# Patient Record
Sex: FEMALE | Race: WHITE | NOT HISPANIC OR LATINO | Employment: OTHER | URBAN - METROPOLITAN AREA
[De-identification: names, ages, dates, MRNs, and addresses within clinical notes are randomized per-mention and may not be internally consistent; named-entity substitution may affect disease eponyms.]

---

## 2017-01-11 ENCOUNTER — OFFICE VISIT (OUTPATIENT)
Dept: PHYSICAL MEDICINE AND REHAB | Facility: CLINIC | Age: 82
End: 2017-01-11
Payer: MEDICARE

## 2017-01-11 VITALS
DIASTOLIC BLOOD PRESSURE: 72 MMHG | HEIGHT: 65 IN | SYSTOLIC BLOOD PRESSURE: 165 MMHG | BODY MASS INDEX: 25.34 KG/M2 | WEIGHT: 152.13 LBS | RESPIRATION RATE: 14 BRPM | HEART RATE: 57 BPM

## 2017-01-11 DIAGNOSIS — M54.16 CHRONIC LUMBAR RADICULOPATHY: Primary | ICD-10-CM

## 2017-01-11 PROCEDURE — 99999 PR PBB SHADOW E&M-EST. PATIENT-LVL III: CPT | Mod: PBBFAC,,, | Performed by: PHYSICAL MEDICINE & REHABILITATION

## 2017-01-11 PROCEDURE — 1160F RVW MEDS BY RX/DR IN RCRD: CPT | Mod: S$GLB,,, | Performed by: PHYSICAL MEDICINE & REHABILITATION

## 2017-01-11 PROCEDURE — 1159F MED LIST DOCD IN RCRD: CPT | Mod: S$GLB,,, | Performed by: PHYSICAL MEDICINE & REHABILITATION

## 2017-01-11 PROCEDURE — 99214 OFFICE O/P EST MOD 30 MIN: CPT | Mod: S$GLB,,, | Performed by: PHYSICAL MEDICINE & REHABILITATION

## 2017-01-11 PROCEDURE — 3078F DIAST BP <80 MM HG: CPT | Mod: S$GLB,,, | Performed by: PHYSICAL MEDICINE & REHABILITATION

## 2017-01-11 PROCEDURE — 1157F ADVNC CARE PLAN IN RCRD: CPT | Mod: S$GLB,,, | Performed by: PHYSICAL MEDICINE & REHABILITATION

## 2017-01-11 PROCEDURE — 3077F SYST BP >= 140 MM HG: CPT | Mod: S$GLB,,, | Performed by: PHYSICAL MEDICINE & REHABILITATION

## 2017-01-11 PROCEDURE — 1125F AMNT PAIN NOTED PAIN PRSNT: CPT | Mod: S$GLB,,, | Performed by: PHYSICAL MEDICINE & REHABILITATION

## 2017-01-11 RX ORDER — MELOXICAM 7.5 MG/1
TABLET ORAL
COMMUNITY
Start: 2016-12-10 | End: 2017-01-11

## 2017-01-11 RX ORDER — GABAPENTIN 300 MG/1
300 CAPSULE ORAL 3 TIMES DAILY
Qty: 90 CAPSULE | Refills: 1 | Status: SHIPPED | OUTPATIENT
Start: 2017-01-11 | End: 2017-10-09 | Stop reason: SDUPTHER

## 2017-01-11 NOTE — MR AVS SNAPSHOT
[facility]iat[facility]  [address]  [place] [address]  Phone: [phone]  Fax: [phone]                  Clau Nunn   2017 12:40 PM   Office Visit    Description:  Female : 1935   Provider:  Elicia Mao MD   Department:  [facility] Physiat[facility]           Reason for Visit     Back Pain     Numbness           Diagnoses this Visit        Comments    Chronic lumbar radiculopathy    -  Primary            To Do List           Future Appointments        Provider Department Dept Phone    2017 8:00 AM Dona Manjarrez MD [facility] Rheumatology [phone]    2017 1:40 PM Jeanette Molina MD [facility] Internal Medicine [phone]      Goals (5 Years of Data)     None       These Medications        Disp Refills Start End    gabapentin (NEURONTIN) 300 MG capsule 90 capsule 1 2017     Take 1 capsule (300 mg total) by mouth 3 (three) times daily. - Oral    Pharmacy: [id number] - [place], [place] - [address] [facility] #: [phone]         [facility]     [facility] -  Assistance  Registered nurses in the [facility] provide clinical advisement, health education, appointment booking, and other advisory services.  Call for this free service at [phone].             Medications           Message regarding Medications     Verify the changes and/or additions to your medication regime listed below are the same as discussed with your clinician today.  If any of these changes or additions are incorrect, please notify your healthcare provider.        START taking these NEW medications        Refills    gabapentin (NEURONTIN) 300 MG capsule 1    Sig: Take 1 capsule (300 mg total) by mouth 3 (three) times daily.    Class: Normal    Route: Oral      STOP taking these medications     zolpidem (AMBIEN) 10 mg Tab TAKE 1 TABLET EVERY NIGHT AT BEDTIME AS NEEDED           Verify that the below list of medications is an  "accurate representation of the medications you are currently taking.  If none reported, the list may be blank. If incorrect, please contact your healthcare provider. Carry this list with you in case of emergency.           Current Medications     albuterol 90 mcg/actuation inhaler Inhale 2 puffs into the lungs 4 (four) times daily as needed.    alprazolam (XANAX) 0.25 MG tablet TAKE 1 TABLET BY MOUTH EVERY 6 HOURS AS NEEDED FOR ACUTE ANXIETY    ammonium lactate (AMLACTIN) 12 % lotion Apply to damp skin daily.    azelastine (ASTELIN) 137 mcg (0.1 %) nasal spray     cycloSPORINE (RESTASIS) 0.05 % ophthalmic emulsion Place 0.05 mLs (1 drop total) into both eyes 2 (two) times daily.    escitalopram oxalate (LEXAPRO) 20 MG tablet Take 1 tablet (20 mg total) by mouth once daily.    fluticasone (FLONASE) 50 mcg/actuation nasal spray USE 2 SPRAYS IN EACH NOSTRIL EVERY DAY    levothyroxine (SYNTHROID) 88 MCG tablet Take 1 tablet (88 mcg total) by mouth once daily.    losartan-hydrochlorothiazide 50-12.5 mg (HYZAAR) 50-12.5 mg per tablet TAKE 1 TABLET ONE TIME DAILY    meloxicam (MOBIC) 15 MG tablet TAKE ONE TABLET BY MOUTH DAILY    omeprazole (PRILOSEC) 20 MG capsule Take 2 capsules (40 mg total) by mouth 2 (two) times daily.    temazepam (RESTORIL) 15 mg Cap     cholecalciferol, vitamin D3, 50,000 unit capsule Take 1 capsule (50,000 Units total) by mouth once a week.    diclofenac sodium (VOLTAREN) 1 % Gel Apply 2 g topically 3 (three) times daily.    gabapentin (NEURONTIN) 300 MG capsule Take 1 capsule (300 mg total) by mouth 3 (three) times daily.    tolterodine (DETROL) 2 MG Tab Take 2 mg by mouth once daily.           Clinical Reference Information           Vital Signs - Last Recorded  Most recent update: 1/11/2017 12:44 PM by Glenys Acosta LPN    BP Pulse Resp Ht Wt BMI    (!) 165/72 (!) 57 14 5' 5" (1.651 m) 69 kg (152 lb 1.9 oz) 25.31 kg/m2      Blood Pressure          Most Recent Value    BP  (!)  165/72    "   Allergies as of 1/11/2017     No Known Drug Allergies      Immunizations Administered on Date of Encounter - 1/11/2017     None      Instructions      Exercises to Strengthen Your Lower Back  Strong lower back and abdominal muscles work together to support your spine. The exercises below will help strengthen the lower back. It is important that you begin exercising slowly and increase levels gradually.  Always begin any exercise program with stretching. If you feel pain while doing any of these exercises, stop and talk to your doctor about a more specific exercise program that better suits your condition.   Low back stretch  The point of stretching is to make you more flexible and increase your range of motion. Stretch only as much as you are able. Stretch slowly. Do not push your stretch to the limit. If at any point you feel pain while stretching, this is your (temporary) limit.  · Lie on your back with your knees bent and both feet on the ground.  · Slowly raise your left knee to your chest as you flatten your lower back against the floor. Hold for 5 seconds.  · Relax and repeat the exercise with your right knee.  · Do 10 of these exercises for each leg.  · Repeat hugging both knees to your chest at the same time.  Building lower back strength  Start your exercise routine with 10 to 30 minutes a day, 1 to 3 times a day.  Initial exercises  Lying on your back:  1. Ankle pumps: Move your foot up and down, towards your head, and then away. Repeat 10 times with each foot.  2. Heel slides: Slowly bend your knee, drawing the heel of your foot towards you. Then slide your heel/foot from you, straightening your knee. Do not lift your foot off the floor (this is not a leg lift).  3. Abdominal contraction: Bend your knees and put your hands on your stomach. Tighten your stomach muscles. Hold for 5 seconds, then relax. Repeat 10 times.  4. Straight leg raise: Bend one leg at the knee and keep the other leg straight.  Tighten your stomach muscles. Slowly lift your straight leg 6 to 12 inches off the floor and hold for up to 5 seconds. Repeat 10 times on each side.  Standin. Wall squats: Stand with your back against the wall. Move your feet about 12 inches away from the wall. Tighten your stomach muscles, and slowly bend your knees until they are at about a 45 degree angle. Do not go down too far. Hold about 5 seconds. Then slowly return to your starting position. Repeat 10 times.  2. Heel raises: Stand facing the wall. Slowly raise the heels of your feet up and down, while keeping your toes on the floor. If you have trouble balancing, you can touch the wall with your hands. Repeat 10 times.  More advanced exercises  When you feel comfortable enough, try these exercises.  1. Kneeling lumbar extension: Begin on your hands and knees. At the same time, raise and straighten your right arm and left leg until they are parallel to the ground. Hold for 2 seconds and come back slowly to a starting position. Repeat with left arm and right leg, alternating 10 times.  2. Prone lumbar extension: Lie face down, arms extended overhead, palms on the floor. At the same time, raise your right arm and left leg as high as comfortably possible. Hold for 10 seconds and slowly return to start. Repeat with left arm and right leg, alternating 10 times. Gradually build up to 20 times. (Advanced: Repeat this exercise raising both arms and both legs a few inches off the floor at the same time. Hold for 5 seconds and release.)  3. Pelvic tilt: Lie on the floor on your back with your knees bent at 90 degrees. Your feet should be flat on the floor. Inhale, exhale, then slowly contract your abdominal muscles bringing your navel toward your spine. Let your pelvis rock back until your lower back is flat on the floor. Hold for 10 seconds while breathing smoothly.  4. Abdominal crunch: Perform a pelvic tilt (above) flattening your lower back against the floor.  Holding the tension in your abdominal muscles, take another breath and raise your shoulder blades off the ground (this is not a full sit-up). Keep your head in line with your body (dont bend your neck forward). Hold for 2 seconds, then slowly lower.  © 1949-3815 [employer]. [address] [place] [address]. All rights reserved. This information is not intended as a substitute for professional medical care. Always follow your healthcare professional's instructions.        Back Safety: Pushing and Pulling  Pushing can be hard on your back. Pulling can be even harder. So, push rather than pull when you can. Follow the tips on this sheet to help protect your back.    Pushing a light object  · Bend your knees slightly. Keep your ears, shoulders, and hips in line.  · Tighten your stomach muscles.  · Lean in slightly toward the object youre pushing.  · Use your legs and the weight of your body to move the object.  · Take small steps.    Pushing a heavy object  · Tighten your stomach muscles.  · Bend your knees.  · Lean in toward the object youre pushing. The heavier the object, the more you should lean.  · Try not to hunch your back: Keep it straight.  · Use your legs and the weight of your body to move the object.  · Take small steps.    Pulling  · Face the object youre pulling.  · Keep your knees slightly bent.  · Step backward and pull the object with you.  · Dont twist your body. If youre using one hand, putting the other hand on your hip can help keep you from twisting.  · When pulling heavy objects, lean back, bending at the knees and hips. Keep your arms straight. Let your body weight pull the load.  © 4604-6789 [employer]. [address] [place] [address]. All rights reserved. This information is not intended as a substitute for professional medical care. Always follow your healthcare professional's instructions.        Back Safety: Basics of Good Posture  Good  posture helps protect you from injury. It also increases your comfort. Aim for good posture throughout the day.    Check your posture  The human body works best when it is properly aligned. To improve your standing posture, follow these steps:  · Take a moment to close your eyes and feel your body. Then breathe deeply and relax your shoulders, hips, and knees.  · Now, from the very top of your head, lift up just a bit. Think of a line linking your ears, shoulders, hips, and ankles. Adjust your body to follow the line. You may need to relax your hips and tuck your buttocks under a bit.  · Next, take a look at yourself in a mirror. Is one ear, shoulder, or hip higher than the other? They should be level.  Check how you sit  When you sit properly, pressure on your back is reduced. Try these steps:  · Sit so that the curve of your lower back fits easily against the chair. Keep your gaze level.  · Support your feet. They should be flat on the floor or on a footrest. Your knees should be level with your hips.  · Adjust the chair height as needed. Sit so your forearms are level with the work surface.  Proper posture helps  When your back is aligned, its more likely to stay safe throughout the day.  · Standing in place. Rest one foot on a stool or low box to ease pressure on your lower back. Switch feet often. If you can, adjust the height of your work surface so your neck and shoulders arent under strain.  · Driving. Sit close enough to the steering wheel to keep your knees slightly bent. For comfort, your knees should be level with your hips or just a bit lower. Sit as straight as you can. The curve of your lower back should be fully supported.  · Walking. Stand tall and walk with your head up. Let your arms swing while you walk. This helps relax muscles. Wear shoes that fit and support your feet. If you will be standing or walking for a long time, dont wear high heels.  · Sitting and sleeping. Choose your furniture  with care. Make sure its not causing or increasing your back pain. Chairs should allow for comfortable, correct sitting posture. Use pillows for added support if needed. Your bed should support your backs natural curves without being too hard or too soft.  © 5125-1608 [employer]. [address] [place] [address]. All rights reserved. This information is not intended as a substitute for professional medical care. Always follow your healthcare professional's instructions.        Back Safety: Poor Posture Hurts  An unhealthy spine often starts with bad habits. Poor movement patterns and posture problems are common causes of back pain. Disk, bone, nerve, and soft tissue problems can all be affected by poor posture. They can lead to pain, stiffness, and other symptoms.    Poor posture backfires  Poor posture can cause pain. Too much slouching puts increased pressure on the disks. An excessive lumbar curve can overload and inflame the vertebrae. As a result, the back muscles may tighten or spasm to splint and protect the spine. This adds to the pain you feel.    Proper posture: The key to safe movement  Your spine bears your weight throughout the day. This is true whether youre sleeping, standing, or bending. Certain positions strain your spine more than others. But by maintaining proper posture in all positions, you can reduce the stress on your spine.       To improve your standing posture, follow these steps:  · Breathe deeply.  · Relax your shoulders, hips, and ankles. · Think of the ears, shoulders, hips, and ankles as a series of dots. Now, adjust your body to connect the dots in a straight line.  · Tuck your buttocks in just a bit if you need to.      © 5578-1881 [employer]. [address] [place] [address]. All rights reserved. This information is not intended as a substitute for professional medical care. Always follow your healthcare professional's  instructions.        Caring for Your Back Throughout the Day  Take care of your back throughout the day. You will likely have fewer back problems if you do. Try to warm up before you move. Shift positions often. Also do your best to form healthy habits.    Warm up for the day  Do a few slow, catlike stretches before starting your day. This simple warmup can soften your disks, stretch your back muscles, and help prevent injuries.  Shift positions often  At work and at home, change positions often. This helps keep your body from getting stiff. Stand up or lean back while you sit. If you can, get up and move every 1/2 hour.  Form healthy habits  Here are some suggestions:   · Keep a healthy weight. When you weigh too much, your back is under excess strain. But losing just a few extra pounds can help a lot.  · Try not to overeat. Learn about serving sizes. The size of a serving depends on the food and the food group. Many foods list serving sizes on the labels.  · Handle minor aches with cold and heat. Apply cold the first 24 to 48 hours. Use heat after that. Always place a thin cloth between your skin and the source of cold or heat.  · Take medicines as directed. This helps keep pain under control. Always read labels, and call your healthcare provider or pharmacist if you have any questions.  Walk each day  A daily walk keeps your back and thigh muscles stretched and strong. This gives your back better support. Be sure to walk with your spines three curves aligned, by keeping your head, hips, and toes connected by a vertical line.   © 7682-6104 [employer]. [address] [place] [address], [place] [address]. All rights reserved. This information is not intended as a substitute for professional medical care. Always follow your healthcare professional's instructions.        Back Safety: Bending  Bending can strain or even injure your back. Follow the tips below to move safely and protect your back as you perform  everyday activities.  Bending over    · Keep your feet shoulder-width apart.  · Move your whole body as one unit.  · Bend at your hips and knees, not at your waist.  · Flatten your stomach and tighten your leg muscles.  · To keep your spine straight, let your buttocks move out behind you. Dont try to tuck them under.  · If you need to, place one hand on a sturdy object for support.     Bending to the floor  · Lower yourself to one knee. If you can, rest one hand on a sturdy object to help lower yourself.  · Rest one arm on your raised knee.  · Dont bend at the waist.  · Do not hunch your back or neck to reach to the floor. Instead, bend more at your hips and knees to get closer.  © 5565-2662 [employer]. [address] [place] [address]. All rights reserved. This information is not intended as a substitute for professional medical care. Always follow your healthcare professional's instructions.        Back Safety: Sleeping Positions  Good posture protects your back when you sit, stand, and walk. It is also important while sleeping. Keep your ears, shoulders, and hips in line. Try the tips below. Also, be sure to follow any guidelines from your health care provider.  If you lie on your back  Safe sleeping     Place pillows under your legs from your thighs to your ankles.     Ask your healthcare provider how firm your mattress should be.  · Find a position that keeps your back aligned and comfortable.  · Fill gaps between your body and the mattress with pillows.  · Never sleep on your back without bending your legs.  · Never sleep on your stomach.  If you lie on your side  Turning in bed     Support your upper body and top leg with pillows.    · If you change positions, you will need to move your pillows. This can become so natural that you hardly wake up.  · When you turn in bed, move your whole body as one unit.  · Tighten your stomach muscles. Bend your knees slightly toward your  chest.  · Roll to one side, keeping your ears, shoulders, and hips in line. Keep a pillow between your legs.  · Be careful not to bend or twist at the waist.  © 6135-5657 [employer]. [address] [place] [address], [place] [address]. All rights reserved. This information is not intended as a substitute for professional medical care. Always follow your healthcare professional's instructions.        Back Basics: A Healthy Spine  A healthy spine supports the body while letting it move freely. It does this with the help of three natural curves. Strong, flexible muscles help, too. They support the spine by keeping its curves properly aligned. The disks that cushion the bones of your spine also play a role in back fitness.    Three natural curves  The spine is made of bones (vertebrae) and pads of soft tissue (disks). These parts are arranged in three curves: cervical, thoracic, and lumbar. When properly aligned, these curves keep your body balanced. They also support your body when you move. By distributing your weight throughout your spine, the curves make back injuries less likely.  Strong, flexible muscles  Strong, flexible back muscles help support the three curves of the spine. They do so by holding the vertebrae and disks in proper alignment. Strong, flexible abdominal, hip, and leg muscles also reduce strain on the back.  The lumbar curve  The lumbar curve is the hardest-working part of the spine. It carries more weight and moves the most. Aligning this curve helps prevent damage to vertebrae, disks, and other parts of the spine.  Cushioning disks  Disks are the soft pads of tissue between the vertebrae. The disks absorb shock caused by movement. Each disk has a spongy center (nucleus) and a tougher outer ring (annulus). Movement within the nucleus allows the vertebrae to rock back and forth on the disks. This provides the flexibility needed to bend and move.       © 2000-2016 [employer] [facility] [place] [address]. All rights reserved. This information is not intended as a substitute for professional medical care. Always follow your healthcare professional's instructions.        Exercises to Prevent Falls  Certain types of exercises may help make you less likely to fall. Try the ones below. Or do other exercises that your health care provider suggests. Depending on your health, you may need to start slowly. Don't let that stop you. Even small amounts of exercise can help you. Be sure to talk to your health care provider before starting any exercise program.    Improve balance  Many types of exercise can help improve balance. Deion chi and yoga are good examples. Here's another one to try. You can do it anytime and almost anywhere.  · Stand next to a counter or solid support.  · Push yourself up onto your tiptoes.  · Hold for 5 seconds. If you start to lose your balance, hold on to the counter.  · Rest and repeat 5 times. Work up to holding for 20 to 30 seconds, if you can.    Increase flexibility  Being more flexible makes it easier for you to move around safely. Try exercises like the seated hamstring stretch.  · Sit in a chair and put one foot on a stool.  · Straighten your leg and reach with both hands down either side of your leg. Reach as far down your leg as you can.  · Hold for about 20 seconds.  · Go back to the starting position. Then repeat 5 times. Switch legs.    Build strength  "Resistance" exercises help build strength. You can do them without equipment. Or you can use weights, elastic bands, or special machines. One such exercise is called the biceps curl. You can hold a 1-pound weight or even a can of soup. Do this exercise at least 3 times a week. Strive for every day.  · Sit up straight in a chair.  · Keep your elbow close to your body and your wrist straight.  · Bend your arm, moving your hand up to your shoulder. Then slowly lower your arm.  · Repeat 5 times. Switch to " the other arm.    Build your staying power  Aerobic exercises make your heart and lungs stronger so you can keep moving longer. Walking and swimming are two of the best types of exercises you can do. Using a stationary bike is great, too. Find an aerobic exercise that you enjoy. Start slowly and build up. Even 5 minutes is helpful. Aim for a goal of 30 minutes, at least 3 times a week. You don't have to do 30 minutes in 1 session. Break it up and walk a little throughout the day.     More helpful tips  · Start easy. Slowly work up to doing more.  · Talk with your health care provider about the best exercises for you.  · Call senior centers or health clubs about exercise programs.  · If needed, have a family member watch you walk every so often to check your stability.  · Exercise with a friend. Choose an activity you both enjoy.  · Consider kaycee chi or yoga to strengthen your balance.  · Try exercises that you can do anytime, anywhere. Here are 2 examples. Have someone with you when you first try these:  ¨ Practice walking by placing 1 foot right in front of the other.  ¨ Stand up and sit down 10 times. Repeat this throughout the day.   © 1874-1481 [employer]. [address] [place] [address], [place] [address]. All rights reserved. This information is not intended as a substitute for professional medical care. Always follow your healthcare professional's instructions.

## 2017-01-11 NOTE — PROGRESS NOTES
PM&R NEW PATIENT HISTORY & PHYSICAL :    Referring Physician:    Chief Complaint   Patient presents with    Back Pain     low back pain, to the legs, burnumng    Numbness     bilateral leg numbness/tingling       HPI: This is a 81 y.o.  female being seen in clinic today for evaluation of chronic leg pain with numbness, tingling, and burning into her legs to her feet.  Her symptoms are worse at night when trying to rest.  She has tried multiple ESIs and other interventional procedures with Dr Palmer in the past without much relief.  She has a history of thoracic surgery due to major vertebral fracture after [place] [facility] [place]  She denies major back pain other than stiffness.  Her major complaint is leg numbness tingling and limited mobility.  She tries to remain as active as possible and feels her legs are limiting her.     History obtained from patient    Functional History:  Walking: limited at times  Transfers: Independent  Assistive devices: No  Power mobility: No  Falls: yes in past    Needs help with:  Nothing - all ADLS normal    Review of Systems:     General- denies lethargy, weight change, fever, chills  Head/neck- denies swallowing difficulties  ENT- denies hearing changes  Cardiovascular-denies chest pain  Pulmonary- denies shortness of breath  GI- denies constipation or bowel incontinence  - denies bladder incontinence  Skin- denies wounds or rashes  Musculoskeletal- +/-weakness, +pain  Neurologic- +numbness and tingling  Psychiatric- denies depressive or psychotic features, denies anxiety  Lymphatic-denies swelling  Endocrine- denies hypoglycemic symptoms/DM history    Physical Examination:  General: Well developed, well nourished female, NAD    Spinal Examination: CERVICAL  Active ROM is within normal limits.  Inspection: No deformity of spinal alignment.    Spinal Examination: LUMBAR or THORACIC  Active ROM is limited at endranges  Inspection: No deformity of spinal  alignment.  No palpable olisthesis.  Palpation: No vertebral tenderness to percussion.  Mild ttp at si joints and paraspinals  SLR Test (seated supine):negative  bilaterally  Able to stand on heels and toes    Bilateral Upper and Lower Extremities:  Pulses are 2+ at radial,bilaterally.  Shoulder/Elbow/Wrist/Hand ROM   Hip/Knee/Ankle ROM wnl  Bilateral Extremities show normal capillary refill.  No signs of cyanosis, rubor, edema, skin changes, or dysvascular changes of appendages.  Nails appear intact.    Neurological Exam:  Cranial Nerves:  II-XII grossly intact    Manual Muscle Testing: (Motor 5=normal)    RIGHT Lower extremity: Hip flexion 5/5, Hip Abduction 4/5, Knee extension 5/5, Knee flexion 5/5, Ankle dorsiflexion 5/5, Extensor hallucis longus 5/5, Ankle plantarflexion 5/5  LEFT Lower extremity:  Hip flexion 5/5, Hip Abduction 4/5, Knee extension 5/5, Knee flexion 5/5, Ankle dorsiflexion 5/5, Extensor hallucis longus 5/5, Ankle plantarflexion 5/5    No focal atrophy is noted of either upper or lower extremity.    Bilateral Reflexes:hypo at patellar  No clonus at knee or ankle.    Sensation: tested to light touch  - intact in legs    Gait: slow, normal stride, good arm swing, no obvious hip weakness    Cerebellar: tandem gait.     IMPRESSION/PLAN: This is a 81 y.o.  female with lumbar DJD/DDD, chronic lumbar radiculopathy    1. Try gabapentin 300mg with titration to TID if tolerated  2. Back exercises and fall prevention exercises provided to patient  3. May consider formal PT with traction if not responding to [facility]  4. Pt will contact     Elicia Mao M.D.  Physical Medicine and Rehab

## 2017-01-11 NOTE — PATIENT INSTRUCTIONS
Exercises to Strengthen Your Lower Back  Strong lower back and abdominal muscles work together to support your spine. The exercises below will help strengthen the lower back. It is important that you begin exercising slowly and increase levels gradually.  Always begin any exercise program with stretching. If you feel pain while doing any of these exercises, stop and talk to your doctor about a more specific exercise program that better suits your condition.   Low back stretch  The point of stretching is to make you more flexible and increase your range of motion. Stretch only as much as you are able. Stretch slowly. Do not push your stretch to the limit. If at any point you feel pain while stretching, this is your (temporary) limit.  · Lie on your back with your knees bent and both feet on the ground.  · Slowly raise your left knee to your chest as you flatten your lower back against the floor. Hold for 5 seconds.  · Relax and repeat the exercise with your right knee.  · Do 10 of these exercises for each leg.  · Repeat hugging both knees to your chest at the same time.  Building lower back strength  Start your exercise routine with 10 to 30 minutes a day, 1 to 3 times a day.  Initial exercises  Lying on your back:  1. Ankle pumps: Move your foot up and down, towards your head, and then away. Repeat 10 times with each foot.  2. Heel slides: Slowly bend your knee, drawing the heel of your foot towards you. Then slide your heel/foot from you, straightening your knee. Do not lift your foot off the floor (this is not a leg lift).  3. Abdominal contraction: Bend your knees and put your hands on your stomach. Tighten your stomach muscles. Hold for 5 seconds, then relax. Repeat 10 times.  4. Straight leg raise: Bend one leg at the knee and keep the other leg straight. Tighten your stomach muscles. Slowly lift your straight leg 6 to 12 inches off the floor and hold for up to 5 seconds. Repeat 10 times on each  side.  Standin. Wall squats: Stand with your back against the wall. Move your feet about 12 inches away from the wall. Tighten your stomach muscles, and slowly bend your knees until they are at about a 45 degree angle. Do not go down too far. Hold about 5 seconds. Then slowly return to your starting position. Repeat 10 times.  2. Heel raises: Stand facing the wall. Slowly raise the heels of your feet up and down, while keeping your toes on the floor. If you have trouble balancing, you can touch the wall with your hands. Repeat 10 times.  More advanced exercises  When you feel comfortable enough, try these exercises.  1. Kneeling lumbar extension: Begin on your hands and knees. At the same time, raise and straighten your right arm and left leg until they are parallel to the ground. Hold for 2 seconds and come back slowly to a starting position. Repeat with left arm and right leg, alternating 10 times.  2. Prone lumbar extension: Lie face down, arms extended overhead, palms on the floor. At the same time, raise your right arm and left leg as high as comfortably possible. Hold for 10 seconds and slowly return to start. Repeat with left arm and right leg, alternating 10 times. Gradually build up to 20 times. (Advanced: Repeat this exercise raising both arms and both legs a few inches off the floor at the same time. Hold for 5 seconds and release.)  3. Pelvic tilt: Lie on the floor on your back with your knees bent at 90 degrees. Your feet should be flat on the floor. Inhale, exhale, then slowly contract your abdominal muscles bringing your navel toward your spine. Let your pelvis rock back until your lower back is flat on the floor. Hold for 10 seconds while breathing smoothly.  4. Abdominal crunch: Perform a pelvic tilt (above) flattening your lower back against the floor. Holding the tension in your abdominal muscles, take another breath and raise your shoulder blades off the ground (this is not a full sit-up).  Keep your head in line with your body (dont bend your neck forward). Hold for 2 seconds, then slowly lower.  © 4613-7898 [employer]. [address] [place] [address]. All rights reserved. This information is not intended as a substitute for professional medical care. Always follow your healthcare professional's instructions.        Back Safety: Pushing and Pulling  Pushing can be hard on your back. Pulling can be even harder. So, push rather than pull when you can. Follow the tips on this sheet to help protect your back.    Pushing a light object  · Bend your knees slightly. Keep your ears, shoulders, and hips in line.  · Tighten your stomach muscles.  · Lean in slightly toward the object youre pushing.  · Use your legs and the weight of your body to move the object.  · Take small steps.    Pushing a heavy object  · Tighten your stomach muscles.  · Bend your knees.  · Lean in toward the object youre pushing. The heavier the object, the more you should lean.  · Try not to hunch your back: Keep it straight.  · Use your legs and the weight of your body to move the object.  · Take small steps.    Pulling  · Face the object youre pulling.  · Keep your knees slightly bent.  · Step backward and pull the object with you.  · Dont twist your body. If youre using one hand, putting the other hand on your hip can help keep you from twisting.  · When pulling heavy objects, lean back, bending at the knees and hips. Keep your arms straight. Let your body weight pull the load.  © 1635-2246 [employer]. [address] [place] [address]. All rights reserved. This information is not intended as a substitute for professional medical care. Always follow your healthcare professional's instructions.        Back Safety: Basics of Good Posture  Good posture helps protect you from injury. It also increases your comfort. Aim for good posture throughout the day.    Check your posture  The  human body works best when it is properly aligned. To improve your standing posture, follow these steps:  · Take a moment to close your eyes and feel your body. Then breathe deeply and relax your shoulders, hips, and knees.  · Now, from the very top of your head, lift up just a bit. Think of a line linking your ears, shoulders, hips, and ankles. Adjust your body to follow the line. You may need to relax your hips and tuck your buttocks under a bit.  · Next, take a look at yourself in a mirror. Is one ear, shoulder, or hip higher than the other? They should be level.  Check how you sit  When you sit properly, pressure on your back is reduced. Try these steps:  · Sit so that the curve of your lower back fits easily against the chair. Keep your gaze level.  · Support your feet. They should be flat on the floor or on a footrest. Your knees should be level with your hips.  · Adjust the chair height as needed. Sit so your forearms are level with the work surface.  Proper posture helps  When your back is aligned, its more likely to stay safe throughout the day.  · Standing in place. Rest one foot on a stool or low box to ease pressure on your lower back. Switch feet often. If you can, adjust the height of your work surface so your neck and shoulders arent under strain.  · Driving. Sit close enough to the steering wheel to keep your knees slightly bent. For comfort, your knees should be level with your hips or just a bit lower. Sit as straight as you can. The curve of your lower back should be fully supported.  · Walking. Stand tall and walk with your head up. Let your arms swing while you walk. This helps relax muscles. Wear shoes that fit and support your feet. If you will be standing or walking for a long time, dont wear high heels.  · Sitting and sleeping. Choose your furniture with care. Make sure its not causing or increasing your back pain. Chairs should allow for comfortable, correct sitting posture. Use pillows  for added support if needed. Your bed should support your backs natural curves without being too hard or too soft.  © 1764-3432 [employer]. [address] [place] [address]. All rights reserved. This information is not intended as a substitute for professional medical care. Always follow your healthcare professional's instructions.        Back Safety: Poor Posture Hurts  An unhealthy spine often starts with bad habits. Poor movement patterns and posture problems are common causes of back pain. Disk, bone, nerve, and soft tissue problems can all be affected by poor posture. They can lead to pain, stiffness, and other symptoms.    Poor posture backfires  Poor posture can cause pain. Too much slouching puts increased pressure on the disks. An excessive lumbar curve can overload and inflame the vertebrae. As a result, the back muscles may tighten or spasm to splint and protect the spine. This adds to the pain you feel.    Proper posture: The key to safe movement  Your spine bears your weight throughout the day. This is true whether youre sleeping, standing, or bending. Certain positions strain your spine more than others. But by maintaining proper posture in all positions, you can reduce the stress on your spine.       To improve your standing posture, follow these steps:  · Breathe deeply.  · Relax your shoulders, hips, and ankles. · Think of the ears, shoulders, hips, and ankles as a series of dots. Now, adjust your body to connect the dots in a straight line.  · Tuck your buttocks in just a bit if you need to.      © 2000-2016 [employer]. [address] [place] [address]. All rights reserved. This information is not intended as a substitute for professional medical care. Always follow your healthcare professional's instructions.        Caring for Your Back Throughout the Day  Take care of your back throughout the day. You will likely have fewer back problems if you  do. Try to warm up before you move. Shift positions often. Also do your best to form healthy habits.    Warm up for the day  Do a few slow, catlike stretches before starting your day. This simple warmup can soften your disks, stretch your back muscles, and help prevent injuries.  Shift positions often  At work and at home, change positions often. This helps keep your body from getting stiff. Stand up or lean back while you sit. If you can, get up and move every 1/2 hour.  Form healthy habits  Here are some suggestions:   · Keep a healthy weight. When you weigh too much, your back is under excess strain. But losing just a few extra pounds can help a lot.  · Try not to overeat. Learn about serving sizes. The size of a serving depends on the food and the food group. Many foods list serving sizes on the labels.  · Handle minor aches with cold and heat. Apply cold the first 24 to 48 hours. Use heat after that. Always place a thin cloth between your skin and the source of cold or heat.  · Take medicines as directed. This helps keep pain under control. Always read labels, and call your healthcare provider or pharmacist if you have any questions.  Walk each day  A daily walk keeps your back and thigh muscles stretched and strong. This gives your back better support. Be sure to walk with your spines three curves aligned, by keeping your head, hips, and toes connected by a vertical line.   © 3810-7233 [employer]. [address] [place] [address], [place] [address]. All rights reserved. This information is not intended as a substitute for professional medical care. Always follow your healthcare professional's instructions.        Back Safety: Bending  Bending can strain or even injure your back. Follow the tips below to move safely and protect your back as you perform everyday activities.  Bending over    · Keep your feet shoulder-width apart.  · Move your whole body as one unit.  · Bend at your hips and knees, not at  your waist.  · Flatten your stomach and tighten your leg muscles.  · To keep your spine straight, let your buttocks move out behind you. Dont try to tuck them under.  · If you need to, place one hand on a sturdy object for support.     Bending to the floor  · Lower yourself to one knee. If you can, rest one hand on a sturdy object to help lower yourself.  · Rest one arm on your raised knee.  · Dont bend at the waist.  · Do not hunch your back or neck to reach to the floor. Instead, bend more at your hips and knees to get closer.  © 5509-5215 [employer]. [address] [place] [address], [place] [address]. All rights reserved. This information is not intended as a substitute for professional medical care. Always follow your healthcare professional's instructions.        Back Safety: Sleeping Positions  Good posture protects your back when you sit, stand, and walk. It is also important while sleeping. Keep your ears, shoulders, and hips in line. Try the tips below. Also, be sure to follow any guidelines from your health care provider.  If you lie on your back  Safe sleeping     Place pillows under your legs from your thighs to your ankles.     Ask your healthcare provider how firm your mattress should be.  · Find a position that keeps your back aligned and comfortable.  · Fill gaps between your body and the mattress with pillows.  · Never sleep on your back without bending your legs.  · Never sleep on your stomach.  If you lie on your side  Turning in bed     Support your upper body and top leg with pillows.    · If you change positions, you will need to move your pillows. This can become so natural that you hardly wake up.  · When you turn in bed, move your whole body as one unit.  · Tighten your stomach muscles. Bend your knees slightly toward your chest.  · Roll to one side, keeping your ears, shoulders, and hips in line. Keep a pillow between your legs.  · Be careful not to bend or twist at the waist.  ©  1985-9652 [employer]. [address] [place] [address]. All rights reserved. This information is not intended as a substitute for professional medical care. Always follow your healthcare professional's instructions.        Back Basics: A Healthy Spine  A healthy spine supports the body while letting it move freely. It does this with the help of three natural curves. Strong, flexible muscles help, too. They support the spine by keeping its curves properly aligned. The disks that cushion the bones of your spine also play a role in back fitness.    Three natural curves  The spine is made of bones (vertebrae) and pads of soft tissue (disks). These parts are arranged in three curves: cervical, thoracic, and lumbar. When properly aligned, these curves keep your body balanced. They also support your body when you move. By distributing your weight throughout your spine, the curves make back injuries less likely.  Strong, flexible muscles  Strong, flexible back muscles help support the three curves of the spine. They do so by holding the vertebrae and disks in proper alignment. Strong, flexible abdominal, hip, and leg muscles also reduce strain on the back.  The lumbar curve  The lumbar curve is the hardest-working part of the spine. It carries more weight and moves the most. Aligning this curve helps prevent damage to vertebrae, disks, and other parts of the spine.  Cushioning disks  Disks are the soft pads of tissue between the vertebrae. The disks absorb shock caused by movement. Each disk has a spongy center (nucleus) and a tougher outer ring (annulus). Movement within the nucleus allows the vertebrae to rock back and forth on the disks. This provides the flexibility needed to bend and move.       © 1343-6710 [employer]. [address] [place], [place] [id number]. All rights reserved. This information is not intended as a substitute for professional medical care. Always follow your  "healthcare professional's instructions.        Exercises to Prevent Falls  Certain types of exercises may help make you less likely to fall. Try the ones below. Or do other exercises that your health care provider suggests. Depending on your health, you may need to start slowly. Don't let that stop you. Even small amounts of exercise can help you. Be sure to talk to your health care provider before starting any exercise program.    Improve balance  Many types of exercise can help improve balance. Deion chi and yoga are good examples. Here's another one to try. You can do it anytime and almost anywhere.  · Stand next to a counter or solid support.  · Push yourself up onto your tiptoes.  · Hold for 5 seconds. If you start to lose your balance, hold on to the counter.  · Rest and repeat 5 times. Work up to holding for 20 to 30 seconds, if you can.    Increase flexibility  Being more flexible makes it easier for you to move around safely. Try exercises like the seated hamstring stretch.  · Sit in a chair and put one foot on a stool.  · Straighten your leg and reach with both hands down either side of your leg. Reach as far down your leg as you can.  · Hold for about 20 seconds.  · Go back to the starting position. Then repeat 5 times. Switch legs.    Build strength  "Resistance" exercises help build strength. You can do them without equipment. Or you can use weights, elastic bands, or special machines. One such exercise is called the biceps curl. You can hold a 1-pound weight or even a can of soup. Do this exercise at least 3 times a week. Strive for every day.  · Sit up straight in a chair.  · Keep your elbow close to your body and your wrist straight.  · Bend your arm, moving your hand up to your shoulder. Then slowly lower your arm.  · Repeat 5 times. Switch to the other arm.    Build your staying power  Aerobic exercises make your heart and lungs stronger so you can keep moving longer. Walking and swimming are two of " the best types of exercises you can do. Using a stationary bike is great, too. Find an aerobic exercise that you enjoy. Start slowly and build up. Even 5 minutes is helpful. Aim for a goal of 30 minutes, at least 3 times a week. You don't have to do 30 minutes in 1 session. Break it up and walk a little throughout the day.     More helpful tips  · Start easy. Slowly work up to doing more.  · Talk with your health care provider about the best exercises for you.  · Call senior centers or health clubs about exercise programs.  · If needed, have a family member watch you walk every so often to check your stability.  · Exercise with a friend. Choose an activity you both enjoy.  · Consider kaycee chi or yoga to strengthen your balance.  · Try exercises that you can do anytime, anywhere. Here are 2 examples. Have someone with you when you first try these:  ¨ Practice walking by placing 1 foot right in front of the other.  ¨ Stand up and sit down 10 times. Repeat this throughout the day.   © 9275-1845 [employer]. [address] [place] [address], [place] [address]. All rights reserved. This information is not intended as a substitute for professional medical care. Always follow your healthcare professional's instructions.

## 2017-01-17 ENCOUNTER — LAB VISIT (OUTPATIENT)
Dept: LAB | Facility: HOSPITAL | Age: 82
End: 2017-01-17
Attending: INTERNAL MEDICINE
Payer: MEDICARE

## 2017-01-17 ENCOUNTER — OFFICE VISIT (OUTPATIENT)
Dept: RHEUMATOLOGY | Facility: CLINIC | Age: 82
End: 2017-01-17
Payer: MEDICARE

## 2017-01-17 VITALS
HEART RATE: 64 BPM | BODY MASS INDEX: 25.78 KG/M2 | HEIGHT: 65 IN | SYSTOLIC BLOOD PRESSURE: 161 MMHG | DIASTOLIC BLOOD PRESSURE: 82 MMHG | WEIGHT: 154.75 LBS

## 2017-01-17 DIAGNOSIS — E55.9 VITAMIN D DEFICIENCY DISEASE: ICD-10-CM

## 2017-01-17 DIAGNOSIS — Z87.81 HISTORY OF FRACTURE OF RIGHT HIP: ICD-10-CM

## 2017-01-17 DIAGNOSIS — M81.0 OSTEOPOROSIS, POSTMENOPAUSAL: Primary | Chronic | ICD-10-CM

## 2017-01-17 DIAGNOSIS — N18.30 STAGE 3 CHRONIC KIDNEY DISEASE: ICD-10-CM

## 2017-01-17 DIAGNOSIS — M81.0 OSTEOPOROSIS, POSTMENOPAUSAL: Chronic | ICD-10-CM

## 2017-01-17 LAB
25(OH)D3+25(OH)D2 SERPL-MCNC: 51 NG/ML
ALBUMIN SERPL BCP-MCNC: 3.6 G/DL
ALP SERPL-CCNC: 58 U/L
ALT SERPL W/O P-5'-P-CCNC: 12 U/L
ANION GAP SERPL CALC-SCNC: 11 MMOL/L
AST SERPL-CCNC: 21 U/L
BASOPHILS # BLD AUTO: 0.02 K/UL
BASOPHILS NFR BLD: 0.5 %
BILIRUB SERPL-MCNC: 0.7 MG/DL
BUN SERPL-MCNC: 16 MG/DL
CALCIUM SERPL-MCNC: 9.6 MG/DL
CHLORIDE SERPL-SCNC: 105 MMOL/L
CO2 SERPL-SCNC: 25 MMOL/L
CREAT SERPL-MCNC: 1 MG/DL
DIFFERENTIAL METHOD: ABNORMAL
EOSINOPHIL # BLD AUTO: 0.1 K/UL
EOSINOPHIL NFR BLD: 3.5 %
ERYTHROCYTE [DISTWIDTH] IN BLOOD BY AUTOMATED COUNT: 13 %
EST. GFR  (AFRICAN AMERICAN): >60 ML/MIN/1.73 M^2
EST. GFR  (NON AFRICAN AMERICAN): 53 ML/MIN/1.73 M^2
GLUCOSE SERPL-MCNC: 118 MG/DL
HCT VFR BLD AUTO: 44.4 %
HGB BLD-MCNC: 14.9 G/DL
LYMPHOCYTES # BLD AUTO: 1.3 K/UL
LYMPHOCYTES NFR BLD: 32.6 %
MCH RBC QN AUTO: 32.1 PG
MCHC RBC AUTO-ENTMCNC: 33.6 %
MCV RBC AUTO: 96 FL
MONOCYTES # BLD AUTO: 0.3 K/UL
MONOCYTES NFR BLD: 7.2 %
NEUTROPHILS # BLD AUTO: 2.3 K/UL
NEUTROPHILS NFR BLD: 56.2 %
PLATELET # BLD AUTO: 206 K/UL
PMV BLD AUTO: 10.8 FL
POTASSIUM SERPL-SCNC: 4.4 MMOL/L
PROT SERPL-MCNC: 6.5 G/DL
RBC # BLD AUTO: 4.64 M/UL
SODIUM SERPL-SCNC: 141 MMOL/L
WBC # BLD AUTO: 4.05 K/UL

## 2017-01-17 PROCEDURE — 80053 COMPREHEN METABOLIC PANEL: CPT | Mod: PO

## 2017-01-17 PROCEDURE — 99214 OFFICE O/P EST MOD 30 MIN: CPT | Mod: S$GLB,,, | Performed by: INTERNAL MEDICINE

## 2017-01-17 PROCEDURE — 1157F ADVNC CARE PLAN IN RCRD: CPT | Mod: S$GLB,,, | Performed by: INTERNAL MEDICINE

## 2017-01-17 PROCEDURE — 99999 PR PBB SHADOW E&M-EST. PATIENT-LVL III: CPT | Mod: PBBFAC,,, | Performed by: INTERNAL MEDICINE

## 2017-01-17 PROCEDURE — 1159F MED LIST DOCD IN RCRD: CPT | Mod: S$GLB,,, | Performed by: INTERNAL MEDICINE

## 2017-01-17 PROCEDURE — 3077F SYST BP >= 140 MM HG: CPT | Mod: S$GLB,,, | Performed by: INTERNAL MEDICINE

## 2017-01-17 PROCEDURE — 3079F DIAST BP 80-89 MM HG: CPT | Mod: S$GLB,,, | Performed by: INTERNAL MEDICINE

## 2017-01-17 PROCEDURE — 1160F RVW MEDS BY RX/DR IN RCRD: CPT | Mod: S$GLB,,, | Performed by: INTERNAL MEDICINE

## 2017-01-17 PROCEDURE — 82306 VITAMIN D 25 HYDROXY: CPT

## 2017-01-17 PROCEDURE — 36415 COLL VENOUS BLD VENIPUNCTURE: CPT | Mod: PO

## 2017-01-17 PROCEDURE — 85025 COMPLETE CBC W/AUTO DIFF WBC: CPT | Mod: PO

## 2017-01-17 PROCEDURE — 99499 UNLISTED E&M SERVICE: CPT | Mod: S$GLB,,, | Performed by: INTERNAL MEDICINE

## 2017-01-17 PROCEDURE — 1125F AMNT PAIN NOTED PAIN PRSNT: CPT | Mod: S$GLB,,, | Performed by: INTERNAL MEDICINE
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
## 2017-01-18 ENCOUNTER — TELEPHONE (OUTPATIENT)
Dept: RHEUMATOLOGY | Facility: CLINIC | Age: 82
End: 2017-01-18

## 2017-01-18 NOTE — TELEPHONE ENCOUNTER
----- Message from Dona Manjarrez MD sent at 1/18/2017 12:27 PM CST -----  Let her know her vitamin D is normal so she doesn't need the prescription anymore. She should purchase over the counter D3 or D2 and take 4000 IU daily.  Thank ou  MB

## 2017-01-20 ENCOUNTER — OFFICE VISIT (OUTPATIENT)
Dept: INTERNAL MEDICINE | Facility: CLINIC | Age: 82
End: 2017-01-20
Payer: MEDICARE

## 2017-01-20 ENCOUNTER — OFFICE VISIT (OUTPATIENT)
Dept: OPHTHALMOLOGY | Facility: CLINIC | Age: 82
End: 2017-01-20
Payer: MEDICARE

## 2017-01-20 VITALS
SYSTOLIC BLOOD PRESSURE: 162 MMHG | HEART RATE: 60 BPM | DIASTOLIC BLOOD PRESSURE: 88 MMHG | TEMPERATURE: 96 F | WEIGHT: 156.06 LBS | OXYGEN SATURATION: 98 % | BODY MASS INDEX: 26 KG/M2 | HEIGHT: 65 IN

## 2017-01-20 DIAGNOSIS — E55.9 VITAMIN D DEFICIENCY DISEASE: ICD-10-CM

## 2017-01-20 DIAGNOSIS — F41.9 ANXIETY: ICD-10-CM

## 2017-01-20 DIAGNOSIS — H04.129 DRY EYE: Primary | ICD-10-CM

## 2017-01-20 DIAGNOSIS — H01.009 BLEPHARITIS, UNSPECIFIED LATERALITY, UNSPECIFIED TYPE: ICD-10-CM

## 2017-01-20 DIAGNOSIS — E03.9 ACQUIRED HYPOTHYROIDISM: ICD-10-CM

## 2017-01-20 DIAGNOSIS — M81.0 OSTEOPOROSIS, POSTMENOPAUSAL: Primary | Chronic | ICD-10-CM

## 2017-01-20 DIAGNOSIS — H52.7 REFRACTIVE ERROR: ICD-10-CM

## 2017-01-20 DIAGNOSIS — J41.0 SIMPLE CHRONIC BRONCHITIS: ICD-10-CM

## 2017-01-20 DIAGNOSIS — Z96.1 PSEUDOPHAKIA: ICD-10-CM

## 2017-01-20 DIAGNOSIS — I10 ESSENTIAL HYPERTENSION: ICD-10-CM

## 2017-01-20 DIAGNOSIS — Z00.00 ENCOUNTER FOR PREVENTIVE HEALTH EXAMINATION: ICD-10-CM

## 2017-01-20 PROCEDURE — 92014 COMPRE OPH EXAM EST PT 1/>: CPT | Mod: S$GLB,,, | Performed by: OPHTHALMOLOGY

## 2017-01-20 PROCEDURE — 99999 PR PBB SHADOW E&M-EST. PATIENT-LVL I: CPT | Mod: PBBFAC,,, | Performed by: OPHTHALMOLOGY

## 2017-01-20 PROCEDURE — 92015 DETERMINE REFRACTIVE STATE: CPT | Mod: S$GLB,,, | Performed by: OPHTHALMOLOGY

## 2017-01-20 PROCEDURE — 3077F SYST BP >= 140 MM HG: CPT | Mod: S$GLB,,, | Performed by: INTERNAL MEDICINE

## 2017-01-20 PROCEDURE — 99999 PR PBB SHADOW E&M-EST. PATIENT-LVL III: CPT | Mod: PBBFAC,,, | Performed by: INTERNAL MEDICINE

## 2017-01-20 PROCEDURE — 3079F DIAST BP 80-89 MM HG: CPT | Mod: S$GLB,,, | Performed by: INTERNAL MEDICINE

## 2017-01-20 PROCEDURE — 99397 PER PM REEVAL EST PAT 65+ YR: CPT | Mod: S$GLB,,, | Performed by: INTERNAL MEDICINE

## 2017-01-20 PROCEDURE — 99499 UNLISTED E&M SERVICE: CPT | Mod: S$GLB,,, | Performed by: INTERNAL MEDICINE

## 2017-01-20 RX ORDER — ERYTHROMYCIN 5 MG/G
OINTMENT OPHTHALMIC
Qty: 1 TUBE | Refills: 3 | Status: SHIPPED | OUTPATIENT
Start: 2017-01-20 | End: 2018-05-28

## 2017-01-20 RX ORDER — CYCLOSPORINE 0.5 MG/ML
1 EMULSION OPHTHALMIC 2 TIMES DAILY
Qty: 60 VIAL | Refills: 12 | Status: SHIPPED | OUTPATIENT
Start: 2017-01-20 | End: 2020-06-30 | Stop reason: SDUPTHER

## 2017-01-20 RX ORDER — ALBUTEROL SULFATE 90 UG/1
2 AEROSOL, METERED RESPIRATORY (INHALATION) 4 TIMES DAILY PRN
Qty: 3 INHALER | Refills: 3 | Status: SHIPPED | OUTPATIENT
Start: 2017-01-20 | End: 2018-04-17 | Stop reason: SDUPTHER

## 2017-01-20 NOTE — PROGRESS NOTES
SUBJECTIVE:   Clau Nunn is a 81 y.o. female   Corrected distance visual acuity was 20/30 -2 in the right eye and 20/25 -1 in the left eye.   Chief Complaint   Patient presents with    Eye Exam     yearly exam.     Dry Eye     restasis bid ou-needs refill.     Eye Problem     occasional itching. states she had ointment in the past and would like an rx.         HPI:  HPI     Eye Exam    Additional comments: yearly exam.            Dry Eye    Additional comments: restasis bid ou-needs refill.            Eye Problem    Additional comments: occasional itching. states she had ointment in the   past and would like an rx.            Comments   Pt would like a new glasses rx today.     1. Restor IOL OU-Fargusen  2. Dry eyes-CSM  3. Dizzness-No Ocular involvement   4.RE- RX for SV glasses.  Restasis BID OU       Last edited by Lela Fong MA on 1/20/2017 10:01 AM. (History)        Assessment /Plan :  1. Dry eye - Both Eyes - Doing well with Restasis    2. Pseudophakia - Well   3. Refractive error  Disp Single va MR- pt request    4. Blepharitis, unspecified laterality, unspecified type- use Emycin at bed as needed               RTC 1 year
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
## 2017-01-20 NOTE — MR AVS SNAPSHOT
[facility]  [address]  [place] [address]  Phone: [phone]  Fax: [phone]                  Clau Nunn   2017 11:00 AM   Office Visit    Description:  Female : 1935   Provider:  Jeanette Molina MD   Department:  [facility]           Reason for Visit     Follow-up           Diagnoses this Visit        Comments    Osteoporosis, postmenopausal    -  Primary     Vitamin D deficiency disease         Essential hypertension         Acquired hypothyroidism         Anxiety         Encounter for preventive health examination         Simple chronic bronchitis                To Do List           Future Appointments        Provider Department Dept Phone    2017 1:00 PM RHEUMATOLOGY NURSE, [facility] Rheumatology [phone]    2/3/2017 9:00 AM IRAM Baer [facility] [phone]    2/10/2017 11:05 AM LABORATORY, [facility] [phone]    5/3/2017 10:20 AM Jeanette Molina MD [facility] [phone]      Goals (5 Years of Data)     None      Follow-Up and Disposition     Return in about 3 months (around 2017).    Follow-up and Disposition History       These Medications        Disp Refills Start End    albuterol 90 mcg/actuation inhaler 3 Inhaler 3 2017     Inhale 2 puffs into the lungs 4 (four) times daily as needed. - Inhalation    Pharmacy: [id number] [address] [facility] Ph #: [phone]         [facility]     [facility] -  Assistance  Registered nurses in the [facility] provide clinical advisement, health education, appointment booking, and other advisory services.  Call for this free service at [phone].             Medications           Message regarding Medications     Verify the changes and/or additions to your medication regime listed below are the same as discussed with your clinician  today.  If any of these changes or additions are incorrect, please notify your healthcare provider.             Verify that the below list of medications is an accurate representation of the medications you are currently taking.  If none reported, the list may be blank. If incorrect, please contact your healthcare provider. Carry this list with you in case of emergency.           Current Medications     albuterol 90 mcg/actuation inhaler Inhale 2 puffs into the lungs 4 (four) times daily as needed.    alprazolam (XANAX) 0.25 MG tablet TAKE 1 TABLET BY MOUTH EVERY 6 HOURS AS NEEDED FOR ACUTE ANXIETY    ammonium lactate (AMLACTIN) 12 % lotion Apply to damp skin daily.    azelastine (ASTELIN) 137 mcg (0.1 %) nasal spray     cholecalciferol, vitamin D3, 50,000 unit capsule Take 1 capsule (50,000 Units total) by mouth once a week.    cycloSPORINE (RESTASIS) 0.05 % ophthalmic emulsion Place 0.4 mLs (1 drop total) into both eyes 2 (two) times daily.    diclofenac sodium (VOLTAREN) 1 % Gel Apply 2 g topically 3 (three) times daily.    erythromycin (ROMYCIN) ophthalmic ointment Place into both eyes as needed.    escitalopram oxalate (LEXAPRO) 20 MG tablet Take 1 tablet (20 mg total) by mouth once daily.    fluticasone (FLONASE) 50 mcg/actuation nasal spray USE 2 SPRAYS IN EACH NOSTRIL EVERY DAY    gabapentin (NEURONTIN) 300 MG capsule Take 1 capsule (300 mg total) by mouth 3 (three) times daily.    levothyroxine (SYNTHROID) 88 MCG tablet Take 1 tablet (88 mcg total) by mouth once daily.    losartan-hydrochlorothiazide 50-12.5 mg (HYZAAR) 50-12.5 mg per tablet TAKE 1 TABLET ONE TIME DAILY    meloxicam (MOBIC) 15 MG tablet TAKE ONE TABLET BY MOUTH DAILY    omeprazole (PRILOSEC) 20 MG capsule Take 2 capsules (40 mg total) by mouth 2 (two) times daily.    temazepam (RESTORIL) 15 mg Cap     tolterodine (DETROL) 2 MG Tab Take 2 mg by mouth once daily.           Clinical Reference Information           Vital Signs - Last Recorded   "Most recent update: 1/20/2017 11:12 AM by French Sanchez MA    BP Pulse Temp Ht Wt SpO2    (!) 162/88 (BP Location: Right arm) 60 (!) 95.9 °F (35.5 °C) (Tympanic) 5' 5" (1.651 m) 70.8 kg (156 lb 1.4 oz) 98%    BMI                25.97 kg/m2          Blood Pressure          Most Recent Value    BP  (!)  162/88      Allergies as of 1/20/2017     No Known Drug Allergies      Immunizations Administered on Date of Encounter - 1/20/2017     None      "

## 2017-02-03 ENCOUNTER — OFFICE VISIT (OUTPATIENT)
Dept: INTERNAL MEDICINE | Facility: CLINIC | Age: 82
End: 2017-02-03
Payer: MEDICARE

## 2017-02-03 VITALS
WEIGHT: 152.13 LBS | DIASTOLIC BLOOD PRESSURE: 62 MMHG | BODY MASS INDEX: 25.34 KG/M2 | HEIGHT: 65 IN | HEART RATE: 62 BPM | TEMPERATURE: 97 F | OXYGEN SATURATION: 96 % | SYSTOLIC BLOOD PRESSURE: 112 MMHG

## 2017-02-03 DIAGNOSIS — I10 ESSENTIAL HYPERTENSION: Primary | ICD-10-CM

## 2017-02-03 PROCEDURE — 99499 UNLISTED E&M SERVICE: CPT | Mod: S$GLB,,, | Performed by: PHYSICIAN ASSISTANT

## 2017-02-03 PROCEDURE — 99999 PR PBB SHADOW E&M-EST. PATIENT-LVL IV: CPT | Mod: PBBFAC,,, | Performed by: PHYSICIAN ASSISTANT

## 2017-02-03 PROCEDURE — 1160F RVW MEDS BY RX/DR IN RCRD: CPT | Mod: S$GLB,,, | Performed by: PHYSICIAN ASSISTANT

## 2017-02-03 PROCEDURE — 99213 OFFICE O/P EST LOW 20 MIN: CPT | Mod: S$GLB,,, | Performed by: PHYSICIAN ASSISTANT

## 2017-02-03 PROCEDURE — 1159F MED LIST DOCD IN RCRD: CPT | Mod: S$GLB,,, | Performed by: PHYSICIAN ASSISTANT

## 2017-02-03 PROCEDURE — 3078F DIAST BP <80 MM HG: CPT | Mod: S$GLB,,, | Performed by: PHYSICIAN ASSISTANT

## 2017-02-03 PROCEDURE — 1157F ADVNC CARE PLAN IN RCRD: CPT | Mod: S$GLB,,, | Performed by: PHYSICIAN ASSISTANT

## 2017-02-03 PROCEDURE — 3074F SYST BP LT 130 MM HG: CPT | Mod: S$GLB,,, | Performed by: PHYSICIAN ASSISTANT

## 2017-02-03 RX ORDER — PROMETHAZINE HYDROCHLORIDE AND DEXTROMETHORPHAN HYDROBROMIDE 6.25; 15 MG/5ML; MG/5ML
SYRUP ORAL
COMMUNITY
Start: 2017-01-25 | End: 2017-02-21

## 2017-02-03 RX ORDER — AMOXICILLIN 500 MG/1
CAPSULE ORAL
COMMUNITY
Start: 2017-01-25 | End: 2017-02-21

## 2017-02-03 RX ORDER — PREDNISONE 20 MG/1
20 TABLET ORAL DAILY
COMMUNITY
Start: 2017-01-25 | End: 2017-02-21
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
## 2017-02-03 NOTE — MR AVS SNAPSHOT
[facility]  [address] [place] [address]  Phone: [phone]  Fax: [phone]                  Clau Nunn   2/3/2017 9:00 AM   Office Visit    Description:  Female : 1935   Provider:  IRAM Baer   Department:  [facility]           Reason for Visit     Follow-up                To Do List           Future Appointments        Provider Department Dept Phone    2017 11:40 AM IRAM Baer [facility] [phone]    2/10/2017 11:05 AM LABORATORY, [facility] [phone]    5/3/2017 10:20 AM Jeanette Molina MD [facility] [phone]      Goals (5 Years of Data)     None      [facility]     [facility] On Call Nurse [facility] Line -  Assistance  Registered nurses in the [facility] provide clinical advisement, health education, appointment booking, and other advisory services.  Call for this free service at [phone].             Medications           Message regarding Medications     Verify the changes and/or additions to your medication regime listed below are the same as discussed with your clinician today.  If any of these changes or additions are incorrect, please notify your healthcare provider.             Verify that the below list of medications is an accurate representation of the medications you are currently taking.  If none reported, the list may be blank. If incorrect, please contact your healthcare provider. Carry this list with you in case of emergency.           Current Medications     albuterol 90 mcg/actuation inhaler Inhale 2 puffs into the lungs 4 (four) times daily as needed.    alprazolam (XANAX) 0.25 MG tablet TAKE 1 TABLET BY MOUTH EVERY 6 HOURS AS NEEDED FOR ACUTE ANXIETY    ammonium lactate (AMLACTIN) 12 % lotion Apply to damp skin daily.    amoxicillin (AMOXIL) 500 MG capsule     azelastine (ASTELIN) 137 mcg (0.1 %) nasal spray      "cholecalciferol, vitamin D3, 50,000 unit capsule Take 1 capsule (50,000 Units total) by mouth once a week.    cycloSPORINE (RESTASIS) 0.05 % ophthalmic emulsion Place 0.4 mLs (1 drop total) into both eyes 2 (two) times daily.    diclofenac sodium (VOLTAREN) 1 % Gel Apply 2 g topically 3 (three) times daily.    erythromycin (ROMYCIN) ophthalmic ointment Place into both eyes as needed.    escitalopram oxalate (LEXAPRO) 20 MG tablet Take 1 tablet (20 mg total) by mouth once daily.    fluticasone (FLONASE) 50 mcg/actuation nasal spray USE 2 SPRAYS IN EACH NOSTRIL EVERY DAY    gabapentin (NEURONTIN) 300 MG capsule Take 1 capsule (300 mg total) by mouth 3 (three) times daily.    levothyroxine (SYNTHROID) 88 MCG tablet Take 1 tablet (88 mcg total) by mouth once daily.    losartan-hydrochlorothiazide 50-12.5 mg (HYZAAR) 50-12.5 mg per tablet TAKE 1 TABLET ONE TIME DAILY    meloxicam (MOBIC) 15 MG tablet TAKE ONE TABLET BY MOUTH DAILY    omeprazole (PRILOSEC) 20 MG capsule Take 2 capsules (40 mg total) by mouth 2 (two) times daily.    predniSONE (DELTASONE) 20 MG tablet Take 20 mg by mouth once daily.     promethazine-dextromethorphan (PROMETHAZINE-DM) 6.25-15 mg/5 mL Syrp     temazepam (RESTORIL) 15 mg Cap     tolterodine (DETROL) 2 MG Tab Take 2 mg by mouth once daily.           Clinical Reference Information           Your Vitals Were     BP Pulse Temp Height Weight SpO2    112/62 (BP Location: Right arm, Patient Position: Sitting, BP Method: Manual) 62 96.5 °F (35.8 °C) (Tympanic) 5' 5" (1.651 m) 69 kg (152 lb 1.9 oz) 96%    BMI                25.31 kg/m2          Blood Pressure          Most Recent Value    BP  112/62      Allergies as of 2/3/2017     No Known Drug Allergies      Immunizations Administered on Date of Encounter - 2/3/2017     None      Language Assistance Services     ATTENTION: Language assistance services are available, free of charge. Please call [phone].      ATENCIÓN: chuck Jones " a santos disposición servicios gratuitos de asistencia lingüística. Llfrancis al [phone].     SANDRITA Ý: N?u b?n nói Ti?ng Vi?t, có các d?ch v? h? tr? ngôn ng? mi?n phí dành cho b?n. G?i s? [phone].         [facility] complies with applicable Federal civil rights laws and does not discriminate on the basis of race, color, national origin, age, disability, or sex.

## 2017-02-06 ENCOUNTER — OFFICE VISIT (OUTPATIENT)
Dept: INTERNAL MEDICINE | Facility: CLINIC | Age: 82
End: 2017-02-06
Payer: MEDICARE

## 2017-02-06 VITALS
TEMPERATURE: 97 F | WEIGHT: 154.75 LBS | BODY MASS INDEX: 25.78 KG/M2 | HEIGHT: 65 IN | OXYGEN SATURATION: 97 % | DIASTOLIC BLOOD PRESSURE: 70 MMHG | SYSTOLIC BLOOD PRESSURE: 122 MMHG

## 2017-02-06 DIAGNOSIS — I10 ESSENTIAL HYPERTENSION: Primary | ICD-10-CM

## 2017-02-06 PROCEDURE — 3074F SYST BP LT 130 MM HG: CPT | Mod: S$GLB,,, | Performed by: PHYSICIAN ASSISTANT

## 2017-02-06 PROCEDURE — 1160F RVW MEDS BY RX/DR IN RCRD: CPT | Mod: S$GLB,,, | Performed by: PHYSICIAN ASSISTANT

## 2017-02-06 PROCEDURE — 1157F ADVNC CARE PLAN IN RCRD: CPT | Mod: S$GLB,,, | Performed by: PHYSICIAN ASSISTANT

## 2017-02-06 PROCEDURE — 99213 OFFICE O/P EST LOW 20 MIN: CPT | Mod: S$GLB,,, | Performed by: PHYSICIAN ASSISTANT

## 2017-02-06 PROCEDURE — 3078F DIAST BP <80 MM HG: CPT | Mod: S$GLB,,, | Performed by: PHYSICIAN ASSISTANT

## 2017-02-06 PROCEDURE — 99499 UNLISTED E&M SERVICE: CPT | Mod: S$GLB,,, | Performed by: PHYSICIAN ASSISTANT

## 2017-02-06 PROCEDURE — 1159F MED LIST DOCD IN RCRD: CPT | Mod: S$GLB,,, | Performed by: PHYSICIAN ASSISTANT

## 2017-02-06 PROCEDURE — 99999 PR PBB SHADOW E&M-EST. PATIENT-LVL IV: CPT | Mod: PBBFAC,,, | Performed by: PHYSICIAN ASSISTANT

## 2017-02-06 NOTE — MR AVS SNAPSHOT
[facility]  [address] [place] [address]  Phone: [phone]  Fax: [phone]                  Clau Nunn   2017 11:40 AM   Office Visit    Description:  Female : 1935   Provider:  IRAM Baer   Department:  [facility]                To Do List           Future Appointments        Provider Department Dept Phone    2017 11:40 AM IRAM Baer [facility] [phone]    2/10/2017 11:05 AM LABORATORY, [facility] [phone]    5/3/2017 10:20 AM Jeanette Molina MD [facility] [place] [phone]      Goals (5 Years of Data)     None      [facility]     [facility] -  Assistance  Registered nurses in the [facility] provide clinical advisement, health education, appointment booking, and other advisory services.  Call for this free service at [phone].             Medications           Message regarding Medications     Verify the changes and/or additions to your medication regime listed below are the same as discussed with your clinician today.  If any of these changes or additions are incorrect, please notify your healthcare provider.             Verify that the below list of medications is an accurate representation of the medications you are currently taking.  If none reported, the list may be blank. If incorrect, please contact your healthcare provider. Carry this list with you in case of emergency.           Current Medications     albuterol 90 mcg/actuation inhaler Inhale 2 puffs into the lungs 4 (four) times daily as needed.    alprazolam (XANAX) 0.25 MG tablet TAKE 1 TABLET BY MOUTH EVERY 6 HOURS AS NEEDED FOR ACUTE ANXIETY    ammonium lactate (AMLACTIN) 12 % lotion Apply to damp skin daily.    amoxicillin (AMOXIL) 500 MG capsule     azelastine (ASTELIN) 137 mcg (0.1 %) nasal spray     cholecalciferol, vitamin D3, 50,000 unit capsule  "Take 1 capsule (50,000 Units total) by mouth once a week.    cycloSPORINE (RESTASIS) 0.05 % ophthalmic emulsion Place 0.4 mLs (1 drop total) into both eyes 2 (two) times daily.    diclofenac sodium (VOLTAREN) 1 % Gel Apply 2 g topically 3 (three) times daily.    erythromycin (ROMYCIN) ophthalmic ointment Place into both eyes as needed.    escitalopram oxalate (LEXAPRO) 20 MG tablet Take 1 tablet (20 mg total) by mouth once daily.    fluticasone (FLONASE) 50 mcg/actuation nasal spray USE 2 SPRAYS IN EACH NOSTRIL EVERY DAY    gabapentin (NEURONTIN) 300 MG capsule Take 1 capsule (300 mg total) by mouth 3 (three) times daily.    levothyroxine (SYNTHROID) 88 MCG tablet Take 1 tablet (88 mcg total) by mouth once daily.    losartan-hydrochlorothiazide 50-12.5 mg (HYZAAR) 50-12.5 mg per tablet TAKE 1 TABLET ONE TIME DAILY    meloxicam (MOBIC) 15 MG tablet TAKE ONE TABLET BY MOUTH DAILY    omeprazole (PRILOSEC) 20 MG capsule Take 2 capsules (40 mg total) by mouth 2 (two) times daily.    predniSONE (DELTASONE) 20 MG tablet Take 20 mg by mouth once daily.     promethazine-dextromethorphan (PROMETHAZINE-DM) 6.25-15 mg/5 mL Syrp     temazepam (RESTORIL) 15 mg Cap     tolterodine (DETROL) 2 MG Tab Take 2 mg by mouth once daily.           Clinical Reference Information           Your Vitals Were     BP Temp Height    122/70 (BP Location: Right arm, Patient Position: Sitting, BP Method: Manual) 96.9 °F (36.1 °C) (Tympanic) 5' 5" (1.651 m)    Weight SpO2 BMI    70.2 kg (154 lb 12.2 oz) 97% 25.75 kg/m2      Blood Pressure          Most Recent Value    BP  122/70      Allergies as of 2/6/2017     No Known Drug Allergies      Immunizations Administered on Date of Encounter - 2/6/2017     None      Language Assistance Services     ATTENTION: Language assistance services are available, free of charge. Please call [phone].      ATENCIÓN: Si habla reg, tiene a santos disposición servicios gratuitos de asistencia lingüística. Llame " al [phone].     SANDRITA Ý: N?u b?n nói Ti?ng Vi?t, có các d?ch v? h? tr? ngôn ng? mi?n phí dành cho b?n. G?i s? [phone].         [facility] - Internal Medicine complies with applicable Federal civil rights laws and does not discriminate on the basis of race, color, national origin, age, disability, or sex.

## 2017-02-06 NOTE — PROGRESS NOTES
Subjective:       Patient ID: Clau Nunn is a 81 y.o. female.    Chief Complaint: Follow-up    HPI Comments: 81 year old female presents to clinic for BP f/u. She reports BPs at home have been high but she did not bring BP record in with her today. She brings in her BP cuff today and she has been using it upside down - unsure if this would affect her cuff reading, but reading in clinic today was 120s/70s with her cuff (applied the correct way) and she says she has never gotten a good reading such as this with her cuff. Manual BP reading in clinic is 122/70. She reports taking her losartan-HCTZ 50-12.5mg QD as directed. She reports no CP, SOB, edema, exertional sxs, or other medical complaints.    Past Medical History:    Allergy                                                       Anxiety                                                       Asthma                                                        Back pain                                                     Chronic venous insufficiency                    11/19/2013    Depression                                                    Diverticulosis                                  11/19/2013      Comment:1/8/8 colonoscopy    Dry eyes                                                      GERD (gastroesophageal reflux disease)                        H/O total hip arthroplasty                      12/30/2015    Hypertension                                                  Hypothyroid                                                   Osteoarthritis                                                Osteoporosis                                                  Postlaminectomy syndrome of lumbar region       1/8/2014      Umbilical hernia                                11/19/2013    Urinary incontinence                                          Vitamin D deficiency disease                                      Review of Systems   Constitutional: Negative for chills and  fever.   Respiratory: Negative for cough and shortness of breath.    Cardiovascular: Negative for chest pain, palpitations and leg swelling.   Gastrointestinal: Negative for nausea and vomiting.   Skin: Negative for rash.   Neurological: Negative for dizziness, weakness, numbness and headaches.   Psychiatric/Behavioral: Negative for confusion.       Objective:      Physical Exam   Constitutional: She is oriented to person, place, and time. She appears well-developed and well-nourished. No distress.   HENT:   Head: Normocephalic and atraumatic.   Eyes: EOM are normal. No scleral icterus.   Neck: Neck supple.   Cardiovascular: Normal rate and regular rhythm.    Pulmonary/Chest: Effort normal and breath sounds normal. No respiratory distress. She has no decreased breath sounds. She has no wheezes. She has no rhonchi. She has no rales.   Musculoskeletal: Normal range of motion. She exhibits no edema.   Neurological: She is alert and oriented to person, place, and time. No cranial nerve deficit.   Skin: Skin is warm and dry.   Psychiatric: She has a normal mood and affect. Her speech is normal and behavior is normal. Thought content normal.       Assessment:       1. Essential hypertension        Plan:         1. Continue current medication. Monitor BP with BP cuff correctly positioned.  2. Call office or RTC within one week if BPs still running high at home. Otherwise, f/u with  PCP in 3 months as already scheduled for health management.

## 2017-02-10 ENCOUNTER — CLINICAL SUPPORT (OUTPATIENT)
Dept: RHEUMATOLOGY | Facility: CLINIC | Age: 82
End: 2017-02-10
Payer: MEDICARE

## 2017-02-10 ENCOUNTER — LAB VISIT (OUTPATIENT)
Dept: LAB | Facility: HOSPITAL | Age: 82
End: 2017-02-10
Attending: INTERNAL MEDICINE
Payer: MEDICARE

## 2017-02-10 DIAGNOSIS — M81.0 OSTEOPOROSIS, POSTMENOPAUSAL: Chronic | ICD-10-CM

## 2017-02-10 LAB
ANION GAP SERPL CALC-SCNC: 12 MMOL/L
BUN SERPL-MCNC: 23 MG/DL
CALCIUM SERPL-MCNC: 9.7 MG/DL
CHLORIDE SERPL-SCNC: 103 MMOL/L
CO2 SERPL-SCNC: 25 MMOL/L
CREAT SERPL-MCNC: 1.1 MG/DL
EST. GFR  (AFRICAN AMERICAN): 55 ML/MIN/1.73 M^2
EST. GFR  (NON AFRICAN AMERICAN): 48 ML/MIN/1.73 M^2
GLUCOSE SERPL-MCNC: 90 MG/DL
POTASSIUM SERPL-SCNC: 4.3 MMOL/L
SODIUM SERPL-SCNC: 140 MMOL/L

## 2017-02-10 PROCEDURE — 96372 THER/PROPH/DIAG INJ SC/IM: CPT | Mod: S$GLB,,, | Performed by: INTERNAL MEDICINE

## 2017-02-10 PROCEDURE — 99999 PR PBB SHADOW E&M-EST. PATIENT-LVL III: CPT | Mod: PBBFAC,,,

## 2017-02-10 NOTE — PROGRESS NOTES
Prolia 60mg/cc to right lower abdomen. Labs checked: Calcium 9.7, Creatinine1.1. Pt tolerated well. No acute reaction noted to site. Pt instructed on signs and symptoms of reaction to report. Pt verbalized understanding.     Lot:  [id number]  Exp:  4/19    Pt instructed to wait 15 min to watch for any adverse reactions

## 2017-02-10 NOTE — MR AVS SNAPSHOT
[facility]  [address] [place] [address]  Phone: [phone]  Fax: [phone]                  Clau Nunn   2/10/2017 11:15 AM   Appointment    Description:  Female : 1935   Provider:  RHEUMATOLOGY NURSE [facility]   Department:  [facility] - Rhe[facility]ology                To Do List           Future Appointments        Provider Department Dept Phone    5/3/2017 10:20 AM Jeanette Molina MD [facility] [phone]    2017 10:30 AM LAB[facility]ATORY, [facility] [phone]    2017 11:00 AM Dona Manjarrez MD [facility] [phone]      Goals (5 Years of Data)     None      [facility]     [facility] On Call Nurse Care Line -  Assistance  Registered nurses in the [facility] provide clinical advisement, health education, appointment booking, and other advisory services.  Call for this free service at [phone].             Medications           Message regarding Medications     Verify the changes and/or additions to your medication regime listed below are the same as discussed with your clinician today.  If any of these changes or additions are incorrect, please notify your healthcare provider.             Verify that the below list of medications is an accurate representation of the medications you are currently taking.  If none reported, the list may be blank. If incorrect, please contact your healthcare provider. Carry this list with you in case of emergency.           Current Medications     albuterol 90 mcg/actuation inhaler Inhale 2 puffs into the lungs 4 (four) times daily as needed.    alprazolam (XANAX) 0.25 MG tablet TAKE 1 TABLET BY MOUTH EVERY 6 HOURS AS NEEDED FOR ACUTE ANXIETY    ammonium lactate (AMLACTIN) 12 % lotion Apply to damp skin daily.    amoxicillin (AMOXIL) 500 MG capsule     azelastine (ASTELIN) 137 mcg (0.1 %) nasal spray     cholecalciferol, vitamin D3, 50,000 unit  capsule Take 1 capsule (50,000 Units total) by mouth once a week.    cycloSPORINE (RESTASIS) 0.05 % ophthalmic emulsion Place 0.4 mLs (1 drop total) into both eyes 2 (two) times daily.    diclofenac sodium (VOLTAREN) 1 % Gel Apply 2 g topically 3 (three) times daily.    erythromycin (ROMYCIN) ophthalmic ointment Place into both eyes as needed.    escitalopram oxalate (LEXAPRO) 20 MG tablet Take 1 tablet (20 mg total) by mouth once daily.    fluticasone (FLONASE) 50 mcg/actuation nasal spray USE 2 SPRAYS IN EACH NOSTRIL EVERY DAY    gabapentin (NEURONTIN) 300 MG capsule Take 1 capsule (300 mg total) by mouth 3 (three) times daily.    levothyroxine (SYNTHROID) 88 MCG tablet Take 1 tablet (88 mcg total) by mouth once daily.    losartan-hydrochlorothiazide 50-12.5 mg (HYZAAR) 50-12.5 mg per tablet TAKE 1 TABLET ONE TIME DAILY    meloxicam (MOBIC) 15 MG tablet TAKE ONE TABLET BY MOUTH DAILY    omeprazole (PRILOSEC) 20 MG capsule Take 2 capsules (40 mg total) by mouth 2 (two) times daily.    predniSONE (DELTASONE) 20 MG tablet Take 20 mg by mouth once daily.     promethazine-dextromethorphan (PROMETHAZINE-DM) 6.25-15 mg/5 mL Syrp     temazepam (RESTORIL) 15 mg Cap     tolterodine (DETROL) 2 MG Tab Take 2 mg by mouth once daily.           Clinical Reference Information           Allergies as of 2/10/2017     No Known Drug Allergies      Immunizations Administered on Date of Encounter - 2/10/2017     None      Language Assistance Services     ATTENTION: Language assistance services are available, free of charge. Please call [phone].      ATENCIÓN: Si davidla reg, tiene a santos disposición servicios gratuitos de asistencia lingüística. Llame al [phone].     SANDRITA Ý: N?u b?n nói Ti?ng Vi?t, có các d?ch v? h? tr? ngôn ng? mi?n phí dành cho b?n. G?i s? [phone].         [facility] complies with applicable Federal civil rights laws and does not discriminate on the basis of race, color, national origin,  age, disability, or sex.

## 2017-02-21 ENCOUNTER — OFFICE VISIT (OUTPATIENT)
Dept: INTERNAL MEDICINE | Facility: CLINIC | Age: 82
End: 2017-02-21
Payer: MEDICARE

## 2017-02-21 VITALS
SYSTOLIC BLOOD PRESSURE: 139 MMHG | WEIGHT: 157.06 LBS | DIASTOLIC BLOOD PRESSURE: 84 MMHG | BODY MASS INDEX: 26.17 KG/M2 | OXYGEN SATURATION: 94 % | HEIGHT: 65 IN | HEART RATE: 66 BPM

## 2017-02-21 DIAGNOSIS — F32.0 MILD MAJOR DEPRESSION: ICD-10-CM

## 2017-02-21 DIAGNOSIS — M54.16 CHRONIC LUMBAR RADICULOPATHY: ICD-10-CM

## 2017-02-21 DIAGNOSIS — E55.9 VITAMIN D DEFICIENCY DISEASE: ICD-10-CM

## 2017-02-21 DIAGNOSIS — I10 ESSENTIAL HYPERTENSION: Chronic | ICD-10-CM

## 2017-02-21 DIAGNOSIS — E03.9 ACQUIRED HYPOTHYROIDISM: ICD-10-CM

## 2017-02-21 DIAGNOSIS — Z00.00 ENCOUNTER FOR PREVENTIVE HEALTH EXAMINATION: Primary | ICD-10-CM

## 2017-02-21 DIAGNOSIS — M46.1 SACROILIAC INFLAMMATION: ICD-10-CM

## 2017-02-21 DIAGNOSIS — N18.30 CKD (CHRONIC KIDNEY DISEASE) STAGE 3, GFR 30-59 ML/MIN: ICD-10-CM

## 2017-02-21 DIAGNOSIS — K44.9 HIATAL HERNIA WITH GASTROESOPHAGEAL REFLUX: ICD-10-CM

## 2017-02-21 DIAGNOSIS — M47.816 LUMBAR ARTHROPATHY: ICD-10-CM

## 2017-02-21 DIAGNOSIS — F41.9 ANXIETY: ICD-10-CM

## 2017-02-21 DIAGNOSIS — I70.0 ATHEROSCLEROSIS OF AORTA: ICD-10-CM

## 2017-02-21 DIAGNOSIS — K21.9 HIATAL HERNIA WITH GASTROESOPHAGEAL REFLUX: ICD-10-CM

## 2017-02-21 DIAGNOSIS — M81.0 OSTEOPOROSIS, POSTMENOPAUSAL: Chronic | ICD-10-CM

## 2017-02-21 PROCEDURE — 99499 UNLISTED E&M SERVICE: CPT | Mod: S$GLB,,, | Performed by: PHYSICIAN ASSISTANT

## 2017-02-21 PROCEDURE — 3079F DIAST BP 80-89 MM HG: CPT | Mod: S$GLB,,, | Performed by: PHYSICIAN ASSISTANT

## 2017-02-21 PROCEDURE — G0439 PPPS, SUBSEQ VISIT: HCPCS | Mod: S$GLB,,, | Performed by: PHYSICIAN ASSISTANT

## 2017-02-21 PROCEDURE — 3075F SYST BP GE 130 - 139MM HG: CPT | Mod: S$GLB,,, | Performed by: PHYSICIAN ASSISTANT

## 2017-02-21 PROCEDURE — 99999 PR PBB SHADOW E&M-EST. PATIENT-LVL IV: CPT | Mod: PBBFAC,,, | Performed by: PHYSICIAN ASSISTANT

## 2017-02-21 NOTE — MR AVS SNAPSHOT
[facility] Internal Medicine  [address] [facility] [address] [place] [address]  Phone: [phone]  Fax: [phone]                  Clau Nunn   2017 12:30 PM   Office Visit    Description:  Female : 1935   Provider:  Trevor Carter PA-C   Department:  [facility] - Internal Medicine           Reason for Visit     Health Risk Assessment           Diagnoses this Visit        Comments    Encounter for preventive health examination    -  Primary     Essential hypertension         Osteoporosis, postmenopausal         Acquired hypothyroidism         Lumbar arthropathy         Vitamin D deficiency disease         Sacroiliac inflammation         Atherosclerosis of aorta         Hiatal hernia with gastroesophageal reflux         CKD (chronic kidney disease) stage 3, GFR 30-59 ml/min         Mild major depression         Anxiety         Chronic lumbar radiculopathy                To Do List           Future Appointments        Provider Department Dept Phone    5/3/2017 10:20 AM Jeanette Molina MD [facility] Internal Medicine [phone]    2017 10:30 AM LABORATORY, [facility] [phone]    2017 11:00 AM Dona Manjarrez MD [facility] Rheumatology [phone]      Goals (5 Years of Data)     None      [facility] On [facility]     [facility] -  Assistance  Registered nurses in the [facility] provide clinical advisement, health education, appointment booking, and other advisory services.  Call for this free service at [phone].             Medications           Message regarding Medications     Verify the changes and/or additions to your medication regime listed below are the same as discussed with your clinician today.  If any of these changes or additions are incorrect, please notify your healthcare provider.        STOP taking these medications     amoxicillin (AMOXIL) 500 MG capsule     diclofenac sodium (VOLTAREN) 1 % Gel Apply 2 g  topically 3 (three) times daily.    meloxicam (MOBIC) 15 MG tablet TAKE ONE TABLET BY MOUTH DAILY    predniSONE (DELTASONE) 20 MG tablet Take 20 mg by mouth once daily.     promethazine-dextromethorphan (PROMETHAZINE-DM) 6.25-15 mg/5 mL Syrp            Verify that the below list of medications is an accurate representation of the medications you are currently taking.  If none reported, the list may be blank. If incorrect, please contact your healthcare provider. Carry this list with you in case of emergency.           Current Medications     albuterol 90 mcg/actuation inhaler Inhale 2 puffs into the lungs 4 (four) times daily as needed.    alprazolam (XANAX) 0.25 MG tablet TAKE 1 TABLET BY MOUTH EVERY 6 HOURS AS NEEDED FOR ACUTE ANXIETY    cholecalciferol, vitamin D3, 50,000 unit capsule Take 1 capsule (50,000 Units total) by mouth once a week.    cycloSPORINE (RESTASIS) 0.05 % ophthalmic emulsion Place 0.4 mLs (1 drop total) into both eyes 2 (two) times daily.    escitalopram oxalate (LEXAPRO) 20 MG tablet Take 1 tablet (20 mg total) by mouth once daily.    fluticasone (FLONASE) 50 mcg/actuation nasal spray USE 2 SPRAYS IN EACH NOSTRIL EVERY DAY    gabapentin (NEURONTIN) 300 MG capsule Take 1 capsule (300 mg total) by mouth 3 (three) times daily.    levothyroxine (SYNTHROID) 88 MCG tablet Take 1 tablet (88 mcg total) by mouth once daily.    losartan-hydrochlorothiazide 50-12.5 mg (HYZAAR) 50-12.5 mg per tablet TAKE 1 TABLET ONE TIME DAILY    omeprazole (PRILOSEC) 20 MG capsule Take 2 capsules (40 mg total) by mouth 2 (two) times daily.    temazepam (RESTORIL) 15 mg Cap     tolterodine (DETROL) 2 MG Tab Take 2 mg by mouth once daily.    ammonium lactate (AMLACTIN) 12 % lotion Apply to damp skin daily.    azelastine (ASTELIN) 137 mcg (0.1 %) nasal spray     erythromycin (ROMYCIN) ophthalmic ointment Place into both eyes as needed.           Clinical Reference Information           Your Vitals Were     BP Pulse Height  "Weight SpO2 BMI    139/84 66 5' 5" (1.651 m) 71.2 kg (157 lb 1.2 oz) 94% 26.14 kg/m2      Blood Pressure          Most Recent Value    BP  139/84      Allergies as of 2/21/2017     No Known Drug Allergies      Immunizations Administered on Date of Encounter - 2/21/2017     None      Instructions      Counseling and Referral of Other Preventative  (Italic type indicates deductible and co-insurance are waived)    Patient Name: Clau Nunn  Today's Date: 2/21/2017      SERVICE LIMITATIONS RECOMMENDATION    Vaccines    · Pneumococcal (once after 65)    · Influenza (annually)    · Hepatitis B (if medium/high risk)    · Prevnar 13      Hepatitis B medium/high risk factors:       - End-stage renal disease       - Hemophiliacs who received Factor VII or         IX concentrates       - Clients of institutions for the mentally             retarded       - Persons who live in the same house as          a HepB carrier       - Homosexual men       - Illicit injectable drug abusers     Pneumococcal:12/2011 Done, no repeat necessary     Influenza:10/2016 Done, repeat in one year     Hepatitis B: N/A Follow PCP     Prevnar 13: done 5/2016    Mammogram (biennial age 50-74)  Annually (age 40 or over)  Last done 2016, recommend to repeat every 1  years [facility]    Pap (up to age 70 and after 70 if unknown history or abnormal study last 10 years)        The USPSTF recommends against screening for cervical cancer in women older than age 65 years who have had adequate prior screening and are not otherwise at high risk for cervical cancer.      Colorectal cancer screening (to age 75)    · Fecal occult blood test (annual)  · Flexible sigmoidoscopy (5y)  · Screening colonoscopy (10y)  · Barium enema   Last done 1/2014, recommend to repeat every 0  years    Diabetes self-management training (no USPSTF recommendations)  Requires referral by treating physician for patient with diabetes or renal disease. 10 hours of initial DSMT " sessions of no less than 30 minutes each in a continuous 12-month period. 2 hours of follow-up DSMT in subsequent years.  Continue to follow PCP    Bone mass measurements (age 65 & older, biennial)  Requires diagnosis related to osteoporosis or estrogen deficiency. Biennial benefit unless patient has history of long-term glucocorticoid  Last done 12/2016, recommend to repeat every 2-3  years    Glaucoma screening (no USPSTF recommendation)  Diabetes mellitus, family history   , age 50 or over    American, age 65 or over  Continue to follow Dr. Rubalcava    Medical nutrition therapy for diabetes or renal disease (no recommended schedule)  Requires referral by treating physician for patient with diabetes or renal disease or kidney transplant within the past 3 years.  Can be provided in same year as diabetes self-management training (DSMT), and CMS recommends medical nutrition therapy take place after DSMT. Up to 3 hours for initial year and 2 hours in subsequent years.  Continue to follow PCP    Cardiovascular screening blood tests (every 5 years)  · Fasting lipid panel  Order as a panel if possible  Continue to follow PCP    Diabetes screening tests (at least every 3 years, Medicare covers annually or at 6-month intervals for prediabetic patients)  · Fasting blood sugar (FBS) or glucose tolerance test (GTT)  Patient must be diagnosed with one of the following:       - Hypertension       - Dyslipidemia       - Obesity (BMI 30kg/m2)       - Previous elevated impaired FBS or GTT       ... or any two of the following:       - Overweight (BMI 25 but <30)       - Family history of diabetes       - Age 65 or older       - History of gestational diabetes or birth of baby weighing more than 9 pounds  Continue to follow PCP    Abdominal aortic aneurysm screening (once)  · Sonogram   Limited to patients who meet one of the following criteria:       - Men who are 65-75 years old and have smoked more than  100 cigarette in their lifetime       - Anyone with a family history of abdominal aortic aneurysm       - Anyone recommended for screening by the USPSTF Continue to follow PCP    HIV screening (annually for increased risk patients)  · HIV-1 and HIV-2 by EIA, or AUDREY, rapid antibody test or oral mucosa transudate  Patients must be at increased risk for HIV infection per USPSTF guidelines or pregnant. Tests covered annually for patient at increased risk or as requested by the patient. Pregnant patients may receive up to 3 tests during pregnancy.  Risks discussed, screening is not recommended    Smoking cessation counseling (up to 8 sessions per year)  Patients must be asymptomatic of tobacco-related conditions to receive as a preventative service.  does not smoke    Subsequent annual wellness visit  At least 12 months since last AWV  Return in one year     The following information is provided to all patients.  This information is to help you find resources for any of the problems found today that may be affecting your health:                Living healthy guide: www.[facility].[place].[facility]      Understanding Diabetes: www.diabetes.org      Eating healthy: www.cdc.gov/healthyweight      [facility] home safety checklist: www.cdc.gov/steadi/patient.html      Agency on Aging: www.goea.[place]      Alcoholics anonymous (AA): www.aa.org      Physical Activity: www.eligio.nih.gov/xm1fcow      Tobacco use: www.quitwithusla.org          Language Assistance Services     ATTENTION: Language assistance services are available, free of charge. Please call [phone].      ATENCIÓN: Si habla español, tiene a santos disposición servicios gratuitos de asistencia lingüística. Llame al [phone].     CHÚ Ý: N?u b?n nói Ti?ng Vi?t, có các d?ch v? h? tr? ngôn ng? mi?n phí dành cho b?n. G?i s? [phone].         [facility] complies with applicable Federal civil rights laws and does not discriminate on the basis of race,  color, national origin, age, disability, or sex.

## 2017-02-21 NOTE — PROGRESS NOTES
"Clau Nunn presented for a  Medicare AWV and comprehensive Health Risk Assessment today. The following components were reviewed and updated:    · Medical history  · Family History  · Social history  · Allergies and Current Medications  · Health Risk Assessment  · Health Maintenance  · Care Team     ** See Completed Assessments for Annual Wellness Visit within the encounter summary.**       The following assessments were completed:  · Living Situation  · CAGE  · Depression Screening  · Timed Get Up and Go  · Whisper Test  · Cognitive Function Screening  · Nutrition Screening  · ADL Screening  · PAQ Screening    Vitals:    02/21/17 1233   BP: 139/84   Pulse: 66   SpO2: (!) 94%   Weight: 71.2 kg (157 lb 1.2 oz)   Height: 5' 5" (1.651 m)     Body mass index is 26.14 kg/(m^2).  Physical Exam   Constitutional: She appears well-developed and well-nourished. No distress.   HENT:   Head: Normocephalic and atraumatic.   Eyes: Conjunctivae and EOM are normal. Right eye exhibits no discharge. Left eye exhibits no discharge.   Neck: Normal range of motion. Neck supple. No tracheal deviation present. No thyromegaly present.   Cardiovascular: Normal rate, regular rhythm and normal heart sounds.    No murmur heard.  Pulses:       Radial pulses are 2+ on the right side, and 2+ on the left side.   Pulmonary/Chest: Effort normal and breath sounds normal. No respiratory distress. She has no wheezes.   Abdominal: Soft. Bowel sounds are normal. She exhibits no distension. There is no tenderness. There is no rebound and no guarding.   Musculoskeletal: Normal range of motion. She exhibits no edema or tenderness.   Neurological: No cranial nerve deficit.   Grasp equal both hands, No tremors, or muscle fasciculations noted. Toes downgoing, Sensation intact to soft touch. Gait: No ataxia.    Skin: Skin is warm and dry. No rash noted. She is not diaphoretic. No erythema. No pallor.   Psychiatric: She has a normal mood and affect. Her " behavior is normal. Judgment and thought content normal.         Diagnoses and health risks identified today and associated recommendations/orders:    1. Encounter for preventive health examination  Completed today    2. Essential hypertension  Stable and controlled. On losartan-hctz.  Continue current treatment plan as previously prescribed with your PCP.     3. Osteoporosis, postmenopausal/  /Dexa 6/18/14. Prolia inj. Stable. Continue current treatment plan as previously prescribed with your rheumatologist, Dr. Manjarrez. Appt     4. Acquired hypothyroidism  Stable and controlled. On Synthroid. Continue current treatment plan as previously prescribed with your PCP.    5. Lumbar arthropathy  Followed  Dr. Palmer for REJI and facet injections in the past.  Stable. Continue current treatment plan as previously prescribed with Dr. Bolaños in physiatry.    6. Vitamin D deficiency disease  Stable. On vit D.  Continue current treatment plan as previously prescribed with your PCP.    7. Sacroiliac inflammation  SI jt injection by Dr. Palmer 1/8/15.  Stable. Continue current treatment plan as previously prescribed with Dr. Bolaños    8. Atherosclerosis of aorta  CT abd 11/11/13. Documented atherosclerosis of the aorta. Pt denies chest pains, sob or palpations.  Discussed this is not emergent. Discussed need to continue control BP, lipids, glucose, avoid smoking to avoid further arterial wall buildup.    9. Hiatal hernia with gastroesophageal reflux  Stable. Reports seen ENT DR. Yakov Yu for throat pain. Rx Omeprazole helped. Continue follow with ent and PCP.    10. CKD (chronic kidney disease) stage 3, GFR 30-59 ml/min  Recommend avoid NSAID pain medications such as Advil, Aleve, Motrin, Ibuprofen, Naprosyn, Celebrex, Meloxicam, Diclofenac, etc.   Drink plenty water. Stable. Continue follow with PCP and Dr. Brown.    11. Mild major depression  PHQ-9 score . Reports feeling down/ depressed some the past two weeks.  Denies thoughts of hurting self.  Stable. On Lexapro. Continue current treatment plan as previously prescribed with your PCP.    12. Anxiety  Stable. Xanax prn. Continue current treatment plan as previously prescribed with your PCP.    13. Chronic lumbar radiculopathy  Stable. Takes Gabapentin. Continue current treatment plan as previously prescribed with your PCP and Dr. Bolaños.      Provided Clau with a 5-10 year written screening schedule and personal prevention plan. Recommendations were developed using the USPSTF age appropriate recommendations. Education, counseling, and referrals were provided as needed. After Visit Summary printed and given to patient which includes a list of additional screenings\tests needed. Continue to follow with your PCP as scheduled 5/3/17 or sooner if necessary.      Trevor Carter PA-C

## 2017-02-21 NOTE — PATIENT INSTRUCTIONS
Counseling and Referral of Other Preventative  (Italic type indicates deductible and co-insurance are waived)    Patient Name: Clau Nunn  Today's Date: 2/21/2017      SERVICE LIMITATIONS RECOMMENDATION    Vaccines    · Pneumococcal (once after 65)    · Influenza (annually)    · Hepatitis B (if medium/high risk)    · Prevnar 13      Hepatitis B medium/high risk factors:       - End-stage renal disease       - Hemophiliacs who received Factor VII or         IX concentrates       - Clients of institutions for the mentally             retarded       - Persons who live in the same house as          a HepB carrier       - Homosexual men       - Illicit injectable drug abusers     Pneumococcal:12/2011 Done, no repeat necessary     Influenza:10/2016 Done, repeat in one year     Hepatitis B: N/A Follow PCP     Prevnar 13: done 5/2016    Mammogram (biennial age 50-74)  Annually (age 40 or over)  Last done 2016, recommend to repeat every 1  years [facility]    Pap (up to age 70 and after 70 if unknown history or abnormal study last 10 years)        The USPSTF recommends against screening for cervical cancer in women older than age 65 years who have had adequate prior screening and are not otherwise at high risk for cervical cancer.      Colorectal cancer screening (to age 75)    · Fecal occult blood test (annual)  · Flexible sigmoidoscopy (5y)  · Screening colonoscopy (10y)  · Barium enema   Last done 1/2014, recommend to repeat every 0  years    Diabetes self-management training (no USPSTF recommendations)  Requires referral by treating physician for patient with diabetes or renal disease. 10 hours of initial DSMT sessions of no less than 30 minutes each in a continuous 12-month period. 2 hours of follow-up DSMT in subsequent years.  Continue to follow PCP    Bone mass measurements (age 65 & older, biennial)  Requires diagnosis related to osteoporosis or estrogen deficiency. Biennial benefit unless patient has  history of long-term glucocorticoid  Last done 12/2016, recommend to repeat every 2-3  years    Glaucoma screening (no USPSTF recommendation)  Diabetes mellitus, family history   , age 50 or over    American, age 65 or over  Continue to follow Dr. Rubalcava    Medical nutrition therapy for diabetes or renal disease (no recommended schedule)  Requires referral by treating physician for patient with diabetes or renal disease or kidney transplant within the past 3 years.  Can be provided in same year as diabetes self-management training (DSMT), and CMS recommends medical nutrition therapy take place after DSMT. Up to 3 hours for initial year and 2 hours in subsequent years.  Continue to follow PCP    Cardiovascular screening blood tests (every 5 years)  · Fasting lipid panel  Order as a panel if possible  Continue to follow PCP    Diabetes screening tests (at least every 3 years, Medicare covers annually or at 6-month intervals for prediabetic patients)  · Fasting blood sugar (FBS) or glucose tolerance test (GTT)  Patient must be diagnosed with one of the following:       - Hypertension       - Dyslipidemia       - Obesity (BMI 30kg/m2)       - Previous elevated impaired FBS or GTT       ... or any two of the following:       - Overweight (BMI 25 but <30)       - Family history of diabetes       - Age 65 or older       - History of gestational diabetes or birth of baby weighing more than 9 pounds  Continue to follow PCP    Abdominal aortic aneurysm screening (once)  · Sonogram   Limited to patients who meet one of the following criteria:       - Men who are 65-75 years old and have smoked more than 100 cigarette in their lifetime       - Anyone with a family history of abdominal aortic aneurysm       - Anyone recommended for screening by the USPSTF Continue to follow PCP    HIV screening (annually for increased risk patients)  · HIV-1 and HIV-2 by EIA, or AUDREY, rapid antibody test or oral mucosa  transudate  Patients must be at increased risk for HIV infection per USPSTF guidelines or pregnant. Tests covered annually for patient at increased risk or as requested by the patient. Pregnant patients may receive up to 3 tests during pregnancy.  Risks discussed, screening is not recommended    Smoking cessation counseling (up to 8 sessions per year)  Patients must be asymptomatic of tobacco-related conditions to receive as a preventative service.  does not smoke    Subsequent annual wellness visit  At least 12 months since last AWV  Return in one year     The following information is provided to all patients.  This information is to help you find resources for any of the problems found today that may be affecting your health:                Living healthy guide: www.[facility].[place].[facility]      Understanding Diabetes: www.diabetes.org      Eating healthy: www.cdc.gov/healthyweight      CDC home safety checklist: www.cdc.gov/steadi/patient.html      Agency on Aging: www.goea.[place].[facility]      Alcoholics anonymous (AA): www.aa.org      Physical Activity: www.eligio.nih.gov/ua1gvkw      Tobacco use: www.quitwithusla.org

## 2017-04-26 ENCOUNTER — TELEPHONE (OUTPATIENT)
Dept: RHEUMATOLOGY | Facility: CLINIC | Age: 82
End: 2017-04-26

## 2017-04-26 NOTE — TELEPHONE ENCOUNTER
Spoke with Ms. Nunn and rescheduled her appointment with Dr. Manjarrez on 08/16/2017 to 08/18/2017 with Kandice Salcedo PA-C. Reminder letter mailed.

## 2017-05-03 ENCOUNTER — OFFICE VISIT (OUTPATIENT)
Dept: INTERNAL MEDICINE | Facility: CLINIC | Age: 82
End: 2017-05-03
Payer: MEDICARE

## 2017-05-03 VITALS
BODY MASS INDEX: 26.23 KG/M2 | OXYGEN SATURATION: 93 % | SYSTOLIC BLOOD PRESSURE: 132 MMHG | HEIGHT: 65 IN | HEART RATE: 58 BPM | TEMPERATURE: 96 F | DIASTOLIC BLOOD PRESSURE: 82 MMHG | WEIGHT: 157.44 LBS

## 2017-05-03 DIAGNOSIS — M81.0 OSTEOPOROSIS, POSTMENOPAUSAL: Primary | Chronic | ICD-10-CM

## 2017-05-03 DIAGNOSIS — E03.9 ACQUIRED HYPOTHYROIDISM: ICD-10-CM

## 2017-05-03 DIAGNOSIS — F32.0 MILD MAJOR DEPRESSION: ICD-10-CM

## 2017-05-03 DIAGNOSIS — I10 ESSENTIAL HYPERTENSION: Chronic | ICD-10-CM

## 2017-05-03 DIAGNOSIS — F41.9 ANXIETY: ICD-10-CM

## 2017-05-03 DIAGNOSIS — J41.0 SIMPLE CHRONIC BRONCHITIS: ICD-10-CM

## 2017-05-03 DIAGNOSIS — Z29.9 PREVENTIVE MEASURE: ICD-10-CM

## 2017-05-03 PROCEDURE — 90732 PPSV23 VACC 2 YRS+ SUBQ/IM: CPT | Mod: S$GLB,,, | Performed by: INTERNAL MEDICINE

## 2017-05-03 PROCEDURE — 1160F RVW MEDS BY RX/DR IN RCRD: CPT | Mod: S$GLB,,, | Performed by: INTERNAL MEDICINE

## 2017-05-03 PROCEDURE — 1157F ADVNC CARE PLAN IN RCRD: CPT | Mod: 8P,S$GLB,, | Performed by: INTERNAL MEDICINE

## 2017-05-03 PROCEDURE — 99214 OFFICE O/P EST MOD 30 MIN: CPT | Mod: 25,S$GLB,, | Performed by: INTERNAL MEDICINE

## 2017-05-03 PROCEDURE — 1159F MED LIST DOCD IN RCRD: CPT | Mod: S$GLB,,, | Performed by: INTERNAL MEDICINE

## 2017-05-03 PROCEDURE — 3075F SYST BP GE 130 - 139MM HG: CPT | Mod: S$GLB,,, | Performed by: INTERNAL MEDICINE

## 2017-05-03 PROCEDURE — 3079F DIAST BP 80-89 MM HG: CPT | Mod: S$GLB,,, | Performed by: INTERNAL MEDICINE

## 2017-05-03 PROCEDURE — 99999 PR PBB SHADOW E&M-EST. PATIENT-LVL IV: CPT | Mod: PBBFAC,,, | Performed by: INTERNAL MEDICINE

## 2017-05-03 PROCEDURE — 99499 UNLISTED E&M SERVICE: CPT | Mod: S$GLB,,, | Performed by: INTERNAL MEDICINE

## 2017-05-03 PROCEDURE — G0009 ADMIN PNEUMOCOCCAL VACCINE: HCPCS | Mod: S$GLB,,, | Performed by: INTERNAL MEDICINE

## 2017-05-03 RX ORDER — LOSARTAN POTASSIUM AND HYDROCHLOROTHIAZIDE 12.5; 5 MG/1; MG/1
TABLET ORAL
Qty: 90 TABLET | Refills: 3 | Status: SHIPPED | OUTPATIENT
Start: 2017-05-03 | End: 2018-03-23 | Stop reason: SDUPTHER

## 2017-05-03 RX ORDER — ALPRAZOLAM 0.25 MG/1
TABLET ORAL
Qty: 30 TABLET | Refills: 3 | Status: SHIPPED | OUTPATIENT
Start: 2017-05-03 | End: 2018-05-28

## 2017-05-03 RX ORDER — MELOXICAM 15 MG/1
TABLET ORAL
COMMUNITY
Start: 2017-03-14 | End: 2017-10-24

## 2017-05-03 NOTE — PROGRESS NOTES
"Subjective:       Patient ID: Clau Nunn is a 82 y.o. female.    Chief Complaint: Follow-up    HPI Comments: Here for follow up of medical problems.   Started prolia, tolerating well.  Gyn giving sleep med.  No f/c/sw.  Cough is chronic.  On PPI.  Takes albuterol once a day for cough.  Anxiety doing well, lexapro and rare xanax.  No cp/palp.  BMs normal.      Updated/ annual due 1/18:  HM: 10/16 fluvax, 5/16 [id number], 5/17 today booster [id number], 12/11 TDaP, 11/09 zoster, 12/16 BMD rep 3y, 1/14 Cscope no more needed, 11/16 MMG/ Gyn Dr. Tere bauer outside, 1/17 Eye Dr. Rubalcava.        Review of Systems   Constitutional: Negative for chills, diaphoresis and fever.   Respiratory: Negative for cough and shortness of breath.    Cardiovascular: Negative for chest pain, palpitations and leg swelling.   Gastrointestinal: Negative for blood in stool, constipation, diarrhea, nausea and vomiting.   Genitourinary: Negative for dysuria, frequency and hematuria.   Psychiatric/Behavioral: The patient is not nervous/anxious.        Objective:   /82 (BP Location: Right arm)  Pulse (!) 58  Temp (!) 95.8 °F (35.4 °C) (Tympanic)   Ht 5' 5" (1.651 m)  Wt 71.4 kg (157 lb 6.5 oz)  SpO2 (!) 93%  BMI 26.19 kg/m2    Physical Exam   Constitutional: She is oriented to person, place, and time. She appears well-developed.   HENT:   Mouth/Throat: Oropharynx is clear and moist.   Neck: Neck supple. Carotid bruit is not present. No thyroid mass present.   Cardiovascular: Normal rate, regular rhythm and intact distal pulses.  Exam reveals no gallop and no friction rub.    No murmur heard.  Pulmonary/Chest: Effort normal and breath sounds normal. She has no wheezes. She has no rales.   Abdominal: Soft. Bowel sounds are normal. She exhibits no mass. There is no hepatosplenomegaly. There is no tenderness.   Musculoskeletal: She exhibits no edema.   Lymphadenopathy:     She has no cervical adenopathy.   Neurological: She is alert and " oriented to person, place, and time.   Psychiatric: She has a normal mood and affect.       Assessment:       1. Osteoporosis, postmenopausal    2. Acquired hypothyroidism    3. Essential hypertension    4. Mild major depression    5. Anxiety    6. Preventive measure    7. Simple chronic bronchitis        Plan:       Clau was seen today for follow-up.    Diagnoses and all orders for this visit:    Osteoporosis, postmenopausal- on prolia.    Acquired hypothyroidism- clin stable.    Essential hypertension- stable on rx.    Mild major depression and Anxiety- stable on rxs.    Preventive measure  -     Pneumococcal Polysaccharide Vaccine (23 Valent) (SQ/IM)    Simple chronic bronchitis- stable on rx.    RTC 6mo.

## 2017-05-03 NOTE — MR AVS SNAPSHOT
[facility] Internal Medicine  [address]  [facility] [address]  Phone: [phone]  Fax: [phone]                  Clau Nunn   5/3/2017 10:20 AM   Office Visit    Description:  Female : 1935   Provider:  Jeanette Molina MD   Department:  [facility] Internal Medicine           Reason for Visit     Follow-up           Diagnoses this Visit        Comments    Osteoporosis, postmenopausal    -  Primary     Acquired hypothyroidism         Essential hypertension         Mild major depression         Anxiety         Preventive measure         Simple chronic bronchitis                To Do List           Future Appointments        Provider Department Dept Phone    2017 10:30 AM LABORATORY, [facility] [phone]    2017 11:00 AM Kandice Salcedo PA-C [facility] Rheumatology [phone]    11/3/2017 9:40 AM Jeanette Molina MD [facility] Internal Medicine [phone]      Goals (5 Years of Data)     None      Follow-Up and Disposition     Return in about 6 months (around 11/3/2017).       These Medications        Disp Refills Start End    alprazolam (XANAX) 0.25 MG tablet 30 tablet 3 5/3/2017     TAKE 1 TABLET BY MOUTH EVERY 6 HOURS AS NEEDED FOR ACUTE ANXIETY    Pharmacy: [id number] [address] #: [phone]         [facility]     [facility] Care [facility] -  Assistance  Unless otherwise directed by your provider, please contact [facility], our nurse care line that is available for  assistance.     Registered nurses in the [facility] provide: appointment scheduling, clinical advisement, health education, and other advisory services.  Call: [phone] (toll free)               Medications           Message regarding Medications     Verify the changes and/or additions to your medication regime listed below are the same as discussed with your clinician today.  If any of these changes  "or additions are incorrect, please notify your healthcare provider.             Verify that the below list of medications is an accurate representation of the medications you are currently taking.  If none reported, the list may be blank. If incorrect, please contact your healthcare provider. Carry this list with you in case of emergency.           Current Medications     albuterol 90 mcg/actuation inhaler Inhale 2 puffs into the lungs 4 (four) times daily as needed.    alprazolam (XANAX) 0.25 MG tablet TAKE 1 TABLET BY MOUTH EVERY 6 HOURS AS NEEDED FOR ACUTE ANXIETY    ammonium lactate (AMLACTIN) 12 % lotion Apply to damp skin daily.    azelastine (ASTELIN) 137 mcg (0.1 %) nasal spray     cholecalciferol, vitamin D3, 50,000 unit capsule Take 1 capsule (50,000 Units total) by mouth once a week.    cycloSPORINE (RESTASIS) 0.05 % ophthalmic emulsion Place 0.4 mLs (1 drop total) into both eyes 2 (two) times daily.    erythromycin (ROMYCIN) ophthalmic ointment Place into both eyes as needed.    escitalopram oxalate (LEXAPRO) 20 MG tablet Take 1 tablet (20 mg total) by mouth once daily.    fluticasone (FLONASE) 50 mcg/actuation nasal spray USE 2 SPRAYS IN EACH NOSTRIL EVERY DAY    gabapentin (NEURONTIN) 300 MG capsule Take 1 capsule (300 mg total) by mouth 3 (three) times daily.    levothyroxine (SYNTHROID) 88 MCG tablet Take 1 tablet (88 mcg total) by mouth once daily.    losartan-hydrochlorothiazide 50-12.5 mg (HYZAAR) 50-12.5 mg per tablet TAKE 1 TABLET ONE TIME DAILY    meloxicam (MOBIC) 15 MG tablet     omeprazole (PRILOSEC) 20 MG capsule Take 2 capsules (40 mg total) by mouth 2 (two) times daily.    temazepam (RESTORIL) 15 mg Cap     tolterodine (DETROL) 2 MG Tab Take 2 mg by mouth once daily.           Clinical Reference Information           Your Vitals Were     BP Pulse Temp Height Weight SpO2    132/82 (BP Location: Right arm) 58 95.8 °F (35.4 °C) (Tympanic) 5' 5" (1.651 m) 71.4 kg (157 lb 6.5 oz) 93%    BMI    "             26.19 kg/m2          Blood Pressure          Most Recent Value    BP  132/82      Allergies as of 5/3/2017     No Known Drug Allergies      Immunizations Administered on Date of Encounter - 5/3/2017     Name Date Dose VIS Date Route    Pneumococcal Polysaccharide - 23 Valent 5/3/2017 0.5 mL 4/24/2015 Intramuscular      Orders Placed During Today's Visit      Normal Orders This Visit    Pneumococcal Polysaccharide Vaccine (23 Valent) (SQ/IM)       Language Assistance Services     ATTENTION: Language assistance services are available, free of charge. Please call [phone].      ATENCIÓN: Si habla español, tiene a santos disposición servicios gratuitos de asistencia lingüística. Llame al [phone].     SANDRITA Ý: N?u b?n nói Ti?ng Vi?t, có các d?ch v? h? tr? ngôn ng? mi?n phí dành cho b?n. G?i s? [phone].         [facility] complies with applicable Federal civil rights laws and does not discriminate on the basis of race, color, national origin, age, disability, or sex.

## 2017-06-13 ENCOUNTER — TELEPHONE (OUTPATIENT)
Dept: RHEUMATOLOGY | Facility: CLINIC | Age: 82
End: 2017-06-13

## 2017-06-14 DIAGNOSIS — M81.0 OSTEOPOROSIS, POSTMENOPAUSAL: Primary | Chronic | ICD-10-CM

## 2017-08-18 ENCOUNTER — LAB VISIT (OUTPATIENT)
Dept: LAB | Facility: HOSPITAL | Age: 82
End: 2017-08-18
Attending: PHYSICIAN ASSISTANT
Payer: MEDICARE

## 2017-08-18 ENCOUNTER — OFFICE VISIT (OUTPATIENT)
Dept: RHEUMATOLOGY | Facility: CLINIC | Age: 82
End: 2017-08-18
Payer: MEDICARE

## 2017-08-18 VITALS
DIASTOLIC BLOOD PRESSURE: 65 MMHG | HEART RATE: 60 BPM | WEIGHT: 161.38 LBS | BODY MASS INDEX: 26.85 KG/M2 | SYSTOLIC BLOOD PRESSURE: 121 MMHG

## 2017-08-18 DIAGNOSIS — N18.30 STAGE 3 CHRONIC KIDNEY DISEASE: ICD-10-CM

## 2017-08-18 DIAGNOSIS — Z51.81 MEDICATION MONITORING ENCOUNTER: ICD-10-CM

## 2017-08-18 DIAGNOSIS — M47.816 LUMBAR ARTHROPATHY: ICD-10-CM

## 2017-08-18 DIAGNOSIS — M85.80 OSTEOPENIA WITH HIGH RISK OF FRACTURE: Primary | ICD-10-CM

## 2017-08-18 DIAGNOSIS — M81.0 OSTEOPOROSIS, POSTMENOPAUSAL: Chronic | ICD-10-CM

## 2017-08-18 DIAGNOSIS — M15.9 PRIMARY OSTEOARTHRITIS INVOLVING MULTIPLE JOINTS: ICD-10-CM

## 2017-08-18 LAB
ALBUMIN SERPL BCP-MCNC: 3.7 G/DL
ALP SERPL-CCNC: 44 U/L
ALT SERPL W/O P-5'-P-CCNC: 15 U/L
ANION GAP SERPL CALC-SCNC: 10 MMOL/L
AST SERPL-CCNC: 23 U/L
BILIRUB SERPL-MCNC: 0.9 MG/DL
BUN SERPL-MCNC: 30 MG/DL
CALCIUM SERPL-MCNC: 10.2 MG/DL
CHLORIDE SERPL-SCNC: 101 MMOL/L
CO2 SERPL-SCNC: 28 MMOL/L
CREAT SERPL-MCNC: 1.1 MG/DL
EST. GFR  (AFRICAN AMERICAN): 54 ML/MIN/1.73 M^2
EST. GFR  (NON AFRICAN AMERICAN): 47 ML/MIN/1.73 M^2
GLUCOSE SERPL-MCNC: 86 MG/DL
POTASSIUM SERPL-SCNC: 4.2 MMOL/L
PROT SERPL-MCNC: 7 G/DL
SODIUM SERPL-SCNC: 139 MMOL/L

## 2017-08-18 PROCEDURE — 96372 THER/PROPH/DIAG INJ SC/IM: CPT | Mod: S$GLB,,, | Performed by: PHYSICIAN ASSISTANT

## 2017-08-18 PROCEDURE — 99999 PR PBB SHADOW E&M-EST. PATIENT-LVL IV: CPT | Mod: PBBFAC,,, | Performed by: PHYSICIAN ASSISTANT

## 2017-08-18 PROCEDURE — 1125F AMNT PAIN NOTED PAIN PRSNT: CPT | Mod: S$GLB,,, | Performed by: PHYSICIAN ASSISTANT

## 2017-08-18 PROCEDURE — 99499 UNLISTED E&M SERVICE: CPT | Mod: S$GLB,,, | Performed by: PHYSICIAN ASSISTANT

## 2017-08-18 PROCEDURE — 3078F DIAST BP <80 MM HG: CPT | Mod: S$GLB,,, | Performed by: PHYSICIAN ASSISTANT

## 2017-08-18 PROCEDURE — 1159F MED LIST DOCD IN RCRD: CPT | Mod: S$GLB,,, | Performed by: PHYSICIAN ASSISTANT

## 2017-08-18 PROCEDURE — 99214 OFFICE O/P EST MOD 30 MIN: CPT | Mod: 25,S$GLB,, | Performed by: PHYSICIAN ASSISTANT

## 2017-08-18 PROCEDURE — 3008F BODY MASS INDEX DOCD: CPT | Mod: S$GLB,,, | Performed by: PHYSICIAN ASSISTANT

## 2017-08-18 PROCEDURE — 3074F SYST BP LT 130 MM HG: CPT | Mod: S$GLB,,, | Performed by: PHYSICIAN ASSISTANT

## 2017-08-18 NOTE — PATIENT INSTRUCTIONS
Tumeric 1000mg/day   Omega 3, fish oil for stiffness  Avoid nsaids--mobic, aleve, ibuprofen,   Try tylenol arthritis/tylenol    Cont prolia today and in 6 months    Gabapentin 2-3 at night      Chew 2 tums daily x 2 weeks, then we will recheck calcium level

## 2017-08-18 NOTE — LETTER
August 18, 2017      Vin Mcginnis MD  [address] [facility] [address] [place] [address]           [facility] - Rheumatology  [address] [place] [address]  Phone: [phone]  Fax: [phone]          Patient: Clau Nunn   MR Number: [id number]   YOB: 1935   Date of Visit: 8/18/2017       Dear Dr. Vin Mcginnis:    Thank you for referring Clau Nunn to me for evaluation. Attached you will find relevant portions of my assessment and plan of care.    If you have questions, please do not hesitate to call me. I look forward to following Clau Nunn along with you.    Sincerely,    Kandice Salcedo PA-C    Enclosure  CC:  No Recipients    If you would like to receive this communication electronically, please contact [email or web address] or [phone] to request more information on [employer] access.    For providers and/or their staff who would like to refer a patient to [facility], please contact us through our one-stop-shop provider referral line, [facility], at [phone].    If you feel you have received this communication in error or would no longer like to receive these types of communications, please e-mail [email or web address]

## 2017-08-18 NOTE — PROGRESS NOTES
Administered 1cc Prolia 60mg/cc to LLQ of abdomen. Pt. Tolerated well. No acute reaction noted to site. Pt. Instructed on S/S of reaction to report. Pt. Verbalized understanding.    Lot:[id number]  Exp:10/19  Manu:kenny

## 2017-08-18 NOTE — PROGRESS NOTES
Subjective:       Patient ID: Clau Nunn is a 82 y.o. female.    Chief Complaint: osteopenia w high frax    Mrs. Nunn is here for f/u for h/o osteoporosis now osteopenia with high fracture risk. She has a h/o of a right hip fx, right tibia fx, spine fxs s/p trauma and left wrist fx.  No recent falls or broken bones.  She has taken reclast in the past and was on holiday but repeat dexa this year showed decreasing bmd at the hip.  Last visit with Dr. SCHMITT she was moved to Prolia injections due to CKD. She had her first injection in feb this year. Due for #2 today.  She has a history of low vit d on 50K units weekly, but level check last was normal and she is taking otc vit D daily 1K units.  She also has osteoarthritis in multiple  Joints, hips, hands, knees, and chronic back pain (two surgeries s/p trauma) takes gabapentin for this but still having leg pains mainly at night.  She takes mobic for her pain.        Review of Systems   Constitutional: Negative for chills, fatigue and fever.   HENT: Negative for mouth sores, rhinorrhea and sore throat.    Eyes: Negative for pain and redness.   Respiratory: Negative for cough and shortness of breath.    Cardiovascular: Negative for chest pain.   Gastrointestinal: Negative for abdominal pain, constipation, diarrhea, nausea and vomiting.   Genitourinary: Negative for dysuria and hematuria.   Musculoskeletal: Positive for arthralgias and back pain. Negative for joint swelling and myalgias.   Skin: Negative for rash.   Neurological: Negative for weakness, numbness and headaches.   Psychiatric/Behavioral: The patient is not nervous/anxious.          Objective:   /65   Pulse 60   Wt 73.2 kg (161 lb 6 oz)   BMI 26.85 kg/m²      Physical Exam   Constitutional: She is oriented to person, place, and time and well-developed, well-nourished, and in no distress.   HENT:   Head: Normocephalic and atraumatic.   Eyes: Pupils are equal, round, and reactive to light. Right  eye exhibits no discharge.   Neck: Normal range of motion.   Cardiovascular: Normal rate, regular rhythm and normal heart sounds.  Exam reveals no friction rub.    Pulmonary/Chest: Effort normal and breath sounds normal. No respiratory distress.   Abdominal: Soft. She exhibits no distension. There is no tenderness.   Lymphadenopathy:     She has no cervical adenopathy.   Neurological: She is alert and oriented to person, place, and time.   Skin: No rash noted. No erythema.     Psychiatric: Mood normal.   Musculoskeletal: Normal range of motion. She exhibits no edema or deformity.   significant heberden, surekha nodes, OA squaring rebecca 1 cmc joints, no synovitis  rebecca knees no effusion +crepitus, good rom  Gait is steady            Recent Results (from the past 168 hour(s))   Comprehensive metabolic panel    Collection Time: 08/18/17 10:37 AM   Result Value Ref Range    Sodium 139 136 - 145 mmol/L    Potassium 4.2 3.5 - 5.1 mmol/L    Chloride 101 95 - 110 mmol/L    CO2 28 23 - 29 mmol/L    Glucose 86 70 - 110 mg/dL    BUN, Bld 30 (H) 8 - 23 mg/dL    Creatinine 1.1 0.5 - 1.4 mg/dL    Calcium 10.2 8.7 - 10.5 mg/dL    Total Protein 7.0 6.0 - 8.4 g/dL    Albumin 3.7 3.5 - 5.2 g/dL    Total Bilirubin 0.9 0.1 - 1.0 mg/dL    Alkaline Phosphatase 44 (L) 55 - 135 U/L    AST 23 10 - 40 U/L    ALT 15 10 - 44 U/L    Anion Gap 10 8 - 16 mmol/L    eGFR if African American 54 (A) >60 mL/min/1.73 m^2    eGFR if non African American 47 (A) >60 mL/min/1.73 m^2         Dexa 12.13.16: Left Femur -1.2, L neck -1.2, spine 5.6, decreasing bmd at hip, frax major 18.5%, hip 3.7%  Assessment:       1. Osteopenia with high risk of fracture    2. Stage 3 chronic kidney disease    3. Medication monitoring encounter    4. Lumbar arthropathy    5. Primary osteoarthritis involving multiple joints        Impression:  Osteopenia with high fracture risk: h/o multiple fxs,  h/o reclast then holiday, decreasing bmd last dexa, Prolia started x 1 dose;  taking vit d daily 1K units, dietary calcium, no new fxs, no falls  CKD: stable  Medication monitoring; no issues with prolia  Osteoarthritis multiple joints: rebecca hands, rebecca shoulders, left hip, rebecca knees, she takes mobic  Lumbar arthropathy: h/o trauma, 2 surgeries, chronic pain, gabapentin 300mg at night     Plan:       prolia #2 today and in 6 months  Cont daily vit D 1000units, dietary calcium   Stop mobic, avoid nsaids, rec tylenol, tumeric, omega 3/fish oil  Increase neurontin to 2-3 pills at night  Send to PT for low back, gen OA  2 tums daily x 2 weeks, then recheck bmp in 10 days     rtc in 6 mos for prolia, bmp

## 2017-08-30 DIAGNOSIS — F41.9 ANXIETY: ICD-10-CM

## 2017-08-30 RX ORDER — ESCITALOPRAM OXALATE 20 MG/1
TABLET ORAL
Qty: 90 TABLET | Refills: 2 | Status: SHIPPED | OUTPATIENT
Start: 2017-08-30 | End: 2018-06-05 | Stop reason: SDUPTHER

## 2017-10-02 DIAGNOSIS — E03.4 HYPOTHYROIDISM DUE TO ACQUIRED ATROPHY OF THYROID: Chronic | ICD-10-CM

## 2017-10-02 RX ORDER — LEVOTHYROXINE SODIUM 88 UG/1
88 TABLET ORAL DAILY
Qty: 90 TABLET | Refills: 3 | Status: SHIPPED | OUTPATIENT
Start: 2017-10-02 | End: 2018-10-05 | Stop reason: SDUPTHER

## 2017-10-02 NOTE — TELEPHONE ENCOUNTER
----- Message from Martin Fregoso sent at 10/2/2017 10:23 AM CDT -----  Pt is requesting a refill on levothyroxine 88 mcg tab.          Please call pt back at [phone]        .  [id number] - [facility] [address] [facility]  [address] [facility] [address]  Phone: [phone] Fax: [phone]    [facility] - [place], [place] - [address]  [address] [facility] [address]  Phone: [phone] Fax: [phone]

## 2017-10-09 DIAGNOSIS — M54.16 CHRONIC LUMBAR RADICULOPATHY: ICD-10-CM

## 2017-10-09 RX ORDER — GABAPENTIN 300 MG/1
300 CAPSULE ORAL 3 TIMES DAILY
Qty: 90 CAPSULE | Refills: 11 | Status: SHIPPED | OUTPATIENT
Start: 2017-10-09 | End: 2018-10-17 | Stop reason: SDUPTHER

## 2017-10-24 ENCOUNTER — OFFICE VISIT (OUTPATIENT)
Dept: URGENT CARE | Facility: CLINIC | Age: 82
End: 2017-10-24
Payer: MEDICARE

## 2017-10-24 ENCOUNTER — HOSPITAL ENCOUNTER (OUTPATIENT)
Dept: RADIOLOGY | Facility: HOSPITAL | Age: 82
Discharge: HOME | End: 2017-10-24
Attending: NURSE PRACTITIONER
Payer: MEDICARE

## 2017-10-24 VITALS
SYSTOLIC BLOOD PRESSURE: 132 MMHG | TEMPERATURE: 97 F | WEIGHT: 162.94 LBS | HEART RATE: 63 BPM | OXYGEN SATURATION: 98 % | BODY MASS INDEX: 27.15 KG/M2 | DIASTOLIC BLOOD PRESSURE: 80 MMHG | HEIGHT: 65 IN

## 2017-10-24 DIAGNOSIS — M79.671 RIGHT FOOT PAIN: ICD-10-CM

## 2017-10-24 DIAGNOSIS — M79.671 RIGHT FOOT PAIN: Primary | ICD-10-CM

## 2017-10-24 PROCEDURE — 99999 PR PBB SHADOW E&M-EST. PATIENT-LVL V: CPT | Mod: PBBFAC,,, | Performed by: NURSE PRACTITIONER

## 2017-10-24 PROCEDURE — 99499 UNLISTED E&M SERVICE: CPT | Mod: S$GLB,,, | Performed by: NURSE PRACTITIONER

## 2017-10-24 PROCEDURE — 99214 OFFICE O/P EST MOD 30 MIN: CPT | Mod: S$GLB,,, | Performed by: NURSE PRACTITIONER

## 2017-10-24 PROCEDURE — 73630 X-RAY EXAM OF FOOT: CPT | Mod: TC,PO,RT

## 2017-10-24 PROCEDURE — 73630 X-RAY EXAM OF FOOT: CPT | Mod: 26,RT,, | Performed by: RADIOLOGY

## 2017-10-24 NOTE — PROGRESS NOTES
CHIEF COMPLAINT/REASON FOR VISIT:  Right foot pain    HISTORY OF PRESENT ILLNESS:  82-year-old  Female complains of right pain & burning on & off for some time. Patient admits repeatedly stubbing toes & concerned may have broke her foot. Admits tried OTC medications with little relief. Admits primary care instructed her to stop taking Mobic due to kidney function. Admits not taking Neurontin every day as prescribed.  Admits would like an x-ray to be sure no fractures. Patient denies shortness of breath, congestion, fever, cough, back pain and urinary discomfort. Admits has an appointment with PCP in November.    Past Medical History:   Diagnosis Date    Allergy     Anxiety     Asthma     Back pain     Chronic venous insufficiency 11/19/2013    Depression     Diverticulosis 11/19/2013 1/8/8 colonoscopy    Dry eyes     GERD (gastroesophageal reflux disease)     H/O total hip arthroplasty 12/30/2015    Hypertension     Hypothyroid     Osteoarthritis     Osteoporosis     Postlaminectomy syndrome of lumbar region 1/8/2014    Simple chronic bronchitis 5/3/2017    Umbilical hernia 11/19/2013    Urinary incontinence     Vitamin D deficiency disease           Social History     Social History    Marital status:      Spouse name: N/A    Number of children: N/A    Years of education: N/A     Occupational History    retired      [employer]     Social History Main Topics    Smoking status: Never Smoker    Smokeless tobacco: Never Used    Alcohol use 0.0 oz/week      Comment: rarely    Drug use:      Types: Hydrocodone    Sexual activity: No     Other Topics Concern    Not on file     Social History Narrative    Patient is retired from [employer]          Family History   Problem Relation Age of Onset    COPD Mother     Cancer Mother      lung CA    Pulmonary fibrosis Sister     Cancer Daughter      colon    Stroke Father     Diabetes Son     Melanoma Neg Hx      Psoriasis Neg Hx     Lupus Neg Hx        ROS:  GENERAL: No fever, chills, fatigability or weight loss.  SKIN: No rashes, itching or changes in color or texture of skin.  HEENT: No headaches or recent head trauma. . No loss of smell, no epistaxis or postnasal drip. No hoarseness or change in voice.   NODES: No masses or lesions. Denies swollen glands.  CHEST: Denies cyanosis, wheezing, cough and sputum production.  CARDIOVASCULAR: Denies chest pain, PND, orthopnea or reduced exercise tolerance.  ABDOMEN: Appetite fine. No weight loss. Denies diarrhea, abdominal pain,  MUSCULOSKELETAL: right foot pain & burning sensation. Denies back pain.  NEUROLOGIC: No history of seizures, paralysis, alteration of gait or coordination.  PSYCHIATRIC: Denies mood swings, depression or suicidal thoughts.    PE:   APPEARANCE: Well nourished, well developed, in no acute distress.   V/S: Reviewed.  SKIN: Normal skin turgor, no lesions.  HEENT: Normocephalic, atraumatic, turbinates pink, pink pharynx, TM's clear bilateral.  CHEST: Lungs clear to auscultation.  CARDIOVASCULAR: Regular rate and rhythm   MUSCULOSKELETAL: right lower extremity & foot with full range of motion, no redness, no ecchymosis, minimal soft tissue swelling, pulse palpable  NEUROLOGIC: No sensory deficits. Gait & Posture: Normal gait.  No cerebellar signs.  MENTAL STATUS: Patient alert, oriented x 3 & conversant.    PLAN: X-ray right foot  Advise elevate & apply ice  Practice good handwashing..  Advise low sodium diet  Advise take Neurontin  Advise increase oral fluids  Tylenol  for fever, headache and body aches.  Advise follow with PCP.  Advise  go to ER if symptoms worsen or fail to improve with treatment.      DIAGNOSIS:  Right foot pain/ burning  Osteoarthritis  Hypertension  Chronic kidney disease

## 2017-10-24 NOTE — PATIENT INSTRUCTIONS
PLAN: X-ray right foot  Advise elevate & apply ice  Practice good handwashing..  Advise low sodium diet  Advise take Neurontin  Advise increase oral fluids  Tylenol  for fever, headache and body aches.  Advise follow with PCP.  Advise  go to ER if symptoms worsen or fail to improve with treatment.

## 2017-10-27 ENCOUNTER — OFFICE VISIT (OUTPATIENT)
Dept: PHYSICAL MEDICINE AND REHAB | Facility: CLINIC | Age: 82
End: 2017-10-27
Payer: MEDICARE

## 2017-10-27 VITALS
BODY MASS INDEX: 26.99 KG/M2 | RESPIRATION RATE: 14 BRPM | HEART RATE: 58 BPM | DIASTOLIC BLOOD PRESSURE: 68 MMHG | WEIGHT: 162 LBS | HEIGHT: 65 IN | SYSTOLIC BLOOD PRESSURE: 120 MMHG

## 2017-10-27 DIAGNOSIS — M54.16 CHRONIC LUMBAR RADICULOPATHY: Primary | ICD-10-CM

## 2017-10-27 PROCEDURE — 99999 PR PBB SHADOW E&M-EST. PATIENT-LVL III: CPT | Mod: PBBFAC,,, | Performed by: PHYSICAL MEDICINE & REHABILITATION

## 2017-10-27 PROCEDURE — 99214 OFFICE O/P EST MOD 30 MIN: CPT | Mod: S$GLB,,, | Performed by: PHYSICAL MEDICINE & REHABILITATION

## 2017-10-27 NOTE — PROGRESS NOTES
PM&R CLINIC NOTE    Chief Complaint   Patient presents with    Back Pain     low back    Leg Pain     left     HPI: This is a 82 y.o.  female being seen in clinic today for follow up of chronic leg pain with numbness, tingling, and achy pain into her legs to her feet-worse on the left. She tried aquatic PT at [facility] but it didn't help much.  She overall feels achy and is limited in her desired functions    History obtained from patient    Functional History:  Walking: limited at times  Transfers: Independent  Assistive devices: No  Power mobility: No  Falls: yes in past    Needs help with:  Nothing - all ADLS normal    Review of Systems:     General- denies lethargy, weight change, fever, chills  Head/neck- denies swallowing difficulties  ENT- denies hearing changes  Cardiovascular-denies chest pain  Pulmonary- denies shortness of breath  GI- denies constipation or bowel incontinence  - denies bladder incontinence  Skin- denies wounds or rashes  Musculoskeletal- +/-weakness, +pain  Neurologic- +numbness and tingling  Psychiatric- denies depressive or psychotic features, denies anxiety  Lymphatic-denies swelling  Endocrine- denies hypoglycemic symptoms/DM history    Physical Examination:  General: Well developed, well nourished female, NAD  HEENT: NCAT EOMI  Pulmonary: Normal respirations    Spinal Examination: CERVICAL  Active ROM is within normal limits.  Inspection: No deformity of spinal alignment.    Spinal Examination: LUMBAR or THORACIC  Active ROM is limited at endranges  Inspection: No deformity of spinal alignment.  No palpable olisthesis.  Palpation:  Mild ttp at si joints and paraspinals  SLR Test (seated supine):negative  bilaterally  Able to stand on heels and toes    Bilateral Upper and Lower Extremities:  Shoulder/Elbow/Wrist/Hand ROM   Hip/Knee/Ankle ROM wnl  Bilateral Extremities show normal capillary refill.  No signs of cyanosis, rubor, edema, skin changes, or dysvascular changes of  appendages.  Nails appear intact.    Neurological Exam:  Cranial Nerves:  II-XII grossly intact    Manual Muscle Testing: (Motor 5=normal)    RIGHT Lower extremity: Hip flexion 5/5, Hip Abduction 4/5, Knee extension 5/5, Knee flexion 5/5, Ankle dorsiflexion 5/5, Extensor hallucis longus 5/5, Ankle plantarflexion 5/5  LEFT Lower extremity:  Hip flexion 5/5, Hip Abduction 4/5, Knee extension 5/5, Knee flexion 5/5, Ankle dorsiflexion 5/5, Extensor hallucis longus 5/5, Ankle plantarflexion 5/5    No focal atrophy is noted of either upper or lower extremity.    No clonus at knee or ankle.    Sensation: tested to light touch  - intact in legs    Gait: slow, normal stride, good arm swing, no obvious hip weakness    IMPRESSION/PLAN: This is a 82 y.o.  female with lumbar DJD/DDD, chronic lumbar radiculopathy    1. Cont gabapentin 300mg TID if tolerated  2. Cont HEP  3. Per pt request, will refer to Dr Wells for pain management     Elicia Mao M.D.  Physical Medicine and Rehab

## 2017-10-31 ENCOUNTER — OFFICE VISIT (OUTPATIENT)
Dept: PAIN MEDICINE | Facility: CLINIC | Age: 82
End: 2017-10-31
Payer: MEDICARE

## 2017-10-31 VITALS
HEIGHT: 65 IN | DIASTOLIC BLOOD PRESSURE: 75 MMHG | HEART RATE: 62 BPM | SYSTOLIC BLOOD PRESSURE: 140 MMHG | BODY MASS INDEX: 26.99 KG/M2 | RESPIRATION RATE: 16 BRPM | WEIGHT: 162 LBS

## 2017-10-31 DIAGNOSIS — M47.816 LUMBAR SPONDYLOSIS: ICD-10-CM

## 2017-10-31 DIAGNOSIS — M53.3 SACROILIAC JOINT PAIN: Primary | ICD-10-CM

## 2017-10-31 PROCEDURE — 99999 PR PBB SHADOW E&M-EST. PATIENT-LVL III: CPT | Mod: PBBFAC,,, | Performed by: ANESTHESIOLOGY

## 2017-10-31 PROCEDURE — 99204 OFFICE O/P NEW MOD 45 MIN: CPT | Mod: S$GLB,,, | Performed by: ANESTHESIOLOGY

## 2017-10-31 NOTE — PATIENT INSTRUCTIONS
Understanding Sacroiliac Strain    A joint is a place where 2 bones meet. The 2 sacroiliac joints are where the hip (iliac) bones meet the bottom part of the spine (sacrum). These joints are surrounded by muscle, connective tissue, and nerves. Normally, a sacroiliac joint (SIJ) does not move very much. But it can be pushed out of alignment. The tissues around an SIJ also can be stretched or torn. This can lead to pain in the low back.     How to say it  [id number] strain   Causes of sacroiliac strain  Causes of SIJ strain can include:  · Stress on the SIJ from lifting weight incorrectly  · Poor body mechanics and posture during sports or work activities  · Damage from degenerative diseases such as arthritis  · Increased pressure on the SIJ from pregnancy  Symptoms of sacroiliac strain  Symptoms of SIJ strain may include:  · Aching in the low back, buttocks, or upper leg  · Pain that gets worse with movement or standing for a long time, and gets better with rest  · Inability to move as freely as usual  · Muscle spasms in the low back  Treating sacroiliac strain  Treatment focuses on reducing pain and avoiding further injury. Treatments may include:  · Prescription or over-the-counter pain medicines. These help reduce pain and swelling.  · Cold packs or heat packs. These help reduce pain and swelling.  · Stretching and other exercises. These improve flexibility and strength.  · Physical therapy. This may include exercises or other treatments.  · An SIJ belt. This medical device is worn around the hips, to make the SIJ more stable and reduce pain.  · Injections of medicine. This may relieve symptoms.  Possible complications of sacroiliac strain  If the cause of the pain is not addressed, symptoms may return or get worse. Follow your healthcare providers instructions on lifestyle changes and treating your SIJ strain.     When to call your healthcare provider  Call your healthcare provider right away if you have  any of these:  · Fever of 100.4°F (38°C) or higher, or as directed  · Redness or swelling  · Pain that gets worse  · Symptoms that dont get better with prescribed medicines, or get worse  · New symptoms   Date Last Reviewed: 3/10/2016  © 2370-3425 [employer]. [address] [place] [address]. All rights reserved. This information is not intended as a substitute for professional medical care. Always follow your healthcare professional's instructions.

## 2017-10-31 NOTE — PROGRESS NOTES
Chief Pain Complaint:  Low-back Pain (Bilat )        History of Present Illness:   Clau Nunn is a 82 y.o. female  who is presenting with a chief complaint of Low-back Pain (Bilat )  . The patient began experiencing this problem insidiously, and the pain has been gradually worsening over the past 6 month(s). The pain is described as throbbing, cramping, aching and heavy and is located in the bilateral buttocks. Pain is intermittent and lasts hours. The pain radiates to lateral thigh and groin. The patient rates her pain a 7 out of ten and interferes with activities of daily living a 6 out of ten. Pain is exacerbated by getting up from a seated position, and is improved by rest. Patient reports no prior trauma, no prior spinal surgery.     - pertinent negatives: No fever, No chills, No weight loss, No bladder dysfunction, No bowel dysfunction, No extremity weakness, No saddle anesthesia  - pertinent positives: none    - medications, other therapies tried (physical therapy, injections):     >> Tylenol    >> Has previously undergone Physical Therapy    >> Has previously undergone spinal injection/s      Imaging / Labs / Studies (reviewed on 10/31/2017):    No results found for this or any previous visit.  Results for orders placed during the hospital encounter of 01/13/14   MRI Lumbar Spine W WO Contrast    Narrative Findings: Pre- and postcontrast multiplanar multisequence imaging of the thoracic and lumbar spine was performed using 7 cc of Gadavist intravenous contrast material.    There is moderately severe chronic central compression deformity of the T12 vertebral body which is stabilized by paraspinous rods and pedicle screws which extend from T10 through L2.  Postsurgical changes of laminectomy also noted at T12.  The posterior   cortex of the T12 vertebral body is displaced posteriorly and indents the ventral thecal sac.  There is no anterior cord contact or significant central canal stenosis.   "Degenerative disk desiccation is noted at multiple levels with moderate disk   narrowing at L5-S1.  Also L5-S1 is a broad-based disk protrusion which combined with bilateral facet hypertrophy results in moderately severe narrowing of both neural foramina.  No significant central canal stenosis noted.  From L2-3 through L4-5 there   is mild disk bulging which results in mild bilateral foraminal narrowing.  This is slightly more pronounced at L4-5 with bilateral facet hypertrophy a contributing factor.  No significant central canal stenosis noted.  No thoracic disk extrusion, and   foraminal narrowing, canal stenosis is evident.  Thoracolumbar cord is intact.  Paravertebral soft tissues are symmetric and normal in appearance.    Impression  Chronic compression deformity of T12 status post surgical stabilization and laminectomy.  Multilevel spondylosis and degenerative disk disease lumbar spine as detailed.      Electronically signed by: JARROD PRESTON MD  Date:     01/13/14  Time:    16:38        Review of Systems:  CONSTITUTIONAL: patient denies any fever, chills, or weight loss  SKIN: patient denies any rash or itching  RESPIRATORY: patient denies having any shortness of breath  GASTROINTESTINAL: patient denies having any diarrhea, constipation, or bowel incontinence  GENITOURINARY: patient denies having any abnormal bladder function    MUSCULOSKELETAL:  - patient complains of the above noted pain/s (see chief pain complaint)    NEUROLOGICAL:   - pain as above  - strength in Lower extremities is intact, BILATERALLY  - sensation in Lower extremities is intact, BILATERALLY  - patient denies any loss of bowel or bladder control      PSYCHIATRIC: patient denies any change in mood    Other:  All other systems reviewed and are negative      Physical Exam:  BP (!) 140/75 (BP Location: Right arm, Patient Position: Sitting)   Pulse 62   Resp 16   Ht 5' 5" (1.651 m)   Wt 73.5 kg (162 lb)   BMI 26.96 kg/m²  (reviewed on " 10/31/2017)  General: Alert and oriented, in no apparent distress.  Gait: normal gait.  Skin: No rashes, No discoloration, No obvious lesions  HEENT: Normocephalic, atraumatic. Pupils equal and round.  Cardiovascular: Regular rate and rhythm , no significant peripheral edema present  Respiratory: Without audible wheezing, without use of accessory muscles of respiration.    Musculoskeletal:    Lumbar Spine    - Pain on flexion of lumbar spine Absent  - Straight Leg Raise:  Absent    - Pain on extension of lumbar spine Absent  - TTP over the lumbar facet joints Absent  - Lumbar facet loading Absent    -Pain on palpation over the SI joint  Present Bilateral L>R  - PAMELA: Present Bilateral L>R      Neuro:    Strength:    LE R/L: HF: 5/5, HE: 5/5, KF: 5/5; KE: 5/5; FE: 5/5; FF: 5/5    Extremity Reflexes: Brisk and symmetric throughout.      Extremity Sensory: Sensation to pinprick and temperature symmetric. Proprioception intact.      Psych:  Mood and affect is appropriate      Assessment:    Clau Nunn is a 82 y.o. year old female who is presenting with   Encounter Diagnoses   Name Primary?    Sacroiliac joint pain Yes    Lumbar spondylosis        Plan:    1. Interventional: Schdule patient for bilateral SI joint injection with local.     2. Pharmacologic: Tylenol PRN.    3. Rehabilitative: PT post injection.    4. Diagnostic: None at this time.    5. Follow up: Return for After Injection.      30  minutes were spent in this encounter with more than 50% of the time used for counseling and review of the plan.  Imaging / studies reviewed, detailed above.  I discussed in detail the risks, benefits, and alternatives to any and all potential treatment options.  All questions and concerns were fully addressed today in clinic. Medical decision making moderate.    Thank you for the opportunity to assist in the care of this patient.    Best wishes,    Signed:    Raffi Wells MD          Disclaimer:  This note may have  been prepared using voice recognition software, it may have not been extensively proofed, as such there could be errors within the text such as sound alike errors.

## 2017-10-31 NOTE — LETTER
October 31, 2017      Elicia Mao MD  [address] [place] [address]           [facility]  [address] [place] [address]  Phone: [phone]  Fax: [phone]          Patient: Clau Nunn   MR Number: [id number]   YOB: 1935   Date of Visit: 10/31/2017       Dear Dr. Elicia Mao:    Thank you for referring Clau Nunn to me for evaluation. Attached you will find relevant portions of my assessment and plan of care.    If you have questions, please do not hesitate to call me. I look forward to following Clau Nunn along with you.    Sincerely,    Raffi Wells MD    Enclosure  CC:  No Recipients    If you would like to receive this communication electronically, please contact [email or web address] or [phone] to request more information on [employer] access.    For providers and/or their staff who would like to refer a patient to [facility], please contact us through our one-stop-shop provider referral line, [facility], at [phone].    If you feel you have received this communication in error or would no longer like to receive these types of communications, please e-mail [email or web address]

## 2017-11-03 ENCOUNTER — OFFICE VISIT (OUTPATIENT)
Dept: INTERNAL MEDICINE | Facility: CLINIC | Age: 82
End: 2017-11-03
Payer: MEDICARE

## 2017-11-03 VITALS
HEART RATE: 76 BPM | BODY MASS INDEX: 27.15 KG/M2 | WEIGHT: 162.94 LBS | TEMPERATURE: 97 F | DIASTOLIC BLOOD PRESSURE: 80 MMHG | OXYGEN SATURATION: 96 % | SYSTOLIC BLOOD PRESSURE: 110 MMHG | HEIGHT: 65 IN

## 2017-11-03 DIAGNOSIS — R79.9 ABNORMAL BLOOD CHEMISTRY: ICD-10-CM

## 2017-11-03 DIAGNOSIS — F41.9 ANXIETY: ICD-10-CM

## 2017-11-03 DIAGNOSIS — J41.0 SIMPLE CHRONIC BRONCHITIS: ICD-10-CM

## 2017-11-03 DIAGNOSIS — N18.30 STAGE 3 CHRONIC KIDNEY DISEASE: ICD-10-CM

## 2017-11-03 DIAGNOSIS — E03.9 ACQUIRED HYPOTHYROIDISM: ICD-10-CM

## 2017-11-03 DIAGNOSIS — M81.0 OSTEOPOROSIS, POSTMENOPAUSAL: Chronic | ICD-10-CM

## 2017-11-03 DIAGNOSIS — Z29.9 PREVENTIVE MEASURE: ICD-10-CM

## 2017-11-03 DIAGNOSIS — I10 ESSENTIAL HYPERTENSION: Primary | Chronic | ICD-10-CM

## 2017-11-03 DIAGNOSIS — E55.9 VITAMIN D DEFICIENCY DISEASE: ICD-10-CM

## 2017-11-03 PROCEDURE — G0008 ADMIN INFLUENZA VIRUS VAC: HCPCS | Mod: S$GLB,,, | Performed by: INTERNAL MEDICINE

## 2017-11-03 PROCEDURE — 99999 PR PBB SHADOW E&M-EST. PATIENT-LVL III: CPT | Mod: PBBFAC,,, | Performed by: INTERNAL MEDICINE

## 2017-11-03 PROCEDURE — 99214 OFFICE O/P EST MOD 30 MIN: CPT | Mod: S$GLB,,, | Performed by: INTERNAL MEDICINE

## 2017-11-03 PROCEDURE — 99499 UNLISTED E&M SERVICE: CPT | Mod: S$GLB,,, | Performed by: INTERNAL MEDICINE

## 2017-11-03 PROCEDURE — 90662 IIV NO PRSV INCREASED AG IM: CPT | Mod: S$GLB,,, | Performed by: INTERNAL MEDICINE

## 2017-11-03 RX ORDER — LIDOCAINE AND PRILOCAINE 25; 25 MG/G; MG/G
CREAM TOPICAL
COMMUNITY
Start: 2017-10-31 | End: 2020-08-10

## 2017-11-03 NOTE — PROGRESS NOTES
"Subjective:       Patient ID: Clau Nunn is a 82 y.o. female.    Chief Complaint: Follow-up    Here for follow up of medical problems.  Lexapro working well.  Not taking xanax.  Takes restoril every night, no morning grogginess.  Energy a little low.  Gabapentin is helping pain some, but causing sleepiness.  To have SI joints injected.  No cp/palp.  Breathing well.    No f/c/sw/cough.  No cp/sob/palp.  BMs normal.    Updated/ annual due 1/18:  HM: 11/17 today fluvax, 5/16 [id number], 5/17 booster [id number], 12/11 TDaP, 11/09 zoster, 12/16 BMD rep 3y, 1/14 Cscope no more needed, 11/16 MMG/ Gyn Dr. Tere bauer outside, 1/17 Eye Dr. Rubalcava.          Review of Systems   Constitutional: Negative for chills, diaphoresis and fever.   Respiratory: Negative for cough and shortness of breath.    Cardiovascular: Negative for chest pain, palpitations and leg swelling.   Gastrointestinal: Negative for blood in stool, constipation, diarrhea, nausea and vomiting.   Genitourinary: Negative for dysuria, frequency and hematuria.   Psychiatric/Behavioral: The patient is not nervous/anxious.        Objective:   /80 (BP Location: Right arm, Patient Position: Sitting)   Pulse 76   Temp 96.9 °F (36.1 °C) (Tympanic)   Ht 5' 5" (1.651 m)   Wt 73.9 kg (162 lb 14.7 oz)   SpO2 96%   BMI 27.11 kg/m²     Physical Exam   Constitutional: She is oriented to person, place, and time. She appears well-developed.   HENT:   Mouth/Throat: Oropharynx is clear and moist.   Neck: Neck supple. Carotid bruit is not present. No thyroid mass present.   Cardiovascular: Normal rate, regular rhythm and intact distal pulses.  Exam reveals no gallop and no friction rub.    No murmur heard.  Pulmonary/Chest: Effort normal and breath sounds normal. She has no wheezes. She has no rales.   Abdominal: Soft. Bowel sounds are normal. She exhibits no mass. There is no hepatosplenomegaly. There is no tenderness.   Musculoskeletal: She exhibits no edema. "   Lymphadenopathy:     She has no cervical adenopathy.   Neurological: She is alert and oriented to person, place, and time.   Psychiatric: She has a normal mood and affect.       Results for orders placed or performed in visit on 08/18/17   Comprehensive metabolic panel   Result Value Ref Range    Sodium 139 136 - 145 mmol/L    Potassium 4.2 3.5 - 5.1 mmol/L    Chloride 101 95 - 110 mmol/L    CO2 28 23 - 29 mmol/L    Glucose 86 70 - 110 mg/dL    BUN, Bld 30 (H) 8 - 23 mg/dL    Creatinine 1.1 0.5 - 1.4 mg/dL    Calcium 10.2 8.7 - 10.5 mg/dL    Total Protein 7.0 6.0 - 8.4 g/dL    Albumin 3.7 3.5 - 5.2 g/dL    Total Bilirubin 0.9 0.1 - 1.0 mg/dL    Alkaline Phosphatase 44 (L) 55 - 135 U/L    AST 23 10 - 40 U/L    ALT 15 10 - 44 U/L    Anion Gap 10 8 - 16 mmol/L    eGFR if African American 54 (A) >60 mL/min/1.73 m^2    eGFR if non African American 47 (A) >60 mL/min/1.73 m^2       Assessment:       1. Essential hypertension    2. Anxiety    3. Acquired hypothyroidism    4. Stage 3 chronic kidney disease    5. Vitamin D deficiency disease    6. Simple chronic bronchitis    7. Osteoporosis, postmenopausal    8. Preventive measure    9. Abnormal blood chemistry        Plan:       Clau was seen today for follow-up.    Diagnoses and all orders for this visit:    Essential hypertension- stable on rx, cont.    Anxiety- stable on rx, cont.    Acquired hypothyroidism- clin stable, recheck 4mo.  -     TSH; Future    Stage 3 chronic kidney disease- cont to monitor.    Vitamin D deficiency disease- check lab.  -     Vitamin D; Future    Simple chronic bronchitis- doing well.    Osteoporosis, postmenopausal    Preventive measure- fluvax now.  Annual in 4mo.  -     Comprehensive metabolic panel; Future  -     Lipid panel; Future  -     CBC auto differential; Future  -     TSH; Future  -     Vitamin D; Future    Abnormal blood chemistry/ gluc high on one lab, and son with severe DM.  -     Lipid panel; Future  -     Hemoglobin  A1c; Future    Other orders  -     Influenza - High Dose (65+) (PF) (IM)

## 2017-11-09 ENCOUNTER — HOSPITAL ENCOUNTER (OUTPATIENT)
Facility: HOSPITAL | Age: 82
Discharge: HOME | End: 2017-11-09
Attending: ANESTHESIOLOGY | Admitting: ANESTHESIOLOGY
Payer: MEDICARE

## 2017-11-09 ENCOUNTER — HOSPITAL ENCOUNTER (OUTPATIENT)
Dept: RADIOLOGY | Facility: HOSPITAL | Age: 82
Discharge: HOME | End: 2017-11-09
Attending: ANESTHESIOLOGY | Admitting: ANESTHESIOLOGY
Payer: MEDICARE

## 2017-11-09 ENCOUNTER — SURGERY (OUTPATIENT)
Age: 82
End: 2017-11-09

## 2017-11-09 VITALS
HEART RATE: 54 BPM | SYSTOLIC BLOOD PRESSURE: 162 MMHG | DIASTOLIC BLOOD PRESSURE: 77 MMHG | OXYGEN SATURATION: 97 % | RESPIRATION RATE: 16 BRPM

## 2017-11-09 DIAGNOSIS — M53.3 SACROILIAC JOINT PAIN: ICD-10-CM

## 2017-11-09 DIAGNOSIS — M47.816 LUMBAR SPONDYLOSIS: ICD-10-CM

## 2017-11-09 PROCEDURE — 63600175 PHARM REV CODE 636 W HCPCS

## 2017-11-09 PROCEDURE — 25000003 PHARM REV CODE 250

## 2017-11-09 PROCEDURE — 25000003 PHARM REV CODE 250: Performed by: ANESTHESIOLOGY

## 2017-11-09 PROCEDURE — 63600175 PHARM REV CODE 636 W HCPCS: Performed by: ANESTHESIOLOGY

## 2017-11-09 PROCEDURE — 27096 INJECT SACROILIAC JOINT: CPT | Mod: TC,50

## 2017-11-09 PROCEDURE — 27096 INJECT SACROILIAC JOINT: CPT | Mod: 50,,, | Performed by: ANESTHESIOLOGY

## 2017-11-09 RX ORDER — METHYLPREDNISOLONE ACETATE 40 MG/ML
INJECTION, SUSPENSION INTRA-ARTICULAR; INTRALESIONAL; INTRAMUSCULAR; SOFT TISSUE
Status: DISCONTINUED | OUTPATIENT
Start: 2017-11-09 | End: 2017-11-09 | Stop reason: HOSPADM

## 2017-11-09 RX ORDER — LIDOCAINE HYDROCHLORIDE 20 MG/ML
INJECTION, SOLUTION EPIDURAL; INFILTRATION; INTRACAUDAL; PERINEURAL
Status: DISCONTINUED | OUTPATIENT
Start: 2017-11-09 | End: 2017-11-09 | Stop reason: HOSPADM

## 2017-11-09 RX ADMIN — LIDOCAINE HYDROCHLORIDE 4 ML: 20 INJECTION, SOLUTION EPIDURAL; INFILTRATION; INTRACAUDAL; PERINEURAL at 01:11

## 2017-11-09 RX ADMIN — METHYLPREDNISOLONE ACETATE 80 MG: 40 INJECTION, SUSPENSION INTRA-ARTICULAR; INTRALESIONAL; INTRAMUSCULAR; SOFT TISSUE at 01:11

## 2017-11-09 NOTE — DISCHARGE SUMMARY
[facility]  Discharge Note       Description of Procedure: Sacroiliac Joint Injection under Fluoroscopic Guidance    Procedure Date: 11/9/2017    Admit Date: 11/9/2017  Discharge Date: 11/9/2017     Attending Physician: Priscilla Nugent   Discharge Provider: Priscilla Nugent    Preoperative Diagnosis: Bilateral Sacroiliac Joint Dysfunction     Postoperative Diagnosis: as above, same as preoperative diagnosis    Discharged Condition: Stable    Hospital Course: Patient was admitted for an outpatient procedure. The procedure was tolerated well with no complications.    Final Diagnoses: Same as principal problem.    Disposition: Home, self-care.    Follow up/Patient Instructions:  Follow-up in clinic in 2-3 weeks.    Medications: No medications were prescribed today. The patient was advised to resume normal medication regimen without change.  Specific information was provided regarding restarting any anticoagulant/s.    Discharge Procedure Orders (must include Diet, Follow-up, Activity):  Light activity for the remainder of the day, resume normal activity tomorrow. Resume normal diet. Follow-up in clinic in 2-3 weeks.

## 2017-11-09 NOTE — PROCEDURES
PROCEDURE: Sacroiliac joint injection under fluoroscopic guidance     SIDE: bilateral      PROCEDURE DATE: 11/9/2017    PREOPERATIVE DIAGNOSIS: Sacroiliitis  POSTOPERATIVE DIAGNOSIS: Sacroiliitis    PROVIDER: Fernando Sandhu MD  Assistant(s): None    ANESTHESIA: Local, No Sedation    >> 0 mg of VERSED    >> 0 mcg of FENTANYL     INDICATION: The patient has a history of pain due to sacroiliitis unresponsive to conservative treatments. Fluoroscopy was used to optimize visualization of needle placement and to maximize safety.     PROCEDURE DESCRIPTION: The patient was seen and identified in the preoperative area. Risks, benefits, complications, and alternatives were discussed with the patient. The patient agreed to proceed with the procedure and signed the consent. The site and side of the procedure was identified and marked. An IV was not placed for this procedure.     The patient was taken to the procedural suite. The patient was positioned in prone orientation on procedure table. A time out was performed prior to any intervention. The procedure, site, side, and allergies were stated and agreed to by all present. The lumbosacral area was widely prepped with ChloraPrep. The procedural site was draped in usual sterile fashion. Vital signs were closely monitored throughout this procedure. Conscious sedation was not used for this procedure.    The fluoroscopic camera was placed in contralateral oblique view and was adjusted until the anterior and posterior joint margins of the targeted sacroiliac joint aligned in linear array. The lower pole of the joint was identified, marked, and localized with 1% Lidocaine. A 25 gauge 3.5 inch spinal needle was introduced and advanced to the joint under fluoroscopic guidance. The joint space was entered and after negative aspiration, 2 mL of injectate was posited into the joint space. The needle was then withdrawn outside of the joint space and after negative aspiration 1 mL of  solution was injected outside of the joint space. The injectant solution used was comprised of 4 mL of 1% PF Lidocaine and 2 mL of Methylprednisolone (40 mg/mL). This techniques was performed for the above noted joint/s.    Description of Findings: Not applicable    Prosthetic devices, grafts, tissues, or devices implanted: None    Specimen Removed: No    Estimated Blood Loss: minimal    COMPLICATIONS: None    DISPOSITION / PLANS: The patient was transferred to the recovery area in a stable condition for observation. The patient was reexamined prior to discharge. There was no evidence of acute neurologic injury following the procedure.  Patient was discharged from the recovery room after meeting discharge criteria. Home discharge instructions were given to the patient by the staff.

## 2017-11-09 NOTE — PLAN OF CARE
Problem: Patient Care Overview  Goal: Plan of Care Review  Outcome: Outcome(s) achieved Date Met: 11/09/17  Patient d/c home in stable condition via wheelchair with ride. Verbalized understanding of d/c instructions. Patient voiced no complaints at this time. Patient stood at side of bed, walked steps with no new motor deficits. Neurologically intact.

## 2017-12-01 ENCOUNTER — OFFICE VISIT (OUTPATIENT)
Dept: PAIN MEDICINE | Facility: CLINIC | Age: 82
End: 2017-12-01
Payer: MEDICARE

## 2017-12-01 VITALS
DIASTOLIC BLOOD PRESSURE: 68 MMHG | RESPIRATION RATE: 16 BRPM | HEIGHT: 65 IN | HEART RATE: 61 BPM | SYSTOLIC BLOOD PRESSURE: 118 MMHG | BODY MASS INDEX: 26.99 KG/M2 | WEIGHT: 162 LBS

## 2017-12-01 DIAGNOSIS — M47.816 LUMBAR SPONDYLOSIS: ICD-10-CM

## 2017-12-01 DIAGNOSIS — M53.3 SACROILIAC JOINT PAIN: ICD-10-CM

## 2017-12-01 DIAGNOSIS — M25.552 PAIN OF LEFT HIP JOINT: Primary | ICD-10-CM

## 2017-12-01 PROCEDURE — 99214 OFFICE O/P EST MOD 30 MIN: CPT | Mod: ,,, | Performed by: ANESTHESIOLOGY

## 2017-12-01 PROCEDURE — 99999 PR PBB SHADOW E&M-EST. PATIENT-LVL III: CPT | Mod: PBBFAC,,, | Performed by: ANESTHESIOLOGY

## 2017-12-01 RX ORDER — TIZANIDINE 4 MG/1
4 TABLET ORAL 2 TIMES DAILY PRN
Qty: 60 TABLET | Refills: 0 | Status: SHIPPED | OUTPATIENT
Start: 2017-12-01 | End: 2017-12-31

## 2017-12-01 NOTE — PROGRESS NOTES
Chief Pain Complaint:  Low-back Pain (F/U INJ)    Interval History: The patient was last seen 11/09/2017. At that time she underwent bilateral SI joint injection. The patient reports that  she is/was better following the procedure. She had 30% improvement in pain. The changes lasted 2  weeks. The changes have continued through this visit.      History of Present Illness:   Clau Nunn is a 82 y.o. female  who is presenting with a chief complaint of Low-back Pain (F/U INJ)  . The patient began experiencing this problem insidiously, and the pain has been gradually worsening over the past 6 month(s). The pain is described as throbbing, cramping, aching and heavy and is located in the bilateral buttocks. Pain is intermittent and lasts hours. The pain radiates to lateral thigh and groin. The patient rates her pain a 7 out of ten and interferes with activities of daily living a 6 out of ten. Pain is exacerbated by getting up from a seated position, and is improved by rest. Patient reports no prior trauma, no prior spinal surgery.     - pertinent negatives: No fever, No chills, No weight loss, No bladder dysfunction, No bowel dysfunction, No extremity weakness, No saddle anesthesia  - pertinent positives: none    - medications, other therapies tried (physical therapy, injections):     >> Tylenol    >> Has previously undergone Physical Therapy    >> Has previously undergone spinal injection/s      Imaging / Labs / Studies (reviewed on 12/1/2017):    No results found for this or any previous visit.  Results for orders placed during the hospital encounter of 01/13/14   MRI Lumbar Spine W WO Contrast    Narrative Findings: Pre- and postcontrast multiplanar multisequence imaging of the thoracic and lumbar spine was performed using 7 cc of Gadavist intravenous contrast material.    There is moderately severe chronic central compression deformity of the T12 vertebral body which is stabilized by paraspinous rods and pedicle  screws which extend from T10 through L2.  Postsurgical changes of laminectomy also noted at T12.  The posterior   cortex of the T12 vertebral body is displaced posteriorly and indents the ventral thecal sac.  There is no anterior cord contact or significant central canal stenosis.  Degenerative disk desiccation is noted at multiple levels with moderate disk   narrowing at L5-S1.  Also L5-S1 is a broad-based disk protrusion which combined with bilateral facet hypertrophy results in moderately severe narrowing of both neural foramina.  No significant central canal stenosis noted.  From L2-3 through L4-5 there   is mild disk bulging which results in mild bilateral foraminal narrowing.  This is slightly more pronounced at L4-5 with bilateral facet hypertrophy a contributing factor.  No significant central canal stenosis noted.  No thoracic disk extrusion, and   foraminal narrowing, canal stenosis is evident.  Thoracolumbar cord is intact.  Paravertebral soft tissues are symmetric and normal in appearance.    Impression  Chronic compression deformity of T12 status post surgical stabilization and laminectomy.  Multilevel spondylosis and degenerative disk disease lumbar spine as detailed.      Electronically signed by: JARROD PRESTON MD  Date:     01/13/14  Time:    16:38        Review of Systems:  CONSTITUTIONAL: patient denies any fever, chills, or weight loss  SKIN: patient denies any rash or itching  RESPIRATORY: patient denies having any shortness of breath  GASTROINTESTINAL: patient denies having any diarrhea, constipation, or bowel incontinence  GENITOURINARY: patient denies having any abnormal bladder function    MUSCULOSKELETAL:  - patient complains of the above noted pain/s (see chief pain complaint)    NEUROLOGICAL:   - pain as above  - strength in Lower extremities is intact, BILATERALLY  - sensation in Lower extremities is intact, BILATERALLY  - patient denies any loss of bowel or bladder control     "  PSYCHIATRIC: patient denies any change in mood    Other:  All other systems reviewed and are negative      Physical Exam:  /68 (BP Location: Right arm, Patient Position: Sitting)   Pulse 61   Resp 16   Ht 5' 5" (1.651 m)   Wt 73.5 kg (162 lb)   BMI 26.96 kg/m²  (reviewed on 12/1/2017)  General: Alert and oriented, in no apparent distress.  Gait: normal gait.  Skin: No rashes, No discoloration, No obvious lesions  HEENT: Normocephalic, atraumatic. Pupils equal and round.  Cardiovascular: Regular rate and rhythm , no significant peripheral edema present  Respiratory: Without audible wheezing, without use of accessory muscles of respiration.    Musculoskeletal:    Lumbar Spine    - Pain on flexion of lumbar spine Absent  - Straight Leg Raise:  Absent    - Pain on extension of lumbar spine Absent  - TTP over the lumbar facet joints Absent  - Lumbar facet loading Absent    -Pain on palpation over the SI joint  Present Bilateral L>R  - PAMELA: Present Bilateral L>R    - Pain on Internal and external rotation of the hip L>R    Neuro:    Strength:    LE R/L: HF: 5/5, HE: 5/5, KF: 5/5; KE: 5/5; FE: 5/5; FF: 5/5    Extremity Reflexes: Brisk and symmetric throughout.      Extremity Sensory: Sensation to pinprick and temperature symmetric. Proprioception intact.      Psych:  Mood and affect is appropriate      Assessment:    Clau Nunn is a 82 y.o. year old female who is presenting with   Encounter Diagnoses   Name Primary?    Sacroiliac joint pain Yes    Lumbar spondylosis     Pain of left hip joint        Plan:    1. Interventional: Schdule patient for Left Hip injection with local.     2. Pharmacologic: Tylenol PRN. Add Tizanidine 4 mg PO BID.     3. Rehabilitative: PT post injection.    4. Diagnostic: None at this time.    5. Follow up: Post injection.     30  minutes were spent in this encounter with more than 50% of the time used for counseling and review of the plan.  Imaging / studies reviewed, " detailed above.  I discussed in detail the risks, benefits, and alternatives to any and all potential treatment options.  All questions and concerns were fully addressed today in clinic. Medical decision making moderate.    Thank you for the opportunity to assist in the care of this patient.    Best wishes,    Signed:    Raffi Wells MD          Disclaimer:  This note may have been prepared using voice recognition software, it may have not been extensively proofed, as such there could be errors within the text such as sound alike errors.

## 2017-12-28 ENCOUNTER — HOSPITAL ENCOUNTER (OUTPATIENT)
Facility: HOSPITAL | Age: 82
Discharge: HOME | End: 2017-12-28
Attending: ANESTHESIOLOGY | Admitting: ANESTHESIOLOGY
Payer: MEDICARE

## 2017-12-28 ENCOUNTER — SURGERY (OUTPATIENT)
Age: 82
End: 2017-12-28

## 2017-12-28 ENCOUNTER — HOSPITAL ENCOUNTER (OUTPATIENT)
Dept: RADIOLOGY | Facility: HOSPITAL | Age: 82
Discharge: HOME | End: 2017-12-28
Attending: ANESTHESIOLOGY
Payer: MEDICARE

## 2017-12-28 VITALS
RESPIRATION RATE: 17 BRPM | OXYGEN SATURATION: 100 % | SYSTOLIC BLOOD PRESSURE: 186 MMHG | HEART RATE: 58 BPM | DIASTOLIC BLOOD PRESSURE: 82 MMHG

## 2017-12-28 DIAGNOSIS — M25.552 PAIN OF LEFT HIP JOINT: ICD-10-CM

## 2017-12-28 PROCEDURE — 77002 NEEDLE LOCALIZATION BY XRAY: CPT | Mod: TC

## 2017-12-28 PROCEDURE — 63600175 PHARM REV CODE 636 W HCPCS

## 2017-12-28 PROCEDURE — 25000003 PHARM REV CODE 250: Performed by: ANESTHESIOLOGY

## 2017-12-28 PROCEDURE — 63600175 PHARM REV CODE 636 W HCPCS: Performed by: ANESTHESIOLOGY

## 2017-12-28 PROCEDURE — 25000003 PHARM REV CODE 250

## 2017-12-28 PROCEDURE — 20610 DRAIN/INJ JOINT/BURSA W/O US: CPT | Mod: LT,,, | Performed by: ANESTHESIOLOGY

## 2017-12-28 PROCEDURE — 77002 NEEDLE LOCALIZATION BY XRAY: CPT | Mod: 26,,, | Performed by: ANESTHESIOLOGY

## 2017-12-28 RX ORDER — METHYLPREDNISOLONE ACETATE 40 MG/ML
INJECTION, SUSPENSION INTRA-ARTICULAR; INTRALESIONAL; INTRAMUSCULAR; SOFT TISSUE
Status: DISCONTINUED | OUTPATIENT
Start: 2017-12-28 | End: 2017-12-28 | Stop reason: HOSPADM

## 2017-12-28 RX ORDER — LIDOCAINE HYDROCHLORIDE 20 MG/ML
INJECTION, SOLUTION EPIDURAL; INFILTRATION; INTRACAUDAL; PERINEURAL
Status: DISCONTINUED | OUTPATIENT
Start: 2017-12-28 | End: 2017-12-28 | Stop reason: HOSPADM

## 2017-12-28 RX ADMIN — METHYLPREDNISOLONE ACETATE 80 MG: 40 INJECTION, SUSPENSION INTRA-ARTICULAR; INTRALESIONAL; INTRAMUSCULAR; SOFT TISSUE at 12:12

## 2017-12-28 RX ADMIN — LIDOCAINE HYDROCHLORIDE 2 ML: 20 INJECTION, SOLUTION EPIDURAL; INFILTRATION; INTRACAUDAL; PERINEURAL at 12:12

## 2017-12-28 NOTE — H&P (VIEW-ONLY)
Chief Pain Complaint:  Low-back Pain (F/U INJ)    Interval History: The patient was last seen 11/09/2017. At that time she underwent bilateral SI joint injection. The patient reports that  she is/was better following the procedure. She had 30% improvement in pain. The changes lasted 2  weeks. The changes have continued through this visit.      History of Present Illness:   Clau Nunn is a 82 y.o. female  who is presenting with a chief complaint of Low-back Pain (F/U INJ)  . The patient began experiencing this problem insidiously, and the pain has been gradually worsening over the past 6 month(s). The pain is described as throbbing, cramping, aching and heavy and is located in the bilateral buttocks. Pain is intermittent and lasts hours. The pain radiates to lateral thigh and groin. The patient rates her pain a 7 out of ten and interferes with activities of daily living a 6 out of ten. Pain is exacerbated by getting up from a seated position, and is improved by rest. Patient reports no prior trauma, no prior spinal surgery.     - pertinent negatives: No fever, No chills, No weight loss, No bladder dysfunction, No bowel dysfunction, No extremity weakness, No saddle anesthesia  - pertinent positives: none    - medications, other therapies tried (physical therapy, injections):     >> Tylenol    >> Has previously undergone Physical Therapy    >> Has previously undergone spinal injection/s      Imaging / Labs / Studies (reviewed on 12/1/2017):    No results found for this or any previous visit.  Results for orders placed during the hospital encounter of 01/13/14   MRI Lumbar Spine W WO Contrast    Narrative Findings: Pre- and postcontrast multiplanar multisequence imaging of the thoracic and lumbar spine was performed using 7 cc of Gadavist intravenous contrast material.    There is moderately severe chronic central compression deformity of the T12 vertebral body which is stabilized by paraspinous rods and pedicle  screws which extend from T10 through L2.  Postsurgical changes of laminectomy also noted at T12.  The posterior   cortex of the T12 vertebral body is displaced posteriorly and indents the ventral thecal sac.  There is no anterior cord contact or significant central canal stenosis.  Degenerative disk desiccation is noted at multiple levels with moderate disk   narrowing at L5-S1.  Also L5-S1 is a broad-based disk protrusion which combined with bilateral facet hypertrophy results in moderately severe narrowing of both neural foramina.  No significant central canal stenosis noted.  From L2-3 through L4-5 there   is mild disk bulging which results in mild bilateral foraminal narrowing.  This is slightly more pronounced at L4-5 with bilateral facet hypertrophy a contributing factor.  No significant central canal stenosis noted.  No thoracic disk extrusion, and   foraminal narrowing, canal stenosis is evident.  Thoracolumbar cord is intact.  Paravertebral soft tissues are symmetric and normal in appearance.    Impression  Chronic compression deformity of T12 status post surgical stabilization and laminectomy.  Multilevel spondylosis and degenerative disk disease lumbar spine as detailed.      Electronically signed by: JARROD PRESTON MD  Date:     01/13/14  Time:    16:38        Review of Systems:  CONSTITUTIONAL: patient denies any fever, chills, or weight loss  SKIN: patient denies any rash or itching  RESPIRATORY: patient denies having any shortness of breath  GASTROINTESTINAL: patient denies having any diarrhea, constipation, or bowel incontinence  GENITOURINARY: patient denies having any abnormal bladder function    MUSCULOSKELETAL:  - patient complains of the above noted pain/s (see chief pain complaint)    NEUROLOGICAL:   - pain as above  - strength in Lower extremities is intact, BILATERALLY  - sensation in Lower extremities is intact, BILATERALLY  - patient denies any loss of bowel or bladder control     "  PSYCHIATRIC: patient denies any change in mood    Other:  All other systems reviewed and are negative      Physical Exam:  /68 (BP Location: Right arm, Patient Position: Sitting)   Pulse 61   Resp 16   Ht 5' 5" (1.651 m)   Wt 73.5 kg (162 lb)   BMI 26.96 kg/m²  (reviewed on 12/1/2017)  General: Alert and oriented, in no apparent distress.  Gait: normal gait.  Skin: No rashes, No discoloration, No obvious lesions  HEENT: Normocephalic, atraumatic. Pupils equal and round.  Cardiovascular: Regular rate and rhythm , no significant peripheral edema present  Respiratory: Without audible wheezing, without use of accessory muscles of respiration.    Musculoskeletal:    Lumbar Spine    - Pain on flexion of lumbar spine Absent  - Straight Leg Raise:  Absent    - Pain on extension of lumbar spine Absent  - TTP over the lumbar facet joints Absent  - Lumbar facet loading Absent    -Pain on palpation over the SI joint  Present Bilateral L>R  - PAEMLA: Present Bilateral L>R    - Pain on Internal and external rotation of the hip L>R    Neuro:    Strength:    LE R/L: HF: 5/5, HE: 5/5, KF: 5/5; KE: 5/5; FE: 5/5; FF: 5/5    Extremity Reflexes: Brisk and symmetric throughout.      Extremity Sensory: Sensation to pinprick and temperature symmetric. Proprioception intact.      Psych:  Mood and affect is appropriate      Assessment:    Clau Nunn is a 82 y.o. year old female who is presenting with   Encounter Diagnoses   Name Primary?    Sacroiliac joint pain Yes    Lumbar spondylosis     Pain of left hip joint        Plan:    1. Interventional: Schdule patient for Left Hip injection with local.     2. Pharmacologic: Tylenol PRN. Add Tizanidine 4 mg PO BID.     3. Rehabilitative: PT post injection.    4. Diagnostic: None at this time.    5. Follow up: Post injection.     30  minutes were spent in this encounter with more than 50% of the time used for counseling and review of the plan.  Imaging / studies reviewed, " detailed above.  I discussed in detail the risks, benefits, and alternatives to any and all potential treatment options.  All questions and concerns were fully addressed today in clinic. Medical decision making moderate.    Thank you for the opportunity to assist in the care of this patient.    Best wishes,    Signed:    Raffi Wells MD          Disclaimer:  This note may have been prepared using voice recognition software, it may have not been extensively proofed, as such there could be errors within the text such as sound alike errors.

## 2017-12-28 NOTE — OP NOTE
Procedure: Hip (acetabulofemoral) joint injection under fluoroscopic guidance     Side: Left     Pre-procedure diagnosis: osteoarthritis of the hip (of the above noted laterality)  Post-procedure diagnosis: same     PROVIDER: Raffi Wells MD  Assistant(s): None     ANESTHESIA: Local, No Sedation    >> 0 mg of VERSED    >> 0 mcg of FENTANYL      INDICATION: The patient has hip pain unresponsive to conservative treatments. Fluoroscopy was used to optimize visualization of needle placement and to maximize safety.      Description of Procedure:  Prior to starting this procedure, risks, benefits, complications, and alternatives were discussed with the patient. The patient agreed to proceed with the procedure and signed a consent. The site and side of the procedure was identified and marked. The patient was taken to the procedural suite and positioned on the table in prone orientation. A time out was performed. All in attendance were in agreement with the time out. The area over the above noted joint/s was widely prepped with ChloraPrep and drapped in usual sterile fashion.     The above noted joint/s was identified on fluoroscopy. A 27-gauge 1.5 inch needle was used to localize the site of entry with 3 mL of 1% PF Lidocaine. A 25 gauge 3.5 inch spinal needle was then introduced and advanced towards the neck of the femur. The target site was approximately 0.5 to 1.0 cm distal to the lateral border of the femoral head and 0.5 to 1.0 cm below the superior aspect of the femoral neck. The needle was advanced until osseus interface was met. Following negative aspiration, a solution containing 2 mL of 1% PF Lidocaine and 1 mL of Methylprednisolone (40 mg/mL) was then injected. There was minimal resistance encountered throughout injection. No paresthesias were noted on injection. The needle stylet was replaced and the needle was removed intact. The patient tolerated the procedure well. A bandage was applied to the site of  injection.     Description of Findings: Not applicable     Prosthetic devices, grafts, tissues, or devices implanted: None     Specimen Removed: No     Estimated Blood Loss: minimal     COMPLICATIONS: None     DISPOSITION / PLANS: The patient was transferred to the recovery area in a stable condition for observation. The patient was reexamined prior to discharge. There was no evidence of acute neurologic injury following the procedure.  Patient was discharged from the recovery room after meeting discharge criteria. Home discharge instructions were given to the patient by the staff.

## 2017-12-28 NOTE — DISCHARGE SUMMARY
[facility]  Discharge Note       Description of Procedure: Left  Acetabulofemoral Joint Injection under Fluoroscopic Guidance    Procedure Date: 12/28/2017    Admit Date: 12/28/2017  Discharge Date: 12/28/2017     Attending Physician: Priscilla Nugent   Discharge Provider: Priscilla Nugent    Preoperative Diagnosis: Left Hip Pain     Postoperative Diagnosis: as above, same as preoperative diagnosis    Discharged Condition: Stable    Hospital Course: Patient was admitted for an outpatient procedure. The procedure was tolerated well with no complications.    Final Diagnoses: Same as principal problem.    Disposition: Home, self-care.    Follow up/Patient Instructions:  Follow-up in clinic in 2-3 weeks.    Medications: No medications were prescribed today. The patient was advised to resume normal medication regimen without change.  Specific information was provided regarding restarting any anticoagulant/s.    Discharge Procedure Orders (must include Diet, Follow-up, Activity):  Light activity for the remainder of the day, resume normal activity tomorrow. Resume normal diet. Follow-up in clinic in 2-3 weeks.

## 2017-12-28 NOTE — PLAN OF CARE
Problem: Patient Care Overview  Goal: Plan of Care Review  Outcome: Outcome(s) achieved Date Met: 12/28/17  Patient discharged home in stable condition via wheelchair with ride. Verbalized understanding of discharge instructions. Patient voiced no complaints at this time. Patient stood at side of bed, walked steps with no new motor or sensory deficits. Neurologically intact.

## 2018-01-22 ENCOUNTER — OFFICE VISIT (OUTPATIENT)
Dept: PAIN MEDICINE | Facility: CLINIC | Age: 83
End: 2018-01-22
Payer: MEDICARE

## 2018-01-22 VITALS
HEART RATE: 60 BPM | WEIGHT: 162 LBS | DIASTOLIC BLOOD PRESSURE: 77 MMHG | SYSTOLIC BLOOD PRESSURE: 140 MMHG | BODY MASS INDEX: 26.99 KG/M2 | RESPIRATION RATE: 18 BRPM | HEIGHT: 65 IN

## 2018-01-22 DIAGNOSIS — M53.3 SACROILIAC JOINT PAIN: Primary | ICD-10-CM

## 2018-01-22 DIAGNOSIS — M47.816 LUMBAR FACET ARTHROPATHY: ICD-10-CM

## 2018-01-22 DIAGNOSIS — M25.552 PAIN OF LEFT HIP JOINT: ICD-10-CM

## 2018-01-22 DIAGNOSIS — M47.816 LUMBAR SPONDYLOSIS: ICD-10-CM

## 2018-01-22 PROCEDURE — 99999 PR PBB SHADOW E&M-EST. PATIENT-LVL IV: CPT | Mod: PBBFAC,,, | Performed by: PHYSICIAN ASSISTANT

## 2018-01-22 PROCEDURE — 99214 OFFICE O/P EST MOD 30 MIN: CPT | Mod: S$GLB,,, | Performed by: PHYSICIAN ASSISTANT

## 2018-01-22 NOTE — PROGRESS NOTES
Chief Pain Complaint:  Low-back Pain    Interval History: The patient was last seen 12/28/2017. At that time, she underwent a left hip joint injection. The patient reports that  she is/was better following the procedure. The changes have continued through this visit. She does have some localized back pain at this time.      History of Present Illness:   Clau Nunn is a 82 y.o. female  who is presenting with a chief complaint of low back pain. The patient began experiencing this problem insidiously, and the pain has been gradually worsening over the past 6 month(s). The pain is described as throbbing, cramping, aching and heavy and is located in the bilateral buttocks. Pain is intermittent and lasts hours. The pain radiates to lateral thigh and groin. The patient rates her pain a 5 out of ten and interferes with activities of daily living a 5 out of ten. Pain is exacerbated by getting up from a seated position, and is improved by rest. Patient reports no prior trauma, no prior spinal surgery.     - pertinent negatives: No fever, No chills, No weight loss, No bladder dysfunction, No bowel dysfunction, No extremity weakness, No saddle anesthesia  - pertinent positives: none    - medications, other therapies tried (physical therapy, injections):     >> Tylenol    >> Has previously undergone Physical Therapy (aquatic and land) with limited relief    >> Has previously undergone spinal injection/s:   - bilateral SIJ injection on 11-9-17 with ~30% relief for 2 weeks   - left hip injection on 12-28-17 with some relief      Imaging / Labs / Studies (reviewed on 1/22/2018):      Results for orders placed during the hospital encounter of 01/13/14   MRI Lumbar Spine W WO Contrast    Narrative Findings: Pre- and postcontrast multiplanar multisequence imaging of the thoracic and lumbar spine was performed using 7 cc of Gadavist intravenous contrast material.    There is moderately severe chronic central compression  deformity of the T12 vertebral body which is stabilized by paraspinous rods and pedicle screws which extend from T10 through L2.  Postsurgical changes of laminectomy also noted at T12.  The posterior   cortex of the T12 vertebral body is displaced posteriorly and indents the ventral thecal sac.  There is no anterior cord contact or significant central canal stenosis.  Degenerative disk desiccation is noted at multiple levels with moderate disk   narrowing at L5-S1.  Also L5-S1 is a broad-based disk protrusion which combined with bilateral facet hypertrophy results in moderately severe narrowing of both neural foramina.  No significant central canal stenosis noted.  From L2-3 through L4-5 there   is mild disk bulging which results in mild bilateral foraminal narrowing.  This is slightly more pronounced at L4-5 with bilateral facet hypertrophy a contributing factor.  No significant central canal stenosis noted.  No thoracic disk extrusion, and   foraminal narrowing, canal stenosis is evident.  Thoracolumbar cord is intact.  Paravertebral soft tissues are symmetric and normal in appearance.       Review of Systems:  CONSTITUTIONAL: patient denies any fever, chills, or weight loss  SKIN: patient denies any rash or itching  RESPIRATORY: patient denies having any shortness of breath  GASTROINTESTINAL: patient denies having any diarrhea, constipation, or bowel incontinence  GENITOURINARY: patient denies having any abnormal bladder function    MUSCULOSKELETAL:  - patient complains of the above noted pain/s (see chief pain complaint)    NEUROLOGICAL:   - pain as above  - strength in Lower extremities is intact, BILATERALLY  - sensation in Lower extremities is intact, BILATERALLY  - patient denies any loss of bowel or bladder control      PSYCHIATRIC: patient denies any change in mood    Other:  All other systems reviewed and are negative      Physical Exam:  Vitals:  BP (!) 140/77 (BP Location: Right arm, Patient Position:  "Sitting, BP Method: Medium (Automatic))   Pulse 60   Resp 18   Ht 5' 5" (1.651 m)   Wt 73.5 kg (162 lb)   BMI 26.96 kg/m²   (reviewed on 1/22/2018)    General: alert and oriented, in no apparent distress.  Gait: normal gait.  Skin: no rashes, no discoloration, no obvious lesions  HEENT: normocephalic, atraumatic. Pupils equal and round.  Cardiovascular: no significant peripheral edema present.  Respiratory: without use of accessory muscles of respiration.    Musculoskeletal - Lumbar Spine:  - Pain on flexion of lumbar spine: Absent   - Pain on extension of lumbar spine: Present  - Lumbar facet loading: Positive bilaterally  - TTP over the lumbar facet joints: Absent  - TTP over the SI joints:  Present Bilaterally, L>R  - Straight Leg Raise: Negative  - PAMELA: Positive bilaterally, L>R  - Pain on Internal and external rotation of the hip L>R    Neuro - Lower Extremities:  - BLE Strength: R/L: HF: 5/5, HE: 5/5, KF: 5/5; KE: 5/5; FE: 5/5; FF: 5/5  - Extremity Reflexes: Brisk and symmetric throughout  - Sensory: Sensation to light touch intact bilaterally      Psych:  Mood and affect is appropriate              Assessment:    Clau Nunn is a 82 y.o. year old female who is presenting with   Encounter Diagnoses   Name Primary?    Sacroiliac joint pain Yes    Lumbar spondylosis     Pain of left hip joint     Lumbar facet arthropathy        Plan:    1. Interventional:   - S/p left hip injection on 12-28-17 with some relief. She feels better overall than prior to procedure, but she continues to have localized axial back pain.   - Consider lumbar MBB, but we will hold off at this time to limit steroid exposure, since she did get some relief.    2. Pharmacologic:   - Continue Tylenol PRN and Tizanidine 4 mg PO BID.   - She asks about back brace, but I inform her that my concern is about muscle atrophy. We will hold off on this for now.    3. Rehabilitative: We discussed PT, but she is not interested. She " reports it made her pain worse in the past.    4. Diagnostic: None at this time.    5. Follow up: in 3 months to discuss possible injections.    - I discussed the risks, benefits, and alternatives to potential treatment options. All questions and concerns were fully addressed today in clinic. Dr. Wells was consulted regarding the patient plan and agrees.            >> Pain Disability Index:  1/22/2018 :: 31

## 2018-02-05 ENCOUNTER — OFFICE VISIT (OUTPATIENT)
Dept: INTERNAL MEDICINE | Facility: CLINIC | Age: 83
End: 2018-02-05
Payer: MEDICARE

## 2018-02-05 VITALS
TEMPERATURE: 97 F | HEART RATE: 65 BPM | WEIGHT: 161.63 LBS | SYSTOLIC BLOOD PRESSURE: 146 MMHG | HEIGHT: 65 IN | BODY MASS INDEX: 26.93 KG/M2 | OXYGEN SATURATION: 99 % | DIASTOLIC BLOOD PRESSURE: 86 MMHG

## 2018-02-05 DIAGNOSIS — M46.1 SACROILIAC INFLAMMATION: ICD-10-CM

## 2018-02-05 DIAGNOSIS — I10 ESSENTIAL HYPERTENSION: Chronic | ICD-10-CM

## 2018-02-05 DIAGNOSIS — M47.816 LUMBAR ARTHROPATHY: ICD-10-CM

## 2018-02-05 DIAGNOSIS — Z00.00 ENCOUNTER FOR PREVENTIVE HEALTH EXAMINATION: Primary | ICD-10-CM

## 2018-02-05 DIAGNOSIS — I87.2 CHRONIC VENOUS INSUFFICIENCY: Chronic | ICD-10-CM

## 2018-02-05 DIAGNOSIS — N18.30 STAGE 3 CHRONIC KIDNEY DISEASE: ICD-10-CM

## 2018-02-05 DIAGNOSIS — J41.0 SIMPLE CHRONIC BRONCHITIS: ICD-10-CM

## 2018-02-05 DIAGNOSIS — I70.0 ATHEROSCLEROSIS OF AORTA: ICD-10-CM

## 2018-02-05 DIAGNOSIS — E55.9 VITAMIN D DEFICIENCY DISEASE: ICD-10-CM

## 2018-02-05 DIAGNOSIS — M81.0 OSTEOPOROSIS, POSTMENOPAUSAL: Chronic | ICD-10-CM

## 2018-02-05 DIAGNOSIS — F32.0 MILD MAJOR DEPRESSION: ICD-10-CM

## 2018-02-05 DIAGNOSIS — K21.9 HIATAL HERNIA WITH GASTROESOPHAGEAL REFLUX: ICD-10-CM

## 2018-02-05 DIAGNOSIS — F41.9 ANXIETY: ICD-10-CM

## 2018-02-05 DIAGNOSIS — K44.9 HIATAL HERNIA WITH GASTROESOPHAGEAL REFLUX: ICD-10-CM

## 2018-02-05 DIAGNOSIS — M15.9 PRIMARY OSTEOARTHRITIS INVOLVING MULTIPLE JOINTS: ICD-10-CM

## 2018-02-05 DIAGNOSIS — M25.552 PAIN OF LEFT HIP JOINT: ICD-10-CM

## 2018-02-05 DIAGNOSIS — M54.16 CHRONIC LUMBAR RADICULOPATHY: ICD-10-CM

## 2018-02-05 DIAGNOSIS — E03.9 ACQUIRED HYPOTHYROIDISM: ICD-10-CM

## 2018-02-05 DIAGNOSIS — M85.80 OSTEOPENIA WITH HIGH RISK OF FRACTURE: ICD-10-CM

## 2018-02-05 PROBLEM — Z51.81 MEDICATION MONITORING ENCOUNTER: Status: RESOLVED | Noted: 2017-08-18 | Resolved: 2018-02-05

## 2018-02-05 PROCEDURE — 99499 UNLISTED E&M SERVICE: CPT | Mod: S$GLB,,, | Performed by: PHYSICIAN ASSISTANT

## 2018-02-05 PROCEDURE — G0439 PPPS, SUBSEQ VISIT: HCPCS | Mod: S$GLB,,, | Performed by: PHYSICIAN ASSISTANT

## 2018-02-05 PROCEDURE — 99999 PR PBB SHADOW E&M-EST. PATIENT-LVL IV: CPT | Mod: PBBFAC,,, | Performed by: PHYSICIAN ASSISTANT

## 2018-02-05 RX ORDER — BROMPHENIRAMINE MALEATE, PSEUDOEPHEDRINE HYDROCHLORIDE, AND DEXTROMETHORPHAN HYDROBROMIDE 2; 30; 10 MG/5ML; MG/5ML; MG/5ML
SYRUP ORAL
COMMUNITY
Start: 2017-11-15 | End: 2018-07-25

## 2018-02-05 NOTE — PROGRESS NOTES
"Clau Nunn presented for a  Medicare AWV and comprehensive Health Risk Assessment today. The following components were reviewed and updated:    · Medical history  · Family History  · Social history  · Allergies and Current Medications  · Health Risk Assessment  · Health Maintenance  · Care Team     ** See Completed Assessments for Annual Wellness Visit within the encounter summary.**       The following assessments were completed:  · Living Situation  · CAGE  · Depression Screening  · Timed Get Up and Go  · Whisper Test  · Cognitive Function Screening  · Nutrition Screening  · ADL Screening  · PAQ Screening    Vitals:    02/05/18 1112   BP: (!) 146/86   Pulse: 65   Temp: 96.9 °F (36.1 °C)   TempSrc: Tympanic   SpO2: 99%   Weight: 73.3 kg (161 lb 9.6 oz)   Height: 5' 5" (1.651 m)     Body mass index is 26.89 kg/m².  Physical Exam   Constitutional: She is oriented to person, place, and time. She appears well-developed and well-nourished. No distress.   HENT:   Head: Normocephalic and atraumatic.   Mouth/Throat: No oropharyngeal exudate.   Eyes: Conjunctivae and EOM are normal. Pupils are equal, round, and reactive to light.   Cardiovascular: Normal rate, regular rhythm and normal heart sounds.  Exam reveals no gallop and no friction rub.    No murmur heard.  Pulmonary/Chest: Effort normal and breath sounds normal. No respiratory distress. She has no wheezes. She has no rales.   Musculoskeletal:        Legs:  Neurological: She is alert and oriented to person, place, and time.   Skin: Skin is warm. Capillary refill takes less than 2 seconds. No rash noted. She is not diaphoretic.   Psychiatric: She has a normal mood and affect. Her behavior is normal. Judgment and thought content normal.   Nursing note and vitals reviewed.        Diagnoses and health risks identified today and associated recommendations/orders:    1. Encounter for preventive health examination  -patient is due for labs scheduled by PCP. Pt states " that she is scheduled to have labs completed before her follow up appointment with her PCP in March.     2. Chronic lumbar radiculopathy  -Dr. Wells. Not controlled.  Continue current treatment plan as previously prescribed with your PCP and pain management specialist.     3. Anxiety  -Stable and controlled on lexapro. Continue current treatment plan as previously prescribed with your PCP.     4. Mild major depression   -Stable and controlled on lexapro. Continue current treatment plan as previously prescribed with your PCP.     5. Simple chronic bronchitis  -Stable and controlled on astelin nasal spray. Continue current treatment plan as previously prescribed with your PCP.      6. Atherosclerosis of aorta  -CT abd 11/11/2013.   --Stable and controlled. Continue current treatment plan as previously prescribed with your PCP.     7. Chronic venous insufficiency  -Continue current treatment plan as previously prescribed with your PCP. Denies any pain. Reports off and on swelling.  -advised to start wearing compression stockings.     8. Essential hypertension  -Stable and controlled. Continue current treatment plan as previously prescribed with your PCP.   -a little high today but did not take her medication yet.     9. Stage 3 chronic kidney disease  -Last CMP 8/18/2017: creat: 1.1, eGFR: 47.   --Stable. Continue current treatment plan as previously prescribed with your PCP.     10. Acquired hypothyroidism  --Stable and controlled on synthroid 88mcg. Continue current treatment plan as previously prescribed with your PCP.   -due to recheck thyroid levels. Scheduled.     11. Vitamin D deficiency disease  --Stable and controlled. Continue current treatment plan as previously prescribed with your PCP.   -currently on OTC calcium + vitamin D     12. Hiatal hernia with gastroesophageal reflux.   -Stable and controlled on omeprazole Continue current treatment plan as previously prescribed with your PCP.     13. Lumbar  arthropathy  -Dr. Wells. Not controlled.  Continue current treatment plan as previously prescribed with your PCP and pain management specialist.     14. Osteopenia with high risk of fracture  -on calcium and vitamin D.  -Stable and controlled. Continue current treatment plan as previously prescribed with your PCP.     15. Osteoporosis, postmenopausal  -prolia injections. Due. Scheduled.   -Stable and controlled. Continue current treatment plan as previously prescribed with your PCP.     16. Pain of left hip joint  -See's Dr. Wells. Not controlled.  Continue current treatment plan as previously prescribed with your PCP and pain management specialist.     17. Primary osteoarthritis involving multiple joints  --Stable. Continue current treatment plan as previously prescribed with your PCP.     18. Sacroiliac inflammation  -See's Dr. Wells. Not controlled.  Continue current treatment plan as previously prescribed with your PCP and pain management specialist.       Provided Clau with a 5-10 year written screening schedule and personal prevention plan. Recommendations were developed using the USPSTF age appropriate recommendations. Education, counseling, and referrals were provided as needed. After Visit Summary printed and given to patient which includes a list of additional screenings\tests needed.    Follow-up if symptoms worsen or fail to improve.    Tanesha Ulloa PA-C  I offered to discuss end of life issues, including information on how to make advance directives that the patient could use to name someone who would make medical decisions on their behalf if they became too ill to make themselves.    ___Patient declined  _X_Patient is interested, I provided paper work and offered to discuss.

## 2018-02-05 NOTE — PATIENT INSTRUCTIONS
Counseling and Referral of Other Preventative  (Italic type indicates deductible and co-insurance are waived)    Patient Name: Clau Nunn  Today's Date: 2/5/2018    Health Maintenance       Date Due Completion Date    DEXA SCAN 12/13/2018 12/13/2016    Override on 4/17/2012: Done (Osteoporosis; stable to improved BMD hip and spine; repeat in 1-2 years)    Lipid Panel 08/08/2021 8/8/2016    TETANUS VACCINE 12/13/2021 12/13/2011        No orders of the defined types were placed in this encounter.    The following information is provided to all patients.  This information is to help you find resources for any of the problems found today that may be affecting your health:                Living healthy guide: www.[facility].[place].gov      Understanding Diabetes: www.diabetes.org      Eating healthy: www.cdc.gov/healthyweight      [facility] home safety checklist: www.cdc.gov/steadi/patient.html      Agency on Aging: www.goea.[place].[facility]      Alcoholics anonymous (AA): www.aa.org      Physical Activity: www.eligio.nih.gov/zy5cfxd      Tobacco use: www.quitwithusla.org
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
## 2018-02-20 ENCOUNTER — OFFICE VISIT (OUTPATIENT)
Dept: RHEUMATOLOGY | Facility: CLINIC | Age: 83
End: 2018-02-20
Payer: MEDICARE

## 2018-02-20 ENCOUNTER — LAB VISIT (OUTPATIENT)
Dept: LAB | Facility: HOSPITAL | Age: 83
End: 2018-02-20
Attending: PHYSICIAN ASSISTANT
Payer: MEDICARE

## 2018-02-20 VITALS
WEIGHT: 164.88 LBS | HEART RATE: 61 BPM | HEIGHT: 64 IN | BODY MASS INDEX: 28.15 KG/M2 | DIASTOLIC BLOOD PRESSURE: 57 MMHG | SYSTOLIC BLOOD PRESSURE: 122 MMHG

## 2018-02-20 DIAGNOSIS — M47.816 LUMBAR ARTHROPATHY: ICD-10-CM

## 2018-02-20 DIAGNOSIS — M81.0 OSTEOPOROSIS, POSTMENOPAUSAL: Chronic | ICD-10-CM

## 2018-02-20 DIAGNOSIS — M15.9 PRIMARY OSTEOARTHRITIS INVOLVING MULTIPLE JOINTS: ICD-10-CM

## 2018-02-20 DIAGNOSIS — Z51.81 MEDICATION MONITORING ENCOUNTER: ICD-10-CM

## 2018-02-20 DIAGNOSIS — N18.30 STAGE 3 CHRONIC KIDNEY DISEASE: ICD-10-CM

## 2018-02-20 DIAGNOSIS — M85.80 OSTEOPENIA WITH HIGH RISK OF FRACTURE: Primary | ICD-10-CM

## 2018-02-20 LAB
ANION GAP SERPL CALC-SCNC: 12 MMOL/L
BUN SERPL-MCNC: 26 MG/DL
CALCIUM SERPL-MCNC: 9.4 MG/DL
CHLORIDE SERPL-SCNC: 103 MMOL/L
CO2 SERPL-SCNC: 24 MMOL/L
CREAT SERPL-MCNC: 1.1 MG/DL
EST. GFR  (AFRICAN AMERICAN): 54 ML/MIN/1.73 M^2
EST. GFR  (NON AFRICAN AMERICAN): 47 ML/MIN/1.73 M^2
GLUCOSE SERPL-MCNC: 133 MG/DL
POTASSIUM SERPL-SCNC: 4.4 MMOL/L
SODIUM SERPL-SCNC: 139 MMOL/L

## 2018-02-20 PROCEDURE — 36415 COLL VENOUS BLD VENIPUNCTURE: CPT | Mod: PO

## 2018-02-20 PROCEDURE — 99499 UNLISTED E&M SERVICE: CPT | Mod: S$GLB,,, | Performed by: PHYSICIAN ASSISTANT

## 2018-02-20 PROCEDURE — 99999 PR PBB SHADOW E&M-EST. PATIENT-LVL IV: CPT | Mod: PBBFAC,,, | Performed by: PHYSICIAN ASSISTANT

## 2018-02-20 PROCEDURE — 1159F MED LIST DOCD IN RCRD: CPT | Mod: S$GLB,,, | Performed by: PHYSICIAN ASSISTANT

## 2018-02-20 PROCEDURE — 96372 THER/PROPH/DIAG INJ SC/IM: CPT | Mod: JG,S$GLB,, | Performed by: INTERNAL MEDICINE

## 2018-02-20 PROCEDURE — 80048 BASIC METABOLIC PNL TOTAL CA: CPT | Mod: PO

## 2018-02-20 PROCEDURE — 1125F AMNT PAIN NOTED PAIN PRSNT: CPT | Mod: S$GLB,,, | Performed by: PHYSICIAN ASSISTANT

## 2018-02-20 PROCEDURE — 3008F BODY MASS INDEX DOCD: CPT | Mod: S$GLB,,, | Performed by: PHYSICIAN ASSISTANT

## 2018-02-20 PROCEDURE — 99214 OFFICE O/P EST MOD 30 MIN: CPT | Mod: 25,S$GLB,, | Performed by: PHYSICIAN ASSISTANT

## 2018-02-20 NOTE — PATIENT INSTRUCTIONS
Increase gabapentin at night to 2- 3 pills     Chew 2 tums day for 2 weeks--don't need to check lab again

## 2018-02-20 NOTE — PROGRESS NOTES
Administered 1cc Prolia 60mg/cc to llq of abdomen. Ca 9.4 Creat 1.1 Pt. Tolerated well. No acute reaction noted to site. Pt. Instructed on S/S of reaction to report. Pt. Verbalized understanding. Pt waited 15 minutes.    Lot:[id number]  Exp:06/20  Manu:RACHEL

## 2018-02-23 ENCOUNTER — PATIENT OUTREACH (OUTPATIENT)
Dept: ADMINISTRATIVE | Facility: HOSPITAL | Age: 83
End: 2018-02-23

## 2018-03-02 ENCOUNTER — LAB VISIT (OUTPATIENT)
Dept: LAB | Facility: HOSPITAL | Age: 83
End: 2018-03-02
Attending: INTERNAL MEDICINE
Payer: MEDICARE

## 2018-03-02 DIAGNOSIS — Z29.9 PREVENTIVE MEASURE: ICD-10-CM

## 2018-03-02 DIAGNOSIS — E55.9 VITAMIN D DEFICIENCY DISEASE: ICD-10-CM

## 2018-03-02 DIAGNOSIS — E03.9 ACQUIRED HYPOTHYROIDISM: ICD-10-CM

## 2018-03-02 DIAGNOSIS — R79.9 ABNORMAL BLOOD CHEMISTRY: ICD-10-CM

## 2018-03-02 LAB
25(OH)D3+25(OH)D2 SERPL-MCNC: 28 NG/ML
ALBUMIN SERPL BCP-MCNC: 3.5 G/DL
ALP SERPL-CCNC: 54 U/L
ALT SERPL W/O P-5'-P-CCNC: 12 U/L
ANION GAP SERPL CALC-SCNC: 10 MMOL/L
AST SERPL-CCNC: 17 U/L
BASOPHILS # BLD AUTO: 0.05 K/UL
BASOPHILS NFR BLD: 0.8 %
BILIRUB SERPL-MCNC: 0.7 MG/DL
BUN SERPL-MCNC: 22 MG/DL
CALCIUM SERPL-MCNC: 9.6 MG/DL
CHLORIDE SERPL-SCNC: 104 MMOL/L
CHOLEST SERPL-MCNC: 206 MG/DL
CHOLEST/HDLC SERPL: 4 {RATIO}
CO2 SERPL-SCNC: 28 MMOL/L
CREAT SERPL-MCNC: 1.1 MG/DL
DIFFERENTIAL METHOD: ABNORMAL
EOSINOPHIL # BLD AUTO: 0.2 K/UL
EOSINOPHIL NFR BLD: 3 %
ERYTHROCYTE [DISTWIDTH] IN BLOOD BY AUTOMATED COUNT: 11.9 %
EST. GFR  (AFRICAN AMERICAN): 54.4 ML/MIN/1.73 M^2
EST. GFR  (NON AFRICAN AMERICAN): 47.2 ML/MIN/1.73 M^2
ESTIMATED AVG GLUCOSE: 100 MG/DL
GLUCOSE SERPL-MCNC: 87 MG/DL
HBA1C MFR BLD HPLC: 5.1 %
HCT VFR BLD AUTO: 43.9 %
HDLC SERPL-MCNC: 52 MG/DL
HDLC SERPL: 25.2 %
HGB BLD-MCNC: 14.2 G/DL
IMM GRANULOCYTES # BLD AUTO: 0.02 K/UL
IMM GRANULOCYTES NFR BLD AUTO: 0.3 %
LDLC SERPL CALC-MCNC: 129.2 MG/DL
LYMPHOCYTES # BLD AUTO: 1.7 K/UL
LYMPHOCYTES NFR BLD: 28.3 %
MCH RBC QN AUTO: 31.9 PG
MCHC RBC AUTO-ENTMCNC: 32.3 G/DL
MCV RBC AUTO: 99 FL
MONOCYTES # BLD AUTO: 0.5 K/UL
MONOCYTES NFR BLD: 7.7 %
NEUTROPHILS # BLD AUTO: 3.6 K/UL
NEUTROPHILS NFR BLD: 59.9 %
NONHDLC SERPL-MCNC: 154 MG/DL
NRBC BLD-RTO: 0 /100 WBC
PLATELET # BLD AUTO: 242 K/UL
PMV BLD AUTO: 11.2 FL
POTASSIUM SERPL-SCNC: 4.2 MMOL/L
PROT SERPL-MCNC: 7.1 G/DL
RBC # BLD AUTO: 4.45 M/UL
SODIUM SERPL-SCNC: 142 MMOL/L
TRIGL SERPL-MCNC: 124 MG/DL
TSH SERPL DL<=0.005 MIU/L-ACNC: 1.19 UIU/ML
WBC # BLD AUTO: 5.97 K/UL

## 2018-03-02 PROCEDURE — 84443 ASSAY THYROID STIM HORMONE: CPT

## 2018-03-02 PROCEDURE — 80053 COMPREHEN METABOLIC PANEL: CPT

## 2018-03-02 PROCEDURE — 80061 LIPID PANEL: CPT

## 2018-03-02 PROCEDURE — 82306 VITAMIN D 25 HYDROXY: CPT

## 2018-03-02 PROCEDURE — 83036 HEMOGLOBIN GLYCOSYLATED A1C: CPT

## 2018-03-02 PROCEDURE — 36415 COLL VENOUS BLD VENIPUNCTURE: CPT | Mod: PO

## 2018-03-02 PROCEDURE — 85025 COMPLETE CBC W/AUTO DIFF WBC: CPT
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
## 2018-03-09 ENCOUNTER — PATIENT MESSAGE (OUTPATIENT)
Dept: INTERNAL MEDICINE | Facility: CLINIC | Age: 83
End: 2018-03-09

## 2018-03-09 ENCOUNTER — LAB VISIT (OUTPATIENT)
Dept: LAB | Facility: HOSPITAL | Age: 83
End: 2018-03-09
Attending: INTERNAL MEDICINE
Payer: MEDICARE

## 2018-03-09 ENCOUNTER — OFFICE VISIT (OUTPATIENT)
Dept: INTERNAL MEDICINE | Facility: CLINIC | Age: 83
End: 2018-03-09
Payer: MEDICARE

## 2018-03-09 VITALS
BODY MASS INDEX: 28.49 KG/M2 | SYSTOLIC BLOOD PRESSURE: 134 MMHG | TEMPERATURE: 96 F | HEART RATE: 74 BPM | HEIGHT: 64 IN | DIASTOLIC BLOOD PRESSURE: 72 MMHG | OXYGEN SATURATION: 95 % | WEIGHT: 166.88 LBS

## 2018-03-09 DIAGNOSIS — N18.30 STAGE 3 CHRONIC KIDNEY DISEASE: ICD-10-CM

## 2018-03-09 DIAGNOSIS — R30.0 DYSURIA: ICD-10-CM

## 2018-03-09 DIAGNOSIS — J41.0 SIMPLE CHRONIC BRONCHITIS: ICD-10-CM

## 2018-03-09 DIAGNOSIS — K44.9 HIATAL HERNIA WITH GASTROESOPHAGEAL REFLUX: ICD-10-CM

## 2018-03-09 DIAGNOSIS — M81.0 OSTEOPOROSIS, POSTMENOPAUSAL: Chronic | ICD-10-CM

## 2018-03-09 DIAGNOSIS — E03.9 ACQUIRED HYPOTHYROIDISM: ICD-10-CM

## 2018-03-09 DIAGNOSIS — I70.0 ATHEROSCLEROSIS OF AORTA: ICD-10-CM

## 2018-03-09 DIAGNOSIS — K21.9 HIATAL HERNIA WITH GASTROESOPHAGEAL REFLUX: ICD-10-CM

## 2018-03-09 DIAGNOSIS — M54.16 CHRONIC LUMBAR RADICULOPATHY: ICD-10-CM

## 2018-03-09 DIAGNOSIS — E55.9 VITAMIN D DEFICIENCY DISEASE: ICD-10-CM

## 2018-03-09 DIAGNOSIS — M15.9 PRIMARY OSTEOARTHRITIS INVOLVING MULTIPLE JOINTS: ICD-10-CM

## 2018-03-09 DIAGNOSIS — I10 ESSENTIAL HYPERTENSION: Chronic | ICD-10-CM

## 2018-03-09 DIAGNOSIS — F41.9 ANXIETY: ICD-10-CM

## 2018-03-09 DIAGNOSIS — Z00.00 ENCOUNTER FOR PREVENTIVE HEALTH EXAMINATION: Primary | ICD-10-CM

## 2018-03-09 LAB
BACTERIA #/AREA URNS HPF: ABNORMAL /HPF
BILIRUB UR QL STRIP: NEGATIVE
CLARITY UR: ABNORMAL
COLOR UR: YELLOW
GLUCOSE UR QL STRIP: NEGATIVE
HGB UR QL STRIP: ABNORMAL
KETONES UR QL STRIP: NEGATIVE
LEUKOCYTE ESTERASE UR QL STRIP: ABNORMAL
MICROSCOPIC COMMENT: ABNORMAL
NITRITE UR QL STRIP: NEGATIVE
PH UR STRIP: 6 [PH] (ref 5–8)
PROT UR QL STRIP: NEGATIVE
RBC #/AREA URNS HPF: 10 /HPF (ref 0–4)
SP GR UR STRIP: 1.01 (ref 1–1.03)
URN SPEC COLLECT METH UR: ABNORMAL
WBC #/AREA URNS HPF: >100 /HPF (ref 0–5)

## 2018-03-09 PROCEDURE — 81000 URINALYSIS NONAUTO W/SCOPE: CPT | Mod: PO

## 2018-03-09 PROCEDURE — 87186 SC STD MICRODIL/AGAR DIL: CPT

## 2018-03-09 PROCEDURE — 87088 URINE BACTERIA CULTURE: CPT

## 2018-03-09 PROCEDURE — 87086 URINE CULTURE/COLONY COUNT: CPT

## 2018-03-09 PROCEDURE — 99499 UNLISTED E&M SERVICE: CPT | Mod: S$GLB,,, | Performed by: INTERNAL MEDICINE

## 2018-03-09 PROCEDURE — 87077 CULTURE AEROBIC IDENTIFY: CPT

## 2018-03-09 PROCEDURE — 99999 PR PBB SHADOW E&M-EST. PATIENT-LVL V: CPT | Mod: PBBFAC,,, | Performed by: INTERNAL MEDICINE

## 2018-03-09 PROCEDURE — 99397 PER PM REEVAL EST PAT 65+ YR: CPT | Mod: S$GLB,,, | Performed by: INTERNAL MEDICINE

## 2018-03-09 RX ORDER — OMEPRAZOLE 40 MG/1
CAPSULE, DELAYED RELEASE ORAL
COMMUNITY
Start: 2017-12-04 | End: 2018-09-07

## 2018-03-09 RX ORDER — GUAIFENESIN AND PHENYLEPHRINE HCL 400; 10 MG/1; MG/1
1000 TABLET ORAL DAILY
Qty: 100 CAPSULE | Refills: 6 | Status: SHIPPED | OUTPATIENT
Start: 2018-03-09 | End: 2018-12-10 | Stop reason: ALTCHOICE

## 2018-03-09 RX ORDER — CIPROFLOXACIN 250 MG/1
250 TABLET, FILM COATED ORAL 2 TIMES DAILY
Qty: 14 TABLET | Refills: 0 | Status: SHIPPED | OUTPATIENT
Start: 2018-03-09 | End: 2018-03-16

## 2018-03-12 LAB — BACTERIA UR CULT: NORMAL

## 2018-03-23 RX ORDER — LOSARTAN POTASSIUM AND HYDROCHLOROTHIAZIDE 12.5; 5 MG/1; MG/1
TABLET ORAL
Qty: 90 TABLET | Refills: 3 | Status: SHIPPED | OUTPATIENT
Start: 2018-03-23 | End: 2018-09-07

## 2018-04-17 DIAGNOSIS — J41.0 SIMPLE CHRONIC BRONCHITIS: ICD-10-CM

## 2018-04-17 RX ORDER — ALBUTEROL SULFATE 90 UG/1
2 AEROSOL, METERED RESPIRATORY (INHALATION) 4 TIMES DAILY PRN
Qty: 18 G | Refills: 2 | Status: SHIPPED | OUTPATIENT
Start: 2018-04-17 | End: 2018-10-05 | Stop reason: SDUPTHER

## 2018-04-25 ENCOUNTER — OFFICE VISIT (OUTPATIENT)
Dept: PAIN MEDICINE | Facility: CLINIC | Age: 83
End: 2018-04-25
Payer: MEDICARE

## 2018-04-25 VITALS
RESPIRATION RATE: 16 BRPM | WEIGHT: 166 LBS | HEIGHT: 64 IN | HEART RATE: 72 BPM | DIASTOLIC BLOOD PRESSURE: 63 MMHG | SYSTOLIC BLOOD PRESSURE: 96 MMHG | BODY MASS INDEX: 28.34 KG/M2

## 2018-04-25 DIAGNOSIS — M25.552 PAIN OF LEFT HIP JOINT: ICD-10-CM

## 2018-04-25 DIAGNOSIS — M47.817 LUMBOSACRAL SPONDYLOSIS WITHOUT MYELOPATHY: ICD-10-CM

## 2018-04-25 DIAGNOSIS — R60.9 SWELLING: ICD-10-CM

## 2018-04-25 DIAGNOSIS — M46.1 SACROILIITIS: ICD-10-CM

## 2018-04-25 DIAGNOSIS — M47.816 LUMBAR SPONDYLOSIS: ICD-10-CM

## 2018-04-25 DIAGNOSIS — M47.816 LUMBAR FACET ARTHROPATHY: ICD-10-CM

## 2018-04-25 DIAGNOSIS — M53.3 SACROILIAC JOINT PAIN: Primary | ICD-10-CM

## 2018-04-25 PROCEDURE — 3078F DIAST BP <80 MM HG: CPT | Mod: CPTII,S$GLB,, | Performed by: PHYSICIAN ASSISTANT

## 2018-04-25 PROCEDURE — 99214 OFFICE O/P EST MOD 30 MIN: CPT | Mod: S$GLB,,, | Performed by: PHYSICIAN ASSISTANT

## 2018-04-25 PROCEDURE — 99999 PR PBB SHADOW E&M-EST. PATIENT-LVL V: CPT | Mod: PBBFAC,,, | Performed by: PHYSICIAN ASSISTANT

## 2018-04-25 PROCEDURE — 3074F SYST BP LT 130 MM HG: CPT | Mod: CPTII,S$GLB,, | Performed by: PHYSICIAN ASSISTANT

## 2018-04-25 RX ORDER — ARM BRACE
1 EACH MISCELLANEOUS DAILY PRN
Qty: 1 EACH | Refills: 0 | Status: SHIPPED | OUTPATIENT
Start: 2018-04-25 | End: 2022-04-07 | Stop reason: ALTCHOICE

## 2018-04-25 RX ORDER — ARM BRACE
1 EACH MISCELLANEOUS DAILY PRN
Qty: 1 EACH | Refills: 0 | Status: SHIPPED | OUTPATIENT
Start: 2018-04-25 | End: 2018-04-25

## 2018-04-25 NOTE — PROGRESS NOTES
Chief Pain Complaint:  Low-back Pain    Interval History: The patient was last seen 12/28/2017. At that time, she underwent a left hip joint injection. The patient reports that  she is/was better following the procedure. The changes have continued through this visit. She does have some localized back pain at this time.    History of Present Illness:   Clau Nunn is a 83 y.o. female  who is presenting with a chief complaint of low back pain. The patient began experiencing this problem insidiously, and the pain has been gradually worsening over the past 6 month(s). The pain is described as throbbing, cramping, aching and heavy and is located in the bilateral buttocks. Pain is intermittent and lasts hours. The pain radiates to lateral thigh and groin. The patient rates her pain a 5 out of ten and interferes with activities of daily living a 5 out of ten. Pain is exacerbated by getting up from a seated position, and is improved by rest. Patient reports no prior trauma, no prior spinal surgery.     - pertinent negatives: No fever, No chills, No weight loss, No bladder dysfunction, No bowel dysfunction, No extremity weakness, No saddle anesthesia  - pertinent positives: none    - medications, other therapies tried (physical therapy, injections):     >> Tylenol    >> Has previously undergone Physical Therapy (aquatic and land) with limited relief    >> Has previously undergone spinal injection/s:   - bilateral SIJ injection on 11-9-17 with ~30% relief for 2 weeks   - left hip injection on 12-28-17 with some relief        Imaging / Labs / Studies (reviewed on 4/25/2018):    Results for orders placed during the hospital encounter of 01/13/14   MRI Lumbar Spine W WO Contrast    Narrative Findings: Pre- and postcontrast multiplanar multisequence imaging of the thoracic and lumbar spine was performed using 7 cc of Gadavist intravenous contrast material.  There is moderately severe chronic central compression deformity  of the T12 vertebral body which is stabilized by paraspinous rods and pedicle screws which extend from T10 through L2.  Postsurgical changes of laminectomy also noted at T12.  The posterior cortex of the T12 vertebral body is displaced posteriorly and indents the ventral thecal sac.  There is no anterior cord contact or significant central canal stenosis.  Degenerative disk desiccation is noted at multiple levels with moderate disk   narrowing at L5-S1.    Also L5-S1 is a broad-based disk protrusion which combined with bilateral facet hypertrophy results in moderately severe narrowing of both neural foramina.  No significant central canal stenosis noted.    From L2-3 through L4-5 there   is mild disk bulging which results in mild bilateral foraminal narrowing.  This is slightly more pronounced at L4-5 with bilateral facet hypertrophy a contributing factor.  No significant central canal stenosis noted.  No thoracic disk extrusion, and foraminal narrowing, canal stenosis is evident.  Thoracolumbar cord is intact.  Paravertebral soft tissues are symmetric and normal in appearance.         Review of Systems:  CONSTITUTIONAL: patient denies any fever, chills, or weight loss  SKIN: patient denies any rash or itching  RESPIRATORY: patient denies having any shortness of breath  GASTROINTESTINAL: patient denies having any diarrhea, constipation, or bowel incontinence  GENITOURINARY: patient denies having any abnormal bladder function    MUSCULOSKELETAL:  - patient complains of the above noted pain/s (see chief pain complaint)    NEUROLOGICAL:   - pain as above  - strength in Lower extremities is intact, BILATERALLY  - sensation in Lower extremities is intact, BILATERALLY  - patient denies any loss of bowel or bladder control      PSYCHIATRIC: patient denies any change in mood    Other:  All other systems reviewed and are negative      Physical Exam:  Vitals:  BP 96/63 (BP Location: Right arm, Patient Position: Sitting, BP  "Method: Medium (Automatic))   Pulse 72   Resp 16   Ht 5' 4" (1.626 m)   Wt 75.3 kg (166 lb)   BMI 28.49 kg/m²   (reviewed on 4/25/2018)    General: alert and oriented, in no apparent distress.  Gait: normal gait.  Skin: no rashes, no discoloration, no obvious lesions  HEENT: normocephalic, atraumatic. Pupils equal and round.  Cardiovascular: no significant peripheral edema present.  Respiratory: without use of accessory muscles of respiration.    Musculoskeletal - Lumbar Spine:  - Pain on flexion of lumbar spine: Absent   - Pain on extension of lumbar spine: Present  - Lumbar facet loading: Positive bilaterally  - TTP over the lumbar facet joints: Absent  - TTP over the SI joints:  Present Bilaterally, L>R  - Straight Leg Raise: Negative  - PAMELA: Positive bilaterally, L>R  - Pain on Internal and external rotation of the hip L>R    Neuro - Lower Extremities:  - BLE Strength: R/L: HF: 5/5, HE: 5/5, KF: 5/5; KE: 5/5; FE: 5/5; FF: 5/5  - Extremity Reflexes: Brisk and symmetric throughout  - Sensory: Sensation to light touch intact bilaterally      Psych:  Mood and affect is appropriate              Assessment:  Clau Nunn is a 83 y.o. year old female who is presenting with   Encounter Diagnoses   Name Primary?    Sacroiliac joint pain Yes    Lumbar spondylosis     Pain of left hip joint     Lumbar facet arthropathy     Lumbosacral spondylosis without myelopathy     Sacroiliitis        Plan:  1. Interventional:   - Schedule bilateral L3-5 MBB with local. Patient is not prescription taking blood thinners or ASA.   - Consider REJI for continued pain.    2. Pharmacologic:   - Continue Tylenol PRN and Tizanidine 4 mg PO BID.     3. Rehabilitative:   - We discussed PT, but she is not interested. She reports it made her pain worse in the past. Encourage regular exercise.  - Order back brace. Patient would benefit from brace to help provide stability, support weak spinal muscles, reduce pain, and increase " ADLs. Order sent to [facility]. Patient was informed to limit use to avoid muscle atrophy.     4. Diagnostic: None at this time. Consider updating MRI for pain not relieved with injection.    5. Follow up: 3 weeks post injection.    - I discussed the risks, benefits, and alternatives to potential treatment options. All questions and concerns were fully addressed today in clinic. Dr. Wells was consulted regarding the patient plan and agrees.            >> Pain Disability Index:  1/22/2018 :: 31

## 2018-05-09 ENCOUNTER — TELEPHONE (OUTPATIENT)
Dept: PAIN MEDICINE | Facility: CLINIC | Age: 83
End: 2018-05-09

## 2018-05-09 NOTE — TELEPHONE ENCOUNTER
----- Message from Kristie Samaniego sent at 5/9/2018 12:25 PM CDT -----  Contact: [facility]  Please fax the pt prescription, demographics, and clinical notes to [phone], can be reached at [phone]///[phone]

## 2018-05-11 ENCOUNTER — SURGERY (OUTPATIENT)
Age: 83
End: 2018-05-11

## 2018-05-11 ENCOUNTER — HOSPITAL ENCOUNTER (OUTPATIENT)
Facility: HOSPITAL | Age: 83
Discharge: HOME | End: 2018-05-11
Attending: ANESTHESIOLOGY | Admitting: ANESTHESIOLOGY
Payer: MEDICARE

## 2018-05-11 ENCOUNTER — HOSPITAL ENCOUNTER (OUTPATIENT)
Dept: RADIOLOGY | Facility: HOSPITAL | Age: 83
Discharge: HOME | End: 2018-05-11
Attending: PHYSICIAN ASSISTANT
Payer: MEDICARE

## 2018-05-11 VITALS
DIASTOLIC BLOOD PRESSURE: 85 MMHG | RESPIRATION RATE: 15 BRPM | OXYGEN SATURATION: 97 % | SYSTOLIC BLOOD PRESSURE: 177 MMHG | TEMPERATURE: 98 F | HEART RATE: 56 BPM

## 2018-05-11 DIAGNOSIS — M47.816 LUMBAR SPONDYLOSIS: ICD-10-CM

## 2018-05-11 PROCEDURE — 63600175 PHARM REV CODE 636 W HCPCS: Performed by: ANESTHESIOLOGY

## 2018-05-11 PROCEDURE — 25000003 PHARM REV CODE 250

## 2018-05-11 PROCEDURE — 25000003 PHARM REV CODE 250: Performed by: ANESTHESIOLOGY

## 2018-05-11 PROCEDURE — 64495 INJ PARAVERT F JNT L/S 3 LEV: CPT | Mod: 50,,, | Performed by: ANESTHESIOLOGY

## 2018-05-11 PROCEDURE — 64493 INJ PARAVERT F JNT L/S 1 LEV: CPT | Mod: 50

## 2018-05-11 PROCEDURE — 63600175 PHARM REV CODE 636 W HCPCS

## 2018-05-11 PROCEDURE — 64494 INJ PARAVERT F JNT L/S 2 LEV: CPT | Mod: 50

## 2018-05-11 PROCEDURE — 64495 INJ PARAVERT F JNT L/S 3 LEV: CPT | Mod: 50

## 2018-05-11 PROCEDURE — 64493 INJ PARAVERT F JNT L/S 1 LEV: CPT | Mod: 50,,, | Performed by: ANESTHESIOLOGY

## 2018-05-11 PROCEDURE — 64494 INJ PARAVERT F JNT L/S 2 LEV: CPT | Mod: 50,,, | Performed by: ANESTHESIOLOGY

## 2018-05-11 RX ORDER — LIDOCAINE HYDROCHLORIDE 20 MG/ML
INJECTION, SOLUTION EPIDURAL; INFILTRATION; INTRACAUDAL; PERINEURAL
Status: DISCONTINUED | OUTPATIENT
Start: 2018-05-11 | End: 2018-05-11 | Stop reason: HOSPADM

## 2018-05-11 RX ORDER — METHYLPREDNISOLONE ACETATE 40 MG/ML
INJECTION, SUSPENSION INTRA-ARTICULAR; INTRALESIONAL; INTRAMUSCULAR; SOFT TISSUE
Status: DISCONTINUED | OUTPATIENT
Start: 2018-05-11 | End: 2018-05-11 | Stop reason: HOSPADM

## 2018-05-11 RX ADMIN — METHYLPREDNISOLONE ACETATE 160 MG: 40 INJECTION, SUSPENSION INTRA-ARTICULAR; INTRALESIONAL; INTRAMUSCULAR; SOFT TISSUE at 01:05

## 2018-05-11 RX ADMIN — LIDOCAINE HYDROCHLORIDE 5 ML: 20 INJECTION, SOLUTION EPIDURAL; INFILTRATION; INTRACAUDAL; PERINEURAL at 01:05

## 2018-05-11 NOTE — PLAN OF CARE
Pt d/c home in stable condition via wheelchair with ride.  Verbalized understanding of d/c instructions.  Pt voiced no complaints at this time. Pt stood at side of bed, walked steps with no new motor or sensory deficits.  Neurologically intact.

## 2018-05-11 NOTE — DISCHARGE SUMMARY
[facility]  Discharge Note       Description of Procedure: Bilateral L3, L4, L5 Lumbar Medial Branch Block under Fluoroscopic Guidance    Procedure Date: 5/11/2018    Admit Date: 5/11/2018  Discharge Date: 5/11/2018     Attending Physician: Priscilla Nugent   Discharge Provider: Priscilla Nugent    Preoperative Diagnosis: Lumbar Spondylosis, Lumbar Facet Arthropathy     Postoperative Diagnosis: as above, same as preoperative diagnosis    Discharged Condition: Stable    Hospital Course: Patient was admitted for an outpatient procedure. The procedure was tolerated well with no complications.    Final Diagnoses: Same as principal problem.    Disposition: Home, self-care.    Follow up/Patient Instructions:  Follow-up in clinic in 2-3 weeks.    Medications: No medications were prescribed today. The patient was advised to resume normal medication regimen without change.  Specific information was provided regarding restarting any anticoagulant/s.    Discharge Procedure Orders (must include Diet, Follow-up, Activity):  Light activity for the remainder of the day, resume normal activity tomorrow. Resume normal diet. Follow-up in clinic in 2-3 weeks.

## 2018-05-11 NOTE — OP NOTE
Procedure: Lumbar Medial Branch Block under Fluoroscopic Guidance    Side: bilateral     Level:  Sacral ala (Corresponding to the L5 dorsal ramus), L5 transverse process (Corresponding to the L4 medial branch) and L4 transverse process (Corresponding to the L3 medial branch)     PROCEDURE DATE: 5/11/2018    Pre-operative Diagnosis: Lumbar Spondylosis  Post-operative Diagnosis: Lumbar Spondylosis    Provider: Raffi Wells MD  Assistant(s): none    Anesthesia: Local, No Sedation    >> 0 mg of VERSED    >> 0 mcg of FENTANYL     Indication: Low back pain without radiculopathy. Symptoms unresponsive to conservative treatments. Fluoroscopy was used to optimize visualization of needle placement and to maximize safety.     Procedure Description / Technique:  The patient was seen and identified in the preoperative area. Risks, benefits, complications, and alternatives were discussed with the patient. The patient agreed to proceed with the procedure and signed the consent. The site and side of the procedure was identified and marked. No iv was started.     The patient was taken to the procedural suite and positioned in prone orientation on the procedure table. A pillow was placed under the abdomen to reduce lumbar lordosis. A time out was performed. The procedure, site, side, and allergies were stated and agreed to by all present. The lumbosacral area was widely prepped with ChloraPrep. The procedural site was draped in usual sterile fashion. Vital signs were closely monitored throughout this procedure. Conscious sedation was NOT used for this procedure.    The Right sacral ala and superior articular process was identified and marked on AP fluoroscopic imaging. Subcutaneous tissues were localized using 1% PF Lidocaine to improve patient comfort. A 25 gauge 3.5 inch spinal needle was advance until the needle rested on OS at the interface of the sacral ala and the base of the sacral superior articular process. After negative  "aspiration, 1 mL of a solution containing 4 mL of 1% PF Lidocaine and 2 mL of Methylprednisolone (40 mg/mL) was injected. No pain or paresthesia was noted on injection. After right side injection, the fluoroscope was obliqued to the Right until the ted dog outline of the L5 vertebrae came into view. The spinal needle was advanced to the "eye of the ted dog" and after negative aspiration a 1 mL of the above noted solution was injected. No pain or paresthesia was noted. This technique was repeated at each of the above noted levels. The spinal needle was removed intact following injection at each targeted site. The stylet was replaced prior to needle removal at each site.    The Left sacral ala and superior articular process was identified and marked on AP fluoroscopic imaging. Subcutaneous tissues were localized using 1% PF Lidocaine to improve patient comfort. A 25 gauge 3.5 inch spinal needle was advance until the needle rested on OS at the interface of the sacral ala and the base of the sacral superior articular process. After negative aspiration, 1 mL of a solution containing 4 mL of 1% PF Lidocaine and 2 mL of Methylprednisolone (40 mg/mL) was injected. No pain or paresthesia was noted on injection. After left side injection, the fluoroscope was obliqued to the Left until the ted dog outline of the L5 vertebrae came into view. The spinal needle was advanced to the "eye of the ted dog" and after negative aspiration a 1 mL of the above noted solution was injected. No pain or paresthesia was noted. This technique was repeated at each of the above noted levels. The spinal needle was removed intact following injection at each targeted site. The stylet was replaced prior to needle removal at each site.    Description of Findings: Not applicable    Prosthetic devices, grafts, tissues, or devices implanted: None    Specimen Removed: No    ESTIMATED BLOOD LOSS: minimal    COMPLICATIONS: None    DISPOSITION " / PLANS: The patient was transferred to the recovery area in a stable condition for observation. The patient was reexamined prior to discharge. There was no evidence of acute neurologic injury following the procedure.  Patient was discharged from the recovery room after meeting discharge criteria. Home discharge instructions were given to the patient by the staff.

## 2018-05-22 ENCOUNTER — TELEPHONE (OUTPATIENT)
Dept: INTERNAL MEDICINE | Facility: CLINIC | Age: 83
End: 2018-05-22
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
## 2018-05-22 NOTE — TELEPHONE ENCOUNTER
Called [employer] regarding a letter that was received on pt's behalf requesting medical information d/t a report citing concerns.  LMOM./rpr
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
## 2018-05-28 ENCOUNTER — OFFICE VISIT (OUTPATIENT)
Dept: NEUROLOGY | Facility: CLINIC | Age: 83
End: 2018-05-28
Payer: MEDICARE

## 2018-05-28 VITALS
HEIGHT: 64 IN | DIASTOLIC BLOOD PRESSURE: 60 MMHG | HEART RATE: 78 BPM | SYSTOLIC BLOOD PRESSURE: 110 MMHG | BODY MASS INDEX: 27.63 KG/M2 | WEIGHT: 161.81 LBS | RESPIRATION RATE: 20 BRPM

## 2018-05-28 DIAGNOSIS — M79.605 PAIN IN BOTH LOWER EXTREMITIES: ICD-10-CM

## 2018-05-28 DIAGNOSIS — M79.604 PAIN IN BOTH LOWER EXTREMITIES: ICD-10-CM

## 2018-05-28 DIAGNOSIS — M47.816 LUMBAR SPONDYLOSIS: Primary | ICD-10-CM

## 2018-05-28 PROCEDURE — 99999 PR PBB SHADOW E&M-EST. PATIENT-LVL IV: CPT | Mod: PBBFAC,,, | Performed by: PSYCHIATRY & NEUROLOGY

## 2018-05-28 PROCEDURE — 99499 UNLISTED E&M SERVICE: CPT | Mod: HCNC,S$GLB,, | Performed by: PSYCHIATRY & NEUROLOGY

## 2018-05-28 PROCEDURE — 3074F SYST BP LT 130 MM HG: CPT | Mod: CPTII,S$GLB,, | Performed by: PSYCHIATRY & NEUROLOGY

## 2018-05-28 PROCEDURE — 3078F DIAST BP <80 MM HG: CPT | Mod: CPTII,S$GLB,, | Performed by: PSYCHIATRY & NEUROLOGY

## 2018-05-28 PROCEDURE — 99205 OFFICE O/P NEW HI 60 MIN: CPT | Mod: S$GLB,,, | Performed by: PSYCHIATRY & NEUROLOGY

## 2018-05-28 NOTE — PROGRESS NOTES
Consult Requested By: No att. providers found  Reason for Consult: Leg pain     SUBJECTIVE:       HPI:   Back pain and leg pain for long time . She goes to pain clinic and lately she got injection in the back which has improved her leg pain. She has multiple compression fractures in the lumbar and thoracic regions . She has back surgery . Still she is walking around normally. At night she has some leg cramps and takes OTC medication. She takes 2 Gabapentin at night and it makes her drowsy .    Past Medical History:   Diagnosis Date    Allergy     Anxiety     Asthma     Back pain     Chronic venous insufficiency 11/19/2013    Depression     Diverticulosis 11/19/2013 1/8/8 colonoscopy    Dry eyes     GERD (gastroesophageal reflux disease)     H/O total hip arthroplasty 12/30/2015    Hypertension     Hypothyroid     Osteoarthritis     Osteoporosis     Postlaminectomy syndrome of lumbar region 1/8/2014    Simple chronic bronchitis 5/3/2017    Umbilical hernia 11/19/2013    Urinary incontinence     Vitamin D deficiency disease      Past Surgical History:   Procedure Laterality Date    ADENOIDECTOMY      BACK SURGERY      x2    breast implants  1973    CATARACT EXTRACTION Bilateral     cystoscope  1/6/16    DR. Brown    FRACTURE SURGERY  April 3 2015    left wrist    HAND SURGERY      HIP SURGERY Right     total hip- Dr. Dos Santos    HYSTERECTOMY      36y/o    LEG SURGERY Right     ex-fix tib/fib    TONSILLECTOMY       Family History   Problem Relation Age of Onset    COPD Mother     Cancer Mother         lung CA    Pulmonary fibrosis Sister     Cancer Daughter         colon    Stroke Father     Diabetes Son     Melanoma Neg Hx     Psoriasis Neg Hx     Lupus Neg Hx      Social History   Substance Use Topics    Smoking status: Never Smoker    Smokeless tobacco: Never Used    Alcohol use 0.0 oz/week      Comment: rarely     Review of patient's allergies indicates:   Allergen  Reactions    No known drug allergies        Scheduled Meds:   [START ON 7/25/2018] denosumab  60 mg Subcutaneous Q6 Months     Review of Systems   Constitutional: Negative for fever and weight loss.   HENT: Negative for hearing loss.    Eyes: Negative for blurred vision, double vision, photophobia and pain.   Respiratory: Negative for cough.    Cardiovascular: Negative for chest pain.   Gastrointestinal: Negative for abdominal pain, nausea and vomiting.   Genitourinary: Negative for dysuria, frequency and urgency.   Musculoskeletal: Positive for back pain, joint pain and myalgias. Negative for falls and neck pain.   Skin: Negative for itching and rash.   Neurological: Negative for tingling and headaches.   Psychiatric/Behavioral: Negative for depression and memory loss.         OBJECTIVE:     Vital Signs (Most Recent)  Pulse: 78 (05/28/18 1437)  Resp: 20 (05/28/18 1437)  BP: 110/60 (05/28/18 1437)    Physical Exam   Constitutional: She is oriented to person, place, and time. She appears well-developed and well-nourished.   HENT:   Head: Normocephalic and atraumatic.   Eyes: Conjunctivae and EOM are normal. Pupils are equal, round, and reactive to light.   Neck: Normal range of motion. Neck supple. No JVD present. No tracheal deviation present. No thyromegaly present.   Cardiovascular: Normal rate, regular rhythm and normal heart sounds.    Pulmonary/Chest: Effort normal and breath sounds normal.   Abdominal: She exhibits no distension. There is no tenderness.   Musculoskeletal: Normal range of motion. She exhibits no edema or tenderness.   Neurological: She is alert and oriented to person, place, and time. She has normal strength. She displays normal reflexes. A sensory deficit is present. No cranial nerve deficit. She exhibits normal muscle tone. She displays a negative Romberg sign. Coordination and gait normal.   Reflex Scores:       Tricep reflexes are 2+ on the right side and 2+ on the left side.       Bicep  reflexes are 2+ on the right side and 2+ on the left side.       Brachioradialis reflexes are 2+ on the right side and 2+ on the left side.       Patellar reflexes are 2+ on the right side and 2+ on the left side.       Achilles reflexes are 1+ on the right side and 1+ on the left side.  She has mild loss of vibratory sensation , mainly in the right big toe.   Skin: Skin is warm and dry. No rash noted.   Psychiatric: She has a normal mood and affect. Her behavior is normal. Judgment and thought content normal.       Strength  Deltoids Triceps Biceps Wrist Extension Wrist Flexion Hand    Upper: R 5/5 5/5 5/5 5/5 5/5 5/5    L 5/5 5/5 5/5 5/5 5/5 5/5     Iliopsoas Quadriceps Knee  Flexion Tibialis  anterior Gastro- cnemius EHL   Lower: R 5/5 5/5 5/5 5/5 5/5 5/5    L 5/5 5/5 5/5 5/5 5/5 5/5     Laboratory:  Lab Results   Component Value Date    WBC 5.97 03/02/2018    HGB 14.2 03/02/2018    HCT 43.9 03/02/2018     03/02/2018    CHOL 206 (H) 03/02/2018    TRIG 124 03/02/2018    HDL 52 03/02/2018    ALT 12 03/02/2018    AST 17 03/02/2018     03/02/2018    K 4.2 03/02/2018     03/02/2018    CREATININE 1.1 03/02/2018    BUN 22 03/02/2018    CO2 28 03/02/2018    TSH 1.185 03/02/2018    INR 2.5 (H) 12/06/2010    HGBA1C 5.1 03/02/2018         Diagnostic Results:  MRI of the L-spine:1/13/2014  Impression      Chronic compression deformity of T12 status post surgical stabilization and laminectomy.  Multilevel spondylosis and degenerative disk disease lumbar spine as detailed.       MRI of the T-spine: 1/13/2014  Impression      Chronic compression deformity of T12 status post surgical stabilization and laminectomy.  Multilevel spondylosis and degenerative disk disease lumbar spine as detailed.             ASSESSMENT/PLAN:     Primary Diagnoses:  Problem List Items Addressed This Visit     Lumbar spondylosis - Primary    Current Assessment & Plan     She has severe DDD nd compression fracture s/p back  surgery. Back pain comes and goes but with injection, she is better today.         Pain in both lower extremities    Overview     She has intermittent leg pain , possibly secondary to radiculopathy. Injection in the back has helped.                  Patient Active Problem List   Diagnosis    Primary osteoarthritis involving multiple joints    Osteoporosis, postmenopausal    Acquired hypothyroidism    Lumbar arthropathy    Chronic venous insufficiency    Anxiety    Vitamin D deficiency disease    Essential hypertension    Hiatal hernia with gastroesophageal reflux    Sacroiliac inflammation    Atherosclerosis of aorta    Chronic lumbar radiculopathy    Stage 3 chronic kidney disease    Mild major depression    Simple chronic bronchitis    Osteopenia with high risk of fracture    Pain of left hip joint    Lumbar spondylosis        Plan Will see her PRN.  Time spent: 40 minutes in face to face discussion concerning diagnosis, prognosis, review of lab and test results, benefits of treatment as well as management of disease, counseling of patient and coordination of care between various health care providers . Greater than half the time spent was used for coordination of care and counseling of patient. This note may have some spelling or wording mistakes which might have been overlooked during proof reading.

## 2018-05-28 NOTE — ASSESSMENT & PLAN NOTE
She has severe DDD nd compression fracture s/p back surgery. Back pain comes and goes but with injection, she is better today.

## 2018-05-28 NOTE — LETTER
May 28, 2018      Jeanette Molina MD  [address]  [place] [address]           [facility]  [address] [place] [address]  Phone: [phone]          Patient: Clau Nunn   MR Number: [id number]   YOB: 1935   Date of Visit: 5/28/2018       Dear Dr. Jeanette Molina:    Thank you for referring Clau Nunn to me for evaluation. Attached you will find relevant portions of my assessment and plan of care.    If you have questions, please do not hesitate to call me. I look forward to following Clau Nunn along with you.    Sincerely,    Vidal Anderson MD    Enclosure  CC:  No Recipients    If you would like to receive this communication electronically, please contact [email or web address] or [phone] to request more information on [facility] access.    For providers and/or their staff who would like to refer a patient to [facility], please contact us through our one-stop-shop provider referral line, [facility], at [phone].    If you feel you have received this communication in error or would no longer like to receive these types of communications, please e-mail [email or web address]

## 2018-06-01 ENCOUNTER — OFFICE VISIT (OUTPATIENT)
Dept: PAIN MEDICINE | Facility: CLINIC | Age: 83
End: 2018-06-01
Payer: MEDICARE

## 2018-06-01 VITALS
HEIGHT: 64 IN | RESPIRATION RATE: 16 BRPM | SYSTOLIC BLOOD PRESSURE: 130 MMHG | HEART RATE: 56 BPM | DIASTOLIC BLOOD PRESSURE: 72 MMHG | WEIGHT: 161 LBS | BODY MASS INDEX: 27.49 KG/M2

## 2018-06-01 DIAGNOSIS — M25.552 PAIN OF LEFT HIP JOINT: ICD-10-CM

## 2018-06-01 DIAGNOSIS — M47.816 LUMBAR SPONDYLOSIS: ICD-10-CM

## 2018-06-01 DIAGNOSIS — M47.816 LUMBAR FACET ARTHROPATHY: ICD-10-CM

## 2018-06-01 DIAGNOSIS — M47.817 LUMBOSACRAL SPONDYLOSIS WITHOUT MYELOPATHY: ICD-10-CM

## 2018-06-01 DIAGNOSIS — M53.3 SACROILIAC JOINT PAIN: Primary | ICD-10-CM

## 2018-06-01 DIAGNOSIS — M46.1 SACROILIITIS: ICD-10-CM

## 2018-06-01 PROCEDURE — 99214 OFFICE O/P EST MOD 30 MIN: CPT | Mod: S$GLB,,, | Performed by: PHYSICIAN ASSISTANT

## 2018-06-01 PROCEDURE — 3075F SYST BP GE 130 - 139MM HG: CPT | Mod: CPTII,S$GLB,, | Performed by: PHYSICIAN ASSISTANT

## 2018-06-01 PROCEDURE — 3078F DIAST BP <80 MM HG: CPT | Mod: CPTII,S$GLB,, | Performed by: PHYSICIAN ASSISTANT

## 2018-06-01 PROCEDURE — 99999 PR PBB SHADOW E&M-EST. PATIENT-LVL IV: CPT | Mod: PBBFAC,,, | Performed by: PHYSICIAN ASSISTANT

## 2018-06-01 NOTE — PROGRESS NOTES
Chief Pain Complaint:  Low-back Pain      Interval History: Patient was seen on 5/11/18. At that time she underwent bilateral L3-5 MBB with local. The patient reports that she is/was better following the procedure. She reports 100% pain relief that has lasted 3 weeks so far. The changes have continued through this visit. She reports 0/10 pain today.      History of Present Illness:   Clau Nunn is a 83 y.o. female  who is presenting with a chief complaint of low back pain. The patient began experiencing this problem insidiously, and the pain has been gradually worsening over the past 6 month(s). The pain is described as throbbing, cramping, aching and heavy and is located in the bilateral buttocks. Pain is intermittent and lasts hours. The pain radiates to lateral thigh and groin. The patient rates her pain a 5 out of ten and interferes with activities of daily living a 5 out of ten. Pain is exacerbated by getting up from a seated position, and is improved by rest. Patient reports no prior trauma, no prior spinal surgery.     - pertinent negatives: No fever, No chills, No weight loss, No bladder dysfunction, No bowel dysfunction, No extremity weakness, No saddle anesthesia  - pertinent positives: none    - medications, other therapies tried (physical therapy, injections):     >> Tylenol    >> Has previously undergone Physical Therapy (aquatic and land) with limited relief    >> Has previously undergone spinal injection/s:   - bilateral SIJ injection on 11-9-17 with ~30% relief for 2 weeks   - left hip injection on 12-28-17 with some relief   - bilateral L3-5 MBB with local on 5/11/18 with reported 100% pain relief      Imaging / Labs / Studies (reviewed on 6/1/2018):    Results for orders placed during the hospital encounter of 01/13/14   MRI Lumbar Spine W WO Contrast    Narrative Findings: Pre- and postcontrast multiplanar multisequence imaging of the thoracic and lumbar spine was performed using 7 cc of  Gadavist intravenous contrast material.  There is moderately severe chronic central compression deformity of the T12 vertebral body which is stabilized by paraspinous rods and pedicle screws which extend from T10 through L2.  Postsurgical changes of laminectomy also noted at T12.  The posterior cortex of the T12 vertebral body is displaced posteriorly and indents the ventral thecal sac.  There is no anterior cord contact or significant central canal stenosis.  Degenerative disk desiccation is noted at multiple levels with moderate disk   narrowing at L5-S1.    Also L5-S1 is a broad-based disk protrusion which combined with bilateral facet hypertrophy results in moderately severe narrowing of both neural foramina.  No significant central canal stenosis noted.    From L2-3 through L4-5 there   is mild disk bulging which results in mild bilateral foraminal narrowing.  This is slightly more pronounced at L4-5 with bilateral facet hypertrophy a contributing factor.  No significant central canal stenosis noted.  No thoracic disk extrusion, and foraminal narrowing, canal stenosis is evident.  Thoracolumbar cord is intact.  Paravertebral soft tissues are symmetric and normal in appearance.         Review of Systems:  CONSTITUTIONAL: patient denies any fever, chills, or weight loss  SKIN: patient denies any rash or itching  RESPIRATORY: patient denies having any shortness of breath  GASTROINTESTINAL: patient denies having any diarrhea, constipation, or bowel incontinence  GENITOURINARY: patient denies having any abnormal bladder function    MUSCULOSKELETAL:  - patient complains of the above noted pain/s (see chief pain complaint)    NEUROLOGICAL:   - pain as above  - strength in Lower extremities is intact, BILATERALLY  - sensation in Lower extremities is intact, BILATERALLY  - patient denies any loss of bowel or bladder control      PSYCHIATRIC: patient denies any change in mood    Other:  All other systems reviewed and are  "negative      Physical Exam:  Vitals:  /72 (BP Location: Left arm, Patient Position: Sitting, BP Method: Medium (Automatic))   Pulse (!) 56   Resp 16   Ht 5' 4" (1.626 m)   Wt 73 kg (161 lb)   BMI 27.64 kg/m²   (reviewed on 6/1/2018)    General: alert and oriented, in no apparent distress.  Gait: normal gait.  Skin: no rashes, no discoloration, no obvious lesions  HEENT: normocephalic, atraumatic. Pupils equal and round.  Cardiovascular: no significant peripheral edema present.  Respiratory: without use of accessory muscles of respiration.    Musculoskeletal - Lumbar Spine:  - Pain on flexion of lumbar spine: Absent   - Pain on extension of lumbar spine: Present  - Lumbar facet loading: Positive bilaterally  - TTP over the lumbar facet joints: Absent  - TTP over the SI joints:  Present Bilaterally, L>R  - Straight Leg Raise: Negative  - PAMELA: Positive bilaterally, L>R  - Pain on Internal and external rotation of the hip L>R    Neuro - Lower Extremities:  - BLE Strength: R/L: HF: 5/5, HE: 5/5, KF: 5/5; KE: 5/5; FE: 5/5; FF: 5/5  - Extremity Reflexes: Brisk and symmetric throughout  - Sensory: Sensation to light touch intact bilaterally      Psych:  Mood and affect is appropriate              Assessment:  Clau Nunn is a 83 y.o. year old female who is presenting with   Encounter Diagnoses   Name Primary?    Sacroiliac joint pain Yes    Lumbar spondylosis     Pain of left hip joint     Lumbar facet arthropathy     Lumbosacral spondylosis without myelopathy     Sacroiliitis        Plan:  1. Interventional:   - S/p bilateral L3-5 MBB with local on 5/11/18 with reported 100% pain relief  - Consider REJI for worsening leg pain.    2. Pharmacologic:   - Continue gabapentin 600mg QHS and Tizanidine 4 mg PO BID.     3. Rehabilitative:   - We discussed PT previously, but she is not interested. She reports it made her pain worse in the past. Encourage regular exercise.   - Can use back brace but try to " limit use to avoid muscle atrophy.     4. Diagnostic: None at this time. Consider updating MRI in the future.    5. Follow up: PRN    - I discussed the risks, benefits, and alternatives to potential treatment options. All questions and concerns were fully addressed today in clinic. Dr. Wells was consulted regarding the patient plan and agrees.            >> Pain Disability Index:  1/22/2018 :: 31

## 2018-06-05 DIAGNOSIS — F41.9 ANXIETY: ICD-10-CM

## 2018-06-05 RX ORDER — ESCITALOPRAM OXALATE 20 MG/1
TABLET ORAL
Qty: 90 TABLET | Refills: 1 | Status: SHIPPED | OUTPATIENT
Start: 2018-06-05 | End: 2018-10-05 | Stop reason: SDUPTHER

## 2018-07-24 DIAGNOSIS — M25.552 LEFT HIP PAIN: Primary | ICD-10-CM

## 2018-07-25 ENCOUNTER — OFFICE VISIT (OUTPATIENT)
Dept: ORTHOPEDICS | Facility: CLINIC | Age: 83
End: 2018-07-25
Payer: MEDICARE

## 2018-07-25 ENCOUNTER — HOSPITAL ENCOUNTER (OUTPATIENT)
Dept: RADIOLOGY | Facility: HOSPITAL | Age: 83
Discharge: HOME | End: 2018-07-25
Attending: PHYSICIAN ASSISTANT
Payer: MEDICARE

## 2018-07-25 VITALS
RESPIRATION RATE: 12 BRPM | HEIGHT: 64 IN | SYSTOLIC BLOOD PRESSURE: 136 MMHG | HEART RATE: 54 BPM | BODY MASS INDEX: 27.48 KG/M2 | WEIGHT: 160.94 LBS | DIASTOLIC BLOOD PRESSURE: 74 MMHG

## 2018-07-25 DIAGNOSIS — M54.16 LUMBAR RADICULOPATHY: ICD-10-CM

## 2018-07-25 DIAGNOSIS — M25.552 LEFT HIP PAIN: ICD-10-CM

## 2018-07-25 DIAGNOSIS — M54.50 LUMBOSACRAL PAIN: Primary | ICD-10-CM

## 2018-07-25 DIAGNOSIS — M54.50 LUMBOSACRAL PAIN: ICD-10-CM

## 2018-07-25 PROCEDURE — 3078F DIAST BP <80 MM HG: CPT | Mod: CPTII,S$GLB,, | Performed by: PHYSICIAN ASSISTANT

## 2018-07-25 PROCEDURE — 73502 X-RAY EXAM HIP UNI 2-3 VIEWS: CPT | Mod: 26,LT,, | Performed by: RADIOLOGY

## 2018-07-25 PROCEDURE — 99999 PR PBB SHADOW E&M-EST. PATIENT-LVL IV: CPT | Mod: PBBFAC,,, | Performed by: PHYSICIAN ASSISTANT

## 2018-07-25 PROCEDURE — 73502 X-RAY EXAM HIP UNI 2-3 VIEWS: CPT | Mod: TC,FY,PO,LT

## 2018-07-25 PROCEDURE — 99214 OFFICE O/P EST MOD 30 MIN: CPT | Mod: S$GLB,,, | Performed by: PHYSICIAN ASSISTANT

## 2018-07-25 PROCEDURE — 3075F SYST BP GE 130 - 139MM HG: CPT | Mod: CPTII,S$GLB,, | Performed by: PHYSICIAN ASSISTANT

## 2018-07-25 PROCEDURE — 72110 X-RAY EXAM L-2 SPINE 4/>VWS: CPT | Mod: TC,FY,PO

## 2018-07-25 PROCEDURE — 72110 X-RAY EXAM L-2 SPINE 4/>VWS: CPT | Mod: 26,,, | Performed by: RADIOLOGY

## 2018-07-25 RX ORDER — METHYLPREDNISOLONE 4 MG/1
TABLET ORAL
Qty: 1 PACKAGE | Refills: 0 | Status: SHIPPED | OUTPATIENT
Start: 2018-07-25 | End: 2018-09-07

## 2018-07-25 NOTE — PATIENT INSTRUCTIONS
Lumbar Stretch (Flexibility)    1. Lie on your back on the floor, with your knees bent and your feet flat on the floor. Dont press your neck or lower back to the floor.  2. Pull one knee up toward your chest. Clasp your hands under your thigh to help pull.  3. Hold for 30 to 60 seconds. Lower your leg back down to the floor.  4. Repeat 2 times, or as instructed.  5. Switch legs and repeat.  Date Last Reviewed: 3/10/2016  © 8478-1983 [employer]. [address] [place] [address]. All rights reserved. This information is not intended as a substitute for professional medical care. Always follow your healthcare professional's instructions.      Back Exercise to Keep Fit for Low Back Pain  To help in your recovery and to prevent further back problems, keep your back, abdominal muscles and legs strong. Walk daily as soon as you can. Gradually add other physical activities such as swimming and biking, which can help improve lower back strength. Begin as soon as you can do them comfortably. Do not do any exercises that make your pain a lot worse. The following are some back exercises that can help relieve low back pain.     Pelvic tilt   Repeat 5-10 times, twice per day.  Lie flat on your back (or stand with your back to a wall), knees bent, feet flat on the floor, body relaxed. Tighten your abdominal and buttock muscles, and tilt your pelvis. The curve of the small of your back should flatten towards the floor (or wall). Hold 10 seconds and then relax.       Knee raise     Repeat 5-10 times, twice per day.    Lie flat on your back, knees bent. Bring one knee slowly to your chest. Hug your knee gently. Then lower your leg toward the floor, keeping your knee bent. Do not straighten your legs. Repeat exercise with your other leg.              Partial press-up     Lie face down on a soft, firm surface. Do not turn your head to either side. Rest your arms bent at the elbows alongside your body. Relax  for a few minutes. Then raise your upper body enough to lean on your elbows. Relax your lower back and legs as much as possible. Hold this position for 30 seconds at first. Gradually work up to five minutes. Or try slow press-ups. Hold each for five seconds, and repeat five to six times.              Copyright © 2012 by [employer]   Seema JERRY, Mario D, Hayes J, Allison B, Julio R, Segundo B, Chase K, Jose C, Sherley B, Jonh S, Gustavo L, Ben R. [employer]. Adult Acute and Subacute Low Back Pain. Updated November 2012.     Back Exercises: Back Release  Do this exercise on your hands and knees. Keep your knees under your hips and your hands under your shoulders.      · Relax your abdominal and buttocks muscles, lift your head, and let your back sag. Be sure to keep your weight evenly distributed. Dont sit back on your hips.   · Hold for 5 seconds.  · Return to starting position.  · Tuck your head and lift (arch) your back.  · Hold for 5 seconds  · Return to starting position.  · Repeat 5 times.  Date Last Reviewed: 8/16/2015  © 6597-0981 [employer]. [address] [place] [address]. All rights reserved. This information is not intended as a substitute for professional medical care. Always follow your healthcare professional's instructions.        Back Exercises: Arm Reach    Do this exercise on your hands and knees. Keep your knees under your hips and your hands under your shoulders. Keep your spine in a neutral position (not arched or sagging). Be sure to maintain your necks natural curve:  · Stretch one arm straight out in front of you. Dont raise your head or let your supporting shoulder sag.  · Hold for 5 seconds.  · Return to starting position.  · Repeat 5 times.  · Switch arms.  Date Last Reviewed: 8/16/2015  © 1258-7260 [employer]. [address] [place] [address]. All rights  reserved. This information is not intended as a substitute for professional medical care. Always follow your healthcare professional's instructions.

## 2018-07-25 NOTE — PROGRESS NOTES
Subjective:      Patient ID: Clau Nunn is a 83 y.o. female.    Chief Complaint: Pain of the Left Hip      HPI: Clau Nunn  is a 83 y.o. female who c/o Pain of the Left Hip   for duration of for a few months.  She denies any inciting injury.  She complains of pain in the left hip posteriorly which radiates down the leg.  She has had epidural steroid injections by Dr. Wells with little to no relief.  She wanted to come in and get her hip checked today. She tells me her nephew is an interventional radiologist to does a hip injections for people with it pain and wants to know if she should consider having a hip injection done.  She has a history of a right total hip replacement up by Dr. Dos Santos many years ago.  She is not having any problems with the right hip.  Pain level today on the left is 7/10 in severity.  Quality is aching, throbbing, sharp, shooting, burning.  Alleviating factors include nothing.  She has been on prescription for ibuprofen and gabapentin both by her rheumatologist.  She has had injections by Dr. Wells.  Aggravating factors include getting up from seated position, trying to get comfortable at nighttime, as well as getting up 1st thing in the morning.  She also has difficulty with stairs and steps.  She has done physical therapy in the past without any relief.  She says if anything, the aquatic therapy made her worse.  She denies groin pain.    Past Medical History:   Diagnosis Date    Allergy     Anxiety     Asthma     Back pain     Chronic venous insufficiency 11/19/2013    Depression     Diverticulosis 11/19/2013 1/8/8 colonoscopy    Dry eyes     GERD (gastroesophageal reflux disease)     H/O total hip arthroplasty 12/30/2015    Hypertension     Hypothyroid     Osteoarthritis     Osteoporosis     Postlaminectomy syndrome of lumbar region 1/8/2014    Simple chronic bronchitis 5/3/2017    Umbilical hernia 11/19/2013    Urinary incontinence     Vitamin D  deficiency disease      Past Surgical History:   Procedure Laterality Date    ADENOIDECTOMY      BACK SURGERY      x2    breast implants  1973    CATARACT EXTRACTION Bilateral     cystoscope  1/6/16    DR. Brown    FRACTURE SURGERY  April 3 2015    left wrist    HAND SURGERY      HIP SURGERY Right     total hip- Dr. Dos Santos    HYSTERECTOMY      34y/o    JOINT REPLACEMENT Right     R NNAMDI - Dr. Dos Santos    LEG SURGERY Right     ex-fix tib/fib    TONSILLECTOMY       Family History   Problem Relation Age of Onset    COPD Mother     Cancer Mother         lung CA    Pulmonary fibrosis Sister     Cancer Daughter         colon    Stroke Father     Diabetes Son     Melanoma Neg Hx     Psoriasis Neg Hx     Lupus Neg Hx      Social History     Social History    Marital status:      Spouse name: N/A    Number of children: N/A    Years of education: N/A     Occupational History    retired      [employer]     Social History Main Topics    Smoking status: Never Smoker    Smokeless tobacco: Never Used    Alcohol use 0.0 oz/week      Comment: rarely    Drug use: Yes     Types: Hydrocodone    Sexual activity: No     Other Topics Concern    Not on file     Social History Narrative    Patient is retired from [employer]     Medication List with Changes/Refills   New Medications    METHYLPREDNISOLONE (MEDROL DOSEPACK) 4 MG TABLET    use as directed   Current Medications    AMMONIUM LACTATE (AMLACTIN) 12 % LOTION    Apply to damp skin daily.    AZELASTINE (ASTELIN) 137 MCG (0.1 %) NASAL SPRAY        BACK BRACE MISC    1 Device by Misc.(Non-Drug; Combo Route) route daily as needed.    CHOLECALCIFEROL, VITAMIN D3, 50,000 UNIT CAPSULE    Take 1 capsule (50,000 Units total) by mouth once a week.    CYCLOSPORINE (RESTASIS) 0.05 % OPHTHALMIC EMULSION    Place 0.4 mLs (1 drop total) into both eyes 2 (two) times daily.    ESCITALOPRAM OXALATE (LEXAPRO) 20 MG TABLET    TAKE ONE TABLET BY  MOUTH DAILY    GABAPENTIN (NEURONTIN) 300 MG CAPSULE    Take 1 capsule (300 mg total) by mouth 3 (three) times daily.    LEVOTHYROXINE (SYNTHROID) 88 MCG TABLET    Take 1 tablet (88 mcg total) by mouth once daily.    LIDOCAINE-PRILOCAINE (EMLA) CREAM        LOSARTAN-HYDROCHLOROTHIAZIDE 50-12.5 MG (HYZAAR) 50-12.5 MG PER TABLET    TAKE 1 TABLET EVERY DAY    OMEPRAZOLE (PRILOSEC) 40 MG CAPSULE        TOLTERODINE (DETROL) 2 MG TAB    Take 2 mg by mouth once daily.    TURMERIC ROOT EXTRACT 500 MG CAP    Take 1,000 capsules by mouth once daily.    VENTOLIN HFA 90 MCG/ACTUATION INHALER    INHALE 2 PUFFS INTO THE LUNGS 4 (FOUR) TIMES DAILY AS NEEDED.   Discontinued Medications    BROMPHENIRAMINE-PSEUDOEPH-DM 2-30-10 MG/5 ML SYRP         Review of patient's allergies indicates:   Allergen Reactions    No known drug allergies        Review of Systems   Constitution: Negative for fever.   Cardiovascular: Negative for chest pain.   Respiratory: Negative for cough and shortness of breath.    Skin: Negative for rash.   Musculoskeletal: Positive for back pain and joint pain (left buttock radiating down leg). Negative for joint swelling and stiffness.   Gastrointestinal: Negative for heartburn.   Neurological: Negative for headaches and numbness.         Objective:        General    Nursing note and vitals reviewed.  Constitutional: She is oriented to person, place, and time. She appears well-developed and well-nourished.   HENT:   Head: Normocephalic and atraumatic.   Eyes: EOM are normal.   Cardiovascular: Normal rate and regular rhythm.    Pulmonary/Chest: Effort normal and breath sounds normal.   Abdominal: Soft.   Neurological: She is alert and oriented to person, place, and time.   Psychiatric: She has a normal mood and affect. Her behavior is normal.             Right Hip Exam     Other   Sensation: normal    Comments:  Sensation intact S/S/SPN/DPN/Tib  Motor intact to EHL/FHL/GS/TA  (-) SLR  Left Hip Exam     Tenderness    The patient tender to palpation of the SI joint and trochanteric bursa.    Range of Motion   Extension: normal   Flexion: normal   Internal Rotation: normal   External Rotation: normal   Abduction: normal   Adduction: normal     Tests   Pain w/ forced internal rotation (PAMELA): present  Pain w/ forced external rotation (FADIR): absent  Log Roll: negative    Other   Sensation: normal    Comments:  Min TTP troch bursa  Sensation intact S/S/SPN/DPN/Tib  Motor intact to EHL/FHL/GS/TA  (+) SLR        Back (L-Spine & T-Spine) / Neck (C-Spine) Exam     Back (L-Spine & T-Spine) Range of Motion   Extension: normal   Flexion: abnormal Back flexion: pain.     Spinal Sensation   Right Side Sensation  L-Spine Level: normal  Left Side Sensation  L-Spine Level: normal    Comments:  (+) SLR LLE        Muscle Strength   Right Lower Extremity   Hip Abduction: 5/5   Hip Adduction: 5/5   Hip Flexion: 5/5   Quadriceps:  5/5   Hamstrin/5   Ankle Dorsiflexion:  5/5   Anterior tibial:  5/5/5  Gastrocsoleus:  5/5/5  EHL:  5/5  Left Lower Extremity   Hip Abduction: 5/5   Hip Adduction: 5/5   Hip Flexion: 5/5   Quadriceps:  5/5   Hamstrin/5   Ankle Dorsiflexion:  5/5   Anterior tibial:  5/5/5   Gastrocsoleus:  5/5/5  EHL:  5/5    Reflexes     Left Side  Quadriceps:  2+  Achilles:  2+    Right Side   Quadriceps:  2+  Achilles:  2+    Vascular Exam     Right Pulses  Dorsalis Pedis:      2+          Left Pulses  Dorsalis Pedis:      2+          Capillary Refill  Right Hand: normal capillary refill  Left Hand: normal capillary refill    Edema  Right Upper Leg: absent  Left Upper Leg: absent            Xray:   Left hip from today images and report were reviewed today.  I agree with the radiologist's interpretation.  Right hip arthroplasty changes present without evidence of loosening.  There is mild degenerative changes of the left hip without significant joint space loss.  No acute fracture.  Degenerative changes noted of the lower  lumbar spine.    Assessment:       Encounter Diagnoses   Name Primary?    Lumbosacral pain Yes    Lumbar radiculopathy           Plan:       Clau was seen today for pain.    Diagnoses and all orders for this visit:    Lumbosacral pain  -     methylPREDNISolone (MEDROL DOSEPACK) 4 mg tablet; use as directed  -     X-Ray Lumbar Spine Complete 5 View; Future    Lumbar radiculopathy  -     methylPREDNISolone (MEDROL DOSEPACK) 4 mg tablet; use as directed  -     X-Ray Lumbar Spine Complete 5 View; Future    Ms. Nunn is a new patient with new problems today as above. She has minimal degenerative changes in the left hip on x-ray.  Certainly not anything that I would expect to be causing the pain that she is experiencing radiating down the left lower extremity.  Additionally she has negative hip findings on exam.  Additionally, she has positive findings for lumbosacral pain as the source of her pain in the left posterior hip.  I would recommend she follow up with Dr. Wells.  She is not diabetic and has not done any sort of steroid Dosepak here recently.  About all I can offer her would be a Medrol Dosepak with a follow-up in the [facility] for further evaluation and consideration of injections. I have also ordered an x-ray of her back which she can do on her way out.  I will notify her via the patient portal with those results.  She verbalizes understanding and agrees with the above plan.    Follow-up if symptoms worsen or fail to improve.          The patient understands, chooses and consents to this plan and accepts all   the risks which include but are not limited to the risks mentioned above.     Disclaimer: This note was prepared using a voice recognition system and is likely to have sound alike errors within the text.

## 2018-07-26 ENCOUNTER — HOSPITAL ENCOUNTER (EMERGENCY)
Facility: HOSPITAL | Age: 83
Discharge: HOME | End: 2018-07-27
Attending: EMERGENCY MEDICINE
Payer: MEDICARE

## 2018-07-26 DIAGNOSIS — M21.931 RIGHT WRIST DEFORMITY: ICD-10-CM

## 2018-07-26 DIAGNOSIS — M25.559 HIP PAIN: ICD-10-CM

## 2018-07-26 DIAGNOSIS — M79.603 ARM PAIN: ICD-10-CM

## 2018-07-26 DIAGNOSIS — S52.501A CLOSED FRACTURE OF DISTAL END OF RIGHT RADIUS, UNSPECIFIED FRACTURE MORPHOLOGY, INITIAL ENCOUNTER: Primary | ICD-10-CM

## 2018-07-26 PROCEDURE — 25600 CLTX DST RDL FX/EPHYS SEP WO: CPT

## 2018-07-26 PROCEDURE — 25000003 PHARM REV CODE 250: Performed by: EMERGENCY MEDICINE

## 2018-07-26 PROCEDURE — 96365 THER/PROPH/DIAG IV INF INIT: CPT | Mod: RT

## 2018-07-26 PROCEDURE — 96375 TX/PRO/DX INJ NEW DRUG ADDON: CPT

## 2018-07-26 PROCEDURE — 99285 EMERGENCY DEPT VISIT HI MDM: CPT | Mod: 25

## 2018-07-26 PROCEDURE — 63600175 PHARM REV CODE 636 W HCPCS: Performed by: EMERGENCY MEDICINE

## 2018-07-26 PROCEDURE — 99152 MOD SED SAME PHYS/QHP 5/>YRS: CPT

## 2018-07-26 PROCEDURE — 25605 CLTX DST RDL FX/EPHYS SEP W/: CPT

## 2018-07-26 RX ORDER — LOSARTAN POTASSIUM 50 MG/1
50 TABLET ORAL ONCE
Status: COMPLETED | OUTPATIENT
Start: 2018-07-26 | End: 2018-07-26

## 2018-07-26 RX ORDER — PROPOFOL 10 MG/ML
20 VIAL (ML) INTRAVENOUS ONCE
Status: COMPLETED | OUTPATIENT
Start: 2018-07-27 | End: 2018-07-26

## 2018-07-26 RX ORDER — MORPHINE SULFATE 10 MG/ML
5 INJECTION INTRAMUSCULAR; INTRAVENOUS; SUBCUTANEOUS
Status: COMPLETED | OUTPATIENT
Start: 2018-07-26 | End: 2018-07-26

## 2018-07-26 RX ORDER — HYDROCHLOROTHIAZIDE 12.5 MG/1
12.5 TABLET ORAL ONCE
Status: COMPLETED | OUTPATIENT
Start: 2018-07-26 | End: 2018-07-26

## 2018-07-26 RX ORDER — PROPOFOL 10 MG/ML
20 VIAL (ML) INTRAVENOUS ONCE
Status: COMPLETED | OUTPATIENT
Start: 2018-07-26 | End: 2018-07-26

## 2018-07-26 RX ORDER — ONDANSETRON 2 MG/ML
4 INJECTION INTRAMUSCULAR; INTRAVENOUS
Status: COMPLETED | OUTPATIENT
Start: 2018-07-26 | End: 2018-07-26

## 2018-07-26 RX ADMIN — ONDANSETRON 4 MG: 2 INJECTION, SOLUTION INTRAMUSCULAR; INTRAVENOUS at 10:07

## 2018-07-26 RX ADMIN — PROPOFOL 20 MG: 10 INJECTION, EMULSION INTRAVENOUS at 11:07

## 2018-07-26 RX ADMIN — MORPHINE SULFATE 5 MG: 10 INJECTION INTRAVENOUS at 10:07

## 2018-07-26 RX ADMIN — LOSARTAN POTASSIUM 50 MG: 50 TABLET, FILM COATED ORAL at 10:07

## 2018-07-26 RX ADMIN — HYDROCHLOROTHIAZIDE 12.5 MG: 12.5 TABLET ORAL at 10:07

## 2018-07-27 VITALS
HEART RATE: 56 BPM | OXYGEN SATURATION: 98 % | TEMPERATURE: 98 F | RESPIRATION RATE: 22 BRPM | BODY MASS INDEX: 28.35 KG/M2 | WEIGHT: 166.06 LBS | HEIGHT: 64 IN | DIASTOLIC BLOOD PRESSURE: 77 MMHG | SYSTOLIC BLOOD PRESSURE: 165 MMHG

## 2018-07-27 PROCEDURE — 90715 TDAP VACCINE 7 YRS/> IM: CPT | Performed by: EMERGENCY MEDICINE

## 2018-07-27 PROCEDURE — 96365 THER/PROPH/DIAG IV INF INIT: CPT

## 2018-07-27 PROCEDURE — 63600175 PHARM REV CODE 636 W HCPCS: Performed by: EMERGENCY MEDICINE

## 2018-07-27 PROCEDURE — 90471 IMMUNIZATION ADMIN: CPT | Performed by: EMERGENCY MEDICINE

## 2018-07-27 RX ORDER — CEFAZOLIN SODIUM 1 G/50ML
1 SOLUTION INTRAVENOUS
Status: COMPLETED | OUTPATIENT
Start: 2018-07-27 | End: 2018-07-27

## 2018-07-27 RX ORDER — CEPHALEXIN 500 MG/1
500 CAPSULE ORAL EVERY 6 HOURS
Qty: 40 CAPSULE | Refills: 0 | Status: SHIPPED | OUTPATIENT
Start: 2018-07-27 | End: 2018-08-06

## 2018-07-27 RX ORDER — HYDROCODONE BITARTRATE AND ACETAMINOPHEN 5; 325 MG/1; MG/1
1 TABLET ORAL EVERY 6 HOURS PRN
Qty: 15 TABLET | Refills: 0 | Status: SHIPPED | OUTPATIENT
Start: 2018-07-27 | End: 2018-12-10 | Stop reason: ALTCHOICE

## 2018-07-27 RX ADMIN — CLOSTRIDIUM TETANI TOXOID ANTIGEN (FORMALDEHYDE INACTIVATED), CORYNEBACTERIUM DIPHTHERIAE TOXOID ANTIGEN (FORMALDEHYDE INACTIVATED), BORDETELLA PERTUSSIS TOXOID ANTIGEN (GLUTARALDEHYDE INACTIVATED), BORDETELLA PERTUSSIS FILAMENTOUS HEMAGGLUTININ ANTIGEN (FORMALDEHYDE INACTIVATED), BORDETELLA PERTUSSIS PERTACTIN ANTIGEN, AND BORDETELLA PERTUSSIS FIMBRIAE 2/3 ANTIGEN 0.5 ML: 5; 2; 2.5; 5; 3; 5 INJECTION, SUSPENSION INTRAMUSCULAR at 12:07

## 2018-07-27 RX ADMIN — CEFAZOLIN SODIUM 1 G: 1 SOLUTION INTRAVENOUS at 12:07

## 2018-07-27 NOTE — ED PROVIDER NOTES
SCRIBE #1 NOTE: I, Africa Miller, am scribing for, and in the presence of, Marc Nunez MD. I have scribed the entire note.      History      Chief Complaint   Patient presents with    Fall     R wrist and R hip pain.  Denies LOC       Review of patient's allergies indicates:   Allergen Reactions    No known drug allergies         HPI   HPI    7/26/2018, 9:26 PM   History obtained from the patient      History of Present Illness: Clau Nunn is a 83 y.o. female patient who presents to the Emergency Department for evaluation after fall which occurred PTA. Patient reports tripping and falling at home. Patient reports landing on her R side. Symptoms are constant and moderate in severity. No mitigating or exacerbating factors reported. Patient c/o R shoulder pain, R elbow pain, R wrist pain, R hip pain, and R upper leg pain. Patient denies any head injury, LOC, HA, dizziness, neck pain, abdominal pain, CP, SOB, back pain, knee pain, and all other sxs at this time. Patient denies use of blood thinners. No further complaints or concerns at this time.     Arrival mode: EMS     PCP: Jeanette Gomez MD       Past Medical History:  Past Medical History:   Diagnosis Date    Allergy     Anxiety     Asthma     Back pain     Chronic venous insufficiency 11/19/2013    Depression     Diverticulosis 11/19/2013 1/8/8 colonoscopy    Dry eyes     GERD (gastroesophageal reflux disease)     H/O total hip arthroplasty 12/30/2015    Hypertension     Hypothyroid     Osteoarthritis     Osteoporosis     Postlaminectomy syndrome of lumbar region 1/8/2014    Simple chronic bronchitis 5/3/2017    Umbilical hernia 11/19/2013    Urinary incontinence     Vitamin D deficiency disease        Past Surgical History:  Past Surgical History:   Procedure Laterality Date    ADENOIDECTOMY      BACK SURGERY      x2    breast implants  1973    CATARACT EXTRACTION Bilateral     cystoscope  1/6/16    DR. Brown     FRACTURE SURGERY  April 3 2015    left wrist    HAND SURGERY      HIP SURGERY Right     total hip- Dr. Dos Santos    HYSTERECTOMY      36y/o    JOINT REPLACEMENT Right     R NNAMDI - Dr. Dos Santos    LEG SURGERY Right     ex-fix tib/fib    TONSILLECTOMY           Family History:  Family History   Problem Relation Age of Onset    COPD Mother     Cancer Mother         lung CA    Pulmonary fibrosis Sister     Cancer Daughter         colon    Stroke Father     Diabetes Son     Melanoma Neg Hx     Psoriasis Neg Hx     Lupus Neg Hx        Social History:  Social History     Social History Main Topics    Smoking status: Never Smoker    Smokeless tobacco: Never Used    Alcohol use 0.0 oz/week      Comment: rarely    Drug use: Yes     Types: Hydrocodone    Sexual activity: No       ROS   Review of Systems   Constitutional: Negative for fever.   HENT: Negative for sore throat.         (-) head injury   Respiratory: Negative for shortness of breath.    Cardiovascular: Negative for chest pain.   Gastrointestinal: Negative for abdominal pain and nausea.   Genitourinary: Negative for dysuria.   Musculoskeletal: Negative for back pain and neck pain.        (+) R shoulder pain, R elbow pain, R wrist pain, R hip pain, and R upper leg pain  (-) knee pain   Skin: Negative for rash.   Neurological: Negative for dizziness, weakness and headaches.        (-) LOC   Hematological: Does not bruise/bleed easily.   All other systems reviewed and are negative.      Physical Exam      Initial Vitals [07/26/18 2102]   BP Pulse Resp Temp SpO2   (!) 170/77 70 18 98.2 °F (36.8 °C) 96 %      MAP       --          Physical Exam  Nursing Notes and Vital Signs Reviewed.  Constitutional: Patient is in no acute distress. Well-developed and well-nourished.  Head: Atraumatic. Normocephalic.  Eyes: PERRL. EOM intact. Conjunctivae are not pale. No scleral icterus.  ENT: Mucous membranes are moist. Oropharynx is clear and symmetric.    Neck: Supple.  "Full ROM. No lymphadenopathy. No cervical midline bony tenderness, deformities, or step-offs.  Cardiovascular: Regular rate. Regular rhythm. No murmurs, rubs, or gallops. Distal pulses are 2+ and symmetric.  Pulmonary/Chest: No respiratory distress. Clear to auscultation bilaterally. No wheezing or rales.  Abdominal: Soft and non-distended.  There is no tenderness.  No rebound, guarding, or rigidity. Good bowel sounds.  Genitourinary: No CVA tenderness  Musculoskeletal: Moves all extremities. No obvious deformities. No edema. No calf tenderness.  Back: No tenderness. No midline bony tenderness, deformities, or step-offs of the T-spine or L-spine. Skin appears normal without abrasions or bruising. No erythema, induration, or fluctuance.   RUE: evident deformity to R wrist, with punctate area to lateral wrist, good distal pulse, good movement of digits. Cap refill distally is <2 seconds. Radial pulses are equal and 2+ bilaterally. No motor deficit. No distal sensory deficit. Mild tenderness to palpation to R elbow, good distal pulse, good ROM. Mild tenderness to palpation to R shoulder, good distal pulse, good ROM.  RLE: no evident deformity. R hip tender to palpation. ROM is normal. Cap refill distally is <2 seconds. DP and PT pulses are equal and 2+ bilaterally. No motor deficit. No distal sensory deficit  Skin: Warm and dry.  Neurological:  Alert, awake, and appropriate.  Normal speech.  No acute focal neurological deficits are appreciated.  Psychiatric: Normal affect. Good eye contact. Appropriate in content.    ED Course    Procedural Sedation  Date/Time: 7/26/2018 11:09 PM  Performed by: CORINA MARTINEZ.  Authorized by: CORINA MARTINEZ.   Consent Done: Yes  Consent: Verbal consent obtained. Written consent obtained.  Risks and benefits: risks, benefits and alternatives were discussed  Consent given by: patient  Time out: Immediately prior to procedure a "time out" was called to verify the correct patient, " procedure, equipment, support staff and site/side marked as required.  ASA Class: Class 1 - Heathy patient. No medical history.  Mallampati Score: Class 1 - Visualization of the soft palate, fauces, uvula, and anterior/posterior pillars.   NPO STATUS:  Date/Time of last solid: 7/26/2018 5:00 PM  Equipment: on cardiac monitor., on BP monitor., suction available., airway equipment available. and reversal drugs available.   Sedation type: moderate (conscious) sedation  (See MAR for exact dosages of medications).  Sedatives: propofol  Sedation start date/time: 7/26/2018 11:11 PM  Sedation end date/time: 7/26/2018 11:29 PM  Vitals: Vital signs were monitored during sedation.  Complications: No complications.   Patient/Family history of anesthesia or sedation complications: No  Orthopedic Injury  Date/Time: 7/26/2018 11:10 PM  Performed by: CORINA MARTINEZ  Authorized by: CORINA MARTINEZ     Consent Done?:  Yes  Universal Protocol:     Verbal consent obtained?: Yes      Written consent obtained?: Yes      Risks and benefits: Risks, benefits and alternatives were discussed      Consent given by:  Patient    Time Out: Immediately prior to the procedure a time out was called    Injury:     Injury location:  Wrist    Location details:  Right wrist    Injury type:  Fracture    Fracture type: distal radius      Fracture type: distal radius        Pre-procedure assessment:     Neurovascular status: Neurovascularly intact      Distal perfusion: normal      Neurological function: normal      Range of motion: reduced      Patient sedated?: Yes      ASA Class:  Class 1 - Heathy patient. No medical history.    Mallampati Score:  Class 1 - Visualization of the soft palate, fauces, uvula, and anterior/posterior pillars.  Date/Time of last solid:  7/26/2018 5:00 PM    Patient/Family history of anesthesia or sedation complications: No      Sedation type: moderate (conscious) sedation      Sedation:  Propofol    Sedation start:   "7/26/2018 11:11 PM    Sedation end:  7/26/2018 11:29 PM    Vital signs: Vital signs monitored during sedation        Selections made in this section will also lock the Injury type section above.:     Manipulation performed?: Yes      Reduction successful?: Yes      Confirmation: Reduction confirmed by x-ray      Immobilization:  Splint and sling    Splint type:  Ulnar gutter  Post-procedure assessment:     Neurovascular status: Neurovascularly intact      Distal perfusion: normal      Neurological function: normal      Range of motion: normal      Patient tolerance:  Patient tolerated the procedure well with no immediate complications      ED Vital Signs:  Vitals:    07/26/18 2102 07/26/18 2107 07/26/18 2134 07/26/18 2159   BP: (!) 170/77  (!) 191/93    Pulse: 70  66 63   Resp: 18      Temp: 98.2 °F (36.8 °C)      TempSrc: Oral      SpO2: 96%  (!) 94%    Weight:  75.3 kg (166 lb 1 oz)     Height: 5' 4" (1.626 m)       07/26/18 2232 07/26/18 2256 07/26/18 2314 07/26/18 2315   BP: (!) 180/83 (!) 189/81  (!) 172/76   Pulse: 62 62     Resp: 16 (!) 25     Temp:       TempSrc:       SpO2: (!) 94% (!) 89% 96%    Weight:       Height:        07/26/18 2318 07/26/18 2323 07/26/18 2328 07/26/18 2331   BP: (!) 178/79 (!) 165/74 (!) 170/79 (!) 166/78   Pulse: 65 66 65 63   Resp: 20 20 20 (!) 22   Temp:       TempSrc:       SpO2: 96% (!) 94% 96% 96%   Weight:       Height:        07/27/18 0031 07/27/18 0101   BP: (!) 183/84 (!) 165/77   Pulse: (!) 58 (!) 56   Resp: 20 (!) 22   Temp:  98.1 °F (36.7 °C)   TempSrc:     SpO2: 98% 98%   Weight:     Height:         Imaging Results:  Imaging Results          X-Ray Wrist 2 View Right (Final result)  Result time 07/26/18 23:46:47   Procedure changed from X-Ray Wrist Complete Left     Final result by Olegario Easton Jr., MD (07/26/18 23:46:47)                 Impression:      Improved alignment of the distal radius fracture.  There is an acute ulnar styloid fracture.  This was previously " described as chronic.      Electronically signed by: Olegario Easton Jr., MD  Date:    07/26/2018  Time:    23:46             Narrative:    EXAMINATION:  XR WRIST 2 VIEW RIGHT    CLINICAL HISTORY:  reduction;    TECHNIQUE:  PA, lateral, and oblique views of the right wrist were performed.    COMPARISON:  None    FINDINGS:  Postreduction views demonstrate improved alignment of the fracture site of the distal radius.  There is an associated ulnar styloid fracture that was previously described as chronic.  It is now favored to be acute.  No additional fractures.  Osteopenia.                               X-Ray Shoulder Trauma Right (Final result)  Result time 07/26/18 22:35:27    Final result by Olegario Easton Jr., MD (07/26/18 22:35:27)                 Impression:      Degenerative AC joint change without acute abnormality.      Electronically signed by: Olegario Easton Jr., MD  Date:    07/26/2018  Time:    22:35             Narrative:    EXAMINATION:  XR SHOULDER TRAUMA 3 VIEW RIGHT    CLINICAL HISTORY:  Pain in arm, unspecified    TECHNIQUE:  Two or three views of the right shoulder were performed.    COMPARISON:  None    FINDINGS:  The glenohumeral joint articulates normally.  Moderate degenerative change of the acromioclavicular joint.  No acute fractures are evident.                               X-Ray Elbow Complete Right (Final result)  Result time 07/26/18 22:34:39    Final result by Olegario Easton Jr., MD (07/26/18 22:34:39)                 Impression:      No acute radiographic abnormality.      Electronically signed by: Olegario Easton Jr., MD  Date:    07/26/2018  Time:    22:34             Narrative:    EXAMINATION:  XR ELBOW COMPLETE 3 VIEW RIGHT    CLINICAL HISTORY:  . Pain in arm, unspecified    TECHNIQUE:  AP, lateral, and oblique views of the right elbow were performed.    COMPARISON:  None available.    FINDINGS:  The elbow joint articulates normally. No evidence of fracture or elbow joint effusion. No  radiopaque foreign bodies.                               X-Ray Hip 2 View Right (Final result)  Result time 07/26/18 22:33:51    Final result by Olegario Easton Jr., MD (07/26/18 22:33:51)                 Impression:      Findings are similar to December 30, 2015 with no acute abnormality evident.      Electronically signed by: Olegario Easton Jr., MD  Date:    07/26/2018  Time:    22:33             Narrative:    EXAMINATION:  XR HIP 2 VIEW RIGHT    CLINICAL HISTORY:  Pain in unspecified hip    TECHNIQUE:  AP view of the pelvis and frog leg lateral view of the right hip were performed.    COMPARISON:  Previous radiographic series of the left hip from July 25, 2018 was reviewed.  Radiographs of December 30, 2015 were reviewed as well.    FINDINGS:  There is a total right hip arthroplasty with a malleable plate about the greater trochanter of the right femur.  There are metallic wires in place.  The hardware is not significantly changed compared to December 30, 2015.  No signs of acute fracture or dislocation.                               X-Ray Wrist Complete Right (Final result)  Result time 07/26/18 22:36:24    Final result by Olegario Easton Jr., MD (07/26/18 22:36:24)                 Impression:      Distal radius fracture with dorsal angulation.      Electronically signed by: Olegario Easton Jr., MD  Date:    07/26/2018  Time:    22:36             Narrative:    EXAMINATION:  XR WRIST COMPLETE 3 VIEWS RIGHT    CLINICAL HISTORY:  Unspecified acquired deformity of right forearm    TECHNIQUE:  PA, lateral, and oblique views of the right wrist were performed.    COMPARISON:  None    FINDINGS:  There is an impaction fracture of the distal radius with dorsal angulation.  There is an old fracture of the ulnar styloid.  Osteopenia.  Degenerative change of the trapezial metacarpal joint.                                        The Emergency Provider reviewed the vital signs and test results, which are outlined above.    ED  Discussion     11:05 PM: Re-evaluated pt. Pt is resting comfortably and is in no acute distress.  D/w pt all pertinent results. D/w pt any concerns expressed at this time. Answered all questions. Pt expresses understanding at this time. Patient states Tetanus is not UTD.    12:37 AM: Dr. Nunez discussed the pt's case with Dr. Coley (Ortho) who states it is reasonable to discharge patient on Keflex and have patient f/u as outpatient in a few days.    12:40 AM: Reassessed pt at this time.  Pt states her condition has improved at this time. Improved alignment on repeat x-ray. Discussed with pt all pertinent ED information and results. Discussed pt dx and plan of tx. Gave pt all f/u and return to the ED instructions. All questions and concerns were addressed at this time. Pt expresses understanding of information and instructions, and is comfortable with plan to discharge. Pt is stable for discharge.  Discussed with patient to f/u with ortho in a few days. Family states patient has previously seen Dr. Black for ortho. Advised patient call his office in the morning to schedule f/u appointment.    I discussed with patient and/or family/caretaker that evaluation in the ED does not suggest any emergent or life threatening medical conditions requiring immediate intervention beyond what was provided in the ED, and I believe patient is safe for discharge.  Regardless, an unremarkable evaluation in the ED does not preclude the development or presence of a serious of life threatening condition. As such, patient was instructed to return immediately for any worsening or change in current symptoms.    Driving or other activities under influence of medications - Patient and/or family/caretaker was given a prescription for, or instructed to use a medicine that may impair ability to drive, operate machinery, or participate in other potentially dangerous activities.  Patient was instructed not to participate in these activities  while under the influence of these medications.     I discussed with the patient/guardian regarding the the quantity of the opioid. I discussed the patient/guardian's option to fill the prescription in a lesser quantity. I also discussed with the patient/guardian the risks associated with the opioid.        ED Medication(s):  Medications   losartan tablet 50 mg (50 mg Oral Given 7/26/18 2231)   hydroCHLOROthiazide tablet 12.5 mg (12.5 mg Oral Given 7/26/18 2231)   morphine injection 5 mg (5 mg Intravenous Given 7/26/18 2232)   ondansetron injection 4 mg (4 mg Intravenous Given 7/26/18 2232)   propofol (DIPRIVAN) 10 mg/mL IVP (20 mg Intravenous Given 7/26/18 2319)   propofol (DIPRIVAN) 10 mg/mL IVP (20 mg Intravenous Given 7/26/18 2320)   Tdap vaccine injection 0.5 mL (0.5 mLs Intramuscular Given 7/27/18 0044)   ceFAZolin (ANCEF) 1 gram in dextrose 5 % 50 mL IVPB (premix) (0 g Intravenous Stopped 7/27/18 0122)       Discharge Medication List as of 7/27/2018 12:52 AM      START taking these medications    Details   cephALEXin (KEFLEX) 500 MG capsule Take 1 capsule (500 mg total) by mouth every 6 (six) hours. for 10 days, Starting Fri 7/27/2018, Until Mon 8/6/2018, Print      HYDROcodone-acetaminophen (NORCO) 5-325 mg per tablet Take 1 tablet by mouth every 6 (six) hours as needed for Pain., Starting Fri 7/27/2018, Print             Follow-up Information     Zafar Black Jr, MD. Call on 7/27/2018.    Specialty:  Orthopedic Surgery  Why:  to make an appointment for follow-up on Monday  Contact information:  [address]  [address]  [facility] [address] [phone]             Go to  [facility].    Specialty:  Emergency Medicine  Why:  As needed, If symptoms worsen  Contact information:  [address]  [facility] [address] [phone]                   Medical Decision Making    Medical Decision Making:   Clinical Tests:   Radiological Study: Ordered and  Reviewed           Scribe Attestation:   Scribe #1: I performed the above scribed service and the documentation accurately describes the services I performed. I attest to the accuracy of the note.    Attending:   Physician Attestation Statement for Scribe #1: I, Marc uNnez MD, personally performed the services described in this documentation, as scribed by Africa Miller, in my presence, and it is both accurate and complete.          Clinical Impression       ICD-10-CM ICD-9-CM   1. Closed fracture of distal end of right radius, unspecified fracture morphology, initial encounter S52.501A 813.42   2. Right wrist deformity M21.931 736.00   3. Arm pain M79.603 729.5   4. Hip pain M25.559 719.45       Disposition:   Disposition: Discharged  Condition: Stable         Marc Nunez MD  07/27/18 0201

## 2018-07-27 NOTE — ED NOTES
In bed resting, VSS,aaox3,skin warm dry to touch,equal bilateral clear lung sounds,side rails up x 2,bed locked and in low position, plan of care discussed, oriented to surroundings, instructed to call with any needs. Patient cap refill less than 3 seconds on left fingers, pulses noted.

## 2018-07-30 ENCOUNTER — HOSPITAL ENCOUNTER (OUTPATIENT)
Dept: RADIOLOGY | Facility: HOSPITAL | Age: 83
Discharge: HOME | End: 2018-07-30
Attending: PHYSICIAN ASSISTANT
Payer: MEDICARE

## 2018-07-30 ENCOUNTER — CLINICAL SUPPORT (OUTPATIENT)
Dept: CARDIOLOGY | Facility: CLINIC | Age: 83
End: 2018-07-30
Payer: MEDICARE

## 2018-07-30 ENCOUNTER — OFFICE VISIT (OUTPATIENT)
Dept: INTERNAL MEDICINE | Facility: CLINIC | Age: 83
End: 2018-07-30
Payer: MEDICARE

## 2018-07-30 VITALS
HEART RATE: 82 BPM | SYSTOLIC BLOOD PRESSURE: 124 MMHG | TEMPERATURE: 99 F | DIASTOLIC BLOOD PRESSURE: 70 MMHG | HEIGHT: 64 IN | OXYGEN SATURATION: 96 %

## 2018-07-30 DIAGNOSIS — I10 ESSENTIAL HYPERTENSION: Chronic | ICD-10-CM

## 2018-07-30 DIAGNOSIS — Z01.818 PREOP EXAM FOR INTERNAL MEDICINE: ICD-10-CM

## 2018-07-30 DIAGNOSIS — M25.551 RIGHT HIP PAIN: ICD-10-CM

## 2018-07-30 DIAGNOSIS — I70.0 ATHEROSCLEROSIS OF AORTA: ICD-10-CM

## 2018-07-30 DIAGNOSIS — S52.501A CLOSED FRACTURE OF DISTAL END OF RIGHT RADIUS, UNSPECIFIED FRACTURE MORPHOLOGY, INITIAL ENCOUNTER: Primary | ICD-10-CM

## 2018-07-30 DIAGNOSIS — M81.0 OSTEOPOROSIS, POSTMENOPAUSAL: Chronic | ICD-10-CM

## 2018-07-30 DIAGNOSIS — M54.5 ACUTE RIGHT-SIDED LOW BACK PAIN, WITH SCIATICA PRESENCE UNSPECIFIED: ICD-10-CM

## 2018-07-30 DIAGNOSIS — E55.9 VITAMIN D DEFICIENCY DISEASE: ICD-10-CM
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
## 2018-07-30 DIAGNOSIS — E03.9 ACQUIRED HYPOTHYROIDISM: ICD-10-CM

## 2018-07-30 DIAGNOSIS — F41.9 ANXIETY: ICD-10-CM

## 2018-07-30 DIAGNOSIS — N18.30 STAGE 3 CHRONIC KIDNEY DISEASE: ICD-10-CM

## 2018-07-30 PROCEDURE — 72110 X-RAY EXAM L-2 SPINE 4/>VWS: CPT | Mod: TC,FY,PO

## 2018-07-30 PROCEDURE — 3074F SYST BP LT 130 MM HG: CPT | Mod: CPTII,S$GLB,, | Performed by: PHYSICIAN ASSISTANT

## 2018-07-30 PROCEDURE — 73521 X-RAY EXAM HIPS BI 2 VIEWS: CPT | Mod: 26,,, | Performed by: RADIOLOGY

## 2018-07-30 PROCEDURE — 93000 ELECTROCARDIOGRAM COMPLETE: CPT | Mod: S$GLB,,, | Performed by: NUCLEAR MEDICINE

## 2018-07-30 PROCEDURE — 73521 X-RAY EXAM HIPS BI 2 VIEWS: CPT | Mod: TC,FY,PO

## 2018-07-30 PROCEDURE — 71046 X-RAY EXAM CHEST 2 VIEWS: CPT | Mod: TC,FY,PO

## 2018-07-30 PROCEDURE — 71046 X-RAY EXAM CHEST 2 VIEWS: CPT | Mod: 26,,, | Performed by: RADIOLOGY

## 2018-07-30 PROCEDURE — 99999 PR PBB SHADOW E&M-EST. PATIENT-LVL V: CPT | Mod: PBBFAC,,, | Performed by: PHYSICIAN ASSISTANT

## 2018-07-30 PROCEDURE — 99214 OFFICE O/P EST MOD 30 MIN: CPT | Mod: S$GLB,,, | Performed by: PHYSICIAN ASSISTANT

## 2018-07-30 PROCEDURE — 72110 X-RAY EXAM L-2 SPINE 4/>VWS: CPT | Mod: 26,,, | Performed by: RADIOLOGY

## 2018-07-30 PROCEDURE — 3078F DIAST BP <80 MM HG: CPT | Mod: CPTII,S$GLB,, | Performed by: PHYSICIAN ASSISTANT

## 2018-07-30 NOTE — PROGRESS NOTES
Subjective:       Patient ID: Clau Nunn is a 83 y.o. female.    Chief Complaint: Pre-op Exam    83 year old female presents to clinic for pre-op exam. Pt is scheduled for R wrist ORIF by Dr. Zafar Black 7/27/18. She reports falling 7/26/18, which resulted in R wrist fracture. She reports onset of R lower back / buttock pain after that fall as well and would like xrays for this today. She reports pain worsens with any movement of affected area and radiates into R groin / hip region. She reports no fever, chills, CP, SOB, exertional sxs, or other medical complaints. She denies any past complications with anesthesia.    PCP: Dr. Molina    Patient Active Problem List:     Primary osteoarthritis involving multiple joints     Osteoporosis, postmenopausal     Acquired hypothyroidism     Lumbar arthropathy     Chronic venous insufficiency     Anxiety     Vitamin D deficiency disease     Essential hypertension     Hiatal hernia with gastroesophageal reflux     Sacroiliac inflammation     Atherosclerosis of aorta     Chronic lumbar radiculopathy     Stage 3 chronic kidney disease     Mild major depression     Simple chronic bronchitis     Osteopenia with high risk of fracture     Pain of left hip joint     Lumbar spondylosis     Pain in both lower extremities      Review of Systems   Constitutional: Negative for unexpected weight change.   HENT: Negative for hearing loss, rhinorrhea and trouble swallowing.    Eyes: Negative for discharge and visual disturbance.   Respiratory: Negative for cough, chest tightness, shortness of breath and wheezing.    Cardiovascular: Negative for chest pain, palpitations and leg swelling.   Gastrointestinal: Negative for blood in stool, constipation, diarrhea and vomiting.   Endocrine: Negative for polydipsia and polyuria.   Genitourinary: Negative for difficulty urinating, dysuria, hematuria and menstrual problem.   Musculoskeletal: Positive for arthralgias and back pain.  "Negative for joint swelling and neck pain.   Skin: Negative for rash.   Neurological: Negative for dizziness, weakness, numbness and headaches.   Psychiatric/Behavioral: Negative for confusion and dysphoric mood.       Objective:       Vitals:    07/30/18 1014   BP: 124/70   BP Location: Left arm   Patient Position: Sitting   BP Method: Small (Manual)   Pulse: 82   Temp: 98.5 °F (36.9 °C)   TempSrc: Tympanic   SpO2: 96%   Weight: Comment: unable to obtain   Height: 5' 4" (1.626 m)     Physical Exam   Constitutional: She is oriented to person, place, and time. She appears well-developed and well-nourished. No distress.   Pt presents in wheelchair   HENT:   Head: Normocephalic and atraumatic.   Eyes: EOM are normal. No scleral icterus.   Neck: Neck supple.   Cardiovascular: Normal rate and regular rhythm.    Pulmonary/Chest: Effort normal and breath sounds normal. No respiratory distress. She has no decreased breath sounds. She has no wheezes. She has no rhonchi. She has no rales.   Musculoskeletal: She exhibits no edema.   TTP to midline lumbar spine and R lower back; no midline spine step-offs; rotations of R hip produce R hip pain; R wrist in splint; 5+ strength   Neurological: She is alert and oriented to person, place, and time. No cranial nerve deficit.   Skin: Skin is warm and dry. No rash noted.   Psychiatric: She has a normal mood and affect. Her speech is normal and behavior is normal. Thought content normal.       Component      Latest Ref Rng & Units 3/2/2018   WBC      3.90 - 12.70 K/uL 5.97   RBC      4.00 - 5.40 M/uL 4.45   Hemoglobin      12.0 - 16.0 g/dL 14.2   Hematocrit      37.0 - 48.5 % 43.9   MCV      82 - 98 fL 99 (H)   MCH      27.0 - 31.0 pg 31.9 (H)   MCHC      32.0 - 36.0 g/dL 32.3   RDW      11.5 - 14.5 % 11.9   Platelets      150 - 350 K/uL 242   MPV      9.2 - 12.9 fL 11.2   Immature Granulocytes      0.0 - 0.5 % 0.3   Gran # (ANC)      1.8 - 7.7 K/uL 3.6   Immature Grans (Abs)      0.00 " - 0.04 K/uL 0.02   Lymph #      1.0 - 4.8 K/uL 1.7   Mono #      0.3 - 1.0 K/uL 0.5   Eos #      0.0 - 0.5 K/uL 0.2   Baso #      0.00 - 0.20 K/uL 0.05   nRBC      0 /100 WBC 0   Gran%      38.0 - 73.0 % 59.9   Lymph%      18.0 - 48.0 % 28.3   Mono%      4.0 - 15.0 % 7.7   Eosinophil%      0.0 - 8.0 % 3.0   Basophil%      0.0 - 1.9 % 0.8   Differential Method       Automated   Sodium      136 - 145 mmol/L 142   Potassium      3.5 - 5.1 mmol/L 4.2   Chloride      95 - 110 mmol/L 104   CO2      23 - 29 mmol/L 28   Glucose      70 - 110 mg/dL 87   BUN, Bld      8 - 23 mg/dL 22   Creatinine      0.5 - 1.4 mg/dL 1.1   Calcium      8.7 - 10.5 mg/dL 9.6   Total Protein      6.0 - 8.4 g/dL 7.1   Albumin      3.5 - 5.2 g/dL 3.5   Total Bilirubin      0.1 - 1.0 mg/dL 0.7   Alkaline Phosphatase      55 - 135 U/L 54 (L)   AST      10 - 40 U/L 17   ALT      10 - 44 U/L 12   Anion Gap      8 - 16 mmol/L 10   eGFR if African American      >60 mL/min/1.73 m:2 54.4 (A)   eGFR if non African American      >60 mL/min/1.73 m:2 47.2 (A)   Cholesterol      120 - 199 mg/dL 206 (H)   Triglycerides      30 - 150 mg/dL 124   HDL      40 - 75 mg/dL 52   LDL Cholesterol      63.0 - 159.0 mg/dL 129.2   HDL/Chol Ratio      20.0 - 50.0 % 25.2   Total Cholesterol/HDL Ratio      2.0 - 5.0 4.0   Non-HDL Cholesterol      mg/dL 154   Hemoglobin A1C      4.0 - 5.6 % 5.1   Estimated Avg Glucose      68 - 131 mg/dL 100   TSH      0.400 - 4.000 uIU/mL 1.185   Vit D, 25-Hydroxy      30 - 96 ng/mL 28 (L)     Assessment:       1. Closed fracture of distal end of right radius, unspecified fracture morphology, initial encounter    2. Preop exam for internal medicine    3. Fall, initial encounter    4. Acute right-sided low back pain, with sciatica presence unspecified    5. Right hip pain    6. Essential hypertension    7. Stage 3 chronic kidney disease    8. Atherosclerosis of aorta    9. Acquired hypothyroidism    10. Vitamin D deficiency disease    11.  Osteoporosis, postmenopausal    12. Anxiety        Plan:         Clau was seen today for pre-op exam.    Diagnoses and all orders for this visit:    Closed fracture of distal end of right radius, unspecified fracture morphology, initial encounter; Preop exam for internal medicine; Fall, initial encounter  -     CBC auto differential; Future  -     X-Ray Chest PA And Lateral; Future  -     EKG 12-lead; Future  -     Basic metabolic panel; Future  Pre-op labs / imaging today with result review following. Will complete pre-op after review of pending results.    Acute right-sided low back pain, Right hip pain  -     X-Ray Lumbar Spine Complete 5 View; Future  -     X-Ray Hips Bilateral 2 View Inc AP Pelvis; Future  Xrays as above with result review following. Pt to inform surgeon of sxs prior to surgery as well.    Essential hypertension  -     X-Ray Chest PA And Lateral; Future  -     EKG 12-lead; Future  -     Basic metabolic panel; Future  Controlled on losartan-HCTZ. Monitor BP and f/u with PCP.    Stage 3 chronic kidney disease  Stable. Avoid NSAIDs and stay hydrated. F/u with PCP.    Atherosclerosis of aorta  -     X-Ray Chest PA And Lateral; Future  Stable. F/u with PCP.    Acquired hypothyroidism  Controlled on Synthroid. F/u wiuth PCP.    Vitamin D deficiency disease  Pt taking vit D suppl. F/u with PCP.    Osteoporosis, postmenopausal  Pt taking vit D suppl and dietary calcium. F/u with rheum as recommended.    Anxiety  Controlled on Lexapro.    Follow-up with your PCP as scheduled 9/7/18 for health management.    Addendum 7/31/18:    Component      Latest Ref Rng & Units 7/30/2018   WBC      3.90 - 12.70 K/uL 13.16 (H)   RBC      4.00 - 5.40 M/uL 4.62   Hemoglobin      12.0 - 16.0 g/dL 14.9   Hematocrit      37.0 - 48.5 % 45.5   MCV      82 - 98 fL 99 (H)   MCH      27.0 - 31.0 pg 32.3 (H)   MCHC      32.0 - 36.0 g/dL 32.7   RDW      11.5 - 14.5 % 13.6   Platelets      150 - 350 K/uL 203   MPV      9.2 -  12.9 fL 11.3   Gran # (ANC)      1.8 - 7.7 K/uL 11.2 (H)   Lymph #      1.0 - 4.8 K/uL 0.8 (L)   Mono #      0.3 - 1.0 K/uL 1.1 (H)   Eos #      0.0 - 0.5 K/uL 0.1   Baso #      0.00 - 0.20 K/uL 0.02   Gran%      38.0 - 73.0 % 85.6 (H)   Lymph%      18.0 - 48.0 % 6.4 (L)   Mono%      4.0 - 15.0 % 8.0   Eosinophil%      0.0 - 8.0 % 0.5   Basophil%      0.0 - 1.9 % 0.2   Platelet Estimate       Appears normal   Differential Method       Automated   Sodium      136 - 145 mmol/L 137   Potassium      3.5 - 5.1 mmol/L 4.4   Chloride      95 - 110 mmol/L 98   CO2      23 - 29 mmol/L 26   Glucose      70 - 110 mg/dL 106   BUN, Bld      8 - 23 mg/dL 24 (H)   Creatinine      0.5 - 1.4 mg/dL 1.1   Calcium      8.7 - 10.5 mg/dL 9.5   Anion Gap      8 - 16 mmol/L 13   eGFR if African American      >60 mL/min/1.73 m:2 54 (A)   eGFR if non African American      >60 mL/min/1.73 m:2 47 (A)     CXR 7/30/18: The lungs are clear and free of infiltrate.  No pleural effusion or pneumothorax. The heart is mildly enlarged.  Calcified bilateral breast implants are noted.  Postoperative changes associated with the lower thoracic/upper lumbar spine. IMPRESSION: No acute cardiopulmonary process.    EKG 7/30/18: Sinus rhythm with marked sinus arrythmia. Nonspecific ST and/or T wave abnormalities. Abnormal ECG. When compared with ECG of 30-JUL-2018 11:16, Confirmed by MING KING MD ([id number]) on 7/30/2018 5:00:38 PM    B hips/pelvis xrays 7/30/18: The bony pelvis is intact. A right total hip arthroplasty, plate and wires are in place.  No hardware failure or loosening suggested.  The appearance is unchanged when compared to the prior exam.  Mild degenerative changes noted at the left hip.  No acute fracture or dislocation of the left hip.  No significant joint space narrowing identified.  No plain film evidence to suggest AVN of either hip.    L-spine xrays 7/30/18: There is grade 1 spondylolisthesis of L4 on L5.  Pedicle screws and fixation  rods are noted for at the T10, T11, L1 and L2 levels.  Chronic compression deformity at the L1 level unchanged.  Prominent bilateral facet arthropathy noted at the L4-5 and L5-S1 levels.  IVC filter noted.    Pt is currently taking a Medrol dose pack. Pt denies any sxs of infection or fever/chills.     Recommend pt f/u with ortho for further eval of onset of R lower back / R hip pain after her fall.     Pre-op test results reviewed with PCP Dr. Molina. Pt is cleared for upcoming surgery from general medical standpoint.

## 2018-08-27 ENCOUNTER — OFFICE VISIT (OUTPATIENT)
Dept: RHEUMATOLOGY | Facility: CLINIC | Age: 83
End: 2018-08-27
Payer: MEDICARE

## 2018-08-27 ENCOUNTER — LAB VISIT (OUTPATIENT)
Dept: LAB | Facility: HOSPITAL | Age: 83
End: 2018-08-27
Attending: PHYSICIAN ASSISTANT
Payer: MEDICARE

## 2018-08-27 VITALS
SYSTOLIC BLOOD PRESSURE: 121 MMHG | DIASTOLIC BLOOD PRESSURE: 75 MMHG | HEART RATE: 57 BPM | BODY MASS INDEX: 26.52 KG/M2 | HEIGHT: 65 IN | WEIGHT: 159.19 LBS

## 2018-08-27 DIAGNOSIS — N18.30 STAGE 3 CHRONIC KIDNEY DISEASE: ICD-10-CM

## 2018-08-27 DIAGNOSIS — M15.9 PRIMARY OSTEOARTHRITIS INVOLVING MULTIPLE JOINTS: ICD-10-CM

## 2018-08-27 DIAGNOSIS — M85.80 OSTEOPENIA WITH HIGH RISK OF FRACTURE: ICD-10-CM

## 2018-08-27 DIAGNOSIS — Z51.81 MEDICATION MONITORING ENCOUNTER: ICD-10-CM

## 2018-08-27 DIAGNOSIS — E55.9 VITAMIN D DEFICIENCY DISEASE: ICD-10-CM

## 2018-08-27 DIAGNOSIS — M81.0 OSTEOPOROSIS, POSTMENOPAUSAL: Primary | Chronic | ICD-10-CM

## 2018-08-27 LAB
ANION GAP SERPL CALC-SCNC: 9 MMOL/L
BUN SERPL-MCNC: 14 MG/DL
CALCIUM SERPL-MCNC: 9.2 MG/DL
CHLORIDE SERPL-SCNC: 104 MMOL/L
CO2 SERPL-SCNC: 27 MMOL/L
CREAT SERPL-MCNC: 1.1 MG/DL
EST. GFR  (AFRICAN AMERICAN): 54 ML/MIN/1.73 M^2
EST. GFR  (NON AFRICAN AMERICAN): 47 ML/MIN/1.73 M^2
GLUCOSE SERPL-MCNC: 83 MG/DL
POTASSIUM SERPL-SCNC: 4.8 MMOL/L
SODIUM SERPL-SCNC: 140 MMOL/L

## 2018-08-27 PROCEDURE — 36415 COLL VENOUS BLD VENIPUNCTURE: CPT | Mod: PO

## 2018-08-27 PROCEDURE — 3078F DIAST BP <80 MM HG: CPT | Mod: CPTII,S$GLB,, | Performed by: PHYSICIAN ASSISTANT

## 2018-08-27 PROCEDURE — 80048 BASIC METABOLIC PNL TOTAL CA: CPT | Mod: PO

## 2018-08-27 PROCEDURE — 99499 UNLISTED E&M SERVICE: CPT | Mod: HCNC,S$GLB,, | Performed by: PHYSICIAN ASSISTANT

## 2018-08-27 PROCEDURE — 3074F SYST BP LT 130 MM HG: CPT | Mod: CPTII,S$GLB,, | Performed by: PHYSICIAN ASSISTANT

## 2018-08-27 PROCEDURE — 99214 OFFICE O/P EST MOD 30 MIN: CPT | Mod: S$GLB,,, | Performed by: PHYSICIAN ASSISTANT

## 2018-08-27 PROCEDURE — 99999 PR PBB SHADOW E&M-EST. PATIENT-LVL IV: CPT | Mod: PBBFAC,,, | Performed by: PHYSICIAN ASSISTANT

## 2018-08-27 NOTE — LETTER
August 27, 2018      Kandice Salcedo PA-C  [address]  [facility] [address]           [facility]  [address] [place] [address]  Phone: [phone]  Fax: [phone]          Patient: Clau Nunn   MR Number: [id number]   YOB: 1935   Date of Visit: 8/27/2018       Dear Kandice Salcedo:    Thank you for referring Clau Nunn to me for evaluation. Attached you will find relevant portions of my assessment and plan of care.    If you have questions, please do not hesitate to call me. I look forward to following Clau Nunn along with you.    Sincerely,    Carly Mora PA-C    Enclosure  CC:  No Recipients    If you would like to receive this communication electronically, please contact [email or web address] or [phone] to request more information on [employer] Link access.    For providers and/or their staff who would like to refer a patient to [facility], please contact us through our one-stop-shop provider referral line, [facility], at [phone].    If you feel you have received this communication in error or would no longer like to receive these types of communications, please e-mail [email or web address]

## 2018-08-27 NOTE — PATIENT INSTRUCTIONS
Labs today -  Mag, phos, pth, vit D  Forteo- Teriparatide injection  What is this medicine?  TERIPARATIDE (terr ih PAR a tyd) increases bone mass and strength. It helps make healthy bone and to slow bone loss. This medicine is used to prevent bone fractures.  How should I use this medicine?  This medicine comes in an injection pen device. This pen injects the medicine under your skin. Follow the directions on the prescription label. You will be taught how to use this medicine. Take your medicine at regular intervals. Do not take your medicine more often than directed.  Do not use this medicine if it has solid particles in it, or if it is cloudy or colored. It should be clear and colorless.  Be sure that you are using your pen device correctly.   A special MedGuide will be given to you by the pharmacist with each prescription and refill. Be sure to read this information carefully each time.  Talk to your pediatrician regarding the use of this medicine in children. Special care may be needed.  What side effects may I notice from receiving this medicine?  Side effects that you should report to your doctor or health care professional as soon as possible:  · allergic reactions like skin rash, itching or hives, swelling of the face, lips, or tongue  · chronic constipation  · high blood pressure  · irregular heartbeat, chest pain  · nausea, vomiting  · unusually weak or tired  Side effects that usually do not require medical attention (report to your doctor or health care professional if they continue or are bothersome):  · bone pain  · cough, runny nose  · headache  · leg cramps  · muscle spasms in the back or legs  · pain, redness, irritation or swelling at the injection site  · stomach upset  · trouble sleeping  What may interact with this medicine?  · digoxin  · other medicines to strengthen bone  What if I miss a dose?  If you miss a dose, take it as soon as you can. If it is almost time for your next dose, take only  that dose. Do not take double or extra doses.  Where should I keep my medicine?  Keep out of the reach of children.  Store in a refrigerator between 2 and 8 degrees C (36 and 46 degrees F). Do not freeze. Recap the pen injector when not in use to protect it from light and damage. Use quickly after taking out of the refrigerator and return to refrigerator right after using. Throw away any unused medicine 28 days after the first injection from the device. Do not use after the expiration date printed on the pen and pen packaging.  What should I tell my health care provider before I take this medicine?  They need to know if you have any of these conditions:  · bone disease other than osteoporosis  · heart, kidney or liver disease  · history of cancer in the bone  · kidney stone  · Paget's disease  · parathyroid disease  · receiving radiation therapy  · an unusual or allergic reaction to teriparatide, other medicines, foods, dyes, or preservatives  · pregnant or trying to get pregnant  · breast-feeding  What should I watch for while using this medicine?  Visit your doctor or health care professional for regular checks on your progress. Your doctor may order blood tests and other tests to see how you are doing.  You should make sure you get enough calcium and vitamin D while you are taking this medicine, unless your doctor tells you not to. Discuss the foods you eat and the vitamins you take with your health care professional.  You may get drowsy or dizzy. Do not drive, use machinery, or do anything that needs mental alertness until you know how this medicine affects you. Do not stand or sit up quickly, especially if you are an older patient. This reduces the risk of dizzy or fainting spells.  NOTE:This sheet is a summary. It may not cover all possible information. If you have questions about this medicine, talk to your doctor, pharmacist, or health care provider. Copyright© 2017 [employer]

## 2018-08-27 NOTE — PROGRESS NOTES
Subjective:       Patient ID: Clau Nunn is a 83 y.o. female.    Chief Complaint: osteopenia w high frax    Mrs. Nunn is here for f/u for h/o osteoporosis now osteopenia with high fracture risk. She has a h/o of a right hip fx, right tibia fx, spine fxs s/p trauma and left wrist fx.  2 weeks ago fell- tripped over threshold from in her living room. fx right distal radius and right sacrum. Had ORIF right wrist. Non weightbearing on her right hip/pelvis. In wheelchair today her grand daughter with her. Her son Has fixed the SP3H moldings and now all are flat. No rugs are out.     In the past she has taken IV reclast then on drug  holiday but repeat dexa (2016) showed decreasing bmd at the hip.   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 1st Prolia feb 2017. Due for #4 today.  She has a history of low vit d on 50K units weekly, but level check last was normal and she is taking otc vit D daily 1K units.      She also has osteoarthritis in multiple  Joints, hips, hands, knees, and chronic back pain (two surgeries s/p trauma) takes gabapentin at night.  mobic dc'd due to kidney function. She is taking tumeric and tylenol.  Today rates her pain level 0/10      Review of Systems   Constitutional: Negative for chills, fatigue and fever.   HENT: Negative for mouth sores, rhinorrhea and sore throat.    Eyes: Negative for pain and redness.   Respiratory: Negative for cough and shortness of breath.    Cardiovascular: Negative for chest pain.   Gastrointestinal: Negative for abdominal pain, constipation, diarrhea, nausea and vomiting.   Genitourinary: Negative for dysuria and hematuria.   Musculoskeletal: Positive for gait problem. Negative for arthralgias, back pain, joint swelling and myalgias.   Skin: Negative for rash.   Neurological: Negative for weakness, numbness and headaches.   Psychiatric/Behavioral: The patient is not nervous/anxious.          Objective:   /75   Pulse (!) 57    "Ht 5' 5" (1.651 m)   Wt 72.2 kg (159 lb 2.8 oz)   BMI 26.49 kg/m²      Physical Exam   Constitutional: She is oriented to person, place, and time and well-developed, well-nourished, and in no distress.   HENT:   Head: Normocephalic and atraumatic.   Eyes: Pupils are equal, round, and reactive to light. Right eye exhibits no discharge.   Neck: Normal range of motion.   Cardiovascular: Normal rate, regular rhythm and normal heart sounds.  Exam reveals no friction rub.    Pulmonary/Chest: Effort normal and breath sounds normal. No respiratory distress.   Abdominal: Soft. She exhibits no distension. There is no tenderness.   Lymphadenopathy:     She has no cervical adenopathy.   Neurological: She is alert and oriented to person, place, and time.   Skin: No rash noted. No erythema.     Psychiatric: Mood normal.   Musculoskeletal: She exhibits no edema or deformity.   significant heberden, surekha nodes, OA squaring rebecca 1 cmc joints, no synovitis  rebecca knees no effusion +crepitus, good rom  In Wheel chair  Right sacral fx  Right hand immobilizing brace due to Right wrist fx post ORIF           Recent Results (from the past 168 hour(s))   Basic metabolic panel    Collection Time: 08/27/18 10:05 AM   Result Value Ref Range    Sodium 140 136 - 145 mmol/L    Potassium 4.8 3.5 - 5.1 mmol/L    Chloride 104 95 - 110 mmol/L    CO2 27 23 - 29 mmol/L    Glucose 83 70 - 110 mg/dL    BUN, Bld 14 8 - 23 mg/dL    Creatinine 1.1 0.5 - 1.4 mg/dL    Calcium 9.2 8.7 - 10.5 mg/dL    Anion Gap 9 8 - 16 mmol/L    eGFR if African American 54 (A) >60 mL/min/1.73 m^2    eGFR if non African American 47 (A) >60 mL/min/1.73 m^2        Dexa 12.13.16:   Left Femur  BMD 0.850 g/cm2 with T score -1.2  L  Femur neck BMD 0.868 g/cm2 with T score  -1.2,   L1 spine 5.6, decreasing bmd at hip,   frax major 18.5%, hip 3.7%    Assessment:       1. Osteoporosis, postmenopausal    2. Primary osteoarthritis involving multiple joints    3. Vitamin D deficiency " disease    4. Stage 3 chronic kidney disease        Impression:      1. Osteoporosis with  multiple fxs- right hip, left wrist, several compression fx- now new fall with right sacral fx and right wrist fx post ORIF    Mild CKD  Moved to Prolia 2/2017 (X 3 doses) post falling bmd with Reclast Holiday  [id number] (5 yr)  Completed IV Reclast ~ 4 treatment ([id number])      ? forteo candidate   Vit D level wnl on otc D3 1K units, Ca WNL with dietary calcium only     2. CKD: stable, gfr 47    3. Medication monitoring; no issues with prolia    4. Osteoarthritis multiple joints: rebecca hands, rebecca shoulders, left hip, rebecca knees, off mobic, using tumeric, tylenol, has had injections in knees by ortho    5. Lumbar arthropathy with radiculopathy right leg/foot: h/o trauma, 2 surgeries, chronic pain, gabapentin 300mg at night, sent to PT, seeing dr. Wells    Plan:           Get labs today- pth, mag, phos and vit D    Consider forteo as best option in lieu of recent fx if labs are within range  Discussed risk versus benefits and potential side effects of forteo. Medication Literature given to patient      If not able to do forteo then will c/w prolia and bring pt back for nurse visit to admin    Repeat her dexa next year if remains on prolia    Cont daily vit D 1000units, dietary calcium   cont tylenol, tumeric,  Avoid nsaids     C/w neurontin 600-900 mg at night  Cont f/u with Dr. Wells

## 2018-08-28 ENCOUNTER — TELEPHONE (OUTPATIENT)
Dept: RHEUMATOLOGY | Facility: CLINIC | Age: 83
End: 2018-08-28

## 2018-08-28 DIAGNOSIS — M80.00XG AGE-RELATED OSTEOPOROSIS WITH CURRENT PATHOLOGICAL FRACTURE WITH DELAYED HEALING: ICD-10-CM

## 2018-08-28 DIAGNOSIS — M81.0 OSTEOPOROSIS, POSTMENOPAUSAL: Primary | Chronic | ICD-10-CM

## 2018-08-28 RX ORDER — TERIPARATIDE 250 UG/ML
20 INJECTION, SOLUTION SUBCUTANEOUS DAILY
Qty: 2.4 ML | Refills: 11 | Status: SHIPPED | OUTPATIENT
Start: 2018-08-28 | End: 2018-12-10 | Stop reason: ALTCHOICE

## 2018-08-28 NOTE — TELEPHONE ENCOUNTER
Called crys mrs null grand daughter and caretaker    Labs look great pth, mag, bree, ca normal only mild ckd    Ok for forteo daily injection to trt OP with recurrent fx most recent right radial and right sacral fx 2 weeks ago    Will build bone and heal fx so very good for her current issues    Please schedule nurse visit wed or Thursday for her to come in for instruction on home admin of forteo and can start injection in office      She will need cmp, mag, phos, pth and vit D in 3 months  rtc in 6 months with karen with same labs

## 2018-08-29 ENCOUNTER — TELEPHONE (OUTPATIENT)
Dept: PHARMACY | Facility: CLINIC | Age: 83
End: 2018-08-29

## 2018-08-29 NOTE — TELEPHONE ENCOUNTER
Spoke with Jaylene, Mrs. Nunn granddaughter informed her per Karen Mora PA-C that labs look great pth, mag, bree, ca normal only mild ckd. Ok for forteo daily injection to trt OP with recurrent fx most recent right radial and right sacral fx 2 weeks ago. Will build bone and heal fx so very good for her current issues. Nurse visit scheduled for Wed for her to come in for instruction on home admin of forteo and can start injection in office.  cmp, mag, phos, pth and vit D in 3 months  rtc in 6 months with karen with same labs has been scheduled. Jaylene voiced understanding

## 2018-08-30 ENCOUNTER — TELEPHONE (OUTPATIENT)
Dept: RHEUMATOLOGY | Facility: CLINIC | Age: 83
End: 2018-08-30

## 2018-08-30 ENCOUNTER — CLINICAL SUPPORT (OUTPATIENT)
Dept: RHEUMATOLOGY | Facility: CLINIC | Age: 83
End: 2018-08-30
Payer: MEDICARE

## 2018-08-30 NOTE — TELEPHONE ENCOUNTER
I do not see any documentation of paget's disease or bone cancer in her chart    There is no heme/onc noted or endocrinology notes and no care everywhere notes    Where did you get that info? So I can review    She def did not tell me of that history   I looked at her pcp notes as well and do not see anything documented

## 2018-08-30 NOTE — PROGRESS NOTES
Instructed pt and caregiver on use of Forteo pt and caregiver verbalized understanding. Told to call office if any questions

## 2018-08-31 NOTE — TELEPHONE ENCOUNTER
DOCUMENTATION ONLY:  Prior Authorization for Forteo approved from 08/30/18 to 08/30/2020    Co-pay: $499.00    Patient Assistance IS required and is being researched.     -ARR

## 2018-09-04 NOTE — PROGRESS NOTES
Subjective:      Patient ID: Clau Nunn is a 83 y.o. female.    Chief Complaint: Follow-up      HPI  Here for follow up of medical problems.  Fell in July, tripped over a threshold- fracture radius and sacrum, s/p surgery on the radius.  Lots sacrum pain- taking gabapentin 600mg and HC and flexeril at night and able to sleep ok.  Anxiety doing ok most of the time, but wants something for breakthrough.  Rheum has changed to forteo.  Having heartburn, not taking PPI lately.  PT at home 3d per week.  Walking around with a cane.  Non weight bearing on right arm fx.  Walks with walker during the day, not sitting all day.  Recliner also.  No cp/sob/palp.  Urine is strong, doesn't drink much fluids.  Some dyuria.  Coughing on and off.  Took bactrim for both recent UTIs.    Updated/ annual due 3/19:  HM: 11/17 fluvax, 5/16 [id number], 5/17 booster [id number], 12/11 TDaP, 11/09 zoster, 12/16 BMD rep 3y, 1/14 Cscope no more needed, 11/16 MMG/ Gyn Dr. Tere bauer outside, 3/18 sched Eye Dr. Rubalcava.     Review of Systems   Constitutional: Negative for chills, diaphoresis and fever.   Respiratory: Negative for cough and shortness of breath.    Cardiovascular: Negative for chest pain, palpitations and leg swelling.   Gastrointestinal: Negative for blood in stool, constipation, diarrhea, nausea and vomiting.   Genitourinary: Negative for dysuria, frequency and hematuria.   Psychiatric/Behavioral: The patient is not nervous/anxious.          Objective:   /72 (BP Location: Right arm, Patient Position: Sitting, BP Method: Small (Manual))   Pulse 85   Temp 96.5 °F (35.8 °C) (Tympanic)   Wt 71.6 kg (157 lb 13.6 oz)   SpO2 98%   BMI 26.27 kg/m²     Physical Exam   Constitutional: She is oriented to person, place, and time. She appears well-developed.   HENT:   Mouth/Throat: Oropharynx is clear and moist.   Neck: Neck supple. Carotid bruit is not present. No thyroid mass present.   Cardiovascular: Normal rate, regular  rhythm and intact distal pulses. Exam reveals no gallop and no friction rub.   No murmur heard.  Pulmonary/Chest: Effort normal and breath sounds normal. She has no wheezes. She has no rales.   Abdominal: Soft. Bowel sounds are normal. She exhibits no mass. There is no hepatosplenomegaly. There is no tenderness.   Musculoskeletal: She exhibits no edema.   Lymphadenopathy:     She has no cervical adenopathy.   Neurological: She is alert and oriented to person, place, and time.   Psychiatric: She has a normal mood and affect.           Assessment:       1. Hiatal hernia with gastroesophageal reflux    2. Essential hypertension    3. Acquired hypothyroidism    4. Age-related osteoporosis with current pathological fracture with delayed healing    5. Anxiety    6. Chronic lumbar radiculopathy    7. Abnormal lung sounds    8. Cough    9. Dysuria    10. Closed fracture of sacrum and coccyx, initial encounter    11. Closed fracture of proximal end of right radius, unspecified fracture morphology, initial encounter          Plan:     Hiatal hernia with gastroesophageal reflux- restart PPI.    Essential hypertension- stable but overcontrolled, stop hctz.  RTC 6wk.    Acquired hypothyroidism- Clinically stable, continue present treatment.    Age-related osteoporosis with current pathological fracture with delayed healing    Anxiety- cont lexapro.  -     busPIRone (BUSPAR) 5 MG Tab; Take 1 tablet (5 mg total) by mouth 2 (two) times daily.  Dispense: 90 tablet; Refill: 1    Chronic lumbar radiculopathy  -     cyclobenzaprine (FLEXERIL) 5 MG tablet; Take 1 tablet (5 mg total) by mouth nightly as needed for Muscle spasms.  Dispense: 90 tablet; Refill: 0    Abnormal lung sounds/ Cough  -     X-Ray Chest PA And Lateral; Future; Expected date: 09/07/2018    Dysuria s/p 2 courses bactrim-  -     Urine culture; Future; Expected date: 09/07/2018  -     Urinalysis; Future; Expected date: 09/07/2018

## 2018-09-07 ENCOUNTER — OFFICE VISIT (OUTPATIENT)
Dept: INTERNAL MEDICINE | Facility: CLINIC | Age: 83
End: 2018-09-07
Payer: MEDICARE

## 2018-09-07 ENCOUNTER — HOSPITAL ENCOUNTER (OUTPATIENT)
Dept: RADIOLOGY | Facility: HOSPITAL | Age: 83
Discharge: HOME | End: 2018-09-07
Attending: INTERNAL MEDICINE
Payer: MEDICARE

## 2018-09-07 VITALS
TEMPERATURE: 97 F | BODY MASS INDEX: 26.27 KG/M2 | WEIGHT: 157.88 LBS | DIASTOLIC BLOOD PRESSURE: 72 MMHG | HEART RATE: 85 BPM | OXYGEN SATURATION: 98 % | SYSTOLIC BLOOD PRESSURE: 108 MMHG

## 2018-09-07 DIAGNOSIS — I10 ESSENTIAL HYPERTENSION: Chronic | ICD-10-CM

## 2018-09-07 DIAGNOSIS — K44.9 HIATAL HERNIA WITH GASTROESOPHAGEAL REFLUX: Primary | ICD-10-CM

## 2018-09-07 DIAGNOSIS — M80.00XG AGE-RELATED OSTEOPOROSIS WITH CURRENT PATHOLOGICAL FRACTURE WITH DELAYED HEALING: ICD-10-CM

## 2018-09-07 DIAGNOSIS — E03.9 ACQUIRED HYPOTHYROIDISM: ICD-10-CM

## 2018-09-07 DIAGNOSIS — R05.9 COUGH: ICD-10-CM

## 2018-09-07 DIAGNOSIS — S32.10XA CLOSED FRACTURE OF SACRUM AND COCCYX, INITIAL ENCOUNTER: ICD-10-CM

## 2018-09-07 DIAGNOSIS — K21.9 HIATAL HERNIA WITH GASTROESOPHAGEAL REFLUX: Primary | ICD-10-CM

## 2018-09-07 DIAGNOSIS — R09.89 ABNORMAL LUNG SOUNDS: ICD-10-CM

## 2018-09-07 DIAGNOSIS — R30.0 DYSURIA: ICD-10-CM

## 2018-09-07 DIAGNOSIS — S52.101A CLOSED FRACTURE OF PROXIMAL END OF RIGHT RADIUS, UNSPECIFIED FRACTURE MORPHOLOGY, INITIAL ENCOUNTER: ICD-10-CM

## 2018-09-07 DIAGNOSIS — M54.16 CHRONIC LUMBAR RADICULOPATHY: ICD-10-CM

## 2018-09-07 DIAGNOSIS — S32.2XXA CLOSED FRACTURE OF SACRUM AND COCCYX, INITIAL ENCOUNTER: ICD-10-CM

## 2018-09-07 DIAGNOSIS — F41.9 ANXIETY: ICD-10-CM

## 2018-09-07 PROCEDURE — 71046 X-RAY EXAM CHEST 2 VIEWS: CPT | Mod: 26,,, | Performed by: RADIOLOGY

## 2018-09-07 PROCEDURE — 3074F SYST BP LT 130 MM HG: CPT | Mod: CPTII,,, | Performed by: INTERNAL MEDICINE

## 2018-09-07 PROCEDURE — 3078F DIAST BP <80 MM HG: CPT | Mod: CPTII,,, | Performed by: INTERNAL MEDICINE

## 2018-09-07 PROCEDURE — 99999 PR PBB SHADOW E&M-EST. PATIENT-LVL III: CPT | Mod: PBBFAC,,, | Performed by: INTERNAL MEDICINE

## 2018-09-07 PROCEDURE — 71046 X-RAY EXAM CHEST 2 VIEWS: CPT | Mod: TC,FY,PO

## 2018-09-07 PROCEDURE — 99213 OFFICE O/P EST LOW 20 MIN: CPT | Mod: PBBFAC,25,PO | Performed by: INTERNAL MEDICINE

## 2018-09-07 PROCEDURE — 1100F PTFALLS ASSESS-DOCD GE2>/YR: CPT | Mod: CPTII,,, | Performed by: INTERNAL MEDICINE

## 2018-09-07 PROCEDURE — 3288F FALL RISK ASSESSMENT DOCD: CPT | Mod: CPTII,,, | Performed by: INTERNAL MEDICINE

## 2018-09-07 PROCEDURE — 99214 OFFICE O/P EST MOD 30 MIN: CPT | Mod: S$PBB,,, | Performed by: INTERNAL MEDICINE

## 2018-09-07 RX ORDER — CYCLOBENZAPRINE HCL 5 MG
5 TABLET ORAL NIGHTLY PRN
Qty: 90 TABLET | Refills: 0 | Status: SHIPPED | OUTPATIENT
Start: 2018-09-07 | End: 2018-12-10 | Stop reason: ALTCHOICE

## 2018-09-07 RX ORDER — CYCLOBENZAPRINE HCL 5 MG
5 TABLET ORAL NIGHTLY
COMMUNITY
End: 2018-09-07 | Stop reason: SDUPTHER

## 2018-09-07 RX ORDER — OMEPRAZOLE 40 MG/1
40 CAPSULE, DELAYED RELEASE ORAL EVERY MORNING
Qty: 90 CAPSULE | Refills: 3 | Status: SHIPPED | OUTPATIENT
Start: 2018-09-07 | End: 2019-12-23 | Stop reason: SDUPTHER

## 2018-09-07 RX ORDER — BUSPIRONE HYDROCHLORIDE 5 MG/1
5 TABLET ORAL 2 TIMES DAILY
Qty: 90 TABLET | Refills: 1 | Status: SHIPPED | OUTPATIENT
Start: 2018-09-07 | End: 2019-11-15 | Stop reason: SDUPTHER

## 2018-09-07 RX ORDER — LOSARTAN POTASSIUM 50 MG/1
50 TABLET ORAL DAILY
Qty: 90 TABLET | Refills: 3 | Status: SHIPPED | OUTPATIENT
Start: 2018-09-07 | End: 2019-11-15 | Stop reason: SDUPTHER

## 2018-09-11 ENCOUNTER — PATIENT MESSAGE (OUTPATIENT)
Dept: INTERNAL MEDICINE | Facility: CLINIC | Age: 83
End: 2018-09-11

## 2018-09-11 RX ORDER — AMOXICILLIN 875 MG/1
875 TABLET, FILM COATED ORAL 2 TIMES DAILY
Qty: 14 TABLET | Refills: 0 | Status: SHIPPED | OUTPATIENT
Start: 2018-09-11 | End: 2018-09-18

## 2018-09-17 ENCOUNTER — PATIENT MESSAGE (OUTPATIENT)
Dept: RHEUMATOLOGY | Facility: CLINIC | Age: 83
End: 2018-09-17

## 2018-09-18 ENCOUNTER — PATIENT MESSAGE (OUTPATIENT)
Dept: INTERNAL MEDICINE | Facility: CLINIC | Age: 83
End: 2018-09-18

## 2018-09-18 ENCOUNTER — DOCUMENTATION ONLY (OUTPATIENT)
Dept: INTERNAL MEDICINE | Facility: CLINIC | Age: 83
End: 2018-09-18

## 2018-09-20 ENCOUNTER — OFFICE VISIT (OUTPATIENT)
Dept: PAIN MEDICINE | Facility: CLINIC | Age: 83
End: 2018-09-20
Payer: MEDICARE

## 2018-09-20 VITALS
BODY MASS INDEX: 26.16 KG/M2 | SYSTOLIC BLOOD PRESSURE: 141 MMHG | WEIGHT: 157 LBS | RESPIRATION RATE: 18 BRPM | HEART RATE: 62 BPM | DIASTOLIC BLOOD PRESSURE: 73 MMHG | HEIGHT: 65 IN

## 2018-09-20 DIAGNOSIS — M25.552 PAIN OF LEFT HIP JOINT: ICD-10-CM

## 2018-09-20 DIAGNOSIS — M70.62 GREATER TROCHANTERIC BURSITIS OF BOTH HIPS: ICD-10-CM

## 2018-09-20 DIAGNOSIS — M47.816 LUMBAR SPONDYLOSIS: ICD-10-CM

## 2018-09-20 DIAGNOSIS — M53.3 SACROILIAC JOINT PAIN: Primary | ICD-10-CM

## 2018-09-20 DIAGNOSIS — M70.61 GREATER TROCHANTERIC BURSITIS OF BOTH HIPS: ICD-10-CM

## 2018-09-20 DIAGNOSIS — M47.816 LUMBAR FACET ARTHROPATHY: ICD-10-CM

## 2018-09-20 DIAGNOSIS — M47.817 LUMBOSACRAL SPONDYLOSIS WITHOUT MYELOPATHY: ICD-10-CM

## 2018-09-20 PROCEDURE — 99999 PR PBB SHADOW E&M-EST. PATIENT-LVL IV: CPT | Mod: PBBFAC,,, | Performed by: PHYSICIAN ASSISTANT

## 2018-09-20 PROCEDURE — 3078F DIAST BP <80 MM HG: CPT | Mod: CPTII,,, | Performed by: PHYSICIAN ASSISTANT

## 2018-09-20 PROCEDURE — 99214 OFFICE O/P EST MOD 30 MIN: CPT | Mod: S$PBB,,, | Performed by: PHYSICIAN ASSISTANT

## 2018-09-20 PROCEDURE — 99214 OFFICE O/P EST MOD 30 MIN: CPT | Mod: PBBFAC | Performed by: PHYSICIAN ASSISTANT

## 2018-09-20 PROCEDURE — 3288F FALL RISK ASSESSMENT DOCD: CPT | Mod: CPTII,,, | Performed by: PHYSICIAN ASSISTANT

## 2018-09-20 PROCEDURE — 3077F SYST BP >= 140 MM HG: CPT | Mod: CPTII,,, | Performed by: PHYSICIAN ASSISTANT

## 2018-09-20 PROCEDURE — 1100F PTFALLS ASSESS-DOCD GE2>/YR: CPT | Mod: CPTII,,, | Performed by: PHYSICIAN ASSISTANT

## 2018-09-20 NOTE — PROGRESS NOTES
Chief Pain Complaint:  Low-back Pain      Interval History: Patient was seen in June 2018. Since then, she had a fall causing right distal radius fracture requiring ORIF, along with right sacrum fracture.  Her pain has been severe, but it is improving.  She just completed PT for right wrist, but she has not gained full ROM.       History of Present Illness:   Clau Nunn is a 83 y.o. female  who is presenting with a chief complaint of low back pain. The patient began experiencing this problem insidiously, and the pain has been gradually worsening over the past 6 month(s). The pain is described as throbbing, cramping, aching and heavy and is located in the bilateral buttocks. Pain is intermittent and lasts hours. The pain radiates to lateral thigh and groin. The patient rates her pain a 5 out of ten and interferes with activities of daily living a 5 out of ten. Pain is exacerbated by getting up from a seated position, and is improved by rest. Patient reports no prior trauma, no prior spinal surgery.     - pertinent negatives: No fever, No chills, No weight loss, No bladder dysfunction, No bowel dysfunction, No extremity weakness, No saddle anesthesia  - pertinent positives: none    - medications, other therapies tried (physical therapy, injections):     >> Tylenol    >> Has previously undergone Physical Therapy (aquatic and land) with limited relief    >> Has previously undergone spinal injection/s:   - bilateral SIJ injection on 11-9-17 with ~30% relief for 2 weeks   - left hip injection on 12-28-17 with some relief   - bilateral L3-5 MBB with local on 5/11/18 with reported 100% pain relief        Imaging / Labs / Studies (reviewed on 9/20/2018):    Results for orders placed during the hospital encounter of 01/13/14   MRI Lumbar Spine W WO Contrast    Narrative Findings: Pre- and postcontrast multiplanar multisequence imaging of the thoracic and lumbar spine was performed using 7 cc of Gadavist intravenous  contrast material.  There is moderately severe chronic central compression deformity of the T12 vertebral body which is stabilized by paraspinous rods and pedicle screws which extend from T10 through L2.  Postsurgical changes of laminectomy also noted at T12.  The posterior cortex of the T12 vertebral body is displaced posteriorly and indents the ventral thecal sac.  There is no anterior cord contact or significant central canal stenosis.  Degenerative disk desiccation is noted at multiple levels with moderate disk   narrowing at L5-S1.    Also L5-S1 is a broad-based disk protrusion which combined with bilateral facet hypertrophy results in moderately severe narrowing of both neural foramina.  No significant central canal stenosis noted.    From L2-3 through L4-5 there   is mild disk bulging which results in mild bilateral foraminal narrowing.  This is slightly more pronounced at L4-5 with bilateral facet hypertrophy a contributing factor.  No significant central canal stenosis noted.  No thoracic disk extrusion, and foraminal narrowing, canal stenosis is evident.  Thoracolumbar cord is intact.  Paravertebral soft tissues are symmetric and normal in appearance.         Review of Systems:  CONSTITUTIONAL: patient denies any fever, chills, or weight loss  SKIN: patient denies any rash or itching  RESPIRATORY: patient denies having any shortness of breath  GASTROINTESTINAL: patient denies having any diarrhea, constipation, or bowel incontinence  GENITOURINARY: patient denies having any abnormal bladder function    MUSCULOSKELETAL:  - patient complains of the above noted pain/s (see chief pain complaint)    NEUROLOGICAL:   - pain as above  - strength in Lower extremities is intact, BILATERALLY  - sensation in Lower extremities is intact, BILATERALLY  - patient denies any loss of bowel or bladder control      PSYCHIATRIC: patient denies any change in mood    Other:  All other systems reviewed and are  "negative        Physical Exam:  Vitals:  BP (!) 141/73 (BP Location: Right arm, Patient Position: Sitting, BP Method: Medium (Automatic))   Pulse 62   Resp 18   Ht 5' 5" (1.651 m)   Wt 71.2 kg (157 lb)   BMI 26.13 kg/m²   (reviewed on 9/20/2018)    General: alert and oriented, in no apparent distress.  Gait: normal gait.  Skin: no rashes, no discoloration, no obvious lesions  HEENT: normocephalic, atraumatic. Pupils equal and round.  Cardiovascular: no significant peripheral edema present.  Respiratory: without use of accessory muscles of respiration.    Musculoskeletal - Lumbar Spine:  - Pain on flexion of lumbar spine: Absent   - Pain on extension of lumbar spine: Present  - Lumbar facet loading: Positive bilaterally  - TTP over the lumbar facet joints: Absent  - TTP over the SI joints:  Present on right  - TTP over GT bursa:  Present on right  - Straight Leg Raise: Negative  - PAMELA: Positive bilaterally   - Pain on Internal and external rotation of the hip: Mild    Neuro - Lower Extremities:  - BLE Strength: R/L: HF: 5/5, HE: 5/5, KF: 5/5; KE: 5/5; FE: 5/5; FF: 5/5  - Extremity Reflexes: Brisk and symmetric throughout  - Sensory: Sensation to light touch intact bilaterally      Psych:  Mood and affect is appropriate              Assessment:  Clau Nunn is a 83 y.o. year old female who is presenting with   Encounter Diagnoses   Name Primary?    Sacroiliac joint pain Yes    Lumbar spondylosis     Pain of left hip joint     Lumbar facet arthropathy     Lumbosacral spondylosis without myelopathy        Plan:  1. Interventional: Schedule right SIJ + right GT bursa injection with local.     2. Pharmacologic: Continue gabapentin 600mg QHS and Tizanidine 4 mg PO BID.     3. Rehabilitative:   - We discussed PT previously, but she is not interested. She reports it made her pain worse in the past. Encourage regular exercise.   - Can use back brace but try to limit use to avoid muscle atrophy.     4. " Diagnostic: None at this time. Consider updating MRI in the future.    5. Follow up: 3-4 weeks post injection.    - I discussed the risks, benefits, and alternatives to potential treatment options. All questions and concerns were fully addressed today in clinic. Dr. Wells was consulted regarding the patient plan and agrees.

## 2018-09-20 NOTE — TELEPHONE ENCOUNTER
FOR DOCUMENTATION ONLY:  Financial Assistance for Forteo approved from 8/21/2018 to 8/20/2019    Source: [employer]    ID: [id number]  BIN: [id number]  PCN: [id number]  GRP: [id number]

## 2018-09-21 ENCOUNTER — TELEPHONE (OUTPATIENT)
Dept: PHARMACY | Facility: CLINIC | Age: 83
End: 2018-09-21

## 2018-09-21 NOTE — TELEPHONE ENCOUNTER
Initial Forteo consult completed on . Forteo will be shipped on  to arrive at patient's home on  via [employer]. $ 0 copay. Address confirmed.  Confirmed 2 patient identifiers - name and . Therapy Appropriate.    Patient did not want to be counseled on drug. She stated her MD told her all about it and she knows how to do the injections and about the injections.    Will make sure on follow-up that everything is going well with patient.      Reiterated the importance of the importance of Ca + Vit D supplements and weight bearing exercises.  Patient understands to report any medication changes to [facility] and provider. All questions answered and addressed to patients satisfaction. I will f/u with her in 1 week from start, [facility] to contact patient in 3 weeks for refills.

## 2018-10-05 DIAGNOSIS — J41.0 SIMPLE CHRONIC BRONCHITIS: ICD-10-CM

## 2018-10-05 DIAGNOSIS — E03.4 HYPOTHYROIDISM DUE TO ACQUIRED ATROPHY OF THYROID: Chronic | ICD-10-CM

## 2018-10-05 DIAGNOSIS — F41.9 ANXIETY: ICD-10-CM

## 2018-10-05 RX ORDER — ESCITALOPRAM OXALATE 20 MG/1
20 TABLET ORAL DAILY
Qty: 90 TABLET | Refills: 3 | Status: SHIPPED | OUTPATIENT
Start: 2018-10-05 | End: 2019-11-15 | Stop reason: SDUPTHER

## 2018-10-05 RX ORDER — LEVOTHYROXINE SODIUM 88 UG/1
88 TABLET ORAL DAILY
Qty: 90 TABLET | Refills: 3 | Status: SHIPPED | OUTPATIENT
Start: 2018-10-05 | End: 2019-03-13 | Stop reason: DRUGHIGH

## 2018-10-05 RX ORDER — ALBUTEROL SULFATE 90 UG/1
2 AEROSOL, METERED RESPIRATORY (INHALATION) 4 TIMES DAILY PRN
Qty: 3 EACH | Refills: 3 | Status: SHIPPED | OUTPATIENT
Start: 2018-10-05 | End: 2020-11-23 | Stop reason: SDUPTHER

## 2018-10-15 NOTE — TELEPHONE ENCOUNTER
FOR DOCUMENTATION ONLY:    Financial Assistance for Forteo approved from 4/18/2018 to 10/15/2019    Source: [facility] [employer]    ID: [id number]  BIN: [id number]  PCN: [id number]  GRP: [id number]    Amount: $1,000

## 2018-10-16 ENCOUNTER — PATIENT MESSAGE (OUTPATIENT)
Dept: INTERNAL MEDICINE | Facility: CLINIC | Age: 83
End: 2018-10-16

## 2018-10-16 NOTE — TELEPHONE ENCOUNTER
Charlie is holding pt's Lexapro rx d/t age recommendations.  It is recommended max 10mg daily.  Do you still want to prescribe 20mg?/rpr

## 2018-10-17 DIAGNOSIS — M54.16 CHRONIC LUMBAR RADICULOPATHY: ICD-10-CM

## 2018-10-17 RX ORDER — GABAPENTIN 300 MG/1
300 CAPSULE ORAL 3 TIMES DAILY
Qty: 270 CAPSULE | Refills: 4 | Status: SHIPPED | OUTPATIENT
Start: 2018-10-17 | End: 2019-11-18 | Stop reason: SDUPTHER

## 2018-10-17 NOTE — TELEPHONE ENCOUNTER
S/w pharmacist at [facility] ([place]).  Verified pt is to have Lexapro 20mg daily./rpr

## 2018-10-24 ENCOUNTER — HOSPITAL ENCOUNTER (OUTPATIENT)
Facility: HOSPITAL | Age: 83
Discharge: HOME | End: 2018-10-24
Attending: ANESTHESIOLOGY | Admitting: ANESTHESIOLOGY
Payer: MEDICARE

## 2018-10-24 VITALS
RESPIRATION RATE: 16 BRPM | OXYGEN SATURATION: 97 % | DIASTOLIC BLOOD PRESSURE: 91 MMHG | HEART RATE: 54 BPM | TEMPERATURE: 98 F | SYSTOLIC BLOOD PRESSURE: 188 MMHG

## 2018-10-24 DIAGNOSIS — M53.3 SACROILIAC JOINT PAIN: ICD-10-CM

## 2018-10-24 DIAGNOSIS — M70.62 GREATER TROCHANTERIC BURSITIS OF BOTH HIPS: ICD-10-CM

## 2018-10-24 DIAGNOSIS — M70.61 GREATER TROCHANTERIC BURSITIS OF BOTH HIPS: ICD-10-CM

## 2018-10-24 PROCEDURE — 27096 INJECT SACROILIAC JOINT: CPT | Mod: LT,,, | Performed by: ANESTHESIOLOGY

## 2018-10-24 PROCEDURE — 63600175 PHARM REV CODE 636 W HCPCS: Performed by: ANESTHESIOLOGY

## 2018-10-24 PROCEDURE — 25000003 PHARM REV CODE 250

## 2018-10-24 PROCEDURE — 63600175 PHARM REV CODE 636 W HCPCS

## 2018-10-24 PROCEDURE — 25000003 PHARM REV CODE 250: Performed by: ANESTHESIOLOGY

## 2018-10-24 PROCEDURE — 20610 DRAIN/INJ JOINT/BURSA W/O US: CPT | Mod: 59,LT,, | Performed by: ANESTHESIOLOGY

## 2018-10-24 RX ORDER — METHYLPREDNISOLONE ACETATE 40 MG/ML
INJECTION, SUSPENSION INTRA-ARTICULAR; INTRALESIONAL; INTRAMUSCULAR; SOFT TISSUE
Status: DISCONTINUED | OUTPATIENT
Start: 2018-10-24 | End: 2018-10-24 | Stop reason: HOSPADM

## 2018-10-24 RX ORDER — LIDOCAINE HYDROCHLORIDE 20 MG/ML
INJECTION, SOLUTION EPIDURAL; INFILTRATION; INTRACAUDAL; PERINEURAL
Status: DISCONTINUED | OUTPATIENT
Start: 2018-10-24 | End: 2018-10-24 | Stop reason: HOSPADM

## 2018-10-24 NOTE — PLAN OF CARE
Problem: Patient Care Overview  Goal: Plan of Care Review  Outcome: Outcome(s) achieved Date Met: 10/24/18  Patient discharged home in stable condition via wheelchair with ride. Verbalized understanding of discharge instructions. Patient voiced no complaints at this time. Patient stood at side of bed, walked steps with no new motor or sensory deficits. Neurologically intact.

## 2018-10-24 NOTE — DISCHARGE SUMMARY
[facility]  Discharge Note       Description of Procedure: Left Sacroiliac Joint and Greater Trochanteric Bursa Injection under Fluoroscopic Guidance    Procedure Date: 10/24/2018    Admit Date: 10/24/2018  Discharge Date: 10/24/2018     Attending Physician: Priscilla Nugent   Discharge Provider: Priscilla Nugent    Preoperative Diagnosis: Sacroiliitis and Greater Trochanteric Bursitis     Postoperative Diagnosis: as above, same as preoperative diagnosis    Discharged Condition: Stable    Hospital Course: Patient was admitted for an outpatient procedure. The procedure was tolerated well with no complications.    Final Diagnoses: Same as principal problem.    Disposition: Home, self-care.    Follow up/Patient Instructions:  Follow-up in clinic in 2-3 weeks.    Medications: No medications were prescribed today. The patient was advised to resume normal medication regimen without change.  Specific information was provided regarding restarting any anticoagulant/s.    Discharge Procedure Orders (must include Diet, Follow-up, Activity):  Light activity for the remainder of the day, resume normal activity tomorrow. Resume normal diet. Follow-up in clinic in 2-3 weeks.

## 2018-10-24 NOTE — H&P
Chief Pain Complaint:  Low-back Pain        Interval History: Patient was seen in June 2018. Since then, she had a fall causing right distal radius fracture requiring ORIF, along with right sacrum fracture.  Her pain has been severe, but it is improving.  She just completed PT for right wrist, but she has not gained full ROM.         History of Present Illness:   Clau Nunn is a 83 y.o. female  who is presenting with a chief complaint of low back pain. The patient began experiencing this problem insidiously, and the pain has been gradually worsening over the past 6 month(s). The pain is described as throbbing, cramping, aching and heavy and is located in the bilateral buttocks. Pain is intermittent and lasts hours. The pain radiates to lateral thigh and groin. The patient rates her pain a 5 out of ten and interferes with activities of daily living a 5 out of ten. Pain is exacerbated by getting up from a seated position, and is improved by rest. Patient reports no prior trauma, no prior spinal surgery.      - pertinent negatives: No fever, No chills, No weight loss, No bladder dysfunction, No bowel dysfunction, No extremity weakness, No saddle anesthesia  - pertinent positives: none    - medications, other therapies tried (physical therapy, injections):     >> Tylenol    >> Has previously undergone Physical Therapy (aquatic and land) with limited relief    >> Has previously undergone spinal injection/s:              - bilateral SIJ injection on 11-9-17 with ~30% relief for 2 weeks              - left hip injection on 12-28-17 with some relief              - bilateral L3-5 MBB with local on 5/11/18 with reported 100% pain relief           Imaging / Labs / Studies (reviewed on 9/20/2018):          Results for orders placed during the hospital encounter of 01/13/14   MRI Lumbar Spine W WO Contrast     Narrative Findings: Pre- and postcontrast multiplanar multisequence imaging of the thoracic and lumbar spine was  performed using 7 cc of Gadavist intravenous contrast material.  There is moderately severe chronic central compression deformity of the T12 vertebral body which is stabilized by paraspinous rods and pedicle screws which extend from T10 through L2.  Postsurgical changes of laminectomy also noted at T12.  The posterior cortex of the T12 vertebral body is displaced posteriorly and indents the ventral thecal sac.  There is no anterior cord contact or significant central canal stenosis.  Degenerative disk desiccation is noted at multiple levels with moderate disk   narrowing at L5-S1.    Also L5-S1 is a broad-based disk protrusion which combined with bilateral facet hypertrophy results in moderately severe narrowing of both neural foramina.  No significant central canal stenosis noted.    From L2-3 through L4-5 there   is mild disk bulging which results in mild bilateral foraminal narrowing.  This is slightly more pronounced at L4-5 with bilateral facet hypertrophy a contributing factor.  No significant central canal stenosis noted.  No thoracic disk extrusion, and foraminal narrowing, canal stenosis is evident.  Thoracolumbar cord is intact.  Paravertebral soft tissues are symmetric and normal in appearance.            Review of Systems:  CONSTITUTIONAL: patient denies any fever, chills, or weight loss  SKIN: patient denies any rash or itching  RESPIRATORY: patient denies having any shortness of breath  GASTROINTESTINAL: patient denies having any diarrhea, constipation, or bowel incontinence  GENITOURINARY: patient denies having any abnormal bladder function     MUSCULOSKELETAL:  - patient complains of the above noted pain/s (see chief pain complaint)     NEUROLOGICAL:   - pain as above  - strength in Lower extremities is intact, BILATERALLY  - sensation in Lower extremities is intact, BILATERALLY  - patient denies any loss of bowel or bladder control      PSYCHIATRIC: patient denies any change in mood     Other:  All  "other systems reviewed and are negative           Physical Exam:  Vitals:  BP (!) 141/73 (BP Location: Right arm, Patient Position: Sitting, BP Method: Medium (Automatic))   Pulse 62   Resp 18   Ht 5' 5" (1.651 m)   Wt 71.2 kg (157 lb)   BMI 26.13 kg/m²   (reviewed on 9/20/2018)     General: alert and oriented, in no apparent distress.  Gait: normal gait.  Skin: no rashes, no discoloration, no obvious lesions  HEENT: normocephalic, atraumatic. Pupils equal and round.  Cardiovascular: no significant peripheral edema present.  Respiratory: without use of accessory muscles of respiration.     Musculoskeletal - Lumbar Spine:  - Pain on flexion of lumbar spine: Absent   - Pain on extension of lumbar spine: Present  - Lumbar facet loading: Positive bilaterally  - TTP over the lumbar facet joints: Absent  - TTP over the SI joints:  Present on right  - TTP over GT bursa:  Present on right  - Straight Leg Raise: Negative  - PAMELA: Positive bilaterally   - Pain on Internal and external rotation of the hip: Mild     Neuro - Lower Extremities:  - BLE Strength: R/L: HF: 5/5, HE: 5/5, KF: 5/5; KE: 5/5; FE: 5/5; FF: 5/5  - Extremity Reflexes: Brisk and symmetric throughout  - Sensory: Sensation to light touch intact bilaterally      Psych:  Mood and affect is appropriate                  Assessment:  Clau Nunn is a 83 y.o. year old female who is presenting with        Encounter Diagnoses   Name Primary?    Sacroiliac joint pain Yes    Lumbar spondylosis      Pain of left hip joint      Lumbar facet arthropathy      Lumbosacral spondylosis without myelopathy           Plan: Proceed with Left SIJ + Left GT bursa injection with local        "

## 2018-10-24 NOTE — OP NOTE
PROCEDURE: Sacroiliac joint and Greater trochanteric bursa injection under fluoroscopic guidance       SIDE: LEFT Sacroiliac injection and LEFT greater trochanteric bursa injection      PROCEDURE DATE: 10/24/2018    PREOPERATIVE DIAGNOSIS: Sacroiliitis, greater trochanteric bursitis  POSTOPERATIVE DIAGNOSIS: Sacroiliitis, greater trochanteric bursitis    PROVIDER: Raffi Wells MD  Assistant(s): none    Anesthesia: Local, No Sedation     >> 0 mg of VERSED    >> 0 mcg of FENTANYL     INDICATION: The patient has a history of pain due to sacroiliitis unresponsive to conservative treatments. Fluoroscopy was used to optimize visualization of needle placement and to maximize safety.       PROCEDURE DESCRIPTION: The patient was seen and identified in the preoperative area. Risks, benefits, complications, and alternatives were discussed with the patient. The patient agreed to proceed with the procedure and signed the consent. The site and side of the procedure was identified and marked.     The patient was taken to the procedural suite. The patient was positioned in prone orientation on procedure table. A time out was performed prior to any intervention. The procedure, site, side, and allergies were stated and agreed to by all present. The lumbosacral area extending to the skin overlying the femoral greater trochanter was widely prepped with ChloraPrep. The procedural site was draped in usual sterile fashion. Vital signs were closely monitored throughout this procedure.     The fluoroscopic camera was placed in contralateral oblique view and was adjusted until the anterior and posterior joint margins of the targeted sacroiliac joint aligned in linear array. The lower pole of the joint was identified, marked, and localized with 1% Lidocaine. A 25 gauge 3.5 inch spinal needle was introduced and advanced to the joint under fluoroscopic guidance. The joint space was entered and after negative aspiration, 2 mL of injectate was  posited into the joint space. The needle was then withdrawn outside of the joint space and after negative aspiration 1 mL of solution was injected outside of the joint space. The injectant solution used was comprised of 3 mL of 1% PF Lidocaine and 2 mL of Methylprednisolone (40 mg/mL). This techniques was performed for the above noted joint/s.    Following Sacroiliac Joint injection, the stylet was replaced and the needle was withdrawn intact. The LEFT greater trochanter was then identified with fluoroscopy. The targeted site of entry was localized with 1% PF Lidocaine. The above noted spinal needle was advanced to the lateral border of the greater trochanter until the osseus interface was met.  After negative aspiration, 2 mL of the above noted solution was injected. No pain or paresthesia was noted on injection. Following injection, the stylet was replaced and the needle was withdrawn intact. This technique was used for the above noted greater trochanteric bursa / bursae. The skin was cleaned and bandages were applied.    Description of Findings: Not applicable    Prosthetic devices, grafts, tissues, or devices implanted: None    Specimen Removed: No    Estimated Blood Loss: minimal    COMPLICATIONS: None    DISPOSITION / PLANS: The patient was transferred to the recovery area in a stable condition for observation. The patient was reexamined prior to discharge. There was no evidence of acute neurologic injury following the procedure.  Patient was discharged from the recovery room after meeting discharge criteria. Home discharge instructions were given to the patient by the staff.

## 2018-11-13 ENCOUNTER — TELEPHONE (OUTPATIENT)
Dept: PHARMACY | Facility: CLINIC | Age: 83
End: 2018-11-13

## 2018-11-20 ENCOUNTER — LAB VISIT (OUTPATIENT)
Dept: LAB | Facility: HOSPITAL | Age: 83
End: 2018-11-20
Attending: PHYSICIAN ASSISTANT
Payer: MEDICARE

## 2018-11-20 ENCOUNTER — OFFICE VISIT (OUTPATIENT)
Dept: PAIN MEDICINE | Facility: CLINIC | Age: 83
End: 2018-11-20
Payer: MEDICARE

## 2018-11-20 VITALS
BODY MASS INDEX: 26.16 KG/M2 | HEART RATE: 73 BPM | SYSTOLIC BLOOD PRESSURE: 132 MMHG | HEIGHT: 65 IN | RESPIRATION RATE: 18 BRPM | WEIGHT: 157 LBS | DIASTOLIC BLOOD PRESSURE: 77 MMHG

## 2018-11-20 DIAGNOSIS — M47.817 LUMBOSACRAL SPONDYLOSIS WITHOUT MYELOPATHY: ICD-10-CM

## 2018-11-20 DIAGNOSIS — M53.3 SACROILIAC JOINT PAIN: ICD-10-CM

## 2018-11-20 DIAGNOSIS — E55.9 VITAMIN D DEFICIENCY DISEASE: ICD-10-CM

## 2018-11-20 DIAGNOSIS — M47.816 LUMBAR SPONDYLOSIS: Primary | ICD-10-CM

## 2018-11-20 DIAGNOSIS — M81.0 OSTEOPOROSIS, POSTMENOPAUSAL: Chronic | ICD-10-CM

## 2018-11-20 DIAGNOSIS — N18.30 STAGE 3 CHRONIC KIDNEY DISEASE: ICD-10-CM

## 2018-11-20 DIAGNOSIS — M47.816 LUMBAR FACET ARTHROPATHY: ICD-10-CM

## 2018-11-20 DIAGNOSIS — M25.552 PAIN OF LEFT HIP JOINT: ICD-10-CM

## 2018-11-20 LAB
25(OH)D3+25(OH)D2 SERPL-MCNC: 20 NG/ML
ALBUMIN SERPL BCP-MCNC: 3.8 G/DL
ALP SERPL-CCNC: 62 U/L
ALT SERPL W/O P-5'-P-CCNC: 15 U/L
ANION GAP SERPL CALC-SCNC: 8 MMOL/L
AST SERPL-CCNC: 24 U/L
BILIRUB SERPL-MCNC: 0.7 MG/DL
BUN SERPL-MCNC: 20 MG/DL
CALCIUM SERPL-MCNC: 10.1 MG/DL
CHLORIDE SERPL-SCNC: 103 MMOL/L
CO2 SERPL-SCNC: 27 MMOL/L
CREAT SERPL-MCNC: 1.3 MG/DL
EST. GFR  (AFRICAN AMERICAN): 44 ML/MIN/1.73 M^2
EST. GFR  (NON AFRICAN AMERICAN): 38 ML/MIN/1.73 M^2
GLUCOSE SERPL-MCNC: 93 MG/DL
MAGNESIUM SERPL-MCNC: 2.1 MG/DL
PHOSPHATE SERPL-MCNC: 3.3 MG/DL
POTASSIUM SERPL-SCNC: 4.5 MMOL/L
PROT SERPL-MCNC: 7.2 G/DL
PTH-INTACT SERPL-MCNC: 34.5 PG/ML
SODIUM SERPL-SCNC: 138 MMOL/L

## 2018-11-20 PROCEDURE — 84100 ASSAY OF PHOSPHORUS: CPT | Mod: HCNC

## 2018-11-20 PROCEDURE — 36415 COLL VENOUS BLD VENIPUNCTURE: CPT | Mod: HCNC

## 2018-11-20 PROCEDURE — 99999 PR PBB SHADOW E&M-EST. PATIENT-LVL IV: CPT | Mod: PBBFAC,HCNC,, | Performed by: PHYSICIAN ASSISTANT

## 2018-11-20 PROCEDURE — 1100F PTFALLS ASSESS-DOCD GE2>/YR: CPT | Mod: CPTII,HCNC,S$GLB, | Performed by: PHYSICIAN ASSISTANT

## 2018-11-20 PROCEDURE — 83970 ASSAY OF PARATHORMONE: CPT | Mod: HCNC

## 2018-11-20 PROCEDURE — 99214 OFFICE O/P EST MOD 30 MIN: CPT | Mod: HCNC,S$GLB,, | Performed by: PHYSICIAN ASSISTANT

## 2018-11-20 PROCEDURE — 83735 ASSAY OF MAGNESIUM: CPT | Mod: HCNC

## 2018-11-20 PROCEDURE — 3078F DIAST BP <80 MM HG: CPT | Mod: CPTII,HCNC,S$GLB, | Performed by: PHYSICIAN ASSISTANT

## 2018-11-20 PROCEDURE — 82306 VITAMIN D 25 HYDROXY: CPT | Mod: HCNC

## 2018-11-20 PROCEDURE — 3288F FALL RISK ASSESSMENT DOCD: CPT | Mod: CPTII,HCNC,S$GLB, | Performed by: PHYSICIAN ASSISTANT

## 2018-11-20 PROCEDURE — 80053 COMPREHEN METABOLIC PANEL: CPT | Mod: HCNC

## 2018-11-20 PROCEDURE — 3075F SYST BP GE 130 - 139MM HG: CPT | Mod: CPTII,HCNC,S$GLB, | Performed by: PHYSICIAN ASSISTANT

## 2018-11-20 NOTE — PROGRESS NOTES
Chief Pain Complaint:  Low-back Pain and Hip Pain      Interval History: Patient was seen on 10/24/18. At that time she underwent left SIJ + left GT bursa injection with local.  The patient reports that she is/was better following the procedure.  she reports 100% pain relief.  The changes lasted 4 weeks so far.  The changes have continued through this visit.      History of Present Illness:   Clau Nunn is a 83 y.o. female  who is presenting with a chief complaint of low back pain and hip pain. The patient began experiencing this problem insidiously, and the pain has been gradually worsening over the past 6 month(s). The pain is described as throbbing, cramping, aching and heavy and is located in the bilateral buttocks. Pain is intermittent and lasts hours. The pain radiates to lateral thigh and groin. The patient rates her pain a 5 out of ten and interferes with activities of daily living a 5 out of ten. Pain is exacerbated by getting up from a seated position, and is improved by rest. Patient reports prior trauma (fall in June 2018 causing right distal radius + right sacrum fracture), no prior spinal surgery, right hip replacement, right distal radius fracture requiring ORIF in June 2018.    - pertinent negatives: No fever, No chills, No weight loss, No bladder dysfunction, No bowel dysfunction, No extremity weakness, No saddle anesthesia  - pertinent positives: none    - medications, other therapies tried (physical therapy, injections):     >> Tylenol    >> Has previously undergone Physical Therapy (aquatic and land) with limited relief    >> Has previously undergone spinal injection/s:   - bilateral SIJ injection on 11-9-17 with ~30% relief for 2 weeks   - left hip injection on 12-28-17 with some relief   - bilateral L3-5 MBB with local on 5/11/18 with reported 100% pain relief   - left SIJ + left GT bursa injection with local on 10/24/18 with 100% pain relief        Imaging / Labs / Studies (reviewed on  11/20/2018):    7/30/2018 XR LUMBAR SPINE COMPLETE 5 VIEW  TECHNIQUE:  AP, lateral, spot and bilateral oblique views of the lumbar spine were performed.  COMPARISON:  07/25/2018  FINDINGS:  There is grade 1 spondylolisthesis of L4 on L5.  Pedicle screws and fixation rods are noted for at the T10, T11, L1 and L2 levels.  Chronic compression deformity at the L1 level unchanged.  Prominent bilateral facet arthropathy noted at the L4-5 and L5-S1 levels.  IVC filter noted.       7/30/2018 XR HIPS BILATERAL 2 VIEW INCL AP PELVIS  TECHNIQUE:  AP view of the pelvis and frogleg lateral views of both hips were performed.    COMPARISON:  07/26/2018    FINDINGS:  The bony pelvis is intact. A right total hip arthroplasty, plate and wires are in place.  No hardware failure or loosening suggested.  The appearance is unchanged when compared to the prior exam.  Mild degenerative changes noted at the left hip.  No acute fracture or dislocation of the left hip.  No significant joint space narrowing identified.  No plain film evidence to suggest AVN of either hip.       Results for orders placed during the hospital encounter of 01/13/14   MRI Lumbar Spine W WO Contrast    Narrative Findings: Pre- and postcontrast multiplanar multisequence imaging of the thoracic and lumbar spine was performed using 7 cc of Gadavist intravenous contrast material.  There is moderately severe chronic central compression deformity of the T12 vertebral body which is stabilized by paraspinous rods and pedicle screws which extend from T10 through L2.  Postsurgical changes of laminectomy also noted at T12.  The posterior cortex of the T12 vertebral body is displaced posteriorly and indents the ventral thecal sac.  There is no anterior cord contact or significant central canal stenosis.  Degenerative disk desiccation is noted at multiple levels with moderate disk   narrowing at L5-S1.    Also L5-S1 is a broad-based disk protrusion which combined with bilateral  "facet hypertrophy results in moderately severe narrowing of both neural foramina.  No significant central canal stenosis noted.    From L2-3 through L4-5 there   is mild disk bulging which results in mild bilateral foraminal narrowing.  This is slightly more pronounced at L4-5 with bilateral facet hypertrophy a contributing factor.  No significant central canal stenosis noted.  No thoracic disk extrusion, and foraminal narrowing, canal stenosis is evident.  Thoracolumbar cord is intact.  Paravertebral soft tissues are symmetric and normal in appearance.         Review of Systems:  CONSTITUTIONAL: patient denies any fever, chills, or weight loss  SKIN: patient denies any rash or itching  RESPIRATORY: patient denies having any shortness of breath  GASTROINTESTINAL: patient denies having any diarrhea, constipation, or bowel incontinence  GENITOURINARY: patient denies having any abnormal bladder function    MUSCULOSKELETAL:  - patient complains of the above noted pain/s (see chief pain complaint)    NEUROLOGICAL:   - pain as above  - strength in Lower extremities is intact, BILATERALLY  - sensation in Lower extremities is intact, BILATERALLY  - patient denies any loss of bowel or bladder control      PSYCHIATRIC: patient denies any change in mood    Other:  All other systems reviewed and are negative        Physical Exam:  Vitals:  /77 (BP Location: Right arm, Patient Position: Sitting, BP Method: Medium (Automatic))   Pulse 73   Resp 18   Ht 5' 5" (1.651 m)   Wt 71.2 kg (157 lb)   BMI 26.13 kg/m²   (reviewed on 11/20/2018)    General: alert and oriented, in no apparent distress.  Gait: normal gait.  Skin: no rashes, no discoloration, no obvious lesions  HEENT: normocephalic, atraumatic. Pupils equal and round.  Cardiovascular: no significant peripheral edema present.  Respiratory: without use of accessory muscles of respiration.    Musculoskeletal - Lumbar Spine:  - Pain on flexion of lumbar spine: Absent "   - Pain on extension of lumbar spine: Present  - Lumbar facet loading: Positive bilaterally  - TTP over the lumbar facet joints: Absent  - TTP over the SI joints:  Absent on left, improved  - TTP over GT bursa:  Absent on left, improved  - Straight Leg Raise: Negative  - PAMELA: Positive bilaterally   - Pain on Internal and external rotation of the hip: Mild    Neuro - Lower Extremities:  - BLE Strength: R/L: HF: 5/5, HE: 5/5, KF: 5/5; KE: 5/5; FE: 5/5; FF: 5/5  - Extremity Reflexes: Brisk and symmetric throughout  - Sensory: Sensation to light touch intact bilaterally      Psych:  Mood and affect is appropriate              Assessment:  Clau Nunn is a 83 y.o. year old female who is presenting with   Encounter Diagnoses   Name Primary?    Lumbar spondylosis Yes    Sacroiliac joint pain     Pain of left hip joint     Lumbar facet arthropathy     Lumbosacral spondylosis without myelopathy        Plan:  1. Interventional: S/p left SIJ + left GT bursa injection with local on 10/24/18 with 100% pain relief.    2. Pharmacologic: Continue gabapentin 600mg QHS and Tizanidine 4 mg PO BID.     3. Rehabilitative: We discussed PT previously, but she is not interested. She reports it made her pain worse in the past. Encourage regular exercise.     4. Diagnostic: Consider updating MRI in the future.    5. Follow up: PRN.    - I discussed the risks, benefits, and alternatives to potential treatment options. All questions and concerns were fully addressed today in clinic. Dr. Wells was consulted regarding the patient plan and agrees.

## 2018-11-26 ENCOUNTER — TELEPHONE (OUTPATIENT)
Dept: RHEUMATOLOGY | Facility: CLINIC | Age: 83
End: 2018-11-26

## 2018-11-26 ENCOUNTER — PATIENT MESSAGE (OUTPATIENT)
Dept: RHEUMATOLOGY | Facility: CLINIC | Age: 83
End: 2018-11-26

## 2018-11-26 ENCOUNTER — TELEPHONE (OUTPATIENT)
Dept: PHARMACY | Facility: CLINIC | Age: 83
End: 2018-11-26

## 2018-11-26 DIAGNOSIS — M81.0 OSTEOPOROSIS, POSTMENOPAUSAL: Primary | Chronic | ICD-10-CM

## 2018-11-26 NOTE — TELEPHONE ENCOUNTER
Tell her its ok to stop  the foteo but we need to go back on Prolia- she needs to be on OP treatment    Prolia auth orders placed  Please have her come in  For ep with me in the next 8 weeks to resume prolia with  labs cmp and vit D labs

## 2018-11-26 NOTE — TELEPHONE ENCOUNTER
Patient stated that she was not interested in applying in assistance for her co-pay ($365.34).  Instead she has realized since starting on Forteo therapy she has not felt good and does not want to continue therapy.  Sent inbasket to [facility].

## 2018-11-26 NOTE — TELEPHONE ENCOUNTER
----- Message from Carly Joseph, PharmD sent at 11/26/2018 10:59 AM CST -----  Regarding: Forteo  Good Morning,    I wanted to inform Dr. Mora and staff that Ms. Nunn has decided to stop Forteo therapy.  We were working on getting her financial assistance for her co-pay.  However, she has decided she does not like how the Forteo makes her feel and she does not want to continue with therapy.  I informed her that I would let you all know her decision.      Thank You,    Carly Joseph, PharmD    Specialty Pharmacy Clinical Pharmacist  [facility]  P: [phone]

## 2018-11-26 NOTE — TELEPHONE ENCOUNTER
Spoke with Mrs. Nunn informed her that per Carly Mora PA-C it is ok to stop  the foteo but she will  need to go back on Prolia- she needs to be on OP treatment. Appointment scheduled for 1/28/2018

## 2018-12-07 DIAGNOSIS — N18.9 CHRONIC KIDNEY DISEASE, UNSPECIFIED CKD STAGE: ICD-10-CM

## 2018-12-10 ENCOUNTER — HOSPITAL ENCOUNTER (EMERGENCY)
Facility: HOSPITAL | Age: 83
Discharge: HOME | End: 2018-12-11
Attending: FAMILY MEDICINE
Payer: MEDICARE

## 2018-12-10 DIAGNOSIS — S42.201A CLOSED FRACTURE OF PROXIMAL END OF RIGHT HUMERUS, UNSPECIFIED FRACTURE MORPHOLOGY, INITIAL ENCOUNTER: Primary | ICD-10-CM
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
## 2018-12-10 PROCEDURE — 96374 THER/PROPH/DIAG INJ IV PUSH: CPT | Mod: HCNC

## 2018-12-10 PROCEDURE — 99283 EMERGENCY DEPT VISIT LOW MDM: CPT | Mod: 25,HCNC

## 2018-12-10 RX ORDER — ZOLPIDEM TARTRATE 5 MG/1
5 TABLET ORAL NIGHTLY PRN
COMMUNITY
End: 2019-01-28

## 2018-12-10 RX ORDER — BENZONATATE 100 MG/1
100 CAPSULE ORAL 3 TIMES DAILY PRN
COMMUNITY
End: 2019-03-11

## 2018-12-11 ENCOUNTER — OUTPATIENT CASE MANAGEMENT (OUTPATIENT)
Dept: ADMINISTRATIVE | Facility: OTHER | Age: 83
End: 2018-12-11

## 2018-12-11 VITALS
HEART RATE: 58 BPM | BODY MASS INDEX: 27.32 KG/M2 | SYSTOLIC BLOOD PRESSURE: 149 MMHG | WEIGHT: 164 LBS | TEMPERATURE: 98 F | DIASTOLIC BLOOD PRESSURE: 70 MMHG | HEIGHT: 65 IN | OXYGEN SATURATION: 95 % | RESPIRATION RATE: 19 BRPM

## 2018-12-11 PROCEDURE — 63600175 PHARM REV CODE 636 W HCPCS: Mod: HCNC | Performed by: FAMILY MEDICINE

## 2018-12-11 PROCEDURE — 23600 CLTX PROX HUMRL FX W/O MNPJ: CPT

## 2018-12-11 RX ORDER — KETOROLAC TROMETHAMINE 30 MG/ML
30 INJECTION, SOLUTION INTRAMUSCULAR; INTRAVENOUS
Status: COMPLETED | OUTPATIENT
Start: 2018-12-11 | End: 2018-12-11

## 2018-12-11 RX ORDER — HYDROCODONE BITARTRATE AND ACETAMINOPHEN 10; 325 MG/1; MG/1
1 TABLET ORAL EVERY 4 HOURS PRN
Qty: 18 TABLET | Refills: 0 | Status: SHIPPED | OUTPATIENT
Start: 2018-12-11 | End: 2019-01-28

## 2018-12-11 RX ADMIN — KETOROLAC TROMETHAMINE 30 MG: 30 INJECTION INTRAMUSCULAR; INTRAVENOUS at 12:12

## 2018-12-11 NOTE — PROGRESS NOTES
The following patient has been assigned to Mikayla Babin RN with Outpatient Complex Care Management for high risk screening.    Reason: High Risk : Recently discharged Report    Please contact [facility] at [phone] with any questions.    Thank you,    Mere Dhaliwal, [facility]  [phone]

## 2018-12-11 NOTE — ED PROVIDER NOTES
SCRIBE #1 NOTE: I, Tawanna Duron, am scribing for, and in the presence of, Corie Lawrence MD. I have scribed the entire note.      History      Chief Complaint   Patient presents with    Fall     ground level fall after taking nightly ambien, c/o right arm pain       Review of patient's allergies indicates:   Allergen Reactions    No known drug allergies         HPI   HPI    12/10/2018, 10:33 PM   History obtained from the patient      History of Present Illness: Clau Nunn is a 83 y.o. female patient who presents to the Emergency Department for further evaluation of a ground level fall which onset suddenly PTA. Pt reports she had taken her nightly Ambien when her granddaughter needed help to the restroom. Pt reports she stood up, lost her balance, and fell. She is c/o R shoulder pain. Symptoms are constant and moderate in severity. Pain exacerbated by palpation and ROM, no mitigating factors. No other associated sxs. Patient denies any head trauma, LOC, n/v, extremity weakness/numbness, back pain, neck pain, dizziness, palpitations, CP, SOB, hip pain, knee pain, and all other sxs at this time. No further complaints or concerns at this time.       Arrival mode: Ambulance    PCP: Jeanette Gomez MD       Past Medical History:  Past Medical History:   Diagnosis Date    Allergy     Anxiety     Asthma     Back pain     Chronic venous insufficiency 11/19/2013    Depression     Diverticulosis 11/19/2013 1/8/8 colonoscopy    Dry eyes     Fracture, sacrum/coccyx     Dr. Sanchez     GERD (gastroesophageal reflux disease)     H/O total hip arthroplasty 12/30/2015    Hypertension     Hypothyroid     Osteoarthritis     Osteoporosis     Postlaminectomy syndrome of lumbar region 1/8/2014    Radius fracture     7/18    Simple chronic bronchitis 5/3/2017    Umbilical hernia 11/19/2013    Urinary incontinence     Vitamin D deficiency disease        Past Surgical History:  Past Surgical History:    Procedure Laterality Date    ADENOIDECTOMY      BACK SURGERY      x2    breast implants  1973    CATARACT EXTRACTION Bilateral     CLOSED REDUCTION DISTAL RADIUS FRACTURE      Dr. Black, 8/18    COLONOSCOPY N/A 1/6/2014    [facility]formed [facility]    cystoscope  1/6/16    DR. Brown    FRACTURE SURGERY  April 3 2015    left wrist    HAND SURGERY      HIP SURGERY Right     total hip- Dr. Dos Santos    HYSTERECTOMY      36y/o    JOINT REPLACEMENT Right     R NNAMDI - Dr. Dos Santos    LEG SURGERY Right     ex-fix tib/fib    TONSILLECTOMY           Family History:  Family History   Problem Relation Age of Onset    COPD Mother     Cancer Mother         lung CA    Pulmonary fibrosis Sister     Cancer Daughter         colon    Stroke Father     Diabetes Son     Melanoma Neg Hx     Psoriasis Neg Hx     Lupus Neg Hx        Social History:  Social History     Tobacco Use    Smoking status: Never Smoker    Smokeless tobacco: Never Used   Substance and Sexual Activity    Alcohol use: Yes     Alcohol/week: 0.0 oz     Comment: rarely    Drug use: Yes     Types: Hydrocodone    Sexual activity: No     Partners: Male     Birth control/protection: Post-menopausal       ROS   Review of Systems   Constitutional: Negative for fever.   HENT: Negative for nosebleeds and sore throat.    Eyes: Negative for visual disturbance.   Respiratory: Negative for shortness of breath.    Cardiovascular: Negative for chest pain and palpitations.   Gastrointestinal: Negative for abdominal pain, nausea and vomiting.   Genitourinary: Negative for frequency.   Musculoskeletal: Positive for arthralgias (R shoulder). Negative for back pain and neck pain.        (-) hip pain  (-) knee pain   Skin: Negative for color change and wound.   Neurological: Negative for dizziness, weakness, numbness and headaches.        (-) Head trauma  (-) LOC   Hematological: Does not bruise/bleed easily.   Psychiatric/Behavioral:  Negative for behavioral problems.   All other systems reviewed and are negative.    Physical Exam      Initial Vitals [12/10/18 2236]   BP Pulse Resp Temp SpO2   (!) 167/72 64 16 98.6 °F (37 °C) 97 %      MAP       --          Physical Exam  Nursing Notes and Vital Signs Reviewed.  Constitutional: Patient is in no acute distress. Well-developed and well-nourished.  Head: Atraumatic. Normocephalic.  Eyes: PERRL. EOM intact. Conjunctivae are not pale. No scleral icterus.  ENT: Mucous membranes are moist. Oropharynx is clear and symmetric.    Neck: Supple. Full ROM. No lymphadenopathy. No midline bony tenderness of C-spine.  Cardiovascular: Regular rate. Regular rhythm. No murmurs, rubs, or gallops. Distal pulses are 2+ and symmetric.  Pulmonary/Chest: No respiratory distress. Clear to auscultation bilaterally. No wheezing or rales.  Abdominal: Soft and non-distended.  There is no tenderness.  No rebound, guarding, or rigidity.   Musculoskeletal: Moves all extremities. No obvious deformities. No edema. No calf tenderness. No midline bony tenderness of T-spine or L-spine.   RUE/shoulder: TTP to proximal humerus. No obvious bony deformity. ROM is limited secondary to pain. Cap refill distally is <2 seconds. Radial pulses are equal and 2+ bilaterally. No motor deficit. No distal sensory deficit. NVI distally.   Skin: Warm and dry.  Neurological:  Alert, awake, and appropriate.  Normal speech.  No acute focal neurological deficits are appreciated.  Psychiatric: Normal affect. Good eye contact. Appropriate in content.    ED Course    Orthopedic Injury  Date/Time: 12/11/2018 12:02 AM  Performed by: Corie Lawrence MD  Authorized by: Corie Lawrence MD     Consent Done?:  Yes  Universal Protocol:     Verbal consent obtained?: Yes      Risks and benefits: Risks, benefits and alternatives were discussed      Consent given by:  Patient    Patient states understanding of procedure being performed: Yes      Patient's  "understanding of procedure matches consent: Yes      Patient identity confirmed:   and name  Injury:     Injury location:  Shoulder    Location details:  Right shoulder    Injury type:  Fracture    Fracture type: surgical neck        Pre-procedure assessment:     Neurovascular status: Neurovascularly intact      Distal perfusion: normal      Neurological function: normal      Range of motion: reduced      Local anesthesia used?: No      Patient sedated?: No        Selections made in this section will also lock the Injury type section above.:     Manipulation performed?: No      Immobilization:  Sling    Complications: No    Post-procedure assessment:     Neurovascular status: Neurovascularly intact      Distal perfusion: normal      Neurological function: normal      Range of motion comment:  Sling    Patient tolerance:  Patient tolerated the procedure well with no immediate complications      ED Vital Signs:  Vitals:    12/10/18 2236 12/10/18 2242   BP: (!) 167/72 (!) 162/69   Pulse: 64 (!) 58   Resp: 16 19   Temp: 98.6 °F (37 °C)    SpO2: 97% 96%   Weight: 74.4 kg (164 lb)    Height: 5' 5" (1.651 m)        Imaging Results:  Imaging Results          X-Ray Shoulder Trauma Right (Final result)  Result time 12/10/18 23:50:54    Final result by Minor Monson MD (Timothy) (12/10/18 23:50:54)                 Impression:      Comminuted fracture involving the right humeral head and surgical neck.      Electronically signed by: Minor Monson MD  Date:    12/10/2018  Time:    23:50             Narrative:    EXAMINATION:  XR SHOULDER TRAUMA 3 VIEW RIGHT    CLINICAL HISTORY:  Unspecified fall, initial encounter    TECHNIQUE:  Standard radiography performed.    COMPARISON:  None    FINDINGS:  Comminuted fracture involving the surgical neck and right humeral head.  No evidence of dislocation.  Degenerative changes are present at the right AC joint.                                        The Emergency Provider reviewed " the vital signs and test results, which are outlined above.    ED Discussion     12:01 AM: Reassessed pt at this time.  Discussed with pt all pertinent ED information and results. Discussed pt dx and plan of tx. Gave pt all f/u and return to the ED instructions. All questions and concerns were addressed at this time. Pt expresses understanding of information and instructions, and is comfortable with plan to discharge. Pt is stable for discharge.    Trauma precautions were discussed with patient and/or family/caretaker; I do not specifically detect any abdominal, thoracic, CNS, orthopedic, or other emergent or life threatening condition and that patient is safe to be discharged.  It was also discussed that despite an unrevealing examination and negative radiographic examination for serious or life threatening injury, these conditions may still exist.  As such, patient should return to ED immediately should they experience, severe or worsening pain, shortness of breath, abdominal pain, headache, vomiting, or any other concern.  It was also discussed that not infrequently, injuries may not be diagnosed during the initial ED visit (such as fractures) and that if the patient discovers a new area of concern, a new area of injury that was not evaluated in the ED, they should return for evaluation as they may have an injury that requires treatment.      ED Medication(s):  Medications - No data to display    Current Discharge Medication List      START taking these medications    Details   HYDROcodone-acetaminophen (NORCO)  mg per tablet Take 1 tablet by mouth every 4 (four) hours as needed for Pain.  Qty: 18 tablet, Refills: 0             Follow-up Information     [facility]. Schedule an appointment as soon as possible for a visit in 1 day.    Specialty:  Orthopedic Surgery  Contact information:  [address] [address] [phone]                     Medical Decision Making     Medical Decision Making:   Clinical Tests:   Radiological Study: Ordered and Reviewed           Scribe Attestation:   Scribe #1: I performed the above scribed service and the documentation accurately describes the services I performed. I attest to the accuracy of the note.    Attending:   Physician Attestation Statement for Scribe #1: I, Corie Lawrence MD, personally performed the services described in this documentation, as scribed by Tawanna Duron, in my presence, and it is both accurate and complete.          Clinical Impression       ICD-10-CM ICD-9-CM   1. Closed fracture of proximal end of right humerus, unspecified fracture morphology, initial encounter S42.201A 812.00   2. Fall W19.XXXA E888.9       Disposition:   Disposition: Discharged  Condition: Stable         Corie Lawrence MD  12/12/18 1007

## 2018-12-12 ENCOUNTER — PATIENT MESSAGE (OUTPATIENT)
Dept: ADMINISTRATIVE | Facility: OTHER | Age: 83
End: 2018-12-12

## 2018-12-17 ENCOUNTER — OUTPATIENT CASE MANAGEMENT (OUTPATIENT)
Dept: ADMINISTRATIVE | Facility: OTHER | Age: 83
End: 2018-12-17

## 2018-12-17 NOTE — PROGRESS NOTES
Summary:  Patient answered phone and OPCM explained program. Patient states she would like a call back after Thursday when she goes to see Orthopedist and find out if she has to have surgery on her shoulder.     Interventions:  1st Attempt to complete initial assessment for Outpatient Care Management;    Plan:  2nd attempt for f/u planned for 12/20

## 2018-12-20 ENCOUNTER — OUTPATIENT CASE MANAGEMENT (OUTPATIENT)
Dept: ADMINISTRATIVE | Facility: OTHER | Age: 83
End: 2018-12-20

## 2018-12-20 NOTE — PROGRESS NOTES
Summary:  Patient answered phone and we discussed OPCM program. Patient states she saw Orthopedist today and she is not having surgery on her arm. Patient would like information on [employer] OTC benefits. Patient does not report any other needs at this time. Patient declined enrollment.     Interventions:  2nd attempt to complete initial assessment for OPCM;        Plan:   I will mail a letter with my contact information for possible future needs. Mailed [employer] OTC information.

## 2018-12-20 NOTE — LETTER
December 20, 2018    Clau Nunn  [address] [address]             [facility] [address] [place] [address] Dear Ms. Nunn,            I work with [facility] Outpatient Case Management Department. These services are free of charge and are offered to [facility] patients who have recently been discharged from any of our facilities or who have complex medical conditions that may require the skill of a nurse to assist with management.         . Please call our department with any questions regarding your healthcare needs.            The Outpatient Case Management Department can be reached at [phone] from 8:00AM to 4:30 PM on Monday thru Friday. [facility] also has a program where a nurse is available 24/7 to answer questions or provide medical advice, their number is [phone].    Thanks,    Mikayla Babin RN   Outpatient Care Management

## 2019-01-06 ENCOUNTER — PATIENT MESSAGE (OUTPATIENT)
Dept: INTERNAL MEDICINE | Facility: CLINIC | Age: 84
End: 2019-01-06

## 2019-01-07 NOTE — TELEPHONE ENCOUNTER
Spoke with patient who advised on Dec 10 she fell and broke her arm. She is currently stable though.   She had concerns about hearing about medication recall of levothyroxine. I advised patient that is there is a recall on the medication her pharmacy should know and to call her pharmacy to see if hers was recalled. I advised her theres different manufacturers and if her levothyroxine is recalled to let us know and we will see what we can do. Patient verbalized understanding

## 2019-01-28 ENCOUNTER — LAB VISIT (OUTPATIENT)
Dept: LAB | Facility: HOSPITAL | Age: 84
End: 2019-01-28
Attending: PHYSICIAN ASSISTANT
Payer: MEDICARE

## 2019-01-28 ENCOUNTER — OFFICE VISIT (OUTPATIENT)
Dept: RHEUMATOLOGY | Facility: CLINIC | Age: 84
End: 2019-01-28
Payer: MEDICARE

## 2019-01-28 VITALS
BODY MASS INDEX: 27.47 KG/M2 | HEART RATE: 65 BPM | HEIGHT: 65 IN | SYSTOLIC BLOOD PRESSURE: 149 MMHG | WEIGHT: 164.88 LBS | DIASTOLIC BLOOD PRESSURE: 81 MMHG

## 2019-01-28 DIAGNOSIS — M94.9 DISORDER OF BONE AND ARTICULAR CARTILAGE: ICD-10-CM

## 2019-01-28 DIAGNOSIS — M80.00XD AGE-RELATED OSTEOPOROSIS WITH CURRENT PATHOLOGICAL FRACTURE WITH ROUTINE HEALING, SUBSEQUENT ENCOUNTER: Primary | ICD-10-CM

## 2019-01-28 DIAGNOSIS — N18.30 STAGE 3 CHRONIC KIDNEY DISEASE: ICD-10-CM

## 2019-01-28 DIAGNOSIS — M89.9 DISORDER OF BONE AND ARTICULAR CARTILAGE: ICD-10-CM

## 2019-01-28 DIAGNOSIS — M81.0 OSTEOPOROSIS, POSTMENOPAUSAL: Chronic | ICD-10-CM

## 2019-01-28 DIAGNOSIS — E55.9 VITAMIN D DEFICIENCY DISEASE: ICD-10-CM

## 2019-01-28 LAB
25(OH)D3+25(OH)D2 SERPL-MCNC: 27 NG/ML
ALBUMIN SERPL BCP-MCNC: 4 G/DL
ALP SERPL-CCNC: 84 U/L
ALT SERPL W/O P-5'-P-CCNC: 8 U/L
ANION GAP SERPL CALC-SCNC: 12 MMOL/L
AST SERPL-CCNC: 18 U/L
BILIRUB SERPL-MCNC: 0.6 MG/DL
BUN SERPL-MCNC: 21 MG/DL
CALCIUM SERPL-MCNC: 9.8 MG/DL
CHLORIDE SERPL-SCNC: 104 MMOL/L
CO2 SERPL-SCNC: 26 MMOL/L
CREAT SERPL-MCNC: 1 MG/DL
EST. GFR  (AFRICAN AMERICAN): >60 ML/MIN/1.73 M^2
EST. GFR  (NON AFRICAN AMERICAN): 53 ML/MIN/1.73 M^2
GLUCOSE SERPL-MCNC: 114 MG/DL
POTASSIUM SERPL-SCNC: 4.7 MMOL/L
PROT SERPL-MCNC: 7.5 G/DL
SODIUM SERPL-SCNC: 142 MMOL/L

## 2019-01-28 PROCEDURE — 99999 PR PBB SHADOW E&M-EST. PATIENT-LVL IV: ICD-10-PCS | Mod: PBBFAC,HCNC,, | Performed by: PHYSICIAN ASSISTANT

## 2019-01-28 PROCEDURE — 82306 VITAMIN D 25 HYDROXY: CPT | Mod: HCNC

## 2019-01-28 PROCEDURE — 3077F SYST BP >= 140 MM HG: CPT | Mod: HCNC,CPTII,S$GLB, | Performed by: PHYSICIAN ASSISTANT

## 2019-01-28 PROCEDURE — 3079F DIAST BP 80-89 MM HG: CPT | Mod: HCNC,CPTII,S$GLB, | Performed by: PHYSICIAN ASSISTANT

## 2019-01-28 PROCEDURE — 80053 COMPREHEN METABOLIC PANEL: CPT | Mod: HCNC

## 2019-01-28 PROCEDURE — 99214 OFFICE O/P EST MOD 30 MIN: CPT | Mod: HCNC,25,S$GLB, | Performed by: PHYSICIAN ASSISTANT

## 2019-01-28 PROCEDURE — 3077F PR MOST RECENT SYSTOLIC BLOOD PRESSURE >= 140 MM HG: ICD-10-PCS | Mod: HCNC,CPTII,S$GLB, | Performed by: PHYSICIAN ASSISTANT

## 2019-01-28 PROCEDURE — 96372 PR INJECTION,THERAP/PROPH/DIAG2ST, IM OR SUBCUT: ICD-10-PCS | Mod: HCNC,S$GLB,, | Performed by: PHYSICIAN ASSISTANT

## 2019-01-28 PROCEDURE — 36415 COLL VENOUS BLD VENIPUNCTURE: CPT | Mod: HCNC

## 2019-01-28 PROCEDURE — 96372 THER/PROPH/DIAG INJ SC/IM: CPT | Mod: HCNC,S$GLB,, | Performed by: PHYSICIAN ASSISTANT

## 2019-01-28 PROCEDURE — 99214 PR OFFICE/OUTPT VISIT, EST, LEVL IV, 30-39 MIN: ICD-10-PCS | Mod: HCNC,25,S$GLB, | Performed by: PHYSICIAN ASSISTANT

## 2019-01-28 PROCEDURE — 99999 PR PBB SHADOW E&M-EST. PATIENT-LVL IV: CPT | Mod: PBBFAC,HCNC,, | Performed by: PHYSICIAN ASSISTANT

## 2019-01-28 PROCEDURE — 1101F PR PT FALLS ASSESS DOC 0-1 FALLS W/OUT INJ PAST YR: ICD-10-PCS | Mod: HCNC,CPTII,S$GLB, | Performed by: PHYSICIAN ASSISTANT

## 2019-01-28 PROCEDURE — 3079F PR MOST RECENT DIASTOLIC BLOOD PRESSURE 80-89 MM HG: ICD-10-PCS | Mod: HCNC,CPTII,S$GLB, | Performed by: PHYSICIAN ASSISTANT

## 2019-01-28 PROCEDURE — 99499 UNLISTED E&M SERVICE: CPT | Mod: S$GLB,,, | Performed by: PHYSICIAN ASSISTANT

## 2019-01-28 PROCEDURE — 1101F PT FALLS ASSESS-DOCD LE1/YR: CPT | Mod: HCNC,CPTII,S$GLB, | Performed by: PHYSICIAN ASSISTANT

## 2019-01-28 PROCEDURE — 99499 RISK ADDL DX/OHS AUDIT: ICD-10-PCS | Mod: S$GLB,,, | Performed by: PHYSICIAN ASSISTANT

## 2019-01-28 RX ORDER — TRAMADOL HYDROCHLORIDE 50 MG/1
TABLET ORAL
COMMUNITY
Start: 2019-01-26 | End: 2019-03-11

## 2019-01-28 NOTE — PROGRESS NOTES
Allergies and medications reviewed. Administered 1cc Prolia 60mg/cc to LLQ of abdomen. Labs reviewed: Calcium 9.8, Creatinine 1.0 . Pt tolerated well. No acute reaction noted to site. Pt instructed on S/S to report. Pt verbalized understanding. Patient waited 15 mins.     Lot:[id number]  Exp:05/21  Manu:Terry

## 2019-01-28 NOTE — PROGRESS NOTES
Subjective:       Patient ID: Clau Nunn is a 83 y.o. female.    Chief Complaint: osteopenia w high frax    Mrs. Nunn is here for f/u for h/o osteoporosis now osteopenia with high fracture risk. She has a h/o of a right hip fx, right tibia fx, spine fxs s/p trauma and left wrist fx.  Last year fell- tripped over threshold from in her living room. fx right distal radius and right sacrum. Had ORIF right wrist.  This healed well. We did labs which looked good, moved her to forteo due to low bone mass and recurrent fx.   She tried forteo for 2 weeks then stopped. Reports made her feel really bad, pain, malaise, fatigue. Just overall did not feel well. She stopped the forteo and we discussed moving back to Prolia  She is back today to begin Prolia. In dec had right humerus fx- no surgery just immobilization, healing well.     Her son Has fixed the treshold moldings and now all are flat. No rugs are out. She is in balance and fall prevention program to include strengthening as well. She added night light for when she wakes and stopped taking ambien      In the past she has taken IV reclast then on drug  holiday but repeat dexa (2016) showed decreasing bmd at the hip.   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 1st Prolia feb 2017 then was off for short while when we did forteo trial, today 1/28/19 to resume Prolia ( #4 total)  She has a history of low vit d on 50K units weekly, but level check last was normal and she is taking otc vit D daily 1K units.  - d level 20    She also has osteoarthritis in multiple  Joints, hips, hands, knees, and chronic back pain (two surgeries s/p trauma) takes gabapentin at night.  mobic dc'd due to kidney function. She is taking tumeric and tylenol.  Today rates her pain level 3/10      Review of Systems   Constitutional: Negative for chills, fatigue and fever.   HENT: Negative for mouth sores, rhinorrhea and sore throat.    Eyes: Negative for pain and redness.  "  Respiratory: Negative for cough and shortness of breath.    Cardiovascular: Negative for chest pain.   Gastrointestinal: Negative for abdominal pain, constipation, diarrhea, nausea and vomiting.   Genitourinary: Negative for dysuria and hematuria.   Musculoskeletal: Positive for arthralgias and gait problem. Negative for back pain, joint swelling and myalgias.   Skin: Negative for rash.   Neurological: Negative for weakness, numbness and headaches.   Psychiatric/Behavioral: The patient is not nervous/anxious.          Objective:   BP (!) 149/81   Pulse 65   Ht 5' 5" (1.651 m)   Wt 74.8 kg (164 lb 14.5 oz)   BMI 27.44 kg/m²      Physical Exam   Constitutional: She is oriented to person, place, and time and well-developed, well-nourished, and in no distress.   HENT:   Head: Normocephalic and atraumatic.   Eyes: Pupils are equal, round, and reactive to light. Right eye exhibits no discharge.   Neck: Normal range of motion.   Cardiovascular: Normal rate, regular rhythm and normal heart sounds.  Exam reveals no friction rub.    Pulmonary/Chest: Effort normal and breath sounds normal. No respiratory distress.   Abdominal: Soft. She exhibits no distension. There is no tenderness.       Right Side Rheumatological Exam     The patient is tender to palpation of the shoulder      Lymphadenopathy:     She has no cervical adenopathy.   Neurological: She is alert and oriented to person, place, and time.   Skin: No rash noted. No erythema.     Psychiatric: Mood normal.   Musculoskeletal: She exhibits tenderness and deformity. She exhibits no edema.   significant heberden, surekha nodes, OA squaring rebecca 1 cmc joints, no synovitis  rebecca knees no effusion +crepitus, good rom             Recent Results (from the past 168 hour(s))   Comprehensive metabolic panel    Collection Time: 01/28/19  2:44 PM   Result Value Ref Range    Sodium 142 136 - 145 mmol/L    Potassium 4.7 3.5 - 5.1 mmol/L    Chloride 104 95 - 110 mmol/L    CO2 26 " 23 - 29 mmol/L    Glucose 114 (H) 70 - 110 mg/dL    BUN, Bld 21 8 - 23 mg/dL    Creatinine 1.0 0.5 - 1.4 mg/dL    Calcium 9.8 8.7 - 10.5 mg/dL    Total Protein 7.5 6.0 - 8.4 g/dL    Albumin 4.0 3.5 - 5.2 g/dL    Total Bilirubin 0.6 0.1 - 1.0 mg/dL    Alkaline Phosphatase 84 55 - 135 U/L    AST 18 10 - 40 U/L    ALT 8 (L) 10 - 44 U/L    Anion Gap 12 8 - 16 mmol/L    eGFR if African American >60 >60 mL/min/1.73 m^2    eGFR if non African American 53 (A) >60 mL/min/1.73 m^2        Component      Latest Ref Rng & Units 11/20/2018   Sodium      136 - 145 mmol/L 138   Potassium      3.5 - 5.1 mmol/L 4.5   Chloride      95 - 110 mmol/L 103   CO2      23 - 29 mmol/L 27   Glucose      70 - 110 mg/dL 93   BUN, Bld      8 - 23 mg/dL 20   Creatinine      0.5 - 1.4 mg/dL 1.3   Calcium      8.7 - 10.5 mg/dL 10.1   Total Protein      6.0 - 8.4 g/dL 7.2   Albumin      3.5 - 5.2 g/dL 3.8   Total Bilirubin      0.1 - 1.0 mg/dL 0.7   Alkaline Phosphatase      55 - 135 U/L 62   AST      10 - 40 U/L 24   ALT      10 - 44 U/L 15   Anion Gap      8 - 16 mmol/L 8   eGFR if African American      >60 mL/min/1.73 m:2 44 (A)   eGFR if non African American      >60 mL/min/1.73 m:2 38 (A)   PTH      9.0 - 77.0 pg/mL 34.5   Magnesium      1.6 - 2.6 mg/dL 2.1   Phosphorus      2.7 - 4.5 mg/dL 3.3   Vit D, 25-Hydroxy      30 - 96 ng/mL 20 (L)           Dexa 12.13.16:   Left Femur  BMD 0.850 g/cm2 with T score -1.2  L  Femur neck BMD 0.868 g/cm2 with T score  -1.2,   L1 spine 5.6, decreasing bmd at hip,   frax major 18.5%, hip 3.7%    Assessment:       1. Age-related osteoporosis with current pathological fracture with routine healing, subsequent encounter    2. Vitamin D deficiency disease        Impression:      1. Osteoporosis with  multiple fxs- right hip, left wrist, several compression fx- 2018 right sacral fx and right wrist fx post ORIF- tried forteo- failed due to SE  Right humerus fx 11/2018  Now to resume prolia     Mild CKD  Moved to  Prolia 2/2017 (X 3 doses) post falling bmd with Reclast Holiday  [id number] (5 yr)  Completed IV Reclast ~ 4 treatment ([id number])        Vit D level 20 on otc D3 1K units, Ca WNL with dietary calcium only     2. CKD: stable, gfr 47- now improved to 53    3. Medication monitoring; no issues with prolia    4. Osteoarthritis multiple joints: rebecca hands, rebecca shoulders, left hip, rebecca knees, off mobic, using tumeric, tylenol, has had injections in knees by ortho    5. Lumbar arthropathy with radiculopathy right leg/foot: h/o trauma, 2 surgeries, chronic pain, gabapentin 300mg at night, sent to PT, seeing dr. Wells    Plan:           Trial of forteo caused side effects- felt bad, malaise, fatigue and body aches  We moved back  to prolia- to resume today (#4)      Labs reviewed and done within past 14 days  Ca 9.8, Creat 1.0, eGFR 53  No contraindication for Prolia today, ok for nurse to administer today  Re-evaluate patient again in 6 months to determine if Prolia still medically indicated and appropriate to administer.    KDIGO lab monitoring will be followed according to KDIGO 2017 Clinical Practice Guideline Update for the Diagnosis, Evaluation, Prevention, and Treatment of Chronic Kidney Disease-Mineral and Bone Disorder (CKD-MBD)  Chapter 3.1: Diagnosis of CKD-MBD: biochemical abnormalities      Repeat her dexa with next prolia    Increase her  daily vit D 2000 units, dietary calcium   cont tylenol, tumeric,  Avoid nsaids     C/w neurontin 600-900 mg at night  Cont f/u with Dr. Wells     RTC 6 mon at [facility] with labs cmp and vit D and prolia

## 2019-03-11 ENCOUNTER — OFFICE VISIT (OUTPATIENT)
Dept: INTERNAL MEDICINE | Facility: CLINIC | Age: 84
End: 2019-03-11
Payer: MEDICARE

## 2019-03-11 ENCOUNTER — LAB VISIT (OUTPATIENT)
Dept: LAB | Facility: HOSPITAL | Age: 84
End: 2019-03-11
Attending: PHYSICIAN ASSISTANT
Payer: MEDICARE

## 2019-03-11 VITALS
SYSTOLIC BLOOD PRESSURE: 118 MMHG | HEIGHT: 65 IN | TEMPERATURE: 98 F | BODY MASS INDEX: 27.56 KG/M2 | OXYGEN SATURATION: 98 % | DIASTOLIC BLOOD PRESSURE: 62 MMHG | WEIGHT: 165.38 LBS | HEART RATE: 72 BPM

## 2019-03-11 DIAGNOSIS — R73.9 ELEVATED SERUM GLUCOSE: ICD-10-CM

## 2019-03-11 DIAGNOSIS — M15.9 PRIMARY OSTEOARTHRITIS INVOLVING MULTIPLE JOINTS: ICD-10-CM

## 2019-03-11 DIAGNOSIS — I70.0 ATHEROSCLEROSIS OF AORTA: ICD-10-CM

## 2019-03-11 DIAGNOSIS — K44.9 HIATAL HERNIA WITH GASTROESOPHAGEAL REFLUX: ICD-10-CM

## 2019-03-11 DIAGNOSIS — M47.816 LUMBAR SPONDYLOSIS: ICD-10-CM

## 2019-03-11 DIAGNOSIS — J41.0 SIMPLE CHRONIC BRONCHITIS: ICD-10-CM

## 2019-03-11 DIAGNOSIS — Z83.3 FAMILY HISTORY OF DIABETES MELLITUS: ICD-10-CM

## 2019-03-11 DIAGNOSIS — N18.30 STAGE 3 CHRONIC KIDNEY DISEASE: ICD-10-CM

## 2019-03-11 DIAGNOSIS — E03.9 ACQUIRED HYPOTHYROIDISM: ICD-10-CM

## 2019-03-11 DIAGNOSIS — I10 ESSENTIAL HYPERTENSION: Chronic | ICD-10-CM

## 2019-03-11 DIAGNOSIS — F32.0 MILD MAJOR DEPRESSION: ICD-10-CM

## 2019-03-11 DIAGNOSIS — M81.0 OSTEOPOROSIS, POSTMENOPAUSAL: Chronic | ICD-10-CM

## 2019-03-11 DIAGNOSIS — K21.9 HIATAL HERNIA WITH GASTROESOPHAGEAL REFLUX: ICD-10-CM

## 2019-03-11 DIAGNOSIS — Z00.00 ROUTINE PHYSICAL EXAMINATION: ICD-10-CM

## 2019-03-11 DIAGNOSIS — Z00.00 ROUTINE PHYSICAL EXAMINATION: Primary | ICD-10-CM

## 2019-03-11 DIAGNOSIS — F41.9 ANXIETY: ICD-10-CM

## 2019-03-11 DIAGNOSIS — E55.9 VITAMIN D DEFICIENCY DISEASE: ICD-10-CM

## 2019-03-11 PROCEDURE — 80053 COMPREHEN METABOLIC PANEL: CPT | Mod: HCNC

## 2019-03-11 PROCEDURE — 36415 COLL VENOUS BLD VENIPUNCTURE: CPT | Mod: HCNC

## 2019-03-11 PROCEDURE — 3074F PR MOST RECENT SYSTOLIC BLOOD PRESSURE < 130 MM HG: ICD-10-PCS | Mod: HCNC,CPTII,S$GLB, | Performed by: PHYSICIAN ASSISTANT

## 2019-03-11 PROCEDURE — 99999 PR PBB SHADOW E&M-EST. PATIENT-LVL III: ICD-10-PCS | Mod: PBBFAC,HCNC,, | Performed by: PHYSICIAN ASSISTANT

## 2019-03-11 PROCEDURE — 99999 PR PBB SHADOW E&M-EST. PATIENT-LVL III: CPT | Mod: PBBFAC,HCNC,, | Performed by: PHYSICIAN ASSISTANT

## 2019-03-11 PROCEDURE — 99499 UNLISTED E&M SERVICE: CPT | Mod: S$GLB,,, | Performed by: PHYSICIAN ASSISTANT

## 2019-03-11 PROCEDURE — 80061 LIPID PANEL: CPT | Mod: HCNC

## 2019-03-11 PROCEDURE — 3078F PR MOST RECENT DIASTOLIC BLOOD PRESSURE < 80 MM HG: ICD-10-PCS | Mod: HCNC,CPTII,S$GLB, | Performed by: PHYSICIAN ASSISTANT

## 2019-03-11 PROCEDURE — 3078F DIAST BP <80 MM HG: CPT | Mod: HCNC,CPTII,S$GLB, | Performed by: PHYSICIAN ASSISTANT

## 2019-03-11 PROCEDURE — 84443 ASSAY THYROID STIM HORMONE: CPT | Mod: HCNC

## 2019-03-11 PROCEDURE — 99397 PR PREVENTIVE VISIT,EST,65 & OVER: ICD-10-PCS | Mod: HCNC,S$GLB,, | Performed by: PHYSICIAN ASSISTANT

## 2019-03-11 PROCEDURE — 85025 COMPLETE CBC W/AUTO DIFF WBC: CPT | Mod: HCNC

## 2019-03-11 PROCEDURE — 83036 HEMOGLOBIN GLYCOSYLATED A1C: CPT | Mod: HCNC

## 2019-03-11 PROCEDURE — 3074F SYST BP LT 130 MM HG: CPT | Mod: HCNC,CPTII,S$GLB, | Performed by: PHYSICIAN ASSISTANT

## 2019-03-11 PROCEDURE — 99499 RISK ADDL DX/OHS AUDIT: ICD-10-PCS | Mod: S$GLB,,, | Performed by: PHYSICIAN ASSISTANT

## 2019-03-11 PROCEDURE — 84439 ASSAY OF FREE THYROXINE: CPT | Mod: HCNC

## 2019-03-11 PROCEDURE — 99397 PER PM REEVAL EST PAT 65+ YR: CPT | Mod: HCNC,S$GLB,, | Performed by: PHYSICIAN ASSISTANT

## 2019-03-11 RX ORDER — CYCLOBENZAPRINE HCL 5 MG
5 TABLET ORAL 3 TIMES DAILY PRN
Qty: 15 TABLET | Refills: 0 | Status: SHIPPED | OUTPATIENT
Start: 2019-03-11 | End: 2019-03-21

## 2019-03-11 NOTE — PROGRESS NOTES
Subjective:       Patient ID: Clau Nunn is a 83 y.o. female.    Chief Complaint: Annual Exam    83 year old female presents to clinic for annual exam. She sees rheum for OP - on Prolia and DEXA is being followed by rheum. Eye exam is due - pt to schedule appt for updated eye exam. She reports mild intermittent loose stools, seems to have to have a BM every time she eats. She admits to not following a healthy diet. Declines stool studies at this time. Reports anxiety is doing well and she has not needed to take Buspar lately. Pt reports no fever, chills, abd pain, blood in stool, CP, SOB, exertional sxs, urinary/bowel sxs, or other medical complaints. Lives with granddaughter.    PCP: Dr. Molina    HM: 10/18 fluvax, 5/16 [id number], 5/17 booster [id number], 7/18 TDaP, 11/09 zoster, 12/16 BMD rep 3y (rheum), 1/14 Cscope no more needed, 11/16 [facility]/Gyn Dr. Tere bauer outside - pt reports has scheduled in next couple of weeks, 1/17 Eye Dr. Rubalcava.    Patient Active Problem List:     Primary osteoarthritis involving multiple joints     Osteoporosis, postmenopausal     Acquired hypothyroidism     Lumbar arthropathy     Chronic venous insufficiency     Anxiety     Vitamin D deficiency disease     Essential hypertension     Hiatal hernia with gastroesophageal reflux     Sacroiliac inflammation     Atherosclerosis of aorta     Chronic lumbar radiculopathy     Stage 3 chronic kidney disease     Mild major depression     Simple chronic bronchitis     Pain of left hip joint     Lumbar spondylosis     Pain in both lower extremities     Age-related osteoporosis with current pathological fracture with delayed healing     Fracture, sacrum/coccyx     Radius fracture      Review of Systems   Constitutional: Negative for chills and fever.   Respiratory: Negative for cough and shortness of breath.    Cardiovascular: Negative for chest pain, palpitations and leg swelling.   Gastrointestinal: Negative for abdominal pain, blood in  "stool, nausea and vomiting.   Genitourinary: Negative for difficulty urinating, dysuria, frequency and urgency.   Skin: Negative for rash.   Neurological: Negative for dizziness, weakness, numbness and headaches.   Psychiatric/Behavioral: Negative for confusion.       Objective:       Vitals:    03/11/19 1206   BP: 118/62   BP Location: Right arm   Patient Position: Sitting   BP Method: Small (Manual)   Pulse: 72   Temp: 98.1 °F (36.7 °C)   TempSrc: Tympanic   SpO2: 98%   Weight: 75 kg (165 lb 5.5 oz)   Height: 5' 5" (1.651 m)     Physical Exam   Constitutional: She is oriented to person, place, and time. She appears well-developed and well-nourished. No distress.   HENT:   Head: Normocephalic and atraumatic.   Right Ear: Tympanic membrane and ear canal normal.   Left Ear: Tympanic membrane and ear canal normal.   Mouth/Throat: Oropharynx is clear and moist. No oropharyngeal exudate.   Eyes: EOM are normal. Pupils are equal, round, and reactive to light. No scleral icterus.   Neck: Neck supple. Carotid bruit is not present. No thyroid mass and no thyromegaly present.   Cardiovascular: Normal rate, regular rhythm and intact distal pulses.   Pulmonary/Chest: Effort normal and breath sounds normal. No stridor. No respiratory distress. She has no decreased breath sounds. She has no wheezes. She has no rhonchi. She has no rales.   Abdominal: Soft. Bowel sounds are normal. She exhibits no distension and no mass. There is no tenderness. There is no rigidity, no rebound, no guarding and no CVA tenderness.   Musculoskeletal: Normal range of motion. She exhibits no edema.   Lymphadenopathy:     She has no cervical adenopathy.   Neurological: She is alert and oriented to person, place, and time. No cranial nerve deficit.   Skin: Skin is warm and dry. No rash noted.   Psychiatric: She has a normal mood and affect. Her speech is normal and behavior is normal. Thought content normal.       Component      Latest Ref Rng & Units " 1/28/2019   Sodium      136 - 145 mmol/L 142   Potassium      3.5 - 5.1 mmol/L 4.7   Chloride      95 - 110 mmol/L 104   CO2      23 - 29 mmol/L 26   Glucose      70 - 110 mg/dL 114 (H)   BUN, Bld      8 - 23 mg/dL 21   Creatinine      0.5 - 1.4 mg/dL 1.0   Calcium      8.7 - 10.5 mg/dL 9.8   Total Protein      6.0 - 8.4 g/dL 7.5   Albumin      3.5 - 5.2 g/dL 4.0   Total Bilirubin      0.1 - 1.0 mg/dL 0.6   Alkaline Phosphatase      55 - 135 U/L 84   AST      10 - 40 U/L 18   ALT      10 - 44 U/L 8 (L)   Anion Gap      8 - 16 mmol/L 12   eGFR if African American      >60 mL/min/1.73 m:2 >60   eGFR if non African American      >60 mL/min/1.73 m:2 53 (A)   Vit D, 25-Hydroxy      30 - 96 ng/mL 27 (L)     Assessment:       1. Routine physical examination    2. Elevated serum glucose    3. Family history of diabetes mellitus    4. Essential hypertension    5. Stage 3 chronic kidney disease    6. Atherosclerosis of aorta    7. Acquired hypothyroidism    8. Vitamin D deficiency disease    9. Osteoporosis, postmenopausal    10. Primary osteoarthritis involving multiple joints    11. Hiatal hernia with gastroesophageal reflux    12. Simple chronic bronchitis    13. Mild major depression    14. Anxiety    15. Lumbar spondylosis        Plan:         Clau was seen today for annual exam.    Diagnoses and all orders for this visit:    Routine physical examination  -     CBC auto differential; Future  -     Comprehensive metabolic panel; Future  -     Lipid panel; Future  -     TSH; Future  -     Hemoglobin A1c; Future  Labs with result review following.    Elevated serum glucose, Family history of diabetes mellitus  -     Comprehensive metabolic panel; Future  -     Hemoglobin A1c; Future    Essential hypertension        -     Comprehensive metabolic panel; Future  BP controlled. Pt taking losartan. Monitor BP.    Stage 3 chronic kidney disease        -     Comprehensive metabolic panel; Future    Atherosclerosis of aorta         -     Lipid panel; Future  Pt denies any CV sxs.    Acquired hypothyroidism  -     TSH; Future  Lab today with result review following. Pt taking Synthroid.    Vitamin D deficiency disease  Pt taking vit D supplement. F/u with rheum as recommended.    Osteoporosis, postmenopausal  Pt taking Prolia. F/u with rheum as recommended.    Primary osteoarthritis involving multiple joints  Stable.    Hiatal hernia with gastroesophageal reflux  Stable. Pt taking Prilosec.    Simple chronic bronchitis  Stable. Pt has albuterol inhaler to use PRN.    Mild major depression, Anxiety  Stable. Pt taking Lexapro. Has Buspar but not currently taking.    Lumbar spondylosis  Stable. Pt taking Neurontin. Pt requests Flexeril for intermittent muscle spasms - written for pt today per her request - pt reports has taken in the past without adverse effects. Recommend pt use this Rx with caution and practice fall precautions. F/u with physiatry/pain clinic as recommended.    F/u with PCP Dr. Molina in 3 months for further health management.

## 2019-03-12 LAB
ALBUMIN SERPL BCP-MCNC: 3.7 G/DL
ALP SERPL-CCNC: 74 U/L
ALT SERPL W/O P-5'-P-CCNC: 8 U/L
ANION GAP SERPL CALC-SCNC: 10 MMOL/L
AST SERPL-CCNC: 18 U/L
BASOPHILS # BLD AUTO: 0.05 K/UL
BASOPHILS NFR BLD: 0.8 %
BILIRUB SERPL-MCNC: 0.5 MG/DL
BUN SERPL-MCNC: 15 MG/DL
CALCIUM SERPL-MCNC: 9 MG/DL
CHLORIDE SERPL-SCNC: 104 MMOL/L
CHOLEST SERPL-MCNC: 205 MG/DL
CHOLEST/HDLC SERPL: 4.7 {RATIO}
CO2 SERPL-SCNC: 25 MMOL/L
CREAT SERPL-MCNC: 1.1 MG/DL
DIFFERENTIAL METHOD: ABNORMAL
EOSINOPHIL # BLD AUTO: 0.1 K/UL
EOSINOPHIL NFR BLD: 1.4 %
ERYTHROCYTE [DISTWIDTH] IN BLOOD BY AUTOMATED COUNT: 12.6 %
EST. GFR  (AFRICAN AMERICAN): 54 ML/MIN/1.73 M^2
EST. GFR  (NON AFRICAN AMERICAN): 46.9 ML/MIN/1.73 M^2
ESTIMATED AVG GLUCOSE: 97 MG/DL
GLUCOSE SERPL-MCNC: 76 MG/DL
HBA1C MFR BLD HPLC: 5 %
HCT VFR BLD AUTO: 46.8 %
HDLC SERPL-MCNC: 44 MG/DL
HDLC SERPL: 21.5 %
HGB BLD-MCNC: 14.7 G/DL
IMM GRANULOCYTES # BLD AUTO: 0.01 K/UL
IMM GRANULOCYTES NFR BLD AUTO: 0.2 %
LDLC SERPL CALC-MCNC: 124.2 MG/DL
LYMPHOCYTES # BLD AUTO: 2 K/UL
LYMPHOCYTES NFR BLD: 30.7 %
MCH RBC QN AUTO: 30.8 PG
MCHC RBC AUTO-ENTMCNC: 31.4 G/DL
MCV RBC AUTO: 98 FL
MONOCYTES # BLD AUTO: 0.6 K/UL
MONOCYTES NFR BLD: 9.4 %
NEUTROPHILS # BLD AUTO: 3.7 K/UL
NEUTROPHILS NFR BLD: 57.5 %
NONHDLC SERPL-MCNC: 161 MG/DL
NRBC BLD-RTO: 0 /100 WBC
PLATELET # BLD AUTO: 265 K/UL
PMV BLD AUTO: 11.2 FL
POTASSIUM SERPL-SCNC: 4.4 MMOL/L
PROT SERPL-MCNC: 7.1 G/DL
RBC # BLD AUTO: 4.78 M/UL
SODIUM SERPL-SCNC: 139 MMOL/L
T4 FREE SERPL-MCNC: 0.82 NG/DL
TRIGL SERPL-MCNC: 184 MG/DL
TSH SERPL DL<=0.005 MIU/L-ACNC: 7.01 UIU/ML
WBC # BLD AUTO: 6.36 K/UL

## 2019-03-13 DIAGNOSIS — E03.9 ACQUIRED HYPOTHYROIDISM: Primary | ICD-10-CM

## 2019-03-13 RX ORDER — LEVOTHYROXINE SODIUM 100 UG/1
100 TABLET ORAL DAILY
Qty: 90 TABLET | Refills: 0 | Status: SHIPPED | OUTPATIENT
Start: 2019-03-13 | End: 2019-07-30 | Stop reason: SDUPTHER

## 2019-03-14 ENCOUNTER — TELEPHONE (OUTPATIENT)
Dept: INTERNAL MEDICINE | Facility: CLINIC | Age: 84
End: 2019-03-14

## 2019-03-14 DIAGNOSIS — R19.7 DIARRHEA, UNSPECIFIED TYPE: Primary | ICD-10-CM

## 2019-03-14 NOTE — TELEPHONE ENCOUNTER
----- Message from Kamaljit Garcia sent at 3/14/2019  2:35 PM CDT -----  Contact: Pt  Please give pt a call at ..[phone] (home) she is still having diarrhea and needs to have stool sample completed and wants to know if she can come  the kit

## 2019-03-18 NOTE — TELEPHONE ENCOUNTER
Pt declined stool test, pt stated she is doing much better and would like to hold stool orders until further notice.  edward

## 2019-04-03 ENCOUNTER — PATIENT MESSAGE (OUTPATIENT)
Dept: INTERNAL MEDICINE | Facility: CLINIC | Age: 84
End: 2019-04-03

## 2019-04-03 DIAGNOSIS — M62.838 MUSCLE SPASM: Primary | ICD-10-CM

## 2019-04-03 RX ORDER — CYCLOBENZAPRINE HCL 5 MG
5 TABLET ORAL NIGHTLY PRN
Qty: 30 TABLET | Refills: 0 | Status: SHIPPED | OUTPATIENT
Start: 2019-04-03 | End: 2019-04-13

## 2019-04-10 ENCOUNTER — PES CALL (OUTPATIENT)
Dept: ADMINISTRATIVE | Facility: CLINIC | Age: 84
End: 2019-04-10

## 2019-04-12 NOTE — PROGRESS NOTES
"Subjective:      Patient ID: Clau Nunn is a 84 y.o. female.    Chief Complaint: Follow-up      HPI  Here for f/u medical problems.  Energy better.  Anxiety doing well on lexapro.  Addition of flexeril to gabapentin at hs, has helped the muscle pain/sacral area pain.  2 months ago fell and broke right shoulder, healing without surgery.  Was awoken during the night and she was groggy and fell.    Says BPs "running high" at home.  No f/c/sw.  Still with a cough, inhaler helps.  About 1x per week needs that albuterol.  No cp/palp.  BMs normal.  No dysuria.    Updated/ annual due 3/20:  HM: 11/17 fluvax, 5/16 [id number], 5/17 booster [id number], 12/11 TDaP, 11/09 zoster, 12/16 BMD rep 3y, 1/14 Cscope no more needed, 4/19 [facility]/ Gyn Dr. Tere bauer outside, 3/18 sched Eye Dr. Rubalcava.     Review of Systems   Constitutional: Negative for appetite change, chills, diaphoresis and fever.   HENT: Negative for congestion, ear pain, rhinorrhea, sinus pressure and sore throat.    Respiratory: Negative for cough, chest tightness and shortness of breath.    Cardiovascular: Negative for chest pain and palpitations.   Gastrointestinal: Negative for blood in stool, constipation, diarrhea, nausea and vomiting.   Genitourinary: Negative for dysuria, frequency, hematuria, menstrual problem, urgency and vaginal discharge.   Musculoskeletal: Negative for arthralgias.   Skin: Negative for rash.   Neurological: Negative for dizziness and headaches.   Psychiatric/Behavioral: Negative for sleep disturbance. The patient is not nervous/anxious.          Objective:   /68 (BP Location: Right arm, Patient Position: Sitting, BP Method: Medium (Manual))   Pulse 64   Temp 97.7 °F (36.5 °C) (Tympanic)   Ht 5' 5" (1.651 m)   Wt 75 kg (165 lb 5.5 oz)   SpO2 97%   BMI 27.51 kg/m²     Physical Exam   Constitutional: She is oriented to person, place, and time. She appears well-developed and well-nourished.   HENT:   Right Ear: External ear " normal. Tympanic membrane is not injected.   Left Ear: External ear normal. Tympanic membrane is not injected.   Mouth/Throat: Oropharynx is clear and moist.   Eyes: Conjunctivae are normal.   Neck: Normal range of motion. Neck supple. No thyromegaly present.   Cardiovascular: Normal rate, regular rhythm and intact distal pulses. Exam reveals no gallop and no friction rub.   No murmur heard.  Pulmonary/Chest: Effort normal and breath sounds normal. She has no wheezes. She has no rales.   Abdominal: Soft. Bowel sounds are normal. She exhibits no mass. There is no tenderness.   Musculoskeletal: She exhibits no edema.   Lymphadenopathy:     She has no cervical adenopathy.   Neurological: She is alert and oriented to person, place, and time.   Skin: Skin is warm. No rash noted.   Psychiatric: She has a normal mood and affect.           Assessment:       1. Acquired hypothyroidism    2. Vitamin D deficiency disease    3. Stage 3 chronic kidney disease    4. Simple chronic bronchitis    5. Primary osteoarthritis involving multiple joints    6. Osteoporosis, postmenopausal    7. Mild major depression    8. Hiatal hernia with gastroesophageal reflux    9. Essential hypertension    10. Age-related osteoporosis with current pathological fracture with delayed healing    11. Renal cyst          Plan:   Clau was seen today for follow-up.    Diagnoses and all orders for this visit:    Acquired hypothyroidism- now clinically stable, cont rx.    Vitamin D deficiency disease    Stage 3 chronic kidney disease- stable.    Simple chronic bronchitis- doing well currently.    Primary osteoarthritis involving multiple joints- cont turmeric.  Try heat and massage.    Mild major depression- doing well, cont rx.    Hiatal hernia with gastroesophageal reflux- doing well with PPI, cont.    Essential hypertension- bring cuff in for check with nurse- may be running low in reality.    Age-related osteoporosis with current pathological  fracture with delayed healing- on prolia now.    Renal cyst per outside urologist, not present on our u/s 11/15.  Repeat u/s in 4mo with RTC.  Diag mmg per outside facility.

## 2019-04-16 ENCOUNTER — OFFICE VISIT (OUTPATIENT)
Dept: OPHTHALMOLOGY | Facility: CLINIC | Age: 84
End: 2019-04-16
Payer: MEDICARE

## 2019-04-16 DIAGNOSIS — H52.7 REFRACTIVE ERROR: ICD-10-CM

## 2019-04-16 DIAGNOSIS — H04.129 DRY EYE: ICD-10-CM

## 2019-04-16 DIAGNOSIS — Z96.1 PSEUDOPHAKIA OF BOTH EYES: Primary | ICD-10-CM

## 2019-04-16 PROCEDURE — 99999 PR PBB SHADOW E&M-EST. PATIENT-LVL II: ICD-10-PCS | Mod: PBBFAC,HCNC,, | Performed by: OPHTHALMOLOGY

## 2019-04-16 PROCEDURE — 92015 PR REFRACTION: ICD-10-PCS | Mod: HCNC,S$GLB,, | Performed by: OPHTHALMOLOGY

## 2019-04-16 PROCEDURE — 92014 COMPRE OPH EXAM EST PT 1/>: CPT | Mod: HCNC,S$GLB,, | Performed by: OPHTHALMOLOGY

## 2019-04-16 PROCEDURE — 92014 PR EYE EXAM, EST PATIENT,COMPREHESV: ICD-10-PCS | Mod: HCNC,S$GLB,, | Performed by: OPHTHALMOLOGY

## 2019-04-16 PROCEDURE — 92015 DETERMINE REFRACTIVE STATE: CPT | Mod: HCNC,S$GLB,, | Performed by: OPHTHALMOLOGY

## 2019-04-16 PROCEDURE — 99999 PR PBB SHADOW E&M-EST. PATIENT-LVL II: CPT | Mod: PBBFAC,HCNC,, | Performed by: OPHTHALMOLOGY

## 2019-04-16 NOTE — PROGRESS NOTES
SUBJECTIVE:   Clau Nunn is a 84 y.o. female   Corrected distance visual acuity was 20/30 in the right eye and 20/30 -2 in the left eye.   Chief Complaint   Patient presents with    Eye Exam     Yearly        HPI:  HPI     Eye Exam      Additional comments: Yearly              Comments     Patient Notice Right Eye Vision Is A Little Weaker But No Discomfort.    1. Restor IOL OU-Fargusen  2. Dry eyes-CSM  3. Dizzness-No Ocular involvement   4. RE- RX for SV glasses.  5. Blepharitis    Restasis PRN OU  Emycin krista as needed          Last edited by Hannah Olmedo on 4/16/2019  9:57 AM. (History)        Assessment /Plan :  1. Pseudophakia of both eyes  -- Condition stable, no therapeutic change required. Monitoring routinely.     2. Dry eye  -- Condition stable, no therapeutic change required. Monitoring routinely.     3. Refractive error distance rx       RTC in 1 year or prn any changes

## 2019-04-24 ENCOUNTER — LAB VISIT (OUTPATIENT)
Dept: LAB | Facility: HOSPITAL | Age: 84
End: 2019-04-24
Attending: INTERNAL MEDICINE
Payer: MEDICARE

## 2019-04-24 DIAGNOSIS — E03.9 ACQUIRED HYPOTHYROIDISM: ICD-10-CM

## 2019-04-24 LAB — TSH SERPL DL<=0.005 MIU/L-ACNC: 2.37 UIU/ML (ref 0.4–4)

## 2019-04-24 PROCEDURE — 84443 ASSAY THYROID STIM HORMONE: CPT | Mod: HCNC

## 2019-04-24 PROCEDURE — 36415 COLL VENOUS BLD VENIPUNCTURE: CPT | Mod: HCNC,PO

## 2019-04-26 ENCOUNTER — OFFICE VISIT (OUTPATIENT)
Dept: FAMILY MEDICINE | Facility: CLINIC | Age: 84
End: 2019-04-26
Payer: MEDICARE

## 2019-04-26 VITALS
HEIGHT: 65 IN | WEIGHT: 165.38 LBS | DIASTOLIC BLOOD PRESSURE: 68 MMHG | TEMPERATURE: 98 F | SYSTOLIC BLOOD PRESSURE: 116 MMHG | BODY MASS INDEX: 27.56 KG/M2 | HEART RATE: 64 BPM | OXYGEN SATURATION: 97 %

## 2019-04-26 DIAGNOSIS — M15.9 PRIMARY OSTEOARTHRITIS INVOLVING MULTIPLE JOINTS: ICD-10-CM

## 2019-04-26 DIAGNOSIS — F32.0 MILD MAJOR DEPRESSION: ICD-10-CM

## 2019-04-26 DIAGNOSIS — E03.9 ACQUIRED HYPOTHYROIDISM: Primary | ICD-10-CM

## 2019-04-26 DIAGNOSIS — K44.9 HIATAL HERNIA WITH GASTROESOPHAGEAL REFLUX: ICD-10-CM

## 2019-04-26 DIAGNOSIS — N18.30 STAGE 3 CHRONIC KIDNEY DISEASE: ICD-10-CM

## 2019-04-26 DIAGNOSIS — J41.0 SIMPLE CHRONIC BRONCHITIS: ICD-10-CM

## 2019-04-26 DIAGNOSIS — E55.9 VITAMIN D DEFICIENCY DISEASE: ICD-10-CM

## 2019-04-26 DIAGNOSIS — K21.9 HIATAL HERNIA WITH GASTROESOPHAGEAL REFLUX: ICD-10-CM

## 2019-04-26 DIAGNOSIS — M81.0 OSTEOPOROSIS, POSTMENOPAUSAL: Chronic | ICD-10-CM

## 2019-04-26 DIAGNOSIS — M80.00XG AGE-RELATED OSTEOPOROSIS WITH CURRENT PATHOLOGICAL FRACTURE WITH DELAYED HEALING: ICD-10-CM

## 2019-04-26 DIAGNOSIS — N28.1 RENAL CYST: ICD-10-CM

## 2019-04-26 DIAGNOSIS — I10 ESSENTIAL HYPERTENSION: Chronic | ICD-10-CM

## 2019-04-26 PROCEDURE — 99999 PR PBB SHADOW E&M-EST. PATIENT-LVL III: CPT | Mod: PBBFAC,HCNC,, | Performed by: INTERNAL MEDICINE

## 2019-04-26 PROCEDURE — 99999 PR PBB SHADOW E&M-EST. PATIENT-LVL III: ICD-10-PCS | Mod: PBBFAC,HCNC,, | Performed by: INTERNAL MEDICINE

## 2019-04-26 PROCEDURE — 99214 OFFICE O/P EST MOD 30 MIN: CPT | Mod: HCNC,S$GLB,, | Performed by: INTERNAL MEDICINE

## 2019-04-26 PROCEDURE — 3074F SYST BP LT 130 MM HG: CPT | Mod: HCNC,CPTII,S$GLB, | Performed by: INTERNAL MEDICINE

## 2019-04-26 PROCEDURE — 99499 RISK ADDL DX/OHS AUDIT: ICD-10-PCS | Mod: HCNC,S$GLB,, | Performed by: INTERNAL MEDICINE

## 2019-04-26 PROCEDURE — 1101F PT FALLS ASSESS-DOCD LE1/YR: CPT | Mod: HCNC,CPTII,S$GLB, | Performed by: INTERNAL MEDICINE

## 2019-04-26 PROCEDURE — 3078F DIAST BP <80 MM HG: CPT | Mod: HCNC,CPTII,S$GLB, | Performed by: INTERNAL MEDICINE

## 2019-04-26 PROCEDURE — 99214 PR OFFICE/OUTPT VISIT, EST, LEVL IV, 30-39 MIN: ICD-10-PCS | Mod: HCNC,S$GLB,, | Performed by: INTERNAL MEDICINE

## 2019-04-26 PROCEDURE — 99499 UNLISTED E&M SERVICE: CPT | Mod: HCNC,S$GLB,, | Performed by: INTERNAL MEDICINE

## 2019-04-26 PROCEDURE — 3074F PR MOST RECENT SYSTOLIC BLOOD PRESSURE < 130 MM HG: ICD-10-PCS | Mod: HCNC,CPTII,S$GLB, | Performed by: INTERNAL MEDICINE

## 2019-04-26 PROCEDURE — 1101F PR PT FALLS ASSESS DOC 0-1 FALLS W/OUT INJ PAST YR: ICD-10-PCS | Mod: HCNC,CPTII,S$GLB, | Performed by: INTERNAL MEDICINE

## 2019-04-26 PROCEDURE — 3078F PR MOST RECENT DIASTOLIC BLOOD PRESSURE < 80 MM HG: ICD-10-PCS | Mod: HCNC,CPTII,S$GLB, | Performed by: INTERNAL MEDICINE

## 2019-04-26 RX ORDER — CYCLOBENZAPRINE HCL 5 MG
5 TABLET ORAL NIGHTLY
COMMUNITY
End: 2019-04-26 | Stop reason: SDUPTHER

## 2019-04-26 RX ORDER — CYCLOBENZAPRINE HCL 5 MG
5 TABLET ORAL NIGHTLY PRN
Qty: 90 TABLET | Refills: 1 | Status: SHIPPED | OUTPATIENT
Start: 2019-04-26 | End: 2019-11-15 | Stop reason: SDUPTHER

## 2019-04-30 ENCOUNTER — CLINICAL SUPPORT (OUTPATIENT)
Dept: FAMILY MEDICINE | Facility: CLINIC | Age: 84
End: 2019-04-30
Payer: MEDICARE

## 2019-04-30 PROCEDURE — 99999 PR PBB SHADOW E&M-EST. PATIENT-LVL II: ICD-10-PCS | Mod: PBBFAC,HCNC,,

## 2019-04-30 PROCEDURE — 99999 PR PBB SHADOW E&M-EST. PATIENT-LVL II: CPT | Mod: PBBFAC,HCNC,,

## 2019-04-30 NOTE — PROGRESS NOTES
Pt presents to clinic to compare home electronic BP cuff to manual reading.    Manual  110/74  P 66  Elec      117/80  P67    Will forward chart to Dr. Molina for review./rpr

## 2019-05-01 ENCOUNTER — DOCUMENTATION ONLY (OUTPATIENT)
Dept: FAMILY MEDICINE | Facility: CLINIC | Age: 84
End: 2019-05-01

## 2019-05-01 NOTE — PROGRESS NOTES
PA initiated for Cyclobenzaprine 5mg through [email or web address]./prasanna

## 2019-05-03 ENCOUNTER — DOCUMENTATION ONLY (OUTPATIENT)
Dept: FAMILY MEDICINE | Facility: CLINIC | Age: 84
End: 2019-05-03

## 2019-07-23 ENCOUNTER — TELEPHONE (OUTPATIENT)
Dept: FAMILY MEDICINE | Facility: CLINIC | Age: 84
End: 2019-07-23

## 2019-07-23 ENCOUNTER — OFFICE VISIT (OUTPATIENT)
Dept: FAMILY MEDICINE | Facility: CLINIC | Age: 84
End: 2019-07-23
Payer: MEDICARE

## 2019-07-23 ENCOUNTER — HOSPITAL ENCOUNTER (OUTPATIENT)
Dept: RADIOLOGY | Facility: HOSPITAL | Age: 84
Discharge: HOME | End: 2019-07-23
Attending: INTERNAL MEDICINE
Payer: MEDICARE

## 2019-07-23 VITALS
SYSTOLIC BLOOD PRESSURE: 168 MMHG | WEIGHT: 168 LBS | OXYGEN SATURATION: 98 % | HEIGHT: 65 IN | BODY MASS INDEX: 27.99 KG/M2 | DIASTOLIC BLOOD PRESSURE: 78 MMHG | TEMPERATURE: 90 F | HEART RATE: 78 BPM

## 2019-07-23 DIAGNOSIS — M79.89 RIGHT LEG SWELLING: Primary | ICD-10-CM

## 2019-07-23 DIAGNOSIS — J41.0 SIMPLE CHRONIC BRONCHITIS: ICD-10-CM

## 2019-07-23 DIAGNOSIS — M79.89 RIGHT LEG SWELLING: ICD-10-CM

## 2019-07-23 DIAGNOSIS — M79.604 PAIN OF RIGHT LOWER EXTREMITY: ICD-10-CM

## 2019-07-23 DIAGNOSIS — M25.562 POSTERIOR LEFT KNEE PAIN: ICD-10-CM

## 2019-07-23 DIAGNOSIS — M79.604 PAIN OF RIGHT LOWER EXTREMITY: Primary | ICD-10-CM

## 2019-07-23 DIAGNOSIS — N18.30 STAGE 3 CHRONIC KIDNEY DISEASE: ICD-10-CM

## 2019-07-23 DIAGNOSIS — F41.9 ANXIETY: ICD-10-CM

## 2019-07-23 DIAGNOSIS — F51.01 PRIMARY INSOMNIA: ICD-10-CM

## 2019-07-23 DIAGNOSIS — I10 ESSENTIAL HYPERTENSION: Chronic | ICD-10-CM

## 2019-07-23 DIAGNOSIS — E03.9 ACQUIRED HYPOTHYROIDISM: ICD-10-CM

## 2019-07-23 DIAGNOSIS — R30.0 DYSURIA: ICD-10-CM

## 2019-07-23 PROCEDURE — 99999 PR PBB SHADOW E&M-EST. PATIENT-LVL III: CPT | Mod: PBBFAC,HCNC,, | Performed by: INTERNAL MEDICINE

## 2019-07-23 PROCEDURE — 1101F PR PT FALLS ASSESS DOC 0-1 FALLS W/OUT INJ PAST YR: ICD-10-PCS | Mod: HCNC,CPTII,S$GLB, | Performed by: INTERNAL MEDICINE

## 2019-07-23 PROCEDURE — 93971 US LOWER EXTREMITY VEINS RIGHT: ICD-10-PCS | Mod: 26,HCNC,RT, | Performed by: RADIOLOGY

## 2019-07-23 PROCEDURE — 99214 OFFICE O/P EST MOD 30 MIN: CPT | Mod: HCNC,S$GLB,, | Performed by: INTERNAL MEDICINE

## 2019-07-23 PROCEDURE — 99999 PR PBB SHADOW E&M-EST. PATIENT-LVL III: ICD-10-PCS | Mod: PBBFAC,HCNC,, | Performed by: INTERNAL MEDICINE

## 2019-07-23 PROCEDURE — 3078F DIAST BP <80 MM HG: CPT | Mod: HCNC,CPTII,S$GLB, | Performed by: INTERNAL MEDICINE

## 2019-07-23 PROCEDURE — 73562 X-RAY EXAM OF KNEE 3: CPT | Mod: 26,50,HCNC, | Performed by: RADIOLOGY

## 2019-07-23 PROCEDURE — 99214 PR OFFICE/OUTPT VISIT, EST, LEVL IV, 30-39 MIN: ICD-10-PCS | Mod: HCNC,S$GLB,, | Performed by: INTERNAL MEDICINE

## 2019-07-23 PROCEDURE — 73562 X-RAY EXAM OF KNEE 3: CPT | Mod: TC,50,HCNC,FY,PO

## 2019-07-23 PROCEDURE — 3077F SYST BP >= 140 MM HG: CPT | Mod: HCNC,CPTII,S$GLB, | Performed by: INTERNAL MEDICINE

## 2019-07-23 PROCEDURE — 93971 EXTREMITY STUDY: CPT | Mod: TC,HCNC,RT

## 2019-07-23 PROCEDURE — 3077F PR MOST RECENT SYSTOLIC BLOOD PRESSURE >= 140 MM HG: ICD-10-PCS | Mod: HCNC,CPTII,S$GLB, | Performed by: INTERNAL MEDICINE

## 2019-07-23 PROCEDURE — 1101F PT FALLS ASSESS-DOCD LE1/YR: CPT | Mod: HCNC,CPTII,S$GLB, | Performed by: INTERNAL MEDICINE

## 2019-07-23 PROCEDURE — 73562 XR KNEE ORTHO BILAT: ICD-10-PCS | Mod: 26,50,HCNC, | Performed by: RADIOLOGY

## 2019-07-23 PROCEDURE — 93971 EXTREMITY STUDY: CPT | Mod: 26,HCNC,RT, | Performed by: RADIOLOGY

## 2019-07-23 PROCEDURE — 3078F PR MOST RECENT DIASTOLIC BLOOD PRESSURE < 80 MM HG: ICD-10-PCS | Mod: HCNC,CPTII,S$GLB, | Performed by: INTERNAL MEDICINE

## 2019-07-23 RX ORDER — TRAZODONE HYDROCHLORIDE 50 MG/1
50 TABLET ORAL NIGHTLY PRN
Qty: 30 TABLET | Refills: 3 | Status: SHIPPED | OUTPATIENT
Start: 2019-07-23 | End: 2019-11-15 | Stop reason: SDUPTHER

## 2019-07-23 NOTE — PROGRESS NOTES
"Subjective:      Patient ID: Clau Nunn is a 84 y.o. female.    Chief Complaint: Leg Swelling (Pain )      HPI  Pt here for follow up of medical problems and RLE swelling and pain x 1mo, after a drive to [facility].  Chronic cough, unchanged and denies SOB.  Checking BPs at home, but can't remember.  No cp/sob/palp.    Updated/ annual due 3/20:  HM: 11/17 fluvax, 5/16 [id number], 5/17 booster [id number], 12/11 TDaP, 11/09 zoster, 12/16 BMD rep 3y, 1/14 Cscope no more needed, 4/19 [facility]/ Gyn Dr. Tere bauer outside, 3/18 sched Eye Dr. Rubalcava.     Review of Systems   Constitutional: Negative for activity change, chills, diaphoresis, fever and unexpected weight change.   HENT: Negative for hearing loss, rhinorrhea and trouble swallowing.    Eyes: Negative for discharge and visual disturbance.   Respiratory: Negative for cough, chest tightness, shortness of breath and wheezing.    Cardiovascular: Negative for chest pain, palpitations and leg swelling.   Gastrointestinal: Negative for blood in stool, constipation, diarrhea, nausea and vomiting.   Endocrine: Negative for polydipsia and polyuria.   Genitourinary: Positive for difficulty urinating and dysuria. Negative for frequency, hematuria and menstrual problem.   Musculoskeletal: Positive for arthralgias and joint swelling. Negative for neck pain.   Neurological: Negative for weakness and headaches.   Psychiatric/Behavioral: Negative for confusion and dysphoric mood. The patient is not nervous/anxious.          Objective:   BP (!) 168/78 (BP Location: Left arm, Patient Position: Sitting, BP Method: Medium (Manual))   Pulse 78   Temp (!) 90.3 °F (32.4 °C) (Temporal)   Ht 5' 5" (1.651 m)   Wt 76.2 kg (167 lb 15.9 oz)   SpO2 98%   BMI 27.96 kg/m²     Physical Exam   Constitutional: She is oriented to person, place, and time. She appears well-developed.   HENT:   Mouth/Throat: Oropharynx is clear and moist.   Neck: Neck supple. Carotid bruit is not present. No thyroid " mass present.   Cardiovascular: Normal rate, regular rhythm and intact distal pulses. Exam reveals no gallop and no friction rub.   No murmur heard.  Pulmonary/Chest: Effort normal and breath sounds normal. She has no wheezes. She has no rales.   Abdominal: Soft. Bowel sounds are normal. She exhibits no mass. There is no hepatosplenomegaly. There is no tenderness.   Musculoskeletal: She exhibits edema (right pretibial).   Tender calf and firm mass medially, neg Garett's, neg Michael's canal.   Lymphadenopathy:     She has no cervical adenopathy.   Neurological: She is alert and oriented to person, place, and time.   Psychiatric: She has a normal mood and affect.           Assessment:       1. Right leg swelling    2. Pain of right lower extremity    3. Essential hypertension    4. Acquired hypothyroidism    5. Anxiety    6. Simple chronic bronchitis    7. Stage 3 chronic kidney disease          Plan:     Right leg swelling, Pain of right lower extremity x 1mo, since car ride.  -     US Lower Extremity Veins Right; Future; Expected date: 07/23/2019  -     CAR Ultrasound doppler venous leg right; Future  -     X-Ray Knee 1 or 2 View Right; Future; Expected date: 07/23/2019    Essential hypertension- monitor BPs and send to me.    Acquired hypothyroidism- clin stable.    Anxiety- doing well, try trazodone at hs.    Simple chronic bronchitis- stable lately.    Stage 3 chronic kidney disease- labs in the future.

## 2019-07-23 NOTE — TELEPHONE ENCOUNTER
Mary reporting ultrasound results show partial thrombus of right popliteal. Radiologist has reviewed patient history to show chronic issues of thrombosis and surgical placement of filter. Patient was released home.

## 2019-07-24 ENCOUNTER — PATIENT MESSAGE (OUTPATIENT)
Dept: FAMILY MEDICINE | Facility: CLINIC | Age: 84
End: 2019-07-24

## 2019-07-24 RX ORDER — SULFAMETHOXAZOLE AND TRIMETHOPRIM 800; 160 MG/1; MG/1
1 TABLET ORAL 2 TIMES DAILY
Qty: 14 TABLET | Refills: 0 | Status: SHIPPED | OUTPATIENT
Start: 2019-07-24 | End: 2019-07-26

## 2019-07-26 ENCOUNTER — PATIENT MESSAGE (OUTPATIENT)
Dept: FAMILY MEDICINE | Facility: CLINIC | Age: 84
End: 2019-07-26

## 2019-07-26 RX ORDER — NITROFURANTOIN 25; 75 MG/1; MG/1
100 CAPSULE ORAL 2 TIMES DAILY
Qty: 20 CAPSULE | Refills: 0 | Status: SHIPPED | OUTPATIENT
Start: 2019-07-26 | End: 2019-08-05

## 2019-07-30 DIAGNOSIS — E03.9 ACQUIRED HYPOTHYROIDISM: ICD-10-CM

## 2019-07-30 RX ORDER — LEVOTHYROXINE SODIUM 100 UG/1
100 TABLET ORAL DAILY
Qty: 90 TABLET | Refills: 3 | Status: SHIPPED | OUTPATIENT
Start: 2019-07-30 | End: 2019-11-15 | Stop reason: SDUPTHER

## 2019-07-30 NOTE — TELEPHONE ENCOUNTER
----- Message from Maggie Peralta sent at 7/30/2019  9:26 AM CDT -----  Contact: Pharmacy Technician- Faith  Type:  RX Refill Request    Who Called:   Refill or New Rx:Reill  RX Name and Strength:levothyroxine (SYNTHROID) 100 MCG tablet  How is the patient currently taking it? (ex. 1XDay):  Is this a 30 day or 90 day RX:90  Preferred Pharmacy with phone number:  [facility] - [facility] [place] [address]  [address] [facility] [address]  Phone: [phone] Fax: [phone]  Local or Mail Order:Mail  Ordering Provider: Long  Would the patient rather a call back or a response via [facility]? call  Best Call Back Number:[phone] (home)   Additional Information:

## 2019-08-02 ENCOUNTER — CLINICAL SUPPORT (OUTPATIENT)
Dept: CARDIOLOGY | Facility: CLINIC | Age: 84
End: 2019-08-02
Attending: INTERNAL MEDICINE
Payer: MEDICARE

## 2019-08-02 DIAGNOSIS — M79.604 PAIN OF RIGHT LOWER EXTREMITY: ICD-10-CM

## 2019-08-02 DIAGNOSIS — M79.89 RIGHT LEG SWELLING: ICD-10-CM

## 2019-08-02 PROCEDURE — 93971 CAR US DOPPLER VENOUS LEG RIGHT: ICD-10-PCS | Mod: HCNC,S$GLB,, | Performed by: INTERNAL MEDICINE

## 2019-08-02 PROCEDURE — 93971 EXTREMITY STUDY: CPT | Mod: HCNC,S$GLB,, | Performed by: INTERNAL MEDICINE

## 2019-08-13 ENCOUNTER — PATIENT MESSAGE (OUTPATIENT)
Dept: FAMILY MEDICINE | Facility: CLINIC | Age: 84
End: 2019-08-13

## 2019-08-13 DIAGNOSIS — M79.89 RIGHT LEG SWELLING: Primary | ICD-10-CM

## 2019-08-13 NOTE — PROGRESS NOTES
"Subjective:      Patient ID: Clau Nunn is a 84 y.o. female.    Chief Complaint: Follow-up (4 months )      HPI  Here for follow up of medical problems.  To see Dr. Draper tomorrow.  Right leg swelling and "has a lump" at the end of the day.  Hx chronic popliteal DVT right leg.  Cough is relieved by inhaler.  Sleeping ok.  Carries grandkids to and from school each day.  Thinks anxiety is doing ok on current medication.  Trazodone is helping her get to sleep.  Still with some dysuria.  No f/c/n/v.  No cp/palp.    Updated/ annual due 3/20:  HM: 11/17 fluvax, 5/16 [id number], 5/17 booster [id number], 12/11 TDaP, 11/09 zoster, 12/16 BMD rep 3y, 1/14 Cscope no more needed, 4/19 MMG/ Gyn Dr. Tere bauer outside, 3/18 sched Eye Dr. Rubalcava.     Review of Systems   Constitutional: Negative for chills, diaphoresis and fever.   Respiratory: Negative for cough and shortness of breath.    Cardiovascular: Negative for chest pain, palpitations and leg swelling.   Gastrointestinal: Negative for blood in stool, constipation, diarrhea, nausea and vomiting.   Genitourinary: Negative for dysuria, frequency and hematuria.   Psychiatric/Behavioral: The patient is not nervous/anxious.          Objective:   /82 (BP Location: Left arm, Patient Position: Sitting, BP Method: Medium (Manual))   Pulse 89   Temp 97.6 °F (36.4 °C) (Temporal)   Ht 5' 5" (1.651 m)   Wt 75.3 kg (166 lb 0.1 oz)   SpO2 95%   BMI 27.62 kg/m²     Physical Exam   Constitutional: She is oriented to person, place, and time. She appears well-developed.   HENT:   Mouth/Throat: Oropharynx is clear and moist.   Neck: Neck supple. Carotid bruit is not present. No thyroid mass present.   Cardiovascular: Normal rate, regular rhythm and intact distal pulses. Exam reveals no gallop and no friction rub.   No murmur heard.  Pulmonary/Chest: Effort normal and breath sounds normal. She has no wheezes. She has no rales.   Abdominal: Soft. Bowel sounds are normal. She " exhibits no mass. There is no hepatosplenomegaly. There is no tenderness.   Musculoskeletal: She exhibits edema (trace right LE.).   Lymphadenopathy:     She has no cervical adenopathy.   Neurological: She is alert and oriented to person, place, and time.   Psychiatric: She has a normal mood and affect.           Assessment:       1. Essential hypertension    2. Acquired hypothyroidism    3. Simple chronic bronchitis    4. Anxiety    5. Dysuria          Plan:     Essential hypertension- adeq control, cont rx.    Acquired hypothyroidism    Simple chronic bronchitis- cont albuterol prn.    Anxiety- doing well, sleeping better    Dysuria, s/p cipro course last month.  -     Urinalysis; Future; Expected date: 08/27/2019  -     Urine culture; Future; Expected date: 08/27/2019    Leg swelling, pop dvt- to see vasc tomorrow.    RTC 3mo.

## 2019-08-14 ENCOUNTER — TELEPHONE (OUTPATIENT)
Dept: FAMILY MEDICINE | Facility: CLINIC | Age: 84
End: 2019-08-14

## 2019-08-14 NOTE — TELEPHONE ENCOUNTER
----- Message from Gertrude Glasgow sent at 8/14/2019 10:19 AM CDT -----  Contact: Self  Type:  Patient Returning Call    Who Called:Clau  Who Left Message for Patient:  Does the patient know what this is regarding?:  Would the patient rather a call back or a response via [facility]? call  Best Call Back Number:[phone]  Additional Information:

## 2019-08-19 ENCOUNTER — TELEPHONE (OUTPATIENT)
Dept: RADIOLOGY | Facility: HOSPITAL | Age: 84
End: 2019-08-19

## 2019-08-20 ENCOUNTER — HOSPITAL ENCOUNTER (OUTPATIENT)
Dept: RADIOLOGY | Facility: HOSPITAL | Age: 84
Discharge: HOME | End: 2019-08-20
Attending: INTERNAL MEDICINE
Payer: MEDICARE

## 2019-08-20 DIAGNOSIS — N28.1 RENAL CYST: ICD-10-CM

## 2019-08-20 PROCEDURE — 76770 US EXAM ABDO BACK WALL COMP: CPT | Mod: TC,HCNC

## 2019-08-20 PROCEDURE — 76770 US EXAM ABDO BACK WALL COMP: CPT | Mod: 26,HCNC,, | Performed by: RADIOLOGY

## 2019-08-20 PROCEDURE — 76770 US RETROPERITONEAL COMPLETE: ICD-10-PCS | Mod: 26,HCNC,, | Performed by: RADIOLOGY

## 2019-08-27 ENCOUNTER — OFFICE VISIT (OUTPATIENT)
Dept: FAMILY MEDICINE | Facility: CLINIC | Age: 84
End: 2019-08-27
Payer: MEDICARE

## 2019-08-27 VITALS
TEMPERATURE: 98 F | WEIGHT: 166 LBS | OXYGEN SATURATION: 95 % | SYSTOLIC BLOOD PRESSURE: 122 MMHG | DIASTOLIC BLOOD PRESSURE: 82 MMHG | HEIGHT: 65 IN | HEART RATE: 89 BPM | BODY MASS INDEX: 27.66 KG/M2

## 2019-08-27 DIAGNOSIS — E03.9 ACQUIRED HYPOTHYROIDISM: ICD-10-CM

## 2019-08-27 DIAGNOSIS — J41.0 SIMPLE CHRONIC BRONCHITIS: ICD-10-CM

## 2019-08-27 DIAGNOSIS — F41.9 ANXIETY: ICD-10-CM

## 2019-08-27 DIAGNOSIS — I10 ESSENTIAL HYPERTENSION: Primary | Chronic | ICD-10-CM

## 2019-08-27 DIAGNOSIS — R30.0 DYSURIA: ICD-10-CM

## 2019-08-27 PROCEDURE — 3074F SYST BP LT 130 MM HG: CPT | Mod: HCNC,CPTII,S$GLB, | Performed by: INTERNAL MEDICINE

## 2019-08-27 PROCEDURE — 1101F PT FALLS ASSESS-DOCD LE1/YR: CPT | Mod: HCNC,CPTII,S$GLB, | Performed by: INTERNAL MEDICINE

## 2019-08-27 PROCEDURE — 99214 OFFICE O/P EST MOD 30 MIN: CPT | Mod: HCNC,S$GLB,, | Performed by: INTERNAL MEDICINE

## 2019-08-27 PROCEDURE — 99999 PR PBB SHADOW E&M-EST. PATIENT-LVL III: CPT | Mod: PBBFAC,HCNC,, | Performed by: INTERNAL MEDICINE

## 2019-08-27 PROCEDURE — 99214 PR OFFICE/OUTPT VISIT, EST, LEVL IV, 30-39 MIN: ICD-10-PCS | Mod: HCNC,S$GLB,, | Performed by: INTERNAL MEDICINE

## 2019-08-27 PROCEDURE — 3074F PR MOST RECENT SYSTOLIC BLOOD PRESSURE < 130 MM HG: ICD-10-PCS | Mod: HCNC,CPTII,S$GLB, | Performed by: INTERNAL MEDICINE

## 2019-08-27 PROCEDURE — 3079F DIAST BP 80-89 MM HG: CPT | Mod: HCNC,CPTII,S$GLB, | Performed by: INTERNAL MEDICINE

## 2019-08-27 PROCEDURE — 3079F PR MOST RECENT DIASTOLIC BLOOD PRESSURE 80-89 MM HG: ICD-10-PCS | Mod: HCNC,CPTII,S$GLB, | Performed by: INTERNAL MEDICINE

## 2019-08-27 PROCEDURE — 99999 PR PBB SHADOW E&M-EST. PATIENT-LVL III: ICD-10-PCS | Mod: PBBFAC,HCNC,, | Performed by: INTERNAL MEDICINE

## 2019-08-27 PROCEDURE — 1101F PR PT FALLS ASSESS DOC 0-1 FALLS W/OUT INJ PAST YR: ICD-10-PCS | Mod: HCNC,CPTII,S$GLB, | Performed by: INTERNAL MEDICINE

## 2019-08-28 ENCOUNTER — PATIENT MESSAGE (OUTPATIENT)
Dept: FAMILY MEDICINE | Facility: CLINIC | Age: 84
End: 2019-08-28

## 2019-08-28 RX ORDER — CIPROFLOXACIN 250 MG/1
250 TABLET, FILM COATED ORAL 2 TIMES DAILY
Qty: 14 TABLET | Refills: 0 | Status: SHIPPED | OUTPATIENT
Start: 2019-08-28 | End: 2019-09-04

## 2019-08-30 ENCOUNTER — PATIENT MESSAGE (OUTPATIENT)
Dept: FAMILY MEDICINE | Facility: CLINIC | Age: 84
End: 2019-08-30

## 2019-08-30 RX ORDER — CIPROFLOXACIN 500 MG/1
500 TABLET ORAL 2 TIMES DAILY
Qty: 12 TABLET | Refills: 0 | Status: SHIPPED | OUTPATIENT
Start: 2019-08-30 | End: 2019-12-10

## 2019-09-13 ENCOUNTER — PATIENT MESSAGE (OUTPATIENT)
Dept: OPHTHALMOLOGY | Facility: CLINIC | Age: 84
End: 2019-09-13

## 2019-09-23 ENCOUNTER — PATIENT MESSAGE (OUTPATIENT)
Dept: FAMILY MEDICINE | Facility: CLINIC | Age: 84
End: 2019-09-23

## 2019-09-23 ENCOUNTER — PATIENT MESSAGE (OUTPATIENT)
Dept: OPHTHALMOLOGY | Facility: CLINIC | Age: 84
End: 2019-09-23

## 2019-10-03 ENCOUNTER — INITIAL CONSULT (OUTPATIENT)
Dept: DERMATOLOGY | Facility: CLINIC | Age: 84
End: 2019-10-03
Payer: MEDICARE

## 2019-10-03 DIAGNOSIS — Z12.83 SCREENING, MALIGNANT NEOPLASM, SKIN: ICD-10-CM

## 2019-10-03 DIAGNOSIS — L82.1 SEBORRHEIC KERATOSES: ICD-10-CM

## 2019-10-03 DIAGNOSIS — L57.0 ACTINIC KERATOSES: ICD-10-CM

## 2019-10-03 DIAGNOSIS — L85.3 XEROSIS CUTIS: ICD-10-CM

## 2019-10-03 DIAGNOSIS — L30.9 DERMATITIS: Primary | ICD-10-CM

## 2019-10-03 PROCEDURE — 99203 OFFICE O/P NEW LOW 30 MIN: CPT | Mod: 25,HCNC,S$GLB, | Performed by: STUDENT IN AN ORGANIZED HEALTH CARE EDUCATION/TRAINING PROGRAM

## 2019-10-03 PROCEDURE — 99999 PR PBB SHADOW E&M-EST. PATIENT-LVL II: CPT | Mod: PBBFAC,HCNC,, | Performed by: STUDENT IN AN ORGANIZED HEALTH CARE EDUCATION/TRAINING PROGRAM

## 2019-10-03 PROCEDURE — 99999 PR PBB SHADOW E&M-EST. PATIENT-LVL II: ICD-10-PCS | Mod: PBBFAC,HCNC,, | Performed by: STUDENT IN AN ORGANIZED HEALTH CARE EDUCATION/TRAINING PROGRAM

## 2019-10-03 PROCEDURE — 1101F PT FALLS ASSESS-DOCD LE1/YR: CPT | Mod: HCNC,CPTII,S$GLB, | Performed by: STUDENT IN AN ORGANIZED HEALTH CARE EDUCATION/TRAINING PROGRAM

## 2019-10-03 PROCEDURE — 1101F PR PT FALLS ASSESS DOC 0-1 FALLS W/OUT INJ PAST YR: ICD-10-PCS | Mod: HCNC,CPTII,S$GLB, | Performed by: STUDENT IN AN ORGANIZED HEALTH CARE EDUCATION/TRAINING PROGRAM

## 2019-10-03 PROCEDURE — 17000 DESTRUCT PREMALG LESION: CPT | Mod: HCNC,S$GLB,, | Performed by: STUDENT IN AN ORGANIZED HEALTH CARE EDUCATION/TRAINING PROGRAM

## 2019-10-03 PROCEDURE — 17000 PR DESTRUCTION(LASER SURGERY,CRYOSURGERY,CHEMOSURGERY),PREMALIGNANT LESIONS,FIRST LESION: ICD-10-PCS | Mod: HCNC,S$GLB,, | Performed by: STUDENT IN AN ORGANIZED HEALTH CARE EDUCATION/TRAINING PROGRAM

## 2019-10-03 PROCEDURE — 99203 PR OFFICE/OUTPT VISIT, NEW, LEVL III, 30-44 MIN: ICD-10-PCS | Mod: 25,HCNC,S$GLB, | Performed by: STUDENT IN AN ORGANIZED HEALTH CARE EDUCATION/TRAINING PROGRAM

## 2019-10-03 RX ORDER — TRIAMCINOLONE ACETONIDE 0.25 MG/G
CREAM TOPICAL
Qty: 80 G | Refills: 0 | Status: SHIPPED | OUTPATIENT
Start: 2019-10-03 | End: 2023-11-28

## 2019-10-03 RX ORDER — NYSTATIN 100000 U/G
CREAM TOPICAL
COMMUNITY
Start: 2019-10-02 | End: 2022-04-07

## 2019-10-03 NOTE — PATIENT INSTRUCTIONS
SEBORRHEIC KERATOSES        What causes seborrheic keratoses?    Seborrheic keratoses are harmless, common skin growths that first appear during adult life.  As time goes by, more growths appear.  Some persons have a very large number of them.  Seborrheic keratoses appear on both covered and uncovered parts of the body; they are not caused by sunlight.  The tendency to develop seborrheic keratoses is inherited.    Seborrheic keratoses are harmless and never become malignant.  They begin as slightly raised, light brown spots.  Gradually they thicken and take on a rough wartlike surface.  They slowly darken and may turn black.  These color changes are harmless.  Seborrheic keratoses are superficial and look as if they were stuck on the skin.  Persons who have had several seborrheic keratoses can usually recognize this type of benign growth.  However, if you are concerned or unsure about any growth, consult me.    Treatment    Seborrheic keratoses can easily be removed in the office.  The only reason for removing a seborrheic keratosis is your wish to get rid of it.      CRYOSURGERY      Your doctor has used a method called cryosurgery to treat your skin condition. Cryosurgery refers to the use of very cold substances to treat a variety of skin conditions such as warts, pre-skin cancers, molluscum contagiosum, sun spots, and several benign growths. The substance we use in cryosurgery is liquid nitrogen and is so cold (-195 degrees Celsius) that is burns when administered.     Following treatment in the office, the skin may immediately burn and become red. You may find the area around the lesion is affected as well. It is sometimes necessary to treat not only the lesion, but a small area of the surrounding normal skin to achieve a good response.     A blister, and even a blood filled blister, may form after treatment.   This is a normal response. If the blister is painful, it is acceptable to sterilize a needle and with  rubbing alcohol and gently pop the blister. It is important that you gently wash the area with soap and warm water as the blister fluid may contain wart virus if a wart was treated. Do no remove the roof of the blister.     The area treated can take anywhere from 1-3 weeks to heal. Healing time depends on the kind skin lesion treated, the location, and how aggressively the lesion was treated. It is recommended that the areas treated are covered with Vaseline or bacitracin ointment and a band-aid. If a band-aid is not practical, just ointment applied several times per day will do. Keeping these areas moist will speed the healing time.    Treatment with liquid nitrogen can leave a scar. In dark skin, it may be a light or dark scar, in light skin it may be a white or pink scar. These will generally fade with time, but may never go away completely.     If you have any concerns after your treatment, please feel free to call the office.

## 2019-10-03 NOTE — PROGRESS NOTES
Subjective:       Patient ID:  Clau Nunn is a 84 y.o. female who presents for   Chief Complaint   Patient presents with    Skin Check     History of Present Illness: The patient presents with chief complaint of skin lesion.  Location: arm, leg, nose  Duration: months to years  Signs/Symptoms: scaly  Prior treatments: none  Denies any history of skin cancer.           Review of Systems   Skin: Positive for rash (patient with itchy rash on the right lower leg that she admits to frequently scratching.).        Objective:    Physical Exam   Constitutional: She appears well-developed and well-nourished. No distress.   Neurological: She is alert and oriented to person, place, and time. She is not disoriented.   Psychiatric: She has a normal mood and affect.   Skin:   Areas Examined (abnormalities noted in diagram):   Scalp / Hair Palpated and Inspected  Head / Face Inspection Performed  Neck Inspection Performed  Chest / Axilla Inspection Performed  Abdomen Inspection Performed  Genitals / Buttocks / Groin Inspection Performed  Back Inspection Performed  RUE Inspected  LUE Inspection Performed  RLE Inspected  LLE Inspection Performed  Nails and Digits Inspection Performed                   Diagram Legend     Erythematous scaling macule/papule c/w actinic keratosis       Vascular papule c/w angioma      Pigmented verrucoid papule/plaque c/w seborrheic keratosis      Yellow umbilicated papule c/w sebaceous hyperplasia      Irregularly shaped tan macule c/w lentigo     1-2 mm smooth white papules consistent with Milia      Movable subcutaneous cyst with punctum c/w epidermal inclusion cyst      Subcutaneous movable cyst c/w pilar cyst      Firm pink to brown papule c/w dermatofibroma      Pedunculated fleshy papule(s) c/w skin tag(s)      Evenly pigmented macule c/w junctional nevus     Mildly variegated pigmented, slightly irregular-bordered macule c/w mildly atypical nevus      Flesh colored to evenly pigmented  papule c/w intradermal nevus       Pink pearly papule/plaque c/w basal cell carcinoma      Erythematous hyperkeratotic cursted plaque c/w SCC      Surgical scar with no sign of skin cancer recurrence      Open and closed comedones      Inflammatory papules and pustules      Verrucoid papule consistent consistent with wart     Erythematous eczematous patches and plaques     Dystrophic onycholytic nail with subungual debris c/w onychomycosis     Umbilicated papule    Erythematous-base heme-crusted tan verrucoid plaque consistent with inflamed seborrheic keratosis     Erythematous Silvery Scaling Plaque c/w Psoriasis     See annotation      Assessment / Plan:        Dermatitis  -     triamcinolone acetonide 0.025% (KENALOG) 0.025 % cream; AAA bid  Dispense: 80 g; Refill: 0    Xerosis cutis  Apply Am Lactin lotion or cream to arms and hands nightly. Available over-the counter.    Seborrheic keratoses  These are benign inherited growths without a malignant potential. Reassurance given to patient. No treatment is necessary.     Actinic keratoses  Cryosurgery Procedure Note    Verbal consent from the patient is obtained including, but not limited to, risk of hypopigmentation/hyperpigmentation, scar, recurrence of lesion. The patient is aware of the precancerous quality and need for treatment of these lesions. Liquid nitrogen cryosurgery is applied to the 1 actinic keratoses, as detailed in the physical exam, to produce a freeze injury. The patient is aware that blisters may form and is instructed on wound care with gentle cleansing and use of vaseline ointment to keep moist until healed. The patient is supplied a handout on cryosurgery and is instructed to call if lesions do not completely resolve.    Screening, malignant neoplasm, skin  total body skin examination performed today including at least 12 points as noted in physical examination. No lesions suspicious for malignancy noted.  Reassurance provided.  Instructed  patient to observe lesion(s) for changes and follow up in clinic if changes are noted.              Follow up in about 1 year (around 10/3/2020).

## 2019-10-23 ENCOUNTER — PATIENT MESSAGE (OUTPATIENT)
Dept: FAMILY MEDICINE | Facility: CLINIC | Age: 84
End: 2019-10-23

## 2019-11-12 NOTE — PROGRESS NOTES
"Subjective:      Patient ID: Clau Nunn is a 84 y.o. female.    Chief Complaint: Follow-up (3 months )      HPI  Here for follow up of medical problems.  Leg swelling has gone down.  Anxiety doing well.  No dysuria.  No f/c/sw.  Cough is relieved by albuterol inhaler.  BMs normal.    Updated/ annual due 3/20:  HM: 10/19 fluvax, 11/19 today HAV, 5/16 [id number], 5/17 booster [id number], 12/11 TDaP, 11/09 zoster, 12/16 BMD rep 3y, 1/14 Cscope no more needed, 4/19 MMG/ Gyn Dr. Tere bauer outside, 9/19 Eye Dr. Rubalcava.     Review of Systems   Constitutional: Negative for chills, diaphoresis and fever.   Respiratory: Negative for cough and shortness of breath.    Cardiovascular: Negative for chest pain, palpitations and leg swelling.   Gastrointestinal: Negative for blood in stool, constipation, diarrhea, nausea and vomiting.   Genitourinary: Negative for dysuria, frequency and hematuria.   Psychiatric/Behavioral: The patient is not nervous/anxious.          Objective:   /70 (BP Location: Left arm, Patient Position: Sitting, BP Method: Medium (Manual))   Pulse 101   Temp 98.9 °F (37.2 °C) (Oral)   Ht 5' 5" (1.651 m)   Wt 72.8 kg (160 lb 7.9 oz)   SpO2 97%   BMI 26.71 kg/m²     Physical Exam   Constitutional: She is oriented to person, place, and time. She appears well-developed.   HENT:   Mouth/Throat: Oropharynx is clear and moist.   Neck: Neck supple. Carotid bruit is not present. No thyroid mass present.   Cardiovascular: Normal rate, regular rhythm and intact distal pulses. Exam reveals no gallop and no friction rub.   No murmur heard.  Pulmonary/Chest: Effort normal and breath sounds normal. She has no wheezes. She has no rales.   Abdominal: Soft. Bowel sounds are normal. She exhibits no mass. There is no hepatosplenomegaly. There is no tenderness.   Musculoskeletal: She exhibits no edema.   Lymphadenopathy:     She has no cervical adenopathy.   Neurological: She is alert and oriented to person, " place, and time.   Psychiatric: She has a normal mood and affect.           Assessment:       1. Essential hypertension    2. Chronic venous insufficiency    3. Anxiety    4. Acquired hypothyroidism    5. Simple chronic bronchitis    6. Preventive measure    7. Asymptomatic postmenopausal state    8. Vitamin D deficiency disease    9. Stage 3 chronic kidney disease          Plan:     Essential hypertension- stable, cont rx.    Chronic venous insufficiency    Anxiety- doing well, cont rx.    Acquired hypothyroidism- Clinically stable, continue present treatment.    Simple chronic bronchitis- doing well with albuterol prn.    Preventive measure- HAV. Discussed pt needs to get Shingrix vaccination at pharmacy.  Lab/bmd in 4mo.  -     DXA Bone Density Spine And Hip; Future; Expected date: 11/26/2019  -     CBC auto differential; Future; Expected date: 11/26/2019  -     Comprehensive metabolic panel; Future; Expected date: 11/26/2019  -     Lipid panel; Future; Expected date: 11/26/2019  -     TSH; Future; Expected date: 11/26/2019  -     Hepatitis A Vaccine (Adult) (IM)    Asymptomatic postmenopausal state  -     DXA Bone Density Spine And Hip; Future; Expected date: 11/26/2019    Vitamin D deficiency disease  -     Vitamin D; Future    Stage 3 chronic kidney disease- recheck 4mo.

## 2019-11-15 DIAGNOSIS — E03.9 ACQUIRED HYPOTHYROIDISM: ICD-10-CM

## 2019-11-15 DIAGNOSIS — F41.9 ANXIETY: ICD-10-CM

## 2019-11-15 DIAGNOSIS — M81.0 OSTEOPOROSIS, POSTMENOPAUSAL: Chronic | ICD-10-CM

## 2019-11-15 DIAGNOSIS — F51.01 PRIMARY INSOMNIA: ICD-10-CM

## 2019-11-15 RX ORDER — TRAZODONE HYDROCHLORIDE 50 MG/1
50 TABLET ORAL NIGHTLY PRN
Qty: 30 TABLET | Refills: 3 | Status: SHIPPED | OUTPATIENT
Start: 2019-11-15 | End: 2019-12-23 | Stop reason: SDUPTHER

## 2019-11-15 RX ORDER — ESCITALOPRAM OXALATE 20 MG/1
20 TABLET ORAL DAILY
Qty: 90 TABLET | Refills: 3 | Status: SHIPPED | OUTPATIENT
Start: 2019-11-15 | End: 2020-04-23 | Stop reason: SDUPTHER

## 2019-11-15 RX ORDER — BUSPIRONE HYDROCHLORIDE 5 MG/1
5 TABLET ORAL 2 TIMES DAILY
Qty: 90 TABLET | Refills: 1 | Status: SHIPPED | OUTPATIENT
Start: 2019-11-15 | End: 2020-04-23 | Stop reason: SDUPTHER

## 2019-11-15 RX ORDER — LOSARTAN POTASSIUM 50 MG/1
50 TABLET ORAL DAILY
Qty: 90 TABLET | Refills: 3 | Status: SHIPPED | OUTPATIENT
Start: 2019-11-15 | End: 2020-03-20 | Stop reason: SDUPTHER

## 2019-11-15 RX ORDER — CYCLOBENZAPRINE HCL 5 MG
5 TABLET ORAL NIGHTLY PRN
Qty: 90 TABLET | Refills: 1 | Status: SHIPPED | OUTPATIENT
Start: 2019-11-15 | End: 2020-04-23 | Stop reason: SDUPTHER

## 2019-11-15 RX ORDER — LEVOTHYROXINE SODIUM 100 UG/1
100 TABLET ORAL DAILY
Qty: 90 TABLET | Refills: 3 | Status: SHIPPED | OUTPATIENT
Start: 2019-11-15 | End: 2020-04-23 | Stop reason: SDUPTHER

## 2019-11-18 DIAGNOSIS — M54.16 CHRONIC LUMBAR RADICULOPATHY: ICD-10-CM

## 2019-11-18 RX ORDER — GABAPENTIN 300 MG/1
300 CAPSULE ORAL 3 TIMES DAILY
Qty: 270 CAPSULE | Refills: 3 | Status: SHIPPED | OUTPATIENT
Start: 2019-11-18 | End: 2020-12-22 | Stop reason: SDUPTHER

## 2019-11-19 ENCOUNTER — PATIENT OUTREACH (OUTPATIENT)
Dept: ADMINISTRATIVE | Facility: HOSPITAL | Age: 84
End: 2019-11-19

## 2019-11-21 ENCOUNTER — OFFICE VISIT (OUTPATIENT)
Dept: PODIATRY | Facility: CLINIC | Age: 84
End: 2019-11-21
Payer: MEDICARE

## 2019-11-21 ENCOUNTER — IMMUNIZATION (OUTPATIENT)
Dept: PHARMACY | Facility: CLINIC | Age: 84
End: 2019-11-21
Payer: MEDICARE

## 2019-11-21 VITALS
SYSTOLIC BLOOD PRESSURE: 166 MMHG | WEIGHT: 166 LBS | BODY MASS INDEX: 27.66 KG/M2 | HEART RATE: 67 BPM | DIASTOLIC BLOOD PRESSURE: 80 MMHG | HEIGHT: 65 IN

## 2019-11-21 DIAGNOSIS — M19.071 DJD (DEGENERATIVE JOINT DISEASE), ANKLE AND FOOT, RIGHT: Primary | ICD-10-CM

## 2019-11-21 PROCEDURE — 3079F PR MOST RECENT DIASTOLIC BLOOD PRESSURE 80-89 MM HG: ICD-10-PCS | Mod: HCNC,CPTII,S$GLB, | Performed by: PODIATRIST

## 2019-11-21 PROCEDURE — 1125F PR PAIN SEVERITY QUANTIFIED, PAIN PRESENT: ICD-10-PCS | Mod: HCNC,S$GLB,, | Performed by: PODIATRIST

## 2019-11-21 PROCEDURE — 20605 DRAIN/INJ JOINT/BURSA W/O US: CPT | Mod: HCNC,RT,S$GLB, | Performed by: PODIATRIST

## 2019-11-21 PROCEDURE — 99204 PR OFFICE/OUTPT VISIT, NEW, LEVL IV, 45-59 MIN: ICD-10-PCS | Mod: HCNC,25,S$GLB, | Performed by: PODIATRIST

## 2019-11-21 PROCEDURE — 99999 PR PBB SHADOW E&M-EST. PATIENT-LVL III: CPT | Mod: PBBFAC,HCNC,, | Performed by: PODIATRIST

## 2019-11-21 PROCEDURE — 1101F PR PT FALLS ASSESS DOC 0-1 FALLS W/OUT INJ PAST YR: ICD-10-PCS | Mod: HCNC,CPTII,S$GLB, | Performed by: PODIATRIST

## 2019-11-21 PROCEDURE — 1101F PT FALLS ASSESS-DOCD LE1/YR: CPT | Mod: HCNC,CPTII,S$GLB, | Performed by: PODIATRIST

## 2019-11-21 PROCEDURE — 3077F PR MOST RECENT SYSTOLIC BLOOD PRESSURE >= 140 MM HG: ICD-10-PCS | Mod: HCNC,CPTII,S$GLB, | Performed by: PODIATRIST

## 2019-11-21 PROCEDURE — 3077F SYST BP >= 140 MM HG: CPT | Mod: HCNC,CPTII,S$GLB, | Performed by: PODIATRIST

## 2019-11-21 PROCEDURE — 99204 OFFICE O/P NEW MOD 45 MIN: CPT | Mod: HCNC,25,S$GLB, | Performed by: PODIATRIST

## 2019-11-21 PROCEDURE — 1159F MED LIST DOCD IN RCRD: CPT | Mod: HCNC,S$GLB,, | Performed by: PODIATRIST

## 2019-11-21 PROCEDURE — 1125F AMNT PAIN NOTED PAIN PRSNT: CPT | Mod: HCNC,S$GLB,, | Performed by: PODIATRIST

## 2019-11-21 PROCEDURE — 99999 PR PBB SHADOW E&M-EST. PATIENT-LVL III: ICD-10-PCS | Mod: PBBFAC,HCNC,, | Performed by: PODIATRIST

## 2019-11-21 PROCEDURE — 3079F DIAST BP 80-89 MM HG: CPT | Mod: HCNC,CPTII,S$GLB, | Performed by: PODIATRIST

## 2019-11-21 PROCEDURE — 20605 PR DRAIN/INJECT INTERMEDIATE JOINT/BURSA: ICD-10-PCS | Mod: HCNC,RT,S$GLB, | Performed by: PODIATRIST

## 2019-11-21 PROCEDURE — 1159F PR MEDICATION LIST DOCUMENTED IN MEDICAL RECORD: ICD-10-PCS | Mod: HCNC,S$GLB,, | Performed by: PODIATRIST

## 2019-11-21 RX ORDER — TRIAMCINOLONE ACETONIDE 40 MG/ML
40 INJECTION, SUSPENSION INTRA-ARTICULAR; INTRAMUSCULAR
Status: COMPLETED | OUTPATIENT
Start: 2019-11-21 | End: 2019-11-21

## 2019-11-21 RX ADMIN — TRIAMCINOLONE ACETONIDE 40 MG: 40 INJECTION, SUSPENSION INTRA-ARTICULAR; INTRAMUSCULAR at 02:11

## 2019-11-21 NOTE — PROGRESS NOTES
Subjective:     Patient ID: Clau Nunn is a 84 y.o. female.    Chief Complaint: Foot Pain (R foot pain. Noted nodule to dorsal pedis. Rates pain 5/10. Pt states a couple of months ago she had a blood clot in R leg and since then has had problems with R foot. Non diabetic Pt. Wears casual shoes. PCP DR Gomez, last visit 8/27/19)    Clau is a 84 y.o. female who presents to the podiatry clinic  with complaint of  right foot pain. Onset of the symptoms was several months ago. Precipitating event: none known. Current symptoms include: ability to bear weight, but with some pain. Aggravating factors: walking. Symptoms have gradually worsened. Patient has had no prior foot problems. Evaluation to date: none. Treatment to date: padding and different shoes. Patients rates pain 5/10 on pain scale.        Patient Active Problem List   Diagnosis    Primary osteoarthritis involving multiple joints    Acquired hypothyroidism    Lumbar arthropathy    Chronic venous insufficiency    Anxiety    Vitamin D deficiency disease    Essential hypertension    Hiatal hernia with gastroesophageal reflux    Sacroiliac inflammation    Atherosclerosis of aorta    Chronic lumbar radiculopathy    Stage 3 chronic kidney disease    Mild major depression    Simple chronic bronchitis    Pain of left hip joint    Lumbar spondylosis    Pain in both lower extremities    Age-related osteoporosis with current pathological fracture with delayed healing    Fracture, sacrum/coccyx    Radius fracture       Medication List with Changes/Refills   New Medications    FLU VACC [id number],65YR UP, MCG/0.5 ML SYRG    Inject 0.5 mLs into the muscle once. for 1 dose   Current Medications    ALBUTEROL (VENTOLIN HFA) 90 MCG/ACTUATION INHALER    Inhale 2 puffs into the lungs 4 (four) times daily as needed.    AZELASTINE (ASTELIN) 137 MCG (0.1 %) NASAL SPRAY        BACK BRACE MISC    1 Device by Misc.(Non-Drug; Combo Route) route  daily as needed.    BUSPIRONE (BUSPAR) 5 MG TAB    Take 1 tablet (5 mg total) by mouth 2 (two) times daily.    CHOLECALCIFEROL, VITAMIN D3, 50,000 UNIT CAPSULE    Take 1 capsule (50,000 Units total) by mouth once a week.    CIPROFLOXACIN HCL (CIPRO) 500 MG TABLET    Take 1 tablet (500 mg total) by mouth 2 (two) times daily.    CYCLOBENZAPRINE (FLEXERIL) 5 MG TABLET    Take 1 tablet (5 mg total) by mouth nightly as needed for Muscle spasms.    CYCLOSPORINE (RESTASIS) 0.05 % OPHTHALMIC EMULSION    Place 0.4 mLs (1 drop total) into both eyes 2 (two) times daily.    ESCITALOPRAM OXALATE (LEXAPRO) 20 MG TABLET    Take 1 tablet (20 mg total) by mouth once daily.    GABAPENTIN (NEURONTIN) 300 MG CAPSULE    Take 1 capsule (300 mg total) by mouth 3 (three) times daily.    LEVOTHYROXINE (SYNTHROID) 100 MCG TABLET    Take 1 tablet (100 mcg total) by mouth once daily.    LIDOCAINE-PRILOCAINE (EMLA) CREAM        LOSARTAN (COZAAR) 50 MG TABLET    Take 1 tablet (50 mg total) by mouth once daily.    NYSTATIN (MYCOSTATIN) CREAM        OMEPRAZOLE (PRILOSEC) 40 MG CAPSULE    Take 1 capsule (40 mg total) by mouth every morning.    TRAZODONE (DESYREL) 50 MG TABLET    Take 1 tablet (50 mg total) by mouth nightly as needed for Insomnia.    TRIAMCINOLONE ACETONIDE 0.025% (KENALOG) 0.025 % CREAM    AAA bid       Review of patient's allergies indicates:   Allergen Reactions    Cephalexin Anxiety, Dermatitis, Hives, Itching, Rash and Swelling       Past Surgical History:   Procedure Laterality Date    ADENOIDECTOMY      BACK SURGERY      x2    breast implants  1973    CATARACT EXTRACTION Bilateral     CLOSED REDUCTION DISTAL RADIUS FRACTURE      Dr. Black, 8/18    cystoscope  1/6/16    DR. Brown    FRACTURE SURGERY  April 3 2015    left wrist    HAND SURGERY      HIP SURGERY Right     total hip- Dr. Dos Santos    HYSTERECTOMY      34y/o    JOINT REPLACEMENT Right     R NNAMDI - Dr. Dos Santos    LEG SURGERY Right     ex-fix tib/fib     "TONSILLECTOMY         Family History   Problem Relation Age of Onset    COPD Mother     Cancer Mother         lung CA    Pulmonary fibrosis Sister     Cancer Daughter         colon    Stroke Father     Diabetes Son     Melanoma Neg Hx     Psoriasis Neg Hx     Lupus Neg Hx        Social History     Socioeconomic History    Marital status:      Spouse name: Not on file    Number of children: Not on file    Years of education: Not on file    Highest education level: Not on file   Occupational History    Occupation: retired     Comment: [employer]   Social Needs    Financial resource strain: Not hard at all    Food insecurity:     Worry: Never true     Inability: Never true    Transportation needs:     Medical: No     Non-medical: No   Tobacco Use    Smoking status: Never Smoker    Smokeless tobacco: Never Used   Substance and Sexual Activity    Alcohol use: Yes     Alcohol/week: 0.0 standard drinks     Frequency: Never     Binge frequency: Never     Comment: rarely    Drug use: Yes     Types: Hydrocodone    Sexual activity: Never     Partners: Male     Birth control/protection: Post-menopausal   Lifestyle    Physical activity:     Days per week: 0 days     Minutes per session: 0 min    Stress: Rather much   Relationships    Social connections:     Talks on phone: More than three times a week     Gets together: More than three times a week     Attends Worship service: Not on file     Active member of club or organization: Yes     Attends meetings of clubs or organizations: More than 4 times per year     Relationship status:    Other Topics Concern    Are you pregnant or think you may be? Not Asked    Breast-feeding Not Asked   Social History Narrative    Patient is retired from [employer]       Vitals:    11/21/19 1332   BP: (!) 166/80   Pulse: 67   Weight: 75.3 kg (166 lb 0.1 oz)   Height: 5' 5" (1.651 m)   PainSc:   5   PainLoc: Foot       Hemoglobin " A1C   Date Value Ref Range Status   03/11/2019 5.0 4.0 - 5.6 % Final     Comment:     ADA Screening Guidelines:  5.7-6.4%  Consistent with prediabetes  >or=6.5%  Consistent with diabetes  High levels of fetal hemoglobin interfere with the HbA1C  assay. Heterozygous hemoglobin variants (HbS, HgC, etc)do  not significantly interfere with this assay.   However, presence of multiple variants may affect accuracy.     03/02/2018 5.1 4.0 - 5.6 % Final     Comment:     According to ADA guidelines, hemoglobin A1c <7.0% represents  optimal control in non-pregnant diabetic patients. Different  metrics may apply to specific patient populations.   Standards of Medical Care in Diabetes-2016.  For the purpose of screening for the presence of diabetes:  <5.7%     Consistent with the absence of diabetes  5.7-6.4%  Consistent with increasing risk for diabetes   (prediabetes)  >or=6.5%  Consistent with diabetes  Currently, no consensus exists for use of hemoglobin A1c  for diagnosis of diabetes for children.  This Hemoglobin A1c assay has significant interference with fetal   hemoglobin   (HbF). The results are invalid for patients with abnormal amounts of   HbF,   including those with known Hereditary Persistence   of Fetal Hemoglobin. Heterozygous hemoglobin variants (HbAS, HbAC,   HbAD, HbAE, HbA2) do not significantly interfere with this assay;   however, presence of multiple variants in a sample may impact the %   interference.     04/19/2004 5.2 4.5 - 6.2 % Final       Review of Systems   Constitutional: Negative for chills and fever.   Respiratory: Negative for shortness of breath.    Cardiovascular: Negative for chest pain, palpitations, orthopnea, claudication and leg swelling.   Gastrointestinal: Negative for diarrhea, nausea and vomiting.   Musculoskeletal: Negative for joint pain (right midfoot).   Skin: Negative for rash.   Neurological: Negative for dizziness, tingling, sensory change, focal weakness and weakness.    Psychiatric/Behavioral: Negative.          Objective:       PHYSICAL EXAM: Apperance: Alert and orient in no distress,well developed, and with good attention to grooming and body habits  Patient presents ambulating in tennis shoes.   Lower Extremity Physical Exam:  VASCULAR: Dorsalis pedis pulses 1/4 bilateral and Posterior Tibial pulses 2/4 bilateral. Capillary fill time <4 seconds bilateral. Mild edema observed bilateral. Varicosities absent bilateral. Skin temperature of the lower extremities is warm to warm, proximal to distal. Hair growth dim bilateral.  DERMATOLOGICAL: No skin rashes, subcutaneous nodules, lesions, or ulcers observed bilateral.  NEUROLOGICAL: Light touch, sharp-dull, proprioception all present and equal bilaterally.    MUSCULOSKELETAL: Muscle strength is 5/5 for foot inverters, everters, plantarflexors, and dorsiflexors. Muscle tone is normal. (+) pain on palpation of right dorsal bony prominence midfoot.         Assessment:       Encounter Diagnosis   Name Primary?    DJD (degenerative joint disease), ankle and foot, right Yes         Plan:   DJD (degenerative joint disease), ankle and foot, right  -     triamcinolone acetonide injection 40 mg      I counseled the patient on her conditions, regarding findings of my examination, my impressions, and usual treatment plan.   Reviewed ultrasound results in exam room with patient.   Discussed surgical and conservative management of arthritis/cyst deformity. Conservatively we did discuss padding, needle aspirations/injections, and shoe modifications such as softer shoes with wide toe boxes. Surgically we briefly discussed pre and post operative expectations. The patient elects for conservative management at this time.  Patient agreed to injection therapy today. After sterilizing the area of right foot with an alcohol prep, the affected area was injected with a solution containing 1 cc of 1% lidocaine plain, 1cc of 0.5% Marcaine plain and 1 cc of  Kenalog 40%.  Injection area was then covered with band-aid. The patient tolerated the injection well and reported comfort to the area.  Dispensed horseshoe pads to be worn around area to minimize pressure in shoes.   Patient. to as needed but should call immediately if any signs of infection, such as fever, chills, sweats, increased redness or pain.  Patient to return in 3 months.             Flaquito Dhillon DPM  [facility]

## 2019-11-26 ENCOUNTER — OFFICE VISIT (OUTPATIENT)
Dept: FAMILY MEDICINE | Facility: CLINIC | Age: 84
End: 2019-11-26
Payer: MEDICARE

## 2019-11-26 VITALS
HEART RATE: 101 BPM | SYSTOLIC BLOOD PRESSURE: 138 MMHG | DIASTOLIC BLOOD PRESSURE: 70 MMHG | HEIGHT: 65 IN | TEMPERATURE: 99 F | BODY MASS INDEX: 26.74 KG/M2 | OXYGEN SATURATION: 97 % | WEIGHT: 160.5 LBS

## 2019-11-26 DIAGNOSIS — I10 ESSENTIAL HYPERTENSION: Primary | Chronic | ICD-10-CM

## 2019-11-26 DIAGNOSIS — E03.9 ACQUIRED HYPOTHYROIDISM: ICD-10-CM

## 2019-11-26 DIAGNOSIS — Z78.0 ASYMPTOMATIC POSTMENOPAUSAL STATE: ICD-10-CM

## 2019-11-26 DIAGNOSIS — F41.9 ANXIETY: ICD-10-CM

## 2019-11-26 DIAGNOSIS — J41.0 SIMPLE CHRONIC BRONCHITIS: ICD-10-CM

## 2019-11-26 DIAGNOSIS — E55.9 VITAMIN D DEFICIENCY DISEASE: ICD-10-CM

## 2019-11-26 DIAGNOSIS — N18.30 STAGE 3 CHRONIC KIDNEY DISEASE: ICD-10-CM

## 2019-11-26 DIAGNOSIS — Z29.9 PREVENTIVE MEASURE: ICD-10-CM

## 2019-11-26 DIAGNOSIS — I87.2 CHRONIC VENOUS INSUFFICIENCY: Chronic | ICD-10-CM

## 2019-11-26 PROCEDURE — 1101F PT FALLS ASSESS-DOCD LE1/YR: CPT | Mod: HCNC,CPTII,S$GLB, | Performed by: INTERNAL MEDICINE

## 2019-11-26 PROCEDURE — 3078F PR MOST RECENT DIASTOLIC BLOOD PRESSURE < 80 MM HG: ICD-10-PCS | Mod: HCNC,CPTII,S$GLB, | Performed by: INTERNAL MEDICINE

## 2019-11-26 PROCEDURE — 99999 PR PBB SHADOW E&M-EST. PATIENT-LVL V: CPT | Mod: PBBFAC,HCNC,, | Performed by: INTERNAL MEDICINE

## 2019-11-26 PROCEDURE — 99499 UNLISTED E&M SERVICE: CPT | Mod: S$GLB,,, | Performed by: INTERNAL MEDICINE

## 2019-11-26 PROCEDURE — 1101F PR PT FALLS ASSESS DOC 0-1 FALLS W/OUT INJ PAST YR: ICD-10-PCS | Mod: HCNC,CPTII,S$GLB, | Performed by: INTERNAL MEDICINE

## 2019-11-26 PROCEDURE — 99214 OFFICE O/P EST MOD 30 MIN: CPT | Mod: 25,HCNC,S$GLB, | Performed by: INTERNAL MEDICINE

## 2019-11-26 PROCEDURE — 90632 HEPA VACCINE ADULT IM: CPT | Mod: HCNC,S$GLB,, | Performed by: INTERNAL MEDICINE

## 2019-11-26 PROCEDURE — 1159F MED LIST DOCD IN RCRD: CPT | Mod: HCNC,S$GLB,, | Performed by: INTERNAL MEDICINE

## 2019-11-26 PROCEDURE — 1126F AMNT PAIN NOTED NONE PRSNT: CPT | Mod: HCNC,S$GLB,, | Performed by: INTERNAL MEDICINE

## 2019-11-26 PROCEDURE — 1126F PR PAIN SEVERITY QUANTIFIED, NO PAIN PRESENT: ICD-10-PCS | Mod: HCNC,S$GLB,, | Performed by: INTERNAL MEDICINE

## 2019-11-26 PROCEDURE — 3078F DIAST BP <80 MM HG: CPT | Mod: HCNC,CPTII,S$GLB, | Performed by: INTERNAL MEDICINE

## 2019-11-26 PROCEDURE — 1159F PR MEDICATION LIST DOCUMENTED IN MEDICAL RECORD: ICD-10-PCS | Mod: HCNC,S$GLB,, | Performed by: INTERNAL MEDICINE

## 2019-11-26 PROCEDURE — 3075F SYST BP GE 130 - 139MM HG: CPT | Mod: HCNC,CPTII,S$GLB, | Performed by: INTERNAL MEDICINE

## 2019-11-26 PROCEDURE — 3075F PR MOST RECENT SYSTOLIC BLOOD PRESS GE 130-139MM HG: ICD-10-PCS | Mod: HCNC,CPTII,S$GLB, | Performed by: INTERNAL MEDICINE

## 2019-11-26 PROCEDURE — 90632 HEPATITIS A VACCINE ADULT IM: ICD-10-PCS | Mod: HCNC,S$GLB,, | Performed by: INTERNAL MEDICINE

## 2019-11-26 PROCEDURE — 90471 IMMUNIZATION ADMIN: CPT | Mod: HCNC,S$GLB,, | Performed by: INTERNAL MEDICINE

## 2019-11-26 PROCEDURE — 99499 RISK ADDL DX/OHS AUDIT: ICD-10-PCS | Mod: S$GLB,,, | Performed by: INTERNAL MEDICINE

## 2019-11-26 PROCEDURE — 90471 HEPATITIS A VACCINE ADULT IM: ICD-10-PCS | Mod: HCNC,S$GLB,, | Performed by: INTERNAL MEDICINE

## 2019-11-26 PROCEDURE — 99214 PR OFFICE/OUTPT VISIT, EST, LEVL IV, 30-39 MIN: ICD-10-PCS | Mod: 25,HCNC,S$GLB, | Performed by: INTERNAL MEDICINE

## 2019-11-26 PROCEDURE — 99999 PR PBB SHADOW E&M-EST. PATIENT-LVL V: ICD-10-PCS | Mod: PBBFAC,HCNC,, | Performed by: INTERNAL MEDICINE

## 2019-12-10 ENCOUNTER — PATIENT MESSAGE (OUTPATIENT)
Dept: FAMILY MEDICINE | Facility: CLINIC | Age: 84
End: 2019-12-10

## 2019-12-10 ENCOUNTER — TELEPHONE (OUTPATIENT)
Dept: FAMILY MEDICINE | Facility: CLINIC | Age: 84
End: 2019-12-10

## 2019-12-10 ENCOUNTER — HOSPITAL ENCOUNTER (OUTPATIENT)
Dept: RADIOLOGY | Facility: HOSPITAL | Age: 84
Discharge: HOME | End: 2019-12-10
Attending: INTERNAL MEDICINE
Payer: MEDICARE

## 2019-12-10 ENCOUNTER — OFFICE VISIT (OUTPATIENT)
Dept: FAMILY MEDICINE | Facility: CLINIC | Age: 84
End: 2019-12-10
Payer: MEDICARE

## 2019-12-10 VITALS
DIASTOLIC BLOOD PRESSURE: 64 MMHG | TEMPERATURE: 98 F | HEIGHT: 65 IN | OXYGEN SATURATION: 94 % | SYSTOLIC BLOOD PRESSURE: 144 MMHG | BODY MASS INDEX: 26.67 KG/M2 | HEART RATE: 79 BPM | WEIGHT: 160.06 LBS

## 2019-12-10 DIAGNOSIS — R31.0 GROSS HEMATURIA: ICD-10-CM

## 2019-12-10 DIAGNOSIS — R31.9 HEMATURIA, UNSPECIFIED TYPE: ICD-10-CM

## 2019-12-10 DIAGNOSIS — R31.9 HEMATURIA, UNSPECIFIED TYPE: Primary | ICD-10-CM

## 2019-12-10 DIAGNOSIS — Z86.718 HISTORY OF DVT IN ADULTHOOD: ICD-10-CM

## 2019-12-10 DIAGNOSIS — I10 ESSENTIAL HYPERTENSION: Chronic | ICD-10-CM

## 2019-12-10 PROCEDURE — 99999 PR PBB SHADOW E&M-EST. PATIENT-LVL IV: ICD-10-PCS | Mod: PBBFAC,HCNC,, | Performed by: INTERNAL MEDICINE

## 2019-12-10 PROCEDURE — 1126F AMNT PAIN NOTED NONE PRSNT: CPT | Mod: HCNC,S$GLB,, | Performed by: INTERNAL MEDICINE

## 2019-12-10 PROCEDURE — 1101F PR PT FALLS ASSESS DOC 0-1 FALLS W/OUT INJ PAST YR: ICD-10-PCS | Mod: HCNC,CPTII,S$GLB, | Performed by: INTERNAL MEDICINE

## 2019-12-10 PROCEDURE — 1159F PR MEDICATION LIST DOCUMENTED IN MEDICAL RECORD: ICD-10-PCS | Mod: HCNC,S$GLB,, | Performed by: INTERNAL MEDICINE

## 2019-12-10 PROCEDURE — 1126F PR PAIN SEVERITY QUANTIFIED, NO PAIN PRESENT: ICD-10-PCS | Mod: HCNC,S$GLB,, | Performed by: INTERNAL MEDICINE

## 2019-12-10 PROCEDURE — 99214 PR OFFICE/OUTPT VISIT, EST, LEVL IV, 30-39 MIN: ICD-10-PCS | Mod: HCNC,S$GLB,, | Performed by: INTERNAL MEDICINE

## 2019-12-10 PROCEDURE — 1159F MED LIST DOCD IN RCRD: CPT | Mod: HCNC,S$GLB,, | Performed by: INTERNAL MEDICINE

## 2019-12-10 PROCEDURE — 74018 XR ABDOMEN AP 1 VIEW: ICD-10-PCS | Mod: 26,HCNC,, | Performed by: RADIOLOGY

## 2019-12-10 PROCEDURE — 74018 RADEX ABDOMEN 1 VIEW: CPT | Mod: 26,HCNC,, | Performed by: RADIOLOGY

## 2019-12-10 PROCEDURE — 3077F PR MOST RECENT SYSTOLIC BLOOD PRESSURE >= 140 MM HG: ICD-10-PCS | Mod: HCNC,CPTII,S$GLB, | Performed by: INTERNAL MEDICINE

## 2019-12-10 PROCEDURE — 3078F DIAST BP <80 MM HG: CPT | Mod: HCNC,CPTII,S$GLB, | Performed by: INTERNAL MEDICINE

## 2019-12-10 PROCEDURE — 1101F PT FALLS ASSESS-DOCD LE1/YR: CPT | Mod: HCNC,CPTII,S$GLB, | Performed by: INTERNAL MEDICINE

## 2019-12-10 PROCEDURE — 3077F SYST BP >= 140 MM HG: CPT | Mod: HCNC,CPTII,S$GLB, | Performed by: INTERNAL MEDICINE

## 2019-12-10 PROCEDURE — 99999 PR PBB SHADOW E&M-EST. PATIENT-LVL IV: CPT | Mod: PBBFAC,HCNC,, | Performed by: INTERNAL MEDICINE

## 2019-12-10 PROCEDURE — 74018 RADEX ABDOMEN 1 VIEW: CPT | Mod: TC,HCNC,FY,PO

## 2019-12-10 PROCEDURE — 3078F PR MOST RECENT DIASTOLIC BLOOD PRESSURE < 80 MM HG: ICD-10-PCS | Mod: HCNC,CPTII,S$GLB, | Performed by: INTERNAL MEDICINE

## 2019-12-10 PROCEDURE — 99214 OFFICE O/P EST MOD 30 MIN: CPT | Mod: HCNC,S$GLB,, | Performed by: INTERNAL MEDICINE

## 2019-12-10 RX ORDER — ASPIRIN 81 MG/1
81 TABLET ORAL DAILY
Qty: 100 TABLET | Refills: 0
Start: 2019-12-10 | End: 2020-06-30 | Stop reason: SDUPTHER

## 2019-12-10 NOTE — TELEPHONE ENCOUNTER
----- Message from Loretta Hairston sent at 12/10/2019  8:33 AM CST -----  Contact: patient  Type:  Needs Medical Advice    Who Called: patient  Symptoms (please be specific): blood in urine   How long has patient had these symptoms:  Yesterday  Pharmacy name and phone #:    [id number] - [facility] [address] [address]  [address] [address] [facility] [address] [address]  Phone: [phone] Fax: [phone]    Would the patient rather a call back or a response via [facility]? Call  Best Call Back Number: [phone]  Additional Information: please call today ASAP URGENT!!

## 2019-12-10 NOTE — PROGRESS NOTES
"Subjective:      Patient ID: Clau Nunn is a 84 y.o. female.    Chief Complaint: Hematuria      HPI  Pt here for follow up of medical problems and yesterday started with bright red blood in urine.  2 episodes.  Not related to stool.  No anal pain or hx hemorrhoids.  No dysuria.  Today didn't notice gross blood, but microscopic is positive.  No hx kidney stones, no abd pain or flank pain now.  No f/c/n/v.  BMs normal.  BP at home about 140/60s.  Takes baby ASA daily, for hx DVT.  Hx SILVA.    Updated/ annual due 3/20:  HM: 10/19 fluvax, 11/19 HAV, 5/16 [id number], 5/17 booster [id number], 12/11 TDaP, 11/09 zoster, 12/16 BMD rep 3y, 1/14 Cscope no more needed, 4/19 MMG/ Gyn Dr. Tere bauer outside, 9/19 Eye Dr. Rubalcava.     Review of Systems   Constitutional: Negative for chills, diaphoresis and fever.   Respiratory: Negative for cough and shortness of breath.    Cardiovascular: Negative for chest pain, palpitations and leg swelling.   Gastrointestinal: Negative for blood in stool, constipation, diarrhea, nausea and vomiting.   Genitourinary: Negative for dysuria, frequency and hematuria.   Psychiatric/Behavioral: The patient is not nervous/anxious.          Objective:   BP (!) 144/64 (BP Location: Left arm, Patient Position: Sitting, BP Method: Medium (Manual))   Pulse 79   Temp 98.4 °F (36.9 °C) (Oral)   Ht 5' 5" (1.651 m)   Wt 72.6 kg (160 lb 0.9 oz)   SpO2 (!) 94%   BMI 26.63 kg/m²     Physical Exam   Constitutional: She is oriented to person, place, and time. She appears well-developed.   HENT:   Mouth/Throat: Oropharynx is clear and moist.   Neck: Neck supple. Carotid bruit is not present. No thyroid mass present.   Cardiovascular: Normal rate, regular rhythm and intact distal pulses. Exam reveals no gallop and no friction rub.   No murmur heard.  Pulmonary/Chest: Effort normal and breath sounds normal. She has no wheezes. She has no rales.   Abdominal: Soft. Bowel sounds are normal. She exhibits no mass. " There is no hepatosplenomegaly. There is no tenderness.   Musculoskeletal: She exhibits no edema.   Lymphadenopathy:     She has no cervical adenopathy.   Neurological: She is alert and oriented to person, place, and time.   Psychiatric: She has a normal mood and affect.       8/19 kidney u/s:  Impression       Bilateral parenchymal thinning with the kidneys measuring slightly small in size.  Mildly increased renal echogenicity as can be seen with medical renal disease.         Assessment:       1. Hematuria, unspecified type    2. Essential hypertension    3. History of DVT in adulthood          Plan:     Hematuria, unspecified type, gross x 1d-  Kidney and bladder normal on u/s 4mo ago.  -     X-Ray Abdomen AP 1 View; Future; Expected date: 12/10/2019  -     Urinalysis; Future; Expected date: 12/10/2019  -     Urine culture; Future; Expected date: 12/10/2019    Essential hypertension- monitor more BPs for f/u 3mo.    History of DVT in adulthood  -     aspirin (ECOTRIN) 81 MG EC tablet; Take 1 tablet (81 mg total) by mouth once daily.  Dispense: 100 tablet; Refill: 0

## 2019-12-11 ENCOUNTER — TELEPHONE (OUTPATIENT)
Dept: RADIOLOGY | Facility: HOSPITAL | Age: 84
End: 2019-12-11

## 2019-12-11 ENCOUNTER — LAB VISIT (OUTPATIENT)
Dept: LAB | Facility: HOSPITAL | Age: 84
End: 2019-12-11
Attending: INTERNAL MEDICINE
Payer: MEDICARE

## 2019-12-11 DIAGNOSIS — R31.0 GROSS HEMATURIA: ICD-10-CM

## 2019-12-11 LAB
CREAT SERPL-MCNC: 1.1 MG/DL (ref 0.5–1.4)
EST. GFR  (AFRICAN AMERICAN): 54 ML/MIN/1.73 M^2
EST. GFR  (NON AFRICAN AMERICAN): 47 ML/MIN/1.73 M^2

## 2019-12-11 PROCEDURE — 36415 COLL VENOUS BLD VENIPUNCTURE: CPT | Mod: HCNC

## 2019-12-11 PROCEDURE — 82565 ASSAY OF CREATININE: CPT | Mod: HCNC

## 2019-12-12 ENCOUNTER — HOSPITAL ENCOUNTER (OUTPATIENT)
Dept: RADIOLOGY | Facility: HOSPITAL | Age: 84
Discharge: HOME | End: 2019-12-12
Attending: INTERNAL MEDICINE
Payer: MEDICARE

## 2019-12-12 DIAGNOSIS — R31.0 GROSS HEMATURIA: ICD-10-CM

## 2019-12-12 PROCEDURE — 74178 CT ABD&PLV WO CNTR FLWD CNTR: CPT | Mod: 26,HCNC,, | Performed by: RADIOLOGY

## 2019-12-12 PROCEDURE — 74178 CT ABD&PLV WO CNTR FLWD CNTR: CPT | Mod: TC,HCNC

## 2019-12-12 PROCEDURE — 25500020 PHARM REV CODE 255: Mod: HCNC | Performed by: INTERNAL MEDICINE

## 2019-12-12 PROCEDURE — 74178 CT UROGRAM ABD PELVIS W WO: ICD-10-PCS | Mod: 26,HCNC,, | Performed by: RADIOLOGY

## 2019-12-12 RX ADMIN — IOHEXOL 125 ML: 350 INJECTION, SOLUTION INTRAVENOUS at 02:12

## 2019-12-13 ENCOUNTER — PATIENT MESSAGE (OUTPATIENT)
Dept: FAMILY MEDICINE | Facility: CLINIC | Age: 84
End: 2019-12-13

## 2019-12-13 DIAGNOSIS — R31.0 GROSS HEMATURIA: Primary | ICD-10-CM

## 2019-12-23 DIAGNOSIS — F51.01 PRIMARY INSOMNIA: ICD-10-CM

## 2019-12-23 RX ORDER — OMEPRAZOLE 40 MG/1
40 CAPSULE, DELAYED RELEASE ORAL EVERY MORNING
Qty: 90 CAPSULE | Refills: 3 | Status: SHIPPED | OUTPATIENT
Start: 2019-12-23 | End: 2020-04-23 | Stop reason: SDUPTHER

## 2019-12-23 RX ORDER — TRAZODONE HYDROCHLORIDE 50 MG/1
50 TABLET ORAL NIGHTLY PRN
Qty: 30 TABLET | Refills: 3 | Status: SHIPPED | OUTPATIENT
Start: 2019-12-23 | End: 2020-04-23 | Stop reason: SDUPTHER

## 2019-12-23 NOTE — TELEPHONE ENCOUNTER
----- Message from Ruben Smith sent at 12/23/2019 12:49 PM CST -----  Contact: PT  Type:  Sooner Apoointment Request    Caller is requesting a sooner appointment.  Caller declined first available appointment listed below.  Caller will not accept being placed on the waitlist and is requesting a message be sent to doctor.  Name of Caller:Pt   When is the first available appointment?n/a  Symptoms:Prolia  Would the patient rather a call back or a response via [facility]? Call back   Best Call Back Number: [phone] (home)   Additional Information: The patient is calling back in regards to rescheduling, please advise.

## 2020-01-02 ENCOUNTER — APPOINTMENT (OUTPATIENT)
Dept: RADIOLOGY | Facility: HOSPITAL | Age: 85
End: 2020-01-02
Attending: INTERNAL MEDICINE
Payer: MEDICARE

## 2020-01-02 DIAGNOSIS — Z29.9 PREVENTIVE MEASURE: ICD-10-CM

## 2020-01-02 DIAGNOSIS — Z78.0 ASYMPTOMATIC POSTMENOPAUSAL STATE: ICD-10-CM

## 2020-01-02 DIAGNOSIS — E03.9 ACQUIRED HYPOTHYROIDISM: ICD-10-CM

## 2020-01-02 PROCEDURE — 77080 DXA BONE DENSITY AXIAL: CPT | Mod: TC,HCNC

## 2020-01-02 PROCEDURE — 77080 DXA BONE DENSITY AXIAL: CPT | Mod: 26,HCNC,, | Performed by: RADIOLOGY

## 2020-01-02 PROCEDURE — 77080 DEXA BONE DENSITY SPINE HIP: ICD-10-PCS | Mod: 26,HCNC,, | Performed by: RADIOLOGY

## 2020-01-06 ENCOUNTER — PATIENT MESSAGE (OUTPATIENT)
Dept: FAMILY MEDICINE | Facility: CLINIC | Age: 85
End: 2020-01-06

## 2020-01-06 ENCOUNTER — PES CALL (OUTPATIENT)
Dept: ADMINISTRATIVE | Facility: CLINIC | Age: 85
End: 2020-01-06

## 2020-01-06 ENCOUNTER — TELEPHONE (OUTPATIENT)
Dept: FAMILY MEDICINE | Facility: CLINIC | Age: 85
End: 2020-01-06

## 2020-01-06 NOTE — TELEPHONE ENCOUNTER
----- Message from Kaye Mckeon sent at 1/6/2020  9:29 AM CST -----  Contact: Pt  Pt asked that nurse return her call, pt states she would like to have a chest xray because she thinks she may have an infection, pt is requesting a much sooner appt. [phone]

## 2020-01-07 ENCOUNTER — HOSPITAL ENCOUNTER (OUTPATIENT)
Dept: RADIOLOGY | Facility: HOSPITAL | Age: 85
Discharge: HOME | End: 2020-01-07
Attending: INTERNAL MEDICINE
Payer: MEDICARE

## 2020-01-07 ENCOUNTER — PATIENT MESSAGE (OUTPATIENT)
Dept: FAMILY MEDICINE | Facility: CLINIC | Age: 85
End: 2020-01-07

## 2020-01-07 ENCOUNTER — OFFICE VISIT (OUTPATIENT)
Dept: FAMILY MEDICINE | Facility: CLINIC | Age: 85
End: 2020-01-07
Payer: MEDICARE

## 2020-01-07 VITALS
HEART RATE: 100 BPM | HEIGHT: 65 IN | SYSTOLIC BLOOD PRESSURE: 138 MMHG | TEMPERATURE: 98 F | WEIGHT: 163.56 LBS | DIASTOLIC BLOOD PRESSURE: 80 MMHG | OXYGEN SATURATION: 97 % | BODY MASS INDEX: 27.25 KG/M2

## 2020-01-07 DIAGNOSIS — R05.9 COUGH: ICD-10-CM

## 2020-01-07 DIAGNOSIS — J20.9 ACUTE BRONCHITIS, UNSPECIFIED ORGANISM: ICD-10-CM

## 2020-01-07 DIAGNOSIS — R05.9 COUGH: Primary | ICD-10-CM

## 2020-01-07 PROCEDURE — 71046 X-RAY EXAM CHEST 2 VIEWS: CPT | Mod: TC,HCNC,FY,PO

## 2020-01-07 PROCEDURE — 1101F PR PT FALLS ASSESS DOC 0-1 FALLS W/OUT INJ PAST YR: ICD-10-PCS | Mod: HCNC,CPTII,S$GLB, | Performed by: INTERNAL MEDICINE

## 2020-01-07 PROCEDURE — 1126F AMNT PAIN NOTED NONE PRSNT: CPT | Mod: HCNC,S$GLB,, | Performed by: INTERNAL MEDICINE

## 2020-01-07 PROCEDURE — 3079F DIAST BP 80-89 MM HG: CPT | Mod: HCNC,CPTII,S$GLB, | Performed by: INTERNAL MEDICINE

## 2020-01-07 PROCEDURE — 3075F PR MOST RECENT SYSTOLIC BLOOD PRESS GE 130-139MM HG: ICD-10-PCS | Mod: HCNC,CPTII,S$GLB, | Performed by: INTERNAL MEDICINE

## 2020-01-07 PROCEDURE — 99999 PR PBB SHADOW E&M-EST. PATIENT-LVL V: ICD-10-PCS | Mod: PBBFAC,HCNC,, | Performed by: INTERNAL MEDICINE

## 2020-01-07 PROCEDURE — 1126F PR PAIN SEVERITY QUANTIFIED, NO PAIN PRESENT: ICD-10-PCS | Mod: HCNC,S$GLB,, | Performed by: INTERNAL MEDICINE

## 2020-01-07 PROCEDURE — 3079F PR MOST RECENT DIASTOLIC BLOOD PRESSURE 80-89 MM HG: ICD-10-PCS | Mod: HCNC,CPTII,S$GLB, | Performed by: INTERNAL MEDICINE

## 2020-01-07 PROCEDURE — 96372 PR INJECTION,THERAP/PROPH/DIAG2ST, IM OR SUBCUT: ICD-10-PCS | Mod: HCNC,S$GLB,, | Performed by: INTERNAL MEDICINE

## 2020-01-07 PROCEDURE — 71046 X-RAY EXAM CHEST 2 VIEWS: CPT | Mod: 26,HCNC,, | Performed by: RADIOLOGY

## 2020-01-07 PROCEDURE — 1159F PR MEDICATION LIST DOCUMENTED IN MEDICAL RECORD: ICD-10-PCS | Mod: HCNC,S$GLB,, | Performed by: INTERNAL MEDICINE

## 2020-01-07 PROCEDURE — 1159F MED LIST DOCD IN RCRD: CPT | Mod: HCNC,S$GLB,, | Performed by: INTERNAL MEDICINE

## 2020-01-07 PROCEDURE — 1101F PT FALLS ASSESS-DOCD LE1/YR: CPT | Mod: HCNC,CPTII,S$GLB, | Performed by: INTERNAL MEDICINE

## 2020-01-07 PROCEDURE — 99213 OFFICE O/P EST LOW 20 MIN: CPT | Mod: 25,HCNC,S$GLB, | Performed by: INTERNAL MEDICINE

## 2020-01-07 PROCEDURE — 96372 THER/PROPH/DIAG INJ SC/IM: CPT | Mod: HCNC,S$GLB,, | Performed by: INTERNAL MEDICINE

## 2020-01-07 PROCEDURE — 71046 XR CHEST PA AND LATERAL: ICD-10-PCS | Mod: 26,HCNC,, | Performed by: RADIOLOGY

## 2020-01-07 PROCEDURE — 3075F SYST BP GE 130 - 139MM HG: CPT | Mod: HCNC,CPTII,S$GLB, | Performed by: INTERNAL MEDICINE

## 2020-01-07 PROCEDURE — 99999 PR PBB SHADOW E&M-EST. PATIENT-LVL V: CPT | Mod: PBBFAC,HCNC,, | Performed by: INTERNAL MEDICINE

## 2020-01-07 PROCEDURE — 99213 PR OFFICE/OUTPT VISIT, EST, LEVL III, 20-29 MIN: ICD-10-PCS | Mod: 25,HCNC,S$GLB, | Performed by: INTERNAL MEDICINE

## 2020-01-07 RX ORDER — DOXYCYCLINE 100 MG/1
100 CAPSULE ORAL 2 TIMES DAILY
Qty: 20 CAPSULE | Refills: 0 | Status: SHIPPED | OUTPATIENT
Start: 2020-01-07 | End: 2020-01-17

## 2020-01-07 RX ORDER — AMOXICILLIN 875 MG/1
TABLET, FILM COATED ORAL
COMMUNITY
Start: 2020-01-02 | End: 2020-06-30 | Stop reason: SDUPTHER

## 2020-01-07 RX ORDER — DEXCHLORPHENIRAMINE MALEATE, DEXTROMETHORPHAN HBR, PHENYLEPHRINE HCL 1; 10; 5 MG/5ML; MG/5ML; MG/5ML
SYRUP ORAL
COMMUNITY
Start: 2020-01-02 | End: 2020-08-10

## 2020-01-07 RX ORDER — METHYLPREDNISOLONE ACETATE 80 MG/ML
60 INJECTION, SUSPENSION INTRA-ARTICULAR; INTRALESIONAL; INTRAMUSCULAR; SOFT TISSUE
Status: COMPLETED | OUTPATIENT
Start: 2020-01-07 | End: 2020-01-07

## 2020-01-07 RX ORDER — PREDNISONE 20 MG/1
TABLET ORAL
COMMUNITY
Start: 2020-01-02 | End: 2020-06-30 | Stop reason: SDUPTHER

## 2020-01-07 RX ORDER — FLUTICASONE PROPIONATE 50 MCG
SPRAY, SUSPENSION (ML) NASAL DAILY PRN
COMMUNITY
Start: 2020-01-02 | End: 2022-04-07

## 2020-01-07 RX ADMIN — METHYLPREDNISOLONE ACETATE 60 MG: 80 INJECTION, SUSPENSION INTRA-ARTICULAR; INTRALESIONAL; INTRAMUSCULAR; SOFT TISSUE at 11:01

## 2020-01-07 NOTE — PROGRESS NOTES
"Subjective:      Patient ID: Clau Nunn is a 84 y.o. female.    Chief Complaint: Chest Congestion      HPI  Pt here for follow up of medical problems and started 7d with chills and chest congestion.  Went to , taking amoxil and prednisone 60mg x 2d, and got injection.  Not much better.  Still very congested.  Not SOB.  Lots coughing.  No more fever/chills.  No major head pain.  Left lower side pain when coughs.  Feels choked due to the sputum.    Updated/ annual due 3/20:  HM: 10/19 fluvax, 11/19 HAV, 5/16 [id number], 5/17 booster [id number], 12/11 TDaP, 11/09 zoster, 12/16 BMD rep 3y, 1/14 Cscope no more needed, 4/19 MMG/ Gyn Dr. Tere bauer outside, 9/19 Eye Dr. Rubalcava.     Review of Systems   Constitutional: Negative for chills, diaphoresis and fever.   Respiratory: Negative for cough and shortness of breath.    Cardiovascular: Negative for chest pain, palpitations and leg swelling.   Gastrointestinal: Negative for blood in stool, constipation, diarrhea, nausea and vomiting.   Genitourinary: Negative for dysuria, frequency and hematuria.   Psychiatric/Behavioral: The patient is not nervous/anxious.          Objective:   /80 (BP Location: Left arm, Patient Position: Sitting, BP Method: Medium (Manual))   Pulse 100   Temp 97.7 °F (36.5 °C) (Temporal)   Ht 5' 5" (1.651 m)   Wt 74.2 kg (163 lb 9.3 oz)   SpO2 97%   BMI 27.22 kg/m²     Physical Exam   Constitutional: She is oriented to person, place, and time. She appears well-developed.   HENT:   Mouth/Throat: Oropharynx is clear and moist.   Neck: Neck supple. Carotid bruit is not present. No thyroid mass present.   Cardiovascular: Normal rate, regular rhythm and intact distal pulses. Exam reveals no gallop and no friction rub.   No murmur heard.  Pulmonary/Chest: Effort normal and breath sounds normal. She has no wheezes. She has no rales.   Abdominal: Soft. Bowel sounds are normal. She exhibits no mass. There is no hepatosplenomegaly. There is no " tenderness.   Musculoskeletal: She exhibits no edema.   Lymphadenopathy:     She has no cervical adenopathy.   Neurological: She is alert and oriented to person, place, and time.   Psychiatric: She has a normal mood and affect.           Assessment:       1. Cough    2. Acute bronchitis, unspecified organism          Plan:     Cough, s/p amoxil and steroid for 2d- r/o pneumonia and decide on change in abic.  -     X-Ray Chest PA And Lateral; Future; Expected date: 01/07/2020    Acute bronchitis, unspecified organism  -     methylPREDNISolone acetate injection 60 mg

## 2020-01-27 ENCOUNTER — OFFICE VISIT (OUTPATIENT)
Dept: RHEUMATOLOGY | Facility: CLINIC | Age: 85
End: 2020-01-27
Payer: MEDICARE

## 2020-01-27 ENCOUNTER — LAB VISIT (OUTPATIENT)
Dept: LAB | Facility: HOSPITAL | Age: 85
End: 2020-01-27
Payer: MEDICARE

## 2020-01-27 ENCOUNTER — INFUSION (OUTPATIENT)
Dept: INFUSION THERAPY | Facility: HOSPITAL | Age: 85
End: 2020-01-27
Attending: PHYSICIAN ASSISTANT
Payer: MEDICARE

## 2020-01-27 VITALS
DIASTOLIC BLOOD PRESSURE: 76 MMHG | HEART RATE: 61 BPM | WEIGHT: 164.44 LBS | HEIGHT: 65 IN | SYSTOLIC BLOOD PRESSURE: 130 MMHG | BODY MASS INDEX: 27.4 KG/M2

## 2020-01-27 DIAGNOSIS — N18.30 STAGE 3 CHRONIC KIDNEY DISEASE: ICD-10-CM

## 2020-01-27 DIAGNOSIS — M80.00XG AGE-RELATED OSTEOPOROSIS WITH CURRENT PATHOLOGICAL FRACTURE WITH DELAYED HEALING: Primary | ICD-10-CM

## 2020-01-27 DIAGNOSIS — M15.9 PRIMARY OSTEOARTHRITIS INVOLVING MULTIPLE JOINTS: ICD-10-CM

## 2020-01-27 DIAGNOSIS — M81.0 OSTEOPOROSIS, POSTMENOPAUSAL: Chronic | ICD-10-CM

## 2020-01-27 DIAGNOSIS — E55.9 VITAMIN D DEFICIENCY DISEASE: ICD-10-CM

## 2020-01-27 LAB
ALBUMIN SERPL BCP-MCNC: 3.9 G/DL (ref 3.5–5.2)
ALP SERPL-CCNC: 57 U/L (ref 55–135)
ALT SERPL W/O P-5'-P-CCNC: 15 U/L (ref 10–44)
ANION GAP SERPL CALC-SCNC: 11 MMOL/L (ref 8–16)
AST SERPL-CCNC: 19 U/L (ref 10–40)
BILIRUB SERPL-MCNC: 1.1 MG/DL (ref 0.1–1)
BUN SERPL-MCNC: 16 MG/DL (ref 8–23)
CALCIUM SERPL-MCNC: 9.6 MG/DL (ref 8.7–10.5)
CHLORIDE SERPL-SCNC: 104 MMOL/L (ref 95–110)
CO2 SERPL-SCNC: 26 MMOL/L (ref 23–29)
CREAT SERPL-MCNC: 1.3 MG/DL (ref 0.5–1.4)
EST. GFR  (AFRICAN AMERICAN): 44 ML/MIN/1.73 M^2
EST. GFR  (NON AFRICAN AMERICAN): 38 ML/MIN/1.73 M^2
GLUCOSE SERPL-MCNC: 91 MG/DL (ref 70–110)
POTASSIUM SERPL-SCNC: 4.3 MMOL/L (ref 3.5–5.1)
PROT SERPL-MCNC: 7.1 G/DL (ref 6–8.4)
SODIUM SERPL-SCNC: 141 MMOL/L (ref 136–145)

## 2020-01-27 PROCEDURE — 3075F PR MOST RECENT SYSTOLIC BLOOD PRESS GE 130-139MM HG: ICD-10-PCS | Mod: HCNC,CPTII,S$GLB, | Performed by: PHYSICIAN ASSISTANT

## 2020-01-27 PROCEDURE — 3078F PR MOST RECENT DIASTOLIC BLOOD PRESSURE < 80 MM HG: ICD-10-PCS | Mod: HCNC,CPTII,S$GLB, | Performed by: PHYSICIAN ASSISTANT

## 2020-01-27 PROCEDURE — 63600175 PHARM REV CODE 636 W HCPCS: Mod: JG,HCNC | Performed by: PHYSICIAN ASSISTANT

## 2020-01-27 PROCEDURE — 99499 RISK ADDL DX/OHS AUDIT: ICD-10-PCS | Mod: HCNC,S$GLB,, | Performed by: PHYSICIAN ASSISTANT

## 2020-01-27 PROCEDURE — 36415 COLL VENOUS BLD VENIPUNCTURE: CPT | Mod: HCNC

## 2020-01-27 PROCEDURE — 3075F SYST BP GE 130 - 139MM HG: CPT | Mod: HCNC,CPTII,S$GLB, | Performed by: PHYSICIAN ASSISTANT

## 2020-01-27 PROCEDURE — 99999 PR PBB SHADOW E&M-EST. PATIENT-LVL IV: ICD-10-PCS | Mod: PBBFAC,HCNC,, | Performed by: PHYSICIAN ASSISTANT

## 2020-01-27 PROCEDURE — 99499 UNLISTED E&M SERVICE: CPT | Mod: HCNC,S$GLB,, | Performed by: PHYSICIAN ASSISTANT

## 2020-01-27 PROCEDURE — 99214 PR OFFICE/OUTPT VISIT, EST, LEVL IV, 30-39 MIN: ICD-10-PCS | Mod: HCNC,S$GLB,, | Performed by: PHYSICIAN ASSISTANT

## 2020-01-27 PROCEDURE — 1159F MED LIST DOCD IN RCRD: CPT | Mod: HCNC,S$GLB,, | Performed by: PHYSICIAN ASSISTANT

## 2020-01-27 PROCEDURE — 99214 OFFICE O/P EST MOD 30 MIN: CPT | Mod: HCNC,S$GLB,, | Performed by: PHYSICIAN ASSISTANT

## 2020-01-27 PROCEDURE — 1125F PR PAIN SEVERITY QUANTIFIED, PAIN PRESENT: ICD-10-PCS | Mod: HCNC,S$GLB,, | Performed by: PHYSICIAN ASSISTANT

## 2020-01-27 PROCEDURE — 1101F PT FALLS ASSESS-DOCD LE1/YR: CPT | Mod: HCNC,CPTII,S$GLB, | Performed by: PHYSICIAN ASSISTANT

## 2020-01-27 PROCEDURE — 99999 PR PBB SHADOW E&M-EST. PATIENT-LVL IV: CPT | Mod: PBBFAC,HCNC,, | Performed by: PHYSICIAN ASSISTANT

## 2020-01-27 PROCEDURE — 3078F DIAST BP <80 MM HG: CPT | Mod: HCNC,CPTII,S$GLB, | Performed by: PHYSICIAN ASSISTANT

## 2020-01-27 PROCEDURE — 96372 THER/PROPH/DIAG INJ SC/IM: CPT | Mod: HCNC

## 2020-01-27 PROCEDURE — 1101F PR PT FALLS ASSESS DOC 0-1 FALLS W/OUT INJ PAST YR: ICD-10-PCS | Mod: HCNC,CPTII,S$GLB, | Performed by: PHYSICIAN ASSISTANT

## 2020-01-27 PROCEDURE — 1159F PR MEDICATION LIST DOCUMENTED IN MEDICAL RECORD: ICD-10-PCS | Mod: HCNC,S$GLB,, | Performed by: PHYSICIAN ASSISTANT

## 2020-01-27 PROCEDURE — 1125F AMNT PAIN NOTED PAIN PRSNT: CPT | Mod: HCNC,S$GLB,, | Performed by: PHYSICIAN ASSISTANT

## 2020-01-27 PROCEDURE — 80053 COMPREHEN METABOLIC PANEL: CPT | Mod: HCNC

## 2020-01-27 RX ADMIN — DENOSUMAB 60 MG: 60 INJECTION SUBCUTANEOUS at 01:01

## 2020-01-27 NOTE — PATIENT INSTRUCTIONS
Denosumab injection  What is this medicine?  DENOSUMAB (den oh vincenzo mab) slows bone breakdown. Prolia is used to treat osteoporosis in women after menopause and in men. Xgeva is used to prevent bone fractures and other bone problems caused by cancer bone metastases. Xgeva is also used to treat giant cell tumor of the bone.  How should I use this medicine?  This medicine is for injection under the skin. It is given by a health care professional in a hospital or clinic setting.  If you are getting Prolia, a special MedGuide will be given to you by the pharmacist with each prescription and refill. Be sure to read this information carefully each time.  For Prolia, talk to your pediatrician regarding the use of this medicine in children. Special care may be needed. For Xgeva, talk to your pediatrician regarding the use of this medicine in children. While this drug may be prescribed for children as young as 13 years for selected conditions, precautions do apply.  What side effects may I notice from receiving this medicine?  Side effects that you should report to your doctor or health care professional as soon as possible:  · allergic reactions like skin rash, itching or hives, swelling of the face, lips, or tongue  · breathing problems  · chest pain  · fast, irregular heartbeat  · feeling faint or lightheaded, falls  · fever, chills, or any other sign of infection  · muscle spasms, tightening, or twitches  · numbness or tingling  · skin blisters or bumps, or is dry, peels, or red  · slow healing or unexplained pain in the mouth or jaw  · unusual bleeding or bruising  Side effects that usually do not require medical attention (Report these to your doctor or health care professional if they continue or are bothersome.):  · muscle pain  · stomach upset, gas  What may interact with this medicine?  Do not take this medicine with any of the following medications:  · other medicines containing denosumab  This medicine may also  interact with the following medications:  · medicines that suppress the immune system  · medicines that treat cancer  · steroid medicines like prednisone or cortisone  What if I miss a dose?  It is important not to miss your dose. Call your doctor or health care professional if you are unable to keep an appointment.  Where should I keep my medicine?  This medicine is only given in a clinic, doctor's office, or other health care setting and will not be stored at home.  What should I tell my health care provider before I take this medicine?  They need to know if you have any of these conditions:  · dental disease  · eczema  · infection or history of infections  · kidney disease or on dialysis  · low blood calcium or vitamin D  · malabsorption syndrome  · scheduled to have surgery or tooth extraction  · taking medicine that contains denosumab  · thyroid or parathyroid disease  · an unusual reaction to denosumab, other medicines, foods, dyes, or preservatives  · pregnant or trying to get pregnant  · breast-feeding  What should I watch for while using this medicine?  Visit your doctor or health care professional for regular checks on your progress. Your doctor or health care professional may order blood tests and other tests to see how you are doing.  Call your doctor or health care professional if you get a cold or other infection while receiving this medicine. Do not treat yourself. This medicine may decrease your body's ability to fight infection.  You should make sure you get enough calcium and vitamin D while you are taking this medicine, unless your doctor tells you not to. Discuss the foods you eat and the vitamins you take with your health care professional.  See your dentist regularly. Brush and floss your teeth as directed. Before you have any dental work done, tell your dentist you are receiving this medicine.  Do not become pregnant while taking this medicine or for 5 months after stopping it. Women should  inform their doctor if they wish to become pregnant or think they might be pregnant. There is a potential for serious side effects to an unborn child. Talk to your health care professional or pharmacist for more information.  NOTE:This sheet is a summary. It may not cover all possible information. If you have questions about this medicine, talk to your doctor, pharmacist, or health care provider. Copyright© 2017 [employer]        Denosumab injection  What is this medicine?  DENOSUMAB (den oh vincenzo mab) slows bone breakdown. Prolia is used to treat osteoporosis in women after menopause and in men. Xgeva is used to prevent bone fractures and other bone problems caused by cancer bone metastases. Xgeva is also used to treat giant cell tumor of the bone.  How should I use this medicine?  This medicine is for injection under the skin. It is given by a health care professional in a hospital or clinic setting.  If you are getting Prolia, a special MedGuide will be given to you by the pharmacist with each prescription and refill. Be sure to read this information carefully each time.  For Prolia, talk to your pediatrician regarding the use of this medicine in children. Special care may be needed. For Xgeva, talk to your pediatrician regarding the use of this medicine in children. While this drug may be prescribed for children as young as 13 years for selected conditions, precautions do apply.  What side effects may I notice from receiving this medicine?  Side effects that you should report to your doctor or health care professional as soon as possible:  · allergic reactions like skin rash, itching or hives, swelling of the face, lips, or tongue  · breathing problems  · chest pain  · fast, irregular heartbeat  · feeling faint or lightheaded, falls  · fever, chills, or any other sign of infection  · muscle spasms, tightening, or twitches  · numbness or tingling  · skin blisters or bumps, or is dry, peels, or red  · slow  healing or unexplained pain in the mouth or jaw  · unusual bleeding or bruising  Side effects that usually do not require medical attention (Report these to your doctor or health care professional if they continue or are bothersome.):  · muscle pain  · stomach upset, gas  What may interact with this medicine?  Do not take this medicine with any of the following medications:  · other medicines containing denosumab  This medicine may also interact with the following medications:  · medicines that suppress the immune system  · medicines that treat cancer  · steroid medicines like prednisone or cortisone  What if I miss a dose?  It is important not to miss your dose. Call your doctor or health care professional if you are unable to keep an appointment.  Where should I keep my medicine?  This medicine is only given in a clinic, doctor's office, or other health care setting and will not be stored at home.  What should I tell my health care provider before I take this medicine?  They need to know if you have any of these conditions:  · dental disease  · eczema  · infection or history of infections  · kidney disease or on dialysis  · low blood calcium or vitamin D  · malabsorption syndrome  · scheduled to have surgery or tooth extraction  · taking medicine that contains denosumab  · thyroid or parathyroid disease  · an unusual reaction to denosumab, other medicines, foods, dyes, or preservatives  · pregnant or trying to get pregnant  · breast-feeding  What should I watch for while using this medicine?  Visit your doctor or health care professional for regular checks on your progress. Your doctor or health care professional may order blood tests and other tests to see how you are doing.  Call your doctor or health care professional if you get a cold or other infection while receiving this medicine. Do not treat yourself. This medicine may decrease your body's ability to fight infection.  You should make sure you get enough  calcium and vitamin D while you are taking this medicine, unless your doctor tells you not to. Discuss the foods you eat and the vitamins you take with your health care professional.  See your dentist regularly. Brush and floss your teeth as directed. Before you have any dental work done, tell your dentist you are receiving this medicine.  Do not become pregnant while taking this medicine or for 5 months after stopping it. Women should inform their doctor if they wish to become pregnant or think they might be pregnant. There is a potential for serious side effects to an unborn child. Talk to your health care professional or pharmacist for more information.  NOTE:This sheet is a summary. It may not cover all possible information. If you have questions about this medicine, talk to your doctor, pharmacist, or health care provider. Copyright© 2017 [employer]

## 2020-01-27 NOTE — PATIENT INSTRUCTIONS
Vit D3 2000 IU- take once daily     Chew 2 tums daily for 14 day then stop    10 days recheck labs in central- rechecking Ca level post prolia injection

## 2020-01-27 NOTE — NURSING
Printed information regarding Prolia given to pt. Instructed on s/s to report. Verbalized understanding.  Administered Prolia 60 mg/ml SQ in abdomen  Instructed to wait in clinic x 15 min. For monitoring.  Also next appointments scheduled for lab and injection.  
No
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
## 2020-01-28 ENCOUNTER — PATIENT MESSAGE (OUTPATIENT)
Dept: FAMILY MEDICINE | Facility: CLINIC | Age: 85
End: 2020-01-28

## 2020-01-28 DIAGNOSIS — R30.0 DYSURIA: Primary | ICD-10-CM

## 2020-01-29 ENCOUNTER — PATIENT MESSAGE (OUTPATIENT)
Dept: FAMILY MEDICINE | Facility: CLINIC | Age: 85
End: 2020-01-29

## 2020-01-29 ENCOUNTER — LAB VISIT (OUTPATIENT)
Dept: LAB | Facility: HOSPITAL | Age: 85
End: 2020-01-29
Attending: INTERNAL MEDICINE
Payer: MEDICARE

## 2020-01-29 DIAGNOSIS — R30.0 DYSURIA: ICD-10-CM

## 2020-01-29 LAB
BACTERIA #/AREA URNS AUTO: ABNORMAL /HPF
BILIRUB UR QL STRIP: NEGATIVE
CLARITY UR REFRACT.AUTO: ABNORMAL
COLOR UR AUTO: ABNORMAL
GLUCOSE UR QL STRIP: NEGATIVE
HGB UR QL STRIP: ABNORMAL
HYALINE CASTS UR QL AUTO: 0 /LPF
KETONES UR QL STRIP: NEGATIVE
LEUKOCYTE ESTERASE UR QL STRIP: ABNORMAL
MICROSCOPIC COMMENT: ABNORMAL
NITRITE UR QL STRIP: NEGATIVE
PH UR STRIP: 5 [PH] (ref 5–8)
PROT UR QL STRIP: ABNORMAL
RBC #/AREA URNS AUTO: 13 /HPF (ref 0–4)
SP GR UR STRIP: 1.01 (ref 1–1.03)
SQUAMOUS #/AREA URNS AUTO: 1 /HPF
URN SPEC COLLECT METH UR: ABNORMAL
WBC #/AREA URNS AUTO: >100 /HPF (ref 0–5)

## 2020-01-29 PROCEDURE — 87086 URINE CULTURE/COLONY COUNT: CPT | Mod: HCNC

## 2020-01-29 PROCEDURE — 81001 URINALYSIS AUTO W/SCOPE: CPT | Mod: HCNC

## 2020-01-29 PROCEDURE — 87088 URINE BACTERIA CULTURE: CPT | Mod: HCNC

## 2020-01-29 PROCEDURE — 87077 CULTURE AEROBIC IDENTIFY: CPT | Mod: HCNC

## 2020-01-29 PROCEDURE — 87186 SC STD MICRODIL/AGAR DIL: CPT | Mod: HCNC

## 2020-01-30 ENCOUNTER — PATIENT MESSAGE (OUTPATIENT)
Dept: FAMILY MEDICINE | Facility: CLINIC | Age: 85
End: 2020-01-30

## 2020-01-30 RX ORDER — CIPROFLOXACIN 250 MG/1
250 TABLET, FILM COATED ORAL 2 TIMES DAILY
Qty: 14 TABLET | Refills: 0 | Status: SHIPPED | OUTPATIENT
Start: 2020-01-30 | End: 2023-07-07 | Stop reason: SDUPTHER

## 2020-02-01 LAB — BACTERIA UR CULT: ABNORMAL

## 2020-02-06 ENCOUNTER — LAB VISIT (OUTPATIENT)
Dept: LAB | Facility: HOSPITAL | Age: 85
End: 2020-02-06
Attending: INTERNAL MEDICINE
Payer: MEDICARE

## 2020-02-06 DIAGNOSIS — N18.30 STAGE 3 CHRONIC KIDNEY DISEASE: ICD-10-CM

## 2020-02-06 DIAGNOSIS — M80.00XG AGE-RELATED OSTEOPOROSIS WITH CURRENT PATHOLOGICAL FRACTURE WITH DELAYED HEALING: ICD-10-CM

## 2020-02-06 LAB
25(OH)D3+25(OH)D2 SERPL-MCNC: 25 NG/ML (ref 30–96)
ALBUMIN SERPL BCP-MCNC: 4.1 G/DL (ref 3.5–5.2)
ALP SERPL-CCNC: 65 U/L (ref 55–135)
ALT SERPL W/O P-5'-P-CCNC: 12 U/L (ref 10–44)
ANION GAP SERPL CALC-SCNC: 8 MMOL/L (ref 8–16)
AST SERPL-CCNC: 18 U/L (ref 10–40)
BILIRUB SERPL-MCNC: 0.7 MG/DL (ref 0.1–1)
BUN SERPL-MCNC: 12 MG/DL (ref 8–23)
CALCIUM SERPL-MCNC: 9.1 MG/DL (ref 8.7–10.5)
CHLORIDE SERPL-SCNC: 101 MMOL/L (ref 95–110)
CO2 SERPL-SCNC: 28 MMOL/L (ref 23–29)
CREAT SERPL-MCNC: 1.1 MG/DL (ref 0.5–1.4)
EST. GFR  (AFRICAN AMERICAN): 53.7 ML/MIN/1.73 M^2
EST. GFR  (NON AFRICAN AMERICAN): 46.5 ML/MIN/1.73 M^2
GLUCOSE SERPL-MCNC: 133 MG/DL (ref 70–110)
MAGNESIUM SERPL-MCNC: 1.7 MG/DL (ref 1.6–2.6)
PHOSPHATE SERPL-MCNC: 3.1 MG/DL (ref 2.7–4.5)
POTASSIUM SERPL-SCNC: 4 MMOL/L (ref 3.5–5.1)
PROT SERPL-MCNC: 7.4 G/DL (ref 6–8.4)
SODIUM SERPL-SCNC: 137 MMOL/L (ref 136–145)

## 2020-02-06 PROCEDURE — 80053 COMPREHEN METABOLIC PANEL: CPT | Mod: HCNC

## 2020-02-06 PROCEDURE — 83970 ASSAY OF PARATHORMONE: CPT | Mod: HCNC

## 2020-02-06 PROCEDURE — 82306 VITAMIN D 25 HYDROXY: CPT | Mod: HCNC

## 2020-02-06 PROCEDURE — 83735 ASSAY OF MAGNESIUM: CPT | Mod: HCNC

## 2020-02-06 PROCEDURE — 36415 COLL VENOUS BLD VENIPUNCTURE: CPT | Mod: HCNC,PO

## 2020-02-06 PROCEDURE — 84100 ASSAY OF PHOSPHORUS: CPT | Mod: HCNC

## 2020-02-07 LAB — PTH-INTACT SERPL-MCNC: 117 PG/ML (ref 9–77)

## 2020-02-10 ENCOUNTER — TELEPHONE (OUTPATIENT)
Dept: RHEUMATOLOGY | Facility: CLINIC | Age: 85
End: 2020-02-10

## 2020-02-10 NOTE — TELEPHONE ENCOUNTER
Sent her message through the patient portal  Her mag, phos, Calcium  look good   Kidney function is stable and in good ranges     Her Vit D is just a little low (25) and her pth is just a little high (117)  I want her to get over the counter vit D3 2000 IU and take once daily   Once her vit D comes up in the normal range her pth level should decline as well     I added vit D and pth to her next labs
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
## 2020-02-10 NOTE — TELEPHONE ENCOUNTER
----- Message from Stella Richey sent at 2/10/2020 11:38 AM CST -----  Contact: Pt   Type:  Test Results    Who Called: Clau Nunn  Name of Test (Lab/Mammo/Etc): Labs  Date of Test: 02/06/2020  Ordering Provider: Carly Mora  Where the test was performed: [facility]   Would the patient rather a call back or a response via [facility]? Call Back  Best Call Back Number: [phone] (home) or [employer]  Additional Information:  Pt just wants to know if everything is ok.

## 2020-02-11 ENCOUNTER — HOSPITAL ENCOUNTER (OUTPATIENT)
Dept: RADIOLOGY | Facility: HOSPITAL | Age: 85
Discharge: HOME | End: 2020-02-11
Attending: INTERNAL MEDICINE
Payer: MEDICARE

## 2020-02-11 ENCOUNTER — OFFICE VISIT (OUTPATIENT)
Dept: FAMILY MEDICINE | Facility: CLINIC | Age: 85
End: 2020-02-11
Payer: MEDICARE

## 2020-02-11 VITALS
HEART RATE: 89 BPM | HEIGHT: 65 IN | SYSTOLIC BLOOD PRESSURE: 146 MMHG | WEIGHT: 162.5 LBS | DIASTOLIC BLOOD PRESSURE: 82 MMHG | OXYGEN SATURATION: 96 % | TEMPERATURE: 98 F | BODY MASS INDEX: 27.07 KG/M2

## 2020-02-11 DIAGNOSIS — E03.9 ACQUIRED HYPOTHYROIDISM: ICD-10-CM

## 2020-02-11 DIAGNOSIS — M25.562 LEFT KNEE PAIN, UNSPECIFIED CHRONICITY: Primary | ICD-10-CM

## 2020-02-11 DIAGNOSIS — I10 ESSENTIAL HYPERTENSION: Chronic | ICD-10-CM

## 2020-02-11 DIAGNOSIS — M25.562 LEFT KNEE PAIN, UNSPECIFIED CHRONICITY: ICD-10-CM

## 2020-02-11 DIAGNOSIS — F41.9 ANXIETY: ICD-10-CM

## 2020-02-11 PROCEDURE — 3077F PR MOST RECENT SYSTOLIC BLOOD PRESSURE >= 140 MM HG: ICD-10-PCS | Mod: HCNC,CPTII,S$GLB, | Performed by: INTERNAL MEDICINE

## 2020-02-11 PROCEDURE — 73560 XR KNEE ORTHO LEFT: ICD-10-PCS | Mod: 26,HCNC,RT, | Performed by: RADIOLOGY

## 2020-02-11 PROCEDURE — 3079F PR MOST RECENT DIASTOLIC BLOOD PRESSURE 80-89 MM HG: ICD-10-PCS | Mod: HCNC,CPTII,S$GLB, | Performed by: INTERNAL MEDICINE

## 2020-02-11 PROCEDURE — 1159F MED LIST DOCD IN RCRD: CPT | Mod: HCNC,S$GLB,, | Performed by: INTERNAL MEDICINE

## 2020-02-11 PROCEDURE — 99214 OFFICE O/P EST MOD 30 MIN: CPT | Mod: HCNC,S$GLB,, | Performed by: INTERNAL MEDICINE

## 2020-02-11 PROCEDURE — 99214 PR OFFICE/OUTPT VISIT, EST, LEVL IV, 30-39 MIN: ICD-10-PCS | Mod: HCNC,S$GLB,, | Performed by: INTERNAL MEDICINE

## 2020-02-11 PROCEDURE — 1101F PR PT FALLS ASSESS DOC 0-1 FALLS W/OUT INJ PAST YR: ICD-10-PCS | Mod: HCNC,CPTII,S$GLB, | Performed by: INTERNAL MEDICINE

## 2020-02-11 PROCEDURE — 1159F PR MEDICATION LIST DOCUMENTED IN MEDICAL RECORD: ICD-10-PCS | Mod: HCNC,S$GLB,, | Performed by: INTERNAL MEDICINE

## 2020-02-11 PROCEDURE — 73562 XR KNEE ORTHO LEFT: ICD-10-PCS | Mod: 26,HCNC,LT, | Performed by: RADIOLOGY

## 2020-02-11 PROCEDURE — 1101F PT FALLS ASSESS-DOCD LE1/YR: CPT | Mod: HCNC,CPTII,S$GLB, | Performed by: INTERNAL MEDICINE

## 2020-02-11 PROCEDURE — 1125F PR PAIN SEVERITY QUANTIFIED, PAIN PRESENT: ICD-10-PCS | Mod: HCNC,S$GLB,, | Performed by: INTERNAL MEDICINE

## 2020-02-11 PROCEDURE — 99999 PR PBB SHADOW E&M-EST. PATIENT-LVL III: ICD-10-PCS | Mod: PBBFAC,HCNC,, | Performed by: INTERNAL MEDICINE

## 2020-02-11 PROCEDURE — 73560 X-RAY EXAM OF KNEE 1 OR 2: CPT | Mod: 59,TC,HCNC,FY,PO,RT

## 2020-02-11 PROCEDURE — 3079F DIAST BP 80-89 MM HG: CPT | Mod: HCNC,CPTII,S$GLB, | Performed by: INTERNAL MEDICINE

## 2020-02-11 PROCEDURE — 73562 X-RAY EXAM OF KNEE 3: CPT | Mod: 26,HCNC,LT, | Performed by: RADIOLOGY

## 2020-02-11 PROCEDURE — 99999 PR PBB SHADOW E&M-EST. PATIENT-LVL III: CPT | Mod: PBBFAC,HCNC,, | Performed by: INTERNAL MEDICINE

## 2020-02-11 PROCEDURE — 3077F SYST BP >= 140 MM HG: CPT | Mod: HCNC,CPTII,S$GLB, | Performed by: INTERNAL MEDICINE

## 2020-02-11 PROCEDURE — 73560 X-RAY EXAM OF KNEE 1 OR 2: CPT | Mod: 26,HCNC,RT, | Performed by: RADIOLOGY

## 2020-02-11 PROCEDURE — 1125F AMNT PAIN NOTED PAIN PRSNT: CPT | Mod: HCNC,S$GLB,, | Performed by: INTERNAL MEDICINE

## 2020-02-11 RX ORDER — CYCLOBENZAPRINE HCL 10 MG
10 TABLET ORAL 3 TIMES DAILY PRN
Qty: 30 TABLET | Refills: 6 | Status: SHIPPED | OUTPATIENT
Start: 2020-02-11 | End: 2020-02-21

## 2020-02-13 ENCOUNTER — PATIENT MESSAGE (OUTPATIENT)
Dept: FAMILY MEDICINE | Facility: CLINIC | Age: 85
End: 2020-02-13

## 2020-02-14 ENCOUNTER — PATIENT MESSAGE (OUTPATIENT)
Dept: UROLOGY | Facility: CLINIC | Age: 85
End: 2020-02-14

## 2020-03-16 ENCOUNTER — PATIENT MESSAGE (OUTPATIENT)
Dept: FAMILY MEDICINE | Facility: CLINIC | Age: 85
End: 2020-03-16

## 2020-03-17 ENCOUNTER — PATIENT MESSAGE (OUTPATIENT)
Dept: FAMILY MEDICINE | Facility: CLINIC | Age: 85
End: 2020-03-17

## 2020-03-20 ENCOUNTER — PATIENT MESSAGE (OUTPATIENT)
Dept: FAMILY MEDICINE | Facility: CLINIC | Age: 85
End: 2020-03-20

## 2020-03-20 ENCOUNTER — TELEPHONE (OUTPATIENT)
Dept: FAMILY MEDICINE | Facility: CLINIC | Age: 85
End: 2020-03-20

## 2020-03-20 RX ORDER — LOSARTAN POTASSIUM 50 MG/1
50 TABLET ORAL DAILY
Qty: 90 TABLET | Refills: 3 | Status: SHIPPED | OUTPATIENT
Start: 2020-03-20 | End: 2020-04-23 | Stop reason: SDUPTHER

## 2020-04-01 RX ORDER — LEVOTHYROXINE SODIUM 88 UG/1
TABLET ORAL
Qty: 90 TABLET | Refills: 3 | Status: SHIPPED | OUTPATIENT
Start: 2020-04-01 | End: 2020-06-30 | Stop reason: SDUPTHER

## 2020-04-03 ENCOUNTER — PATIENT MESSAGE (OUTPATIENT)
Dept: FAMILY MEDICINE | Facility: CLINIC | Age: 85
End: 2020-04-03

## 2020-04-23 DIAGNOSIS — F41.9 ANXIETY: ICD-10-CM

## 2020-04-23 DIAGNOSIS — E03.9 ACQUIRED HYPOTHYROIDISM: ICD-10-CM

## 2020-04-23 DIAGNOSIS — F51.01 PRIMARY INSOMNIA: ICD-10-CM

## 2020-04-23 DIAGNOSIS — M81.0 OSTEOPOROSIS, POSTMENOPAUSAL: Chronic | ICD-10-CM

## 2020-04-23 RX ORDER — CYCLOBENZAPRINE HCL 5 MG
5 TABLET ORAL NIGHTLY PRN
Qty: 90 TABLET | Refills: 1 | Status: SHIPPED | OUTPATIENT
Start: 2020-04-23 | End: 2020-09-07 | Stop reason: SDUPTHER

## 2020-04-23 RX ORDER — OMEPRAZOLE 40 MG/1
40 CAPSULE, DELAYED RELEASE ORAL EVERY MORNING
Qty: 90 CAPSULE | Refills: 3 | Status: SHIPPED | OUTPATIENT
Start: 2020-04-23 | End: 2021-07-06 | Stop reason: SDUPTHER

## 2020-04-23 RX ORDER — LOSARTAN POTASSIUM 50 MG/1
50 TABLET ORAL DAILY
Qty: 90 TABLET | Refills: 3 | Status: SHIPPED | OUTPATIENT
Start: 2020-04-23 | End: 2021-07-06 | Stop reason: SDUPTHER

## 2020-04-23 RX ORDER — TRAZODONE HYDROCHLORIDE 50 MG/1
50 TABLET ORAL NIGHTLY PRN
Qty: 30 TABLET | Refills: 3 | Status: SHIPPED | OUTPATIENT
Start: 2020-04-23 | End: 2020-08-08 | Stop reason: SDUPTHER

## 2020-04-23 RX ORDER — LEVOTHYROXINE SODIUM 100 UG/1
100 TABLET ORAL DAILY
Qty: 90 TABLET | Refills: 3 | Status: SHIPPED | OUTPATIENT
Start: 2020-04-23 | End: 2020-06-30 | Stop reason: SDUPTHER

## 2020-04-23 RX ORDER — BUSPIRONE HYDROCHLORIDE 5 MG/1
5 TABLET ORAL 2 TIMES DAILY
Qty: 90 TABLET | Refills: 1 | Status: SHIPPED | OUTPATIENT
Start: 2020-04-23 | End: 2020-07-05 | Stop reason: SDUPTHER

## 2020-04-23 RX ORDER — ESCITALOPRAM OXALATE 20 MG/1
20 TABLET ORAL DAILY
Qty: 90 TABLET | Refills: 3 | Status: SHIPPED | OUTPATIENT
Start: 2020-04-23 | End: 2021-07-06 | Stop reason: SDUPTHER

## 2020-06-12 ENCOUNTER — HOSPITAL ENCOUNTER (OUTPATIENT)
Dept: RADIOLOGY | Facility: HOSPITAL | Age: 85
Discharge: HOME | End: 2020-06-12
Attending: PHYSICIAN ASSISTANT
Payer: MEDICARE

## 2020-06-12 ENCOUNTER — OFFICE VISIT (OUTPATIENT)
Dept: PAIN MEDICINE | Facility: CLINIC | Age: 85
End: 2020-06-12
Payer: MEDICARE

## 2020-06-12 VITALS
SYSTOLIC BLOOD PRESSURE: 127 MMHG | HEART RATE: 68 BPM | DIASTOLIC BLOOD PRESSURE: 71 MMHG | WEIGHT: 164.69 LBS | BODY MASS INDEX: 27.44 KG/M2 | HEIGHT: 65 IN

## 2020-06-12 DIAGNOSIS — M54.16 LEFT LUMBAR RADICULOPATHY: ICD-10-CM

## 2020-06-12 DIAGNOSIS — M47.817 LUMBOSACRAL SPONDYLOSIS WITHOUT MYELOPATHY: ICD-10-CM

## 2020-06-12 DIAGNOSIS — M54.50 LOW BACK PAIN, NON-SPECIFIC: ICD-10-CM

## 2020-06-12 DIAGNOSIS — M47.816 LUMBAR FACET ARTHROPATHY: ICD-10-CM

## 2020-06-12 DIAGNOSIS — M25.552 PAIN OF LEFT HIP JOINT: ICD-10-CM

## 2020-06-12 DIAGNOSIS — M47.816 LUMBAR SPONDYLOSIS: Primary | ICD-10-CM

## 2020-06-12 DIAGNOSIS — M53.3 SACROILIAC JOINT PAIN: ICD-10-CM

## 2020-06-12 PROCEDURE — 99999 PR PBB SHADOW E&M-EST. PATIENT-LVL IV: ICD-10-PCS | Mod: PBBFAC,HCNC,, | Performed by: PHYSICIAN ASSISTANT

## 2020-06-12 PROCEDURE — 73502 X-RAY EXAM HIP UNI 2-3 VIEWS: CPT | Mod: 26,HCNC,LT, | Performed by: RADIOLOGY

## 2020-06-12 PROCEDURE — 3078F DIAST BP <80 MM HG: CPT | Mod: HCNC,CPTII,S$GLB, | Performed by: PHYSICIAN ASSISTANT

## 2020-06-12 PROCEDURE — 73502 X-RAY EXAM HIP UNI 2-3 VIEWS: CPT | Mod: TC,HCNC,LT

## 2020-06-12 PROCEDURE — 3074F PR MOST RECENT SYSTOLIC BLOOD PRESSURE < 130 MM HG: ICD-10-PCS | Mod: HCNC,CPTII,S$GLB, | Performed by: PHYSICIAN ASSISTANT

## 2020-06-12 PROCEDURE — 1101F PT FALLS ASSESS-DOCD LE1/YR: CPT | Mod: HCNC,CPTII,S$GLB, | Performed by: PHYSICIAN ASSISTANT

## 2020-06-12 PROCEDURE — 99214 OFFICE O/P EST MOD 30 MIN: CPT | Mod: HCNC,S$GLB,, | Performed by: PHYSICIAN ASSISTANT

## 2020-06-12 PROCEDURE — 1125F AMNT PAIN NOTED PAIN PRSNT: CPT | Mod: HCNC,S$GLB,, | Performed by: PHYSICIAN ASSISTANT

## 2020-06-12 PROCEDURE — 1159F MED LIST DOCD IN RCRD: CPT | Mod: HCNC,S$GLB,, | Performed by: PHYSICIAN ASSISTANT

## 2020-06-12 PROCEDURE — 99999 PR PBB SHADOW E&M-EST. PATIENT-LVL IV: CPT | Mod: PBBFAC,HCNC,, | Performed by: PHYSICIAN ASSISTANT

## 2020-06-12 PROCEDURE — 1125F PR PAIN SEVERITY QUANTIFIED, PAIN PRESENT: ICD-10-PCS | Mod: HCNC,S$GLB,, | Performed by: PHYSICIAN ASSISTANT

## 2020-06-12 PROCEDURE — 3078F PR MOST RECENT DIASTOLIC BLOOD PRESSURE < 80 MM HG: ICD-10-PCS | Mod: HCNC,CPTII,S$GLB, | Performed by: PHYSICIAN ASSISTANT

## 2020-06-12 PROCEDURE — 1101F PR PT FALLS ASSESS DOC 0-1 FALLS W/OUT INJ PAST YR: ICD-10-PCS | Mod: HCNC,CPTII,S$GLB, | Performed by: PHYSICIAN ASSISTANT

## 2020-06-12 PROCEDURE — 3074F SYST BP LT 130 MM HG: CPT | Mod: HCNC,CPTII,S$GLB, | Performed by: PHYSICIAN ASSISTANT

## 2020-06-12 PROCEDURE — 1159F PR MEDICATION LIST DOCUMENTED IN MEDICAL RECORD: ICD-10-PCS | Mod: HCNC,S$GLB,, | Performed by: PHYSICIAN ASSISTANT

## 2020-06-12 PROCEDURE — 73502 XR HIP 2 VIEW LEFT: ICD-10-PCS | Mod: 26,HCNC,LT, | Performed by: RADIOLOGY

## 2020-06-12 PROCEDURE — 99214 PR OFFICE/OUTPT VISIT, EST, LEVL IV, 30-39 MIN: ICD-10-PCS | Mod: HCNC,S$GLB,, | Performed by: PHYSICIAN ASSISTANT

## 2020-06-12 NOTE — PROGRESS NOTES
Chief Pain Complaint:  Back Pain    Interval History (6/12/2020):     Interval History(11/20/18): Patient was seen on 10/24/18. At that time she underwent left SIJ + left GT bursa injection with local.  The patient reports that she is/was better following the procedure.  she reports 100% pain relief.  The changes lasted 4 weeks so far.  The changes have continued through this visit.    History of Present Illness:   Clau Nunn is a 85 y.o. female  who is presenting with a chief complaint of low back pain and hip pain. The patient began experiencing this problem insidiously, and the pain has been gradually worsening over the past 6 month(s). The pain is described as throbbing, cramping, aching and heavy and is located in the bilateral buttocks. Pain is intermittent and lasts hours. The pain radiates to lateral thigh and groin. The patient rates her pain a 5 out of ten and interferes with activities of daily living a 5 out of ten. Pain is exacerbated by getting up from a seated position, and is improved by rest. Patient reports prior trauma (fall in June 2018 causing right distal radius + right sacrum fracture), prior lumbar surgery in ~2005, right hip replacement, right distal radius fracture requiring ORIF in June 2018.    - pertinent negatives: No fever, No chills, No weight loss, No bladder dysfunction, No bowel dysfunction, No extremity weakness, No saddle anesthesia  - pertinent positives: none    - medications, other therapies tried (physical therapy, injections):     >> Tylenol    >> Has previously undergone Physical Therapy (aquatic and land) with limited relief    >> Has previously undergone spinal injection/s:   - bilateral SIJ injection on 11-9-17 with ~30% relief for 2 weeks   - left hip injection on 12-28-17 with some relief   - bilateral L3-5 MBB with local on 5/11/18 with reported 100% pain relief   - left SIJ + left GT bursa injection with local on 10/24/18 with 100% pain relief        Imaging  / Labs / Studies (reviewed on 6/12/2020):    7/30/2018 XR LUMBAR SPINE COMPLETE 5 VIEW  TECHNIQUE:  AP, lateral, spot and bilateral oblique views of the lumbar spine were performed.  COMPARISON:  07/25/2018  FINDINGS:  There is grade 1 spondylolisthesis of L4 on L5.  Pedicle screws and fixation rods are noted for at the T10, T11, L1 and L2 levels.  Chronic compression deformity at the L1 level unchanged.  Prominent bilateral facet arthropathy noted at the L4-5 and L5-S1 levels.  IVC filter noted.       7/30/2018 XR HIPS BILATERAL 2 VIEW INCL AP PELVIS  TECHNIQUE:  AP view of the pelvis and frogleg lateral views of both hips were performed.    COMPARISON:  07/26/2018    FINDINGS:  The bony pelvis is intact. A right total hip arthroplasty, plate and wires are in place.  No hardware failure or loosening suggested.  The appearance is unchanged when compared to the prior exam.  Mild degenerative changes noted at the left hip.  No acute fracture or dislocation of the left hip.  No significant joint space narrowing identified.  No plain film evidence to suggest AVN of either hip.       Results for orders placed during the hospital encounter of 01/13/14   MRI Lumbar Spine W WO Contrast    Narrative Findings: Pre- and postcontrast multiplanar multisequence imaging of the thoracic and lumbar spine was performed using 7 cc of Gadavist intravenous contrast material.  There is moderately severe chronic central compression deformity of the T12 vertebral body which is stabilized by paraspinous rods and pedicle screws which extend from T10 through L2.  Postsurgical changes of laminectomy also noted at T12.  The posterior cortex of the T12 vertebral body is displaced posteriorly and indents the ventral thecal sac.  There is no anterior cord contact or significant central canal stenosis.  Degenerative disk desiccation is noted at multiple levels with moderate disk   narrowing at L5-S1.    Also L5-S1 is a broad-based disk protrusion  "which combined with bilateral facet hypertrophy results in moderately severe narrowing of both neural foramina.  No significant central canal stenosis noted.    From L2-3 through L4-5 there   is mild disk bulging which results in mild bilateral foraminal narrowing.  This is slightly more pronounced at L4-5 with bilateral facet hypertrophy a contributing factor.  No significant central canal stenosis noted.  No thoracic disk extrusion, and foraminal narrowing, canal stenosis is evident.  Thoracolumbar cord is intact.  Paravertebral soft tissues are symmetric and normal in appearance.         Review of Systems:  CONSTITUTIONAL: patient denies any fever, chills, or weight loss  SKIN: patient denies any rash or itching  RESPIRATORY: patient denies having any shortness of breath  GASTROINTESTINAL: patient denies having any diarrhea, constipation, or bowel incontinence  GENITOURINARY: patient denies having any abnormal bladder function    MUSCULOSKELETAL:  - patient complains of the above noted pain/s (see chief pain complaint)    NEUROLOGICAL:   - pain as above  - strength in Lower extremities is intact, BILATERALLY  - sensation in Lower extremities is intact, BILATERALLY  - patient denies any loss of bowel or bladder control      PSYCHIATRIC: patient denies any change in mood    Other:  All other systems reviewed and are negative        Physical Exam:  Vitals:  /71   Pulse 68   Ht 5' 5" (1.651 m)   Wt 74.7 kg (164 lb 10.9 oz)   BMI 27.40 kg/m²   (reviewed on 6/12/2020)    General: alert and oriented, in no apparent distress.  Gait: normal gait.  Skin: no rashes, no discoloration, no obvious lesions  HEENT: normocephalic, atraumatic. Pupils equal and round.  Cardiovascular: no significant peripheral edema present.  Respiratory: without use of accessory muscles of respiration.    Musculoskeletal - Lumbar Spine:  - Pain on extension of lumbar spine: Present  - Lumbar facet loading: Positive bilaterally  - TTP " over the lumbar facet joints: Absent  - TTP over the SI joints: Present on left   - TTP over GT bursa: Present on left   - Straight Leg Raise: equivocal on left   - PAMELA: Positive on left   - Pain on Internal and external rotation of the hip: Mild    Neuro - Lower Extremities:  - BLE Strength: R/L: HF: 5/5, HE: 5/5, KF: 5/5; KE: 5/5; FE: 5/5; FF: 5/5  - Extremity Reflexes: Brisk and symmetric throughout  - Sensory: Sensation to light touch intact bilaterally      Psych:  Mood and affect is appropriate              Assessment:  Clau Nunn is a 85 y.o. year old female who is presenting with   Encounter Diagnoses   Name Primary?    Lumbar spondylosis Yes    Sacroiliac joint pain     Pain of left hip joint     Lumbar facet arthropathy     Lumbosacral spondylosis without myelopathy        Plan:  1. Interventional: She had left SIJ + left GT bursa injection with local on 10/24/18 with 100% pain relief. Consider repeating vs. L3 REJI.     2. Pharmacologic: Continue gabapentin 600mg QHS.    3. Rehabilitative: We discussed PT previously, but she is not interested. She reports it made her pain worse in the past. Encourage regular exercise.     4. Diagnostic: Order lumbar MRI to evaluate radiculopathy/ potential discogenic sources of pain. Order left hip x-ray.    5. Follow up: MRI review.    - I discussed the risks, benefits, and alternatives to potential treatment options. All questions and concerns were fully addressed today in clinic.

## 2020-06-22 ENCOUNTER — PATIENT OUTREACH (OUTPATIENT)
Dept: ADMINISTRATIVE | Facility: HOSPITAL | Age: 85
End: 2020-06-22

## 2020-06-22 NOTE — PROGRESS NOTES
PreVisit Chart Audit Performed    updated with recent information     Connie JANG LPN Care Coordinator  Care Coordination Department  [facility]  [phone]

## 2020-06-29 ENCOUNTER — HOSPITAL ENCOUNTER (OUTPATIENT)
Dept: RADIOLOGY | Facility: HOSPITAL | Age: 85
Discharge: HOME | End: 2020-06-29
Attending: PHYSICIAN ASSISTANT
Payer: MEDICARE

## 2020-06-29 DIAGNOSIS — M54.50 LOW BACK PAIN, NON-SPECIFIC: ICD-10-CM

## 2020-06-29 PROCEDURE — 72158 MRI LUMBAR SPINE W/O & W/DYE: CPT | Mod: TC,HCNC

## 2020-06-29 PROCEDURE — 25500020 PHARM REV CODE 255: Mod: HCNC | Performed by: PHYSICIAN ASSISTANT

## 2020-06-29 PROCEDURE — A9585 GADOBUTROL INJECTION: HCPCS | Mod: HCNC | Performed by: PHYSICIAN ASSISTANT

## 2020-06-29 RX ORDER — GADOBUTROL 604.72 MG/ML
10 INJECTION INTRAVENOUS
Status: COMPLETED | OUTPATIENT
Start: 2020-06-29 | End: 2020-06-29

## 2020-06-29 RX ADMIN — GADOBUTROL 7 ML: 604.72 INJECTION INTRAVENOUS at 01:06

## 2020-06-30 ENCOUNTER — OFFICE VISIT (OUTPATIENT)
Dept: PAIN MEDICINE | Facility: CLINIC | Age: 85
End: 2020-06-30
Payer: MEDICARE

## 2020-06-30 VITALS
DIASTOLIC BLOOD PRESSURE: 79 MMHG | WEIGHT: 165 LBS | BODY MASS INDEX: 27.49 KG/M2 | RESPIRATION RATE: 20 BRPM | HEIGHT: 65 IN | SYSTOLIC BLOOD PRESSURE: 144 MMHG | HEART RATE: 62 BPM

## 2020-06-30 DIAGNOSIS — M54.16 LEFT LUMBAR RADICULOPATHY: Primary | ICD-10-CM

## 2020-06-30 DIAGNOSIS — R21 RASH: ICD-10-CM

## 2020-06-30 PROCEDURE — 99214 OFFICE O/P EST MOD 30 MIN: CPT | Mod: HCNC,S$GLB,, | Performed by: PHYSICIAN ASSISTANT

## 2020-06-30 PROCEDURE — 3078F PR MOST RECENT DIASTOLIC BLOOD PRESSURE < 80 MM HG: ICD-10-PCS | Mod: HCNC,CPTII,S$GLB, | Performed by: PHYSICIAN ASSISTANT

## 2020-06-30 PROCEDURE — 1125F AMNT PAIN NOTED PAIN PRSNT: CPT | Mod: HCNC,S$GLB,, | Performed by: PHYSICIAN ASSISTANT

## 2020-06-30 PROCEDURE — 99999 PR PBB SHADOW E&M-EST. PATIENT-LVL V: ICD-10-PCS | Mod: PBBFAC,HCNC,, | Performed by: PHYSICIAN ASSISTANT

## 2020-06-30 PROCEDURE — 1159F PR MEDICATION LIST DOCUMENTED IN MEDICAL RECORD: ICD-10-PCS | Mod: HCNC,S$GLB,, | Performed by: PHYSICIAN ASSISTANT

## 2020-06-30 PROCEDURE — 1101F PR PT FALLS ASSESS DOC 0-1 FALLS W/OUT INJ PAST YR: ICD-10-PCS | Mod: HCNC,CPTII,S$GLB, | Performed by: PHYSICIAN ASSISTANT

## 2020-06-30 PROCEDURE — 3078F DIAST BP <80 MM HG: CPT | Mod: HCNC,CPTII,S$GLB, | Performed by: PHYSICIAN ASSISTANT

## 2020-06-30 PROCEDURE — 1101F PT FALLS ASSESS-DOCD LE1/YR: CPT | Mod: HCNC,CPTII,S$GLB, | Performed by: PHYSICIAN ASSISTANT

## 2020-06-30 PROCEDURE — 99214 PR OFFICE/OUTPT VISIT, EST, LEVL IV, 30-39 MIN: ICD-10-PCS | Mod: HCNC,S$GLB,, | Performed by: PHYSICIAN ASSISTANT

## 2020-06-30 PROCEDURE — 3077F PR MOST RECENT SYSTOLIC BLOOD PRESSURE >= 140 MM HG: ICD-10-PCS | Mod: HCNC,CPTII,S$GLB, | Performed by: PHYSICIAN ASSISTANT

## 2020-06-30 PROCEDURE — 1159F MED LIST DOCD IN RCRD: CPT | Mod: HCNC,S$GLB,, | Performed by: PHYSICIAN ASSISTANT

## 2020-06-30 PROCEDURE — 1125F PR PAIN SEVERITY QUANTIFIED, PAIN PRESENT: ICD-10-PCS | Mod: HCNC,S$GLB,, | Performed by: PHYSICIAN ASSISTANT

## 2020-06-30 PROCEDURE — 3077F SYST BP >= 140 MM HG: CPT | Mod: HCNC,CPTII,S$GLB, | Performed by: PHYSICIAN ASSISTANT

## 2020-06-30 PROCEDURE — 99999 PR PBB SHADOW E&M-EST. PATIENT-LVL V: CPT | Mod: PBBFAC,HCNC,, | Performed by: PHYSICIAN ASSISTANT

## 2020-06-30 RX ORDER — ACETAMINOPHEN AND CODEINE PHOSPHATE 300; 30 MG/1; MG/1
TABLET ORAL
Qty: 14 TABLET | Refills: 0 | Status: SHIPPED | OUTPATIENT
Start: 2020-06-30 | End: 2020-08-10

## 2020-06-30 RX ORDER — ASPIRIN 81 MG/1
81 TABLET ORAL DAILY
COMMUNITY
End: 2022-05-11

## 2020-06-30 NOTE — PROGRESS NOTES
Chief Pain Complaint:  Low-back Pain    Interval History (6/12/2020): She is here today to review MRI. She reports 10/10 pain today. She also has concerns about Prolia as she read that it can cause rashes and joint pain.  She has been having a rash on her right breast for a few weeks.  She will see Dr. Gomez soon to discuss.     Interval History(11/20/18): Patient was seen on 10/24/18. At that time she underwent left SIJ + left GT bursa injection with local.  The patient reports that she is/was better following the procedure.  she reports 100% pain relief.  The changes lasted 4 weeks so far.  The changes have continued through this visit.    History of Present Illness:   Clau Nunn is a 85 y.o. female  who is presenting with a chief complaint of low back pain and hip pain. The patient began experiencing this problem insidiously, and the pain has been gradually worsening over the past 6 month(s). The pain is described as throbbing, cramping, aching and heavy and is located in the bilateral buttocks. Pain is intermittent and lasts hours. The pain radiates to lateral thigh and groin. The patient rates her pain a 5 out of ten and interferes with activities of daily living a 5 out of ten. Pain is exacerbated by getting up from a seated position, and is improved by rest. Patient reports prior trauma (fall in June 2018 causing right distal radius + right sacrum fracture), prior lumbar surgery in ~2005, right hip replacement, right distal radius fracture requiring ORIF in June 2018.    - pertinent negatives: No fever, No chills, No weight loss, No bladder dysfunction, No bowel dysfunction, No extremity weakness, No saddle anesthesia  - pertinent positives: none    - medications, other therapies tried (physical therapy, injections):     >> Tylenol    >> Has previously undergone Physical Therapy (aquatic and land) with limited relief    >> Has previously undergone spinal injection/s:   - bilateral SIJ injection on  11-9-17 with ~30% relief for 2 weeks   - left hip injection on 12-28-17 with some relief   - bilateral L3-5 MBB with local on 5/11/18 with reported 100% pain relief   - left SIJ + left GT bursa injection with local on 10/24/18 with 100% pain relief        Imaging / Labs / Studies (reviewed on 6/30/2020):    Results for orders placed during the hospital encounter of 06/29/20   MRI Lumbar Spine W WO Cont    Narrative COMPARISON:  Prior MRI from June 29, 2020.  FINDINGS:  Again demonstrated are postoperative findings from thoracolumbar spinal fusion and laminectomy at T12.  Chronic compression deformity of T12 with chronic retropulsion that flattens the thecal sac to 14 mm.  This is unchanged.  Mild, grade 1 degenerative spondylolisthesis at L4-L5.  Vertebral body height is normal.  No abnormal enhancement patterns. The conus medullaris terminates at the level of L2-L3, low lying.  No abnormal signal within the conus. Intervertebral disc levels are as follows:  T11-T12 disc: Fusion at this level.  Mild, chronic retropulsion that flattens the thecal sac to 14 mm.  No significant foraminal stenosis.  T12-L1 disc: Prior laminectomy and fusion.  The thecal sac measures 19 mm.  No significant foraminal stenosis.  L1-L2 disc : Posterior fusion.  No significant spinal stenosis or foraminal stenosis.  L2-L3 disc: Disc space height loss.  Broad-based posterior disc bulge which encroaches slightly into the floors of the exit foramina.  Moderate buckling of ligamentum flavum.  The thecal sac measures 8-9 mm AP.  Mild bilateral foraminal stenosis.  This has progressed since the prior MRI.  L3-L4 disc: Broad-based posterior disc bulge that encroaches slightly into the floors of the exit foramina.  Moderate buckling of ligamentum flavum.  The thecal sac measures 10 mm AP.  Mild bilateral neural foraminal stenosis, right greater than left.  These findings are similar to prior.  L4-L5 disc: Minimal grade 1 spondylolisthesis with  severe degenerative facet change and hypertrophy.  Left-sided facet joint effusion.  Is buckling of ligamentum flavum.  The thecal sac measures 11 mm.  No significant foraminal stenosis.  The spondylolisthesis has slightly progressed measuring 4 mm compared to prior 2 mm.  L5-S1 disc: Severe disc space height loss with marginal disc and osteophyte encroach into the exit foramina bilaterally.  Moderate degenerative facet hypertrophy.  The thecal sac measures 14 mm.    Impression 1. 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  2. Progression of mild foraminal stenosis and mild spinal stenosis at L2-L3.  3. Minimal progression of grade 1 spondylolisthesis at L4-L5.  4. Unchanged mild, chronic retropulsion from T12 where there has been a laminectomy and fusion.       7/30/2018 XR LUMBAR SPINE COMPLETE 5 VIEW  TECHNIQUE:  AP, lateral, spot and bilateral oblique views of the lumbar spine were performed.  COMPARISON:  07/25/2018  FINDINGS:  There is grade 1 spondylolisthesis of L4 on L5.  Pedicle screws and fixation rods are noted for at the T10, T11, L1 and L2 levels.  Chronic compression deformity at the L1 level unchanged.  Prominent bilateral facet arthropathy noted at the L4-5 and L5-S1 levels.  IVC filter noted.     7/30/2018 XR HIPS BILATERAL 2 VIEW INCL AP PELVIS  TECHNIQUE:  AP view of the pelvis and frogleg lateral views of both hips were performed.  COMPARISON:  07/26/2018  FINDINGS:  The bony pelvis is intact. A right total hip arthroplasty, plate and wires are in place.  No hardware failure or loosening suggested.  The appearance is unchanged when compared to the prior exam.  Mild degenerative changes noted at the left hip.  No acute fracture or dislocation of the left hip.  No significant joint space narrowing identified.  No plain film evidence to suggest AVN of either hip.     Results for orders placed during the hospital encounter of 01/13/14   MRI Lumbar Spine W WO Contrast    Narrative Findings: Pre- and  postcontrast multiplanar multisequence imaging of the thoracic and lumbar spine was performed using 7 cc of Gadavist intravenous contrast material.  There is moderately severe chronic central compression deformity of the T12 vertebral body which is stabilized by paraspinous rods and pedicle screws which extend from T10 through L2.  Postsurgical changes of laminectomy also noted at T12.  The posterior cortex of the T12 vertebral body is displaced posteriorly and indents the ventral thecal sac.  There is no anterior cord contact or significant central canal stenosis.  Degenerative disk desiccation is noted at multiple levels with moderate disk   narrowing at L5-S1.    Also L5-S1 is a broad-based disk protrusion which combined with bilateral facet hypertrophy results in moderately severe narrowing of both neural foramina.  No significant central canal stenosis noted.    From L2-3 through L4-5 there   is mild disk bulging which results in mild bilateral foraminal narrowing.  This is slightly more pronounced at L4-5 with bilateral facet hypertrophy a contributing factor.  No significant central canal stenosis noted.  No thoracic disk extrusion, and foraminal narrowing, canal stenosis is evident.  Thoracolumbar cord is intact.  Paravertebral soft tissues are symmetric and normal in appearance.         Review of Systems:  CONSTITUTIONAL: patient denies any fever, chills, or weight loss  SKIN: patient denies any rash or itching  RESPIRATORY: patient denies having any shortness of breath  GASTROINTESTINAL: patient denies having any diarrhea, constipation, or bowel incontinence  GENITOURINARY: patient denies having any abnormal bladder function    MUSCULOSKELETAL:  - patient complains of the above noted pain/s (see chief pain complaint)    NEUROLOGICAL:   - pain as above  - strength in Lower extremities is intact, BILATERALLY  - sensation in Lower extremities is intact, BILATERALLY  - patient denies any loss of bowel or  "bladder control      PSYCHIATRIC: patient denies any change in mood    Other:  All other systems reviewed and are negative        Physical Exam:  Vitals:  BP (!) 144/79 (BP Location: Right arm, Patient Position: Sitting, BP Method: Medium (Automatic))   Pulse 62   Resp 20   Ht 5' 5" (1.651 m)   Wt 74.8 kg (165 lb)   BMI 27.46 kg/m²   (reviewed on 6/30/2020)    General: alert and oriented, in no apparent distress.  Gait: normal gait.  Skin: no rashes, no discoloration, no obvious lesions  HEENT: normocephalic, atraumatic. Pupils equal and round.  Cardiovascular: no significant peripheral edema present.  Respiratory: without use of accessory muscles of respiration.    Musculoskeletal - Lumbar Spine:  - Pain on flexion of lumbar spine: Present, worse than with extension  - Pain on extension of lumbar spine: Present  - Lumbar facet loading: Positive bilaterally  - TTP over the lumbar facet joints: Absent  - TTP over the SI joints: Present on left   - TTP over GT bursa: Present on left   - Straight Leg Raise: equivocal on left   - PAMELA: Positive on left   - Pain on Internal and external rotation of the hip: Mild    Neuro - Lower Extremities:  - BLE Strength: R/L: HF: 5/5, HE: 5/5, KF: 5/5; KE: 5/5; FE: 5/5; FF: 5/5  - Extremity Reflexes: Brisk and symmetric throughout  - Sensory: Sensation to light touch intact bilaterally      Psych:  Mood and affect is appropriate              Assessment:  Clau Nunn is a 85 y.o. year old female who is presenting with   Encounter Diagnosis   Name Primary?    Left lumbar radiculopathy Yes       Plan:  1. Interventional: Schedule left L2/3 + L5/S1 TF REJI. Patient is not taking prescription blood thinners or ASA. Patient will be instructed to stop all ASA products, NSAIDs, tumeric, fish oil, and Vitamin E.      2. Pharmacologic:   - Continue gabapentin 600mg QHS.  - Will give bridge to injection of Tylenol #3 to take 1/2 to 1 tab BID PRN (14 tablets).   - LA  reviewed " and appropriate. Listed below.      3. Rehabilitative: We discussed PT previously, but she is not interested. She reports it made her pain worse in the past. Encourage regular exercise.     4. Diagnostic: Lumbar MRI reviewed with patient, detailed above.     5. Other: She has been having a rash on her right breast for a few weeks.  She will see Dr. Gomez soon to discuss. Will refer to Dermatology for evaluation.     6. Follow up: 4 weeks post-injection     - I discussed the risks, benefits, and alternatives to potential treatment options. All questions and concerns were fully addressed today in clinic.

## 2020-06-30 NOTE — TELEPHONE ENCOUNTER
----- Message from Jeanette Molina MD sent at 6/30/2020 12:50 PM CDT -----  She sees a vascular surgeon, so that's really a question for Dr. Draper.  LIGIA  ----- Message -----  From: Nanci Tracy LPN  Sent: 6/30/2020  12:30 PM CDT  To: Jeanette Molina MD    Thank you for the quick response.    Does she need to be on it? I can let her know when I call her to schedule this procedure.     Thanks!  ----- Message -----  From: Jeanette Molina MD  Sent: 6/30/2020  12:19 PM CDT  To: Nanci Tracy LPN    If she is not taking ASA now, she can stay off until the procedure.    ----- Message -----  From: Nanci Tracy LPN  Sent: 6/30/2020  11:15 AM CDT  To: Jeanette Molina MD    Good Morning. Dr. Wells request to perform transforaminal REJI for patient. Pt reports that she is not on aspirin at this time. I can see in note dated 12/10/2010:   History of DVT in adulthood  -     aspirin (ECOTRIN) 81 MG EC tablet; Take 1 tablet (81 mg total) by mouth once daily.  Dispense: 100 tablet; Refill: 0    I would like to know if pt should be on aspirin. If so,  would like pt to to d/c 1 (one) week prior to having this injection along with all other blood thinners including aspirin. Please advise or contact me at ext [phone] with questions. Thanks//lp           
Contacted pt.Informed pt that while  was ok with pt staying off of asa until procedure. She would like pt to check with  to see if she should be on asa. Pt was able to verbalize all understanding and states she will reach out to  office. Injection scheduled. Pre injection instructions taught and mailed. Orders entered. Pt was able to verbalize all understanding. All questions answered.//lp  
Alert and oriented, no focal deficits, no motor or sensory deficits.

## 2020-07-01 NOTE — PRE-PROCEDURE INSTRUCTIONS
Spoke with patient regarding procedure scheduled on 7/8/2020  Arrival time 0930  Has patient been sick with fever or on antibiotics within the last 7 days? no  Has patient received a vaccination within the last 7 days? no  Has the patient stopped all medications as directed? Yes vit 2 last dose 6/30/3030  Does patient have a pacemaker and or defibrillator? no  Does the patient have a ride to and from procedure and someone reliable to remain with patient? Yes granddaughter   Is the patient diabetic? no  Does the patient have sleep apnea? Or use O2 at home? No no  Is the patient receiving sedation? yes  Is the patient instructed to remain NPO beginning at midnight the night before their procedure? yes  Procedure location confirmed with patient? yes  Covid testing: rapid am of procedure dt transportation

## 2020-07-08 ENCOUNTER — HOSPITAL ENCOUNTER (OUTPATIENT)
Facility: HOSPITAL | Age: 85
Discharge: HOME | End: 2020-07-08
Attending: ANESTHESIOLOGY | Admitting: ANESTHESIOLOGY
Payer: MEDICARE

## 2020-07-08 VITALS
OXYGEN SATURATION: 97 % | DIASTOLIC BLOOD PRESSURE: 88 MMHG | RESPIRATION RATE: 17 BRPM | SYSTOLIC BLOOD PRESSURE: 137 MMHG | HEART RATE: 68 BPM | HEIGHT: 64 IN | WEIGHT: 167 LBS | BODY MASS INDEX: 28.51 KG/M2 | TEMPERATURE: 98 F

## 2020-07-08 DIAGNOSIS — M54.16 LUMBAR RADICULOPATHY: Primary | ICD-10-CM

## 2020-07-08 LAB — SARS-COV-2 RDRP RESP QL NAA+PROBE: NEGATIVE

## 2020-07-08 PROCEDURE — 64484 NJX AA&/STRD TFRM EPI L/S EA: CPT | Mod: HCNC | Performed by: ANESTHESIOLOGY

## 2020-07-08 PROCEDURE — 64483 NJX AA&/STRD TFRM EPI L/S 1: CPT | Mod: HCNC | Performed by: ANESTHESIOLOGY

## 2020-07-08 PROCEDURE — 64484 PRA INJECT ANES/STEROID FORAMEN LUMBAR/SACRAL W IMG GUIDE ,EA ADD LEVEL: ICD-10-PCS | Mod: HCNC,LT,, | Performed by: ANESTHESIOLOGY

## 2020-07-08 PROCEDURE — 64483 NJX AA&/STRD TFRM EPI L/S 1: CPT | Mod: HCNC,LT,, | Performed by: ANESTHESIOLOGY

## 2020-07-08 PROCEDURE — 25500020 PHARM REV CODE 255: Mod: HCNC | Performed by: ANESTHESIOLOGY

## 2020-07-08 PROCEDURE — 64484 NJX AA&/STRD TFRM EPI L/S EA: CPT | Mod: HCNC,LT,, | Performed by: ANESTHESIOLOGY

## 2020-07-08 PROCEDURE — U0002 COVID-19 LAB TEST NON-CDC: HCPCS | Mod: HCNC

## 2020-07-08 PROCEDURE — 25000003 PHARM REV CODE 250: Mod: HCNC | Performed by: ANESTHESIOLOGY

## 2020-07-08 PROCEDURE — 63600175 PHARM REV CODE 636 W HCPCS: Mod: HCNC | Performed by: ANESTHESIOLOGY

## 2020-07-08 PROCEDURE — 64483 PR EPIDURAL INJ, ANES/STEROID, TRANSFORAMINAL, LUMB/SACR, SNGL LEVL: ICD-10-PCS | Mod: HCNC,LT,, | Performed by: ANESTHESIOLOGY

## 2020-07-08 RX ORDER — LIDOCAINE HYDROCHLORIDE 10 MG/ML
INJECTION, SOLUTION EPIDURAL; INFILTRATION; INTRACAUDAL; PERINEURAL
Status: DISCONTINUED | OUTPATIENT
Start: 2020-07-08 | End: 2020-07-08 | Stop reason: HOSPADM

## 2020-07-08 RX ORDER — DEXAMETHASONE SODIUM PHOSPHATE 10 MG/ML
INJECTION INTRAMUSCULAR; INTRAVENOUS
Status: DISCONTINUED | OUTPATIENT
Start: 2020-07-08 | End: 2020-07-08 | Stop reason: HOSPADM

## 2020-07-08 NOTE — OP NOTE
"Procedure: Lumbar Transforaminal Epidural Steroid Injection under Fluoroscopic Guidance (supraneural approach)    Level: L2/3 L5/S1    Side: Left    PROCEDURE DATE: 7/8/2020    Pre-operative Diagnosis: Lumbar Radiculopathy  Post-operative Diagnosis: Lumbar Radiculopathy    Provider: Raffi Wells MD  Assistant(s): None    Anesthesia: Local, No Sedation    >> 0 mg of VERSED    >> 0 mcg of FENTANYL     Indication: Low back pain with radiculopathy consistent with distribution of targeted nerve. Symptoms unresponsive to conservative treatments. Fluoroscopy was used to optimize visualization of needle placement and to maximize safety.     Procedure Description / Technique:  The patient was seen and identified in the preoperative area. Risks, benefits, complications, and alternatives were discussed with the patient. The patient agreed to proceed with the procedure and signed the consent. The site and side of the procedure was identified and marked. An IV was not placed for this procedure. The patient was taken to the procedural suite.    The patient was positioned in prone orientation on procedure table and a pillow was placed under the abdomen to reduce lumbar lordosis. A time out was performed prior to any intervention. The procedure, site, side, and allergies were stated and agreed to by all present. The lumbosacral area was widely prepped with ChloraPrep. The procedural site was draped in usual sterile fashion. Vital signs were closely monitored throughout this procedure. Conscious sedation was not used for this procedure.    The target area was visualized under fluoroscopy. The cephalocaudal angle of the fluoroscope was adjusted as to align the vertebral end plates. The fluoroscopic arm was rotated ipsilaterally to an angle of approximately 30 degrees until the "ted dog" outline came into view and the tip of the inferior superior articular process pointed towards the midline, 6:00 position of the above pedicle. A " "25 gauge 3.5 inch spinal needle was directed towards the "chin" of the "ted dog" (adjacent to the pars interarticularis and inferior to the pedicle). The needle was advanced until OS was met at the inferior border of the pedicle / pars interface. The needle was adjusted so that it would pass inferior to the osseous border. The fluoroscope was then placed in the lateral position and the needle was slowly advanced until it rested in the posterior 1/3rd of the vertebral foramen. AP fluoroscopy was checked and the needle tip rested at the 6:00 position under the pedicle. No paresthesia was elicited during needle placement. With the needle tip in its final position, gentle aspiration was negative for blood and CSF. Omnipaque 240 (1 to 2 mL) was injected under live fluoroscopy. Microbore tubing was used for injection. There was no pain or paresthesia on injection. The contrast clearly delineated the targeted nerve root on AP fluoroscopy. No vascular uptake was seen. A solution containing 2 mL of 1% PF Lidocaine and 2 mL of Dexamethasone (10 mg/mL) was mixed and 2 mL was injected slowly at each level targeted. There was minimal resistance on injection. No pain or paresthesia was elicited on injection. The stylet was replaced and the needle was withdrawn intact. This procedure was performed for each of the above indicated levels.     Description of Findings: Not applicable    Prosthetic devices, grafts, tissues, or devices implanted: None    Specimen Removed: No    Estimated Blood Loss: minimal    COMPLICATIONS: None    DISPOSITION / PLANS: The patient was transferred to the recovery area in a stable condition for observation. The patient was reexamined prior to discharge. There was no evidence of acute neurologic injury following the procedure.  Patient was discharged from the recovery room after meeting discharge criteria. Home discharge instructions were given to the patient by the staff.  "

## 2020-07-08 NOTE — DISCHARGE SUMMARY
[facility]  Discharge Note       Description of Procedure: Left L2-3, L5-S1 Lumbar Transforaminal Epidural Steroid Injection under Fluoroscopic Guidance    Procedure Date: 7/8/2020    Admit Date: 7/8/2020  Discharge Date: 7/8/2020     Attending Physician: Priscilla Nugent   Discharge Provider: Priscilla Nugent    Preoperative Diagnosis: Lumbar Sp[ondylosis, Lumbar Radiculopathy   Postoperative Diagnosis: as above, same as preoperative diagnosis    Discharged Condition: Stable    Hospital Course: Patient was admitted for an outpatient procedure. The procedure was tolerated well with no complications.    Final Diagnoses: Same as principal problem.    Disposition: Home, self-care.    Follow up/Patient Instructions:  Follow-up in clinic in 2-3 weeks.    Medications: No medications were prescribed today. The patient was advised to resume normal medication regimen without change.  Specific information was provided regarding restarting any anticoagulant/s.    Discharge Procedure Orders (must include Diet, Follow-up, Activity):  Light activity for the remainder of the day, resume normal activity tomorrow. Resume normal diet. Follow-up in clinic in 2-3 weeks.

## 2020-07-08 NOTE — DISCHARGE INSTRUCTIONS

## 2020-07-23 ENCOUNTER — LAB VISIT (OUTPATIENT)
Dept: LAB | Facility: HOSPITAL | Age: 85
End: 2020-07-23
Payer: MEDICARE

## 2020-07-23 DIAGNOSIS — M80.00XG AGE-RELATED OSTEOPOROSIS WITH CURRENT PATHOLOGICAL FRACTURE WITH DELAYED HEALING: ICD-10-CM

## 2020-07-23 DIAGNOSIS — N18.30 STAGE 3 CHRONIC KIDNEY DISEASE: ICD-10-CM

## 2020-07-23 PROCEDURE — 83970 ASSAY OF PARATHORMONE: CPT | Mod: HCNC

## 2020-07-23 PROCEDURE — 82306 VITAMIN D 25 HYDROXY: CPT | Mod: HCNC

## 2020-07-23 PROCEDURE — 36415 COLL VENOUS BLD VENIPUNCTURE: CPT | Mod: HCNC,PO

## 2020-07-23 PROCEDURE — 80053 COMPREHEN METABOLIC PANEL: CPT | Mod: HCNC

## 2020-07-24 LAB
25(OH)D3+25(OH)D2 SERPL-MCNC: 26 NG/ML (ref 30–96)
ALBUMIN SERPL BCP-MCNC: 3.7 G/DL (ref 3.5–5.2)
ALP SERPL-CCNC: 62 U/L (ref 55–135)
ALT SERPL W/O P-5'-P-CCNC: 12 U/L (ref 10–44)
ANION GAP SERPL CALC-SCNC: 9 MMOL/L (ref 8–16)
AST SERPL-CCNC: 18 U/L (ref 10–40)
BILIRUB SERPL-MCNC: 0.5 MG/DL (ref 0.1–1)
BUN SERPL-MCNC: 13 MG/DL (ref 8–23)
CALCIUM SERPL-MCNC: 9.1 MG/DL (ref 8.7–10.5)
CHLORIDE SERPL-SCNC: 105 MMOL/L (ref 95–110)
CO2 SERPL-SCNC: 25 MMOL/L (ref 23–29)
CREAT SERPL-MCNC: 1 MG/DL (ref 0.5–1.4)
EST. GFR  (AFRICAN AMERICAN): 59.8 ML/MIN/1.73 M^2
EST. GFR  (NON AFRICAN AMERICAN): 51.9 ML/MIN/1.73 M^2
GLUCOSE SERPL-MCNC: 60 MG/DL (ref 70–110)
POTASSIUM SERPL-SCNC: 4.3 MMOL/L (ref 3.5–5.1)
PROT SERPL-MCNC: 6.8 G/DL (ref 6–8.4)
PTH-INTACT SERPL-MCNC: 92 PG/ML (ref 9–77)
SODIUM SERPL-SCNC: 139 MMOL/L (ref 136–145)

## 2020-07-25 PROBLEM — N25.81 SECONDARY HYPERPARATHYROIDISM: Status: ACTIVE | Noted: 2020-07-25

## 2020-07-25 NOTE — PROGRESS NOTES
"Subjective:      Patient ID: Clau Nunn is a 85 y.o. female.    Chief Complaint: Follow-up      HPI  Here for f/u medical problems and preventive exam.  Itchy right breast with red marks, Derm biopsied yesterday.  Staying safe and secluded.  Anxiety doing ok currently.  Seeing PMR for REJI, and to eval for left groin pain.  No f/c/sw/cough.  No cp/sob/palp.  BMs normal.  Urine normal now.  Took cipro last week for "exhausted", with positive dip stick.  Thinks fatigued due to pain left groin and right shoulder.    HM: 10/19 fluvax, 11/19 HAV, 5/16 [id number], 5/17 booster [id number], 12/11 TDaP, 11/09 zoster, 1/20 BMD rep 3y, 1/14 Cscope no more needed, 4/19 MMG/ Gyn Dr. Tere bauer outside, 9/19 Eye Dr. Rubalcava.     Review of Systems   Constitutional: Positive for activity change. Negative for appetite change, chills, diaphoresis, fever and unexpected weight change.   HENT: Negative for congestion, ear pain, hearing loss, rhinorrhea, sinus pressure, sore throat and trouble swallowing.    Eyes: Negative for discharge and visual disturbance.   Respiratory: Negative for cough, chest tightness, shortness of breath and wheezing.    Cardiovascular: Negative for chest pain and palpitations.   Gastrointestinal: Negative for blood in stool, constipation, diarrhea, nausea and vomiting.   Endocrine: Negative for polydipsia and polyuria.   Genitourinary: Negative for difficulty urinating, dysuria, frequency, hematuria, menstrual problem, urgency and vaginal discharge.   Musculoskeletal: Negative for arthralgias, joint swelling and neck pain.   Skin: Negative for rash.   Neurological: Negative for dizziness, weakness and headaches.   Psychiatric/Behavioral: Negative for confusion, dysphoric mood and sleep disturbance. The patient is not nervous/anxious.          Objective:   BP (!) 142/84   Pulse 84   Temp 98.4 °F (36.9 °C)   Ht 5' 4" (1.626 m)   Wt 76.4 kg (168 lb 6.9 oz)   SpO2 96%   BMI 28.91 kg/m²     Physical " Exam  Constitutional:       Appearance: She is well-developed.   HENT:      Right Ear: External ear normal. Tympanic membrane is not injected.      Left Ear: External ear normal. Tympanic membrane is not injected.   Eyes:      Conjunctiva/sclera: Conjunctivae normal.   Neck:      Musculoskeletal: Normal range of motion and neck supple.      Thyroid: No thyromegaly.   Cardiovascular:      Rate and Rhythm: Normal rate and regular rhythm.      Heart sounds: No murmur. No friction rub. No gallop.    Pulmonary:      Effort: Pulmonary effort is normal.      Breath sounds: Normal breath sounds. No wheezing or rales.   Abdominal:      General: Bowel sounds are normal.      Palpations: Abdomen is soft. There is no mass.      Tenderness: There is no abdominal tenderness.   Lymphadenopathy:      Cervical: No cervical adenopathy.   Skin:     General: Skin is warm.      Findings: No rash.   Neurological:      Mental Status: She is alert and oriented to person, place, and time.     Results for NATALIYA DE LA PAZ (MRN [id number]) as of 7/28/2020 16:23   Ref. Range 6/29/2020 12:40   Cholesterol Latest Ref Range: 120 - 199 mg/dL 212 (H)   HDL Latest Ref Range: 40 - 75 mg/dL 51   Hdl/Cholesterol Ratio Latest Ref Range: 20.0 - 50.0 % 24.1   LDL Cholesterol External Latest Ref Range: 63.0 - 159.0 mg/dL 132.2   Non-HDL Cholesterol Latest Units: mg/dL 161   Total Cholesterol/HDL Ratio Latest Ref Range: 2.0 - 5.0  4.2   Triglycerides Latest Ref Range: 30 - 150 mg/dL 144   Vit D, 25-Hydroxy Latest Ref Range: 30 - 96 ng/mL 24 (L)   TSH Latest Ref Range: 0.400 - 4.000 uIU/mL 3.172       Results for NATALIYA DE LA PAZ (MRN [id number]) as of 7/25/2020 12:09   Ref. Range 7/23/2020 10:46   PTH Latest Ref Range: 9.0 - 77.0 pg/mL 92.0 (H)     Results for NATALIYA DE LA PAZ (MRN [id number]) as of 7/25/2020 12:08   Ref. Range 6/29/2020 12:40 6/29/2020 13:29 7/8/2020 09:05 7/8/2020 12:27 7/23/2020 10:46   WBC Latest Ref Range: 3.90 - 12.70 K/uL 7.62        RBC Latest Ref Range: 4.00 - 5.40 M/uL 4.85       Hemoglobin Latest Ref Range: 12.0 - 16.0 g/dL 14.8       Hematocrit Latest Ref Range: 37.0 - 48.5 % 46.8       MCV Latest Ref Range: 82 - 98 fL 97       MCH Latest Ref Range: 27.0 - 31.0 pg 30.5       MCHC Latest Ref Range: 32.0 - 36.0 g/dL 31.6 (L)       RDW Latest Ref Range: 11.5 - 14.5 % 12.6       Platelets Latest Ref Range: 150 - 350 K/uL 287       MPV Latest Ref Range: 9.2 - 12.9 fL 10.8       Gran% Latest Ref Range: 38.0 - 73.0 % 65.5       Gran # (ANC) Latest Ref Range: 1.8 - 7.7 K/uL 5.0       Lymph% Latest Ref Range: 18.0 - 48.0 % 23.9       Lymph # Latest Ref Range: 1.0 - 4.8 K/uL 1.8       Mono% Latest Ref Range: 4.0 - 15.0 % 6.3       Mono # Latest Ref Range: 0.3 - 1.0 K/uL 0.5       Eosinophil% Latest Ref Range: 0.0 - 8.0 % 3.0       Eos # Latest Ref Range: 0.0 - 0.5 K/uL 0.2       Basophil% Latest Ref Range: 0.0 - 1.9 % 0.9       Baso # Latest Ref Range: 0.00 - 0.20 K/uL 0.07       nRBC Latest Ref Range: 0 /100 WBC 0       Differential Method Unknown Automated       Immature Grans (Abs) Latest Ref Range: 0.00 - 0.04 K/uL 0.03       Immature Granulocytes Latest Ref Range: 0.0 - 0.5 % 0.4       Sodium Latest Ref Range: 136 - 145 mmol/L 140    139   Potassium Latest Ref Range: 3.5 - 5.1 mmol/L 4.3    4.3   Chloride Latest Ref Range: 95 - 110 mmol/L 104    105   CO2 Latest Ref Range: 23 - 29 mmol/L 24    25   Anion Gap Latest Ref Range: 8 - 16 mmol/L 12    9   BUN, Bld Latest Ref Range: 8 - 23 mg/dL 14    13   Creatinine Latest Ref Range: 0.5 - 1.4 mg/dL 1.2    1.0   eGFR if non African American Latest Ref Range: >60 mL/min/1.73 m^2 42 (A)    51.9 (A)   eGFR if African American Latest Ref Range: >60 mL/min/1.73 m^2 48 (A)    59.8 (A)   Glucose Latest Ref Range: 70 - 110 mg/dL 94    60 (L)   Calcium Latest Ref Range: 8.7 - 10.5 mg/dL 9.3    9.1   Alkaline Phosphatase Latest Ref Range: 55 - 135 U/L 56    62   PROTEIN TOTAL Latest Ref Range: 6.0 - 8.4 g/dL  7.3    6.8   Albumin Latest Ref Range: 3.5 - 5.2 g/dL 3.9    3.7   BILIRUBIN TOTAL Latest Ref Range: 0.1 - 1.0 mg/dL 0.5    0.5   AST Latest Ref Range: 10 - 40 U/L 17    18   ALT Latest Ref Range: 10 - 44 U/L 9 (L)    12   Triglycerides Latest Ref Range: 30 - 150 mg/dL 144       Cholesterol Latest Ref Range: 120 - 199 mg/dL 212 (H)       HDL Latest Ref Range: 40 - 75 mg/dL 51       Hdl/Cholesterol Ratio Latest Ref Range: 20.0 - 50.0 % 24.1       LDL Cholesterol External Latest Ref Range: 63.0 - 159.0 mg/dL 132.2       Non-HDL Cholesterol Latest Units: mg/dL 161       Total Cholesterol/HDL Ratio Latest Ref Range: 2.0 - 5.0  4.2       Vit D, 25-Hydroxy Latest Ref Range: 30 - 96 ng/mL 24 (L)    26 (L)   TSH Latest Ref Range: 0.400 - 4.000 uIU/mL 3.172         Assessment:       1. Acquired hypothyroidism    2. Age-related osteoporosis with current pathological fracture with delayed healing    3. Anxiety    4. Essential hypertension    5. Simple chronic bronchitis    6. Stage 3 chronic kidney disease    7. Vitamin D deficiency disease    8. History of DVT in adulthood    9. Encounter for preventive health examination    10. Secondary hyperparathyroidism          Plan:     Acquired hypothyroidism- stable, cont rx.    Age-related osteoporosis with current pathological fracture with delayed healing- on Prolia.    Anxiety- doing well, cont rx.    Essential hypertension- adeq control.    Simple chronic bronchitis- doing well lately, albuterol once a week.    Stage 3 chronic kidney disease- stable.    Vitamin D deficiency disease- take 2K daily.    History of DVT in adulthood    Encounter for preventive health examination- utd.    Secondary hyperparathyroidism    RTC 6mo.

## 2020-07-27 ENCOUNTER — OFFICE VISIT (OUTPATIENT)
Dept: DERMATOLOGY | Facility: CLINIC | Age: 85
End: 2020-07-27
Payer: MEDICARE

## 2020-07-27 DIAGNOSIS — L98.9 DISEASE OF SKIN AND SUBCUTANEOUS TISSUE: Primary | ICD-10-CM

## 2020-07-27 DIAGNOSIS — L30.9 NIPPLE DERMATITIS: ICD-10-CM

## 2020-07-27 DIAGNOSIS — R21 RASH: ICD-10-CM

## 2020-07-27 PROCEDURE — 88312 SPECIAL STAINS GROUP 1: CPT | Mod: HCNC | Performed by: PATHOLOGY

## 2020-07-27 PROCEDURE — 88305 TISSUE EXAM BY PATHOLOGIST: CPT | Mod: HCNC | Performed by: PATHOLOGY

## 2020-07-27 PROCEDURE — 88312 SPECIAL STAINS GROUP 1: CPT | Mod: 26,HCNC,, | Performed by: PATHOLOGY

## 2020-07-27 PROCEDURE — 88305 TISSUE EXAM BY PATHOLOGIST: CPT | Mod: 26,HCNC,, | Performed by: PATHOLOGY

## 2020-07-27 PROCEDURE — 88305 TISSUE EXAM BY PATHOLOGIST: ICD-10-PCS | Mod: 26,HCNC,, | Performed by: PATHOLOGY

## 2020-07-27 PROCEDURE — 99999 PR PBB SHADOW E&M-EST. PATIENT-LVL IV: ICD-10-PCS | Mod: PBBFAC,HCNC,, | Performed by: DERMATOLOGY

## 2020-07-27 PROCEDURE — 99499 NO LOS: ICD-10-PCS | Mod: HCNC,S$GLB,, | Performed by: DERMATOLOGY

## 2020-07-27 PROCEDURE — 99499 UNLISTED E&M SERVICE: CPT | Mod: HCNC,S$GLB,, | Performed by: DERMATOLOGY

## 2020-07-27 PROCEDURE — 99999 PR PBB SHADOW E&M-EST. PATIENT-LVL IV: CPT | Mod: PBBFAC,HCNC,, | Performed by: DERMATOLOGY

## 2020-07-27 PROCEDURE — 88312 PR  SPECIAL STAINS,GROUP I: ICD-10-PCS | Mod: 26,HCNC,, | Performed by: PATHOLOGY

## 2020-07-27 PROCEDURE — 11104 PUNCH BX SKIN SINGLE LESION: CPT | Mod: HCNC,S$GLB,, | Performed by: DERMATOLOGY

## 2020-07-27 PROCEDURE — 11104 PR PUNCH BIOPSY, SKIN, SINGLE LESION: ICD-10-PCS | Mod: HCNC,S$GLB,, | Performed by: DERMATOLOGY

## 2020-07-27 RX ORDER — FLUOCINONIDE 0.5 MG/G
CREAM TOPICAL
Qty: 60 G | Refills: 1 | Status: SHIPPED | OUTPATIENT
Start: 2020-07-27

## 2020-07-27 RX ORDER — DESONIDE 0.5 MG/G
CREAM TOPICAL 2 TIMES DAILY
Qty: 30 G | Refills: 2 | Status: SHIPPED | OUTPATIENT
Start: 2020-07-27 | End: 2022-04-07

## 2020-07-27 RX ORDER — KETOCONAZOLE 20 MG/G
CREAM TOPICAL
Qty: 30 G | Refills: 2 | Status: SHIPPED | OUTPATIENT
Start: 2020-07-27 | End: 2022-04-07

## 2020-07-27 NOTE — PROGRESS NOTES
Subjective:       Patient ID:  Clau Nunn is a 85 y.o. female who presents for   Chief Complaint   Patient presents with    Rash     ankle, x years , sx itching , burning , tx otc creams     Rash     pink marks , x 6months , itching , tx otc creams     History of Present Illness: The patient presents with chief complaint of rash.  Location: R breast  Duration: at least 6 months  Signs/Symptoms: itching, redness    Prior treatments: TAC 0.025% cream BID, minimal improvement    Last seen 10/3/2019 by Dr. Baum for RLE dermatitis, AKs treated with LN2, SKs.      Review of Systems   Skin: Positive for itching and rash.   Hematologic/Lymphatic: Does not bruise/bleed easily.        Objective:    Physical Exam   Constitutional: She appears well-developed and well-nourished. No distress.   Neurological: She is alert and oriented to person, place, and time. She is not disoriented.   Psychiatric: She has a normal mood and affect.   Skin:   Areas Examined (abnormalities noted in diagram):   Chest / Axilla Inspection Performed  RLE Inspected              Diagram Legend     Erythematous scaling macule/papule c/w actinic keratosis       Vascular papule c/w angioma      Pigmented verrucoid papule/plaque c/w seborrheic keratosis      Yellow umbilicated papule c/w sebaceous hyperplasia      Irregularly shaped tan macule c/w lentigo     1-2 mm smooth white papules consistent with Milia      Movable subcutaneous cyst with punctum c/w epidermal inclusion cyst      Subcutaneous movable cyst c/w pilar cyst      Firm pink to brown papule c/w dermatofibroma      Pedunculated fleshy papule(s) c/w skin tag(s)      Evenly pigmented macule c/w junctional nevus     Mildly variegated pigmented, slightly irregular-bordered macule c/w mildly atypical nevus      Flesh colored to evenly pigmented papule c/w intradermal nevus       Pink pearly papule/plaque c/w basal cell carcinoma      Erythematous hyperkeratotic cursted plaque c/w SCC       Surgical scar with no sign of skin cancer recurrence      Open and closed comedones      Inflammatory papules and pustules      Verrucoid papule consistent consistent with wart     Erythematous eczematous patches and plaques     Dystrophic onycholytic nail with subungual debris c/w onychomycosis     Umbilicated papule    Erythematous-base heme-crusted tan verrucoid plaque consistent with inflamed seborrheic keratosis     Erythematous Silvery Scaling Plaque c/w Psoriasis     See annotation          Assessment / Plan:      Pathology Orders:     Normal Orders This Visit    Specimen to Pathology, Dermatology     Questions:    Procedure Type: Dermatology and skin neoplasms    Number of Specimens: 1    ------------------------: -------------------------    Spec 1 Procedure: Biopsy    Spec 1 Clinical Impression: nipple dermatitis vs contact derm vs tinea corporis r/o Paget's    Spec 1 Source: R breast        Disease of skin and subcutaneous tissue- R breast  -     Specimen to Pathology, Dermatology  -     ketoconazole (NIZORAL) 2 % cream; AAA bid to breast/nipple  Dispense: 30 g; Refill: 2  -     desonide (DESOWEN) 0.05 % cream; Apply topically 2 (two) times daily. To breast/nipple, for 2-4 weeks per course  Dispense: 30 g; Refill: 2    Disease of skin and subcutaneous tissue- RLE  - asteatotic eczema vs contact derm  - dry skin precautions discussed  -     fluocinonide 0.05% (LIDEX) 0.05 % cream; AAA bid to leg for 2-4 weeks per course  Dispense: 60 g; Refill: 1         Follow up in about 2 weeks (around 8/10/2020) for for nurse visit for SR.

## 2020-07-27 NOTE — LETTER
July 27, 2020      Angelica Caballero PA-C  [id number] [place] [address] [address] [id number]           [facility]  [address]  [address] [place] [address]  Phone: [phone]  Fax: [phone]          Patient: Clau Nunn   MR Number: [id number]   YOB: 1935   Date of Visit: 7/27/2020       Dear Angelica Caballero:    Thank you for referring Clau Nunn to me for evaluation. Attached you will find relevant portions of my assessment and plan of care.    If you have questions, please do not hesitate to call me. I look forward to following Clau Nunn along with you.    Sincerely,    Africa Billings MD    Enclosure  CC:  No Recipients    If you would like to receive this communication electronically, please contact [email or web address] or [phone] to request more information on [facility] [employer] access.    For providers and/or their staff who would like to refer a patient to [facility], please contact us through our one-stop-shop provider referral line, [facility], at [phone].    If you feel you have received this communication in error or would no longer like to receive these types of communications, please e-mail [email or web address]

## 2020-07-27 NOTE — PATIENT INSTRUCTIONS
Wear a supportive bra at all times while sutures are in place    Punch Biopsy Wound Care    Your doctor has performed a punch biopsy today.  A band aid and antibiotic ointment has been placed over the site.  This should remain in place for 24 hours.  It is recommended that you keep the area dry for the first 24 hours.  After 24 hours, you may remove the band aid and wash the area with warm soap and water and apply Vaseline jelly.  Many patients prefer to use Neosporin or Bacitracin ointment.  This is acceptable; however know that you can develop an allergy to this medication even if you have used it safely for years.  It is important to keep the area moist.  Letting it dry out and get air slows healing time, will worsen the scar, and make it more difficult to remove the stitches if they were placed.  Band aid is optional after first 24 hours.      If you notice increasing redness, tenderness, pain, or yellow drainage at the biopsy or surgical site, please notify your doctor.  These are signs of an infection.    If your biopsy/surgical site is bleeding, apply firm pressure for 15 minutes straight.  Repeat for another 15 minutes, if it is still bleeding.   If the surgical site continues to bleed, then please contact your doctor.

## 2020-07-28 ENCOUNTER — INFUSION (OUTPATIENT)
Dept: INFUSION THERAPY | Facility: HOSPITAL | Age: 85
End: 2020-07-28
Attending: PHYSICIAN ASSISTANT
Payer: MEDICARE

## 2020-07-28 ENCOUNTER — OFFICE VISIT (OUTPATIENT)
Dept: RHEUMATOLOGY | Facility: CLINIC | Age: 85
End: 2020-07-28
Payer: MEDICARE

## 2020-07-28 ENCOUNTER — HOSPITAL ENCOUNTER (OUTPATIENT)
Dept: RADIOLOGY | Facility: HOSPITAL | Age: 85
Discharge: HOME | End: 2020-07-28
Attending: PHYSICIAN ASSISTANT
Payer: MEDICARE

## 2020-07-28 ENCOUNTER — OFFICE VISIT (OUTPATIENT)
Dept: FAMILY MEDICINE | Facility: CLINIC | Age: 85
End: 2020-07-28
Payer: MEDICARE

## 2020-07-28 ENCOUNTER — TELEPHONE (OUTPATIENT)
Dept: RHEUMATOLOGY | Facility: CLINIC | Age: 85
End: 2020-07-28

## 2020-07-28 VITALS
RESPIRATION RATE: 18 BRPM | BODY MASS INDEX: 28.84 KG/M2 | TEMPERATURE: 98 F | SYSTOLIC BLOOD PRESSURE: 160 MMHG | DIASTOLIC BLOOD PRESSURE: 94 MMHG | WEIGHT: 168 LBS | OXYGEN SATURATION: 98 %

## 2020-07-28 VITALS
TEMPERATURE: 98 F | OXYGEN SATURATION: 96 % | WEIGHT: 168.44 LBS | DIASTOLIC BLOOD PRESSURE: 84 MMHG | HEIGHT: 64 IN | HEART RATE: 84 BPM | SYSTOLIC BLOOD PRESSURE: 142 MMHG | BODY MASS INDEX: 28.76 KG/M2

## 2020-07-28 VITALS
WEIGHT: 168 LBS | DIASTOLIC BLOOD PRESSURE: 94 MMHG | HEART RATE: 66 BPM | HEIGHT: 64 IN | BODY MASS INDEX: 28.68 KG/M2 | SYSTOLIC BLOOD PRESSURE: 160 MMHG

## 2020-07-28 DIAGNOSIS — Z00.00 ENCOUNTER FOR PREVENTIVE HEALTH EXAMINATION: ICD-10-CM

## 2020-07-28 DIAGNOSIS — M80.00XG AGE-RELATED OSTEOPOROSIS WITH CURRENT PATHOLOGICAL FRACTURE WITH DELAYED HEALING: ICD-10-CM

## 2020-07-28 DIAGNOSIS — M15.9 PRIMARY OSTEOARTHRITIS INVOLVING MULTIPLE JOINTS: ICD-10-CM

## 2020-07-28 DIAGNOSIS — N25.81 SECONDARY HYPERPARATHYROIDISM: ICD-10-CM

## 2020-07-28 DIAGNOSIS — M25.552 PAIN OF LEFT HIP JOINT: ICD-10-CM

## 2020-07-28 DIAGNOSIS — J41.0 SIMPLE CHRONIC BRONCHITIS: ICD-10-CM

## 2020-07-28 DIAGNOSIS — E55.9 VITAMIN D DEFICIENCY DISEASE: ICD-10-CM

## 2020-07-28 DIAGNOSIS — N18.30 STAGE 3 CHRONIC KIDNEY DISEASE: ICD-10-CM

## 2020-07-28 DIAGNOSIS — Z86.718 HISTORY OF DVT IN ADULTHOOD: ICD-10-CM

## 2020-07-28 DIAGNOSIS — M80.00XG AGE-RELATED OSTEOPOROSIS WITH CURRENT PATHOLOGICAL FRACTURE WITH DELAYED HEALING: Primary | ICD-10-CM

## 2020-07-28 DIAGNOSIS — F41.9 ANXIETY: ICD-10-CM

## 2020-07-28 DIAGNOSIS — I10 ESSENTIAL HYPERTENSION: Chronic | ICD-10-CM

## 2020-07-28 DIAGNOSIS — E03.9 ACQUIRED HYPOTHYROIDISM: Primary | ICD-10-CM

## 2020-07-28 PROCEDURE — 99214 PR OFFICE/OUTPT VISIT, EST, LEVL IV, 30-39 MIN: ICD-10-PCS | Mod: HCNC,S$GLB,, | Performed by: INTERNAL MEDICINE

## 2020-07-28 PROCEDURE — 1125F AMNT PAIN NOTED PAIN PRSNT: CPT | Mod: HCNC,S$GLB,, | Performed by: PHYSICIAN ASSISTANT

## 2020-07-28 PROCEDURE — 1159F MED LIST DOCD IN RCRD: CPT | Mod: HCNC,S$GLB,, | Performed by: PHYSICIAN ASSISTANT

## 2020-07-28 PROCEDURE — 3079F PR MOST RECENT DIASTOLIC BLOOD PRESSURE 80-89 MM HG: ICD-10-PCS | Mod: HCNC,CPTII,S$GLB, | Performed by: INTERNAL MEDICINE

## 2020-07-28 PROCEDURE — 1159F PR MEDICATION LIST DOCUMENTED IN MEDICAL RECORD: ICD-10-PCS | Mod: HCNC,S$GLB,, | Performed by: PHYSICIAN ASSISTANT

## 2020-07-28 PROCEDURE — 1125F PR PAIN SEVERITY QUANTIFIED, PAIN PRESENT: ICD-10-PCS | Mod: HCNC,S$GLB,, | Performed by: PHYSICIAN ASSISTANT

## 2020-07-28 PROCEDURE — 3077F SYST BP >= 140 MM HG: CPT | Mod: HCNC,CPTII,S$GLB, | Performed by: INTERNAL MEDICINE

## 2020-07-28 PROCEDURE — 3080F DIAST BP >= 90 MM HG: CPT | Mod: HCNC,CPTII,S$GLB, | Performed by: PHYSICIAN ASSISTANT

## 2020-07-28 PROCEDURE — 73502 X-RAY EXAM HIP UNI 2-3 VIEWS: CPT | Mod: TC,HCNC,LT

## 2020-07-28 PROCEDURE — 96372 THER/PROPH/DIAG INJ SC/IM: CPT | Mod: HCNC

## 2020-07-28 PROCEDURE — 1101F PT FALLS ASSESS-DOCD LE1/YR: CPT | Mod: HCNC,CPTII,S$GLB, | Performed by: INTERNAL MEDICINE

## 2020-07-28 PROCEDURE — 1159F PR MEDICATION LIST DOCUMENTED IN MEDICAL RECORD: ICD-10-PCS | Mod: HCNC,S$GLB,, | Performed by: INTERNAL MEDICINE

## 2020-07-28 PROCEDURE — 3077F PR MOST RECENT SYSTOLIC BLOOD PRESSURE >= 140 MM HG: ICD-10-PCS | Mod: HCNC,CPTII,S$GLB, | Performed by: INTERNAL MEDICINE

## 2020-07-28 PROCEDURE — 1101F PR PT FALLS ASSESS DOC 0-1 FALLS W/OUT INJ PAST YR: ICD-10-PCS | Mod: HCNC,CPTII,S$GLB, | Performed by: PHYSICIAN ASSISTANT

## 2020-07-28 PROCEDURE — 73502 XR HIP 2 VIEW LEFT: ICD-10-PCS | Mod: 26,HCNC,LT, | Performed by: RADIOLOGY

## 2020-07-28 PROCEDURE — 1159F MED LIST DOCD IN RCRD: CPT | Mod: HCNC,S$GLB,, | Performed by: INTERNAL MEDICINE

## 2020-07-28 PROCEDURE — 1101F PR PT FALLS ASSESS DOC 0-1 FALLS W/OUT INJ PAST YR: ICD-10-PCS | Mod: HCNC,CPTII,S$GLB, | Performed by: INTERNAL MEDICINE

## 2020-07-28 PROCEDURE — 1125F PR PAIN SEVERITY QUANTIFIED, PAIN PRESENT: ICD-10-PCS | Mod: HCNC,S$GLB,, | Performed by: INTERNAL MEDICINE

## 2020-07-28 PROCEDURE — 99999 PR PBB SHADOW E&M-EST. PATIENT-LVL V: CPT | Mod: PBBFAC,HCNC,, | Performed by: PHYSICIAN ASSISTANT

## 2020-07-28 PROCEDURE — 3080F PR MOST RECENT DIASTOLIC BLOOD PRESSURE >= 90 MM HG: ICD-10-PCS | Mod: HCNC,CPTII,S$GLB, | Performed by: PHYSICIAN ASSISTANT

## 2020-07-28 PROCEDURE — 3079F DIAST BP 80-89 MM HG: CPT | Mod: HCNC,CPTII,S$GLB, | Performed by: INTERNAL MEDICINE

## 2020-07-28 PROCEDURE — 99999 PR PBB SHADOW E&M-EST. PATIENT-LVL V: ICD-10-PCS | Mod: PBBFAC,HCNC,, | Performed by: INTERNAL MEDICINE

## 2020-07-28 PROCEDURE — 73502 X-RAY EXAM HIP UNI 2-3 VIEWS: CPT | Mod: 26,HCNC,LT, | Performed by: RADIOLOGY

## 2020-07-28 PROCEDURE — 99214 PR OFFICE/OUTPT VISIT, EST, LEVL IV, 30-39 MIN: ICD-10-PCS | Mod: HCNC,S$GLB,, | Performed by: PHYSICIAN ASSISTANT

## 2020-07-28 PROCEDURE — 3077F PR MOST RECENT SYSTOLIC BLOOD PRESSURE >= 140 MM HG: ICD-10-PCS | Mod: HCNC,CPTII,S$GLB, | Performed by: PHYSICIAN ASSISTANT

## 2020-07-28 PROCEDURE — 99499 UNLISTED E&M SERVICE: CPT | Mod: HCNC,S$GLB,, | Performed by: INTERNAL MEDICINE

## 2020-07-28 PROCEDURE — 99999 PR PBB SHADOW E&M-EST. PATIENT-LVL V: CPT | Mod: PBBFAC,HCNC,, | Performed by: INTERNAL MEDICINE

## 2020-07-28 PROCEDURE — 99999 PR PBB SHADOW E&M-EST. PATIENT-LVL V: ICD-10-PCS | Mod: PBBFAC,HCNC,, | Performed by: PHYSICIAN ASSISTANT

## 2020-07-28 PROCEDURE — 63600175 PHARM REV CODE 636 W HCPCS: Mod: JG,HCNC | Performed by: PHYSICIAN ASSISTANT

## 2020-07-28 PROCEDURE — 1125F AMNT PAIN NOTED PAIN PRSNT: CPT | Mod: HCNC,S$GLB,, | Performed by: INTERNAL MEDICINE

## 2020-07-28 PROCEDURE — 99499 RISK ADDL DX/OHS AUDIT: ICD-10-PCS | Mod: HCNC,S$GLB,, | Performed by: INTERNAL MEDICINE

## 2020-07-28 PROCEDURE — 1101F PT FALLS ASSESS-DOCD LE1/YR: CPT | Mod: HCNC,CPTII,S$GLB, | Performed by: PHYSICIAN ASSISTANT

## 2020-07-28 PROCEDURE — 99214 OFFICE O/P EST MOD 30 MIN: CPT | Mod: HCNC,S$GLB,, | Performed by: PHYSICIAN ASSISTANT

## 2020-07-28 PROCEDURE — 99214 OFFICE O/P EST MOD 30 MIN: CPT | Mod: HCNC,S$GLB,, | Performed by: INTERNAL MEDICINE

## 2020-07-28 PROCEDURE — 3077F SYST BP >= 140 MM HG: CPT | Mod: HCNC,CPTII,S$GLB, | Performed by: PHYSICIAN ASSISTANT

## 2020-07-28 RX ORDER — ACETAMINOPHEN 500 MG
1 TABLET ORAL DAILY
Qty: 90 CAPSULE | Refills: 11 | Status: SHIPPED | OUTPATIENT
Start: 2020-07-28 | End: 2024-02-27 | Stop reason: SDUPTHER

## 2020-07-28 RX ADMIN — DENOSUMAB 60 MG: 60 INJECTION SUBCUTANEOUS at 12:07

## 2020-07-28 NOTE — TELEPHONE ENCOUNTER
----- Message from Stella Richey sent at 7/28/2020  4:42 PM CDT -----  Regarding: Appointmetn  Contact: Nurse  Caller called in regards to accidentally canceling 01/28/2021 appointment. Caller is asking to reschedule the pt. Pt can be reached at [phone] ([email or web address]).

## 2020-07-28 NOTE — PROGRESS NOTES
Subjective:       Patient ID: Clau Nunn is a 85 y.o. female.    Chief Complaint: osteopenia w high frax    Mrs. Nunn is here for f/u for h/o osteoporosis- last dexa showed osteopenia with high fracture risk. She has a h/o of a right hip fx, right tibia fx, spine fxs s/p trauma and left wrist fx.  2018 fell- tripped over threshold from in her living room. fx right distal radius and right sacrum. Had ORIF right wrist.  This healed well. 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 1st Prolia feb 2017 then was off for short while when we did forteo trial,    Last injection done 1/27/2020  Prolia ( #6 total);   She has a history of low vit d on 50K units weekly, last vit D was 27- I recommended she get Vit D3 2000 IU per day; she has not started this but did Add otc Ca plus D once daily     Most recent dexa 1/02/2020- osteopenia w/high risk fx    Prolia due today but has rash present and unsure if related to prolia or not   Single tooth extraction 2 wks ago; no issues     Chronic right distal leg rash erythematous papular rash w/itching present after her ankle fx  Rash comes and goes- seen by derm yesterday  Dx: Disease of skin and subcutaneous tissue- RLE  - asteatotic eczema vs contact derm  - dry skin precautions discussed  -     fluocinonide 0.05% (LIDEX) 0.05 % cream; AAA bid to leg for 2-4 weeks per course    Right breast rash present about 6 months now looks more like fungal rash  Biopsy done w/derm- desonide and   Dx: Disease of skin and subcutaneous tissue- R breast  -     Specimen to Pathology, Dermatology  -     ketoconazole  "(NIZORAL) 2 % cream; AAA bid to breast/nipple  Dispense: 30 g; Refill: 2  -     desonide (DESOWEN) 0.05 % cream; Apply topically 2 (two) times daily. To breast/nipple, for 2-4 weeks      Left hip pain better w/sitting, worse w/WB, pain anterior groin as soon as she stands  Pain today 5/10  DDD/DJD spine- mri shows diffuse multilevel lumbar spondylosis  She also has osteoarthritis in multiple  Joints, hips, hands, knees, and chronic back pain (two surgeries s/p trauma) takes gabapentin at night.   She is taking tumeric and tylenol.  Avoid nsaids w/ckd           Follow-up  Associated symptoms include arthralgias. Pertinent negatives include no abdominal pain, chest pain, chills, coughing, fatigue, fever, headaches, joint swelling, myalgias, nausea, numbness, rash, sore throat, vomiting or weakness.     Review of Systems   Constitutional: Negative for chills, fatigue and fever.   HENT: Negative for mouth sores, rhinorrhea and sore throat.    Eyes: Negative for pain and redness.   Respiratory: Negative for cough and shortness of breath.    Cardiovascular: Negative for chest pain.   Gastrointestinal: Negative for abdominal pain, constipation, diarrhea, nausea and vomiting.   Genitourinary: Negative for dysuria and hematuria.   Musculoskeletal: Positive for arthralgias, back pain and gait problem. Negative for joint swelling and myalgias.   Skin: Negative for rash.   Neurological: Negative for weakness, numbness and headaches.   Psychiatric/Behavioral: The patient is not nervous/anxious.          Objective:   BP (!) 160/94   Pulse 66   Ht 5' 4" (1.626 m)   Wt 76.2 kg (167 lb 15.9 oz)   BMI 28.84 kg/m²      Physical Exam   Constitutional: She is oriented to person, place, and time and well-developed, well-nourished, and in no distress.   HENT:   Head: Normocephalic and atraumatic.   Eyes: Pupils are equal, round, and reactive to light. Right eye exhibits no discharge.   Neck: Normal range of motion.   Cardiovascular: " Normal rate, regular rhythm and normal heart sounds.  Exam reveals no friction rub.    Pulmonary/Chest: Effort normal and breath sounds normal. No respiratory distress.   Abdominal: Soft. She exhibits no distension. There is no abdominal tenderness.       Right Side Rheumatological Exam     The patient is tender to palpation of the shoulder      Lymphadenopathy:     She has no cervical adenopathy.   Neurological: She is alert and oriented to person, place, and time.   Skin: No rash noted. No erythema.     Psychiatric: Mood normal.   Musculoskeletal: Tenderness and deformity present. No edema.      Comments: significant heberden, surekha nodes, OA squaring rebecca 1 cmc joints, no synovitis  rebecca knees no effusion +crepitus, good rom    No ttp along spinous processes's    Left hip limited IR w/pain noted  ER good  Flex/ext good             Recent Results (from the past 168 hour(s))   Comprehensive metabolic panel    Collection Time: 07/23/20 10:46 AM   Result Value Ref Range    Sodium 139 136 - 145 mmol/L    Potassium 4.3 3.5 - 5.1 mmol/L    Chloride 105 95 - 110 mmol/L    CO2 25 23 - 29 mmol/L    Glucose 60 (L) 70 - 110 mg/dL    BUN, Bld 13 8 - 23 mg/dL    Creatinine 1.0 0.5 - 1.4 mg/dL    Calcium 9.1 8.7 - 10.5 mg/dL    Total Protein 6.8 6.0 - 8.4 g/dL    Albumin 3.7 3.5 - 5.2 g/dL    Total Bilirubin 0.5 0.1 - 1.0 mg/dL    Alkaline Phosphatase 62 55 - 135 U/L    AST 18 10 - 40 U/L    ALT 12 10 - 44 U/L    Anion Gap 9 8 - 16 mmol/L    eGFR if African American 59.8 (A) >60 mL/min/1.73 m^2    eGFR if non  51.9 (A) >60 mL/min/1.73 m^2   Vitamin D    Collection Time: 07/23/20 10:46 AM   Result Value Ref Range    Vit D, 25-Hydroxy 26 (L) 30 - 96 ng/mL   PTH, intact    Collection Time: 07/23/20 10:46 AM   Result Value Ref Range    PTH, Intact 92.0 (H) 9.0 - 77.0 pg/mL         Dexa 1.2.2020  Left Femur  BMD 0.876 g/cm2 with T score -1.2  Total Femur  BMD 0.907 g/cm2 with T score  -0.8,   L1 spine 7.3   frax  major 18%, hip 3.8%      Dexa 12.13.16:   Left Femur  BMD 0.850 g/cm2 with T score -1.2  L  Femur neck BMD 0.868 g/cm2 with T score  -1.2,   L1 spine 5.6, decreasing bmd at hip,   frax major 18.5%, hip 3.7%    Assessment:       1. Age-related osteoporosis with current pathological fracture with delayed healing    2. Primary osteoarthritis involving multiple joints    3. Pain of left hip joint    4. Vitamin D deficiency disease        Impression:      1. Osteoporosis with  multiple fxs- right hip, left wrist, several compression fx- 2018 right sacral fx and right wrist fx post ORIF- tried forteo- failed due to SE  Right humerus fx 11/2018  Now to resume prolia (lost to follow up) missed 1 dose  Intolerant of forteo     Mild CKD  Moved to Prolia 2/2017 (X 3 doses) post falling bmd with Reclast Holiday  [id number] (5 yr)  Completed IV Reclast ~ 4 treatment ([id number])      On Ca once a day   Last vit D level 26 7/2020- will add low dose otc vit D3    2. CKD: stable, gfr 38    3. Medication monitoring; no issues with prolia    4. Osteoarthritis multiple joints: rebecca hands, rebecca shoulders, left hip, rebecca knees, off mobic, using tumeric, tylenol, has had injections in knees by ortho  Left hip pain- ant groin w/wb and limited IR- most likely OA left hip    5. Lumbar arthropathy with radiculopathy right leg/foot: h/o trauma, 2 surgeries, chronic pain, gabapentin 300mg at night, sent to PT, seeing dr. Wells        Plan:         We moved back  to prolia- today (#6)  Labs reviewed and done within past 14 days  Ca 9.1, Creat 1.0, eGFR 51  No contraindication for Prolia today, ok for nurse to administer today  Re-evaluate patient again in 6 months to determine if Prolia still medically indicated and appropriate to administer.    KDIGO lab monitoring will be followed according to KDIGO 2017 Clinical Practice Guideline Update for the Diagnosis, Evaluation, Prevention, and Treatment of Chronic Kidney Disease-Mineral and Bone Disorder  (CKD-MBD)  Chapter 3.1: Diagnosis of CKD-MBD: biochemical abnormalities    Chew 2 tums daily for 14 day then stop    Get over the counter  vit D3 2000 units take once daily, instruction printed out and in avs  Ca once daily   cont tylenol, tumeric,  Avoid nsaids     Single tooth extraction 2 wks ago  No issues today   No need to hold prolia; ok to give       X-ray left hip today   Will call and discuss results   Tylenol for pain   Consider IA left hip injection     C/w neurontin 600-900 mg at night  Cont f/u with Dr. Wells     Rash does not seem like prolia related rash  Topical per derm  See what biopsy shows     RTC 6 mon - karen - prolia and labs - cmp, vit D, pth

## 2020-07-28 NOTE — NURSING
Lab Results   Component Value Date    CALCIUM 9.1 07/23/2020     Lab Results   Component Value Date    CREATININE 1.0 07/23/2020     Lab Results   Component Value Date    ESTGFRAFRICA 59.8 (A) 07/23/2020     Lab Results   Component Value Date    EGFRNONAA 51.9 (A) 07/23/2020     Lab Results   Component Value Date    XPGMFNTD87FS 26 (L) 07/23/2020       Printed information regarding Prolia given to pt. Instructed on s/s to report. Verbalized understanding.  Administered Prolia 60 mg/ml SQ in abd  Instructed to wait in clinic x 15 min. For monitoring.  Pt tolerated well without adverse event.  Discharged ambulatory from clinic.

## 2020-07-28 NOTE — TELEPHONE ENCOUNTER
Called pt and left message     Left hip-anterior groin pain w/some mild limited internal rotation   No hip bursa tenderness  X-ray showed some mild/mod djd left hip  No other issues    Of course deg changes lower back and si joint   You recently did back injection   Wonder if doing intra-articular hip injection help her    Dr Wells just did-Lumbar Transforaminal Epidural Steroid Injection under Fluoroscopic Guidance (supraneural approach)-Level: L2/3 L5/S1    Will see you next week   May be get w/dr wells to see if hip injection is something yall can do  Dr Mcginnis is the only one who does in office w/ultrasound but he is only here 4-5 days a month    thx

## 2020-07-30 LAB
FINAL PATHOLOGIC DIAGNOSIS: NORMAL
GROSS: NORMAL
MICROSCOPIC EXAM: NORMAL

## 2020-08-03 ENCOUNTER — TELEPHONE (OUTPATIENT)
Dept: DERMATOLOGY | Facility: CLINIC | Age: 85
End: 2020-08-03

## 2020-08-03 NOTE — TELEPHONE ENCOUNTER
----- Message from Stella Richey sent at 8/3/2020  1:53 PM CDT -----  Regarding: return call  Contact: Pt  Type:  Patient Returning Call    Who Called:Clau Nunn  Who Left Message for Patient:AMANDA JUAN  Does the patient know what this is regarding?:  Would the patient rather a call back or a response via [facility]? Call back  Best Call Back Number:[phone] (home)   Additional Information:

## 2020-08-04 ENCOUNTER — TELEPHONE (OUTPATIENT)
Dept: DERMATOLOGY | Facility: CLINIC | Age: 85
End: 2020-08-04

## 2020-08-04 ENCOUNTER — OFFICE VISIT (OUTPATIENT)
Dept: OPHTHALMOLOGY | Facility: CLINIC | Age: 85
End: 2020-08-04
Payer: MEDICARE

## 2020-08-04 DIAGNOSIS — M35.01 KERATITIS SICCA, BILATERAL: ICD-10-CM

## 2020-08-04 DIAGNOSIS — Z96.1 PSEUDOPHAKIA OF BOTH EYES: Primary | ICD-10-CM

## 2020-08-04 PROCEDURE — 92014 COMPRE OPH EXAM EST PT 1/>: CPT | Mod: HCNC,S$GLB,, | Performed by: OPHTHALMOLOGY

## 2020-08-04 PROCEDURE — 99999 PR PBB SHADOW E&M-EST. PATIENT-LVL III: CPT | Mod: PBBFAC,HCNC,, | Performed by: OPHTHALMOLOGY

## 2020-08-04 PROCEDURE — 92014 PR EYE EXAM, EST PATIENT,COMPREHESV: ICD-10-PCS | Mod: HCNC,S$GLB,, | Performed by: OPHTHALMOLOGY

## 2020-08-04 PROCEDURE — 99999 PR PBB SHADOW E&M-EST. PATIENT-LVL III: ICD-10-PCS | Mod: PBBFAC,HCNC,, | Performed by: OPHTHALMOLOGY

## 2020-08-04 RX ORDER — CYCLOSPORINE 0.5 MG/ML
1 EMULSION OPHTHALMIC 2 TIMES DAILY
Qty: 180 VIAL | Refills: 4 | Status: SHIPPED | OUTPATIENT
Start: 2020-08-04 | End: 2020-08-10

## 2020-08-04 NOTE — PROGRESS NOTES
SUBJECTIVE  Clau Nunn is 85 y.o. female  Corrected distance visual acuity was 20/30 in the right eye and 20/25 in the left eye.   Chief Complaint   Patient presents with    Annual Exam          HPI     Pt here for annual eye exam no complaints doing well       1. Restor IOL OU-Delvin  2. Dry eyes-CSM  3. Dizzness-No Ocular involvement   4. RE- RX for SV glasses.  5. Blepharitis    Restasis PRN OU  Emycin krista as needed    Last edited by Daniela Javier MA on 8/4/2020 10:02 AM. (History)         Assessment /Plan :  1. Pseudophakia of both eyes  -- Condition stable, no therapeutic change required. Monitoring routinely.     2. Keratitis sicca, bilateral resume Restasis OU BID, continue the use of the artificial tears OU prn     RTC in 1 year or prn any changes

## 2020-08-04 NOTE — TELEPHONE ENCOUNTER
----- Message from Silvana Orona sent at 8/4/2020 12:06 PM CDT -----  Regarding: reschedule nurse visit  Please call back Pt to reschedule her appt [phone]  Thanks

## 2020-08-05 ENCOUNTER — HOSPITAL ENCOUNTER (OUTPATIENT)
Dept: RADIOLOGY | Facility: HOSPITAL | Age: 85
Discharge: HOME | End: 2020-08-05
Attending: PHYSICIAN ASSISTANT
Payer: MEDICARE

## 2020-08-05 ENCOUNTER — OFFICE VISIT (OUTPATIENT)
Dept: PAIN MEDICINE | Facility: CLINIC | Age: 85
End: 2020-08-05
Payer: MEDICARE

## 2020-08-05 VITALS
SYSTOLIC BLOOD PRESSURE: 181 MMHG | RESPIRATION RATE: 18 BRPM | HEIGHT: 64 IN | DIASTOLIC BLOOD PRESSURE: 86 MMHG | BODY MASS INDEX: 28.68 KG/M2 | HEART RATE: 66 BPM | WEIGHT: 168 LBS

## 2020-08-05 DIAGNOSIS — M25.552 PAIN OF LEFT HIP JOINT: Primary | ICD-10-CM

## 2020-08-05 DIAGNOSIS — M19.011 PRIMARY OSTEOARTHRITIS OF RIGHT SHOULDER: ICD-10-CM

## 2020-08-05 DIAGNOSIS — S42.291S CLOSED FRACTURE OF HEAD OF RIGHT HUMERUS, SEQUELA: ICD-10-CM

## 2020-08-05 DIAGNOSIS — Z87.81 HISTORY OF HUMERUS FRACTURE: ICD-10-CM

## 2020-08-05 DIAGNOSIS — M47.818 ARTHROPATHY OF LEFT SACROILIAC JOINT: ICD-10-CM

## 2020-08-05 PROCEDURE — 1125F AMNT PAIN NOTED PAIN PRSNT: CPT | Mod: HCNC,S$GLB,, | Performed by: PHYSICIAN ASSISTANT

## 2020-08-05 PROCEDURE — 3079F DIAST BP 80-89 MM HG: CPT | Mod: HCNC,CPTII,S$GLB, | Performed by: PHYSICIAN ASSISTANT

## 2020-08-05 PROCEDURE — 73030 XR SHOULDER COMPLETE 2 OR MORE VIEWS RIGHT: ICD-10-PCS | Mod: 26,HCNC,RT, | Performed by: RADIOLOGY

## 2020-08-05 PROCEDURE — 1159F MED LIST DOCD IN RCRD: CPT | Mod: HCNC,S$GLB,, | Performed by: PHYSICIAN ASSISTANT

## 2020-08-05 PROCEDURE — 1101F PR PT FALLS ASSESS DOC 0-1 FALLS W/OUT INJ PAST YR: ICD-10-PCS | Mod: HCNC,CPTII,S$GLB, | Performed by: PHYSICIAN ASSISTANT

## 2020-08-05 PROCEDURE — 99999 PR PBB SHADOW E&M-EST. PATIENT-LVL V: CPT | Mod: PBBFAC,HCNC,, | Performed by: PHYSICIAN ASSISTANT

## 2020-08-05 PROCEDURE — 3077F SYST BP >= 140 MM HG: CPT | Mod: HCNC,CPTII,S$GLB, | Performed by: PHYSICIAN ASSISTANT

## 2020-08-05 PROCEDURE — 1101F PT FALLS ASSESS-DOCD LE1/YR: CPT | Mod: HCNC,CPTII,S$GLB, | Performed by: PHYSICIAN ASSISTANT

## 2020-08-05 PROCEDURE — 73030 X-RAY EXAM OF SHOULDER: CPT | Mod: TC,HCNC,RT

## 2020-08-05 PROCEDURE — 3079F PR MOST RECENT DIASTOLIC BLOOD PRESSURE 80-89 MM HG: ICD-10-PCS | Mod: HCNC,CPTII,S$GLB, | Performed by: PHYSICIAN ASSISTANT

## 2020-08-05 PROCEDURE — 73030 X-RAY EXAM OF SHOULDER: CPT | Mod: 26,HCNC,RT, | Performed by: RADIOLOGY

## 2020-08-05 PROCEDURE — 99999 PR PBB SHADOW E&M-EST. PATIENT-LVL V: ICD-10-PCS | Mod: PBBFAC,HCNC,, | Performed by: PHYSICIAN ASSISTANT

## 2020-08-05 PROCEDURE — 99214 PR OFFICE/OUTPT VISIT, EST, LEVL IV, 30-39 MIN: ICD-10-PCS | Mod: HCNC,S$GLB,, | Performed by: PHYSICIAN ASSISTANT

## 2020-08-05 PROCEDURE — 1159F PR MEDICATION LIST DOCUMENTED IN MEDICAL RECORD: ICD-10-PCS | Mod: HCNC,S$GLB,, | Performed by: PHYSICIAN ASSISTANT

## 2020-08-05 PROCEDURE — 3077F PR MOST RECENT SYSTOLIC BLOOD PRESSURE >= 140 MM HG: ICD-10-PCS | Mod: HCNC,CPTII,S$GLB, | Performed by: PHYSICIAN ASSISTANT

## 2020-08-05 PROCEDURE — 99214 OFFICE O/P EST MOD 30 MIN: CPT | Mod: HCNC,S$GLB,, | Performed by: PHYSICIAN ASSISTANT

## 2020-08-05 PROCEDURE — 1125F PR PAIN SEVERITY QUANTIFIED, PAIN PRESENT: ICD-10-PCS | Mod: HCNC,S$GLB,, | Performed by: PHYSICIAN ASSISTANT

## 2020-08-05 NOTE — PRE-PROCEDURE INSTRUCTIONS
Spoke with patient regarding procedure scheduled on 8/12    Arrival time AWAITING INSURANCE APPROVAL    Has patient been sick with fever or on antibiotics within the last 7 days? No    Has patient received a vaccination within the last 7 days? no    Has the patient stopped all medications as directed? NA    Does patient have a pacemaker and or defibrillator? no    Does the patient have a ride to and from procedure and someone reliable to remain with patient? daanette Mariee    Is the patient diabetic? no    Does the patient have sleep apnea? Or use O2 at home? No and no     Is the patient receiving sedation? no    Is the patient instructed to remain NPO beginning at midnight the night before their procedure? yes    Procedure location confirmed with patient? Yes    Covid- Denies signs/symptoms. Instructed to notify PAT/MD if any changes.

## 2020-08-05 NOTE — H&P (VIEW-ONLY)
Chief Pain Complaint:  Arm Pain (right), Leg Pain (left), and Low-back Pain    Interval History (8/5/2020): Patient was seen on 7/8/20. At that time she underwent left L2/3 + L5/S1 TF REJI.  The patient reports that she is/was slightly better following the procedure.  she reports only 50 % pain relief.  The changes lasted 4 weeks so far.  The changes have continued through this visit.  She also reports that her right shoulder has been bothering her.  She has history of right humerus fracture years ago.    Interval History (6/12/2020): She is here today to review MRI. She reports 10/10 pain today. She also has concerns about Prolia as she read that it can cause rashes and joint pain.  She has been having a rash on her right breast for a few weeks.  She will see Dr. Gomez soon to discuss.     Interval History(11/20/18): Patient was seen on 10/24/18. At that time she underwent left SIJ + left GT bursa injection with local.  The patient reports that she is/was better following the procedure.  she reports 100% pain relief.  The changes lasted 4 weeks so far.  The changes have continued through this visit.    History of Present Illness:   Clau Nunn is a 85 y.o. female  who is presenting with a chief complaint of low back pain and hip pain. The patient began experiencing this problem insidiously, and the pain has been gradually worsening over the past 6 month(s). The pain is described as throbbing, cramping, aching and heavy and is located in the bilateral buttocks. Pain is intermittent and lasts hours. The pain radiates to lateral thigh and groin. The patient rates her pain a 5 out of ten and interferes with activities of daily living a 5 out of ten. Pain is exacerbated by getting up from a seated position, and is improved by rest. Patient reports prior trauma (fall in June 2018 causing right distal radius + right sacrum fracture), prior lumbar surgery in ~2005, right hip replacement, right distal radius  fracture requiring ORIF in June 2018.    - pertinent negatives: No fever, No chills, No weight loss, No bladder dysfunction, No bowel dysfunction, No extremity weakness, No saddle anesthesia  - pertinent positives: none    - medications, other therapies tried (physical therapy, injections):     >> Tylenol    >> Has previously undergone Physical Therapy (aquatic and land) with limited relief    >> Has previously undergone spinal injection/s:   - bilateral SIJ injection on 11-9-17 with ~30% relief for 2 weeks   - left hip injection on 12-28-17 with some relief   - bilateral L3-5 MBB with local on 5/11/18 with reported 100% pain relief   - left SIJ + left GT bursa injection with local on 10/24/18 with 100% pain relief   - left L2/3 + L5/S1 TF REJI on 7/8/20 with about 50% pain relief      Imaging / Labs / Studies (reviewed on 8/5/2020):    Results for orders placed during the hospital encounter of 12/10/18   X-Ray Shoulder Trauma Right    Narrative FINDINGS:  Comminuted fracture involving the surgical neck and right humeral head.  No evidence of dislocation.  Degenerative changes are present at the right AC joint.    Impression Comminuted fracture involving the right humeral head and surgical neck.       Results for orders placed during the hospital encounter of 06/29/20   MRI Lumbar Spine W WO Cont    Narrative COMPARISON:  Prior MRI from June 29, 2020.  FINDINGS:  Again demonstrated are postoperative findings from thoracolumbar spinal fusion and laminectomy at T12.  Chronic compression deformity of T12 with chronic retropulsion that flattens the thecal sac to 14 mm.  This is unchanged.  Mild, grade 1 degenerative spondylolisthesis at L4-L5.  Vertebral body height is normal.  No abnormal enhancement patterns. The conus medullaris terminates at the level of L2-L3, low lying.  No abnormal signal within the conus. Intervertebral disc levels are as follows:  T11-T12 disc: Fusion at this level.  Mild, chronic retropulsion  that flattens the thecal sac to 14 mm.  No significant foraminal stenosis.  T12-L1 disc: Prior laminectomy and fusion.  The thecal sac measures 19 mm.  No significant foraminal stenosis.  L1-L2 disc : Posterior fusion.  No significant spinal stenosis or foraminal stenosis.  L2-L3 disc: Disc space height loss.  Broad-based posterior disc bulge which encroaches slightly into the floors of the exit foramina.  Moderate buckling of ligamentum flavum.  The thecal sac measures 8-9 mm AP.  Mild bilateral foraminal stenosis.  This has progressed since the prior MRI.  L3-L4 disc: Broad-based posterior disc bulge that encroaches slightly into the floors of the exit foramina.  Moderate buckling of ligamentum flavum.  The thecal sac measures 10 mm AP.  Mild bilateral neural foraminal stenosis, right greater than left.  These findings are similar to prior.  L4-L5 disc: Minimal grade 1 spondylolisthesis with severe degenerative facet change and hypertrophy.  Left-sided facet joint effusion.  Is buckling of ligamentum flavum.  The thecal sac measures 11 mm.  No significant foraminal stenosis.  The spondylolisthesis has slightly progressed measuring 4 mm compared to prior 2 mm.  L5-S1 disc: Severe disc space height loss with marginal disc and osteophyte encroach into the exit foramina bilaterally.  Moderate degenerative facet hypertrophy.  The thecal sac measures 14 mm.    Impression 1. 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  2. Progression of mild foraminal stenosis and mild spinal stenosis at L2-L3.  3. Minimal progression of grade 1 spondylolisthesis at L4-L5.  4. Unchanged mild, chronic retropulsion from T12 where there has been a laminectomy and fusion.       7/30/2018 XR LUMBAR SPINE COMPLETE 5 VIEW  TECHNIQUE:  AP, lateral, spot and bilateral oblique views of the lumbar spine were performed.  COMPARISON:  07/25/2018  FINDINGS:  There is grade 1 spondylolisthesis of L4 on L5.  Pedicle screws and fixation rods are noted for at  the T10, T11, L1 and L2 levels.  Chronic compression deformity at the L1 level unchanged.  Prominent bilateral facet arthropathy noted at the L4-5 and L5-S1 levels.  IVC filter noted.     7/30/2018 XR HIPS BILATERAL 2 VIEW INCL AP PELVIS  TECHNIQUE:  AP view of the pelvis and frogleg lateral views of both hips were performed.  COMPARISON:  07/26/2018  FINDINGS:  The bony pelvis is intact. A right total hip arthroplasty, plate and wires are in place.  No hardware failure or loosening suggested.  The appearance is unchanged when compared to the prior exam.  Mild degenerative changes noted at the left hip.  No acute fracture or dislocation of the left hip.  No significant joint space narrowing identified.  No plain film evidence to suggest AVN of either hip.     Results for orders placed during the hospital encounter of 01/13/14   MRI Lumbar Spine W WO Contrast    Narrative Findings: Pre- and postcontrast multiplanar multisequence imaging of the thoracic and lumbar spine was performed using 7 cc of Gadavist intravenous contrast material.  There is moderately severe chronic central compression deformity of the T12 vertebral body which is stabilized by paraspinous rods and pedicle screws which extend from T10 through L2.  Postsurgical changes of laminectomy also noted at T12.  The posterior cortex of the T12 vertebral body is displaced posteriorly and indents the ventral thecal sac.  There is no anterior cord contact or significant central canal stenosis.  Degenerative disk desiccation is noted at multiple levels with moderate disk   narrowing at L5-S1.    Also L5-S1 is a broad-based disk protrusion which combined with bilateral facet hypertrophy results in moderately severe narrowing of both neural foramina.  No significant central canal stenosis noted.    From L2-3 through L4-5 there   is mild disk bulging which results in mild bilateral foraminal narrowing.  This is slightly more pronounced at L4-5 with bilateral  "facet hypertrophy a contributing factor.  No significant central canal stenosis noted.  No thoracic disk extrusion, and foraminal narrowing, canal stenosis is evident.  Thoracolumbar cord is intact.  Paravertebral soft tissues are symmetric and normal in appearance.         Review of Systems:  CONSTITUTIONAL: patient denies any fever, chills, or weight loss  SKIN: patient denies any rash or itching  RESPIRATORY: patient denies having any shortness of breath  GASTROINTESTINAL: patient denies having any diarrhea, constipation, or bowel incontinence  GENITOURINARY: patient denies having any abnormal bladder function    MUSCULOSKELETAL:  - patient complains of the above noted pain/s (see chief pain complaint)    NEUROLOGICAL:   - pain as above  - strength in Lower extremities is intact, BILATERALLY  - sensation in Lower extremities is intact, BILATERALLY  - patient denies any loss of bowel or bladder control      PSYCHIATRIC: patient denies any change in mood    Other:  All other systems reviewed and are negative        Physical Exam:  Vitals:  BP (!) 181/86 (BP Location: Right arm, Patient Position: Sitting, BP Method: Medium (Automatic))   Pulse 66   Resp 18   Ht 5' 4" (1.626 m)   Wt 76.2 kg (168 lb)   BMI 28.84 kg/m²   (reviewed on 8/5/2020)    General: alert and oriented, in no apparent distress.  Gait: normal gait.  Skin: no rashes, no discoloration, no obvious lesions  HEENT: normocephalic, atraumatic. Pupils equal and round.  Cardiovascular: no significant peripheral edema present.  Respiratory: without use of accessory muscles of respiration.    Musculoskeletal - Lumbar Spine:  - Pain on flexion of lumbar spine: Present, worse than with extension  - Pain on extension of lumbar spine: Present  - Lumbar facet loading: Positive bilaterally  - TTP over the lumbar facet joints: Absent  - TTP over the SI joints: Present on left   - TTP over GT bursa: Present on left   - Straight Leg Raise: equivocal on left   - " PAMELA: Positive on left   - Pain on Internal and external rotation of the hip: Mild    Right Shoulder:  - Pain on abduction: Present   - ROM: decreased secondary to pain, very limited ROM           - abduction beyond 90 degrees (supraspinatous) - unable  - TTP over the AC and GH joint: Present  - Neer's: Positive  - Hawkin's: Positive  - Apley's Scratch test: Positive    Neuro - Lower Extremities:  - BLE Strength: R/L: HF: 5/5, HE: 5/5, KF: 5/5; KE: 5/5; FE: 5/5; FF: 5/5  - Extremity Reflexes: Brisk and symmetric throughout  - Sensory: Sensation to light touch intact bilaterally      Psych:  Mood and affect is appropriate              Assessment:  Clau Nunn is a 85 y.o. year old female who is presenting with   Encounter Diagnoses   Name Primary?    Pain of left hip joint Yes    Arthropathy of left sacroiliac joint     Primary osteoarthritis of right shoulder     History of humerus fracture     Closed fracture of head of right humerus, sequela        Plan:  1. Interventional:   - S/p left L2/3 + L5/S1 TF REJI on 7/8/20 with about 50% pain relief.  - Schedule left SIJ + left hip joint + right shoulder joint injection.     2. Pharmacologic:   - Continue gabapentin 600mg QHS.  - At last visit, she was given bridge to injection of Tylenol #3 to take 1/2 to 1 tab BID PRN (14 tablets).   - LA  reviewed and appropriate. Listed below.      3. Rehabilitative: We discussed PT previously, but she is not interested. She reports it made her pain worse in the past. Encourage regular exercise.     4. Diagnostic:  Order right shoulder x-ray to be done today.  Previous shoulder x-ray reviewed, which shows acute fracture at that time.    5. Other: She has been having a rash on her right breast for a few weeks.  She has seen Dermatology for evaluation since the last visit.    6. Follow up: 4 weeks post-injection     - I discussed the risks, benefits, and alternatives to potential treatment options. All questions and  concerns were fully addressed today in clinic.

## 2020-08-10 ENCOUNTER — CLINICAL SUPPORT (OUTPATIENT)
Dept: DERMATOLOGY | Facility: CLINIC | Age: 85
End: 2020-08-10
Payer: MEDICARE

## 2020-08-10 ENCOUNTER — OFFICE VISIT (OUTPATIENT)
Dept: INTERNAL MEDICINE | Facility: CLINIC | Age: 85
End: 2020-08-10
Payer: MEDICARE

## 2020-08-10 VITALS
SYSTOLIC BLOOD PRESSURE: 132 MMHG | WEIGHT: 164.88 LBS | RESPIRATION RATE: 20 BRPM | BODY MASS INDEX: 28.15 KG/M2 | HEART RATE: 96 BPM | DIASTOLIC BLOOD PRESSURE: 78 MMHG | OXYGEN SATURATION: 98 % | TEMPERATURE: 99 F | HEIGHT: 64 IN

## 2020-08-10 DIAGNOSIS — S32.10XA CLOSED FRACTURE OF SACRUM AND COCCYX, INITIAL ENCOUNTER: ICD-10-CM

## 2020-08-10 DIAGNOSIS — Z00.00 ENCOUNTER FOR PREVENTIVE HEALTH EXAMINATION: Primary | ICD-10-CM

## 2020-08-10 DIAGNOSIS — Z86.718 HISTORY OF DVT IN ADULTHOOD: ICD-10-CM

## 2020-08-10 DIAGNOSIS — S32.2XXA CLOSED FRACTURE OF SACRUM AND COCCYX, INITIAL ENCOUNTER: ICD-10-CM

## 2020-08-10 DIAGNOSIS — I10 ESSENTIAL HYPERTENSION: Chronic | ICD-10-CM

## 2020-08-10 DIAGNOSIS — M46.96 UNSPECIFIED INFLAMMATORY SPONDYLOPATHY, LUMBAR REGION: ICD-10-CM

## 2020-08-10 DIAGNOSIS — I70.0 ATHEROSCLEROSIS OF AORTA: ICD-10-CM

## 2020-08-10 DIAGNOSIS — N25.81 SECONDARY HYPERPARATHYROIDISM: ICD-10-CM

## 2020-08-10 DIAGNOSIS — Z48.02 VISIT FOR SUTURE REMOVAL: Primary | ICD-10-CM

## 2020-08-10 DIAGNOSIS — I87.2 CHRONIC VENOUS INSUFFICIENCY: Chronic | ICD-10-CM

## 2020-08-10 DIAGNOSIS — M15.9 PRIMARY OSTEOARTHRITIS INVOLVING MULTIPLE JOINTS: ICD-10-CM

## 2020-08-10 DIAGNOSIS — F32.5 MAJOR DEPRESSIVE DISORDER IN FULL REMISSION, UNSPECIFIED WHETHER RECURRENT: ICD-10-CM

## 2020-08-10 DIAGNOSIS — N18.30 STAGE 3 CHRONIC KIDNEY DISEASE: ICD-10-CM

## 2020-08-10 DIAGNOSIS — M46.1 SACROILIAC INFLAMMATION: ICD-10-CM

## 2020-08-10 DIAGNOSIS — M80.00XG AGE-RELATED OSTEOPOROSIS WITH CURRENT PATHOLOGICAL FRACTURE WITH DELAYED HEALING: ICD-10-CM

## 2020-08-10 DIAGNOSIS — E03.9 ACQUIRED HYPOTHYROIDISM: ICD-10-CM

## 2020-08-10 DIAGNOSIS — E55.9 VITAMIN D DEFICIENCY DISEASE: ICD-10-CM

## 2020-08-10 DIAGNOSIS — R26.9 ABNORMALITY OF GAIT AND MOBILITY: ICD-10-CM

## 2020-08-10 DIAGNOSIS — M54.16 CHRONIC LUMBAR RADICULOPATHY: ICD-10-CM

## 2020-08-10 PROBLEM — F32.0 MILD MAJOR DEPRESSION: Status: RESOLVED | Noted: 2017-02-21 | Resolved: 2020-08-10

## 2020-08-10 PROCEDURE — 99999 PR PBB SHADOW E&M-EST. PATIENT-LVL III: ICD-10-PCS | Mod: PBBFAC,HCNC,, | Performed by: NURSE PRACTITIONER

## 2020-08-10 PROCEDURE — 3075F PR MOST RECENT SYSTOLIC BLOOD PRESS GE 130-139MM HG: ICD-10-PCS | Mod: HCNC,CPTII,S$GLB, | Performed by: NURSE PRACTITIONER

## 2020-08-10 PROCEDURE — 99499 UNLISTED E&M SERVICE: CPT | Mod: HCNC,S$GLB,, | Performed by: NURSE PRACTITIONER

## 2020-08-10 PROCEDURE — 99999 PR PBB SHADOW E&M-EST. PATIENT-LVL III: CPT | Mod: PBBFAC,HCNC,, | Performed by: NURSE PRACTITIONER

## 2020-08-10 PROCEDURE — G0439 PR MEDICARE ANNUAL WELLNESS SUBSEQUENT VISIT: ICD-10-PCS | Mod: HCNC,S$GLB,, | Performed by: NURSE PRACTITIONER

## 2020-08-10 PROCEDURE — G0439 PPPS, SUBSEQ VISIT: HCPCS | Mod: HCNC,S$GLB,, | Performed by: NURSE PRACTITIONER

## 2020-08-10 PROCEDURE — 3075F SYST BP GE 130 - 139MM HG: CPT | Mod: HCNC,CPTII,S$GLB, | Performed by: NURSE PRACTITIONER

## 2020-08-10 PROCEDURE — 3078F DIAST BP <80 MM HG: CPT | Mod: HCNC,CPTII,S$GLB, | Performed by: NURSE PRACTITIONER

## 2020-08-10 PROCEDURE — 3078F PR MOST RECENT DIASTOLIC BLOOD PRESSURE < 80 MM HG: ICD-10-PCS | Mod: HCNC,CPTII,S$GLB, | Performed by: NURSE PRACTITIONER

## 2020-08-10 PROCEDURE — 99024 PR POST-OP FOLLOW-UP VISIT: ICD-10-PCS | Mod: HCNC,S$GLB,, | Performed by: DERMATOLOGY

## 2020-08-10 PROCEDURE — 99499 RISK ADDL DX/OHS AUDIT: ICD-10-PCS | Mod: HCNC,S$GLB,, | Performed by: NURSE PRACTITIONER

## 2020-08-10 PROCEDURE — 99024 POSTOP FOLLOW-UP VISIT: CPT | Mod: HCNC,S$GLB,, | Performed by: DERMATOLOGY

## 2020-08-10 NOTE — PATIENT INSTRUCTIONS
Counseling and Referral of Other Preventative  (Italic type indicates deductible and co-insurance are waived)    Patient Name: Clau Nunn  Today's Date: 8/10/2020    Health Maintenance       Date Due Completion Date    Shingles Vaccine (2 of 3) 01/01/2010 11/6/2009    Influenza Vaccine (1) 09/01/2020 11/21/2019    DEXA SCAN 01/02/2022 1/2/2020    Override on 4/17/2012: Done (Osteoporosis; stable to improved BMD hip and spine; repeat in 1-2 years)    Lipid Panel 06/29/2025 6/29/2020    TETANUS VACCINE 07/27/2028 7/27/2018        No orders of the defined types were placed in this encounter.    The following information is provided to all patients.  This information is to help you find resources for any of the problems found today that may be affecting your health:                Living healthy guide: www.[facility].[place].gov      Understanding Diabetes: www.diabetes.org      Eating healthy: www.cdc.gov/healthyweight      [facility] home safety checklist: www.cdc.gov/steadi/patient.html      Agency on Aging: www.goea.[place].[facility]      Alcoholics anonymous (AA): www.aa.org      Physical Activity: www.eligio.nih.gov/kd4qubn      Tobacco use: www.quitwithusla.org

## 2020-08-10 NOTE — PRE-PROCEDURE INSTRUCTIONS
Patient returned call in regards to procedure on 8/12. Arrival time 1000 at the [facility]. Verbalized understanding.

## 2020-08-10 NOTE — PROGRESS NOTES
"  Clau Nunn presented for a  Medicare AWV and comprehensive Health Risk Assessment today. The following components were reviewed and updated:    · Medical history  · Family History  · Social history  · Allergies and Current Medications  · Health Risk Assessment  · Health Maintenance  · Care Team     ** See Completed Assessments for Annual Wellness Visit within the encounter summary.**         The following assessments were completed:  · Living Situation  · CAGE  · Depression Screening  · Timed Get Up and Go  · Whisper Test  · Cognitive Function Screening  · Nutrition Screening  · ADL Screening  · PAQ Screening        Vitals:    08/10/20 1006   BP: 132/78   BP Location: Left arm   Patient Position: Sitting   BP Method: Medium (Manual)   Pulse: 96   Resp: 20   Temp: 98.5 °F (36.9 °C)   TempSrc: Tympanic   SpO2: 98%   Weight: 74.8 kg (164 lb 14.5 oz)   Height: 5' 3.5" (1.613 m)     Body mass index is 28.75 kg/m².  Physical Exam  Constitutional:       Appearance: Normal appearance. She is well-developed. She is not ill-appearing, toxic-appearing or diaphoretic.   HENT:      Head: Normocephalic and atraumatic.      Right Ear: External ear normal.      Left Ear: Tympanic membrane and external ear normal.      Nose: Nose normal.   Eyes:      General: No scleral icterus.        Right eye: No discharge.         Left eye: No discharge.      Conjunctiva/sclera: Conjunctivae normal.      Pupils: Pupils are equal, round, and reactive to light.   Neck:      Musculoskeletal: Normal range of motion and neck supple. Normal range of motion. No edema, neck rigidity or muscular tenderness.      Thyroid: No thyromegaly.      Vascular: No carotid bruit.      Trachea: No tracheal deviation.   Cardiovascular:      Rate and Rhythm: Normal rate and regular rhythm.      Pulses: Normal pulses.      Heart sounds: No murmur. No friction rub. No gallop.    Pulmonary:      Effort: Pulmonary effort is normal. No respiratory distress.      " Breath sounds: Normal breath sounds. No wheezing or rales.   Chest:      Chest wall: No tenderness.   Abdominal:      General: Bowel sounds are normal. There is no distension.      Palpations: Abdomen is soft. There is no mass.      Tenderness: There is no abdominal tenderness. There is no guarding or rebound.   Musculoskeletal: Normal range of motion.         General: No tenderness.   Lymphadenopathy:      Head:      Right side of head: No tonsillar adenopathy.      Left side of head: No tonsillar adenopathy.      Cervical: No cervical adenopathy.      Upper Body:      Right upper body: No supraclavicular adenopathy.      Left upper body: No supraclavicular adenopathy.   Skin:     General: Skin is warm and dry.      Findings: No lesion or rash.      Comments: Bilateral darkening of skin to lower legs due to chronic venous insuff   Neurological:      General: No focal deficit present.      Mental Status: She is alert and oriented to person, place, and time.      Deep Tendon Reflexes: Reflexes are normal and symmetric.   Psychiatric:         Mood and Affect: Mood normal.         Behavior: Behavior normal.               Diagnoses and health risks identified today and associated recommendations/orders:    1. Unspecified inflammatory spondylopathy, lumbar region  Monitored/treated on meds, continue the same tx, stable, sees pain management     2. Essential hypertension  Monitored/treated on meds, continue the same tx, stable    3. Chronic venous insufficiency  Monitored/treated on meds, continue the same tx, stable    4. Atherosclerosis of aorta  Monitored/treated on meds, continue the same tx, stable, monitor BP, low fat diet , stay active    5. Stage 3 chronic kidney disease  Monitored/treated on meds, continue the same tx, stable    6. History of DVT in adulthood  Monitored/treated on meds, continue the same tx, stable    7. Vitamin D deficiency disease  Monitored/treated on meds, continue the same tx, stable, last  Vit D 26, followed by Dr Molina    8. Secondary hyperparathyroidism  Monitored/treated on meds, continue the same tx, stable    9. Acquired hypothyroidism  Monitored/treated on meds, continue the same tx, stable    10. 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 Chronic lumbar radiculopathy  Monitored/treated on meds, continue the same tx, stable    12. Sacroiliac inflammation  Monitored/treated on meds, continue the same tx, stable    13. Primary osteoarthritis involving multiple joints  Monitored/treated on meds, continue the same tx, stable    14. Age-related osteoporosis with current pathological fracture with delayed healing  Monitored/treated on meds, continue the same tx, stable, last DEXA 2020    15. Closed fracture of sacrum and coccyx, initial encounter  Monitored/treated on meds, continue the same tx, stable, hx of    16. Encounter for preventative health examination  Screenings performed, as noted above.  Personal preventative testing needs reviewed.       Provided Clau with a 5-10 year written screening schedule and personal prevention plan. Recommendations were developed using the USPSTF age appropriate recommendations. Education, counseling, and referrals were provided as needed. After Visit Summary printed and given to patient which includes a list of additional screenings\tests needed.    No follow-ups on file.    Bladimir Feng NP  I offered to discuss end of life issues, including information on how to make advance directives that the patient could use to name someone who would make medical decisions on their behalf if they became too ill to make themselves.    _X_Patient declined  ___Patient is interested, I provided paper work and offered to discuss.

## 2020-08-12 ENCOUNTER — HOSPITAL ENCOUNTER (OUTPATIENT)
Facility: HOSPITAL | Age: 85
Discharge: HOME | End: 2020-08-12
Attending: ANESTHESIOLOGY | Admitting: ANESTHESIOLOGY
Payer: MEDICARE

## 2020-08-12 VITALS
WEIGHT: 165 LBS | BODY MASS INDEX: 29.23 KG/M2 | HEART RATE: 69 BPM | SYSTOLIC BLOOD PRESSURE: 181 MMHG | TEMPERATURE: 98 F | OXYGEN SATURATION: 100 % | DIASTOLIC BLOOD PRESSURE: 79 MMHG | RESPIRATION RATE: 18 BRPM | HEIGHT: 63 IN

## 2020-08-12 DIAGNOSIS — M46.1 SACROILIAC INFLAMMATION: ICD-10-CM

## 2020-08-12 DIAGNOSIS — M47.818 ARTHROPATHY OF LEFT SACROILIAC JOINT: ICD-10-CM

## 2020-08-12 DIAGNOSIS — M25.552 PAIN OF LEFT HIP JOINT: ICD-10-CM

## 2020-08-12 DIAGNOSIS — M19.011 PRIMARY OSTEOARTHRITIS OF RIGHT SHOULDER: Primary | ICD-10-CM

## 2020-08-12 PROCEDURE — 27096 PR INJECTION,SACROILIAC JOINT: ICD-10-PCS | Mod: HCNC,LT,, | Performed by: ANESTHESIOLOGY

## 2020-08-12 PROCEDURE — 25000003 PHARM REV CODE 250: Mod: HCNC | Performed by: ANESTHESIOLOGY

## 2020-08-12 PROCEDURE — 27096 INJECT SACROILIAC JOINT: CPT | Mod: HCNC,LT,, | Performed by: ANESTHESIOLOGY

## 2020-08-12 PROCEDURE — 20610 PR DRAIN/INJECT LARGE JOINT/BURSA: ICD-10-PCS | Mod: 59,HCNC,RT, | Performed by: ANESTHESIOLOGY

## 2020-08-12 PROCEDURE — 27096 INJECT SACROILIAC JOINT: CPT | Mod: HCNC | Performed by: ANESTHESIOLOGY

## 2020-08-12 PROCEDURE — 20610 DRAIN/INJ JOINT/BURSA W/O US: CPT | Mod: 59,HCNC,LT, | Performed by: ANESTHESIOLOGY

## 2020-08-12 PROCEDURE — 63600175 PHARM REV CODE 636 W HCPCS: Mod: HCNC | Performed by: ANESTHESIOLOGY

## 2020-08-12 PROCEDURE — 25500020 PHARM REV CODE 255: Mod: HCNC | Performed by: ANESTHESIOLOGY

## 2020-08-12 PROCEDURE — 20610 DRAIN/INJ JOINT/BURSA W/O US: CPT | Mod: HCNC | Performed by: ANESTHESIOLOGY

## 2020-08-12 RX ORDER — LIDOCAINE HYDROCHLORIDE 20 MG/ML
INJECTION, SOLUTION EPIDURAL; INFILTRATION; INTRACAUDAL; PERINEURAL
Status: DISCONTINUED | OUTPATIENT
Start: 2020-08-12 | End: 2020-08-12 | Stop reason: HOSPADM

## 2020-08-12 RX ORDER — METHYLPREDNISOLONE ACETATE 40 MG/ML
INJECTION, SUSPENSION INTRA-ARTICULAR; INTRALESIONAL; INTRAMUSCULAR; SOFT TISSUE
Status: DISCONTINUED | OUTPATIENT
Start: 2020-08-12 | End: 2020-08-12 | Stop reason: HOSPADM

## 2020-08-12 NOTE — DISCHARGE SUMMARY
[facility]  Discharge Note       Description of Procedure: Left Sacroiliac joint, Left Acetabulofemoral joint injection and Right Shoulder Inejction under fluoroscopic guidance      Procedure Date: 8/12/2020    Admit Date: 8/12/2020  Discharge Date: 8/12/2020     Attending Physician: Priscilla Nugent   Discharge Provider: Priscilla Nugent    Preoperative Diagnosis: Sacroiliitis,  Acetabulofemoral osteoarthritis, Shoulder Arthritis    Postoperative Diagnosis: as above, same as preoperative diagnosis    Discharged Condition: Stable    Hospital Course: Patient was admitted for an outpatient procedure. The procedure was tolerated well with no complications.    Final Diagnoses: Same as principal problem.    Disposition: Home, self-care.    Follow up/Patient Instructions:  Follow-up in clinic in 2-3 weeks.    Medications: No medications were prescribed today. The patient was advised to resume normal medication regimen without change.  Specific information was provided regarding restarting any anticoagulant/s.    Discharge Procedure Orders (must include Diet, Follow-up, Activity):  Light activity for the remainder of the day, resume normal activity tomorrow. Resume normal diet. Follow-up in clinic in 2-3 weeks.

## 2020-08-12 NOTE — OP NOTE
PROCEDURE: Left Sacroiliac joint, Left Acetabulofemoral joint injection and Right Shoulder Inejction under fluoroscopic guidance       PROCEDURE DATE: 8/12/2020    PREOPERATIVE DIAGNOSIS: Sacroiliitis, Acetabulofemoral osteoarthritis, Shoulder Arthritis  POSTOPERATIVE DIAGNOSIS: Sacroiliitis,  Acetabulofemoral osteoarthritis, Shoulder Arthritis    PROVIDER: Raffi Wells MD  Assistant(s): none    Anesthesia: Local,  No Sedation    >> 0 mg of VERSED    >> 0 mcg of FENTANYL     INDICATION: The patient has a history of pain due to sacroiliitis and hip pain unresponsive to conservative treatments. Fluoroscopy was used to optimize visualization of needle placement and to maximize safety.    PROCEDURE DESCRIPTION: The patient was seen and identified in the preoperative area. Risks, benefits, complications, and alternatives were discussed with the patient. The patient agreed to proceed with the procedure and signed the consent. The site and side of the procedure was identified and marked.     The patient was taken to the procedural suite. The patient was positioned in prone orientation on procedure table. A time out was performed prior to any intervention. The procedure, site, side, and allergies were stated and agreed to by all present. The lumbosacral area extending to the skin overlying the femoral greater trochanter was widely prepped with ChloraPrep. The procedural site was draped in usual sterile fashion. Vital signs were closely monitored throughout this procedure.     The fluoroscopic camera was placed in contralateral oblique view and was adjusted until the anterior and posterior joint margins of the targeted sacroiliac joint aligned in linear array. The lower pole of the joint was identified, marked, and localized with 1% Lidocaine. A 25 gauge 3.5 inch spinal needle was introduced and advanced to the joint under fluoroscopic guidance. The joint space was entered and after negative aspiration, 2 mL of injectate was  posited into the joint space. The needle was then withdrawn outside of the joint space and after negative aspiration 1 mL of solution was injected outside of the joint space. The injectant solution used was comprised of 7 mL of 1% PF Lidocaine and 2 mL of Methylprednisolone (40 mg/mL). This technique was performed for the above noted joint/s.    Following Sacroiliac Joint injection, the stylet was replaced and the needle was withdrawn intact. The above noted Acetabulofemoral joint and femoral neck was then identified with fluoroscopy. The targeted site of entry was localized with 1% PF Lidocaine. The above noted spinal needle was advanced to the rostral, medial aspect of the femoral neck just outside of the joint space until the osseous interface was met.  After negative aspiration, 3 mL of the above noted solution was injected. No pain or paresthesia was noted on injection. Following injection, the stylet was replaced and the needle was withdrawn intact. This technique was used for the above noted joint/s. The skin was cleaned and bandages were applied.    Shoulder Injection:  The Right shoulder was prepped and draped in the usual sterile fashion with ChloraPrep. 1% lidocaine through a 27-gauge needle was used to anesthetize the target area, and then a 3-1/2 inch 25-gauge Quincke-point needle advanced under direct vision fluoroscopy toward the Left -sided shoulder joint through the lateral approach, and the placement of the needle was confirmed by injecting 1 mL iodine  contrast live fluoroscopy, which demonstrated excellent placement of the needle. Aspiration was negative for blood and CSF and air. Subsequently 3 mLof the above noted solution was injected, and then the needle was withdrawn.The patient tolerated the procedure very well and was brought to the postprocedure recovery room in excellent condition.    Description of Findings: Not applicable    Prosthetic devices, grafts, tissues, or devices implanted:  None    Specimen Removed: No    Estimated Blood Loss: minimal    COMPLICATIONS: None    DISPOSITION / PLANS: The patient was transferred to the recovery area in a stable condition for observation. The patient was reexamined prior to discharge. There was no evidence of acute neurologic injury following the procedure.  Patient was discharged from the recovery room after meeting discharge criteria. Home discharge instructions were given to the patient by the staff.

## 2020-08-12 NOTE — DISCHARGE INSTRUCTIONS

## 2020-08-31 ENCOUNTER — OFFICE VISIT (OUTPATIENT)
Dept: DERMATOLOGY | Facility: CLINIC | Age: 85
End: 2020-08-31
Payer: MEDICARE

## 2020-08-31 DIAGNOSIS — L82.1 SEBORRHEIC KERATOSIS: ICD-10-CM

## 2020-08-31 DIAGNOSIS — L23.9 ALLERGIC CONTACT DERMATITIS, UNSPECIFIED TRIGGER: Primary | ICD-10-CM

## 2020-08-31 PROCEDURE — 1101F PT FALLS ASSESS-DOCD LE1/YR: CPT | Mod: HCNC,CPTII,S$GLB, | Performed by: DERMATOLOGY

## 2020-08-31 PROCEDURE — 1159F PR MEDICATION LIST DOCUMENTED IN MEDICAL RECORD: ICD-10-PCS | Mod: HCNC,S$GLB,, | Performed by: DERMATOLOGY

## 2020-08-31 PROCEDURE — 1159F MED LIST DOCD IN RCRD: CPT | Mod: HCNC,S$GLB,, | Performed by: DERMATOLOGY

## 2020-08-31 PROCEDURE — 99999 PR PBB SHADOW E&M-EST. PATIENT-LVL III: CPT | Mod: PBBFAC,HCNC,, | Performed by: DERMATOLOGY

## 2020-08-31 PROCEDURE — 99999 PR PBB SHADOW E&M-EST. PATIENT-LVL III: ICD-10-PCS | Mod: PBBFAC,HCNC,, | Performed by: DERMATOLOGY

## 2020-08-31 PROCEDURE — 99213 PR OFFICE/OUTPT VISIT, EST, LEVL III, 20-29 MIN: ICD-10-PCS | Mod: HCNC,S$GLB,, | Performed by: DERMATOLOGY

## 2020-08-31 PROCEDURE — 99213 OFFICE O/P EST LOW 20 MIN: CPT | Mod: HCNC,S$GLB,, | Performed by: DERMATOLOGY

## 2020-08-31 PROCEDURE — 1101F PR PT FALLS ASSESS DOC 0-1 FALLS W/OUT INJ PAST YR: ICD-10-PCS | Mod: HCNC,CPTII,S$GLB, | Performed by: DERMATOLOGY

## 2020-08-31 NOTE — PATIENT INSTRUCTIONS
- Use a moisturizer twice daily  - For legs, use a moisturizer with Ammonium lactate (Amlactin or Lachydrin) or CeraVe SA (salicylic acid) twice daily      XEROSIS (DRY SKIN)        1. Definition    Xerosis is the term for dry skin.  We all have a natural oil coating over our skin produced by the skin oil glands.  If this oil is removed, the skin becomes dry which can lead to cracking, which can lead to inflammation.  Xerosis is usually a long-term problem that recurs often, especially in the winter.    2. Cause     Long hot baths or showers can remove our natural oil and lead to xerosis.  One should never take more than one bath or shower a day and for no longer than ten minutes.   Use of harsh soaps such as Zest, Dial, and Ivory can worsen and cause xerosis.   Cold winter weather worsens xerosis because the amount of moisture contained in cold air is much less than the amount of moisture in warm air.    3. Treatment     Treatment is intended to restore the natural oil to your skin.  Keep the skin lubricated.     Do not take more than one bath or shower a day.  Use lukewarm water, not hot.  Hot water dries out the skin.     Use a gentle moisturizing soap such as Cetaphil soap, Oil of Olay, Dove, Basis, Ivory moisture care, Restoraderm cleanser.     When toweling dry, dont rub.  Blot the skin so there is still some water left on the skin.  You should apply a moisturizing cream to all of the skin such as Cerave cream, Cetaphil cream, Restoraderm or Eucerin Original Formula cream.   Alpha hydroxyacid lotions, i.e., AmLactin, also work very well for preventing dry skin, but may burn when used on inflamed or reddened skin.     If you like to swim during the winter months, you should not use soap when getting out of the pool.  When you have finished swimming, rinse off the chlorine with cool to warm water.  If this will be the only shower of the day, then you may use Cetaphil or another mild soap to cleanse your  skin.  After the shower, apply a moisturizing cream to all of the skin as above.

## 2020-08-31 NOTE — PROGRESS NOTES
Subjective:       Patient ID:  Clau Nunn is a 85 y.o. female who presents for   Chief Complaint   Patient presents with    Follow-up     rash on ankles, medication successful      History of Present Illness: The patient presents for follow up of rash.    The patient was last seen on: 7/27/2020 for initial eval by me of nipple dermatitis.  Biopsy suggestive of ACD.  Resolved with desonide cream and keto cream.    Also had rash to RLE, which has now resolved with lidex cream.     Other skin complaints: bumps on chest  Patient complains of lesion(s)  Location: chest  Duration: years  Symptoms: rough and bumpy  Relieving factors/Previous treatments: none            Review of Systems   Skin: Negative for itching and rash.   Hematologic/Lymphatic: Does not bruise/bleed easily.        Objective:    Physical Exam   Constitutional: She appears well-developed and well-nourished. No distress.   Neurological: She is alert and oriented to person, place, and time. She is not disoriented.   Psychiatric: She has a normal mood and affect.   Skin:   Areas Examined (abnormalities noted in diagram):   Chest / Axilla Inspection Performed  RLE Inspected  LLE Inspection Performed              Diagram Legend     Erythematous scaling macule/papule c/w actinic keratosis       Vascular papule c/w angioma      Pigmented verrucoid papule/plaque c/w seborrheic keratosis      Yellow umbilicated papule c/w sebaceous hyperplasia      Irregularly shaped tan macule c/w lentigo     1-2 mm smooth white papules consistent with Milia      Movable subcutaneous cyst with punctum c/w epidermal inclusion cyst      Subcutaneous movable cyst c/w pilar cyst      Firm pink to brown papule c/w dermatofibroma      Pedunculated fleshy papule(s) c/w skin tag(s)      Evenly pigmented macule c/w junctional nevus     Mildly variegated pigmented, slightly irregular-bordered macule c/w mildly atypical nevus      Flesh colored to evenly pigmented papule c/w  "intradermal nevus       Pink pearly papule/plaque c/w basal cell carcinoma      Erythematous hyperkeratotic cursted plaque c/w SCC      Surgical scar with no sign of skin cancer recurrence      Open and closed comedones      Inflammatory papules and pustules      Verrucoid papule consistent consistent with wart     Erythematous eczematous patches and plaques     Dystrophic onycholytic nail with subungual debris c/w onychomycosis     Umbilicated papule    Erythematous-base heme-crusted tan verrucoid plaque consistent with inflamed seborrheic keratosis     Erythematous Silvery Scaling Plaque c/w Psoriasis     See annotation    PATH 7/27/2020  Final Pathologic Diagnosis Skin, right breast, punch biopsy:   -SPONGIOTIC DERMATITIS WITH EOSINOPHILS, see comment   COMMENT:  The clinical images are reviewed in EPIC and clinical differential   diagnosis is noted.  The presence of scattered eosinophils suggests the   possibility of a delayed hypersensitivity reaction.  More importantly, the   biopsy is negative for carcinoma or Paget's disease.    Comment: Interpreted by: Leonard Archer M.D., Signed on 07/30/2020 at 12:43   Gross Patient ID/ Pathology ID:  [id number]   Received in formalin, labeled "right breast," is a 3 mm tan skin punch   biopsy. The specimen is submitted entirely in cassette [id number].   Grossed by: Itzel Phelps    Microscopic Exam Punch biopsy sections show mild epidermal spongiosis.  There is a superficial   perivascular and interstitial lympho histiocytic infiltrate with some   scattered eosinophils.  PAS stain is negative for fungal organisms.  Special   stain was reviewed with adequate positive control.  Negative for malignancy.        Assessment / Plan:        Allergic contact dermatitis, unspecified trigger  - resolved with desonide cream + keto cream. Stop use.  Can restart if rash recurs but discussed side effects and would recommend patch testing if recurrent.  Side effect profile reviewed " for topical steroids including atrophy, telangiectasia and striae development with prolonged usage.  Patient acknowledged understanding.  - discussed potential triggers. Sensitive skin regimen recommended, discussed options and given handout.  - discussed patch testing. Consider if recurrent    RLE dermatitis  - asteatotic dermatitis vs ACD  - resolved with lidex cream. Stop use.  - start Amlactin cream or CeraVe SA. Dry skin precautions recommended.      Seborrheic keratosis  These are benign inherited growths without a malignant potential. Reassurance given to patient. No treatment is necessary.              Follow up if symptoms worsen or fail to improve.

## 2020-09-03 ENCOUNTER — PATIENT MESSAGE (OUTPATIENT)
Dept: FAMILY MEDICINE | Facility: CLINIC | Age: 85
End: 2020-09-03

## 2020-09-09 ENCOUNTER — OFFICE VISIT (OUTPATIENT)
Dept: PAIN MEDICINE | Facility: CLINIC | Age: 85
End: 2020-09-09
Payer: MEDICARE

## 2020-09-09 ENCOUNTER — PATIENT OUTREACH (OUTPATIENT)
Dept: ADMINISTRATIVE | Facility: OTHER | Age: 85
End: 2020-09-09

## 2020-09-09 VITALS
HEART RATE: 64 BPM | BODY MASS INDEX: 29.23 KG/M2 | SYSTOLIC BLOOD PRESSURE: 156 MMHG | DIASTOLIC BLOOD PRESSURE: 75 MMHG | WEIGHT: 165 LBS | HEIGHT: 63 IN | RESPIRATION RATE: 18 BRPM

## 2020-09-09 DIAGNOSIS — M19.011 PRIMARY OSTEOARTHRITIS OF RIGHT SHOULDER: Primary | ICD-10-CM

## 2020-09-09 DIAGNOSIS — Z87.81 HISTORY OF HUMERUS FRACTURE: ICD-10-CM

## 2020-09-09 DIAGNOSIS — M25.511 CHRONIC RIGHT SHOULDER PAIN: ICD-10-CM

## 2020-09-09 DIAGNOSIS — G89.29 CHRONIC RIGHT SHOULDER PAIN: ICD-10-CM

## 2020-09-09 PROCEDURE — 1101F PR PT FALLS ASSESS DOC 0-1 FALLS W/OUT INJ PAST YR: ICD-10-PCS | Mod: HCNC,CPTII,S$GLB, | Performed by: PHYSICIAN ASSISTANT

## 2020-09-09 PROCEDURE — 1101F PT FALLS ASSESS-DOCD LE1/YR: CPT | Mod: HCNC,CPTII,S$GLB, | Performed by: PHYSICIAN ASSISTANT

## 2020-09-09 PROCEDURE — 3078F DIAST BP <80 MM HG: CPT | Mod: HCNC,CPTII,S$GLB, | Performed by: PHYSICIAN ASSISTANT

## 2020-09-09 PROCEDURE — 99999 PR PBB SHADOW E&M-EST. PATIENT-LVL IV: CPT | Mod: PBBFAC,HCNC,, | Performed by: PHYSICIAN ASSISTANT

## 2020-09-09 PROCEDURE — 1159F MED LIST DOCD IN RCRD: CPT | Mod: HCNC,S$GLB,, | Performed by: PHYSICIAN ASSISTANT

## 2020-09-09 PROCEDURE — 99214 OFFICE O/P EST MOD 30 MIN: CPT | Mod: HCNC,S$GLB,, | Performed by: PHYSICIAN ASSISTANT

## 2020-09-09 PROCEDURE — 1125F PR PAIN SEVERITY QUANTIFIED, PAIN PRESENT: ICD-10-PCS | Mod: HCNC,S$GLB,, | Performed by: PHYSICIAN ASSISTANT

## 2020-09-09 PROCEDURE — 3077F PR MOST RECENT SYSTOLIC BLOOD PRESSURE >= 140 MM HG: ICD-10-PCS | Mod: HCNC,CPTII,S$GLB, | Performed by: PHYSICIAN ASSISTANT

## 2020-09-09 PROCEDURE — 99214 PR OFFICE/OUTPT VISIT, EST, LEVL IV, 30-39 MIN: ICD-10-PCS | Mod: HCNC,S$GLB,, | Performed by: PHYSICIAN ASSISTANT

## 2020-09-09 PROCEDURE — 3078F PR MOST RECENT DIASTOLIC BLOOD PRESSURE < 80 MM HG: ICD-10-PCS | Mod: HCNC,CPTII,S$GLB, | Performed by: PHYSICIAN ASSISTANT

## 2020-09-09 PROCEDURE — 1159F PR MEDICATION LIST DOCUMENTED IN MEDICAL RECORD: ICD-10-PCS | Mod: HCNC,S$GLB,, | Performed by: PHYSICIAN ASSISTANT

## 2020-09-09 PROCEDURE — 3077F SYST BP >= 140 MM HG: CPT | Mod: HCNC,CPTII,S$GLB, | Performed by: PHYSICIAN ASSISTANT

## 2020-09-09 PROCEDURE — 1125F AMNT PAIN NOTED PAIN PRSNT: CPT | Mod: HCNC,S$GLB,, | Performed by: PHYSICIAN ASSISTANT

## 2020-09-09 PROCEDURE — 99999 PR PBB SHADOW E&M-EST. PATIENT-LVL IV: ICD-10-PCS | Mod: PBBFAC,HCNC,, | Performed by: PHYSICIAN ASSISTANT

## 2020-09-09 NOTE — PROGRESS NOTES
Health Maintenance Due   Topic Date Due    Shingles Vaccine (2 of 3) 01/01/2010    Influenza Vaccine (1) 08/01/2020     Updates were requested from care everywhere.  Chart was reviewed for overdue Proactive [facility] Encounters ([facility]) topics (CRS, Breast Cancer Screening, Eye exam)  Health Maintenance has been updated.  [place] immunization registry triggered.  Immunizations were reconciled.

## 2020-09-09 NOTE — PROGRESS NOTES
Chief Pain Complaint:    Chief Complaint   Patient presents with    Shoulder Pain     right    Hip Pain     left - improved       Interval History (9/9/2020): Patient was seen on 8/12/20. At that time she underwent left SIJ + left hip joint + right shoulder joint injection.  The patient reports that she is/was better following the procedure.  she reports 75% pain relief with hip pain relief but only 25% relief of shoulder pain.  The changes lasted 4 weeks so far.  The changes have continued through this visit.  Patient reports pain as /10 today.    Interval History (8/5/2020): Patient was seen on 7/8/20. At that time she underwent left L2/3 + L5/S1 TF REJI.  The patient reports that she is/was slightly better following the procedure.  she reports only 50 % pain relief.  The changes lasted 4 weeks so far.  The changes have continued through this visit.  She also reports that her right shoulder has been bothering her.  She has history of right humerus fracture years ago.    Interval History (6/12/2020): She is here today to review MRI. She reports 10/10 pain today. She also has concerns about Prolia as she read that it can cause rashes and joint pain.  She has been having a rash on her right breast for a few weeks.  She will see Dr. Gomez soon to discuss.     Interval History(11/20/18): Patient was seen on 10/24/18. At that time she underwent left SIJ + left GT bursa injection with local.  The patient reports that she is/was better following the procedure.  she reports 100% pain relief.  The changes lasted 4 weeks so far.  The changes have continued through this visit.    History of Present Illness:   Clau Nunn is a 85 y.o. female  who is presenting with a chief complaint of low back pain and hip pain. The patient began experiencing this problem insidiously, and the pain has been gradually worsening over the past 6 month(s). The pain is described as throbbing, cramping, aching and heavy and is located in  the bilateral buttocks. Pain is intermittent and lasts hours. The pain radiates to lateral thigh and groin. The patient rates her pain a 5 out of ten and interferes with activities of daily living a 5 out of ten. Pain is exacerbated by getting up from a seated position, and is improved by rest. Patient reports prior trauma (fall in June 2018 causing right distal radius + right sacrum fracture), prior lumbar surgery in ~2005, right hip replacement, right distal radius fracture requiring ORIF in June 2018.    - pertinent negatives: No fever, No chills, No weight loss, No bladder dysfunction, No bowel dysfunction, No extremity weakness, No saddle anesthesia  - pertinent positives: none    - medications, other therapies tried (physical therapy, injections):     >> Tylenol    >> Has previously undergone Physical Therapy (aquatic and land) with limited relief    >> Has previously undergone spinal injection/s:   - bilateral SIJ injection on 11-9-17 with ~30% relief for 2 weeks   - left hip injection on 12-28-17 with some relief   - bilateral L3-5 MBB with local on 5/11/18 with reported 100% pain relief   - left SIJ + left GT bursa injection with local on 10/24/18 with 100% pain relief   - left L2/3 + L5/S1 TF REJI on 7/8/20 with about 50% pain relief   - left SIJ + left hip joint + right shoulder joint injection on 8/12/20 with 75% pain relief with hip pain relief but only 25% relief of shoulder pain      Imaging / Labs / Studies (reviewed on 9/9/2020):    Results for orders placed during the hospital encounter of 08/05/20   X-ray Shoulder 2 or More Views Right    Narrative COMPARISON:  12/10/2018  FINDINGS:  There is a chronic fracture deformity of the right humeral head and neck.  Associated osseous productive changes approximate the inferior margins of the articular surface of the humeral head.  No definite acute fractures or dislocations visualized.  There are mild degenerative changes present at the AC joint and  glenoid.  Postoperative findings noted in the lower thoracic spine.     Results for orders placed during the hospital encounter of 12/10/18   X-Ray Shoulder Trauma Right    Narrative FINDINGS:  Comminuted fracture involving the surgical neck and right humeral head.  No evidence of dislocation.  Degenerative changes are present at the right AC joint.    Impression Comminuted fracture involving the right humeral head and surgical neck.       Results for orders placed during the hospital encounter of 06/29/20   MRI Lumbar Spine W WO Cont    Narrative COMPARISON:  Prior MRI from June 29, 2020.  FINDINGS:  Again demonstrated are postoperative findings from thoracolumbar spinal fusion and laminectomy at T12.  Chronic compression deformity of T12 with chronic retropulsion that flattens the thecal sac to 14 mm.  This is unchanged.  Mild, grade 1 degenerative spondylolisthesis at L4-L5.  Vertebral body height is normal.  No abnormal enhancement patterns. The conus medullaris terminates at the level of L2-L3, low lying.  No abnormal signal within the conus. Intervertebral disc levels are as follows:  T11-T12 disc: Fusion at this level.  Mild, chronic retropulsion that flattens the thecal sac to 14 mm.  No significant foraminal stenosis.  T12-L1 disc: Prior laminectomy and fusion.  The thecal sac measures 19 mm.  No significant foraminal stenosis.  L1-L2 disc : Posterior fusion.  No significant spinal stenosis or foraminal stenosis.  L2-L3 disc: Disc space height loss.  Broad-based posterior disc bulge which encroaches slightly into the floors of the exit foramina.  Moderate buckling of ligamentum flavum.  The thecal sac measures 8-9 mm AP.  Mild bilateral foraminal stenosis.  This has progressed since the prior MRI.  L3-L4 disc: Broad-based posterior disc bulge that encroaches slightly into the floors of the exit foramina.  Moderate buckling of ligamentum flavum.  The thecal sac measures 10 mm AP.  Mild bilateral neural  foraminal stenosis, right greater than left.  These findings are similar to prior.  L4-L5 disc: Minimal grade 1 spondylolisthesis with severe degenerative facet change and hypertrophy.  Left-sided facet joint effusion.  Is buckling of ligamentum flavum.  The thecal sac measures 11 mm.  No significant foraminal stenosis.  The spondylolisthesis has slightly progressed measuring 4 mm compared to prior 2 mm.  L5-S1 disc: Severe disc space height loss with marginal disc and osteophyte encroach into the exit foramina bilaterally.  Moderate degenerative facet hypertrophy.  The thecal sac measures 14 mm.    Impression 1. 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  2. Progression of mild foraminal stenosis and mild spinal stenosis at L2-L3.  3. Minimal progression of grade 1 spondylolisthesis at L4-L5.  4. Unchanged mild, chronic retropulsion from T12 where there has been a laminectomy and fusion.       7/30/2018 XR LUMBAR SPINE COMPLETE 5 VIEW  TECHNIQUE:  AP, lateral, spot and bilateral oblique views of the lumbar spine were performed.  COMPARISON:  07/25/2018  FINDINGS:  There is grade 1 spondylolisthesis of L4 on L5.  Pedicle screws and fixation rods are noted for at the T10, T11, L1 and L2 levels.  Chronic compression deformity at the L1 level unchanged.  Prominent bilateral facet arthropathy noted at the L4-5 and L5-S1 levels.  IVC filter noted.     7/30/2018 XR HIPS BILATERAL 2 VIEW INCL AP PELVIS  TECHNIQUE:  AP view of the pelvis and frogleg lateral views of both hips were performed.  COMPARISON:  07/26/2018  FINDINGS:  The bony pelvis is intact. A right total hip arthroplasty, plate and wires are in place.  No hardware failure or loosening suggested.  The appearance is unchanged when compared to the prior exam.  Mild degenerative changes noted at the left hip.  No acute fracture or dislocation of the left hip.  No significant joint space narrowing identified.  No plain film evidence to suggest AVN of either hip.      Results for orders placed during the hospital encounter of 01/13/14   MRI Lumbar Spine W WO Contrast    Narrative Findings: Pre- and postcontrast multiplanar multisequence imaging of the thoracic and lumbar spine was performed using 7 cc of Gadavist intravenous contrast material.  There is moderately severe chronic central compression deformity of the T12 vertebral body which is stabilized by paraspinous rods and pedicle screws which extend from T10 through L2.  Postsurgical changes of laminectomy also noted at T12.  The posterior cortex of the T12 vertebral body is displaced posteriorly and indents the ventral thecal sac.  There is no anterior cord contact or significant central canal stenosis.  Degenerative disk desiccation is noted at multiple levels with moderate disk   narrowing at L5-S1.    Also L5-S1 is a broad-based disk protrusion which combined with bilateral facet hypertrophy results in moderately severe narrowing of both neural foramina.  No significant central canal stenosis noted.    From L2-3 through L4-5 there   is mild disk bulging which results in mild bilateral foraminal narrowing.  This is slightly more pronounced at L4-5 with bilateral facet hypertrophy a contributing factor.  No significant central canal stenosis noted.  No thoracic disk extrusion, and foraminal narrowing, canal stenosis is evident.  Thoracolumbar cord is intact.  Paravertebral soft tissues are symmetric and normal in appearance.         Review of Systems:  CONSTITUTIONAL: patient denies any fever, chills, or weight loss  SKIN: patient denies any rash or itching  RESPIRATORY: patient denies having any shortness of breath  GASTROINTESTINAL: patient denies having any diarrhea, constipation, or bowel incontinence  GENITOURINARY: patient denies having any abnormal bladder function    MUSCULOSKELETAL:  - patient complains of the above noted pain/s (see chief pain complaint)    NEUROLOGICAL:   - pain as above  - strength in Lower  "extremities is intact, BILATERALLY  - sensation in Lower extremities is intact, BILATERALLY  - patient denies any loss of bowel or bladder control      PSYCHIATRIC: patient denies any change in mood    Other:  All other systems reviewed and are negative        Physical Exam:  Vitals:    09/09/20 1031   BP: (!) 156/75   Pulse: 64   Resp: 18   Weight: 74.8 kg (165 lb)   Height: 5' 3" (1.6 m)   PainSc:   2   PainLoc: Hip    (reviewed on 9/9/2020)    General: alert and oriented, in no apparent distress.  Gait: normal gait.  Skin: no rashes, no discoloration, no obvious lesions  HEENT: normocephalic, atraumatic. Pupils equal and round.  Cardiovascular: no significant peripheral edema present.  Respiratory: without use of accessory muscles of respiration.    Musculoskeletal - Lumbar Spine:  - Pain on flexion of lumbar spine: Present, worse than with extension  - Pain on extension of lumbar spine: Present  - Lumbar facet loading: Positive bilaterally  - TTP over the lumbar facet joints: Absent  - TTP over the SI joints: Present, mild, improved since procedure   - TTP over GT bursa: Present on left   - Straight Leg Raise: equivocal on left   - PAMELA: Present on left, mild, improved since procedure   - Pain on Internal and external rotation of the hip: Present, mild, improved since procedure     Right Shoulder:  - Pain on abduction: Present   - ROM: decreased secondary to pain, very limited ROM           - abduction beyond 90 degrees (supraspinatous) - unable  - TTP over the AC and GH joint: Present  - Neer's: Positive  - Hawkin's: Positive  - Apley's Scratch test: Positive    Cervical:  - Spurling's: Negative    Neuro - Lower Extremities:  - BLE Strength: R/L: HF: 5/5, HE: 5/5, KF: 5/5; KE: 5/5; FE: 5/5; FF: 5/5  - Extremity Reflexes: Brisk and symmetric throughout  - Sensory: Sensation to light touch intact bilaterally      Psych:  Mood and affect is appropriate              Assessment:  Clau Nunn is a 85 y.o. " year old female who is presenting with     ICD-10-CM ICD-9-CM   1. Primary osteoarthritis of right shoulder  M19.011 715.11   2. History of humerus fracture  Z87.81 V15.51   3. Chronic right shoulder pain  M25.511 719.41    G89.29 338.29       Plan:  1. Interventional:   - S/p left SIJ + left hip joint + right shoulder joint injection on 8/12/20 with 75% pain relief with hip pain relief but only 25% relief of shoulder pain.   - Schedule Right suprascapular nerve block.  Consider RFA.  - Consider Orthopedics referral if no relief after block.    2. Pharmacologic:   - Continue gabapentin 600mg QHS.  - At previous visit, she was given bridge to injection of Tylenol #3 to take 1/2 to 1 tab BID PRN (14 tablets).   - LA  reviewed and appropriate. Listed below.      3. Rehabilitative: We discussed PT previously, but she is not interested. She reports it made her pain worse in the past.  Encourage regular exercise.     4. Diagnostic: Sign records release.  Request imaging, office visit notes, and procedure notes from [facility] regarding right shoulder fracture a couple years ago.    5. Follow up: 4 weeks post-injection - will send online ticket scheduling on [employer] - follow-up appointment made in clinic prior to leaving today    - I discussed the risks, benefits, and alternatives to potential treatment options. All questions and concerns were fully addressed today in clinic.

## 2020-09-09 NOTE — H&P (VIEW-ONLY)
Chief Pain Complaint:    Chief Complaint   Patient presents with    Shoulder Pain     right    Hip Pain     left - improved       Interval History (9/9/2020): Patient was seen on 8/12/20. At that time she underwent left SIJ + left hip joint + right shoulder joint injection.  The patient reports that she is/was better following the procedure.  she reports 75% pain relief with hip pain relief but only 25% relief of shoulder pain.  The changes lasted 4 weeks so far.  The changes have continued through this visit.  Patient reports pain as /10 today.    Interval History (8/5/2020): Patient was seen on 7/8/20. At that time she underwent left L2/3 + L5/S1 TF REJI.  The patient reports that she is/was slightly better following the procedure.  she reports only 50 % pain relief.  The changes lasted 4 weeks so far.  The changes have continued through this visit.  She also reports that her right shoulder has been bothering her.  She has history of right humerus fracture years ago.    Interval History (6/12/2020): She is here today to review MRI. She reports 10/10 pain today. She also has concerns about Prolia as she read that it can cause rashes and joint pain.  She has been having a rash on her right breast for a few weeks.  She will see Dr. Gomez soon to discuss.     Interval History(11/20/18): Patient was seen on 10/24/18. At that time she underwent left SIJ + left GT bursa injection with local.  The patient reports that she is/was better following the procedure.  she reports 100% pain relief.  The changes lasted 4 weeks so far.  The changes have continued through this visit.    History of Present Illness:   Clau Nunn is a 85 y.o. female  who is presenting with a chief complaint of low back pain and hip pain. The patient began experiencing this problem insidiously, and the pain has been gradually worsening over the past 6 month(s). The pain is described as throbbing, cramping, aching and heavy and is located in  the bilateral buttocks. Pain is intermittent and lasts hours. The pain radiates to lateral thigh and groin. The patient rates her pain a 5 out of ten and interferes with activities of daily living a 5 out of ten. Pain is exacerbated by getting up from a seated position, and is improved by rest. Patient reports prior trauma (fall in June 2018 causing right distal radius + right sacrum fracture), prior lumbar surgery in ~2005, right hip replacement, right distal radius fracture requiring ORIF in June 2018.    - pertinent negatives: No fever, No chills, No weight loss, No bladder dysfunction, No bowel dysfunction, No extremity weakness, No saddle anesthesia  - pertinent positives: none    - medications, other therapies tried (physical therapy, injections):     >> Tylenol    >> Has previously undergone Physical Therapy (aquatic and land) with limited relief    >> Has previously undergone spinal injection/s:   - bilateral SIJ injection on 11-9-17 with ~30% relief for 2 weeks   - left hip injection on 12-28-17 with some relief   - bilateral L3-5 MBB with local on 5/11/18 with reported 100% pain relief   - left SIJ + left GT bursa injection with local on 10/24/18 with 100% pain relief   - left L2/3 + L5/S1 TF REJI on 7/8/20 with about 50% pain relief   - left SIJ + left hip joint + right shoulder joint injection on 8/12/20 with 75% pain relief with hip pain relief but only 25% relief of shoulder pain      Imaging / Labs / Studies (reviewed on 9/9/2020):    Results for orders placed during the hospital encounter of 08/05/20   X-ray Shoulder 2 or More Views Right    Narrative COMPARISON:  12/10/2018  FINDINGS:  There is a chronic fracture deformity of the right humeral head and neck.  Associated osseous productive changes approximate the inferior margins of the articular surface of the humeral head.  No definite acute fractures or dislocations visualized.  There are mild degenerative changes present at the AC joint and  glenoid.  Postoperative findings noted in the lower thoracic spine.     Results for orders placed during the hospital encounter of 12/10/18   X-Ray Shoulder Trauma Right    Narrative FINDINGS:  Comminuted fracture involving the surgical neck and right humeral head.  No evidence of dislocation.  Degenerative changes are present at the right AC joint.    Impression Comminuted fracture involving the right humeral head and surgical neck.       Results for orders placed during the hospital encounter of 06/29/20   MRI Lumbar Spine W WO Cont    Narrative COMPARISON:  Prior MRI from June 29, 2020.  FINDINGS:  Again demonstrated are postoperative findings from thoracolumbar spinal fusion and laminectomy at T12.  Chronic compression deformity of T12 with chronic retropulsion that flattens the thecal sac to 14 mm.  This is unchanged.  Mild, grade 1 degenerative spondylolisthesis at L4-L5.  Vertebral body height is normal.  No abnormal enhancement patterns. The conus medullaris terminates at the level of L2-L3, low lying.  No abnormal signal within the conus. Intervertebral disc levels are as follows:  T11-T12 disc: Fusion at this level.  Mild, chronic retropulsion that flattens the thecal sac to 14 mm.  No significant foraminal stenosis.  T12-L1 disc: Prior laminectomy and fusion.  The thecal sac measures 19 mm.  No significant foraminal stenosis.  L1-L2 disc : Posterior fusion.  No significant spinal stenosis or foraminal stenosis.  L2-L3 disc: Disc space height loss.  Broad-based posterior disc bulge which encroaches slightly into the floors of the exit foramina.  Moderate buckling of ligamentum flavum.  The thecal sac measures 8-9 mm AP.  Mild bilateral foraminal stenosis.  This has progressed since the prior MRI.  L3-L4 disc: Broad-based posterior disc bulge that encroaches slightly into the floors of the exit foramina.  Moderate buckling of ligamentum flavum.  The thecal sac measures 10 mm AP.  Mild bilateral neural  foraminal stenosis, right greater than left.  These findings are similar to prior.  L4-L5 disc: Minimal grade 1 spondylolisthesis with severe degenerative facet change and hypertrophy.  Left-sided facet joint effusion.  Is buckling of ligamentum flavum.  The thecal sac measures 11 mm.  No significant foraminal stenosis.  The spondylolisthesis has slightly progressed measuring 4 mm compared to prior 2 mm.  L5-S1 disc: Severe disc space height loss with marginal disc and osteophyte encroach into the exit foramina bilaterally.  Moderate degenerative facet hypertrophy.  The thecal sac measures 14 mm.    Impression 1. 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  2. Progression of mild foraminal stenosis and mild spinal stenosis at L2-L3.  3. Minimal progression of grade 1 spondylolisthesis at L4-L5.  4. Unchanged mild, chronic retropulsion from T12 where there has been a laminectomy and fusion.       7/30/2018 XR LUMBAR SPINE COMPLETE 5 VIEW  TECHNIQUE:  AP, lateral, spot and bilateral oblique views of the lumbar spine were performed.  COMPARISON:  07/25/2018  FINDINGS:  There is grade 1 spondylolisthesis of L4 on L5.  Pedicle screws and fixation rods are noted for at the T10, T11, L1 and L2 levels.  Chronic compression deformity at the L1 level unchanged.  Prominent bilateral facet arthropathy noted at the L4-5 and L5-S1 levels.  IVC filter noted.     7/30/2018 XR HIPS BILATERAL 2 VIEW INCL AP PELVIS  TECHNIQUE:  AP view of the pelvis and frogleg lateral views of both hips were performed.  COMPARISON:  07/26/2018  FINDINGS:  The bony pelvis is intact. A right total hip arthroplasty, plate and wires are in place.  No hardware failure or loosening suggested.  The appearance is unchanged when compared to the prior exam.  Mild degenerative changes noted at the left hip.  No acute fracture or dislocation of the left hip.  No significant joint space narrowing identified.  No plain film evidence to suggest AVN of either hip.      Results for orders placed during the hospital encounter of 01/13/14   MRI Lumbar Spine W WO Contrast    Narrative Findings: Pre- and postcontrast multiplanar multisequence imaging of the thoracic and lumbar spine was performed using 7 cc of Gadavist intravenous contrast material.  There is moderately severe chronic central compression deformity of the T12 vertebral body which is stabilized by paraspinous rods and pedicle screws which extend from T10 through L2.  Postsurgical changes of laminectomy also noted at T12.  The posterior cortex of the T12 vertebral body is displaced posteriorly and indents the ventral thecal sac.  There is no anterior cord contact or significant central canal stenosis.  Degenerative disk desiccation is noted at multiple levels with moderate disk   narrowing at L5-S1.    Also L5-S1 is a broad-based disk protrusion which combined with bilateral facet hypertrophy results in moderately severe narrowing of both neural foramina.  No significant central canal stenosis noted.    From L2-3 through L4-5 there   is mild disk bulging which results in mild bilateral foraminal narrowing.  This is slightly more pronounced at L4-5 with bilateral facet hypertrophy a contributing factor.  No significant central canal stenosis noted.  No thoracic disk extrusion, and foraminal narrowing, canal stenosis is evident.  Thoracolumbar cord is intact.  Paravertebral soft tissues are symmetric and normal in appearance.         Review of Systems:  CONSTITUTIONAL: patient denies any fever, chills, or weight loss  SKIN: patient denies any rash or itching  RESPIRATORY: patient denies having any shortness of breath  GASTROINTESTINAL: patient denies having any diarrhea, constipation, or bowel incontinence  GENITOURINARY: patient denies having any abnormal bladder function    MUSCULOSKELETAL:  - patient complains of the above noted pain/s (see chief pain complaint)    NEUROLOGICAL:   - pain as above  - strength in Lower  "extremities is intact, BILATERALLY  - sensation in Lower extremities is intact, BILATERALLY  - patient denies any loss of bowel or bladder control      PSYCHIATRIC: patient denies any change in mood    Other:  All other systems reviewed and are negative        Physical Exam:  Vitals:    09/09/20 1031   BP: (!) 156/75   Pulse: 64   Resp: 18   Weight: 74.8 kg (165 lb)   Height: 5' 3" (1.6 m)   PainSc:   2   PainLoc: Hip    (reviewed on 9/9/2020)    General: alert and oriented, in no apparent distress.  Gait: normal gait.  Skin: no rashes, no discoloration, no obvious lesions  HEENT: normocephalic, atraumatic. Pupils equal and round.  Cardiovascular: no significant peripheral edema present.  Respiratory: without use of accessory muscles of respiration.    Musculoskeletal - Lumbar Spine:  - Pain on flexion of lumbar spine: Present, worse than with extension  - Pain on extension of lumbar spine: Present  - Lumbar facet loading: Positive bilaterally  - TTP over the lumbar facet joints: Absent  - TTP over the SI joints: Present, mild, improved since procedure   - TTP over GT bursa: Present on left   - Straight Leg Raise: equivocal on left   - PAMELA: Present on left, mild, improved since procedure   - Pain on Internal and external rotation of the hip: Present, mild, improved since procedure     Right Shoulder:  - Pain on abduction: Present   - ROM: decreased secondary to pain, very limited ROM           - abduction beyond 90 degrees (supraspinatous) - unable  - TTP over the AC and GH joint: Present  - Neer's: Positive  - Hawkin's: Positive  - Apley's Scratch test: Positive    Cervical:  - Spurling's: Negative    Neuro - Lower Extremities:  - BLE Strength: R/L: HF: 5/5, HE: 5/5, KF: 5/5; KE: 5/5; FE: 5/5; FF: 5/5  - Extremity Reflexes: Brisk and symmetric throughout  - Sensory: Sensation to light touch intact bilaterally      Psych:  Mood and affect is appropriate              Assessment:  Clau Nunn is a 85 y.o. " year old female who is presenting with     ICD-10-CM ICD-9-CM   1. Primary osteoarthritis of right shoulder  M19.011 715.11   2. History of humerus fracture  Z87.81 V15.51   3. Chronic right shoulder pain  M25.511 719.41    G89.29 338.29       Plan:  1. Interventional:   - S/p left SIJ + left hip joint + right shoulder joint injection on 8/12/20 with 75% pain relief with hip pain relief but only 25% relief of shoulder pain.   - Schedule Right suprascapular nerve block.  Consider RFA.  - Consider Orthopedics referral if no relief after block.    2. Pharmacologic:   - Continue gabapentin 600mg QHS.  - At previous visit, she was given bridge to injection of Tylenol #3 to take 1/2 to 1 tab BID PRN (14 tablets).   - LA  reviewed and appropriate. Listed below.      3. Rehabilitative: We discussed PT previously, but she is not interested. She reports it made her pain worse in the past.  Encourage regular exercise.     4. Diagnostic: Sign records release.  Request imaging, office visit notes, and procedure notes from [facility] regarding right shoulder fracture a couple years ago.    5. Follow up: 4 weeks post-injection - will send online ticket scheduling on [employer] - follow-up appointment made in clinic prior to leaving today    - I discussed the risks, benefits, and alternatives to potential treatment options. All questions and concerns were fully addressed today in clinic.

## 2020-09-11 NOTE — PRE-PROCEDURE INSTRUCTIONS
Attempted to PAT patient for procedure on 9/16 with Dr. valdez . No answer. M with return phone number. No return call at this time.

## 2020-09-11 NOTE — PRE-PROCEDURE INSTRUCTIONS
Spoke with patient regarding procedure scheduled on 9/16    Arrival time 0950    Has patient been sick with fever or on antibiotics within the last 7 days? No    Has patient received a vaccination within the last 7 days? no    Has the patient stopped all medications as directed? Na    Does patient have a pacemaker and or defibrillator? no    Does the patient have a ride to and from procedure and someone reliable to remain with patient? daanette Mariee     Is the patient diabetic? no    Does the patient have sleep apnea? Or use O2 at home? No and no     Is the patient receiving sedation? yes    Is the patient instructed to remain NPO beginning at midnight the night before their procedure? yes    Procedure location confirmed with patient? Yes    Covid- Denies signs/symptoms. Instructed to notify PAT/MD if any changes.

## 2020-09-16 ENCOUNTER — HOSPITAL ENCOUNTER (OUTPATIENT)
Facility: HOSPITAL | Age: 85
Discharge: HOME | End: 2020-09-16
Attending: ANESTHESIOLOGY | Admitting: ANESTHESIOLOGY
Payer: MEDICARE

## 2020-09-16 VITALS
RESPIRATION RATE: 16 BRPM | DIASTOLIC BLOOD PRESSURE: 84 MMHG | BODY MASS INDEX: 29.38 KG/M2 | WEIGHT: 165.81 LBS | OXYGEN SATURATION: 99 % | SYSTOLIC BLOOD PRESSURE: 178 MMHG | HEIGHT: 63 IN | TEMPERATURE: 98 F | HEART RATE: 60 BPM

## 2020-09-16 DIAGNOSIS — M25.519 SHOULDER PAIN: ICD-10-CM

## 2020-09-16 PROCEDURE — 64417 NJX AA&/STRD AX NERVE IMG: CPT | Mod: HCNC,RT,, | Performed by: ANESTHESIOLOGY

## 2020-09-16 PROCEDURE — 64417 PR NERVE BLOCK INJ, ANES/STEROID, AXILLARY, INCL IMAG GUIDANCE: ICD-10-PCS | Mod: HCNC,RT,, | Performed by: ANESTHESIOLOGY

## 2020-09-16 PROCEDURE — 64418 PR NERVE BLOCK INJ, ANES/STEROID, SUPRASCAPULAR: ICD-10-PCS | Mod: 59,HCNC,RT, | Performed by: ANESTHESIOLOGY

## 2020-09-16 PROCEDURE — 64418 NJX AA&/STRD SPRSCAP NRV: CPT | Mod: 59,HCNC,RT, | Performed by: ANESTHESIOLOGY

## 2020-09-16 PROCEDURE — 64418 NJX AA&/STRD SPRSCAP NRV: CPT | Mod: HCNC | Performed by: ANESTHESIOLOGY

## 2020-09-16 PROCEDURE — 63600175 PHARM REV CODE 636 W HCPCS: Mod: HCNC | Performed by: ANESTHESIOLOGY

## 2020-09-16 PROCEDURE — 64417 NJX AA&/STRD AX NERVE IMG: CPT | Mod: HCNC | Performed by: ANESTHESIOLOGY

## 2020-09-16 PROCEDURE — 25000003 PHARM REV CODE 250: Mod: HCNC | Performed by: ANESTHESIOLOGY

## 2020-09-16 RX ORDER — DEXAMETHASONE SODIUM PHOSPHATE 100 MG/10ML
INJECTION INTRAMUSCULAR; INTRAVENOUS
Status: DISCONTINUED | OUTPATIENT
Start: 2020-09-16 | End: 2020-09-16 | Stop reason: HOSPADM

## 2020-09-16 RX ORDER — LIDOCAINE HYDROCHLORIDE 20 MG/ML
INJECTION, SOLUTION EPIDURAL; INFILTRATION; INTRACAUDAL; PERINEURAL
Status: DISCONTINUED | OUTPATIENT
Start: 2020-09-16 | End: 2020-09-16 | Stop reason: HOSPADM

## 2020-09-16 NOTE — DISCHARGE INSTRUCTIONS

## 2020-09-16 NOTE — DISCHARGE SUMMARY
[facility]  Discharge Note       Description of Procedure: Right Suprascapular articular branch from the Suprascapular Nerve and Axillary articular branch from the Axillary Nerve under fluoroscopic guidance    Procedure Date: 9/16/2020    Admit Date: 9/16/2020  Discharge Date: 9/16/2020     Attending Physician: Raffi Wells   Discharge Provider: Raffi Wells    Preoperative Diagnosis: Right Shoulder pain    Postoperative Diagnosis: as above, same as preoperative diagnosis    Discharged Condition: Stable    Hospital Course: Patient was admitted for an outpatient procedure. The procedure was tolerated well with no complications.    Final Diagnoses: Same as principal problem.    Disposition: Home, self-care.    Follow up/Patient Instructions:  Follow-up in clinic in 2-3 weeks.    Medications: No medications were prescribed today. The patient was advised to resume normal medication regimen without change.  Specific information was provided regarding restarting any anticoagulant/s.    Discharge Procedure Orders (must include Diet, Follow-up, Activity):  Light activity for the remainder of the day, resume normal activity tomorrow. Resume normal diet. Follow-up in clinic in 2-3 weeks.

## 2020-09-16 NOTE — OP NOTE
PROCEDURE:  Suprascapular articular branch from the Suprascapular Nerve and Axillary articular branch from the Axillary Nerve under fluoroscopic guidance     SIDE: Right      PROCEDURE DATE: 9/16/2020     PREOPERATIVE DIAGNOSIS: Right Shoulder Pain   POSTOPERATIVE DIAGNOSIS: Right Shoulder Pain      PROVIDER: Raffi Wells MD  Assistant(s): None     ANESTHESIA: Local    >> 0 mg of VERSED    >> 0 mcg of FENTANYL      INDICATION: The patient has knee pain unresponsive to conservative treatments. Fluoroscopy was used to optimize visualization of needle placement and to maximize safety.         PROCEDURE DESCRIPTION / TECHNIQUE:   The patient was seen and identified in the preoperative area. Risks, benefits, complications, and alternatives were discussed with the patient. The patient agreed to proceed with the procedure and signed the consent. The site and side of the procedure was identified and marked. An iv was started.     TECHNIQUE:   The patient was brought to the procedure room and placed on the exam table in a comfortable supine position for the posterior nerve blocks of the Suprascapular and Axillary nerve branches. The target site for needle placement was obtained by manual palpation with radiographic confirmation. The sterile field was prepared using chloroprep and sterile drapes. Local anesthesia superficial and deep was provided by local infiltration.      A  25 gauge 2.5 inch spinal needles was placed overlying the shoulder joint. For the suprascapular target, the needle was placed inferior to the spinoglenoid notch on the most lateral border of the glenoid fossa rim . For the axillary target the introducer was advanced to the inferior most lateral border of the greater tubercle. All imaging was done using fluoroscopy.  After negative aspiration, 1.5 to 2 mL of an intectate solution comprised of 2 mL of 1% PF Lidocaine and 2 mL of Dexamethasone (10 mg/mL) was injected at each targeted site. After injection,  the stylets were replaced and all needles were removed intact.       Description of Findings: Not applicable     Prosthetic devices, grafts, tissues, or devices implanted: None     Specimen Removed: No     Estimated Blood Loss: minimal     COMPLICATIONS: None     DISPOSITION / PLANS: The patient was transferred to the recovery area in a stable condition for observation. The patient was reexamined prior to discharge. There was no evidence of acute neurologic injury following the procedure.  Patient was discharged from the recovery room after meeting discharge criteria. Home discharge instructions were given to the patient by the staff.

## 2020-09-25 ENCOUNTER — PATIENT MESSAGE (OUTPATIENT)
Dept: PAIN MEDICINE | Facility: CLINIC | Age: 85
End: 2020-09-25

## 2020-09-25 ENCOUNTER — TELEPHONE (OUTPATIENT)
Dept: PAIN MEDICINE | Facility: CLINIC | Age: 85
End: 2020-09-25

## 2020-09-25 DIAGNOSIS — M47.816 LUMBAR SPONDYLOSIS: ICD-10-CM

## 2020-09-25 DIAGNOSIS — M47.818 ARTHROPATHY OF LEFT SACROILIAC JOINT: ICD-10-CM

## 2020-09-25 DIAGNOSIS — M25.519 SHOULDER PAIN, UNSPECIFIED CHRONICITY, UNSPECIFIED LATERALITY: Primary | ICD-10-CM

## 2020-09-25 NOTE — TELEPHONE ENCOUNTER
----- Message from Melonie Amaya MA sent at 9/25/2020 12:38 PM CDT -----  Spoke with pt she would like a physical therapy order for arm pain sent to [facility] [address]

## 2020-10-01 ENCOUNTER — PATIENT MESSAGE (OUTPATIENT)
Dept: PAIN MEDICINE | Facility: CLINIC | Age: 85
End: 2020-10-01

## 2020-10-16 ENCOUNTER — IMMUNIZATION (OUTPATIENT)
Dept: PHARMACY | Facility: CLINIC | Age: 85
End: 2020-10-16
Payer: MEDICARE

## 2020-10-16 ENCOUNTER — OFFICE VISIT (OUTPATIENT)
Dept: PAIN MEDICINE | Facility: CLINIC | Age: 85
End: 2020-10-16
Payer: MEDICARE

## 2020-10-16 VITALS
WEIGHT: 165.81 LBS | DIASTOLIC BLOOD PRESSURE: 77 MMHG | BODY MASS INDEX: 29.38 KG/M2 | SYSTOLIC BLOOD PRESSURE: 136 MMHG | HEIGHT: 63 IN

## 2020-10-16 DIAGNOSIS — Z87.81 HISTORY OF HUMERUS FRACTURE: ICD-10-CM

## 2020-10-16 DIAGNOSIS — G89.29 CHRONIC RIGHT SHOULDER PAIN: Primary | ICD-10-CM

## 2020-10-16 DIAGNOSIS — M25.511 CHRONIC RIGHT SHOULDER PAIN: Primary | ICD-10-CM

## 2020-10-16 DIAGNOSIS — M19.011 PRIMARY OSTEOARTHRITIS OF RIGHT SHOULDER: ICD-10-CM

## 2020-10-16 PROCEDURE — 99214 PR OFFICE/OUTPT VISIT, EST, LEVL IV, 30-39 MIN: ICD-10-PCS | Mod: HCNC,S$GLB,, | Performed by: PHYSICIAN ASSISTANT

## 2020-10-16 PROCEDURE — 3075F SYST BP GE 130 - 139MM HG: CPT | Mod: HCNC,CPTII,S$GLB, | Performed by: PHYSICIAN ASSISTANT

## 2020-10-16 PROCEDURE — 1125F PR PAIN SEVERITY QUANTIFIED, PAIN PRESENT: ICD-10-PCS | Mod: HCNC,S$GLB,, | Performed by: PHYSICIAN ASSISTANT

## 2020-10-16 PROCEDURE — 99999 PR PBB SHADOW E&M-EST. PATIENT-LVL IV: CPT | Mod: PBBFAC,HCNC,, | Performed by: PHYSICIAN ASSISTANT

## 2020-10-16 PROCEDURE — 1101F PT FALLS ASSESS-DOCD LE1/YR: CPT | Mod: HCNC,CPTII,S$GLB, | Performed by: PHYSICIAN ASSISTANT

## 2020-10-16 PROCEDURE — 3078F PR MOST RECENT DIASTOLIC BLOOD PRESSURE < 80 MM HG: ICD-10-PCS | Mod: HCNC,CPTII,S$GLB, | Performed by: PHYSICIAN ASSISTANT

## 2020-10-16 PROCEDURE — 99999 PR PBB SHADOW E&M-EST. PATIENT-LVL IV: ICD-10-PCS | Mod: PBBFAC,HCNC,, | Performed by: PHYSICIAN ASSISTANT

## 2020-10-16 PROCEDURE — 3075F PR MOST RECENT SYSTOLIC BLOOD PRESS GE 130-139MM HG: ICD-10-PCS | Mod: HCNC,CPTII,S$GLB, | Performed by: PHYSICIAN ASSISTANT

## 2020-10-16 PROCEDURE — 1159F MED LIST DOCD IN RCRD: CPT | Mod: HCNC,S$GLB,, | Performed by: PHYSICIAN ASSISTANT

## 2020-10-16 PROCEDURE — 1101F PR PT FALLS ASSESS DOC 0-1 FALLS W/OUT INJ PAST YR: ICD-10-PCS | Mod: HCNC,CPTII,S$GLB, | Performed by: PHYSICIAN ASSISTANT

## 2020-10-16 PROCEDURE — 3078F DIAST BP <80 MM HG: CPT | Mod: HCNC,CPTII,S$GLB, | Performed by: PHYSICIAN ASSISTANT

## 2020-10-16 PROCEDURE — 1125F AMNT PAIN NOTED PAIN PRSNT: CPT | Mod: HCNC,S$GLB,, | Performed by: PHYSICIAN ASSISTANT

## 2020-10-16 PROCEDURE — 1159F PR MEDICATION LIST DOCUMENTED IN MEDICAL RECORD: ICD-10-PCS | Mod: HCNC,S$GLB,, | Performed by: PHYSICIAN ASSISTANT

## 2020-10-16 PROCEDURE — 99214 OFFICE O/P EST MOD 30 MIN: CPT | Mod: HCNC,S$GLB,, | Performed by: PHYSICIAN ASSISTANT

## 2020-10-16 RX ORDER — LEVOTHYROXINE SODIUM 100 UG/1
1 TABLET ORAL DAILY
COMMUNITY
Start: 2020-09-28 | End: 2021-07-06 | Stop reason: SDUPTHER

## 2020-10-19 NOTE — PROGRESS NOTES
Chief Pain Complaint:    Chief Complaint   Patient presents with    Shoulder Pain     right    Hip Pain     left - improved     Interval History (10/19/2020): Patient was seen on 9/16/20. At that time she underwent  Right suprascapular nerve block.  The patient reports that she is/was unchanged following the procedure.  she reports no pain relief.  Patient reports pain as 6/10 today - only when she moves her arm.    Interval History (9/9/2020): Patient was seen on 8/12/20. At that time she underwent left SIJ + left hip joint + right shoulder joint injection.  The patient reports that she is/was better following the procedure.  she reports 75% pain relief with hip pain relief but only 25% relief of shoulder pain.  The changes lasted 4 weeks so far.  The changes have continued through this visit.  Patient reports pain as /10 today.    Interval History (8/5/2020): Patient was seen on 7/8/20. At that time she underwent left L2/3 + L5/S1 TF REJI.  The patient reports that she is/was slightly better following the procedure.  she reports only 50 % pain relief.  The changes lasted 4 weeks so far.  The changes have continued through this visit.  She also reports that her right shoulder has been bothering her.  She has history of right humerus fracture years ago.    Interval History (6/12/2020): She is here today to review MRI. She reports 10/10 pain today. She also has concerns about Prolia as she read that it can cause rashes and joint pain.  She has been having a rash on her right breast for a few weeks.  She will see Dr. Gomez soon to discuss.     Interval History(11/20/18): Patient was seen on 10/24/18. At that time she underwent left SIJ + left GT bursa injection with local.  The patient reports that she is/was better following the procedure.  she reports 100% pain relief.  The changes lasted 4 weeks so far.  The changes have continued through this visit.    History of Present Illness:   Clau Nunn is a 85  y.o. female  who is presenting with a chief complaint of low back pain and hip pain. The patient began experiencing this problem insidiously, and the pain has been gradually worsening over the past 6 month(s). The pain is described as throbbing, cramping, aching and heavy and is located in the bilateral buttocks. Pain is intermittent and lasts hours. The pain radiates to lateral thigh and groin. The patient rates her pain a 5 out of ten and interferes with activities of daily living a 5 out of ten. Pain is exacerbated by getting up from a seated position, and is improved by rest. Patient reports prior trauma (fall in June 2018 causing right distal radius + right sacrum fracture), prior lumbar surgery in ~2005, right hip replacement, right distal radius fracture requiring ORIF in June 2018.    - pertinent negatives: No fever, No chills, No weight loss, No bladder dysfunction, No bowel dysfunction, No extremity weakness, No saddle anesthesia  - pertinent positives: none    - medications, other therapies tried (physical therapy, injections):     >> Tylenol    >> Has previously undergone Physical Therapy (aquatic and land) with limited relief    >> Has previously undergone spinal injection/s:   - bilateral SIJ injection on 11-9-17 with ~30% relief for 2 weeks   - left hip injection on 12-28-17 with some relief   - bilateral L3-5 MBB with local on 5/11/18 with reported 100% pain relief   - left SIJ + left GT bursa injection with local on 10/24/18 with 100% pain relief   - left L2/3 + L5/S1 TF REJI on 7/8/20 with about 50% pain relief   - left SIJ + left hip joint + right shoulder joint injection on 8/12/20 with 75% pain relief with hip pain relief but only 25% relief of shoulder pain   - Right suprascapular nerve block on 9/16/20 with limited pain relief     Imaging / Labs / Studies (reviewed on 10/19/2020):    Results for orders placed during the hospital encounter of 08/05/20   X-ray Shoulder 2 or More Views Right     Narrative COMPARISON:  12/10/2018  FINDINGS:  There is a chronic fracture deformity of the right humeral head and neck.  Associated osseous productive changes approximate the inferior margins of the articular surface of the humeral head.  No definite acute fractures or dislocations visualized.  There are mild degenerative changes present at the AC joint and glenoid.  Postoperative findings noted in the lower thoracic spine.     Results for orders placed during the hospital encounter of 12/10/18   X-Ray Shoulder Trauma Right    Narrative FINDINGS:  Comminuted fracture involving the surgical neck and right humeral head.  No evidence of dislocation.  Degenerative changes are present at the right AC joint.    Impression Comminuted fracture involving the right humeral head and surgical neck.       Results for orders placed during the hospital encounter of 06/29/20   MRI Lumbar Spine W WO Cont    Narrative COMPARISON:  Prior MRI from June 29, 2020.  FINDINGS:  Again demonstrated are postoperative findings from thoracolumbar spinal fusion and laminectomy at T12.  Chronic compression deformity of T12 with chronic retropulsion that flattens the thecal sac to 14 mm.  This is unchanged.  Mild, grade 1 degenerative spondylolisthesis at L4-L5.  Vertebral body height is normal.  No abnormal enhancement patterns. The conus medullaris terminates at the level of L2-L3, low lying.  No abnormal signal within the conus. Intervertebral disc levels are as follows:  T11-T12 disc: Fusion at this level.  Mild, chronic retropulsion that flattens the thecal sac to 14 mm.  No significant foraminal stenosis.  T12-L1 disc: Prior laminectomy and fusion.  The thecal sac measures 19 mm.  No significant foraminal stenosis.  L1-L2 disc : Posterior fusion.  No significant spinal stenosis or foraminal stenosis.  L2-L3 disc: Disc space height loss.  Broad-based posterior disc bulge which encroaches slightly into the floors of the exit foramina.   Moderate buckling of ligamentum flavum.  The thecal sac measures 8-9 mm AP.  Mild bilateral foraminal stenosis.  This has progressed since the prior MRI.  L3-L4 disc: Broad-based posterior disc bulge that encroaches slightly into the floors of the exit foramina.  Moderate buckling of ligamentum flavum.  The thecal sac measures 10 mm AP.  Mild bilateral neural foraminal stenosis, right greater than left.  These findings are similar to prior.  L4-L5 disc: Minimal grade 1 spondylolisthesis with severe degenerative facet change and hypertrophy.  Left-sided facet joint effusion.  Is buckling of ligamentum flavum.  The thecal sac measures 11 mm.  No significant foraminal stenosis.  The spondylolisthesis has slightly progressed measuring 4 mm compared to prior 2 mm.  L5-S1 disc: Severe disc space height loss with marginal disc and osteophyte encroach into the exit foramina bilaterally.  Moderate degenerative facet hypertrophy.  The thecal sac measures 14 mm.    Impression 1. 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  2. Progression of mild foraminal stenosis and mild spinal stenosis at L2-L3.  3. Minimal progression of grade 1 spondylolisthesis at L4-L5.  4. Unchanged mild, chronic retropulsion from T12 where there has been a laminectomy and fusion.       7/30/2018 XR LUMBAR SPINE COMPLETE 5 VIEW  TECHNIQUE:  AP, lateral, spot and bilateral oblique views of the lumbar spine were performed.  COMPARISON:  07/25/2018  FINDINGS:  There is grade 1 spondylolisthesis of L4 on L5.  Pedicle screws and fixation rods are noted for at the T10, T11, L1 and L2 levels.  Chronic compression deformity at the L1 level unchanged.  Prominent bilateral facet arthropathy noted at the L4-5 and L5-S1 levels.  IVC filter noted.     7/30/2018 XR HIPS BILATERAL 2 VIEW INCL AP PELVIS  TECHNIQUE:  AP view of the pelvis and frogleg lateral views of both hips were performed.  COMPARISON:  07/26/2018  FINDINGS:  The bony pelvis is intact. A right total hip  arthroplasty, plate and wires are in place.  No hardware failure or loosening suggested.  The appearance is unchanged when compared to the prior exam.  Mild degenerative changes noted at the left hip.  No acute fracture or dislocation of the left hip.  No significant joint space narrowing identified.  No plain film evidence to suggest AVN of either hip.     Results for orders placed during the hospital encounter of 01/13/14   MRI Lumbar Spine W WO Contrast    Narrative Findings: Pre- and postcontrast multiplanar multisequence imaging of the thoracic and lumbar spine was performed using 7 cc of Gadavist intravenous contrast material.  There is moderately severe chronic central compression deformity of the T12 vertebral body which is stabilized by paraspinous rods and pedicle screws which extend from T10 through L2.  Postsurgical changes of laminectomy also noted at T12.  The posterior cortex of the T12 vertebral body is displaced posteriorly and indents the ventral thecal sac.  There is no anterior cord contact or significant central canal stenosis.  Degenerative disk desiccation is noted at multiple levels with moderate disk   narrowing at L5-S1.    Also L5-S1 is a broad-based disk protrusion which combined with bilateral facet hypertrophy results in moderately severe narrowing of both neural foramina.  No significant central canal stenosis noted.    From L2-3 through L4-5 there   is mild disk bulging which results in mild bilateral foraminal narrowing.  This is slightly more pronounced at L4-5 with bilateral facet hypertrophy a contributing factor.  No significant central canal stenosis noted.  No thoracic disk extrusion, and foraminal narrowing, canal stenosis is evident.  Thoracolumbar cord is intact.  Paravertebral soft tissues are symmetric and normal in appearance.         Review of Systems:  CONSTITUTIONAL: patient denies any fever, chills, or weight loss  SKIN: patient denies any rash or  "itching  RESPIRATORY: patient denies having any shortness of breath  GASTROINTESTINAL: patient denies having any diarrhea, constipation, or bowel incontinence  GENITOURINARY: patient denies having any abnormal bladder function    MUSCULOSKELETAL:  - patient complains of the above noted pain/s (see chief pain complaint)    NEUROLOGICAL:   - pain as above  - strength in Lower extremities is intact, BILATERALLY  - sensation in Lower extremities is intact, BILATERALLY  - patient denies any loss of bowel or bladder control      PSYCHIATRIC: patient denies any change in mood    Other:  All other systems reviewed and are negative        Physical Exam:  Vitals:    10/16/20 1257   BP: 136/77   Weight: 75.2 kg (165 lb 12.6 oz)   Height: 5' 3" (1.6 m)   PainSc:   6    (reviewed on 10/19/2020)    General: alert and oriented, in no apparent distress.  Gait: normal gait.  Skin: no rashes, no discoloration, no obvious lesions  HEENT: normocephalic, atraumatic. Pupils equal and round.  Cardiovascular: no significant peripheral edema present.  Respiratory: without use of accessory muscles of respiration.    Musculoskeletal - Lumbar Spine:  - Pain on flexion of lumbar spine: Present, worse than with extension  - Pain on extension of lumbar spine: Present  - Lumbar facet loading: Positive bilaterally  - TTP over the lumbar facet joints: Absent  - TTP over the SI joints: Present, mild, improved since procedure   - TTP over GT bursa: Present on left   - Straight Leg Raise: equivocal on left   - PAMELA: Present on left, mild, improved since procedure   - Pain on Internal and external rotation of the hip: Present, mild, improved since procedure     Right Shoulder:  - Pain on abduction: Present   - ROM: decreased secondary to pain, very limited ROM      - TTP over the AC and GH joint: Present  - Neer's: Positive  - Hawkin's: Positive  - Apley's Scratch test: Positive    Cervical:  - Spurling's: Negative    Neuro - Lower Extremities:  - BLE " Strength: R/L: HF: 5/5, HE: 5/5, KF: 5/5; KE: 5/5; FE: 5/5; FF: 5/5  - Extremity Reflexes: Brisk and symmetric throughout  - Sensory: Sensation to light touch intact bilaterally      Psych:  Mood and affect is appropriate              Assessment:  Clau Nunn is a 85 y.o. year old female who is presenting with     ICD-10-CM ICD-9-CM   1. Chronic right shoulder pain  M25.511 719.41    G89.29 338.29   2. Primary osteoarthritis of right shoulder  M19.011 715.11   3. History of humerus fracture  Z87.81 V15.51       Plan:  1. Interventional:   - S/p Right suprascapular nerve block on 9/16/20 with limited pain relief.  - Consider Orthopedics referral.  She wants to continue with physical therapy 1st.     2. Pharmacologic: Continue gabapentin 600mg QHS.    3. Rehabilitative: Continue physical therapy at [facility], which has been helping.     4. Diagnostic: Signed records release at last visit.  imaging, office visit notes, and procedure notes from [facility] regarding right shoulder fracture- Records reviewed.    5. Follow up: PRN     - I discussed the risks, benefits, and alternatives to potential treatment options. All questions and concerns were fully addressed today in clinic.

## 2020-11-21 ENCOUNTER — PATIENT MESSAGE (OUTPATIENT)
Dept: FAMILY MEDICINE | Facility: CLINIC | Age: 85
End: 2020-11-21

## 2020-11-21 DIAGNOSIS — J41.0 SIMPLE CHRONIC BRONCHITIS: ICD-10-CM

## 2020-11-23 RX ORDER — ALBUTEROL SULFATE 90 UG/1
2 AEROSOL, METERED RESPIRATORY (INHALATION) 4 TIMES DAILY PRN
Qty: 18 G | Refills: 3 | Status: SHIPPED | OUTPATIENT
Start: 2020-11-23 | End: 2022-02-24 | Stop reason: SDUPTHER

## 2020-12-21 ENCOUNTER — PATIENT MESSAGE (OUTPATIENT)
Dept: RHEUMATOLOGY | Facility: CLINIC | Age: 85
End: 2020-12-21

## 2020-12-21 DIAGNOSIS — M54.16 CHRONIC LUMBAR RADICULOPATHY: ICD-10-CM

## 2020-12-22 ENCOUNTER — PATIENT MESSAGE (OUTPATIENT)
Dept: FAMILY MEDICINE | Facility: CLINIC | Age: 85
End: 2020-12-22

## 2020-12-22 DIAGNOSIS — M54.16 CHRONIC LUMBAR RADICULOPATHY: ICD-10-CM

## 2020-12-22 RX ORDER — GABAPENTIN 300 MG/1
300 CAPSULE ORAL 3 TIMES DAILY
Qty: 90 CAPSULE | Refills: 3 | Status: SHIPPED | OUTPATIENT
Start: 2020-12-22 | End: 2020-12-22 | Stop reason: SDUPTHER

## 2020-12-22 RX ORDER — GABAPENTIN 300 MG/1
300 CAPSULE ORAL 3 TIMES DAILY
Qty: 270 CAPSULE | Refills: 3 | Status: SHIPPED | OUTPATIENT
Start: 2020-12-22 | End: 2021-11-01 | Stop reason: SDUPTHER

## 2020-12-23 ENCOUNTER — PATIENT MESSAGE (OUTPATIENT)
Dept: FAMILY MEDICINE | Facility: CLINIC | Age: 85
End: 2020-12-23

## 2021-01-05 ENCOUNTER — PATIENT MESSAGE (OUTPATIENT)
Dept: FAMILY MEDICINE | Facility: CLINIC | Age: 86
End: 2021-01-05

## 2021-01-06 ENCOUNTER — IMMUNIZATION (OUTPATIENT)
Dept: INTERNAL MEDICINE | Facility: CLINIC | Age: 86
End: 2021-01-06
Payer: MEDICARE

## 2021-01-06 DIAGNOSIS — Z23 NEED FOR VACCINATION: ICD-10-CM

## 2021-01-06 PROCEDURE — 91300 COVID-19, MRNA, LNP-S, PF, 30 MCG/0.3 ML DOSE VACCINE: CPT | Mod: PBBFAC | Performed by: FAMILY MEDICINE

## 2021-01-27 ENCOUNTER — IMMUNIZATION (OUTPATIENT)
Dept: INTERNAL MEDICINE | Facility: CLINIC | Age: 86
End: 2021-01-27
Payer: MEDICARE

## 2021-01-27 ENCOUNTER — PATIENT MESSAGE (OUTPATIENT)
Dept: RHEUMATOLOGY | Facility: CLINIC | Age: 86
End: 2021-01-27

## 2021-01-27 DIAGNOSIS — Z23 NEED FOR VACCINATION: Primary | ICD-10-CM

## 2021-01-27 PROCEDURE — 91300 COVID-19, MRNA, LNP-S, PF, 30 MCG/0.3 ML DOSE VACCINE: CPT | Mod: PBBFAC | Performed by: FAMILY MEDICINE

## 2021-01-27 PROCEDURE — 0002A COVID-19, MRNA, LNP-S, PF, 30 MCG/0.3 ML DOSE VACCINE: CPT | Mod: PBBFAC | Performed by: FAMILY MEDICINE

## 2021-01-28 ENCOUNTER — OFFICE VISIT (OUTPATIENT)
Dept: RHEUMATOLOGY | Facility: CLINIC | Age: 86
End: 2021-01-28
Payer: MEDICARE

## 2021-01-28 ENCOUNTER — INFUSION (OUTPATIENT)
Dept: INFUSION THERAPY | Facility: HOSPITAL | Age: 86
End: 2021-01-28
Attending: PHYSICIAN ASSISTANT
Payer: MEDICARE

## 2021-01-28 VITALS
OXYGEN SATURATION: 93 % | HEART RATE: 59 BPM | RESPIRATION RATE: 18 BRPM | BODY MASS INDEX: 29.95 KG/M2 | HEIGHT: 63 IN | DIASTOLIC BLOOD PRESSURE: 77 MMHG | WEIGHT: 169.06 LBS | TEMPERATURE: 98 F | SYSTOLIC BLOOD PRESSURE: 152 MMHG

## 2021-01-28 VITALS
WEIGHT: 169.06 LBS | HEART RATE: 60 BPM | SYSTOLIC BLOOD PRESSURE: 147 MMHG | DIASTOLIC BLOOD PRESSURE: 75 MMHG | BODY MASS INDEX: 29.95 KG/M2 | HEIGHT: 63 IN

## 2021-01-28 DIAGNOSIS — M15.9 PRIMARY OSTEOARTHRITIS INVOLVING MULTIPLE JOINTS: ICD-10-CM

## 2021-01-28 DIAGNOSIS — E55.9 VITAMIN D DEFICIENCY DISEASE: ICD-10-CM

## 2021-01-28 DIAGNOSIS — M80.00XG AGE-RELATED OSTEOPOROSIS WITH CURRENT PATHOLOGICAL FRACTURE WITH DELAYED HEALING: Primary | ICD-10-CM

## 2021-01-28 PROCEDURE — 63600175 PHARM REV CODE 636 W HCPCS: Mod: JG,HCNC | Performed by: PHYSICIAN ASSISTANT

## 2021-01-28 PROCEDURE — 3078F DIAST BP <80 MM HG: CPT | Mod: HCNC,CPTII,S$GLB, | Performed by: PHYSICIAN ASSISTANT

## 2021-01-28 PROCEDURE — 99214 OFFICE O/P EST MOD 30 MIN: CPT | Mod: HCNC,S$GLB,, | Performed by: PHYSICIAN ASSISTANT

## 2021-01-28 PROCEDURE — 99999 PR PBB SHADOW E&M-EST. PATIENT-LVL IV: CPT | Mod: PBBFAC,HCNC,, | Performed by: PHYSICIAN ASSISTANT

## 2021-01-28 PROCEDURE — 1159F MED LIST DOCD IN RCRD: CPT | Mod: HCNC,S$GLB,, | Performed by: PHYSICIAN ASSISTANT

## 2021-01-28 PROCEDURE — 1157F ADVNC CARE PLAN IN RCRD: CPT | Mod: HCNC,S$GLB,, | Performed by: PHYSICIAN ASSISTANT

## 2021-01-28 PROCEDURE — 99999 PR PBB SHADOW E&M-EST. PATIENT-LVL IV: ICD-10-PCS | Mod: PBBFAC,HCNC,, | Performed by: PHYSICIAN ASSISTANT

## 2021-01-28 PROCEDURE — 1125F PR PAIN SEVERITY QUANTIFIED, PAIN PRESENT: ICD-10-PCS | Mod: HCNC,S$GLB,, | Performed by: PHYSICIAN ASSISTANT

## 2021-01-28 PROCEDURE — 1125F AMNT PAIN NOTED PAIN PRSNT: CPT | Mod: HCNC,S$GLB,, | Performed by: PHYSICIAN ASSISTANT

## 2021-01-28 PROCEDURE — 1157F PR ADVANCE CARE PLAN OR EQUIV PRESENT IN MEDICAL RECORD: ICD-10-PCS | Mod: HCNC,S$GLB,, | Performed by: PHYSICIAN ASSISTANT

## 2021-01-28 PROCEDURE — 3077F PR MOST RECENT SYSTOLIC BLOOD PRESSURE >= 140 MM HG: ICD-10-PCS | Mod: HCNC,CPTII,S$GLB, | Performed by: PHYSICIAN ASSISTANT

## 2021-01-28 PROCEDURE — 96372 THER/PROPH/DIAG INJ SC/IM: CPT | Mod: HCNC

## 2021-01-28 PROCEDURE — 99214 PR OFFICE/OUTPT VISIT, EST, LEVL IV, 30-39 MIN: ICD-10-PCS | Mod: HCNC,S$GLB,, | Performed by: PHYSICIAN ASSISTANT

## 2021-01-28 PROCEDURE — 3078F PR MOST RECENT DIASTOLIC BLOOD PRESSURE < 80 MM HG: ICD-10-PCS | Mod: HCNC,CPTII,S$GLB, | Performed by: PHYSICIAN ASSISTANT

## 2021-01-28 PROCEDURE — 3077F SYST BP >= 140 MM HG: CPT | Mod: HCNC,CPTII,S$GLB, | Performed by: PHYSICIAN ASSISTANT

## 2021-01-28 PROCEDURE — 1159F PR MEDICATION LIST DOCUMENTED IN MEDICAL RECORD: ICD-10-PCS | Mod: HCNC,S$GLB,, | Performed by: PHYSICIAN ASSISTANT

## 2021-01-28 RX ADMIN — DENOSUMAB 60 MG: 60 INJECTION SUBCUTANEOUS at 11:01

## 2021-02-01 ENCOUNTER — TELEPHONE (OUTPATIENT)
Dept: PAIN MEDICINE | Facility: CLINIC | Age: 86
End: 2021-02-01

## 2021-02-02 ENCOUNTER — PES CALL (OUTPATIENT)
Dept: ADMINISTRATIVE | Facility: CLINIC | Age: 86
End: 2021-02-02

## 2021-02-02 ENCOUNTER — TELEPHONE (OUTPATIENT)
Dept: PAIN MEDICINE | Facility: CLINIC | Age: 86
End: 2021-02-02

## 2021-02-02 ENCOUNTER — OFFICE VISIT (OUTPATIENT)
Dept: PAIN MEDICINE | Facility: CLINIC | Age: 86
End: 2021-02-02
Payer: MEDICARE

## 2021-02-02 VITALS
WEIGHT: 165.38 LBS | SYSTOLIC BLOOD PRESSURE: 131 MMHG | DIASTOLIC BLOOD PRESSURE: 70 MMHG | BODY MASS INDEX: 29.3 KG/M2 | HEIGHT: 63 IN | HEART RATE: 61 BPM

## 2021-02-02 DIAGNOSIS — M19.011 PRIMARY OSTEOARTHRITIS OF RIGHT SHOULDER: ICD-10-CM

## 2021-02-02 DIAGNOSIS — G89.29 CHRONIC RIGHT SHOULDER PAIN: ICD-10-CM

## 2021-02-02 DIAGNOSIS — M25.511 CHRONIC RIGHT SHOULDER PAIN: ICD-10-CM

## 2021-02-02 DIAGNOSIS — Z87.81 HISTORY OF HUMERUS FRACTURE: ICD-10-CM

## 2021-02-02 DIAGNOSIS — M54.16 BILATERAL LUMBAR RADICULOPATHY: Primary | ICD-10-CM

## 2021-02-02 PROCEDURE — 1159F PR MEDICATION LIST DOCUMENTED IN MEDICAL RECORD: ICD-10-PCS | Mod: S$GLB,,, | Performed by: PHYSICIAN ASSISTANT

## 2021-02-02 PROCEDURE — 1101F PR PT FALLS ASSESS DOC 0-1 FALLS W/OUT INJ PAST YR: ICD-10-PCS | Mod: CPTII,S$GLB,, | Performed by: PHYSICIAN ASSISTANT

## 2021-02-02 PROCEDURE — 1101F PT FALLS ASSESS-DOCD LE1/YR: CPT | Mod: CPTII,S$GLB,, | Performed by: PHYSICIAN ASSISTANT

## 2021-02-02 PROCEDURE — 3288F PR FALLS RISK ASSESSMENT DOCUMENTED: ICD-10-PCS | Mod: CPTII,S$GLB,, | Performed by: PHYSICIAN ASSISTANT

## 2021-02-02 PROCEDURE — 3075F PR MOST RECENT SYSTOLIC BLOOD PRESS GE 130-139MM HG: ICD-10-PCS | Mod: CPTII,S$GLB,, | Performed by: PHYSICIAN ASSISTANT

## 2021-02-02 PROCEDURE — 1125F AMNT PAIN NOTED PAIN PRSNT: CPT | Mod: S$GLB,,, | Performed by: PHYSICIAN ASSISTANT

## 2021-02-02 PROCEDURE — 1157F ADVNC CARE PLAN IN RCRD: CPT | Mod: S$GLB,,, | Performed by: PHYSICIAN ASSISTANT

## 2021-02-02 PROCEDURE — 3078F PR MOST RECENT DIASTOLIC BLOOD PRESSURE < 80 MM HG: ICD-10-PCS | Mod: CPTII,S$GLB,, | Performed by: PHYSICIAN ASSISTANT

## 2021-02-02 PROCEDURE — 1159F MED LIST DOCD IN RCRD: CPT | Mod: S$GLB,,, | Performed by: PHYSICIAN ASSISTANT

## 2021-02-02 PROCEDURE — 3078F DIAST BP <80 MM HG: CPT | Mod: CPTII,S$GLB,, | Performed by: PHYSICIAN ASSISTANT

## 2021-02-02 PROCEDURE — 1157F PR ADVANCE CARE PLAN OR EQUIV PRESENT IN MEDICAL RECORD: ICD-10-PCS | Mod: S$GLB,,, | Performed by: PHYSICIAN ASSISTANT

## 2021-02-02 PROCEDURE — 99214 OFFICE O/P EST MOD 30 MIN: CPT | Mod: S$GLB,,, | Performed by: PHYSICIAN ASSISTANT

## 2021-02-02 PROCEDURE — 3288F FALL RISK ASSESSMENT DOCD: CPT | Mod: CPTII,S$GLB,, | Performed by: PHYSICIAN ASSISTANT

## 2021-02-02 PROCEDURE — 99999 PR PBB SHADOW E&M-EST. PATIENT-LVL V: CPT | Mod: PBBFAC,HCNC,, | Performed by: PHYSICIAN ASSISTANT

## 2021-02-02 PROCEDURE — 99214 PR OFFICE/OUTPT VISIT, EST, LEVL IV, 30-39 MIN: ICD-10-PCS | Mod: S$GLB,,, | Performed by: PHYSICIAN ASSISTANT

## 2021-02-02 PROCEDURE — 3075F SYST BP GE 130 - 139MM HG: CPT | Mod: CPTII,S$GLB,, | Performed by: PHYSICIAN ASSISTANT

## 2021-02-02 PROCEDURE — 99999 PR PBB SHADOW E&M-EST. PATIENT-LVL V: ICD-10-PCS | Mod: PBBFAC,HCNC,, | Performed by: PHYSICIAN ASSISTANT

## 2021-02-02 PROCEDURE — 1125F PR PAIN SEVERITY QUANTIFIED, PAIN PRESENT: ICD-10-PCS | Mod: S$GLB,,, | Performed by: PHYSICIAN ASSISTANT

## 2021-02-02 RX ORDER — ACETAMINOPHEN 500 MG
1000 TABLET ORAL EVERY 8 HOURS PRN
Qty: 180 TABLET | Refills: 0 | Status: SHIPPED | OUTPATIENT
Start: 2021-02-02 | End: 2022-04-07

## 2021-02-03 ENCOUNTER — PATIENT MESSAGE (OUTPATIENT)
Dept: PAIN MEDICINE | Facility: CLINIC | Age: 86
End: 2021-02-03

## 2021-02-04 ENCOUNTER — PES CALL (OUTPATIENT)
Dept: ADMINISTRATIVE | Facility: CLINIC | Age: 86
End: 2021-02-04

## 2021-02-04 ENCOUNTER — OFFICE VISIT (OUTPATIENT)
Dept: FAMILY MEDICINE | Facility: CLINIC | Age: 86
End: 2021-02-04
Payer: MEDICARE

## 2021-02-04 VITALS
BODY MASS INDEX: 29.21 KG/M2 | TEMPERATURE: 97 F | DIASTOLIC BLOOD PRESSURE: 74 MMHG | SYSTOLIC BLOOD PRESSURE: 126 MMHG | OXYGEN SATURATION: 99 % | HEART RATE: 88 BPM | WEIGHT: 164.88 LBS | HEIGHT: 63 IN

## 2021-02-04 DIAGNOSIS — M46.1 SACROILIAC INFLAMMATION: ICD-10-CM

## 2021-02-04 DIAGNOSIS — I10 ESSENTIAL HYPERTENSION: Primary | Chronic | ICD-10-CM

## 2021-02-04 DIAGNOSIS — E03.9 ACQUIRED HYPOTHYROIDISM: ICD-10-CM

## 2021-02-04 DIAGNOSIS — J41.0 SIMPLE CHRONIC BRONCHITIS: ICD-10-CM

## 2021-02-04 DIAGNOSIS — Z29.9 PREVENTIVE MEASURE: ICD-10-CM

## 2021-02-04 DIAGNOSIS — Z86.718 HISTORY OF DVT IN ADULTHOOD: ICD-10-CM

## 2021-02-04 DIAGNOSIS — F32.5 MAJOR DEPRESSIVE DISORDER IN FULL REMISSION, UNSPECIFIED WHETHER RECURRENT: ICD-10-CM

## 2021-02-04 DIAGNOSIS — N18.31 STAGE 3A CHRONIC KIDNEY DISEASE: ICD-10-CM

## 2021-02-04 DIAGNOSIS — E55.9 VITAMIN D DEFICIENCY DISEASE: ICD-10-CM

## 2021-02-04 DIAGNOSIS — M54.16 CHRONIC LUMBAR RADICULOPATHY: ICD-10-CM

## 2021-02-04 DIAGNOSIS — I70.0 ATHEROSCLEROSIS OF AORTA: ICD-10-CM

## 2021-02-04 DIAGNOSIS — N25.81 SECONDARY HYPERPARATHYROIDISM: ICD-10-CM

## 2021-02-04 DIAGNOSIS — F41.9 ANXIETY: ICD-10-CM

## 2021-02-04 PROCEDURE — 99214 PR OFFICE/OUTPT VISIT, EST, LEVL IV, 30-39 MIN: ICD-10-PCS | Mod: S$GLB,,, | Performed by: INTERNAL MEDICINE

## 2021-02-04 PROCEDURE — 3074F SYST BP LT 130 MM HG: CPT | Mod: CPTII,S$GLB,, | Performed by: INTERNAL MEDICINE

## 2021-02-04 PROCEDURE — 1125F AMNT PAIN NOTED PAIN PRSNT: CPT | Mod: S$GLB,,, | Performed by: INTERNAL MEDICINE

## 2021-02-04 PROCEDURE — 99499 UNLISTED E&M SERVICE: CPT | Mod: S$GLB,,, | Performed by: INTERNAL MEDICINE

## 2021-02-04 PROCEDURE — 3078F DIAST BP <80 MM HG: CPT | Mod: CPTII,S$GLB,, | Performed by: INTERNAL MEDICINE

## 2021-02-04 PROCEDURE — 3288F FALL RISK ASSESSMENT DOCD: CPT | Mod: CPTII,S$GLB,, | Performed by: INTERNAL MEDICINE

## 2021-02-04 PROCEDURE — 3078F PR MOST RECENT DIASTOLIC BLOOD PRESSURE < 80 MM HG: ICD-10-PCS | Mod: CPTII,S$GLB,, | Performed by: INTERNAL MEDICINE

## 2021-02-04 PROCEDURE — 99999 PR PBB SHADOW E&M-EST. PATIENT-LVL V: CPT | Mod: PBBFAC,,, | Performed by: INTERNAL MEDICINE

## 2021-02-04 PROCEDURE — 1101F PT FALLS ASSESS-DOCD LE1/YR: CPT | Mod: CPTII,S$GLB,, | Performed by: INTERNAL MEDICINE

## 2021-02-04 PROCEDURE — 1125F PR PAIN SEVERITY QUANTIFIED, PAIN PRESENT: ICD-10-PCS | Mod: S$GLB,,, | Performed by: INTERNAL MEDICINE

## 2021-02-04 PROCEDURE — 3074F PR MOST RECENT SYSTOLIC BLOOD PRESSURE < 130 MM HG: ICD-10-PCS | Mod: CPTII,S$GLB,, | Performed by: INTERNAL MEDICINE

## 2021-02-04 PROCEDURE — 99999 PR PBB SHADOW E&M-EST. PATIENT-LVL V: ICD-10-PCS | Mod: PBBFAC,,, | Performed by: INTERNAL MEDICINE

## 2021-02-04 PROCEDURE — 1101F PR PT FALLS ASSESS DOC 0-1 FALLS W/OUT INJ PAST YR: ICD-10-PCS | Mod: CPTII,S$GLB,, | Performed by: INTERNAL MEDICINE

## 2021-02-04 PROCEDURE — 1157F ADVNC CARE PLAN IN RCRD: CPT | Mod: S$GLB,,, | Performed by: INTERNAL MEDICINE

## 2021-02-04 PROCEDURE — 1157F PR ADVANCE CARE PLAN OR EQUIV PRESENT IN MEDICAL RECORD: ICD-10-PCS | Mod: S$GLB,,, | Performed by: INTERNAL MEDICINE

## 2021-02-04 PROCEDURE — 99214 OFFICE O/P EST MOD 30 MIN: CPT | Mod: S$GLB,,, | Performed by: INTERNAL MEDICINE

## 2021-02-04 PROCEDURE — 1159F MED LIST DOCD IN RCRD: CPT | Mod: S$GLB,,, | Performed by: INTERNAL MEDICINE

## 2021-02-04 PROCEDURE — 1159F PR MEDICATION LIST DOCUMENTED IN MEDICAL RECORD: ICD-10-PCS | Mod: S$GLB,,, | Performed by: INTERNAL MEDICINE

## 2021-02-04 PROCEDURE — 3288F PR FALLS RISK ASSESSMENT DOCUMENTED: ICD-10-PCS | Mod: CPTII,S$GLB,, | Performed by: INTERNAL MEDICINE

## 2021-02-04 PROCEDURE — 99499 RISK ADDL DX/OHS AUDIT: ICD-10-PCS | Mod: S$GLB,,, | Performed by: INTERNAL MEDICINE

## 2021-02-09 ENCOUNTER — OFFICE VISIT (OUTPATIENT)
Dept: ORTHOPEDICS | Facility: CLINIC | Age: 86
End: 2021-02-09
Payer: MEDICARE

## 2021-02-09 VITALS
WEIGHT: 164.88 LBS | DIASTOLIC BLOOD PRESSURE: 80 MMHG | BODY MASS INDEX: 29.21 KG/M2 | HEIGHT: 63 IN | HEART RATE: 65 BPM | SYSTOLIC BLOOD PRESSURE: 145 MMHG

## 2021-02-09 DIAGNOSIS — M87.021 AVASCULAR NECROSIS OF RIGHT HUMERAL HEAD: Primary | ICD-10-CM

## 2021-02-09 DIAGNOSIS — Z87.81 HISTORY OF HUMERUS FRACTURE: ICD-10-CM

## 2021-02-09 DIAGNOSIS — G89.29 CHRONIC RIGHT SHOULDER PAIN: ICD-10-CM

## 2021-02-09 DIAGNOSIS — M25.511 CHRONIC RIGHT SHOULDER PAIN: ICD-10-CM

## 2021-02-09 DIAGNOSIS — M19.011 PRIMARY OSTEOARTHRITIS OF RIGHT SHOULDER: ICD-10-CM

## 2021-02-09 PROCEDURE — 3079F DIAST BP 80-89 MM HG: CPT | Mod: CPTII,S$GLB,, | Performed by: PHYSICIAN ASSISTANT

## 2021-02-09 PROCEDURE — 99499 RISK ADDL DX/OHS AUDIT: ICD-10-PCS | Mod: S$GLB,,, | Performed by: PHYSICIAN ASSISTANT

## 2021-02-09 PROCEDURE — 3079F PR MOST RECENT DIASTOLIC BLOOD PRESSURE 80-89 MM HG: ICD-10-PCS | Mod: CPTII,S$GLB,, | Performed by: PHYSICIAN ASSISTANT

## 2021-02-09 PROCEDURE — 20610 DRAIN/INJ JOINT/BURSA W/O US: CPT | Mod: RT,S$GLB,, | Performed by: PHYSICIAN ASSISTANT

## 2021-02-09 PROCEDURE — 99499 UNLISTED E&M SERVICE: CPT | Mod: S$GLB,,, | Performed by: PHYSICIAN ASSISTANT

## 2021-02-09 PROCEDURE — 3288F FALL RISK ASSESSMENT DOCD: CPT | Mod: CPTII,S$GLB,, | Performed by: PHYSICIAN ASSISTANT

## 2021-02-09 PROCEDURE — 1157F ADVNC CARE PLAN IN RCRD: CPT | Mod: S$GLB,,, | Performed by: PHYSICIAN ASSISTANT

## 2021-02-09 PROCEDURE — 3077F PR MOST RECENT SYSTOLIC BLOOD PRESSURE >= 140 MM HG: ICD-10-PCS | Mod: CPTII,S$GLB,, | Performed by: PHYSICIAN ASSISTANT

## 2021-02-09 PROCEDURE — 1101F PR PT FALLS ASSESS DOC 0-1 FALLS W/OUT INJ PAST YR: ICD-10-PCS | Mod: CPTII,S$GLB,, | Performed by: PHYSICIAN ASSISTANT

## 2021-02-09 PROCEDURE — 99999 PR PBB SHADOW E&M-EST. PATIENT-LVL IV: CPT | Mod: PBBFAC,,, | Performed by: PHYSICIAN ASSISTANT

## 2021-02-09 PROCEDURE — 20610 LARGE JOINT ASPIRATION/INJECTION: R SUBACROMIAL BURSA: ICD-10-PCS | Mod: RT,S$GLB,, | Performed by: PHYSICIAN ASSISTANT

## 2021-02-09 PROCEDURE — 99214 PR OFFICE/OUTPT VISIT, EST, LEVL IV, 30-39 MIN: ICD-10-PCS | Mod: 25,S$GLB,, | Performed by: PHYSICIAN ASSISTANT

## 2021-02-09 PROCEDURE — 1157F PR ADVANCE CARE PLAN OR EQUIV PRESENT IN MEDICAL RECORD: ICD-10-PCS | Mod: S$GLB,,, | Performed by: PHYSICIAN ASSISTANT

## 2021-02-09 PROCEDURE — 99214 OFFICE O/P EST MOD 30 MIN: CPT | Mod: 25,S$GLB,, | Performed by: PHYSICIAN ASSISTANT

## 2021-02-09 PROCEDURE — 1125F AMNT PAIN NOTED PAIN PRSNT: CPT | Mod: S$GLB,,, | Performed by: PHYSICIAN ASSISTANT

## 2021-02-09 PROCEDURE — 3077F SYST BP >= 140 MM HG: CPT | Mod: CPTII,S$GLB,, | Performed by: PHYSICIAN ASSISTANT

## 2021-02-09 PROCEDURE — 1159F PR MEDICATION LIST DOCUMENTED IN MEDICAL RECORD: ICD-10-PCS | Mod: S$GLB,,, | Performed by: PHYSICIAN ASSISTANT

## 2021-02-09 PROCEDURE — 3288F PR FALLS RISK ASSESSMENT DOCUMENTED: ICD-10-PCS | Mod: CPTII,S$GLB,, | Performed by: PHYSICIAN ASSISTANT

## 2021-02-09 PROCEDURE — 1125F PR PAIN SEVERITY QUANTIFIED, PAIN PRESENT: ICD-10-PCS | Mod: S$GLB,,, | Performed by: PHYSICIAN ASSISTANT

## 2021-02-09 PROCEDURE — 99999 PR PBB SHADOW E&M-EST. PATIENT-LVL IV: ICD-10-PCS | Mod: PBBFAC,,, | Performed by: PHYSICIAN ASSISTANT

## 2021-02-09 PROCEDURE — 1101F PT FALLS ASSESS-DOCD LE1/YR: CPT | Mod: CPTII,S$GLB,, | Performed by: PHYSICIAN ASSISTANT

## 2021-02-09 PROCEDURE — 1159F MED LIST DOCD IN RCRD: CPT | Mod: S$GLB,,, | Performed by: PHYSICIAN ASSISTANT

## 2021-02-09 RX ORDER — METHYLPREDNISOLONE ACETATE 80 MG/ML
80 INJECTION, SUSPENSION INTRA-ARTICULAR; INTRALESIONAL; INTRAMUSCULAR; SOFT TISSUE
Status: DISCONTINUED | OUTPATIENT
Start: 2021-02-09 | End: 2021-02-09 | Stop reason: HOSPADM

## 2021-02-09 RX ADMIN — METHYLPREDNISOLONE ACETATE 80 MG: 80 INJECTION, SUSPENSION INTRA-ARTICULAR; INTRALESIONAL; INTRAMUSCULAR; SOFT TISSUE at 02:02

## 2021-02-10 ENCOUNTER — HOSPITAL ENCOUNTER (OUTPATIENT)
Facility: HOSPITAL | Age: 86
Discharge: HOME | End: 2021-02-10
Attending: ANESTHESIOLOGY | Admitting: ANESTHESIOLOGY
Payer: MEDICARE

## 2021-02-10 VITALS
DIASTOLIC BLOOD PRESSURE: 77 MMHG | HEIGHT: 63 IN | HEART RATE: 63 BPM | TEMPERATURE: 98 F | OXYGEN SATURATION: 97 % | SYSTOLIC BLOOD PRESSURE: 170 MMHG | BODY MASS INDEX: 29.18 KG/M2 | WEIGHT: 164.69 LBS | RESPIRATION RATE: 16 BRPM

## 2021-02-10 DIAGNOSIS — M54.16 LUMBAR RADICULOPATHY: Primary | ICD-10-CM

## 2021-02-10 PROCEDURE — 25500020 PHARM REV CODE 255: Performed by: ANESTHESIOLOGY

## 2021-02-10 PROCEDURE — 63600175 PHARM REV CODE 636 W HCPCS: Performed by: ANESTHESIOLOGY

## 2021-02-10 PROCEDURE — 64483 NJX AA&/STRD TFRM EPI L/S 1: CPT | Mod: 50 | Performed by: ANESTHESIOLOGY

## 2021-02-10 PROCEDURE — 64483 PR EPIDURAL INJ, ANES/STEROID, TRANSFORAMINAL, LUMB/SACR, SNGL LEVL: ICD-10-PCS | Mod: 50,,, | Performed by: ANESTHESIOLOGY

## 2021-02-10 PROCEDURE — 25000003 PHARM REV CODE 250: Performed by: ANESTHESIOLOGY

## 2021-02-10 PROCEDURE — 64483 NJX AA&/STRD TFRM EPI L/S 1: CPT | Mod: 50,,, | Performed by: ANESTHESIOLOGY

## 2021-02-10 RX ORDER — DEXAMETHASONE SODIUM PHOSPHATE 10 MG/ML
INJECTION INTRAMUSCULAR; INTRAVENOUS
Status: DISCONTINUED | OUTPATIENT
Start: 2021-02-10 | End: 2021-02-10 | Stop reason: HOSPADM

## 2021-02-10 RX ORDER — LIDOCAINE HYDROCHLORIDE 10 MG/ML
INJECTION, SOLUTION EPIDURAL; INFILTRATION; INTRACAUDAL; PERINEURAL
Status: DISCONTINUED | OUTPATIENT
Start: 2021-02-10 | End: 2021-02-10 | Stop reason: HOSPADM

## 2021-02-11 ENCOUNTER — PES CALL (OUTPATIENT)
Dept: ADMINISTRATIVE | Facility: CLINIC | Age: 86
End: 2021-02-11

## 2021-03-18 ENCOUNTER — TELEPHONE (OUTPATIENT)
Dept: ADMINISTRATIVE | Facility: HOSPITAL | Age: 86
End: 2021-03-18

## 2021-03-31 ENCOUNTER — TELEPHONE (OUTPATIENT)
Dept: ADMINISTRATIVE | Facility: HOSPITAL | Age: 86
End: 2021-03-31

## 2021-05-18 NOTE — MR AVS SNAPSHOT
[facility]  [address] [place] [address]  Phone: [phone]  Fax: [phone]                  Calu Nunn   2017 8:00 AM   Office Visit    Description:  Female : 1935   Provider:  Dona Manjarrez MD   Department:  [facility] - Rheumatology           Reason for Visit     Osteoarthritis     Osteoporosis     Vitamin D Deficiency     DDD           Diagnoses this Visit        Comments    Osteoporosis, postmenopausal    -  Primary     Vitamin D deficiency disease         History of fracture of right hip         Stage 3 chronic kidney disease                To Do List           Future Appointments        Provider Department Dept Phone    2017 10:15 AM Kd Rubalcava MD [facility] Ophthalmology [phone]    2017 1:40 PM Jeanette Molina MD [facility] Internal Medicine [phone]    2017 1:00 PM RHEUMATOLOGY NURSE, [facility] [phone]    2/10/2017 11:05 AM LABORATORY, [facility] [phone]      Goals (5 Years of Data)     None      [facility]     [facility] Care [facility] -  Assistance  Registered nurses in the [facility] provide clinical advisement, health education, appointment booking, and other advisory services.  Call for this free service at [phone].             Medications           Message regarding Medications     Verify the changes and/or additions to your medication regime listed below are the same as discussed with your clinician today.  If any of these changes or additions are incorrect, please notify your healthcare provider.        These medications were administered today        Dose Freq    denosumab (PROLIA) injection 60 mg 60 mg Every 6 months    Sig: Inject 1 mL (60 mg total) into the skin every 6 (six) months.    Class: Normal    Route: Subcutaneous           Verify that the below list of medications is an accurate representation of the  "medications you are currently taking.  If none reported, the list may be blank. If incorrect, please contact your healthcare provider. Carry this list with you in case of emergency.           Current Medications     albuterol 90 mcg/actuation inhaler Inhale 2 puffs into the lungs 4 (four) times daily as needed.    alprazolam (XANAX) 0.25 MG tablet TAKE 1 TABLET BY MOUTH EVERY 6 HOURS AS NEEDED FOR ACUTE ANXIETY    ammonium lactate (AMLACTIN) 12 % lotion Apply to damp skin daily.    azelastine (ASTELIN) 137 mcg (0.1 %) nasal spray     cholecalciferol, vitamin D3, 50,000 unit capsule Take 1 capsule (50,000 Units total) by mouth once a week.    cycloSPORINE (RESTASIS) 0.05 % ophthalmic emulsion Place 0.05 mLs (1 drop total) into both eyes 2 (two) times daily.    diclofenac sodium (VOLTAREN) 1 % Gel Apply 2 g topically 3 (three) times daily.    escitalopram oxalate (LEXAPRO) 20 MG tablet Take 1 tablet (20 mg total) by mouth once daily.    fluticasone (FLONASE) 50 mcg/actuation nasal spray USE 2 SPRAYS IN EACH NOSTRIL EVERY DAY    gabapentin (NEURONTIN) 300 MG capsule Take 1 capsule (300 mg total) by mouth 3 (three) times daily.    levothyroxine (SYNTHROID) 88 MCG tablet Take 1 tablet (88 mcg total) by mouth once daily.    losartan-hydrochlorothiazide 50-12.5 mg (HYZAAR) 50-12.5 mg per tablet TAKE 1 TABLET ONE TIME DAILY    meloxicam (MOBIC) 15 MG tablet TAKE ONE TABLET BY MOUTH DAILY    omeprazole (PRILOSEC) 20 MG capsule Take 2 capsules (40 mg total) by mouth 2 (two) times daily.    temazepam (RESTORIL) 15 mg Cap     tolterodine (DETROL) 2 MG Tab Take 2 mg by mouth once daily.           Clinical Reference Information           Vital Signs - Last Recorded  Most recent update: 1/17/2017  8:11 AM by Saniya Alfred MA    BP Pulse Ht Wt BMI    (!) 161/82 64 5' 5" (1.651 m) 70.2 kg (154 lb 12.2 oz) 25.75 kg/m2      Blood Pressure          Most Recent Value    BP  (!)  161/82      Allergies as of 1/17/2017     No Known " Drug Allergies      Immunizations Administered on Date of Encounter - 1/17/2017     None      Orders Placed During Today's Visit      Normal Orders This Visit    Prior Authorization Order     Recurring Lab Work Interval Expires    Basic metabolic panel   3/18/2018    CBC auto differential   1/17/2021    Comprehensive metabolic panel   1/17/2021    Vitamin D   3/18/2018       tachy rate, regular rhythm.  Heart sounds S1, S2.  No murmurs, rubs or gallops.

## 2021-06-10 ENCOUNTER — TELEPHONE (OUTPATIENT)
Dept: PAIN MEDICINE | Facility: CLINIC | Age: 86
End: 2021-06-10

## 2021-06-10 ENCOUNTER — OFFICE VISIT (OUTPATIENT)
Dept: ORTHOPEDICS | Facility: CLINIC | Age: 86
End: 2021-06-10
Payer: MEDICARE

## 2021-06-10 VITALS
SYSTOLIC BLOOD PRESSURE: 144 MMHG | BODY MASS INDEX: 29.18 KG/M2 | HEART RATE: 64 BPM | DIASTOLIC BLOOD PRESSURE: 74 MMHG | HEIGHT: 63 IN | WEIGHT: 164.69 LBS

## 2021-06-10 DIAGNOSIS — G89.29 CHRONIC RIGHT SHOULDER PAIN: Primary | ICD-10-CM

## 2021-06-10 DIAGNOSIS — M19.011 PRIMARY OSTEOARTHRITIS OF RIGHT SHOULDER: ICD-10-CM

## 2021-06-10 DIAGNOSIS — G89.29 CHRONIC RIGHT SHOULDER PAIN: ICD-10-CM

## 2021-06-10 DIAGNOSIS — M87.021 AVASCULAR NECROSIS OF RIGHT HUMERAL HEAD: Primary | ICD-10-CM

## 2021-06-10 DIAGNOSIS — M25.511 CHRONIC RIGHT SHOULDER PAIN: Primary | ICD-10-CM

## 2021-06-10 DIAGNOSIS — M25.511 CHRONIC RIGHT SHOULDER PAIN: ICD-10-CM

## 2021-06-10 PROCEDURE — 1101F PT FALLS ASSESS-DOCD LE1/YR: CPT | Mod: CPTII,S$GLB,, | Performed by: PHYSICIAN ASSISTANT

## 2021-06-10 PROCEDURE — 1157F ADVNC CARE PLAN IN RCRD: CPT | Mod: S$GLB,,, | Performed by: PHYSICIAN ASSISTANT

## 2021-06-10 PROCEDURE — 3288F PR FALLS RISK ASSESSMENT DOCUMENTED: ICD-10-PCS | Mod: CPTII,S$GLB,, | Performed by: PHYSICIAN ASSISTANT

## 2021-06-10 PROCEDURE — 99999 PR PBB SHADOW E&M-EST. PATIENT-LVL V: CPT | Mod: PBBFAC,,, | Performed by: PHYSICIAN ASSISTANT

## 2021-06-10 PROCEDURE — 1125F AMNT PAIN NOTED PAIN PRSNT: CPT | Mod: S$GLB,,, | Performed by: PHYSICIAN ASSISTANT

## 2021-06-10 PROCEDURE — 3288F FALL RISK ASSESSMENT DOCD: CPT | Mod: CPTII,S$GLB,, | Performed by: PHYSICIAN ASSISTANT

## 2021-06-10 PROCEDURE — 99213 OFFICE O/P EST LOW 20 MIN: CPT | Mod: S$GLB,,, | Performed by: PHYSICIAN ASSISTANT

## 2021-06-10 PROCEDURE — 99999 PR PBB SHADOW E&M-EST. PATIENT-LVL V: ICD-10-PCS | Mod: PBBFAC,,, | Performed by: PHYSICIAN ASSISTANT

## 2021-06-10 PROCEDURE — 1125F PR PAIN SEVERITY QUANTIFIED, PAIN PRESENT: ICD-10-PCS | Mod: S$GLB,,, | Performed by: PHYSICIAN ASSISTANT

## 2021-06-10 PROCEDURE — 1101F PR PT FALLS ASSESS DOC 0-1 FALLS W/OUT INJ PAST YR: ICD-10-PCS | Mod: CPTII,S$GLB,, | Performed by: PHYSICIAN ASSISTANT

## 2021-06-10 PROCEDURE — 1159F PR MEDICATION LIST DOCUMENTED IN MEDICAL RECORD: ICD-10-PCS | Mod: S$GLB,,, | Performed by: PHYSICIAN ASSISTANT

## 2021-06-10 PROCEDURE — 1159F MED LIST DOCD IN RCRD: CPT | Mod: S$GLB,,, | Performed by: PHYSICIAN ASSISTANT

## 2021-06-10 PROCEDURE — 99213 PR OFFICE/OUTPT VISIT, EST, LEVL III, 20-29 MIN: ICD-10-PCS | Mod: S$GLB,,, | Performed by: PHYSICIAN ASSISTANT

## 2021-06-10 PROCEDURE — 1157F PR ADVANCE CARE PLAN OR EQUIV PRESENT IN MEDICAL RECORD: ICD-10-PCS | Mod: S$GLB,,, | Performed by: PHYSICIAN ASSISTANT

## 2021-06-24 ENCOUNTER — PATIENT MESSAGE (OUTPATIENT)
Dept: PAIN MEDICINE | Facility: CLINIC | Age: 86
End: 2021-06-24

## 2021-06-24 ENCOUNTER — OFFICE VISIT (OUTPATIENT)
Dept: PAIN MEDICINE | Facility: CLINIC | Age: 86
End: 2021-06-24
Payer: MEDICARE

## 2021-06-24 VITALS
BODY MASS INDEX: 29.3 KG/M2 | HEART RATE: 55 BPM | HEIGHT: 63 IN | DIASTOLIC BLOOD PRESSURE: 71 MMHG | SYSTOLIC BLOOD PRESSURE: 154 MMHG | WEIGHT: 165.38 LBS

## 2021-06-24 DIAGNOSIS — M51.36 DDD (DEGENERATIVE DISC DISEASE), LUMBAR: ICD-10-CM

## 2021-06-24 DIAGNOSIS — M19.011 PRIMARY OSTEOARTHRITIS OF RIGHT SHOULDER: ICD-10-CM

## 2021-06-24 DIAGNOSIS — M54.16 LEFT LUMBAR RADICULOPATHY: Primary | ICD-10-CM

## 2021-06-24 DIAGNOSIS — G89.29 CHRONIC RIGHT SHOULDER PAIN: ICD-10-CM

## 2021-06-24 DIAGNOSIS — M25.511 CHRONIC RIGHT SHOULDER PAIN: ICD-10-CM

## 2021-06-24 PROCEDURE — 3288F PR FALLS RISK ASSESSMENT DOCUMENTED: ICD-10-PCS | Mod: CPTII,S$GLB,, | Performed by: PHYSICIAN ASSISTANT

## 2021-06-24 PROCEDURE — 1101F PT FALLS ASSESS-DOCD LE1/YR: CPT | Mod: CPTII,S$GLB,, | Performed by: PHYSICIAN ASSISTANT

## 2021-06-24 PROCEDURE — 99999 PR PBB SHADOW E&M-EST. PATIENT-LVL IV: CPT | Mod: PBBFAC,,, | Performed by: PHYSICIAN ASSISTANT

## 2021-06-24 PROCEDURE — 1125F PR PAIN SEVERITY QUANTIFIED, PAIN PRESENT: ICD-10-PCS | Mod: S$GLB,,, | Performed by: PHYSICIAN ASSISTANT

## 2021-06-24 PROCEDURE — 1157F ADVNC CARE PLAN IN RCRD: CPT | Mod: S$GLB,,, | Performed by: PHYSICIAN ASSISTANT

## 2021-06-24 PROCEDURE — 1101F PR PT FALLS ASSESS DOC 0-1 FALLS W/OUT INJ PAST YR: ICD-10-PCS | Mod: CPTII,S$GLB,, | Performed by: PHYSICIAN ASSISTANT

## 2021-06-24 PROCEDURE — 99214 PR OFFICE/OUTPT VISIT, EST, LEVL IV, 30-39 MIN: ICD-10-PCS | Mod: S$GLB,,, | Performed by: PHYSICIAN ASSISTANT

## 2021-06-24 PROCEDURE — 1125F AMNT PAIN NOTED PAIN PRSNT: CPT | Mod: S$GLB,,, | Performed by: PHYSICIAN ASSISTANT

## 2021-06-24 PROCEDURE — 1159F PR MEDICATION LIST DOCUMENTED IN MEDICAL RECORD: ICD-10-PCS | Mod: S$GLB,,, | Performed by: PHYSICIAN ASSISTANT

## 2021-06-24 PROCEDURE — 99214 OFFICE O/P EST MOD 30 MIN: CPT | Mod: S$GLB,,, | Performed by: PHYSICIAN ASSISTANT

## 2021-06-24 PROCEDURE — 1157F PR ADVANCE CARE PLAN OR EQUIV PRESENT IN MEDICAL RECORD: ICD-10-PCS | Mod: S$GLB,,, | Performed by: PHYSICIAN ASSISTANT

## 2021-06-24 PROCEDURE — 99999 PR PBB SHADOW E&M-EST. PATIENT-LVL IV: ICD-10-PCS | Mod: PBBFAC,,, | Performed by: PHYSICIAN ASSISTANT

## 2021-06-24 PROCEDURE — 3288F FALL RISK ASSESSMENT DOCD: CPT | Mod: CPTII,S$GLB,, | Performed by: PHYSICIAN ASSISTANT

## 2021-06-24 PROCEDURE — 1159F MED LIST DOCD IN RCRD: CPT | Mod: S$GLB,,, | Performed by: PHYSICIAN ASSISTANT

## 2021-06-30 ENCOUNTER — PATIENT OUTREACH (OUTPATIENT)
Dept: ADMINISTRATIVE | Facility: HOSPITAL | Age: 86
End: 2021-06-30

## 2021-07-01 ENCOUNTER — HOSPITAL ENCOUNTER (OUTPATIENT)
Facility: HOSPITAL | Age: 86
Discharge: HOME | End: 2021-07-01
Attending: ANESTHESIOLOGY | Admitting: ANESTHESIOLOGY
Payer: MEDICARE

## 2021-07-01 DIAGNOSIS — M54.16 LUMBAR RADICULOPATHY: Primary | ICD-10-CM

## 2021-07-01 PROCEDURE — 25500020 PHARM REV CODE 255: Performed by: ANESTHESIOLOGY

## 2021-07-01 PROCEDURE — 64484 NJX AA&/STRD TFRM EPI L/S EA: CPT | Mod: LT,,, | Performed by: ANESTHESIOLOGY

## 2021-07-01 PROCEDURE — 64483 PR EPIDURAL INJ, ANES/STEROID, TRANSFORAMINAL, LUMB/SACR, SNGL LEVL: ICD-10-PCS | Mod: LT,,, | Performed by: ANESTHESIOLOGY

## 2021-07-01 PROCEDURE — 25000003 PHARM REV CODE 250: Performed by: ANESTHESIOLOGY

## 2021-07-01 PROCEDURE — 64483 NJX AA&/STRD TFRM EPI L/S 1: CPT | Performed by: ANESTHESIOLOGY

## 2021-07-01 PROCEDURE — 64483 NJX AA&/STRD TFRM EPI L/S 1: CPT | Mod: LT,,, | Performed by: ANESTHESIOLOGY

## 2021-07-01 PROCEDURE — 64484 PRA INJECT ANES/STEROID FORAMEN LUMBAR/SACRAL W IMG GUIDE ,EA ADD LEVEL: ICD-10-PCS | Mod: LT,,, | Performed by: ANESTHESIOLOGY

## 2021-07-01 PROCEDURE — 63600175 PHARM REV CODE 636 W HCPCS: Performed by: ANESTHESIOLOGY

## 2021-07-01 PROCEDURE — 64484 NJX AA&/STRD TFRM EPI L/S EA: CPT | Performed by: ANESTHESIOLOGY

## 2021-07-01 RX ORDER — DEXAMETHASONE SODIUM PHOSPHATE 10 MG/ML
INJECTION INTRAMUSCULAR; INTRAVENOUS
Status: DISCONTINUED | OUTPATIENT
Start: 2021-07-01 | End: 2021-07-01 | Stop reason: HOSPADM

## 2021-07-01 RX ORDER — LIDOCAINE HYDROCHLORIDE 10 MG/ML
INJECTION, SOLUTION EPIDURAL; INFILTRATION; INTRACAUDAL; PERINEURAL
Status: DISCONTINUED | OUTPATIENT
Start: 2021-07-01 | End: 2021-07-01 | Stop reason: HOSPADM

## 2021-07-01 RX ORDER — INDOMETHACIN 25 MG/1
CAPSULE ORAL
Status: DISCONTINUED | OUTPATIENT
Start: 2021-07-01 | End: 2021-07-01 | Stop reason: HOSPADM

## 2021-07-02 VITALS
HEART RATE: 70 BPM | TEMPERATURE: 98 F | HEIGHT: 63 IN | WEIGHT: 166.88 LBS | OXYGEN SATURATION: 97 % | DIASTOLIC BLOOD PRESSURE: 72 MMHG | SYSTOLIC BLOOD PRESSURE: 159 MMHG | RESPIRATION RATE: 18 BRPM | BODY MASS INDEX: 29.57 KG/M2

## 2021-07-13 ENCOUNTER — HOSPITAL ENCOUNTER (OUTPATIENT)
Facility: HOSPITAL | Age: 86
Discharge: HOME | End: 2021-07-13
Attending: ANESTHESIOLOGY | Admitting: ANESTHESIOLOGY
Payer: MEDICARE

## 2021-07-13 VITALS
BODY MASS INDEX: 29.69 KG/M2 | TEMPERATURE: 98 F | SYSTOLIC BLOOD PRESSURE: 165 MMHG | WEIGHT: 167.56 LBS | HEART RATE: 53 BPM | DIASTOLIC BLOOD PRESSURE: 73 MMHG | HEIGHT: 63 IN | OXYGEN SATURATION: 96 % | RESPIRATION RATE: 16 BRPM

## 2021-07-13 DIAGNOSIS — M25.519 SHOULDER PAIN: Primary | ICD-10-CM

## 2021-07-13 PROCEDURE — 64418 NJX AA&/STRD SPRSCAP NRV: CPT | Performed by: ANESTHESIOLOGY

## 2021-07-13 PROCEDURE — 25000003 PHARM REV CODE 250: Performed by: ANESTHESIOLOGY

## 2021-07-13 PROCEDURE — 63600175 PHARM REV CODE 636 W HCPCS: Performed by: ANESTHESIOLOGY

## 2021-07-13 PROCEDURE — 64418 PR NERVE BLOCK INJ, ANES/STEROID, SUPRASCAPULAR: ICD-10-PCS | Mod: RT,,, | Performed by: ANESTHESIOLOGY

## 2021-07-13 PROCEDURE — 64418 NJX AA&/STRD SPRSCAP NRV: CPT | Mod: RT,,, | Performed by: ANESTHESIOLOGY

## 2021-07-13 RX ORDER — DEXAMETHASONE SODIUM PHOSPHATE 10 MG/ML
INJECTION INTRAMUSCULAR; INTRAVENOUS
Status: DISCONTINUED | OUTPATIENT
Start: 2021-07-13 | End: 2021-07-13 | Stop reason: HOSPADM

## 2021-07-13 RX ORDER — BUPIVACAINE HYDROCHLORIDE 2.5 MG/ML
INJECTION, SOLUTION EPIDURAL; INFILTRATION; INTRACAUDAL
Status: DISCONTINUED | OUTPATIENT
Start: 2021-07-13 | End: 2021-07-13 | Stop reason: HOSPADM

## 2021-07-13 RX ORDER — SODIUM BICARBONATE 1 MEQ/ML
SYRINGE (ML) INTRAVENOUS
Status: DISCONTINUED | OUTPATIENT
Start: 2021-07-13 | End: 2021-07-13 | Stop reason: HOSPADM

## 2021-07-28 ENCOUNTER — PATIENT OUTREACH (OUTPATIENT)
Dept: ADMINISTRATIVE | Facility: OTHER | Age: 86
End: 2021-07-28

## 2021-07-29 ENCOUNTER — OFFICE VISIT (OUTPATIENT)
Dept: RHEUMATOLOGY | Facility: CLINIC | Age: 86
End: 2021-07-29
Payer: MEDICARE

## 2021-07-29 ENCOUNTER — INFUSION (OUTPATIENT)
Dept: INFUSION THERAPY | Facility: HOSPITAL | Age: 86
End: 2021-07-29
Attending: PHYSICIAN ASSISTANT
Payer: MEDICARE

## 2021-07-29 VITALS
HEIGHT: 63 IN | HEART RATE: 66 BPM | BODY MASS INDEX: 29.77 KG/M2 | SYSTOLIC BLOOD PRESSURE: 141 MMHG | WEIGHT: 168 LBS | DIASTOLIC BLOOD PRESSURE: 82 MMHG

## 2021-07-29 VITALS
DIASTOLIC BLOOD PRESSURE: 81 MMHG | SYSTOLIC BLOOD PRESSURE: 149 MMHG | HEART RATE: 62 BPM | WEIGHT: 168 LBS | TEMPERATURE: 98 F | OXYGEN SATURATION: 94 % | BODY MASS INDEX: 29.77 KG/M2 | RESPIRATION RATE: 18 BRPM | HEIGHT: 63 IN

## 2021-07-29 DIAGNOSIS — M54.16 CHRONIC LUMBAR RADICULOPATHY: ICD-10-CM

## 2021-07-29 DIAGNOSIS — M17.12 PRIMARY OSTEOARTHRITIS OF LEFT KNEE: ICD-10-CM

## 2021-07-29 DIAGNOSIS — M80.00XG AGE-RELATED OSTEOPOROSIS WITH CURRENT PATHOLOGICAL FRACTURE WITH DELAYED HEALING: Primary | ICD-10-CM

## 2021-07-29 DIAGNOSIS — M17.0 PRIMARY OSTEOARTHRITIS OF BOTH KNEES: ICD-10-CM

## 2021-07-29 DIAGNOSIS — N18.31 STAGE 3A CHRONIC KIDNEY DISEASE: ICD-10-CM

## 2021-07-29 DIAGNOSIS — E55.9 VITAMIN D DEFICIENCY DISEASE: ICD-10-CM

## 2021-07-29 DIAGNOSIS — M71.22 POPLITEAL CYST, UNRUPTURED, LEFT: ICD-10-CM

## 2021-07-29 DIAGNOSIS — M15.9 PRIMARY OSTEOARTHRITIS INVOLVING MULTIPLE JOINTS: ICD-10-CM

## 2021-07-29 PROCEDURE — 1125F PR PAIN SEVERITY QUANTIFIED, PAIN PRESENT: ICD-10-PCS | Mod: CPTII,S$GLB,, | Performed by: PHYSICIAN ASSISTANT

## 2021-07-29 PROCEDURE — 99214 OFFICE O/P EST MOD 30 MIN: CPT | Mod: 25,S$GLB,, | Performed by: PHYSICIAN ASSISTANT

## 2021-07-29 PROCEDURE — 1101F PR PT FALLS ASSESS DOC 0-1 FALLS W/OUT INJ PAST YR: ICD-10-PCS | Mod: CPTII,S$GLB,, | Performed by: PHYSICIAN ASSISTANT

## 2021-07-29 PROCEDURE — 1159F PR MEDICATION LIST DOCUMENTED IN MEDICAL RECORD: ICD-10-PCS | Mod: CPTII,S$GLB,, | Performed by: PHYSICIAN ASSISTANT

## 2021-07-29 PROCEDURE — 63600175 PHARM REV CODE 636 W HCPCS: Mod: JG | Performed by: PHYSICIAN ASSISTANT

## 2021-07-29 PROCEDURE — 99499 UNLISTED E&M SERVICE: CPT | Mod: HCNC,S$GLB,, | Performed by: PHYSICIAN ASSISTANT

## 2021-07-29 PROCEDURE — 3077F PR MOST RECENT SYSTOLIC BLOOD PRESSURE >= 140 MM HG: ICD-10-PCS | Mod: CPTII,S$GLB,, | Performed by: PHYSICIAN ASSISTANT

## 2021-07-29 PROCEDURE — 99999 PR PBB SHADOW E&M-EST. PATIENT-LVL IV: CPT | Mod: PBBFAC,,, | Performed by: PHYSICIAN ASSISTANT

## 2021-07-29 PROCEDURE — 3288F PR FALLS RISK ASSESSMENT DOCUMENTED: ICD-10-PCS | Mod: CPTII,S$GLB,, | Performed by: PHYSICIAN ASSISTANT

## 2021-07-29 PROCEDURE — 1160F RVW MEDS BY RX/DR IN RCRD: CPT | Mod: CPTII,S$GLB,, | Performed by: PHYSICIAN ASSISTANT

## 2021-07-29 PROCEDURE — 1159F MED LIST DOCD IN RCRD: CPT | Mod: CPTII,S$GLB,, | Performed by: PHYSICIAN ASSISTANT

## 2021-07-29 PROCEDURE — 1157F ADVNC CARE PLAN IN RCRD: CPT | Mod: CPTII,S$GLB,, | Performed by: PHYSICIAN ASSISTANT

## 2021-07-29 PROCEDURE — 1157F PR ADVANCE CARE PLAN OR EQUIV PRESENT IN MEDICAL RECORD: ICD-10-PCS | Mod: CPTII,S$GLB,, | Performed by: PHYSICIAN ASSISTANT

## 2021-07-29 PROCEDURE — 1160F PR REVIEW ALL MEDS BY PRESCRIBER/CLIN PHARMACIST DOCUMENTED: ICD-10-PCS | Mod: CPTII,S$GLB,, | Performed by: PHYSICIAN ASSISTANT

## 2021-07-29 PROCEDURE — 3288F FALL RISK ASSESSMENT DOCD: CPT | Mod: CPTII,S$GLB,, | Performed by: PHYSICIAN ASSISTANT

## 2021-07-29 PROCEDURE — 1101F PT FALLS ASSESS-DOCD LE1/YR: CPT | Mod: CPTII,S$GLB,, | Performed by: PHYSICIAN ASSISTANT

## 2021-07-29 PROCEDURE — 96372 THER/PROPH/DIAG INJ SC/IM: CPT

## 2021-07-29 PROCEDURE — 20610 DRAIN/INJ JOINT/BURSA W/O US: CPT | Mod: LT,S$GLB,, | Performed by: PHYSICIAN ASSISTANT

## 2021-07-29 PROCEDURE — 3079F PR MOST RECENT DIASTOLIC BLOOD PRESSURE 80-89 MM HG: ICD-10-PCS | Mod: CPTII,S$GLB,, | Performed by: PHYSICIAN ASSISTANT

## 2021-07-29 PROCEDURE — 3077F SYST BP >= 140 MM HG: CPT | Mod: CPTII,S$GLB,, | Performed by: PHYSICIAN ASSISTANT

## 2021-07-29 PROCEDURE — 20610 LARGE JOINT ASPIRATION/INJECTION: L KNEE JOINT: ICD-10-PCS | Mod: LT,S$GLB,, | Performed by: PHYSICIAN ASSISTANT

## 2021-07-29 PROCEDURE — 1125F AMNT PAIN NOTED PAIN PRSNT: CPT | Mod: CPTII,S$GLB,, | Performed by: PHYSICIAN ASSISTANT

## 2021-07-29 PROCEDURE — 99214 PR OFFICE/OUTPT VISIT, EST, LEVL IV, 30-39 MIN: ICD-10-PCS | Mod: 25,S$GLB,, | Performed by: PHYSICIAN ASSISTANT

## 2021-07-29 PROCEDURE — 99499 RISK ADDL DX/OHS AUDIT: ICD-10-PCS | Mod: HCNC,S$GLB,, | Performed by: PHYSICIAN ASSISTANT

## 2021-07-29 PROCEDURE — 3079F DIAST BP 80-89 MM HG: CPT | Mod: CPTII,S$GLB,, | Performed by: PHYSICIAN ASSISTANT

## 2021-07-29 PROCEDURE — 99999 PR PBB SHADOW E&M-EST. PATIENT-LVL IV: ICD-10-PCS | Mod: PBBFAC,,, | Performed by: PHYSICIAN ASSISTANT

## 2021-07-29 RX ORDER — NITROFURANTOIN 25; 75 MG/1; MG/1
1 CAPSULE ORAL 2 TIMES DAILY
COMMUNITY
Start: 2021-07-26 | End: 2022-04-06

## 2021-07-29 RX ORDER — METHENAMINE, SODIUM PHOSPHATE, MONOBASIC, MONOHYDRATE, PHENYL SALICYLATE, METHYLENE BLUE, AND HYOSCYAMINE SULFATE 118; 40.8; 36; 10; .12 MG/1; MG/1; MG/1; MG/1; MG/1
1 CAPSULE ORAL 4 TIMES DAILY
COMMUNITY
Start: 2021-07-26 | End: 2022-04-07

## 2021-07-29 RX ORDER — TRIAMCINOLONE ACETONIDE 40 MG/ML
40 INJECTION, SUSPENSION INTRA-ARTICULAR; INTRAMUSCULAR
Status: DISCONTINUED | OUTPATIENT
Start: 2021-07-29 | End: 2021-07-29 | Stop reason: HOSPADM

## 2021-07-29 RX ADMIN — DENOSUMAB 60 MG: 60 INJECTION SUBCUTANEOUS at 11:07

## 2021-07-29 RX ADMIN — TRIAMCINOLONE ACETONIDE 40 MG: 40 INJECTION, SUSPENSION INTRA-ARTICULAR; INTRAMUSCULAR at 10:07

## 2021-08-03 ENCOUNTER — OFFICE VISIT (OUTPATIENT)
Dept: OPHTHALMOLOGY | Facility: CLINIC | Age: 86
End: 2021-08-03
Payer: MEDICARE

## 2021-08-03 DIAGNOSIS — Z96.1 PSEUDOPHAKIA OF BOTH EYES: Primary | ICD-10-CM

## 2021-08-03 DIAGNOSIS — M35.01 KERATITIS SICCA, BILATERAL: ICD-10-CM

## 2021-08-03 PROCEDURE — 1157F PR ADVANCE CARE PLAN OR EQUIV PRESENT IN MEDICAL RECORD: ICD-10-PCS | Mod: CPTII,S$GLB,, | Performed by: OPHTHALMOLOGY

## 2021-08-03 PROCEDURE — 92014 PR EYE EXAM, EST PATIENT,COMPREHESV: ICD-10-PCS | Mod: S$GLB,,, | Performed by: OPHTHALMOLOGY

## 2021-08-03 PROCEDURE — 92014 COMPRE OPH EXAM EST PT 1/>: CPT | Mod: S$GLB,,, | Performed by: OPHTHALMOLOGY

## 2021-08-03 PROCEDURE — 1159F MED LIST DOCD IN RCRD: CPT | Mod: CPTII,S$GLB,, | Performed by: OPHTHALMOLOGY

## 2021-08-03 PROCEDURE — 1160F RVW MEDS BY RX/DR IN RCRD: CPT | Mod: CPTII,S$GLB,, | Performed by: OPHTHALMOLOGY

## 2021-08-03 PROCEDURE — 99999 PR PBB SHADOW E&M-EST. PATIENT-LVL III: ICD-10-PCS | Mod: PBBFAC,,, | Performed by: OPHTHALMOLOGY

## 2021-08-03 PROCEDURE — 1160F PR REVIEW ALL MEDS BY PRESCRIBER/CLIN PHARMACIST DOCUMENTED: ICD-10-PCS | Mod: CPTII,S$GLB,, | Performed by: OPHTHALMOLOGY

## 2021-08-03 PROCEDURE — 1157F ADVNC CARE PLAN IN RCRD: CPT | Mod: CPTII,S$GLB,, | Performed by: OPHTHALMOLOGY

## 2021-08-03 PROCEDURE — 1159F PR MEDICATION LIST DOCUMENTED IN MEDICAL RECORD: ICD-10-PCS | Mod: CPTII,S$GLB,, | Performed by: OPHTHALMOLOGY

## 2021-08-03 PROCEDURE — 99999 PR PBB SHADOW E&M-EST. PATIENT-LVL III: CPT | Mod: PBBFAC,,, | Performed by: OPHTHALMOLOGY

## 2021-08-03 RX ORDER — CYCLOSPORINE 0.5 MG/ML
1 EMULSION OPHTHALMIC 2 TIMES DAILY
Qty: 60 EACH | Refills: 12 | Status: SHIPPED | OUTPATIENT
Start: 2021-08-03 | End: 2022-10-25

## 2021-08-03 RX ORDER — ERYTHROMYCIN 5 MG/G
OINTMENT OPHTHALMIC NIGHTLY PRN
Qty: 3.5 G | Refills: 6 | Status: SHIPPED | OUTPATIENT
Start: 2021-08-03 | End: 2022-01-31

## 2021-08-06 ENCOUNTER — PATIENT MESSAGE (OUTPATIENT)
Dept: PAIN MEDICINE | Facility: CLINIC | Age: 86
End: 2021-08-06

## 2021-08-06 ENCOUNTER — TELEPHONE (OUTPATIENT)
Dept: PAIN MEDICINE | Facility: CLINIC | Age: 86
End: 2021-08-06

## 2021-08-24 ENCOUNTER — TELEPHONE (OUTPATIENT)
Dept: RESEARCH | Facility: HOSPITAL | Age: 86
End: 2021-08-24

## 2021-09-10 ENCOUNTER — TELEPHONE (OUTPATIENT)
Dept: FAMILY MEDICINE | Facility: CLINIC | Age: 86
End: 2021-09-10

## 2021-10-12 ENCOUNTER — OFFICE VISIT (OUTPATIENT)
Dept: FAMILY MEDICINE | Facility: CLINIC | Age: 86
End: 2021-10-12
Payer: MEDICARE

## 2021-10-12 ENCOUNTER — HOSPITAL ENCOUNTER (OUTPATIENT)
Dept: RADIOLOGY | Facility: HOSPITAL | Age: 86
Discharge: HOME | End: 2021-10-12
Attending: INTERNAL MEDICINE
Payer: MEDICARE

## 2021-10-12 VITALS
DIASTOLIC BLOOD PRESSURE: 80 MMHG | SYSTOLIC BLOOD PRESSURE: 160 MMHG | HEIGHT: 63 IN | HEART RATE: 70 BPM | RESPIRATION RATE: 18 BRPM | BODY MASS INDEX: 29.3 KG/M2 | OXYGEN SATURATION: 95 % | WEIGHT: 165.38 LBS | TEMPERATURE: 98 F

## 2021-10-12 DIAGNOSIS — R05.9 COUGH: ICD-10-CM

## 2021-10-12 DIAGNOSIS — J41.0 SIMPLE CHRONIC BRONCHITIS: ICD-10-CM

## 2021-10-12 DIAGNOSIS — R06.00 DYSPNEA, UNSPECIFIED TYPE: ICD-10-CM

## 2021-10-12 DIAGNOSIS — M54.16 LUMBAR RADICULOPATHY: ICD-10-CM

## 2021-10-12 DIAGNOSIS — Z00.00 ENCOUNTER FOR PREVENTIVE HEALTH EXAMINATION: ICD-10-CM

## 2021-10-12 DIAGNOSIS — M80.00XG AGE-RELATED OSTEOPOROSIS WITH CURRENT PATHOLOGICAL FRACTURE WITH DELAYED HEALING: ICD-10-CM

## 2021-10-12 DIAGNOSIS — N18.31 STAGE 3A CHRONIC KIDNEY DISEASE: ICD-10-CM

## 2021-10-12 DIAGNOSIS — E03.9 ACQUIRED HYPOTHYROIDISM: ICD-10-CM

## 2021-10-12 DIAGNOSIS — E55.9 VITAMIN D DEFICIENCY DISEASE: ICD-10-CM

## 2021-10-12 DIAGNOSIS — F32.5 MAJOR DEPRESSIVE DISORDER IN FULL REMISSION, UNSPECIFIED WHETHER RECURRENT: ICD-10-CM

## 2021-10-12 DIAGNOSIS — I10 ESSENTIAL HYPERTENSION: Primary | Chronic | ICD-10-CM

## 2021-10-12 DIAGNOSIS — F41.9 ANXIETY: ICD-10-CM

## 2021-10-12 PROCEDURE — 71046 X-RAY EXAM CHEST 2 VIEWS: CPT | Mod: TC,HCNC,FY,PO

## 2021-10-12 PROCEDURE — 71046 X-RAY EXAM CHEST 2 VIEWS: CPT | Mod: 26,HCNC,, | Performed by: RADIOLOGY

## 2021-10-12 PROCEDURE — 99999 PR PBB SHADOW E&M-EST. PATIENT-LVL V: ICD-10-PCS | Mod: PBBFAC,HCNC,, | Performed by: INTERNAL MEDICINE

## 2021-10-12 PROCEDURE — 1157F ADVNC CARE PLAN IN RCRD: CPT | Mod: HCNC,CPTII,S$GLB, | Performed by: INTERNAL MEDICINE

## 2021-10-12 PROCEDURE — 1160F PR REVIEW ALL MEDS BY PRESCRIBER/CLIN PHARMACIST DOCUMENTED: ICD-10-PCS | Mod: HCNC,CPTII,S$GLB, | Performed by: INTERNAL MEDICINE

## 2021-10-12 PROCEDURE — 3077F PR MOST RECENT SYSTOLIC BLOOD PRESSURE >= 140 MM HG: ICD-10-PCS | Mod: HCNC,CPTII,S$GLB, | Performed by: INTERNAL MEDICINE

## 2021-10-12 PROCEDURE — 99499 RISK ADDL DX/OHS AUDIT: ICD-10-PCS | Mod: HCNC,S$GLB,, | Performed by: INTERNAL MEDICINE

## 2021-10-12 PROCEDURE — 99999 PR PBB SHADOW E&M-EST. PATIENT-LVL V: CPT | Mod: PBBFAC,HCNC,, | Performed by: INTERNAL MEDICINE

## 2021-10-12 PROCEDURE — 1159F MED LIST DOCD IN RCRD: CPT | Mod: HCNC,CPTII,S$GLB, | Performed by: INTERNAL MEDICINE

## 2021-10-12 PROCEDURE — 3288F PR FALLS RISK ASSESSMENT DOCUMENTED: ICD-10-PCS | Mod: HCNC,CPTII,S$GLB, | Performed by: INTERNAL MEDICINE

## 2021-10-12 PROCEDURE — 1126F PR PAIN SEVERITY QUANTIFIED, NO PAIN PRESENT: ICD-10-PCS | Mod: HCNC,CPTII,S$GLB, | Performed by: INTERNAL MEDICINE

## 2021-10-12 PROCEDURE — 71046 XR CHEST PA AND LATERAL: ICD-10-PCS | Mod: 26,HCNC,, | Performed by: RADIOLOGY

## 2021-10-12 PROCEDURE — 1159F PR MEDICATION LIST DOCUMENTED IN MEDICAL RECORD: ICD-10-PCS | Mod: HCNC,CPTII,S$GLB, | Performed by: INTERNAL MEDICINE

## 2021-10-12 PROCEDURE — 1101F PT FALLS ASSESS-DOCD LE1/YR: CPT | Mod: HCNC,CPTII,S$GLB, | Performed by: INTERNAL MEDICINE

## 2021-10-12 PROCEDURE — 3079F PR MOST RECENT DIASTOLIC BLOOD PRESSURE 80-89 MM HG: ICD-10-PCS | Mod: HCNC,CPTII,S$GLB, | Performed by: INTERNAL MEDICINE

## 2021-10-12 PROCEDURE — 1126F AMNT PAIN NOTED NONE PRSNT: CPT | Mod: HCNC,CPTII,S$GLB, | Performed by: INTERNAL MEDICINE

## 2021-10-12 PROCEDURE — 99499 UNLISTED E&M SERVICE: CPT | Mod: HCNC,S$GLB,, | Performed by: INTERNAL MEDICINE

## 2021-10-12 PROCEDURE — 1101F PR PT FALLS ASSESS DOC 0-1 FALLS W/OUT INJ PAST YR: ICD-10-PCS | Mod: HCNC,CPTII,S$GLB, | Performed by: INTERNAL MEDICINE

## 2021-10-12 PROCEDURE — 3077F SYST BP >= 140 MM HG: CPT | Mod: HCNC,CPTII,S$GLB, | Performed by: INTERNAL MEDICINE

## 2021-10-12 PROCEDURE — 3288F FALL RISK ASSESSMENT DOCD: CPT | Mod: HCNC,CPTII,S$GLB, | Performed by: INTERNAL MEDICINE

## 2021-10-12 PROCEDURE — 99214 OFFICE O/P EST MOD 30 MIN: CPT | Mod: HCNC,S$GLB,, | Performed by: INTERNAL MEDICINE

## 2021-10-12 PROCEDURE — 1157F PR ADVANCE CARE PLAN OR EQUIV PRESENT IN MEDICAL RECORD: ICD-10-PCS | Mod: HCNC,CPTII,S$GLB, | Performed by: INTERNAL MEDICINE

## 2021-10-12 PROCEDURE — 1160F RVW MEDS BY RX/DR IN RCRD: CPT | Mod: HCNC,CPTII,S$GLB, | Performed by: INTERNAL MEDICINE

## 2021-10-12 PROCEDURE — 3079F DIAST BP 80-89 MM HG: CPT | Mod: HCNC,CPTII,S$GLB, | Performed by: INTERNAL MEDICINE

## 2021-10-12 PROCEDURE — 99214 PR OFFICE/OUTPT VISIT, EST, LEVL IV, 30-39 MIN: ICD-10-PCS | Mod: HCNC,S$GLB,, | Performed by: INTERNAL MEDICINE

## 2021-10-12 RX ORDER — LEVOTHYROXINE SODIUM 100 UG/1
100 TABLET ORAL DAILY
Qty: 90 TABLET | Refills: 3 | Status: SHIPPED | OUTPATIENT
Start: 2021-10-12 | End: 2022-02-24 | Stop reason: SDUPTHER

## 2021-10-13 ENCOUNTER — PATIENT MESSAGE (OUTPATIENT)
Dept: FAMILY MEDICINE | Facility: CLINIC | Age: 86
End: 2021-10-13
Payer: MEDICARE

## 2021-10-13 DIAGNOSIS — J41.0 SIMPLE CHRONIC BRONCHITIS: Primary | ICD-10-CM

## 2021-10-13 DIAGNOSIS — R05.9 COUGH: ICD-10-CM

## 2021-10-26 ENCOUNTER — OFFICE VISIT (OUTPATIENT)
Dept: FAMILY MEDICINE | Facility: CLINIC | Age: 86
End: 2021-10-26
Payer: MEDICARE

## 2021-10-26 VITALS
BODY MASS INDEX: 29.3 KG/M2 | OXYGEN SATURATION: 96 % | WEIGHT: 165.38 LBS | SYSTOLIC BLOOD PRESSURE: 124 MMHG | DIASTOLIC BLOOD PRESSURE: 74 MMHG | HEART RATE: 66 BPM | HEIGHT: 63 IN | TEMPERATURE: 98 F | RESPIRATION RATE: 18 BRPM

## 2021-10-26 DIAGNOSIS — N18.31 STAGE 3A CHRONIC KIDNEY DISEASE: ICD-10-CM

## 2021-10-26 DIAGNOSIS — F41.9 ANXIETY: ICD-10-CM

## 2021-10-26 DIAGNOSIS — I10 ESSENTIAL HYPERTENSION: Primary | Chronic | ICD-10-CM

## 2021-10-26 DIAGNOSIS — E03.9 ACQUIRED HYPOTHYROIDISM: ICD-10-CM

## 2021-10-26 PROCEDURE — 1157F PR ADVANCE CARE PLAN OR EQUIV PRESENT IN MEDICAL RECORD: ICD-10-PCS | Mod: HCNC,CPTII,S$GLB, | Performed by: INTERNAL MEDICINE

## 2021-10-26 PROCEDURE — 1159F PR MEDICATION LIST DOCUMENTED IN MEDICAL RECORD: ICD-10-PCS | Mod: HCNC,CPTII,S$GLB, | Performed by: INTERNAL MEDICINE

## 2021-10-26 PROCEDURE — 1157F ADVNC CARE PLAN IN RCRD: CPT | Mod: HCNC,CPTII,S$GLB, | Performed by: INTERNAL MEDICINE

## 2021-10-26 PROCEDURE — 99499 UNLISTED E&M SERVICE: CPT | Mod: HCNC,S$GLB,, | Performed by: INTERNAL MEDICINE

## 2021-10-26 PROCEDURE — 99213 PR OFFICE/OUTPT VISIT, EST, LEVL III, 20-29 MIN: ICD-10-PCS | Mod: HCNC,S$GLB,, | Performed by: INTERNAL MEDICINE

## 2021-10-26 PROCEDURE — 3078F DIAST BP <80 MM HG: CPT | Mod: HCNC,CPTII,S$GLB, | Performed by: INTERNAL MEDICINE

## 2021-10-26 PROCEDURE — 99999 PR PBB SHADOW E&M-EST. PATIENT-LVL V: CPT | Mod: PBBFAC,HCNC,, | Performed by: INTERNAL MEDICINE

## 2021-10-26 PROCEDURE — 3074F SYST BP LT 130 MM HG: CPT | Mod: HCNC,CPTII,S$GLB, | Performed by: INTERNAL MEDICINE

## 2021-10-26 PROCEDURE — 3078F PR MOST RECENT DIASTOLIC BLOOD PRESSURE < 80 MM HG: ICD-10-PCS | Mod: HCNC,CPTII,S$GLB, | Performed by: INTERNAL MEDICINE

## 2021-10-26 PROCEDURE — 1159F MED LIST DOCD IN RCRD: CPT | Mod: HCNC,CPTII,S$GLB, | Performed by: INTERNAL MEDICINE

## 2021-10-26 PROCEDURE — 3288F PR FALLS RISK ASSESSMENT DOCUMENTED: ICD-10-PCS | Mod: HCNC,CPTII,S$GLB, | Performed by: INTERNAL MEDICINE

## 2021-10-26 PROCEDURE — 1101F PT FALLS ASSESS-DOCD LE1/YR: CPT | Mod: HCNC,CPTII,S$GLB, | Performed by: INTERNAL MEDICINE

## 2021-10-26 PROCEDURE — 1126F PR PAIN SEVERITY QUANTIFIED, NO PAIN PRESENT: ICD-10-PCS | Mod: HCNC,CPTII,S$GLB, | Performed by: INTERNAL MEDICINE

## 2021-10-26 PROCEDURE — 3288F FALL RISK ASSESSMENT DOCD: CPT | Mod: HCNC,CPTII,S$GLB, | Performed by: INTERNAL MEDICINE

## 2021-10-26 PROCEDURE — 3074F PR MOST RECENT SYSTOLIC BLOOD PRESSURE < 130 MM HG: ICD-10-PCS | Mod: HCNC,CPTII,S$GLB, | Performed by: INTERNAL MEDICINE

## 2021-10-26 PROCEDURE — 99213 OFFICE O/P EST LOW 20 MIN: CPT | Mod: HCNC,S$GLB,, | Performed by: INTERNAL MEDICINE

## 2021-10-26 PROCEDURE — 1101F PR PT FALLS ASSESS DOC 0-1 FALLS W/OUT INJ PAST YR: ICD-10-PCS | Mod: HCNC,CPTII,S$GLB, | Performed by: INTERNAL MEDICINE

## 2021-10-26 PROCEDURE — 1126F AMNT PAIN NOTED NONE PRSNT: CPT | Mod: HCNC,CPTII,S$GLB, | Performed by: INTERNAL MEDICINE

## 2021-10-26 PROCEDURE — 99499 RISK ADDL DX/OHS AUDIT: ICD-10-PCS | Mod: HCNC,S$GLB,, | Performed by: INTERNAL MEDICINE

## 2021-10-26 PROCEDURE — 99999 PR PBB SHADOW E&M-EST. PATIENT-LVL V: ICD-10-PCS | Mod: PBBFAC,HCNC,, | Performed by: INTERNAL MEDICINE

## 2021-11-16 NOTE — LETTER
December 17, 2018    Clau Nunn  [address] [address]             [facility] [address] [place] [address] Dear Ms. Nunn,            I work with [facility] Outpatient Case Management Department. We received a referral to call you to discuss your medical history.These services are free of charge and are offered to [facility] patients who have recently been discharged from any of our facilities or who have complex medical conditions that may require the skill of a nurse to assist with management.         .           Please call our department so that we can go over some questions with you regarding your health.    The Outpatient Case Management Department can be reached at [phone] from 8:00AM to 4:30 PM on Monday thru Friday. [facility] also has a program where a nurse is available 24/7 to answer questions or provide medical advice, their number is [phone].    Thanks,    Mikayla Babin RN   Outpatient Care Management           QTc [phone]

## 2021-11-24 ENCOUNTER — TELEPHONE (OUTPATIENT)
Dept: RHEUMATOLOGY | Facility: CLINIC | Age: 86
End: 2021-11-24
Payer: MEDICARE

## 2021-11-24 ENCOUNTER — PATIENT MESSAGE (OUTPATIENT)
Dept: RHEUMATOLOGY | Facility: CLINIC | Age: 86
End: 2021-11-24
Payer: MEDICARE

## 2021-12-08 ENCOUNTER — TELEPHONE (OUTPATIENT)
Dept: RHEUMATOLOGY | Facility: CLINIC | Age: 86
End: 2021-12-08
Payer: MEDICARE

## 2021-12-20 ENCOUNTER — PATIENT OUTREACH (OUTPATIENT)
Dept: ADMINISTRATIVE | Facility: OTHER | Age: 86
End: 2021-12-20
Payer: MEDICARE

## 2021-12-20 ENCOUNTER — OFFICE VISIT (OUTPATIENT)
Dept: UROLOGY | Facility: CLINIC | Age: 86
End: 2021-12-20
Payer: MEDICARE

## 2021-12-20 VITALS
DIASTOLIC BLOOD PRESSURE: 80 MMHG | WEIGHT: 168.88 LBS | BODY MASS INDEX: 29.91 KG/M2 | SYSTOLIC BLOOD PRESSURE: 160 MMHG

## 2021-12-20 DIAGNOSIS — N32.81 OAB (OVERACTIVE BLADDER): ICD-10-CM

## 2021-12-20 DIAGNOSIS — N39.0 RECURRENT UTI: Primary | ICD-10-CM

## 2021-12-20 PROCEDURE — 99999 PR PBB SHADOW E&M-EST. PATIENT-LVL IV: CPT | Mod: PBBFAC,HCNC,, | Performed by: UROLOGY

## 2021-12-20 PROCEDURE — 99204 PR OFFICE/OUTPT VISIT, NEW, LEVL IV, 45-59 MIN: ICD-10-PCS | Mod: HCNC,S$GLB,, | Performed by: UROLOGY

## 2021-12-20 PROCEDURE — 1157F ADVNC CARE PLAN IN RCRD: CPT | Mod: HCNC,CPTII,S$GLB, | Performed by: UROLOGY

## 2021-12-20 PROCEDURE — 99204 OFFICE O/P NEW MOD 45 MIN: CPT | Mod: HCNC,S$GLB,, | Performed by: UROLOGY

## 2021-12-20 PROCEDURE — 87186 SC STD MICRODIL/AGAR DIL: CPT | Mod: HCNC | Performed by: UROLOGY

## 2021-12-20 PROCEDURE — 87088 URINE BACTERIA CULTURE: CPT | Mod: HCNC | Performed by: UROLOGY

## 2021-12-20 PROCEDURE — 87077 CULTURE AEROBIC IDENTIFY: CPT | Mod: HCNC | Performed by: UROLOGY

## 2021-12-20 PROCEDURE — 99999 PR PBB SHADOW E&M-EST. PATIENT-LVL IV: ICD-10-PCS | Mod: PBBFAC,HCNC,, | Performed by: UROLOGY

## 2021-12-20 PROCEDURE — 1157F PR ADVANCE CARE PLAN OR EQUIV PRESENT IN MEDICAL RECORD: ICD-10-PCS | Mod: HCNC,CPTII,S$GLB, | Performed by: UROLOGY

## 2021-12-20 PROCEDURE — 87086 URINE CULTURE/COLONY COUNT: CPT | Mod: HCNC | Performed by: UROLOGY

## 2021-12-20 RX ORDER — OXYBUTYNIN CHLORIDE 5 MG/1
5 TABLET, EXTENDED RELEASE ORAL DAILY
Qty: 30 TABLET | Refills: 2 | Status: SHIPPED | OUTPATIENT
Start: 2021-12-20 | End: 2023-02-14

## 2021-12-21 RX ORDER — NITROFURANTOIN 25; 75 MG/1; MG/1
100 CAPSULE ORAL 2 TIMES DAILY
Qty: 14 CAPSULE | Refills: 0 | Status: SHIPPED | OUTPATIENT
Start: 2021-12-21 | End: 2022-04-06

## 2021-12-22 ENCOUNTER — TELEPHONE (OUTPATIENT)
Dept: UROLOGY | Facility: CLINIC | Age: 86
End: 2021-12-22
Payer: MEDICARE

## 2021-12-23 ENCOUNTER — TELEPHONE (OUTPATIENT)
Dept: UROLOGY | Facility: CLINIC | Age: 86
End: 2021-12-23
Payer: MEDICARE

## 2021-12-23 LAB — BACTERIA UR CULT: ABNORMAL

## 2021-12-24 ENCOUNTER — PATIENT MESSAGE (OUTPATIENT)
Dept: UROLOGY | Facility: CLINIC | Age: 86
End: 2021-12-24
Payer: MEDICARE

## 2021-12-27 ENCOUNTER — PATIENT MESSAGE (OUTPATIENT)
Dept: UROLOGY | Facility: CLINIC | Age: 86
End: 2021-12-27
Payer: MEDICARE

## 2022-01-04 ENCOUNTER — HOSPITAL ENCOUNTER (OUTPATIENT)
Dept: RADIOLOGY | Facility: HOSPITAL | Age: 87
Discharge: HOME | End: 2022-01-04
Attending: UROLOGY
Payer: MEDICARE

## 2022-01-04 DIAGNOSIS — N39.0 RECURRENT UTI: ICD-10-CM

## 2022-01-04 PROCEDURE — 76770 US EXAM ABDO BACK WALL COMP: CPT | Mod: 26,HCNC,, | Performed by: RADIOLOGY

## 2022-01-04 PROCEDURE — 76770 US RETROPERITONEAL COMPLETE: ICD-10-PCS | Mod: 26,HCNC,, | Performed by: RADIOLOGY

## 2022-01-04 PROCEDURE — 76770 US EXAM ABDO BACK WALL COMP: CPT | Mod: TC,HCNC

## 2022-01-27 ENCOUNTER — TELEPHONE (OUTPATIENT)
Dept: RHEUMATOLOGY | Facility: CLINIC | Age: 87
End: 2022-01-27
Payer: MEDICARE

## 2022-01-27 NOTE — TELEPHONE ENCOUNTER
----- Message from Venecia Hughes sent at 1/27/2022  9:58 AM CST -----  Contact: [phone] Patient  Pt is requesting to schedule her 01/31 appt earlier that 2 pm. Please call and advise.

## 2022-01-27 NOTE — TELEPHONE ENCOUNTER
Spoke to pt. Advised pt that she can come at 12:00 and do her labs at 11:30 since she has to get her granddaughter from school Pt verbalized understanding.

## 2022-01-31 ENCOUNTER — TELEPHONE (OUTPATIENT)
Dept: RHEUMATOLOGY | Facility: CLINIC | Age: 87
End: 2022-01-31

## 2022-01-31 ENCOUNTER — OFFICE VISIT (OUTPATIENT)
Dept: RHEUMATOLOGY | Facility: CLINIC | Age: 87
End: 2022-01-31
Payer: MEDICARE

## 2022-01-31 ENCOUNTER — INFUSION (OUTPATIENT)
Dept: INFUSION THERAPY | Facility: HOSPITAL | Age: 87
End: 2022-01-31
Attending: PHYSICIAN ASSISTANT
Payer: MEDICARE

## 2022-01-31 ENCOUNTER — PATIENT OUTREACH (OUTPATIENT)
Dept: ADMINISTRATIVE | Facility: OTHER | Age: 87
End: 2022-01-31
Payer: MEDICARE

## 2022-01-31 ENCOUNTER — TELEPHONE (OUTPATIENT)
Dept: RHEUMATOLOGY | Facility: CLINIC | Age: 87
End: 2022-01-31
Payer: MEDICARE

## 2022-01-31 VITALS
HEART RATE: 61 BPM | SYSTOLIC BLOOD PRESSURE: 124 MMHG | WEIGHT: 165.38 LBS | HEIGHT: 63 IN | BODY MASS INDEX: 29.3 KG/M2 | DIASTOLIC BLOOD PRESSURE: 64 MMHG

## 2022-01-31 VITALS
OXYGEN SATURATION: 97 % | TEMPERATURE: 98 F | RESPIRATION RATE: 16 BRPM | HEART RATE: 61 BPM | SYSTOLIC BLOOD PRESSURE: 122 MMHG | DIASTOLIC BLOOD PRESSURE: 71 MMHG

## 2022-01-31 DIAGNOSIS — N18.31 STAGE 3A CHRONIC KIDNEY DISEASE: ICD-10-CM

## 2022-01-31 DIAGNOSIS — M17.0 PRIMARY OSTEOARTHRITIS OF BOTH KNEES: ICD-10-CM

## 2022-01-31 DIAGNOSIS — M80.00XG AGE-RELATED OSTEOPOROSIS WITH CURRENT PATHOLOGICAL FRACTURE WITH DELAYED HEALING: ICD-10-CM

## 2022-01-31 DIAGNOSIS — M15.9 PRIMARY OSTEOARTHRITIS INVOLVING MULTIPLE JOINTS: ICD-10-CM

## 2022-01-31 DIAGNOSIS — Z51.81 MEDICATION MONITORING ENCOUNTER: Primary | ICD-10-CM

## 2022-01-31 DIAGNOSIS — M80.00XG AGE-RELATED OSTEOPOROSIS WITH CURRENT PATHOLOGICAL FRACTURE WITH DELAYED HEALING: Primary | ICD-10-CM

## 2022-01-31 PROCEDURE — 63600175 PHARM REV CODE 636 W HCPCS: Mod: JG,HCNC | Performed by: PHYSICIAN ASSISTANT

## 2022-01-31 PROCEDURE — 1101F PT FALLS ASSESS-DOCD LE1/YR: CPT | Mod: HCNC,CPTII,S$GLB, | Performed by: PHYSICIAN ASSISTANT

## 2022-01-31 PROCEDURE — 3074F PR MOST RECENT SYSTOLIC BLOOD PRESSURE < 130 MM HG: ICD-10-PCS | Mod: HCNC,CPTII,S$GLB, | Performed by: PHYSICIAN ASSISTANT

## 2022-01-31 PROCEDURE — 96372 THER/PROPH/DIAG INJ SC/IM: CPT | Mod: HCNC

## 2022-01-31 PROCEDURE — 99214 PR OFFICE/OUTPT VISIT, EST, LEVL IV, 30-39 MIN: ICD-10-PCS | Mod: 25,HCNC,S$GLB, | Performed by: PHYSICIAN ASSISTANT

## 2022-01-31 PROCEDURE — 1157F ADVNC CARE PLAN IN RCRD: CPT | Mod: HCNC,CPTII,S$GLB, | Performed by: PHYSICIAN ASSISTANT

## 2022-01-31 PROCEDURE — 1157F PR ADVANCE CARE PLAN OR EQUIV PRESENT IN MEDICAL RECORD: ICD-10-PCS | Mod: HCNC,CPTII,S$GLB, | Performed by: PHYSICIAN ASSISTANT

## 2022-01-31 PROCEDURE — 3078F PR MOST RECENT DIASTOLIC BLOOD PRESSURE < 80 MM HG: ICD-10-PCS | Mod: HCNC,CPTII,S$GLB, | Performed by: PHYSICIAN ASSISTANT

## 2022-01-31 PROCEDURE — 99999 PR PBB SHADOW E&M-EST. PATIENT-LVL IV: CPT | Mod: PBBFAC,HCNC,, | Performed by: PHYSICIAN ASSISTANT

## 2022-01-31 PROCEDURE — 96372 THER/PROPH/DIAG INJ SC/IM: CPT | Mod: HCNC,59,S$GLB, | Performed by: PHYSICIAN ASSISTANT

## 2022-01-31 PROCEDURE — 3288F PR FALLS RISK ASSESSMENT DOCUMENTED: ICD-10-PCS | Mod: HCNC,CPTII,S$GLB, | Performed by: PHYSICIAN ASSISTANT

## 2022-01-31 PROCEDURE — 96372 PR INJECTION,THERAP/PROPH/DIAG2ST, IM OR SUBCUT: ICD-10-PCS | Mod: HCNC,59,S$GLB, | Performed by: PHYSICIAN ASSISTANT

## 2022-01-31 PROCEDURE — 3078F DIAST BP <80 MM HG: CPT | Mod: HCNC,CPTII,S$GLB, | Performed by: PHYSICIAN ASSISTANT

## 2022-01-31 PROCEDURE — 20610 LARGE JOINT ASPIRATION/INJECTION: BILATERAL KNEE: ICD-10-PCS | Mod: 50,HCNC,S$GLB, | Performed by: PHYSICIAN ASSISTANT

## 2022-01-31 PROCEDURE — 1126F PR PAIN SEVERITY QUANTIFIED, NO PAIN PRESENT: ICD-10-PCS | Mod: HCNC,CPTII,S$GLB, | Performed by: PHYSICIAN ASSISTANT

## 2022-01-31 PROCEDURE — 99214 OFFICE O/P EST MOD 30 MIN: CPT | Mod: 25,HCNC,S$GLB, | Performed by: PHYSICIAN ASSISTANT

## 2022-01-31 PROCEDURE — 99999 PR PBB SHADOW E&M-EST. PATIENT-LVL IV: ICD-10-PCS | Mod: PBBFAC,HCNC,, | Performed by: PHYSICIAN ASSISTANT

## 2022-01-31 PROCEDURE — 3288F FALL RISK ASSESSMENT DOCD: CPT | Mod: HCNC,CPTII,S$GLB, | Performed by: PHYSICIAN ASSISTANT

## 2022-01-31 PROCEDURE — 1101F PR PT FALLS ASSESS DOC 0-1 FALLS W/OUT INJ PAST YR: ICD-10-PCS | Mod: HCNC,CPTII,S$GLB, | Performed by: PHYSICIAN ASSISTANT

## 2022-01-31 PROCEDURE — 3074F SYST BP LT 130 MM HG: CPT | Mod: HCNC,CPTII,S$GLB, | Performed by: PHYSICIAN ASSISTANT

## 2022-01-31 PROCEDURE — 20610 DRAIN/INJ JOINT/BURSA W/O US: CPT | Mod: 50,HCNC,S$GLB, | Performed by: PHYSICIAN ASSISTANT

## 2022-01-31 PROCEDURE — 1126F AMNT PAIN NOTED NONE PRSNT: CPT | Mod: HCNC,CPTII,S$GLB, | Performed by: PHYSICIAN ASSISTANT

## 2022-01-31 RX ORDER — BETAMETHASONE SODIUM PHOSPHATE AND BETAMETHASONE ACETATE 3; 3 MG/ML; MG/ML
6 INJECTION, SUSPENSION INTRA-ARTICULAR; INTRALESIONAL; INTRAMUSCULAR; SOFT TISSUE
Status: COMPLETED | OUTPATIENT
Start: 2022-01-31 | End: 2022-01-31

## 2022-01-31 RX ADMIN — DENOSUMAB 60 MG: 60 INJECTION SUBCUTANEOUS at 01:01

## 2022-01-31 RX ADMIN — BETAMETHASONE SODIUM PHOSPHATE AND BETAMETHASONE ACETATE 6 MG: 3; 3 INJECTION, SUSPENSION INTRA-ARTICULAR; INTRALESIONAL; INTRAMUSCULAR; SOFT TISSUE at 01:01

## 2022-01-31 NOTE — TELEPHONE ENCOUNTER
Jasmin Stone LPN  You 22 minutes ago (10:50 AM)     KILLIAN Parekh I believe this medication is given only in clinic correct? If so medications that are given in clinic are billed through the patients pharmacy benefits not medical benefits so the auth doesn't go to our pre services department unless the patient is being treated in the infusion room. I know insurance can be super confusing so if you need to call me so I can explain further my number is [phone].     Jasmin Agosto     Message text

## 2022-01-31 NOTE — TELEPHONE ENCOUNTER
Pt also has a monovisc knee injection today? Is that something that the infusion authorization approves as well

## 2022-01-31 NOTE — PROGRESS NOTES
RHEUMATOLOGY OUTPATIENT CLINIC NOTE    1/31/2022    Attending Rheumatologist: Harika Ceron PA-C  Primary Care Provider: Jeanette Gomez MD   Physician Requesting Consultation: Gordo Alvarez MD  Chief Complaint/Reason For Consultation:  Osteoporosis      Subjective:       HPI  Clau Nunn is a very pleasant 86 y.o. female Mrs. Nunn is here for f/u for osteoporosis and OA. She is scheduled for rebecca monovisc today- initially ordered by LAINE Mora PA-C at her last visit on 7/29/21. Today is my first visit with this patient and the decision was made to proceed with the rebecca monovisc today as she has benefited from these previously.      last dexa showed osteopenia with high fracture risk.     h/o of a right hip fx, right tibia fx, spine fxs s/p trauma and left wrist fx.  2018 fell-  fx right distal radius (ORIF) and right sacrum.  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      1st Prolia feb 2017 then was off for short while when we did forteo trial,  Last injection done 1/28/21 Prolia (#8 total);      low vit d on 50K units weekly, 7/2020 vit D3 was 26- I recommended she get Vit D3 2000 IU per day; she has added vit D3 just unsure exact dose; still Taking otc Ca plus D once daily      Leg pain biggest issue;  Pain today 8/10 rebecca legs; knees and shins   DDD/DJD spine- mri shows diffuse multilevel lumbar spondylosis  Dr valdez injected her back which has helped  Left knee posterior pain - popliteal cyst noted  Rebecca knee OA on x-ray tricompartmental- gel injections have helped  She also has osteoarthritis in multiple  Joints,  "hips, hands, knees, and chronic back pain (two surgeries s/p trauma) takes gabapentin at night.   She is taking tumeric and tylenol.  Some times aleve or ibuprofen   We recommend no nsaids w/ckd     Vitals:    01/31/22 1148   BP: 124/64   Pulse: 61   Weight: 75 kg (165 lb 5.5 oz)   Height: 5' 3" (1.6 m)     Review of Systems   Constitutional: Negative for chills, fever and weight loss.   Eyes: Negative for pain and redness.   Respiratory: Negative for cough and shortness of breath.    Cardiovascular: Negative for chest pain.   Gastrointestinal: Negative for blood in stool and melena.   Genitourinary: Negative for dysuria and hematuria.   Musculoskeletal: Positive for joint pain and myalgias. Negative for falls.   Skin: Negative for rash.     Chronic comorbid conditions affecting medical decision making today:  Past Medical History:   Diagnosis Date    Allergy     Anxiety     Asthma     Back pain     Chronic venous insufficiency 11/19/2013    Depression     Diverticulosis 11/19/2013 1/8/8 colonoscopy    Dry eyes     Fracture, sacrum/coccyx     Dr. Sanchez     GERD (gastroesophageal reflux disease)     H/O total hip arthroplasty 12/30/2015    Hypertension     Hypothyroid     Osteoarthritis     Osteoporosis     Postlaminectomy syndrome of lumbar region 1/8/2014    Radius fracture     7/18    Simple chronic bronchitis 5/3/2017    Umbilical hernia 11/19/2013    Urinary incontinence     Vitamin D deficiency disease      Past Surgical History:   Procedure Laterality Date    ADENOIDECTOMY      BACK SURGERY      x2    breast implants  1973    CATARACT EXTRACTION Bilateral     CLOSED REDUCTION DISTAL RADIUS FRACTURE      Dr. Black, 8/18    cystoscope  1/6/16    DR. Brown    FRACTURE SURGERY  April 3 2015    left wrist    HAND SURGERY      HIP SURGERY Right     total hip- Dr. Dos Santos    HYSTERECTOMY      36y/o    INJECTION OF ANESTHETIC AGENT AROUND NERVE Right 9/16/2020    Procedure: " Right suprascapular nerve block;  Surgeon: Raffi Wells MD;  Location: [facility];  Service: Pain Management;  Laterality: Right;    INJECTION OF ANESTHETIC AGENT AROUND NERVE Right 7/13/2021    Procedure: Block, Nerve Right Suprascapular Nerve Block with RN IV sedation;  Surgeon: Raffi Wells MD;  Location: [facility];  Service: Pain Management;  Laterality: Right;    INJECTION OF ANESTHETIC AGENT INTO SACROILIAC JOINT N/A 8/12/2020    Procedure: Left IA hip Joint + Left SIJ + Right shoulder injection;  Surgeon: Raffi Wells MD;  Location: [facility];  Service: Pain Management;  Laterality: N/A;    INJECTION OF JOINT N/A 8/12/2020    Procedure: Left IA hip Joint + Left SIJ + Right shoulder injection;  Surgeon: Raffi Wells MD;  Location: [facility];  Service: Pain Management;  Laterality: N/A;    JOINT REPLACEMENT Right     R NNAMDI - Dr. Dos Santos    LEG SURGERY Right     ex-fix tib/fib    SELECTIVE INJECTION OF ANESTHETIC AGENT AROUND LUMBAR SPINAL NERVE ROOT BY TRANSFORAMINAL APPROACH Bilateral 2/10/2021    Procedure: Bilateral L5/S1 TF REJI;  Surgeon: Raffi Wells MD;  Location: [facility];  Service: Pain Management;  Laterality: Bilateral;    TONSILLECTOMY      TRANSFORAMINAL EPIDURAL INJECTION OF STEROID Left 7/8/2020    Procedure: left L2/3 + L5/S1 TF REJI;  Surgeon: Raffi Wells MD;  Location: [facility];  Service: Pain Management;  Laterality: Left;    TRANSFORAMINAL EPIDURAL INJECTION OF STEROID Left 7/1/2021    Procedure: left L5/S1 + S1 TF REJI;  Surgeon: Raffi Wells MD;  Location: [facility];  Service: Pain Management;  Laterality: Left;     Social History     Substance and Sexual Activity   Alcohol Use Yes    Alcohol/week: 0.0 standard drinks    Comment: rarely     Social History     Tobacco Use   Smoking Status Never Smoker   Smokeless Tobacco Never Used     Social History     Substance and Sexual Activity   Drug Use Never       Current Outpatient Medications:     acetaminophen  (TYLENOL) 500 MG tablet, Take 2 tablets (1,000 mg total) by mouth every 8 (eight) hours as needed for Pain., Disp: 180 tablet, Rfl: 0    albuterol (VENTOLIN HFA) 90 mcg/actuation inhaler, Inhale 2 puffs into the lungs 4 (four) times daily as needed., Disp: 18 g, Rfl: 3    aspirin (ECOTRIN) 81 MG EC tablet, Take 81 mg by mouth once daily., Disp: , Rfl:     back brace Misc, 1 Device by Misc.(Non-Drug; Combo Route) route daily as needed., Disp: 1 each, Rfl: 0    busPIRone (BUSPAR) 5 MG Tab, TAKE 1 TABLET TWICE DAILY, Disp: 180 tablet, Rfl: 5    cholecalciferol, vitamin D3, (D3-2000) 50 mcg (2,000 unit) Cap, Take 1 capsule (2,000 Units total) by mouth once daily., Disp: 90 capsule, Rfl: 11    cyclobenzaprine (FLEXERIL) 5 MG tablet, TAKE 1 TABLET (5 MG TOTAL) BY MOUTH NIGHTLY AS NEEDED FOR MUSCLE SPASMS., Disp: 90 tablet, Rfl: 1    cycloSPORINE (RESTASIS) 0.05 % ophthalmic emulsion, Place 1 drop into both eyes 2 (two) times daily., Disp: 60 each, Rfl: 12    desonide (DESOWEN) 0.05 % cream, Apply topically 2 (two) times daily. To breast/nipple, for 2-4 weeks per course, Disp: 30 g, Rfl: 2    EScitalopram oxalate (LEXAPRO) 20 MG tablet, TAKE 1 TABLET EVERY DAY, Disp: 90 tablet, Rfl: 3    fluocinonide 0.05% (LIDEX) 0.05 % cream, AAA bid to leg for 2-4 weeks per course, Disp: 60 g, Rfl: 1    gabapentin (NEURONTIN) 300 MG capsule, Take 1 capsule (300 mg total) by mouth 3 (three) times daily., Disp: 270 capsule, Rfl: 3    ketoconazole (NIZORAL) 2 % cream, AAA bid to breast/nipple, Disp: 30 g, Rfl: 2    levothyroxine (SYNTHROID) 100 MCG tablet, Take 1 tablet (100 mcg total) by mouth once daily., Disp: 90 tablet, Rfl: 3    losartan (COZAAR) 50 MG tablet, TAKE 1 TABLET EVERY DAY, Disp: 90 tablet, Rfl: 3    nitrofurantoin, macrocrystal-monohydrate, (MACROBID) 100 MG capsule, Take 1 capsule by mouth 2 (two) times daily., Disp: , Rfl:     nitrofurantoin, macrocrystal-monohydrate, (MACROBID) 100 MG capsule, Take 1  capsule (100 mg total) by mouth 2 (two) times daily., Disp: 14 capsule, Rfl: 0    nystatin (MYCOSTATIN) cream, , Disp: , Rfl:     omeprazole (PRILOSEC) 40 MG capsule, TAKE 1 CAPSULE (40 MG TOTAL) BY MOUTH EVERY MORNING., Disp: 90 capsule, Rfl: 3    oxybutynin (DITROPAN-XL) 5 MG TR24, Take 1 tablet (5 mg total) by mouth once daily., Disp: 30 tablet, Rfl: 2    traZODone (DESYREL) 50 MG tablet, TAKE 1 TABLET (50 MG TOTAL) BY MOUTH NIGHTLY AS NEEDED FOR INSOMNIA., Disp: 30 tablet, Rfl: 3    URIBEL 118-10-40.8-36 mg Cap, Take 1 capsule by mouth 4 (four) times daily., Disp: , Rfl:     azelastine (ASTELIN) 137 mcg (0.1 %) nasal spray, , Disp: , Rfl:     fluticasone propionate (FLONASE) 50 mcg/actuation nasal spray, daily as needed. , Disp: , Rfl:     triamcinolone acetonide 0.025% (KENALOG) 0.025 % cream, AAA bid (Patient not taking: Reported on 1/31/2022), Disp: 80 g, Rfl: 0    Current Facility-Administered Medications:     denosumab (PROLIA) injection 60 mg, 60 mg, Subcutaneous, Q6 Months, Carly Mora PA-C, 60 mg at 01/28/19 1548     Objective:         Reviewed old and all outside pertinent medical records available.    Physical Exam  Vitals reviewed.   Constitutional:       Appearance: Normal appearance.   HENT:      Head: Normocephalic and atraumatic.   Eyes:      Extraocular Movements: Extraocular movements intact.      Pupils: Pupils are equal, round, and reactive to light.   Pulmonary:      Effort: Pulmonary effort is normal. No respiratory distress.   Musculoskeletal:         General: No signs of injury.      Right knee: No deformity or effusion.      Left knee: No deformity or effusion.      Comments: Changes c/w OA to bilateral hands and fingers   Skin:     General: Skin is warm and dry.   Neurological:      General: No focal deficit present.      Mental Status: She is alert and oriented to person, place, and time.   Psychiatric:         Mood and Affect: Mood normal.         Behavior: Behavior  normal.       Immunization History   Administered Date(s) Administered    COVID-19, MRNA, LN-S, PF (Pfizer) (Purple Cap) 01/06/2021, 01/27/2021    Hepatitis A, Adult 11/26/2019    Influenza 10/14/2009, 10/14/2010, 10/25/2018    Influenza (FLUAD) - Quadrivalent - Adjuvanted - PF *Preferred* (65+) 10/16/2020    Influenza - High Dose - PF (65 years and older) 10/26/2011, 11/05/2013, 12/03/2014, 10/29/2015, 10/13/2016, 11/03/2017, 10/25/2018, 11/21/2019    Pneumococcal Conjugate - 13 Valent 05/04/2016    Pneumococcal Polysaccharide - 23 Valent 12/13/2011, 05/03/2017    Tdap 12/13/2011, 08/25/2016, 07/27/2018    Zoster 11/06/2009       All lab results personally reviewed and interpreted by me.    Lab Results   Component Value Date     01/31/2022    K 4.9 01/31/2022     01/31/2022    CO2 26 01/31/2022     (H) 01/31/2022    BUN 19 01/31/2022    CALCIUM 9.7 01/31/2022    PROT 7.0 01/31/2022    ALBUMIN 3.9 01/31/2022    BILITOT 0.6 01/31/2022    AST 20 01/31/2022    ALKPHOS 49 (L) 01/31/2022    ALT 10 01/31/2022       Imaging:  All imaging reviewed and independently interpreted by me.    Dexa 1.2.2020  Left Femur  BMD 0.876 g/cm2 with T score -1.2  Total Femur  BMD 0.907 g/cm2 with T score  -0.8,   L1 spine 7.3  frax major 18%, hip 3.8%      Dexa 12.13.16:   Left Femur  BMD 0.850 g/cm2 with T score -1.2  L  Femur neck BMD 0.868 g/cm2 with T score  -1.2,   L1 spine 5.6, decreasing bmd at hip,   frax major 18.5%, hip 3.7%     ASSESSMENT / PLAN:     ASSESSMENT:  1. Age-related osteoporosis with current pathological fracture with delayed healing    2. Primary osteoarthritis of both knees    3. Primary osteoarthritis involving multiple joints    4. Vitamin D deficiency disease    5. Chronic lumbar radiculopathy    6. Stage 3a chronic kidney disease        Impression:    Osteoporosis with  multiple fxs- right hip, left wrist, several compression fx- 2018 right sacral fx and right wrist fx post ORIF-  tried forteo- failed due to SE  Right humerus fx 11/2018  Now to resume prolia (lost to follow up) missed 1 dose  Intolerant of forteo   Mild CKD  post falling bmd with Reclast Holiday  6403-7560 (5 yr)  Moved to Prolia 2/2017 (X 7 doses) Completed IV Reclast ~ 4 treatment (2135-0514)  Back on Prolia- dose due today 7/29/21     On Ca once a day   Last vit D level 26 7/2020- needs to add low dose otc vit D3     CKD: stable, gfr 42-46     Medication monitoring; no issues with prolia     Osteoarthritis multiple joints: rebecca hands, rebecca shoulders, left hip, reebcca knees   off mobic, using tumeric, tylenol, has had injections in knees by ortho  Left knee popliteal cyst  Left hip pain- ant groin w/wb and limited IR- most likely OA left hip     Lumbar arthropathy with radiculopathy right leg/foot: h/o trauma, 2 surgeries, chronic pain, gabapentin 300mg at night, sent to PT, seeing dr. Wells    PLAN:  prolia- today (#9 today )  Labs reviewed and done within past 14 days  No contraindication for Prolia today, ok for nurse to administer today     Chew 2 tums daily for 14 day then stop     Make sure taking vit D3 around 2000 units take once daily, instruction printed out and in avs  Ca once daily   cont tylenol, tumeric,     Avoid nsaids   Tylenol for pain      Locally inject left knee, see proc doc  Get approval for monovisc injection w/next visit in 6 month will inject both knees      C/w neurontin 600-900 mg at night  Cont f/u with pain mgmt    RTC 6 mon - alanis- prolia and labs - cmp, prolia and monovisc rebecca knees     Method of contact with patient concerns: Kameronhart attn Rheumatology    60 minutes of total time spent on the encounter, which includes face to face time and non-face to face time preparing to see the patient (eg, review of tests), Obtaining and/or reviewing separately obtained history, Documenting clinical information in the electronic or other health record, Independently interpreting results (not separately  reported) and communicating results to the patient/family/caregiver, or Care coordination (not separately reported).        Harika Ceron PA-C  Rheumatology Department   [facility] [place]

## 2022-01-31 NOTE — PROCEDURES
Large Joint Aspiration/Injection: bilateral knee    Date/Time: 1/31/2022 2:00 PM  Performed by: Harika Ceron PA-C  Authorized by: Harika Ceron PA-C     Consent Done?:  Yes (Verbal)  Indications:  Pain and arthritis  Site marked: the procedure site was marked    Timeout: prior to procedure the correct patient, procedure, and site was verified    Prep: patient was prepped and draped in usual sterile fashion      Local anesthesia used?: Yes    Anesthesia:  Local infiltration  Local anesthetic:  Lidocaine 2% without epinephrine and co-phenylcaine spray  Anesthetic total (ml):  2      Details:  Needle Size:  21 G  Ultrasonic Guidance for needle placement?: No    Approach:  Anterolateral  Location:  Knee  Laterality:  Bilateral  Site:  Bilateral knee  Medications (Right):  4 mL hyaluronate sodium, stabilized 88 mg/4 mL  Medications (Left):  4 mL hyaluronate sodium, stabilized 88 mg/4 mL  Patient tolerance:  Patient tolerated the procedure well with no immediate complications

## 2022-01-31 NOTE — PROGRESS NOTES
Administered 1cc of Betamethasone 6mg/2cc to left ventrogluteal. Patient tolerated well. No acute reaction noted to site. Patient instructed on S/S to report. Patient advised to wait in clinic 15 minutes post administration. Patient verbalized understanding.    Lot: [id number]  Exp: 08/31/2022

## 2022-02-14 NOTE — PROGRESS NOTES
"Subjective:      Patient ID: Clau Nunn is a 86 y.o. female.    Chief Complaint: Follow-up      HPI  Here for follow up of medical problems.  BP at home 130s/80.  Energy good.  Urine incont, wears pad, seeing Urologist.  Recent URI, now resolved.  Using albuterol almost daily.  No f/c/sw/cough.  BMs normal.  No cp/sob/palp.  Depression doing well on rx.    Updated/ annual due 10/22:  HM: 10/21 fluvax, 1/21 covid vaccine/booster, 11/19 HAV, 5/16 [id number], 5/17 booster [id number], 12/11 TDaP, 11/09 zoster, 1/20 BMD rep 3y, 1/14 Cscope no more needed, 4/21 MMG/ Gyn Dr. Tere bauer outside, 9/19 Eye Dr. Rubalcava.     Review of Systems   Constitutional: Negative for chills, diaphoresis and fever.   Respiratory: Negative for cough and shortness of breath.    Cardiovascular: Negative for chest pain, palpitations and leg swelling.   Gastrointestinal: Negative for blood in stool, constipation, diarrhea, nausea and vomiting.   Genitourinary: Negative for dysuria, frequency and hematuria.   Psychiatric/Behavioral: The patient is not nervous/anxious.          Objective:   /80   Pulse 67   Temp 96.8 °F (36 °C) (Temporal)   Resp 18   Ht 5' 3" (1.6 m)   Wt 73.9 kg (162 lb 14.7 oz)   SpO2 96%   BMI 28.86 kg/m²     Physical Exam  Constitutional:       Appearance: She is well-developed.   Neck:      Thyroid: No thyroid mass.      Vascular: No carotid bruit.   Cardiovascular:      Rate and Rhythm: Normal rate and regular rhythm.      Heart sounds: No murmur heard.    No friction rub. No gallop.   Pulmonary:      Effort: Pulmonary effort is normal.      Breath sounds: Normal breath sounds. No wheezing or rales.   Abdominal:      General: Bowel sounds are normal.      Palpations: Abdomen is soft. There is no mass.      Tenderness: There is no abdominal tenderness.   Musculoskeletal:      Cervical back: Neck supple.   Lymphadenopathy:      Cervical: No cervical adenopathy.   Neurological:      Mental Status: She is alert " and oriented to person, place, and time.        Latest Reference Range & Units 07/29/21 09:45 01/31/22 11:20   Sodium 136 - 145 mmol/L 142  142 140   Potassium 3.5 - 5.1 mmol/L 4.2  4.2 4.9   Chloride 95 - 110 mmol/L 107  107 105   CO2 23 - 29 mmol/L 25  25 26   Anion Gap 8 - 16 mmol/L 10  10 9   BUN 8 - 23 mg/dL 19  19 19   Creatinine 0.5 - 1.4 mg/dL 1.1  1.1 1.3   EGFR if non African American >60 mL/min/1.73 m^2 46 ! [1]  46 ! [2] 37 ! [3]   EGFR if African American >60 mL/min/1.73 m^2 53 !  53 ! 43 !   Glucose 70 - 110 mg/dL 98  98 118 (H)   Calcium 8.7 - 10.5 mg/dL 9.4  9.4 9.7   Alkaline Phosphatase 55 - 135 U/L 46 (L)  46 (L) 49 (L)   PROTEIN TOTAL 6.0 - 8.4 g/dL 6.6  6.6 7.0   Albumin 3.5 - 5.2 g/dL 3.6  3.6 3.9   BILIRUBIN TOTAL 0.1 - 1.0 mg/dL 0.6 [4]  0.6 [5] 0.6 [6]   AST 10 - 40 U/L 17  17 20   ALT 10 - 44 U/L 13  13 10         Assessment:       1. Essential hypertension    2. History of DVT in adulthood    3. Stage 3b chronic kidney disease    4. Anxiety    5. Acquired hypothyroidism    6. Simple chronic bronchitis    7. 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 Age-related osteoporosis with current pathological fracture with delayed healing    9. Preventive measure    10. Hordeolum externum of right upper eyelid          Plan:     Essential hypertension- stable at home, cont meds.  -     CBC Auto Differential; Future; Expected date: 02/24/2022  -     Comprehensive Metabolic Panel; Future; Expected date: 02/24/2022  -     TSH; Future; Expected date: 02/24/2022    History of DVT in adulthood    Stage 3b chronic kidney disease- not drinking much water, discussed increase.  Avoid NSAIDs.    Anxiety, Major depressive disorder in full remission, doing well, cont rx.    Acquired hypothyroidism- Clinically stable, continue present treatment.  -     levothyroxine (SYNTHROID) 100 MCG tablet; Take 1 tablet (100 mcg total) by mouth once daily.  Dispense: 90 tablet; Refill:  3    Simple chronic bronchitis  -     albuterol (VENTOLIN HFA) 90 mcg/actuation inhaler; Inhale 2 puffs into the lungs 4 (four) times daily as needed.  Dispense: 54 g; Refill: 3    Age-related osteoporosis with current pathological fracture with delayed healing- on prolia.    Preventive measure- get Tet at pharmacy, labs in 6mo.  Discussed pt needs to get Shingrix vaccination at pharmacy.    Hordeolum externum of right upper eyelid  -     doxycycline (VIBRAMYCIN) 100 MG Cap; Take 1 capsule (100 mg total) by mouth 2 (two) times daily. Take with full glass water. for 7 days  Dispense: 14 capsule; Refill: 0

## 2022-02-23 ENCOUNTER — PATIENT MESSAGE (OUTPATIENT)
Dept: UROLOGY | Facility: CLINIC | Age: 87
End: 2022-02-23
Payer: MEDICARE

## 2022-02-24 ENCOUNTER — OFFICE VISIT (OUTPATIENT)
Dept: FAMILY MEDICINE | Facility: CLINIC | Age: 87
End: 2022-02-24
Payer: MEDICARE

## 2022-02-24 VITALS
RESPIRATION RATE: 18 BRPM | HEART RATE: 67 BPM | SYSTOLIC BLOOD PRESSURE: 135 MMHG | TEMPERATURE: 97 F | WEIGHT: 162.94 LBS | DIASTOLIC BLOOD PRESSURE: 80 MMHG | OXYGEN SATURATION: 96 % | HEIGHT: 63 IN | BODY MASS INDEX: 28.87 KG/M2

## 2022-02-24 DIAGNOSIS — E03.9 ACQUIRED HYPOTHYROIDISM: ICD-10-CM

## 2022-02-24 DIAGNOSIS — Z29.9 PREVENTIVE MEASURE: ICD-10-CM

## 2022-02-24 DIAGNOSIS — F41.9 ANXIETY: ICD-10-CM

## 2022-02-24 DIAGNOSIS — H00.011 HORDEOLUM EXTERNUM OF RIGHT UPPER EYELID: ICD-10-CM

## 2022-02-24 DIAGNOSIS — Z86.718 HISTORY OF DVT IN ADULTHOOD: ICD-10-CM

## 2022-02-24 DIAGNOSIS — J41.0 SIMPLE CHRONIC BRONCHITIS: ICD-10-CM

## 2022-02-24 DIAGNOSIS — F32.5 MAJOR DEPRESSIVE DISORDER IN FULL REMISSION, UNSPECIFIED WHETHER RECURRENT: ICD-10-CM

## 2022-02-24 DIAGNOSIS — I10 ESSENTIAL HYPERTENSION: Primary | Chronic | ICD-10-CM

## 2022-02-24 DIAGNOSIS — N18.32 STAGE 3B CHRONIC KIDNEY DISEASE: ICD-10-CM

## 2022-02-24 DIAGNOSIS — M80.00XG AGE-RELATED OSTEOPOROSIS WITH CURRENT PATHOLOGICAL FRACTURE WITH DELAYED HEALING: ICD-10-CM

## 2022-02-24 PROCEDURE — 1126F AMNT PAIN NOTED NONE PRSNT: CPT | Mod: HCNC,CPTII,S$GLB, | Performed by: INTERNAL MEDICINE

## 2022-02-24 PROCEDURE — 3075F PR MOST RECENT SYSTOLIC BLOOD PRESS GE 130-139MM HG: ICD-10-PCS | Mod: HCNC,CPTII,S$GLB, | Performed by: INTERNAL MEDICINE

## 2022-02-24 PROCEDURE — 1159F PR MEDICATION LIST DOCUMENTED IN MEDICAL RECORD: ICD-10-PCS | Mod: HCNC,CPTII,S$GLB, | Performed by: INTERNAL MEDICINE

## 2022-02-24 PROCEDURE — 3079F DIAST BP 80-89 MM HG: CPT | Mod: HCNC,CPTII,S$GLB, | Performed by: INTERNAL MEDICINE

## 2022-02-24 PROCEDURE — 99999 PR PBB SHADOW E&M-EST. PATIENT-LVL V: CPT | Mod: PBBFAC,HCNC,, | Performed by: INTERNAL MEDICINE

## 2022-02-24 PROCEDURE — 99214 OFFICE O/P EST MOD 30 MIN: CPT | Mod: HCNC,S$GLB,, | Performed by: INTERNAL MEDICINE

## 2022-02-24 PROCEDURE — 3075F SYST BP GE 130 - 139MM HG: CPT | Mod: HCNC,CPTII,S$GLB, | Performed by: INTERNAL MEDICINE

## 2022-02-24 PROCEDURE — 1157F ADVNC CARE PLAN IN RCRD: CPT | Mod: HCNC,CPTII,S$GLB, | Performed by: INTERNAL MEDICINE

## 2022-02-24 PROCEDURE — 1159F MED LIST DOCD IN RCRD: CPT | Mod: HCNC,CPTII,S$GLB, | Performed by: INTERNAL MEDICINE

## 2022-02-24 PROCEDURE — 99214 PR OFFICE/OUTPT VISIT, EST, LEVL IV, 30-39 MIN: ICD-10-PCS | Mod: HCNC,S$GLB,, | Performed by: INTERNAL MEDICINE

## 2022-02-24 PROCEDURE — 99499 UNLISTED E&M SERVICE: CPT | Mod: S$GLB,,, | Performed by: INTERNAL MEDICINE

## 2022-02-24 PROCEDURE — 1126F PR PAIN SEVERITY QUANTIFIED, NO PAIN PRESENT: ICD-10-PCS | Mod: HCNC,CPTII,S$GLB, | Performed by: INTERNAL MEDICINE

## 2022-02-24 PROCEDURE — 3079F PR MOST RECENT DIASTOLIC BLOOD PRESSURE 80-89 MM HG: ICD-10-PCS | Mod: HCNC,CPTII,S$GLB, | Performed by: INTERNAL MEDICINE

## 2022-02-24 PROCEDURE — 99999 PR PBB SHADOW E&M-EST. PATIENT-LVL V: ICD-10-PCS | Mod: PBBFAC,HCNC,, | Performed by: INTERNAL MEDICINE

## 2022-02-24 PROCEDURE — 99499 RISK ADDL DX/OHS AUDIT: ICD-10-PCS | Mod: S$GLB,,, | Performed by: INTERNAL MEDICINE

## 2022-02-24 PROCEDURE — 1157F PR ADVANCE CARE PLAN OR EQUIV PRESENT IN MEDICAL RECORD: ICD-10-PCS | Mod: HCNC,CPTII,S$GLB, | Performed by: INTERNAL MEDICINE

## 2022-02-24 RX ORDER — ALBUTEROL SULFATE 90 UG/1
2 AEROSOL, METERED RESPIRATORY (INHALATION) 4 TIMES DAILY PRN
Qty: 54 G | Refills: 3 | Status: SHIPPED | OUTPATIENT
Start: 2022-02-24 | End: 2022-10-28

## 2022-02-24 RX ORDER — DOXYCYCLINE 100 MG/1
100 CAPSULE ORAL 2 TIMES DAILY
Qty: 14 CAPSULE | Refills: 0 | Status: SHIPPED | OUTPATIENT
Start: 2022-02-24 | End: 2022-03-03

## 2022-02-24 RX ORDER — LEVOTHYROXINE SODIUM 100 UG/1
100 TABLET ORAL DAILY
Qty: 90 TABLET | Refills: 3 | Status: SHIPPED | OUTPATIENT
Start: 2022-02-24 | End: 2023-02-28

## 2022-03-31 ENCOUNTER — TELEPHONE (OUTPATIENT)
Dept: FAMILY MEDICINE | Facility: CLINIC | Age: 87
End: 2022-03-31
Payer: MEDICARE

## 2022-03-31 NOTE — TELEPHONE ENCOUNTER
Called pt to get more information pt states that the swelling and the knot on her ankle appeared about to weeks ago  Pt states that the knot is not painful but it get swollen throughout the day as she walks. Pt would like to get scheduled.    Li Carey MA

## 2022-03-31 NOTE — TELEPHONE ENCOUNTER
----- Message from Maya Ralph sent at 3/31/2022  2:13 PM CDT -----  Contact: NATALIYA DE LA PAZ [[id number]]  .Type:  Needs Medical Advice    Who Called: NATALIYA DE LA PAZ [[id number]]  Would the patient rather a call back or a response via [facility]? Call   Best Call Back Number: .[phone] (home)    Additional Information: Pt is req a call back in regards to her ankles have been swelling and she has an knot on her right leg. She would like to see the provider for this issue the first available appointment isn't until 07/2022

## 2022-04-01 NOTE — TELEPHONE ENCOUNTER
S/w pt she states she is a bit concerned about her ankles swelling at the end of the day and a knot comes about as well but by morning before she gets up out of bed the swelling and knot are not there until the end of the day. Pt states she days elevate her feet at the end of the day before bedtime.    Pt schedule appt on 04/06 with you , would you like the pt to do anything else before seeing you or needs to see another dr for the issue?     Please advise.     TRACEE 02/24/22

## 2022-04-01 NOTE — TELEPHONE ENCOUNTER
S/w pt informed pt per  She can get some [employer] support hose, knee high, and place in the morning after she gets out of bed.  SM    Pt verbalized understanding.

## 2022-04-01 NOTE — TELEPHONE ENCOUNTER
She can get some [employer] support hose, knee high, and place in the morning after she gets out of bed.  SM

## 2022-04-04 NOTE — PROGRESS NOTES
"Subjective:      Patient ID: Clau Nunn is a 87 y.o. female.    Chief Complaint: Leg Swelling      HPI  Here for follow up of medical problems.  Leg swelling, and right inner calf gets an extra localized swelling area x months.  Not elevating legs.  Hit leg anterior right lower leg, now very painful and hot.  Has IVC filter.  Hx exterior fixation devise, for fx tibia/fibula.    Updated/ annual due 10/22:  HM: 10/21 fluvax, 1/21 covid vaccine/booster, 11/19 HAV, 5/16 [id number], 5/17 booster [id number], 12/11 TDaP, 11/09 zoster, 1/20 BMD rep 3y, 1/14 Cscope no more needed, 4/21 MMG/ Gyn Dr. Tere bauer outside, 9/19 Eye Dr. Rubalcava.     Review of Systems   Constitutional: Negative for chills, diaphoresis and fever.   Respiratory: Negative for cough and shortness of breath.    Cardiovascular: Negative for chest pain, palpitations and leg swelling.   Gastrointestinal: Negative for blood in stool, constipation, diarrhea, nausea and vomiting.   Genitourinary: Negative for dysuria, frequency and hematuria.   Psychiatric/Behavioral: The patient is not nervous/anxious.          Objective:   /80   Pulse 86   Temp 98.7 °F (37.1 °C) (Temporal)   Ht 5' 3" (1.6 m)   Wt 73.1 kg (161 lb 0.7 oz)   SpO2 95%   BMI 28.53 kg/m²     Physical Exam  Constitutional:       Appearance: She is well-developed.   Neck:      Thyroid: No thyroid mass.      Vascular: No carotid bruit.   Cardiovascular:      Rate and Rhythm: Normal rate and regular rhythm.      Heart sounds: No murmur heard.    No friction rub. No gallop.   Pulmonary:      Effort: Pulmonary effort is normal.      Breath sounds: Normal breath sounds. No wheezing or rales.   Abdominal:      General: Bowel sounds are normal.      Palpations: Abdomen is soft. There is no mass.      Tenderness: There is no abdominal tenderness.   Musculoskeletal:         General: Swelling and tenderness (anterior scrape with 2" surrounding eryth with calor.) present.      Cervical back: " Neck supple.      Right lower leg: Edema present.      Left lower leg: Edema present.   Lymphadenopathy:      Cervical: No cervical adenopathy.   Neurological:      Mental Status: She is alert and oriented to person, place, and time.             Assessment:       1. Leg swelling    2. Localized edema     3. Essential hypertension    4. Acquired hypothyroidism    5. Stage 3b chronic kidney disease    6. Cellulitis of right lower extremity    7. History of DVT in adulthood          Plan:     Leg swelling x months, with a localized area medial right calf.  Elevate legs, compression stocking after fluid   -     US Lower Extremity Veins Bilateral; Future; Expected date: 04/06/2022  -     Comprehensive Metabolic Panel; Future; Expected date: 04/06/2022  -     TSH; Future; Expected date: 04/06/2022  -     BNP; Future; Expected date: 04/06/2022    Localized edema   -     US Lower Extremity Veins Bilateral; Future; Expected date: 04/06/2022    Essential hypertension- stable, cont rx.    Acquired hypothyroidism- check lab now.  -     TSH; Future; Expected date: 04/06/2022    Stage 3b chronic kidney disease- increase hydration.    Cellulitis of right lower extremity- doxy x 10d.    History of DVT in adulthood

## 2022-04-06 ENCOUNTER — TELEPHONE (OUTPATIENT)
Dept: RHEUMATOLOGY | Facility: CLINIC | Age: 87
End: 2022-04-06
Payer: MEDICARE

## 2022-04-06 ENCOUNTER — OFFICE VISIT (OUTPATIENT)
Dept: FAMILY MEDICINE | Facility: CLINIC | Age: 87
End: 2022-04-06
Payer: MEDICARE

## 2022-04-06 ENCOUNTER — LAB VISIT (OUTPATIENT)
Dept: LAB | Facility: HOSPITAL | Age: 87
End: 2022-04-06
Attending: INTERNAL MEDICINE
Payer: MEDICARE

## 2022-04-06 VITALS
HEART RATE: 86 BPM | SYSTOLIC BLOOD PRESSURE: 139 MMHG | TEMPERATURE: 99 F | OXYGEN SATURATION: 95 % | HEIGHT: 63 IN | BODY MASS INDEX: 28.54 KG/M2 | DIASTOLIC BLOOD PRESSURE: 80 MMHG | WEIGHT: 161.06 LBS

## 2022-04-06 DIAGNOSIS — Z86.718 HISTORY OF DVT IN ADULTHOOD: ICD-10-CM

## 2022-04-06 DIAGNOSIS — E03.9 ACQUIRED HYPOTHYROIDISM: ICD-10-CM

## 2022-04-06 DIAGNOSIS — R60.0 LOCALIZED EDEMA: ICD-10-CM

## 2022-04-06 DIAGNOSIS — M79.89 LEG SWELLING: Primary | ICD-10-CM

## 2022-04-06 DIAGNOSIS — L03.115 CELLULITIS OF RIGHT LOWER EXTREMITY: ICD-10-CM

## 2022-04-06 DIAGNOSIS — M79.89 LEG SWELLING: ICD-10-CM

## 2022-04-06 DIAGNOSIS — N18.32 STAGE 3B CHRONIC KIDNEY DISEASE: ICD-10-CM

## 2022-04-06 DIAGNOSIS — I10 ESSENTIAL HYPERTENSION: Chronic | ICD-10-CM

## 2022-04-06 LAB
ALBUMIN SERPL BCP-MCNC: 3.9 G/DL (ref 3.5–5.2)
ALP SERPL-CCNC: 42 U/L (ref 55–135)
ALT SERPL W/O P-5'-P-CCNC: 11 U/L (ref 10–44)
ANION GAP SERPL CALC-SCNC: 11 MMOL/L (ref 8–16)
AST SERPL-CCNC: 24 U/L (ref 10–40)
BILIRUB SERPL-MCNC: 0.9 MG/DL (ref 0.1–1)
BNP SERPL-MCNC: 57 PG/ML (ref 0–99)
BUN SERPL-MCNC: 12 MG/DL (ref 8–23)
CALCIUM SERPL-MCNC: 9.9 MG/DL (ref 8.7–10.5)
CHLORIDE SERPL-SCNC: 103 MMOL/L (ref 95–110)
CO2 SERPL-SCNC: 25 MMOL/L (ref 23–29)
CREAT SERPL-MCNC: 0.9 MG/DL (ref 0.5–1.4)
EST. GFR  (AFRICAN AMERICAN): >60 ML/MIN/1.73 M^2
EST. GFR  (NON AFRICAN AMERICAN): 58.1 ML/MIN/1.73 M^2
GLUCOSE SERPL-MCNC: 84 MG/DL (ref 70–110)
POTASSIUM SERPL-SCNC: 5 MMOL/L (ref 3.5–5.1)
PROT SERPL-MCNC: 7 G/DL (ref 6–8.4)
SODIUM SERPL-SCNC: 139 MMOL/L (ref 136–145)
TSH SERPL DL<=0.005 MIU/L-ACNC: 2.76 UIU/ML (ref 0.4–4)

## 2022-04-06 PROCEDURE — 80053 COMPREHEN METABOLIC PANEL: CPT | Performed by: INTERNAL MEDICINE

## 2022-04-06 PROCEDURE — 1101F PR PT FALLS ASSESS DOC 0-1 FALLS W/OUT INJ PAST YR: ICD-10-PCS | Mod: CPTII,S$GLB,, | Performed by: INTERNAL MEDICINE

## 2022-04-06 PROCEDURE — 99214 OFFICE O/P EST MOD 30 MIN: CPT | Mod: S$GLB,,, | Performed by: INTERNAL MEDICINE

## 2022-04-06 PROCEDURE — 1125F PR PAIN SEVERITY QUANTIFIED, PAIN PRESENT: ICD-10-PCS | Mod: CPTII,S$GLB,, | Performed by: INTERNAL MEDICINE

## 2022-04-06 PROCEDURE — 1125F AMNT PAIN NOTED PAIN PRSNT: CPT | Mod: CPTII,S$GLB,, | Performed by: INTERNAL MEDICINE

## 2022-04-06 PROCEDURE — 3288F PR FALLS RISK ASSESSMENT DOCUMENTED: ICD-10-PCS | Mod: CPTII,S$GLB,, | Performed by: INTERNAL MEDICINE

## 2022-04-06 PROCEDURE — 1157F PR ADVANCE CARE PLAN OR EQUIV PRESENT IN MEDICAL RECORD: ICD-10-PCS | Mod: CPTII,S$GLB,, | Performed by: INTERNAL MEDICINE

## 2022-04-06 PROCEDURE — 1157F ADVNC CARE PLAN IN RCRD: CPT | Mod: CPTII,S$GLB,, | Performed by: INTERNAL MEDICINE

## 2022-04-06 PROCEDURE — 83880 ASSAY OF NATRIURETIC PEPTIDE: CPT | Performed by: INTERNAL MEDICINE

## 2022-04-06 PROCEDURE — 3288F FALL RISK ASSESSMENT DOCD: CPT | Mod: CPTII,S$GLB,, | Performed by: INTERNAL MEDICINE

## 2022-04-06 PROCEDURE — 99999 PR PBB SHADOW E&M-EST. PATIENT-LVL V: ICD-10-PCS | Mod: PBBFAC,,, | Performed by: INTERNAL MEDICINE

## 2022-04-06 PROCEDURE — 1159F MED LIST DOCD IN RCRD: CPT | Mod: CPTII,S$GLB,, | Performed by: INTERNAL MEDICINE

## 2022-04-06 PROCEDURE — 99999 PR PBB SHADOW E&M-EST. PATIENT-LVL V: CPT | Mod: PBBFAC,,, | Performed by: INTERNAL MEDICINE

## 2022-04-06 PROCEDURE — 99214 PR OFFICE/OUTPT VISIT, EST, LEVL IV, 30-39 MIN: ICD-10-PCS | Mod: S$GLB,,, | Performed by: INTERNAL MEDICINE

## 2022-04-06 PROCEDURE — 84443 ASSAY THYROID STIM HORMONE: CPT | Performed by: INTERNAL MEDICINE

## 2022-04-06 PROCEDURE — 1101F PT FALLS ASSESS-DOCD LE1/YR: CPT | Mod: CPTII,S$GLB,, | Performed by: INTERNAL MEDICINE

## 2022-04-06 PROCEDURE — 36415 COLL VENOUS BLD VENIPUNCTURE: CPT | Mod: PO | Performed by: INTERNAL MEDICINE

## 2022-04-06 PROCEDURE — 1159F PR MEDICATION LIST DOCUMENTED IN MEDICAL RECORD: ICD-10-PCS | Mod: CPTII,S$GLB,, | Performed by: INTERNAL MEDICINE

## 2022-04-06 RX ORDER — DOXYCYCLINE 100 MG/1
100 CAPSULE ORAL 2 TIMES DAILY
Qty: 20 CAPSULE | Refills: 0 | Status: SHIPPED | OUTPATIENT
Start: 2022-04-06 | End: 2022-04-16

## 2022-04-07 ENCOUNTER — PATIENT MESSAGE (OUTPATIENT)
Dept: FAMILY MEDICINE | Facility: CLINIC | Age: 87
End: 2022-04-07
Payer: MEDICARE

## 2022-04-07 ENCOUNTER — HOSPITAL ENCOUNTER (EMERGENCY)
Facility: HOSPITAL | Age: 87
Discharge: HOME | End: 2022-04-07
Attending: EMERGENCY MEDICINE
Payer: MEDICARE

## 2022-04-07 ENCOUNTER — HOSPITAL ENCOUNTER (OUTPATIENT)
Dept: RADIOLOGY | Facility: HOSPITAL | Age: 87
Discharge: HOME | End: 2022-04-07
Attending: INTERNAL MEDICINE
Payer: MEDICARE

## 2022-04-07 ENCOUNTER — OFFICE VISIT (OUTPATIENT)
Dept: RHEUMATOLOGY | Facility: CLINIC | Age: 87
End: 2022-04-07
Payer: MEDICARE

## 2022-04-07 VITALS
SYSTOLIC BLOOD PRESSURE: 165 MMHG | HEIGHT: 63 IN | WEIGHT: 161 LBS | DIASTOLIC BLOOD PRESSURE: 92 MMHG | RESPIRATION RATE: 21 BRPM | BODY MASS INDEX: 28.53 KG/M2 | OXYGEN SATURATION: 97 % | TEMPERATURE: 99 F | HEART RATE: 70 BPM

## 2022-04-07 VITALS
HEIGHT: 63 IN | BODY MASS INDEX: 29.14 KG/M2 | HEART RATE: 57 BPM | DIASTOLIC BLOOD PRESSURE: 86 MMHG | WEIGHT: 164.44 LBS | SYSTOLIC BLOOD PRESSURE: 167 MMHG

## 2022-04-07 DIAGNOSIS — R60.0 LOCALIZED EDEMA: ICD-10-CM

## 2022-04-07 DIAGNOSIS — G89.29 CHRONIC PAIN OF LEFT KNEE: ICD-10-CM

## 2022-04-07 DIAGNOSIS — M80.00XG AGE-RELATED OSTEOPOROSIS WITH CURRENT PATHOLOGICAL FRACTURE WITH DELAYED HEALING: ICD-10-CM

## 2022-04-07 DIAGNOSIS — M25.562 CHRONIC PAIN OF LEFT KNEE: ICD-10-CM

## 2022-04-07 DIAGNOSIS — I82.491 ACUTE DEEP VEIN THROMBOSIS (DVT) OF OTHER SPECIFIED VEIN OF RIGHT LOWER EXTREMITY: Primary | ICD-10-CM

## 2022-04-07 DIAGNOSIS — M17.0 PRIMARY OSTEOARTHRITIS OF BOTH KNEES: Primary | ICD-10-CM

## 2022-04-07 DIAGNOSIS — M79.89 LEG SWELLING: ICD-10-CM

## 2022-04-07 DIAGNOSIS — I10 HYPERTENSION: ICD-10-CM

## 2022-04-07 LAB
ALBUMIN SERPL BCP-MCNC: 4.4 G/DL (ref 3.5–5.2)
ALP SERPL-CCNC: 50 U/L (ref 55–135)
ALT SERPL W/O P-5'-P-CCNC: 13 U/L (ref 10–44)
ANION GAP SERPL CALC-SCNC: 11 MMOL/L (ref 8–16)
ANISOCYTOSIS BLD QL SMEAR: SLIGHT
APTT BLDCRRT: 28.5 SEC (ref 21–32)
AST SERPL-CCNC: 21 U/L (ref 10–40)
BASOPHILS # BLD AUTO: 0.03 K/UL (ref 0–0.2)
BASOPHILS NFR BLD: 0.6 % (ref 0–1.9)
BILIRUB SERPL-MCNC: 1.1 MG/DL (ref 0.1–1)
BUN SERPL-MCNC: 16 MG/DL (ref 8–23)
CALCIUM SERPL-MCNC: 10 MG/DL (ref 8.7–10.5)
CHLORIDE SERPL-SCNC: 103 MMOL/L (ref 95–110)
CO2 SERPL-SCNC: 26 MMOL/L (ref 23–29)
CREAT SERPL-MCNC: 1 MG/DL (ref 0.5–1.4)
DIFFERENTIAL METHOD: ABNORMAL
EOSINOPHIL # BLD AUTO: 0 K/UL (ref 0–0.5)
EOSINOPHIL NFR BLD: 0.4 % (ref 0–8)
ERYTHROCYTE [DISTWIDTH] IN BLOOD BY AUTOMATED COUNT: 12.7 % (ref 11.5–14.5)
EST. GFR  (AFRICAN AMERICAN): 59 ML/MIN/1.73 M^2
EST. GFR  (NON AFRICAN AMERICAN): 51 ML/MIN/1.73 M^2
GLUCOSE SERPL-MCNC: 145 MG/DL (ref 70–110)
HCT VFR BLD AUTO: 48.6 % (ref 37–48.5)
HGB BLD-MCNC: 15.9 G/DL (ref 12–16)
IMM GRANULOCYTES # BLD AUTO: 0.02 K/UL (ref 0–0.04)
IMM GRANULOCYTES NFR BLD AUTO: 0.4 % (ref 0–0.5)
INR PPP: 1.1 (ref 0.8–1.2)
LYMPHOCYTES # BLD AUTO: 0.6 K/UL (ref 1–4.8)
LYMPHOCYTES NFR BLD: 11.7 % (ref 18–48)
MCH RBC QN AUTO: 31.7 PG (ref 27–31)
MCHC RBC AUTO-ENTMCNC: 32.7 G/DL (ref 32–36)
MCV RBC AUTO: 97 FL (ref 82–98)
MONOCYTES # BLD AUTO: 0.1 K/UL (ref 0.3–1)
MONOCYTES NFR BLD: 1.1 % (ref 4–15)
NEUTROPHILS # BLD AUTO: 4.5 K/UL (ref 1.8–7.7)
NEUTROPHILS NFR BLD: 85.8 % (ref 38–73)
NRBC BLD-RTO: 0 /100 WBC
PLATELET # BLD AUTO: 246 K/UL (ref 150–450)
PMV BLD AUTO: 11.1 FL (ref 9.2–12.9)
POIKILOCYTOSIS BLD QL SMEAR: SLIGHT
POTASSIUM SERPL-SCNC: 4.3 MMOL/L (ref 3.5–5.1)
PROT SERPL-MCNC: 8 G/DL (ref 6–8.4)
PROTHROMBIN TIME: 11.3 SEC (ref 9–12.5)
RBC # BLD AUTO: 5.02 M/UL (ref 4–5.4)
SODIUM SERPL-SCNC: 140 MMOL/L (ref 136–145)
TROPONIN I SERPL DL<=0.01 NG/ML-MCNC: 0.01 NG/ML (ref 0–0.03)
WBC # BLD AUTO: 5.29 K/UL (ref 3.9–12.7)

## 2022-04-07 PROCEDURE — 25000003 PHARM REV CODE 250: Performed by: EMERGENCY MEDICINE

## 2022-04-07 PROCEDURE — 85730 THROMBOPLASTIN TIME PARTIAL: CPT | Performed by: NURSE PRACTITIONER

## 2022-04-07 PROCEDURE — 25000003 PHARM REV CODE 250: Performed by: NURSE PRACTITIONER

## 2022-04-07 PROCEDURE — 93010 EKG 12-LEAD: ICD-10-PCS | Mod: ,,, | Performed by: INTERNAL MEDICINE

## 2022-04-07 PROCEDURE — 80053 COMPREHEN METABOLIC PANEL: CPT | Performed by: NURSE PRACTITIONER

## 2022-04-07 PROCEDURE — 93970 EXTREMITY STUDY: CPT | Mod: TC

## 2022-04-07 PROCEDURE — 93005 ELECTROCARDIOGRAM TRACING: CPT

## 2022-04-07 PROCEDURE — 93010 ELECTROCARDIOGRAM REPORT: CPT | Mod: ,,, | Performed by: INTERNAL MEDICINE

## 2022-04-07 PROCEDURE — 3288F PR FALLS RISK ASSESSMENT DOCUMENTED: ICD-10-PCS | Mod: CPTII,S$GLB,, | Performed by: PHYSICIAN ASSISTANT

## 2022-04-07 PROCEDURE — 99214 PR OFFICE/OUTPT VISIT, EST, LEVL IV, 30-39 MIN: ICD-10-PCS | Mod: 25,S$GLB,, | Performed by: PHYSICIAN ASSISTANT

## 2022-04-07 PROCEDURE — 1101F PR PT FALLS ASSESS DOC 0-1 FALLS W/OUT INJ PAST YR: ICD-10-PCS | Mod: CPTII,S$GLB,, | Performed by: PHYSICIAN ASSISTANT

## 2022-04-07 PROCEDURE — 99284 EMERGENCY DEPT VISIT MOD MDM: CPT | Mod: 25

## 2022-04-07 PROCEDURE — 1126F PR PAIN SEVERITY QUANTIFIED, NO PAIN PRESENT: ICD-10-PCS | Mod: CPTII,S$GLB,, | Performed by: PHYSICIAN ASSISTANT

## 2022-04-07 PROCEDURE — 93970 US LOWER EXTREMITY VEINS BILATERAL: ICD-10-PCS | Mod: 26,,, | Performed by: RADIOLOGY

## 2022-04-07 PROCEDURE — 99999 PR PBB SHADOW E&M-EST. PATIENT-LVL III: CPT | Mod: PBBFAC,,, | Performed by: PHYSICIAN ASSISTANT

## 2022-04-07 PROCEDURE — 96374 THER/PROPH/DIAG INJ IV PUSH: CPT

## 2022-04-07 PROCEDURE — 85610 PROTHROMBIN TIME: CPT | Performed by: NURSE PRACTITIONER

## 2022-04-07 PROCEDURE — 1159F MED LIST DOCD IN RCRD: CPT | Mod: CPTII,S$GLB,, | Performed by: PHYSICIAN ASSISTANT

## 2022-04-07 PROCEDURE — 20610 DRAIN/INJ JOINT/BURSA W/O US: CPT | Mod: LT,S$GLB,, | Performed by: PHYSICIAN ASSISTANT

## 2022-04-07 PROCEDURE — 3288F FALL RISK ASSESSMENT DOCD: CPT | Mod: CPTII,S$GLB,, | Performed by: PHYSICIAN ASSISTANT

## 2022-04-07 PROCEDURE — 1159F PR MEDICATION LIST DOCUMENTED IN MEDICAL RECORD: ICD-10-PCS | Mod: CPTII,S$GLB,, | Performed by: PHYSICIAN ASSISTANT

## 2022-04-07 PROCEDURE — 1160F RVW MEDS BY RX/DR IN RCRD: CPT | Mod: CPTII,S$GLB,, | Performed by: PHYSICIAN ASSISTANT

## 2022-04-07 PROCEDURE — 1160F PR REVIEW ALL MEDS BY PRESCRIBER/CLIN PHARMACIST DOCUMENTED: ICD-10-PCS | Mod: CPTII,S$GLB,, | Performed by: PHYSICIAN ASSISTANT

## 2022-04-07 PROCEDURE — 1126F AMNT PAIN NOTED NONE PRSNT: CPT | Mod: CPTII,S$GLB,, | Performed by: PHYSICIAN ASSISTANT

## 2022-04-07 PROCEDURE — 20610 LARGE JOINT ASPIRATION/INJECTION: L KNEE: ICD-10-PCS | Mod: LT,S$GLB,, | Performed by: PHYSICIAN ASSISTANT

## 2022-04-07 PROCEDURE — 84484 ASSAY OF TROPONIN QUANT: CPT | Performed by: NURSE PRACTITIONER

## 2022-04-07 PROCEDURE — 1101F PT FALLS ASSESS-DOCD LE1/YR: CPT | Mod: CPTII,S$GLB,, | Performed by: PHYSICIAN ASSISTANT

## 2022-04-07 PROCEDURE — 93970 EXTREMITY STUDY: CPT | Mod: 26,,, | Performed by: RADIOLOGY

## 2022-04-07 PROCEDURE — 85025 COMPLETE CBC W/AUTO DIFF WBC: CPT | Performed by: NURSE PRACTITIONER

## 2022-04-07 PROCEDURE — 99214 OFFICE O/P EST MOD 30 MIN: CPT | Mod: 25,S$GLB,, | Performed by: PHYSICIAN ASSISTANT

## 2022-04-07 PROCEDURE — 99999 PR PBB SHADOW E&M-EST. PATIENT-LVL III: ICD-10-PCS | Mod: PBBFAC,,, | Performed by: PHYSICIAN ASSISTANT

## 2022-04-07 PROCEDURE — 1157F ADVNC CARE PLAN IN RCRD: CPT | Mod: CPTII,S$GLB,, | Performed by: PHYSICIAN ASSISTANT

## 2022-04-07 PROCEDURE — 1157F PR ADVANCE CARE PLAN OR EQUIV PRESENT IN MEDICAL RECORD: ICD-10-PCS | Mod: CPTII,S$GLB,, | Performed by: PHYSICIAN ASSISTANT

## 2022-04-07 PROCEDURE — 63600175 PHARM REV CODE 636 W HCPCS: Performed by: EMERGENCY MEDICINE

## 2022-04-07 RX ORDER — DOXYCYCLINE 100 MG/1
100 CAPSULE ORAL EVERY 12 HOURS
COMMUNITY
End: 2022-04-07 | Stop reason: SDUPTHER

## 2022-04-07 RX ORDER — HYDRALAZINE HYDROCHLORIDE 20 MG/ML
10 INJECTION INTRAMUSCULAR; INTRAVENOUS
Status: COMPLETED | OUTPATIENT
Start: 2022-04-07 | End: 2022-04-07

## 2022-04-07 RX ORDER — TRIAMCINOLONE ACETONIDE 40 MG/ML
40 INJECTION, SUSPENSION INTRA-ARTICULAR; INTRAMUSCULAR
Status: DISCONTINUED | OUTPATIENT
Start: 2022-04-07 | End: 2022-04-07 | Stop reason: HOSPADM

## 2022-04-07 RX ORDER — APIXABAN 5 MG (74)
KIT ORAL
Qty: 1 EACH | Refills: 0 | Status: SHIPPED | OUTPATIENT
Start: 2022-04-07 | End: 2022-06-14

## 2022-04-07 RX ORDER — LIDOCAINE HYDROCHLORIDE 20 MG/ML
2 INJECTION, SOLUTION INFILTRATION; PERINEURAL
Status: DISCONTINUED | OUTPATIENT
Start: 2022-04-07 | End: 2022-04-07 | Stop reason: HOSPADM

## 2022-04-07 RX ORDER — CLONIDINE HYDROCHLORIDE 0.1 MG/1
0.1 TABLET ORAL
Status: COMPLETED | OUTPATIENT
Start: 2022-04-07 | End: 2022-04-07

## 2022-04-07 RX ADMIN — LIDOCAINE HYDROCHLORIDE 2 ML: 20 INJECTION, SOLUTION INFILTRATION; PERINEURAL at 09:04

## 2022-04-07 RX ADMIN — CLONIDINE HYDROCHLORIDE 0.1 MG: 0.1 TABLET ORAL at 03:04

## 2022-04-07 RX ADMIN — APIXABAN 10 MG: 2.5 TABLET, FILM COATED ORAL at 08:04

## 2022-04-07 RX ADMIN — HYDRALAZINE HYDROCHLORIDE 10 MG: 20 INJECTION, SOLUTION INTRAMUSCULAR; INTRAVENOUS at 05:04

## 2022-04-07 RX ADMIN — TRIAMCINOLONE ACETONIDE 40 MG: 40 INJECTION, SUSPENSION INTRA-ARTICULAR; INTRAMUSCULAR at 09:04

## 2022-04-07 NOTE — FIRST PROVIDER EVALUATION
"Medical screening exam completed.  I have conducted a focused provider triage encounter, findings are as follows:    Brief history of present illness:  Sent to the ER for DVT in the right leg.  Patient also has a history of hypertension and hypertensive triage.    Vitals:    04/07/22 1516   BP: (!) 242/120   BP Location: Right arm   Patient Position: Sitting   Pulse: 67   Resp: 18   Temp: 98.5 °F (36.9 °C)   TempSrc: Oral   SpO2: 98%   Weight: 73 kg (161 lb)   Height: 5' 3" (1.6 m)       Pertinent physical exam:  No acute distress, vital signs stable.    Brief workup plan:  Labs, meds,     Preliminary workup initiated; this workup will be continued and followed by the physician or advanced practice provider that is assigned to the patient when roomed.  "

## 2022-04-07 NOTE — PHARMACY MED REC
"Admission Medication History     The home medication history was taken by Rocky Hartmann.    You may go to "Admission" then "Reconcile Home Medications" tabs to review and/or act upon these items.      The home medication list has been updated by the Pharmacy department.    Please read ALL comments highlighted in yellow.    Please address this information as you see fit.     Feel free to contact us if you have any questions or require assistance.      The medications listed below were removed from the home medication list. Please reorder if appropriate:  Patient reports no longer taking the following medication(s):   ACETAMINOPHEN 500 MG TABLET   FLUTICASONE PROPIONATE 50 MCG/ACTUATION NASAL SPRAY    Medications listed below were obtained from: Patient/family, Analytic software- [email or web address] and Medical records  (Not in a hospital admission)      Potential issues to be addressed PRIOR TO DISCHARGE: NONE      Rocky Hartmann, [facility] [phone]      Current Outpatient Medications on File Prior to Encounter   Medication Sig Dispense Refill Last Dose    albuterol (VENTOLIN HFA) 90 mcg/actuation inhaler Inhale 2 puffs into the lungs 4 (four) times daily as needed. 54 g 3 4/6/2022 at Unknown time    aspirin (ECOTRIN) 81 MG EC tablet Take 81 mg by mouth once daily.   4/6/2022 at Unknown time    azelastine (ASTELIN) 137 mcg (0.1 %) nasal spray 2 sprays by Nasal route 2 (two) times daily.   Past Week at Unknown time    busPIRone (BUSPAR) 5 MG Tab TAKE 1 TABLET TWICE DAILY 180 tablet 5 4/6/2022 at Unknown time    cholecalciferol, vitamin D3, (D3-2000) 50 mcg (2,000 unit) Cap Take 1 capsule (2,000 Units total) by mouth once daily. 90 capsule 11 4/6/2022 at Unknown time    cyclobenzaprine (FLEXERIL) 5 MG tablet TAKE 1 TABLET (5 MG TOTAL) BY MOUTH NIGHTLY AS NEEDED FOR MUSCLE SPASMS. 90 tablet 1 Past Week at Unknown time    cycloSPORINE (RESTASIS) 0.05 % ophthalmic emulsion Place 1 drop into both eyes 2 (two) " times daily. 60 each 12 4/6/2022 at Unknown time    doxycycline (VIBRAMYCIN) 100 MG Cap Take 1 capsule (100 mg total) by mouth 2 (two) times daily. for 10 days 20 capsule 0 4/6/2022 at Unknown time    EScitalopram oxalate (LEXAPRO) 20 MG tablet TAKE 1 TABLET EVERY DAY 90 tablet 3 4/6/2022 at Unknown time    fluocinonide 0.05% (LIDEX) 0.05 % cream AAA bid to leg for 2-4 weeks per course (Patient taking differently: Apply to affected area(s) of leg topically twice daily for 2-4 weeks per course) 60 g 1 4/6/2022 at Unknown time    gabapentin (NEURONTIN) 300 MG capsule Take 1 capsule (300 mg total) by mouth 3 (three) times daily. 270 capsule 3 4/6/2022 at Unknown time    levothyroxine (SYNTHROID) 100 MCG tablet Take 1 tablet (100 mcg total) by mouth once daily. 90 tablet 3 4/6/2022 at Unknown time    losartan (COZAAR) 50 MG tablet TAKE 1 TABLET EVERY DAY 90 tablet 3 4/6/2022 at Unknown time    omeprazole (PRILOSEC) 40 MG capsule TAKE 1 CAPSULE (40 MG TOTAL) BY MOUTH EVERY MORNING. 90 capsule 3 4/6/2022 at Unknown time    oxybutynin (DITROPAN-XL) 5 MG TR24 Take 1 tablet (5 mg total) by mouth once daily. 30 tablet 2 4/6/2022 at Unknown time    traZODone (DESYREL) 50 MG tablet TAKE 1 TABLET (50 MG TOTAL) BY MOUTH NIGHTLY AS NEEDED FOR INSOMNIA. 30 tablet 3 4/6/2022 at Unknown time    triamcinolone acetonide 0.025% (KENALOG) 0.025 % cream AAA bid (Patient taking differently: Apply to affected area(s) topically two times daily) 80 g 0 4/6/2022 at Unknown time    [DISCONTINUED] acetaminophen (TYLENOL) 500 MG tablet Take 2 tablets (1,000 mg total) by mouth every 8 (eight) hours as needed for Pain. 180 tablet 0     [DISCONTINUED] back brace Misc 1 Device by Misc.(Non-Drug; Combo Route) route daily as needed. 1 each 0     [DISCONTINUED] desonide (DESOWEN) 0.05 % cream Apply topically 2 (two) times daily. To breast/nipple, for 2-4 weeks per course 30 g 2     [DISCONTINUED] doxycycline (VIBRAMYCIN) 100 MG Cap Take  100 mg by mouth every 12 (twelve) hours.       [DISCONTINUED] fluticasone propionate (FLONASE) 50 mcg/actuation nasal spray daily as needed.        [DISCONTINUED] ketoconazole (NIZORAL) 2 % cream AAA bid to breast/nipple 30 g 2     [DISCONTINUED] nystatin (MYCOSTATIN) cream        [DISCONTINUED] URIBEL 118-10-40.8-36 mg Cap Take 1 capsule by mouth 4 (four) times daily.                                .

## 2022-04-07 NOTE — PROCEDURES
Large Joint Aspiration/Injection: L knee    Date/Time: 4/7/2022 9:30 AM  Performed by: Harika Ceron PA-C  Authorized by: Harika Ceron PA-C     Consent Done?:  Yes (Verbal)  Indications:  Pain and arthritis  Site marked: the procedure site was marked    Timeout: prior to procedure the correct patient, procedure, and site was verified    Prep: patient was prepped and draped in usual sterile fashion      Local anesthesia used?: Yes    Local anesthetic:  Lidocaine 2% without epinephrine and co-phenylcaine spray  Anesthetic total (ml):  2      Details:  Needle Size:  25 G  Ultrasonic Guidance for needle placement?: No    Approach:  Anterolateral  Location:  Knee  Site:  L knee  Medications:  40 mg triamcinolone acetonide 40 mg/mL; 2 mL LIDOcaine HCL 20 mg/ml (2%) 20 mg/mL (2 %)  Patient tolerance:  Patient tolerated the procedure well with no immediate complications

## 2022-04-07 NOTE — PROGRESS NOTES
RHEUMATOLOGY OUTPATIENT CLINIC NOTE    4/7/2022    Attending Rheumatologist: Harika Ceron PA-C  Primary Care Provider: Jeanette Gomez MD   Chief Complaint/Reason For Consultation:  Osteoporosis      Subjective:        Clau Nunn is a 87 y.o. female here today for an acute visit for a flare up of left knee pain. She would like a steroid injection if possible; last had one in 07/2021 and helped her for several months. Had HA injections in 02/2022. No trauma or injury to the knee. She did see her PCP yesterday for eval bilateral leg pain. Has an area that swells a lot on the right medial LE that she is having an ultrasound on today. Rheumatologic systems otherwise negative.    Routinely followed in clinic for osteoporosis and OA. h/o of a right hip fx, right tibia fx, spine fxs s/p trauma and left wrist fx. Leg pain biggest issue;  Pain today 8/10 rebecca legs; knees and shins. DDD/DJD spine- mri shows diffuse multilevel lumbar spondylosis. Dr valdez injected her back which has helped. She also has osteoarthritis in multiple  Joints, hips, hands, knees, and chronic back pain (two surgeries s/p trauma) takes gabapentin at night.   She is taking tumeric and tylenol. Some times aleve or ibuprofen. We recommend no nsaids w/ckd     Serologies    Lab Results   Component Value Date    RODRIGO Negative 05/12/2011      Current Rheum Medications:  · prolia  Previous Rheum Medications:   · Bilateral monovisc, forteo (SE), reclast (stopped 2' CKD)    Past Medical History:   Diagnosis Date    Allergy     Anxiety     Asthma     Back pain     Chronic venous insufficiency 11/19/2013    Depression     Diverticulosis 11/19/2013 1/8/8 colonoscopy    Dry eyes     Fracture, sacrum/coccyx     Dr. Sanchez     GERD (gastroesophageal reflux disease)     H/O total hip arthroplasty 12/30/2015    Hypertension     Hypothyroid     Osteoarthritis     Osteoporosis     Postlaminectomy syndrome of lumbar region 1/8/2014  "   Radius fracture     7/18    Simple chronic bronchitis 5/3/2017    Umbilical hernia 11/19/2013    Urinary incontinence     Vitamin D deficiency disease      Social History     Socioeconomic History    Marital status:    Occupational History    Occupation: retired     Comment: [employer]   Tobacco Use    Smoking status: Never Smoker    Smokeless tobacco: Never Used   Substance and Sexual Activity    Alcohol use: Yes     Alcohol/week: 0.0 standard drinks     Comment: rarely    Drug use: Never    Sexual activity: Never     Partners: Male     Birth control/protection: Post-menopausal   Social History Narrative    Patient is retired from [employer]     Review of patient's allergies indicates:   Allergen Reactions    Cephalexin Anxiety, Dermatitis, Hives, Itching, Rash and Swelling       Objective:   BP (!) 167/86   Pulse (!) 57   Ht 5' 3" (1.6 m)   Wt 74.6 kg (164 lb 7.4 oz)   BMI 29.13 kg/m²     Immunization History   Administered Date(s) Administered    COVID-19, MRNA, LN-S, PF (Pfizer) (Purple Cap) 01/06/2021, 01/27/2021, 10/12/2021    Hepatitis A, Adult 11/26/2019    Influenza 10/14/2009, 10/14/2010, 10/25/2018    Influenza (FLUAD) - Quadrivalent - Adjuvanted - PF *Preferred* (65+) 10/16/2020    Influenza - High Dose - PF (65 years and older) 10/26/2011, 11/05/2013, 12/03/2014, 10/29/2015, 10/13/2016, 11/03/2017, 10/25/2018, 11/21/2019    Pneumococcal Conjugate - 13 Valent 05/04/2016    Pneumococcal Polysaccharide - 23 Valent 12/13/2011, 05/03/2017    Tdap 12/13/2011, 08/25/2016, 07/27/2018    Zoster 11/06/2009       Current Outpatient Medications:     acetaminophen (TYLENOL) 500 MG tablet, Take 2 tablets (1,000 mg total) by mouth every 8 (eight) hours as needed for Pain., Disp: 180 tablet, Rfl: 0    albuterol (VENTOLIN HFA) 90 mcg/actuation inhaler, Inhale 2 puffs into the lungs 4 (four) times daily as needed., Disp: 54 g, Rfl: 3    aspirin (ECOTRIN) 81 " MG EC tablet, Take 81 mg by mouth once daily., Disp: , Rfl:     azelastine (ASTELIN) 137 mcg (0.1 %) nasal spray, , Disp: , Rfl:     back brace Misc, 1 Device by Misc.(Non-Drug; Combo Route) route daily as needed., Disp: 1 each, Rfl: 0    busPIRone (BUSPAR) 5 MG Tab, TAKE 1 TABLET TWICE DAILY, Disp: 180 tablet, Rfl: 5    cholecalciferol, vitamin D3, (D3-2000) 50 mcg (2,000 unit) Cap, Take 1 capsule (2,000 Units total) by mouth once daily., Disp: 90 capsule, Rfl: 11    cyclobenzaprine (FLEXERIL) 5 MG tablet, TAKE 1 TABLET (5 MG TOTAL) BY MOUTH NIGHTLY AS NEEDED FOR MUSCLE SPASMS., Disp: 90 tablet, Rfl: 1    cycloSPORINE (RESTASIS) 0.05 % ophthalmic emulsion, Place 1 drop into both eyes 2 (two) times daily., Disp: 60 each, Rfl: 12    doxycycline (VIBRAMYCIN) 100 MG Cap, Take 1 capsule (100 mg total) by mouth 2 (two) times daily. for 10 days, Disp: 20 capsule, Rfl: 0    EScitalopram oxalate (LEXAPRO) 20 MG tablet, TAKE 1 TABLET EVERY DAY, Disp: 90 tablet, Rfl: 3    fluocinonide 0.05% (LIDEX) 0.05 % cream, AAA bid to leg for 2-4 weeks per course, Disp: 60 g, Rfl: 1    fluticasone propionate (FLONASE) 50 mcg/actuation nasal spray, daily as needed. , Disp: , Rfl:     gabapentin (NEURONTIN) 300 MG capsule, Take 1 capsule (300 mg total) by mouth 3 (three) times daily., Disp: 270 capsule, Rfl: 3    levothyroxine (SYNTHROID) 100 MCG tablet, Take 1 tablet (100 mcg total) by mouth once daily., Disp: 90 tablet, Rfl: 3    losartan (COZAAR) 50 MG tablet, TAKE 1 TABLET EVERY DAY, Disp: 90 tablet, Rfl: 3    omeprazole (PRILOSEC) 40 MG capsule, TAKE 1 CAPSULE (40 MG TOTAL) BY MOUTH EVERY MORNING., Disp: 90 capsule, Rfl: 3    oxybutynin (DITROPAN-XL) 5 MG TR24, Take 1 tablet (5 mg total) by mouth once daily., Disp: 30 tablet, Rfl: 2    traZODone (DESYREL) 50 MG tablet, TAKE 1 TABLET (50 MG TOTAL) BY MOUTH NIGHTLY AS NEEDED FOR INSOMNIA., Disp: 30 tablet, Rfl: 3    triamcinolone acetonide 0.025% (KENALOG) 0.025 %  cream, AAA bid, Disp: 80 g, Rfl: 0    Current Facility-Administered Medications:     denosumab (PROLIA) injection 60 mg, 60 mg, Subcutaneous, Q6 Months, Carly Mora PA-C, 60 mg at 01/28/19 1548    Physical Exam   Constitutional: She is oriented to person, place, and time. No distress.   HENT:   Head: Normocephalic and atraumatic.   Pulmonary/Chest: Effort normal. No respiratory distress.   Abdominal: Normal appearance.   Musculoskeletal:         General: Tenderness present. No swelling. Normal range of motion.      Cervical back: Normal range of motion.   Neurological: She is alert and oriented to person, place, and time.   Skin: Skin is warm and dry.   Psychiatric: Her behavior is normal. Mood normal.   Nursing note and vitals reviewed.    Recent Results (from the past 336 hour(s))   Comprehensive Metabolic Panel    Collection Time: 04/06/22  2:40 PM   Result Value Ref Range    Sodium 139 136 - 145 mmol/L    Potassium 5.0 3.5 - 5.1 mmol/L    Chloride 103 95 - 110 mmol/L    CO2 25 23 - 29 mmol/L    Glucose 84 70 - 110 mg/dL    BUN 12 8 - 23 mg/dL    Creatinine 0.9 0.5 - 1.4 mg/dL    Calcium 9.9 8.7 - 10.5 mg/dL    Total Protein 7.0 6.0 - 8.4 g/dL    Albumin 3.9 3.5 - 5.2 g/dL    Total Bilirubin 0.9 0.1 - 1.0 mg/dL    Alkaline Phosphatase 42 (L) 55 - 135 U/L    AST 24 10 - 40 U/L    ALT 11 10 - 44 U/L    Anion Gap 11 8 - 16 mmol/L    eGFR if African American >60.0 >60 mL/min/1.73 m^2    eGFR if non  58.1 (A) >60 mL/min/1.73 m^2   TSH    Collection Time: 04/06/22  2:40 PM   Result Value Ref Range    TSH 2.760 0.400 - 4.000 uIU/mL   BNP    Collection Time: 04/06/22  2:40 PM   Result Value Ref Range    BNP 57 0 - 99 pg/mL       Assessment:     1. Primary osteoarthritis of both knees    2. Age-related osteoporosis with current pathological fracture with delayed healing    3. Chronic pain of left knee          Plan:         · Left knee injection today- see procedure note  · Return to clinic: keep  scheduled appt for 08/2022    The patient understands, chooses and consents to this plan and accepts all the risks which include but are not limited to the risks mentioned above.     Method of contact with patient concerns: [facility]    40 minutes of total time spent on the encounter, which includes face to face time and non-face to face time preparing to see the patient (eg, review of tests), Obtaining and/or reviewing separately obtained history, Documenting clinical information in the electronic or other health record, Independently interpreting results (not separately reported) and communicating results to the patient/family/caregiver, or Care coordination (not separately reported).             Harika Ceron PA-C  Rheumatology Department   [facility]

## 2022-04-07 NOTE — ED PROVIDER NOTES
SCRIBE #1 NOTE: I, Reynaldo Vail, am scribing for, and in the presence of, Mejia Cannon MD. I have scribed the entire note.      History      Chief Complaint   Patient presents with    Deep Vein Thrombosis     Sent from MD office after a sonogram that shows her blood clot moved. Has IVC filter in place.        Review of patient's allergies indicates:   Allergen Reactions    Cephalexin Hives, Itching, Swelling, Anxiety, Dermatitis and Rash        HPI   HPI    4/7/2022, 6:01 PM   History obtained from the patient      History of Present Illness: Clau Nunn is a 87 y.o. female patient with a PMHx of HTN, DVT s/p IVC filter placement who presents to the Emergency Department for abnormal imaging. Pt was referred to the ED by her PCP, Dr. Gomez, after BLE US done earlier today showed occlusive right DVT in the distal right femoral vein extending into the popliteal vein. Symptoms are constant and moderate in severity. No mitigating or exacerbating factors reported. Associated sxs include intermittent RLE pain. Patient denies any fever, chills, n/v/d, SOB, CP, weakness, numbness, dizziness, headache, and all other sxs at this time. No prior Tx reported. No further complaints or concerns at this time.       Arrival mode: Personal vehicle    PCP: Jeanette Gomez MD       Past Medical History:  Past Medical History:   Diagnosis Date    Allergy     Anxiety     Asthma     Back pain     Chronic venous insufficiency 11/19/2013    Depression     Diverticulosis 11/19/2013 1/8/8 colonoscopy    Dry eyes     Fracture, sacrum/coccyx     Dr. Sanchez     GERD (gastroesophageal reflux disease)     H/O total hip arthroplasty 12/30/2015    Hypertension     Hypothyroid     Osteoarthritis     Osteoporosis     Postlaminectomy syndrome of lumbar region 1/8/2014    Radius fracture     7/18    Simple chronic bronchitis 5/3/2017    Umbilical hernia 11/19/2013    Urinary incontinence     Vitamin D deficiency  disease        Past Surgical History:  Past Surgical History:   Procedure Laterality Date    ADENOIDECTOMY      BACK SURGERY      x2    breast implants  1973    CATARACT EXTRACTION Bilateral     CLOSED REDUCTION DISTAL RADIUS FRACTURE      Dr. Black, 8/18    cystoscope  1/6/16    DR. Brown    FRACTURE SURGERY  April 3 2015    left wrist    HAND SURGERY      HIP SURGERY Right     total hip- Dr. Dos Santos    HYSTERECTOMY      34y/o    INJECTION OF ANESTHETIC AGENT AROUND NERVE Right 9/16/2020    Procedure: Right suprascapular nerve block;  Surgeon: Raffi Wells MD;  Location: [facility];  Service: Pain Management;  Laterality: Right;    INJECTION OF ANESTHETIC AGENT AROUND NERVE Right 7/13/2021    Procedure: Block, Nerve Right Suprascapular Nerve Block with RN IV sedation;  Surgeon: Raffi Wells MD;  Location: [facility];  Service: Pain Management;  Laterality: Right;    INJECTION OF ANESTHETIC AGENT INTO SACROILIAC JOINT N/A 8/12/2020    Procedure: Left IA hip Joint + Left SIJ + Right shoulder injection;  Surgeon: Raffi Wells MD;  Location: [facility];  Service: Pain Management;  Laterality: N/A;    INJECTION OF JOINT N/A 8/12/2020    Procedure: Left IA hip Joint + Left SIJ + Right shoulder injection;  Surgeon: Raffi Wells MD;  Location: [facility];  Service: Pain Management;  Laterality: N/A;    JOINT REPLACEMENT Right     R NNAMDI - Dr. Dos Santos    LEG SURGERY Right     ex-fix tib/fib    SELECTIVE INJECTION OF ANESTHETIC AGENT AROUND LUMBAR SPINAL NERVE ROOT BY TRANSFORAMINAL APPROACH Bilateral 2/10/2021    Procedure: Bilateral L5/S1 TF REJI;  Surgeon: Raffi Wells MD;  Location: [facility];  Service: Pain Management;  Laterality: Bilateral;    TONSILLECTOMY      TRANSFORAMINAL EPIDURAL INJECTION OF STEROID Left 7/8/2020    Procedure: left L2/3 + L5/S1 TF REJI;  Surgeon: Raffi Wells MD;  Location: [facility];  Service: Pain Management;  Laterality: Left;    TRANSFORAMINAL EPIDURAL  INJECTION OF STEROID Left 7/1/2021    Procedure: left L5/S1 + S1 TF REJI;  Surgeon: Raffi Wells MD;  [facility];  Service: Pain Management;  Laterality: Left;         Family History:  Family History   Problem Relation Age of Onset    COPD Mother     Cancer Mother         lung CA    Pulmonary fibrosis Sister     Cancer Daughter         colon    Stroke Father     Diabetes Son     Melanoma Neg Hx     Psoriasis Neg Hx     Lupus Neg Hx        Social History:  Social History     Tobacco Use    Smoking status: Never Smoker    Smokeless tobacco: Never Used   Substance and Sexual Activity    Alcohol use: Yes     Alcohol/week: 0.0 standard drinks     Comment: rarely    Drug use: Never    Sexual activity: Never     Partners: Male     Birth control/protection: Post-menopausal       ROS   Review of Systems   Constitutional: Negative for chills and fever.   HENT: Negative for sore throat.    Respiratory: Negative for shortness of breath.    Cardiovascular: Negative for chest pain.   Gastrointestinal: Negative for diarrhea, nausea and vomiting.   Genitourinary: Negative for dysuria.   Musculoskeletal: Positive for myalgias (RLE, intermittent). Negative for back pain.   Skin: Negative for rash.   Neurological: Negative for dizziness, weakness, light-headedness, numbness and headaches.   Hematological: Does not bruise/bleed easily.   All other systems reviewed and are negative.    Physical Exam      Initial Vitals [04/07/22 1516]   BP Pulse Resp Temp SpO2   (!) 242/120 67 18 98.5 °F (36.9 °C) 98 %      MAP       --          Physical Exam  Nursing Notes and Vital Signs Reviewed.  Constitutional: Patient is in no acute distress. Well-developed and well-nourished.  Head: Atraumatic. Normocephalic.  Eyes: PERRL. EOM intact. Conjunctivae are not pale. No scleral icterus.  ENT: Mucous membranes are moist. Oropharynx is clear and symmetric.    Neck: Supple. Full ROM.   Cardiovascular: Regular rate. Regular  "rhythm. No murmurs, rubs, or gallops. Distal pulses are 2+ and symmetric.  Pulmonary/Chest: No respiratory distress. Clear to auscultation bilaterally. No wheezing or rales.  Abdominal: Soft and non-distended.  There is no tenderness.  No rebound, guarding, or rigidity.   Musculoskeletal: Moves all extremities. No obvious deformities. No edema.  RLE: No evident deformity. Mild swelling. Negative for tenderness. ROM is normal. Cap refill distally is <2 seconds. DP and PT pulses are equal and 2+ bilaterally. No motor deficit. No distal sensory deficit.  Skin: Warm and dry.  Neurological:  Alert, awake, and appropriate.  Normal speech.  No acute focal neurological deficits are appreciated.  Psychiatric: Normal affect. Good eye contact. Appropriate in content.    ED Course    Procedures  ED Vital Signs:  Vitals:    04/07/22 1516 04/07/22 1545 04/07/22 1559 04/07/22 1600   BP: (!) 242/120  (!) 219/99 (!) 219/99   Pulse: 67 77  77   Resp: 18   19   Temp: 98.5 °F (36.9 °C)      TempSrc: Oral      SpO2: 98%   97%   Weight: 73 kg (161 lb)      Height: 5' 3" (1.6 m)       04/07/22 1642 04/07/22 1700 04/07/22 1701 04/07/22 1715   BP: (!) 198/91 (!) 200/93 (!) 200/93 (!) 182/86   Pulse: 71 72 70 65   Resp: (!) 21 (!) 21 20 19   Temp:       TempSrc:       SpO2: 96% 99% 98% 97%   Weight:       Height:        04/07/22 1800 04/07/22 1845 04/07/22 2030   BP: (!) 163/74 (!) 145/68 (!) 165/92   Pulse: 80 81 70   Resp: 20 (!) 22 (!) 21   Temp:   98.6 °F (37 °C)   TempSrc:   Oral   SpO2: 98% 98% 97%   Weight:      Height:          Abnormal Lab Results:  Labs Reviewed   CBC W/ AUTO DIFFERENTIAL - Abnormal; Notable for the following components:       Result Value    Hematocrit 48.6 (*)     MCH 31.7 (*)     Lymph # 0.6 (*)     Mono # 0.1 (*)     Gran % 85.8 (*)     Lymph % 11.7 (*)     Mono % 1.1 (*)     All other components within normal limits   COMPREHENSIVE METABOLIC PANEL - Abnormal; Notable for the following components:    Glucose " 145 (*)     Total Bilirubin 1.1 (*)     Alkaline Phosphatase 50 (*)     eGFR if  59 (*)     eGFR if non  51 (*)     All other components within normal limits   TROPONIN I   PROTIME-INR   APTT        All Lab Results:  Results for orders placed or performed during the hospital encounter of 04/07/22   CBC auto differential   Result Value Ref Range    WBC 5.29 3.90 - 12.70 K/uL    RBC 5.02 4.00 - 5.40 M/uL    Hemoglobin 15.9 12.0 - 16.0 g/dL    Hematocrit 48.6 (H) 37.0 - 48.5 %    MCV 97 82 - 98 fL    MCH 31.7 (H) 27.0 - 31.0 pg    MCHC 32.7 32.0 - 36.0 g/dL    RDW 12.7 11.5 - 14.5 %    Platelets 246 150 - 450 K/uL    MPV 11.1 9.2 - 12.9 fL    Immature Granulocytes 0.4 0.0 - 0.5 %    Gran # (ANC) 4.5 1.8 - 7.7 K/uL    Immature Grans (Abs) 0.02 0.00 - 0.04 K/uL    Lymph # 0.6 (L) 1.0 - 4.8 K/uL    Mono # 0.1 (L) 0.3 - 1.0 K/uL    Eos # 0.0 0.0 - 0.5 K/uL    Baso # 0.03 0.00 - 0.20 K/uL    nRBC 0 0 /100 WBC    Gran % 85.8 (H) 38.0 - 73.0 %    Lymph % 11.7 (L) 18.0 - 48.0 %    Mono % 1.1 (L) 4.0 - 15.0 %    Eosinophil % 0.4 0.0 - 8.0 %    Basophil % 0.6 0.0 - 1.9 %    Aniso Slight     Poik Slight     Differential Method Automated    Comprehensive metabolic panel   Result Value Ref Range    Sodium 140 136 - 145 mmol/L    Potassium 4.3 3.5 - 5.1 mmol/L    Chloride 103 95 - 110 mmol/L    CO2 26 23 - 29 mmol/L    Glucose 145 (H) 70 - 110 mg/dL    BUN 16 8 - 23 mg/dL    Creatinine 1.0 0.5 - 1.4 mg/dL    Calcium 10.0 8.7 - 10.5 mg/dL    Total Protein 8.0 6.0 - 8.4 g/dL    Albumin 4.4 3.5 - 5.2 g/dL    Total Bilirubin 1.1 (H) 0.1 - 1.0 mg/dL    Alkaline Phosphatase 50 (L) 55 - 135 U/L    AST 21 10 - 40 U/L    ALT 13 10 - 44 U/L    Anion Gap 11 8 - 16 mmol/L    eGFR if African American 59 (A) >60 mL/min/1.73 m^2    eGFR if non African American 51 (A) >60 mL/min/1.73 m^2   Troponin I   Result Value Ref Range    Troponin I 0.008 0.000 - 0.026 ng/mL   Protime-INR   Result Value Ref Range    Prothrombin  Time 11.3 9.0 - 12.5 sec    INR 1.1 0.8 - 1.2   APTT   Result Value Ref Range    aPTT 28.5 21.0 - 32.0 sec     Imaging Results:  Imaging Results    None        The EKG was ordered, reviewed, and independently interpreted by the ED provider.  Interpretation time: 15:50  Rate: 81 BPM  Rhythm: Sinus rhythm with premature atrial complexes  Interpretation: Prolonged QT. No STEMI.    Outpatient Imaging Reviewed:    US Lower Extremity Veins Bilateral  Order: [id number]   Status: Final result     Visible to patient: Yes (seen)     Next appt: 04/13/2022 at 08:40 AM in Family Medicine (Jeanette Gomez MD)     Dx: Localized edema; Leg swelling     0 Result Notes    Details    Reading Physician Reading Date Result Priority   Surjit Gibson DO  [phone] 4/7/2022 Routine     Narrative & Impression  EXAMINATION:  US LOWER EXTREMITY VEINS BILATERAL     CLINICAL HISTORY:  Other specified soft tissue disorders     TECHNIQUE:  Duplex and color flow Doppler and dynamic compression was performed of the bilateral lower extremity veins was performed.     COMPARISON:  None     FINDINGS:  Right thigh veins: There is near complete occlusion of the distal right femoral vein.  The right popliteal vein is also completely occluded.  The right right common femoral and proximal to midportion of the femoral vein are patent.     Right calf veins: The visualized calf veins are patent.     Left thigh veins: The common femoral, femoral, popliteal, upper greater saphenous, and deep femoral veins are patent and free of thrombus. The veins are normally compressible and have normal phasic flow and augmentation response.     Left calf veins: The visualized calf veins are patent.     Miscellaneous: None     Impression:     1. Interval development of near completely occlusive thrombus within the distal right femoral vein that extends into the popliteal vein.  2.  This report was flagged in Epic as abnormal.     COMMUNICATION  This critical result was  discovered/received at 13:55.  The critical information above was relayed directly by me by secure chat to Dr. Molina on 04/07/2022 at 13:59.        Electronically signed by: Surjit Gibson DO  Date:                                            04/07/2022  Time:                                           14:14                The Emergency Provider reviewed the vital signs and test results, which are outlined above.    ED Discussion     7:40 PM: Discussed pt's case with Dr. Turner (Interventional Radiology), who states that no intervention is warranted due to the position of the pt's thrombus.    7:48 PM: Reassessed pt at this time. Discussed with pt all pertinent ED information and results. Discussed pt dx and plan of tx. Gave pt all f/u and return to the ED instructions. All questions and concerns were addressed at this time. Pt expresses understanding of information and instructions, and is comfortable with plan to discharge. Pt is stable for discharge.    I discussed with patient and/or family/caretaker that evaluation in the ED does not suggest any emergent or life threatening medical conditions requiring immediate intervention beyond what was provided in the ED, and I believe patient is safe for discharge.  Regardless, an unremarkable evaluation in the ED does not preclude the development or presence of a serious of life threatening condition. As such, patient was instructed to return immediately for any worsening or change in current symptoms.         ED Medication(s):  Medications   cloNIDine tablet 0.1 mg (0.1 mg Oral Given 4/7/22 6209)   hydrALAZINE injection 10 mg (10 mg Intravenous Given 4/7/22 1711)   apixaban tablet 10 mg (10 mg Oral Given 4/7/22 2021)     Discharge Medication List as of 4/7/2022  8:13 PM      START taking these medications    Details   apixaban (ELIQUIS DVT-PE TREAT 30D START) 5 mg (74 tabs) DsPk For the first 7 days take two 5 mg tablets twice daily.  After 7 days take one 5 mg  tablet twice daily., Print            Follow-up Information     Jeanette Gomez MD. Schedule an appointment as soon as possible for a visit in 1 week.    Specialty: Internal Medicine  Contact information:  [address]  [facility] [address] [phone]             [facility] - Emergency Dept..    Specialty: Emergency Medicine  Why: As needed, If symptoms worsen  Contact information:  [address]  [facility] [address] [phone]                         Medical Decision Making    Medical Decision Making:   Clinical Tests:   Lab Tests: Ordered and Reviewed  Radiological Study: Reviewed  Medical Tests: Ordered and Reviewed           Scribe Attestation:   Scribe #1: I performed the above scribed service and the documentation accurately describes the services I performed. I attest to the accuracy of the note.    Attending:   Physician Attestation Statement for Scribe #1: I, Mejia Cannon MD, personally performed the services described in this documentation, as scribed by Reynaldo Vail, in my presence, and it is both accurate and complete.          Clinical Impression       ICD-10-CM ICD-9-CM   1. Acute deep vein thrombosis (DVT) of other specified vein of right lower extremity  I82.491 453.40   2. Hypertension  I10 401.9       Disposition:   Disposition: Discharged  Condition: Stable            Mejia Cannon MD  04/10/22 5159

## 2022-04-07 NOTE — PROGRESS NOTES
Dr. Gibson, (Radiologist [facility]) Spoke to Dr. Jeanette Molina 4/7/2022 at 2:00pm regarding Mrs. Nunn. Dr. Molina was informed that her patient has an acute thrombus in her distal FV.     Per Dr. Molina transfer patient to ER at [facility]. Patient refused ambulance and wished for her son to take her by private vehicle. Dr. Gibson (Radiologist) was notified of patients wishes and agreed to allow patient to ride in private vehicle.     Gary Tiwari [facility]  Melonie Lindsey RDMS

## 2022-04-07 NOTE — PROGRESS NOTES
"Subjective:      Patient ID: Clau Nunn is a 87 y.o. female.    Chief Complaint: Follow-up (Pt has had swollen leg for some time states that she went to the hospital for swelling said it was a blood clot is on blood thinners.)      HPI  Here for follow up of medical problems and f/u of ER visit for R distal femoral DVT, placed on eliquis, no procedure indicated.    4/7/22 doppler:  FINDINGS:  Right thigh veins: There is near complete occlusion of the distal right femoral vein.  The right popliteal vein is also completely occluded.  The right right common femoral and proximal to midportion of the femoral vein are patent.     Right calf veins: The visualized calf veins are patent.     Left thigh veins: The common femoral, femoral, popliteal, upper greater saphenous, and deep femoral veins are patent and free of thrombus. The veins are normally compressible and have normal phasic flow and augmentation response.     Left calf veins: The visualized calf veins are patent.     Miscellaneous: None     Impression:     1. Interval development of near completely occlusive thrombus within the distal right femoral vein that extends into the popliteal vein.  2.  This report was flagged in Epic as abnormal.   -----------------------------------------------------------------------------------------    Tolerating eliquis.  S/p 7d doxycycline.  Feels well.  Much less leg swelling.  Using support stockings after elevation.  Using inhalers.  No f/c.  No cp.  BMs normal, no black or blood.  Anxiety doing well on rx.          Updated/ annual due 10/22:  HM: 10/21 fluvax, 1/21 covid vaccine/booster, 11/19 HAV, 5/16 [id number], 5/17 booster [id number], 12/11 TDaP, 11/09 zoster, 1/20 BMD rep 3y, 1/14 Cscope no more needed, 4/21 [facility]/ Gyn Dr. Tere bauer outside, 9/19 Eye Dr. Rubalcava.     Review of Systems      Objective:   /80   Pulse 72   Temp 97.4 °F (36.3 °C) (Temporal)   Ht 5' 3" (1.6 m)   Wt 72.5 kg (159 lb 13.3 oz)   SpO2 " 97%   BMI 28.31 kg/m²     Physical Exam  Constitutional:       Appearance: She is well-developed.   Neck:      Thyroid: No thyroid mass.      Vascular: No carotid bruit.   Cardiovascular:      Rate and Rhythm: Normal rate and regular rhythm.      Heart sounds: No murmur heard.    No friction rub. No gallop.   Pulmonary:      Effort: Pulmonary effort is normal.      Breath sounds: Normal breath sounds. No wheezing or rales.   Abdominal:      General: Bowel sounds are normal.      Palpations: Abdomen is soft. There is no mass.      Tenderness: There is no abdominal tenderness.   Musculoskeletal:         General: No swelling (no more swelling or erythema or calor.).      Cervical back: Neck supple.   Lymphadenopathy:      Cervical: No cervical adenopathy.   Neurological:      Mental Status: She is alert and oriented to person, place, and time.        Latest Reference Range & Units 04/07/22 15:40   WBC 3.90 - 12.70 K/uL 5.29   RBC 4.00 - 5.40 M/uL 5.02   Hemoglobin 12.0 - 16.0 g/dL 15.9   Hematocrit 37.0 - 48.5 % 48.6 (H)   MCV 82 - 98 fL 97   MCH 27.0 - 31.0 pg 31.7 (H)   MCHC 32.0 - 36.0 g/dL 32.7   RDW 11.5 - 14.5 % 12.7   Platelets 150 - 450 K/uL 246   MPV 9.2 - 12.9 fL 11.1   Gran % 38.0 - 73.0 % 85.8 (H)   Lymph % 18.0 - 48.0 % 11.7 (L)   Mono % 4.0 - 15.0 % 1.1 (L)   Eosinophil % 0.0 - 8.0 % 0.4   Basophil % 0.0 - 1.9 % 0.6   Immature Granulocytes 0.0 - 0.5 % 0.4   Gran # (ANC) 1.8 - 7.7 K/uL 4.5   Lymph # 1.0 - 4.8 K/uL 0.6 (L)   Mono # 0.3 - 1.0 K/uL 0.1 (L)   Eos # 0.0 - 0.5 K/uL 0.0   Baso # 0.00 - 0.20 K/uL 0.03   Immature Grans (Abs) 0.00 - 0.04 K/uL 0.02 [1]   NRBC 0 /100 WBC 0   Differential Method  Automated   Aniso  Slight   Poik  Slight   Protime 9.0 - 12.5 sec 11.3   INR 0.8 - 1.2  1.1 [2]   APTT 21.0 - 32.0 sec 28.5 [3]   Sodium 136 - 145 mmol/L 140   Potassium 3.5 - 5.1 mmol/L 4.3   Chloride 95 - 110 mmol/L 103   CO2 23 - 29 mmol/L 26   Anion Gap 8 - 16 mmol/L 11   BUN 8 - 23 mg/dL 16   Creatinine  0.5 - 1.4 mg/dL 1.0   EGFR if non African American >60 mL/min/1.73 m^2 51 ! [4]   EGFR if African American >60 mL/min/1.73 m^2 59 !   Glucose 70 - 110 mg/dL 145 (H)   Calcium 8.7 - 10.5 mg/dL 10.0   Alkaline Phosphatase 55 - 135 U/L 50 (L)   PROTEIN TOTAL 6.0 - 8.4 g/dL 8.0   Albumin 3.5 - 5.2 g/dL 4.4   BILIRUBIN TOTAL 0.1 - 1.0 mg/dL 1.1 (H) [5]   AST 10 - 40 U/L 21   ALT 10 - 44 U/L 13   Troponin I 0.000 - 0.026 ng/mL 0.008 [6]         Assessment:       1. DVT of deep femoral vein, right    2. Chronic venous insufficiency    3. Essential hypertension    4. Cellulitis of right lower extremity          Plan:     DVT of deep femoral vein, right diagnosed 4/7/22, hx prior DVT, has IVC filter in place.  ? Need for lifetime anticoagulation.  -     Ambulatory referral/consult to Hematology / Oncology; Future; Expected date: 04/20/2022    Chronic venous insufficiency- cont elev/support stockings.    Essential hypertension- stable, cont rx.    Cellulitis of right lower extremity, resolved.

## 2022-04-08 ENCOUNTER — PATIENT MESSAGE (OUTPATIENT)
Dept: FAMILY MEDICINE | Facility: CLINIC | Age: 87
End: 2022-04-08
Payer: MEDICARE

## 2022-04-13 ENCOUNTER — OFFICE VISIT (OUTPATIENT)
Dept: FAMILY MEDICINE | Facility: CLINIC | Age: 87
End: 2022-04-13
Payer: MEDICARE

## 2022-04-13 VITALS
WEIGHT: 159.81 LBS | HEART RATE: 72 BPM | TEMPERATURE: 97 F | DIASTOLIC BLOOD PRESSURE: 80 MMHG | SYSTOLIC BLOOD PRESSURE: 130 MMHG | HEIGHT: 63 IN | OXYGEN SATURATION: 97 % | BODY MASS INDEX: 28.32 KG/M2

## 2022-04-13 DIAGNOSIS — I10 ESSENTIAL HYPERTENSION: Chronic | ICD-10-CM

## 2022-04-13 DIAGNOSIS — I82.411 DVT OF DEEP FEMORAL VEIN, RIGHT: Primary | ICD-10-CM

## 2022-04-13 DIAGNOSIS — I87.2 CHRONIC VENOUS INSUFFICIENCY: Chronic | ICD-10-CM

## 2022-04-13 DIAGNOSIS — L03.115 CELLULITIS OF RIGHT LOWER EXTREMITY: ICD-10-CM

## 2022-04-13 PROCEDURE — 1157F ADVNC CARE PLAN IN RCRD: CPT | Mod: CPTII,S$GLB,, | Performed by: INTERNAL MEDICINE

## 2022-04-13 PROCEDURE — 1159F PR MEDICATION LIST DOCUMENTED IN MEDICAL RECORD: ICD-10-PCS | Mod: CPTII,S$GLB,, | Performed by: INTERNAL MEDICINE

## 2022-04-13 PROCEDURE — 1126F PR PAIN SEVERITY QUANTIFIED, NO PAIN PRESENT: ICD-10-PCS | Mod: CPTII,S$GLB,, | Performed by: INTERNAL MEDICINE

## 2022-04-13 PROCEDURE — 1126F AMNT PAIN NOTED NONE PRSNT: CPT | Mod: CPTII,S$GLB,, | Performed by: INTERNAL MEDICINE

## 2022-04-13 PROCEDURE — 3288F PR FALLS RISK ASSESSMENT DOCUMENTED: ICD-10-PCS | Mod: CPTII,S$GLB,, | Performed by: INTERNAL MEDICINE

## 2022-04-13 PROCEDURE — 99999 PR PBB SHADOW E&M-EST. PATIENT-LVL IV: CPT | Mod: PBBFAC,,, | Performed by: INTERNAL MEDICINE

## 2022-04-13 PROCEDURE — 3288F FALL RISK ASSESSMENT DOCD: CPT | Mod: CPTII,S$GLB,, | Performed by: INTERNAL MEDICINE

## 2022-04-13 PROCEDURE — 1159F MED LIST DOCD IN RCRD: CPT | Mod: CPTII,S$GLB,, | Performed by: INTERNAL MEDICINE

## 2022-04-13 PROCEDURE — 99999 PR PBB SHADOW E&M-EST. PATIENT-LVL IV: ICD-10-PCS | Mod: PBBFAC,,, | Performed by: INTERNAL MEDICINE

## 2022-04-13 PROCEDURE — 99214 OFFICE O/P EST MOD 30 MIN: CPT | Mod: S$GLB,,, | Performed by: INTERNAL MEDICINE

## 2022-04-13 PROCEDURE — 1157F PR ADVANCE CARE PLAN OR EQUIV PRESENT IN MEDICAL RECORD: ICD-10-PCS | Mod: CPTII,S$GLB,, | Performed by: INTERNAL MEDICINE

## 2022-04-13 PROCEDURE — 1101F PR PT FALLS ASSESS DOC 0-1 FALLS W/OUT INJ PAST YR: ICD-10-PCS | Mod: CPTII,S$GLB,, | Performed by: INTERNAL MEDICINE

## 2022-04-13 PROCEDURE — 99214 PR OFFICE/OUTPT VISIT, EST, LEVL IV, 30-39 MIN: ICD-10-PCS | Mod: S$GLB,,, | Performed by: INTERNAL MEDICINE

## 2022-04-13 PROCEDURE — 1101F PT FALLS ASSESS-DOCD LE1/YR: CPT | Mod: CPTII,S$GLB,, | Performed by: INTERNAL MEDICINE

## 2022-05-09 ENCOUNTER — TELEPHONE (OUTPATIENT)
Dept: HEMATOLOGY/ONCOLOGY | Facility: CLINIC | Age: 87
End: 2022-05-09
Payer: MEDICARE

## 2022-05-09 NOTE — TELEPHONE ENCOUNTER
Spoke to patient in reference to Hematology referral from Dr. Molina.  Appointment scheduled date, time and location given.

## 2022-05-10 ENCOUNTER — TELEPHONE (OUTPATIENT)
Dept: PAIN MEDICINE | Facility: CLINIC | Age: 87
End: 2022-05-10
Payer: MEDICARE

## 2022-05-10 ENCOUNTER — PATIENT OUTREACH (OUTPATIENT)
Dept: ADMINISTRATIVE | Facility: OTHER | Age: 87
End: 2022-05-10
Payer: MEDICARE

## 2022-05-10 NOTE — PROGRESS NOTES
Health Maintenance Due   Topic Date Due    COVID-19 Vaccine (4 - Booster for Pfizer series) 02/12/2022    Shingles Vaccine (3 of 3) 04/22/2022     Updates were requested from care everywhere.  Chart was reviewed for overdue Proactive [facility] Encounters ([facility]) topics (CRS, Breast Cancer Screening, Eye exam)  Health Maintenance has been updated.  LINKS immunization registry triggered.  Immunizations were reconciled.

## 2022-05-11 ENCOUNTER — OFFICE VISIT (OUTPATIENT)
Dept: PAIN MEDICINE | Facility: CLINIC | Age: 87
End: 2022-05-11
Payer: MEDICARE

## 2022-05-11 ENCOUNTER — OFFICE VISIT (OUTPATIENT)
Dept: HEMATOLOGY/ONCOLOGY | Facility: CLINIC | Age: 87
End: 2022-05-11
Payer: MEDICARE

## 2022-05-11 ENCOUNTER — LAB VISIT (OUTPATIENT)
Dept: LAB | Facility: HOSPITAL | Age: 87
End: 2022-05-11
Attending: NURSE PRACTITIONER
Payer: MEDICARE

## 2022-05-11 VITALS
RESPIRATION RATE: 17 BRPM | WEIGHT: 164.13 LBS | HEART RATE: 68 BPM | BODY MASS INDEX: 29.08 KG/M2 | SYSTOLIC BLOOD PRESSURE: 118 MMHG | HEIGHT: 63 IN | DIASTOLIC BLOOD PRESSURE: 76 MMHG

## 2022-05-11 VITALS
WEIGHT: 163.81 LBS | HEIGHT: 63 IN | OXYGEN SATURATION: 95 % | DIASTOLIC BLOOD PRESSURE: 76 MMHG | HEART RATE: 64 BPM | BODY MASS INDEX: 29.02 KG/M2 | TEMPERATURE: 98 F | SYSTOLIC BLOOD PRESSURE: 168 MMHG

## 2022-05-11 DIAGNOSIS — I82.431 ACUTE DEEP VEIN THROMBOSIS (DVT) OF POPLITEAL VEIN OF RIGHT LOWER EXTREMITY: ICD-10-CM

## 2022-05-11 DIAGNOSIS — Z79.01 CHRONIC ANTICOAGULATION: Primary | ICD-10-CM

## 2022-05-11 DIAGNOSIS — I82.411 DVT OF DEEP FEMORAL VEIN, RIGHT: ICD-10-CM

## 2022-05-11 DIAGNOSIS — M54.16 BILATERAL LUMBAR RADICULOPATHY: Primary | ICD-10-CM

## 2022-05-11 DIAGNOSIS — M47.897 OTHER SPONDYLOSIS, LUMBOSACRAL REGION: ICD-10-CM

## 2022-05-11 DIAGNOSIS — M51.36 DDD (DEGENERATIVE DISC DISEASE), LUMBAR: ICD-10-CM

## 2022-05-11 DIAGNOSIS — I87.2 CHRONIC VENOUS INSUFFICIENCY: Primary | ICD-10-CM

## 2022-05-11 DIAGNOSIS — Z79.01 CHRONIC ANTICOAGULATION: ICD-10-CM

## 2022-05-11 DIAGNOSIS — Z86.718 HISTORY OF DVT IN ADULTHOOD: ICD-10-CM

## 2022-05-11 LAB
ALBUMIN SERPL BCP-MCNC: 3.9 G/DL (ref 3.5–5.2)
ALP SERPL-CCNC: 47 U/L (ref 55–135)
ALT SERPL W/O P-5'-P-CCNC: 12 U/L (ref 10–44)
ANION GAP SERPL CALC-SCNC: 12 MMOL/L (ref 8–16)
AST SERPL-CCNC: 16 U/L (ref 10–40)
BASOPHILS # BLD AUTO: 0.07 K/UL (ref 0–0.2)
BASOPHILS NFR BLD: 1.2 % (ref 0–1.9)
BILIRUB SERPL-MCNC: 0.6 MG/DL (ref 0.1–1)
BUN SERPL-MCNC: 18 MG/DL (ref 8–23)
CALCIUM SERPL-MCNC: 9.6 MG/DL (ref 8.7–10.5)
CHLORIDE SERPL-SCNC: 106 MMOL/L (ref 95–110)
CO2 SERPL-SCNC: 22 MMOL/L (ref 23–29)
CREAT SERPL-MCNC: 1 MG/DL (ref 0.5–1.4)
DIFFERENTIAL METHOD: ABNORMAL
EOSINOPHIL # BLD AUTO: 0.2 K/UL (ref 0–0.5)
EOSINOPHIL NFR BLD: 3.8 % (ref 0–8)
ERYTHROCYTE [DISTWIDTH] IN BLOOD BY AUTOMATED COUNT: 12.9 % (ref 11.5–14.5)
EST. GFR  (AFRICAN AMERICAN): 59 ML/MIN/1.73 M^2
EST. GFR  (NON AFRICAN AMERICAN): 51 ML/MIN/1.73 M^2
GLUCOSE SERPL-MCNC: 89 MG/DL (ref 70–110)
HCT VFR BLD AUTO: 44.6 % (ref 37–48.5)
HGB BLD-MCNC: 14.7 G/DL (ref 12–16)
IMM GRANULOCYTES # BLD AUTO: 0.02 K/UL (ref 0–0.04)
IMM GRANULOCYTES NFR BLD AUTO: 0.3 % (ref 0–0.5)
LYMPHOCYTES # BLD AUTO: 1.6 K/UL (ref 1–4.8)
LYMPHOCYTES NFR BLD: 28.3 % (ref 18–48)
MCH RBC QN AUTO: 31.8 PG (ref 27–31)
MCHC RBC AUTO-ENTMCNC: 33 G/DL (ref 32–36)
MCV RBC AUTO: 97 FL (ref 82–98)
MONOCYTES # BLD AUTO: 0.6 K/UL (ref 0.3–1)
MONOCYTES NFR BLD: 9.7 % (ref 4–15)
NEUTROPHILS # BLD AUTO: 3.3 K/UL (ref 1.8–7.7)
NEUTROPHILS NFR BLD: 56.7 % (ref 38–73)
NRBC BLD-RTO: 0 /100 WBC
PLATELET # BLD AUTO: 256 K/UL (ref 150–450)
PMV BLD AUTO: 11.1 FL (ref 9.2–12.9)
POTASSIUM SERPL-SCNC: 4.6 MMOL/L (ref 3.5–5.1)
PROT SERPL-MCNC: 7 G/DL (ref 6–8.4)
RBC # BLD AUTO: 4.62 M/UL (ref 4–5.4)
SODIUM SERPL-SCNC: 140 MMOL/L (ref 136–145)
WBC # BLD AUTO: 5.8 K/UL (ref 3.9–12.7)

## 2022-05-11 PROCEDURE — 99999 PR PBB SHADOW E&M-EST. PATIENT-LVL IV: CPT | Mod: PBBFAC,,, | Performed by: PHYSICIAN ASSISTANT

## 2022-05-11 PROCEDURE — 1157F PR ADVANCE CARE PLAN OR EQUIV PRESENT IN MEDICAL RECORD: ICD-10-PCS | Mod: CPTII,S$GLB,, | Performed by: PHYSICIAN ASSISTANT

## 2022-05-11 PROCEDURE — 1125F PR PAIN SEVERITY QUANTIFIED, PAIN PRESENT: ICD-10-PCS | Mod: CPTII,S$GLB,, | Performed by: PHYSICIAN ASSISTANT

## 2022-05-11 PROCEDURE — 1101F PR PT FALLS ASSESS DOC 0-1 FALLS W/OUT INJ PAST YR: ICD-10-PCS | Mod: CPTII,S$GLB,, | Performed by: PHYSICIAN ASSISTANT

## 2022-05-11 PROCEDURE — 1160F PR REVIEW ALL MEDS BY PRESCRIBER/CLIN PHARMACIST DOCUMENTED: ICD-10-PCS | Mod: CPTII,S$GLB,, | Performed by: NURSE PRACTITIONER

## 2022-05-11 PROCEDURE — 1160F RVW MEDS BY RX/DR IN RCRD: CPT | Mod: CPTII,S$GLB,, | Performed by: NURSE PRACTITIONER

## 2022-05-11 PROCEDURE — 3288F PR FALLS RISK ASSESSMENT DOCUMENTED: ICD-10-PCS | Mod: CPTII,S$GLB,, | Performed by: NURSE PRACTITIONER

## 2022-05-11 PROCEDURE — 1125F AMNT PAIN NOTED PAIN PRSNT: CPT | Mod: CPTII,S$GLB,, | Performed by: PHYSICIAN ASSISTANT

## 2022-05-11 PROCEDURE — 3288F PR FALLS RISK ASSESSMENT DOCUMENTED: ICD-10-PCS | Mod: CPTII,S$GLB,, | Performed by: PHYSICIAN ASSISTANT

## 2022-05-11 PROCEDURE — 36415 COLL VENOUS BLD VENIPUNCTURE: CPT | Performed by: NURSE PRACTITIONER

## 2022-05-11 PROCEDURE — 1159F PR MEDICATION LIST DOCUMENTED IN MEDICAL RECORD: ICD-10-PCS | Mod: CPTII,S$GLB,, | Performed by: NURSE PRACTITIONER

## 2022-05-11 PROCEDURE — 1157F ADVNC CARE PLAN IN RCRD: CPT | Mod: CPTII,S$GLB,, | Performed by: NURSE PRACTITIONER

## 2022-05-11 PROCEDURE — 1160F RVW MEDS BY RX/DR IN RCRD: CPT | Mod: CPTII,S$GLB,, | Performed by: PHYSICIAN ASSISTANT

## 2022-05-11 PROCEDURE — 99204 PR OFFICE/OUTPT VISIT, NEW, LEVL IV, 45-59 MIN: ICD-10-PCS | Mod: S$GLB,,, | Performed by: NURSE PRACTITIONER

## 2022-05-11 PROCEDURE — 1126F PR PAIN SEVERITY QUANTIFIED, NO PAIN PRESENT: ICD-10-PCS | Mod: CPTII,S$GLB,, | Performed by: NURSE PRACTITIONER

## 2022-05-11 PROCEDURE — 99204 OFFICE O/P NEW MOD 45 MIN: CPT | Mod: S$GLB,,, | Performed by: NURSE PRACTITIONER

## 2022-05-11 PROCEDURE — 1101F PT FALLS ASSESS-DOCD LE1/YR: CPT | Mod: CPTII,S$GLB,, | Performed by: NURSE PRACTITIONER

## 2022-05-11 PROCEDURE — 1157F PR ADVANCE CARE PLAN OR EQUIV PRESENT IN MEDICAL RECORD: ICD-10-PCS | Mod: CPTII,S$GLB,, | Performed by: NURSE PRACTITIONER

## 2022-05-11 PROCEDURE — 1159F PR MEDICATION LIST DOCUMENTED IN MEDICAL RECORD: ICD-10-PCS | Mod: CPTII,S$GLB,, | Performed by: PHYSICIAN ASSISTANT

## 2022-05-11 PROCEDURE — 99999 PR PBB SHADOW E&M-EST. PATIENT-LVL IV: ICD-10-PCS | Mod: PBBFAC,,, | Performed by: PHYSICIAN ASSISTANT

## 2022-05-11 PROCEDURE — 3288F FALL RISK ASSESSMENT DOCD: CPT | Mod: CPTII,S$GLB,, | Performed by: PHYSICIAN ASSISTANT

## 2022-05-11 PROCEDURE — 80053 COMPREHEN METABOLIC PANEL: CPT | Performed by: NURSE PRACTITIONER

## 2022-05-11 PROCEDURE — 1101F PR PT FALLS ASSESS DOC 0-1 FALLS W/OUT INJ PAST YR: ICD-10-PCS | Mod: CPTII,S$GLB,, | Performed by: NURSE PRACTITIONER

## 2022-05-11 PROCEDURE — 99214 OFFICE O/P EST MOD 30 MIN: CPT | Mod: S$GLB,,, | Performed by: PHYSICIAN ASSISTANT

## 2022-05-11 PROCEDURE — 1126F AMNT PAIN NOTED NONE PRSNT: CPT | Mod: CPTII,S$GLB,, | Performed by: NURSE PRACTITIONER

## 2022-05-11 PROCEDURE — 99214 PR OFFICE/OUTPT VISIT, EST, LEVL IV, 30-39 MIN: ICD-10-PCS | Mod: S$GLB,,, | Performed by: PHYSICIAN ASSISTANT

## 2022-05-11 PROCEDURE — 1159F MED LIST DOCD IN RCRD: CPT | Mod: CPTII,S$GLB,, | Performed by: NURSE PRACTITIONER

## 2022-05-11 PROCEDURE — 85025 COMPLETE CBC W/AUTO DIFF WBC: CPT | Performed by: NURSE PRACTITIONER

## 2022-05-11 PROCEDURE — 99999 PR PBB SHADOW E&M-EST. PATIENT-LVL IV: ICD-10-PCS | Mod: PBBFAC,,, | Performed by: NURSE PRACTITIONER

## 2022-05-11 PROCEDURE — 99999 PR PBB SHADOW E&M-EST. PATIENT-LVL IV: CPT | Mod: PBBFAC,,, | Performed by: NURSE PRACTITIONER

## 2022-05-11 PROCEDURE — 1159F MED LIST DOCD IN RCRD: CPT | Mod: CPTII,S$GLB,, | Performed by: PHYSICIAN ASSISTANT

## 2022-05-11 PROCEDURE — 1157F ADVNC CARE PLAN IN RCRD: CPT | Mod: CPTII,S$GLB,, | Performed by: PHYSICIAN ASSISTANT

## 2022-05-11 PROCEDURE — 1101F PT FALLS ASSESS-DOCD LE1/YR: CPT | Mod: CPTII,S$GLB,, | Performed by: PHYSICIAN ASSISTANT

## 2022-05-11 PROCEDURE — 1160F PR REVIEW ALL MEDS BY PRESCRIBER/CLIN PHARMACIST DOCUMENTED: ICD-10-PCS | Mod: CPTII,S$GLB,, | Performed by: PHYSICIAN ASSISTANT

## 2022-05-11 PROCEDURE — 3288F FALL RISK ASSESSMENT DOCD: CPT | Mod: CPTII,S$GLB,, | Performed by: NURSE PRACTITIONER

## 2022-05-11 NOTE — PROGRESS NOTES
Subjective:      Patient ID: Clau Nunn is a 87 y.o. female.    Chief Complaint: Blood clot    HPI:  Patient is an 87 year old female who presents today for further evaluation and recommendation of acute DVT of right leg diagnosed on 4/7/2022 by US.  Found with interval development of near completely occlusive thrombus within the distal right femoral vein that extends into the popliteal vein.  Was placed on Eliquis started pack on 4/7/2022 and is currently taking Eliquis 5 mg PO bid.  States that she was on an ASA during the time of clot; however was not on anticoagulation at that time.     She has a diagnosis of venous insufficiency and has had previous thrombotic event after back surgery many year ago per patient.  She states she cannot remember if she was place on anticoagulation at that time; however an IVC filter placed at that time.  She states that she had a clot prior to this but is unsure of when this was and cannot give me any details on what type of medication she was on. So this would be her third incidence of thrombotic event.       Denies h/o smoking.  Up to date with mammograms and has not had colonoscopy in many years due to advanced age.  States was never found with polpys in the past.  Has not had unintentional weight loss.  Denies any abnormal adenopathy.  States that she is very active and denies sitting more then 50% of the day.     I have reviewed all of the patient's relevant lab work available in the medical record and have utilized this in my evaluation and management recommendations today.         Social History     Socioeconomic History    Marital status:    Occupational History    Occupation: retired     Comment: [employer]   Tobacco Use    Smoking status: Never Smoker    Smokeless tobacco: Never Used   Substance and Sexual Activity    Alcohol use: Yes     Alcohol/week: 0.0 standard drinks     Comment: rarely    Drug use: Never    Sexual activity: Never      Partners: Male     Birth control/protection: Post-menopausal   Social History Narrative    Patient is retired from [employer]       Family History   Problem Relation Age of Onset    COPD Mother     Cancer Mother         lung CA    Pulmonary fibrosis Sister     Cancer Daughter         colon    Stroke Father     Diabetes Son     Melanoma Neg Hx     Psoriasis Neg Hx     Lupus Neg Hx        Past Surgical History:   Procedure Laterality Date    ADENOIDECTOMY      BACK SURGERY      x2    breast implants  1973    CATARACT EXTRACTION Bilateral     CLOSED REDUCTION DISTAL RADIUS FRACTURE      Dr. Black, 8/18    cystoscope  1/6/16    DR. Brown    FRACTURE SURGERY  April 3 2015    left wrist    HAND SURGERY      HIP SURGERY Right     total hip- Dr. Dos Santos    HYSTERECTOMY      36y/o    INJECTION OF ANESTHETIC AGENT AROUND NERVE Right 9/16/2020    Procedure: Right suprascapular nerve block;  Surgeon: Raffi Wells MD;  Location: [facility];  Service: Pain Management;  Laterality: Right;    INJECTION OF ANESTHETIC AGENT AROUND NERVE Right 7/13/2021    Procedure: Block, Nerve Right Suprascapular Nerve Block with RN IV sedation;  Surgeon: Raffi Wells MD;  Location: [facility];  Service: Pain Management;  Laterality: Right;    INJECTION OF ANESTHETIC AGENT INTO SACROILIAC JOINT N/A 8/12/2020    Procedure: Left IA hip Joint + Left SIJ + Right shoulder injection;  Surgeon: Raffi Wells MD;  Location: [facility];  Service: Pain Management;  Laterality: N/A;    INJECTION OF JOINT N/A 8/12/2020    Procedure: Left IA hip Joint + Left SIJ + Right shoulder injection;  Surgeon: Raffi Wells MD;  Location: [facility];  Service: Pain Management;  Laterality: N/A;    JOINT REPLACEMENT Right     R NNAMDI - Dr. Dos Santos    LEG SURGERY Right     ex-fix tib/fib    SELECTIVE INJECTION OF ANESTHETIC AGENT AROUND LUMBAR SPINAL NERVE ROOT BY TRANSFORAMINAL APPROACH Bilateral 2/10/2021    Procedure:  Bilateral L5/S1 TF REJI;  Surgeon: Raffi Wells MD;  Location: [facility];  Service: Pain Management;  Laterality: Bilateral;    TONSILLECTOMY      TRANSFORAMINAL EPIDURAL INJECTION OF STEROID Left 7/8/2020    Procedure: left L2/3 + L5/S1 TF REJI;  Surgeon: Raffi Wells MD;  Location: [facility];  Service: Pain Management;  Laterality: Left;    TRANSFORAMINAL EPIDURAL INJECTION OF STEROID Left 7/1/2021    Procedure: left L5/S1 + S1 TF REJI;  Surgeon: Raffi Wells MD;  Location: [facility];  Service: Pain Management;  Laterality: Left;       Past Medical History:   Diagnosis Date    Allergy     Anxiety     Asthma     Back pain     Chronic venous insufficiency 11/19/2013    Depression     Diverticulosis 11/19/2013 1/8/8 colonoscopy    Dry eyes     Fracture, sacrum/coccyx     Dr. Sanchez     GERD (gastroesophageal reflux disease)     H/O total hip arthroplasty 12/30/2015    Hypertension     Hypothyroid     Osteoarthritis     Osteoporosis     Postlaminectomy syndrome of lumbar region 1/8/2014    Radius fracture     7/18    Simple chronic bronchitis 5/3/2017    Umbilical hernia 11/19/2013    Urinary incontinence     Vitamin D deficiency disease        Review of Systems   Constitutional: Negative.    HENT: Positive for postnasal drip.    Eyes: Negative.    Respiratory: Negative.    Cardiovascular: Positive for leg swelling (right medial lower leg swelling that has improved since starting anticoagulation).   Gastrointestinal: Negative.    Endocrine: Negative.    Genitourinary:        Vaginal incontinence   Musculoskeletal: Positive for arthralgias.   Skin: Positive for color change (chronic bilateral lower leg).   Allergic/Immunologic: Negative.    Neurological: Negative.    Hematological: Negative.    Psychiatric/Behavioral: Negative.           Medication List with Changes/Refills   Current Medications    ALBUTEROL (VENTOLIN HFA) 90 MCG/ACTUATION INHALER    Inhale 2 puffs into the lungs 4  (four) times daily as needed.    APIXABAN (ELIQUIS DVT-PE TREAT 30D START) 5 MG (74 TABS) DSPK    For the first 7 days take two 5 mg tablets twice daily.  After 7 days take one 5 mg tablet twice daily.    ASPIRIN (ECOTRIN) 81 MG EC TABLET    Take 81 mg by mouth once daily.    AZELASTINE (ASTELIN) 137 MCG (0.1 %) NASAL SPRAY    2 sprays by Nasal route 2 (two) times daily.    BUSPIRONE (BUSPAR) 5 MG TAB    TAKE 1 TABLET TWICE DAILY    CHOLECALCIFEROL, VITAMIN D3, (D3-2000) 50 MCG (2,000 UNIT) CAP    Take 1 capsule (2,000 Units total) by mouth once daily.    CYCLOBENZAPRINE (FLEXERIL) 5 MG TABLET    TAKE 1 TABLET (5 MG TOTAL) BY MOUTH NIGHTLY AS NEEDED FOR MUSCLE SPASMS.    CYCLOSPORINE (RESTASIS) 0.05 % OPHTHALMIC EMULSION    Place 1 drop into both eyes 2 (two) times daily.    ESCITALOPRAM OXALATE (LEXAPRO) 20 MG TABLET    TAKE 1 TABLET EVERY DAY    FLUOCINONIDE 0.05% (LIDEX) 0.05 % CREAM    AAA bid to leg for 2-4 weeks per course    GABAPENTIN (NEURONTIN) 300 MG CAPSULE    Take 1 capsule (300 mg total) by mouth 3 (three) times daily.    LEVOTHYROXINE (SYNTHROID) 100 MCG TABLET    Take 1 tablet (100 mcg total) by mouth once daily.    LOSARTAN (COZAAR) 50 MG TABLET    TAKE 1 TABLET EVERY DAY    OMEPRAZOLE (PRILOSEC) 40 MG CAPSULE    TAKE 1 CAPSULE (40 MG TOTAL) BY MOUTH EVERY MORNING.    OXYBUTYNIN (DITROPAN-XL) 5 MG TR24    Take 1 tablet (5 mg total) by mouth once daily.    TRAZODONE (DESYREL) 50 MG TABLET    TAKE 1 TABLET (50 MG TOTAL) BY MOUTH NIGHTLY AS NEEDED FOR INSOMNIA.    TRIAMCINOLONE ACETONIDE 0.025% (KENALOG) 0.025 % CREAM    AAA bid        Objective:     Vitals:    05/11/22 0855   BP: (!) 168/76   Pulse: 64   Temp: 97.8 °F (36.6 °C)       Physical Exam  Vitals and nursing note reviewed.   Constitutional:       Appearance: Normal appearance.   HENT:      Head: Normocephalic and atraumatic.      Right Ear: External ear normal.      Left Ear: External ear normal.   Cardiovascular:      Rate and Rhythm: Normal  rate and regular rhythm.      Heart sounds: Normal heart sounds, S1 normal and S2 normal.   Pulmonary:      Effort: Pulmonary effort is normal.      Breath sounds: Normal breath sounds.   Chest:   Breasts:      Right: No axillary adenopathy or supraclavicular adenopathy.      Left: No axillary adenopathy or supraclavicular adenopathy.       Abdominal:      General: There is no distension.   Musculoskeletal:         General: Normal range of motion.      Cervical back: Normal range of motion.   Lymphadenopathy:      Head:      Right side of head: No submental, submandibular, tonsillar, preauricular, posterior auricular or occipital adenopathy.      Left side of head: No submental, submandibular, tonsillar, preauricular, posterior auricular or occipital adenopathy.      Cervical: No cervical adenopathy.      Right cervical: No superficial, deep or posterior cervical adenopathy.     Left cervical: No superficial or posterior cervical adenopathy.      Upper Body:      Right upper body: No supraclavicular or axillary adenopathy.      Left upper body: No supraclavicular or axillary adenopathy.   Skin:     General: Skin is warm and dry.      Findings: Erythema (right lower leg - improving per patient since starting anticoagulation) present.      Comments: Dry skin   Neurological:      General: No focal deficit present.      Mental Status: She is alert and oriented to person, place, and time.   Psychiatric:         Attention and Perception: Attention and perception normal.         Mood and Affect: Mood and affect normal.         Speech: Speech normal.         Behavior: Behavior normal. Behavior is cooperative.         Thought Content: Thought content normal.         Cognition and Memory: Cognition and memory normal.         Judgment: Judgment normal.         Assessment:     Problem List Items Addressed This Visit        Cardiac/Vascular    Chronic venous insufficiency - Primary (Chronic)    Relevant Orders    [facility] Auto  Differential    Comprehensive Metabolic Panel       Hematology    History of DVT in adulthood      Other Visit Diagnoses     Acute deep vein thrombosis (DVT) of popliteal vein of right lower extremity        Relevant Orders    CBC Auto Differential    Comprehensive Metabolic Panel    DVT of deep femoral vein, right        Relevant Orders    CBC Auto Differential    Comprehensive Metabolic Panel        Lab Results   Component Value Date    WBC 5.80 05/11/2022    HGB 14.7 05/11/2022    HCT 44.6 05/11/2022    MCV 97 05/11/2022     05/11/2022       BMP  Lab Results   Component Value Date     05/11/2022    K 4.6 05/11/2022     05/11/2022    CO2 22 (L) 05/11/2022    BUN 18 05/11/2022    CREATININE 1.0 05/11/2022    CALCIUM 9.6 05/11/2022    ANIONGAP 12 05/11/2022    ESTGFRAFRICA 59 (A) 05/11/2022    EGFRNONAA 51 (A) 05/11/2022     Lab Results   Component Value Date    ALT 12 05/11/2022    AST 16 05/11/2022    ALKPHOS 47 (L) 05/11/2022    BILITOT 0.6 05/11/2022       Plan:   Chronic venous insufficiency  -     CBC Auto Differential; Future; Expected date: 05/11/2022  -     Comprehensive Metabolic Panel; Future; Expected date: 05/11/2022    Acute deep vein thrombosis (DVT) of popliteal vein of right lower extremity  -     CBC Auto Differential; Future; Expected date: 05/11/2022  -     Comprehensive Metabolic Panel; Future; Expected date: 05/11/2022    DVT of deep femoral vein, right  -     Ambulatory referral/consult to Hematology / Oncology  -     CBC Auto Differential; Future; Expected date: 05/11/2022  -     Comprehensive Metabolic Panel; Future; Expected date: 05/11/2022    History of DVT in adulthood    Stay on Eliquis 5 mg PO bid x 6 months.  At that point we may reduce to 2.5 mg PO bid due to advanced age and increased risk of bleeding. Stop ASA now.  I believe that her DVT is due to chronic venous insufficiency.  Patient should stay on life long anticoagulation due to multiple clotting episodes in  the past - discussed with patient and verbalized understanding.  She will elevate legs while in a seated position.  She will continue to monitor for symptoms of bleeding or clotting and will notify us if symptoms noted.     RTC in 6 months with labs.      Collaborating Provider:  Dr. Chris Barnhart    Thank You,  Che Hernández, INOCENCIO-C

## 2022-05-11 NOTE — PROGRESS NOTES
Chief Pain Complaint:  Chief Complaint   Patient presents with    Back Pain     Pain lower back above buttock and down both legs     Interval History (5/11/2022): Clau Nunn presents today for follow-up visit.  she underwent left L5/S1 + S1 TF REJI on 7/1/21 with excellent pain relief for about 3 months.  The patient reports that she is/was better following the procedure.  The changes have NOT continued through this visit.   she underwent right suprascapular nerve block on 7/13/21.  The patient reports that she is/was unchanged following the procedure. She reports limited pain relief for short term. But, she mainly has limited ROM.   Patient reports pain as 5/10 today - due to low back pain and leg pain.    Interval History (6/24/2021): Patient was seen on 2/10/21. At that time she underwent Bilateral L5/S1 TF REJI.  The patient reports that she is/was unchanged following the procedure.  she reports 20% pain relief.  The changes lasted 1 weeks.  The changes have NOT continued through this visit.  Patient reports pain as 5/10 today.  Her main pain complaint today is LLE radiculopathy worse in popliteal space and shin.   S/p left knee injection with Dr. Burns about 2 months ago with some pain relief.    Interval History (2/2/2021): Patient was last seen on 10/16/2020.  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  She is still never seen orthopedics for this issue, and she has not improved from intra-articular shoulder joint injections nor suprascapular nerve block.  Patient reports pain as 8/10 today.  Today, she is complaining of back and bilateral leg pain, previously just on the left side, although the left side still remains the worst.    Interval History (10/19/2020): Patient was seen on 9/16/20. At that time she underwent  Right suprascapular nerve block.  The patient reports that she is/was unchanged following the procedure.   she reports no pain relief.  Patient reports pain as 6/10 today - only when she moves her arm.    Interval History (9/9/2020): Patient was seen on 8/12/20. At that time she underwent left SIJ + left hip joint + right shoulder joint injection.  The patient reports that she is/was better following the procedure.  she reports 75% pain relief with hip pain relief but only 25% relief of shoulder pain.  The changes lasted 4 weeks so far.  The changes have continued through this visit.  Patient reports pain as /10 today.    Interval History (8/5/2020): Patient was seen on 7/8/20. At that time she underwent left L2/3 + L5/S1 TF REJI.  The patient reports that she is/was slightly better following the procedure.  she reports only 50 % pain relief.  The changes lasted 4 weeks so far.  The changes have continued through this visit.  She also reports that her right shoulder has been bothering her.  She has history of right humerus fracture years ago.    Interval History (6/12/2020): She is here today to review MRI. She reports 10/10 pain today. She also has concerns about Prolia as she read that it can cause rashes and joint pain.  She has been having a rash on her right breast for a few weeks.  She will see Dr. Gomez soon to discuss.     Interval History(11/20/18): Patient was seen on 10/24/18. At that time she underwent left SIJ + left GT bursa injection with local.  The patient reports that she is/was better following the procedure.  she reports 100% pain relief.  The changes lasted 4 weeks so far.  The changes have continued through this visit.    History of Present Illness:   Clau Nunn is a 87 y.o. female  who is presenting with a chief complaint of low back pain and hip pain. The patient began experiencing this problem insidiously, and the pain has been gradually worsening over the past 6 month(s). The pain is described as throbbing, cramping, aching and heavy and is located in the bilateral buttocks. Pain is  intermittent and lasts hours. The pain radiates to lateral thigh and groin. The patient rates her pain a 5 out of ten and interferes with activities of daily living a 5 out of ten. Pain is exacerbated by getting up from a seated position, and is improved by rest. Patient reports prior trauma (fall in June 2018 causing right distal radius + right sacrum fracture), prior lumbar surgery in ~2005, right hip replacement, right distal radius fracture requiring ORIF in June 2018.    - pertinent negatives: No fever, No chills, No weight loss, No bladder dysfunction, No bowel dysfunction, No extremity weakness, No saddle anesthesia  - pertinent positives: none    - medications, other therapies tried (physical therapy, injections):     >> Tylenol    >> Has previously undergone Physical Therapy (aquatic and land) with limited relief    >> Has previously undergone spinal injection/s:   - bilateral SIJ injection on 11-9-17 with ~30% relief for 2 weeks   - left hip injection on 12-28-17 with some relief   - bilateral L3-5 MBB with local on 5/11/18 with reported 100% pain relief   - left SIJ + left GT bursa injection with local on 10/24/18 with 100% pain relief   - left L2/3 + L5/S1 TF REJI on 7/8/20 with about 50% pain relief   - left SIJ + left hip joint + right shoulder joint injection on 8/12/20 with 75% pain relief with hip pain relief but only 25% relief of shoulder pain - no longer having groin pain after this procedure    - Right suprascapular nerve block on 9/16/20 with limited pain relief    - Bilateral L5/S1 TF REJI on 2/10/21 with 20% pain relief for short term   - left knee injection with Dr. Burns in April 2021 with some pain relief   - left L5/S1 + S1 TF REJI on 7/1/21 with excellent pain relief for about 3 months   - right suprascapular nerve block on 7/13/21 with limited pain relief     Imaging / Labs / Studies (reviewed on 5/11/2022):     Records from [facility] regarding right shoulder fracture- uploaded into chart  "under "Media"    Results for orders placed during the hospital encounter of 08/05/20   X-ray Shoulder 2 or More Views Right    Narrative COMPARISON:  12/10/2018  FINDINGS:  There is a chronic fracture deformity of the right humeral head and neck.  Associated osseous productive changes approximate the inferior margins of the articular surface of the humeral head.  No definite acute fractures or dislocations visualized.  There are mild degenerative changes present at the AC joint and glenoid.  Postoperative findings noted in the lower thoracic spine.     Results for orders placed during the hospital encounter of 12/10/18   X-Ray Shoulder Trauma Right    Narrative FINDINGS:  Comminuted fracture involving the surgical neck and right humeral head.  No evidence of dislocation.  Degenerative changes are present at the right AC joint.    Impression Comminuted fracture involving the right humeral head and surgical neck.     Results for orders placed during the hospital encounter of 06/29/20   MRI Lumbar Spine W WO Cont    Narrative COMPARISON:  Prior MRI from June 29, 2020.  FINDINGS:  Again demonstrated are postoperative findings from thoracolumbar spinal fusion and laminectomy at T12.  Chronic compression deformity of T12 with chronic retropulsion that flattens the thecal sac to 14 mm.  This is unchanged.  Mild, grade 1 degenerative spondylolisthesis at L4-L5.  Vertebral body height is normal.  No abnormal enhancement patterns. The conus medullaris terminates at the level of L2-L3, low lying.  No abnormal signal within the conus. Intervertebral disc levels are as follows:  T11-T12 disc: Fusion at this level.  Mild, chronic retropulsion that flattens the thecal sac to 14 mm.  No significant foraminal stenosis.  T12-L1 disc: Prior laminectomy and fusion.  The thecal sac measures 19 mm.  No significant foraminal stenosis.  L1-L2 disc : Posterior fusion.  No significant spinal stenosis or foraminal stenosis.  L2-L3 disc: Disc " space height loss.  Broad-based posterior disc bulge which encroaches slightly into the floors of the exit foramina.  Moderate buckling of ligamentum flavum.  The thecal sac measures 8-9 mm AP.  Mild bilateral foraminal stenosis.  This has progressed since the prior MRI.  L3-L4 disc: Broad-based posterior disc bulge that encroaches slightly into the floors of the exit foramina.  Moderate buckling of ligamentum flavum.  The thecal sac measures 10 mm AP.  Mild bilateral neural foraminal stenosis, right greater than left.  These findings are similar to prior.  L4-L5 disc: Minimal grade 1 spondylolisthesis with severe degenerative facet change and hypertrophy.  Left-sided facet joint effusion.  Is buckling of ligamentum flavum.  The thecal sac measures 11 mm.  No significant foraminal stenosis.  The spondylolisthesis has slightly progressed measuring 4 mm compared to prior 2 mm.  L5-S1 disc: Severe disc space height loss with marginal disc and osteophyte encroach into the exit foramina bilaterally.  Moderate degenerative facet hypertrophy.  The thecal sac measures 14 mm.    Impression 1. 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  2. Progression of mild foraminal stenosis and mild spinal stenosis at L2-L3.  3. Minimal progression of grade 1 spondylolisthesis at L4-L5.  4. Unchanged mild, chronic retropulsion from T12 where there has been a laminectomy and fusion.       7/30/2018 XR LUMBAR SPINE COMPLETE 5 VIEW  TECHNIQUE:  AP, lateral, spot and bilateral oblique views of the lumbar spine were performed.  COMPARISON:  07/25/2018  FINDINGS:  There is grade 1 spondylolisthesis of L4 on L5.  Pedicle screws and fixation rods are noted for at the T10, T11, L1 and L2 levels.  Chronic compression deformity at the L1 level unchanged.  Prominent bilateral facet arthropathy noted at the L4-5 and L5-S1 levels.  IVC filter noted.     7/30/2018 XR HIPS BILATERAL 2 VIEW INCL AP PELVIS  TECHNIQUE:  AP view of the pelvis and frogleg lateral  views of both hips were performed.  COMPARISON:  07/26/2018  FINDINGS:  The bony pelvis is intact. A right total hip arthroplasty, plate and wires are in place.  No hardware failure or loosening suggested.  The appearance is unchanged when compared to the prior exam.  Mild degenerative changes noted at the left hip.  No acute fracture or dislocation of the left hip.  No significant joint space narrowing identified.  No plain film evidence to suggest AVN of either hip.     Results for orders placed during the hospital encounter of 01/13/14   MRI Lumbar Spine W WO Contrast    Narrative Findings: Pre- and postcontrast multiplanar multisequence imaging of the thoracic and lumbar spine was performed using 7 cc of Gadavist intravenous contrast material.  There is moderately severe chronic central compression deformity of the T12 vertebral body which is stabilized by paraspinous rods and pedicle screws which extend from T10 through L2.  Postsurgical changes of laminectomy also noted at T12.  The posterior cortex of the T12 vertebral body is displaced posteriorly and indents the ventral thecal sac.  There is no anterior cord contact or significant central canal stenosis.  Degenerative disk desiccation is noted at multiple levels with moderate disk   narrowing at L5-S1.    Also L5-S1 is a broad-based disk protrusion which combined with bilateral facet hypertrophy results in moderately severe narrowing of both neural foramina.  No significant central canal stenosis noted.    From L2-3 through L4-5 there   is mild disk bulging which results in mild bilateral foraminal narrowing.  This is slightly more pronounced at L4-5 with bilateral facet hypertrophy a contributing factor.  No significant central canal stenosis noted.  No thoracic disk extrusion, and foraminal narrowing, canal stenosis is evident.  Thoracolumbar cord is intact.  Paravertebral soft tissues are symmetric and normal in appearance.         Review of  "Systems:  CONSTITUTIONAL: patient denies any fever, chills, or weight loss  SKIN: patient denies any rash or itching  RESPIRATORY: patient denies having any shortness of breath  GASTROINTESTINAL: patient denies having any diarrhea, constipation, or bowel incontinence  GENITOURINARY: patient denies having any abnormal bladder function    MUSCULOSKELETAL:  - patient complains of the above noted pain/s (see chief pain complaint)    NEUROLOGICAL:   - pain as above  - strength in Lower extremities is intact, BILATERALLY  - sensation in Lower extremities is intact, BILATERALLY  - patient denies any loss of bowel or bladder control      PSYCHIATRIC: patient denies any change in mood    Other:  All other systems reviewed and are negative        Physical Exam:  Vitals:    05/11/22 1307   BP: 118/76   Pulse: 68   Resp: 17   Weight: 74.5 kg (164 lb 2.1 oz)   Height: 5' 3" (1.6 m)   PainSc:   5    Body mass index is 29.07 kg/m².   (reviewed on 5/11/2022)    General: alert and oriented, in no apparent distress.  Gait: antalgic gait   Skin: no rashes, no discoloration, no obvious lesions  HEENT: normocephalic, atraumatic. Pupils equal and round.  Cardiovascular: no significant peripheral edema present.  Respiratory: without use of accessory muscles of respiration.    Musculoskeletal - Lumbar Spine:  - Limited ROM secondary to pain reproduction   - Pain on flexion of lumbar spine: Present, worse than with extension  - Pain on extension of lumbar spine: Present  - Lumbar facet loading: Positive bilaterally  - TTP over the lumbar facet joints: Absent  - TTP over the SI joints: Present   - TTP over GT bursa: Present on left   - Straight Leg Raise: Positive bilaterally, L>R   - PAMELA: Present on left   - Pain on Internal and external rotation of the hip: Present    Right Shoulder:  - Pain on abduction: Present   - ROM: decreased secondary to pain, very limited ROM      - TTP over the AC and GH joint: Present  - Neer's: Positive  - " Hawkin's: Positive  - Apley's Scratch test: Positive    Neuro - Lower Extremities:  - BLE Strength: R/L: HF: 5/5, HE: 5/5, KF: 5/5; KE: 5/5; FE: 5/5; FF: 5/5  - Extremity Reflexes: Brisk and symmetric throughout  - Sensory: Sensation to light touch intact bilaterally      Psych:  Mood and affect is appropriate              Assessment:  Clau Nunn is a 87 y.o. year old female who is presenting with       ICD-10-CM ICD-9-CM    1. Bilateral lumbar radiculopathy (L>R)  M54.16 724.4 IR Epidural Transfor 1st Vert Lumbar Elijah      Case Request-RAD/Other Procedure Area: Bilateral L5/S1 + S1 TF REJI      IR Epidural Transfor Inj Ea Add Lumb BilLumbar Bilat   2. DDD (degenerative disc disease), lumbar  M51.36 722.52    3. Other spondylosis, lumbosacral region   M47.897 721.3 IR Epidural Transfor Inj Ea Add Lumb BilLumbar Bilat        Plan:  1. Interventional:   - Schedule Bilateral L5/S1 + S1 TF REJI. Patient is taking apixaban (Eliquis); she will have to stop 3 days prior to procedure.  Will get clearance from Heme/Onc.    - S/p Bilateral L5/S1 TF REJI on 2/10/21 with 20% pain relief x 1 week. Pain now more localized on left.   - S/p Right suprascapular nerve block on 9/16/20 with limited pain relief.    2. Pharmacologic:   - Patient encouraged to take Tylenol 500mg x 2 tablets (1000mg) TID more regularly, not to exceed 3000mg per day.   - Continue gabapentin 600mg QHS. She is prescribed 300 mg TID, but she cannot tolerate during the day.  - Anticoagulation use: apixaban (Eliquis) - 2° prevention (h/o DVT) - Heme/Onc.     3. Rehabilitative: Encouraged regular exercise. Continue exercises and activities as tolerated. Physical therapy for shoulder did not help in the past.    4. Diagnostic: None.    5. Follow up: 4 weeks post-injection - will send online ticket scheduling on [employer]     - I discussed the risks, benefits, and alternatives to potential treatment options. All questions and concerns were fully addressed today  in clinic.

## 2022-05-17 ENCOUNTER — TELEPHONE (OUTPATIENT)
Dept: PAIN MEDICINE | Facility: CLINIC | Age: 87
End: 2022-05-17
Payer: MEDICARE

## 2022-05-17 ENCOUNTER — PATIENT MESSAGE (OUTPATIENT)
Dept: PAIN MEDICINE | Facility: CLINIC | Age: 87
End: 2022-05-17
Payer: MEDICARE

## 2022-05-18 ENCOUNTER — TELEPHONE (OUTPATIENT)
Dept: PAIN MEDICINE | Facility: CLINIC | Age: 87
End: 2022-05-18
Payer: MEDICARE

## 2022-05-18 ENCOUNTER — PATIENT MESSAGE (OUTPATIENT)
Dept: PAIN MEDICINE | Facility: CLINIC | Age: 87
End: 2022-05-18
Payer: MEDICARE

## 2022-05-18 ENCOUNTER — PATIENT MESSAGE (OUTPATIENT)
Dept: PAIN MEDICINE | Facility: HOSPITAL | Age: 87
End: 2022-05-18
Payer: MEDICARE

## 2022-05-18 NOTE — TELEPHONE ENCOUNTER
Patient pre procedure instructions with 4 wk injection follow up sent to her my chart.  Message also sent to Ara Hernández NP (Hem/Onc) to let her know procedure date so that they can manage the Lovenox bridge since patient has to stop Eliquis for 3 days prior to procedure on 5/25/22 with Dr. Wells.

## 2022-05-19 ENCOUNTER — TELEPHONE (OUTPATIENT)
Dept: PAIN MEDICINE | Facility: CLINIC | Age: 87
End: 2022-05-19
Payer: MEDICARE

## 2022-05-19 DIAGNOSIS — Z86.718 HISTORY OF DVT IN ADULTHOOD: Primary | ICD-10-CM

## 2022-05-19 RX ORDER — ENOXAPARIN SODIUM 100 MG/ML
1 INJECTION SUBCUTANEOUS EVERY 12 HOURS
Qty: 7.2 ML | Refills: 0 | Status: SHIPPED | OUTPATIENT
Start: 2022-05-19 | End: 2022-05-26

## 2022-05-19 NOTE — PROGRESS NOTES
Patient to have procedure with Dr. Wells on 5/25/2022.  She is to stop Eliquis 3 days prior to procedure.  Start Lovenox 1mg/Kg subcutaneously every 12 hours with first dose in PM on 5/21/2022.  She is to continue injection every 12 hours while continuing to hold Eliquis.  She will hold Lovenox injection 12 hours prior to procedure.  She will restart Eliquis 5 mg PO bid with first dose starting the evening of the procedure.  Discussed plans with patient.  She verbalized understanding.  Prescription sent to [facility] pharmacy.

## 2022-05-19 NOTE — PRE-PROCEDURE INSTRUCTIONS
Attempted to PAT patient for procedure on 5.25. with Dr. valdez. No answer. LVM with return phone number. No return call at this time.

## 2022-05-20 ENCOUNTER — TELEPHONE (OUTPATIENT)
Dept: PAIN MEDICINE | Facility: CLINIC | Age: 87
End: 2022-05-20
Payer: MEDICARE

## 2022-05-20 NOTE — TELEPHONE ENCOUNTER
Pt wanted her time for her procedure. I saw Breann tried to call but pt did not answer. I gave pt Breann's number so pt could call and get her arrival time.

## 2022-05-20 NOTE — PRE-PROCEDURE INSTRUCTIONS
Spoke with patient regarding procedure scheduled on 5.25    Arrival time 1200    Has patient been sick with fever or on antibiotics within the last 7 days? No    Does the patient have any open wounds, sores or rashes? No    Does the patient have any recent fractures? no    Has patient received a vaccination within the last 7 days? No    Received the COVID vaccination? Yes     Has the patient stopped all medications as directed? Hold eliquis 3 days prior to procedure. lovenox instructions provided on 5.19 by ASHLEY Hernández.     Does patient have a pacemaker and or defibrillator? no    Does the patient have a ride to and from procedure and someone reliable to remain with patient? FRIEND NOE    Is the patient diabetic? no    Does the patient have sleep apnea? Or use O2 at home? No and no     Is the patient receiving sedation? yes    Is the patient instructed to remain NPO beginning at midnight the night before their procedure? yes    Procedure location confirmed with patient? Yes    Covid- Denies signs/symptoms. Instructed to notify PAT/MD if any changes.

## 2022-05-25 ENCOUNTER — HOSPITAL ENCOUNTER (OUTPATIENT)
Facility: HOSPITAL | Age: 87
Discharge: HOME | End: 2022-05-25
Attending: ANESTHESIOLOGY | Admitting: ANESTHESIOLOGY
Payer: MEDICARE

## 2022-05-25 VITALS
DIASTOLIC BLOOD PRESSURE: 85 MMHG | HEART RATE: 68 BPM | OXYGEN SATURATION: 99 % | TEMPERATURE: 98 F | WEIGHT: 165.38 LBS | RESPIRATION RATE: 16 BRPM | HEIGHT: 63 IN | BODY MASS INDEX: 29.3 KG/M2 | SYSTOLIC BLOOD PRESSURE: 187 MMHG

## 2022-05-25 DIAGNOSIS — M54.16 LUMBAR RADICULOPATHY: Primary | ICD-10-CM

## 2022-05-25 PROCEDURE — 25000003 PHARM REV CODE 250: Performed by: ANESTHESIOLOGY

## 2022-05-25 PROCEDURE — 64483 NJX AA&/STRD TFRM EPI L/S 1: CPT | Mod: 50 | Performed by: ANESTHESIOLOGY

## 2022-05-25 PROCEDURE — 64484 NJX AA&/STRD TFRM EPI L/S EA: CPT | Mod: 50 | Performed by: ANESTHESIOLOGY

## 2022-05-25 PROCEDURE — 64483 PR EPIDURAL INJ, ANES/STEROID, TRANSFORAMINAL, LUMB/SACR, SNGL LEVL: ICD-10-PCS | Mod: 50,,, | Performed by: ANESTHESIOLOGY

## 2022-05-25 PROCEDURE — 64484 PRA INJECT ANES/STEROID FORAMEN LUMBAR/SACRAL W IMG GUIDE ,EA ADD LEVEL: ICD-10-PCS | Mod: 50,,, | Performed by: ANESTHESIOLOGY

## 2022-05-25 PROCEDURE — 64483 NJX AA&/STRD TFRM EPI L/S 1: CPT | Mod: 50,,, | Performed by: ANESTHESIOLOGY

## 2022-05-25 PROCEDURE — 64484 NJX AA&/STRD TFRM EPI L/S EA: CPT | Mod: 50,,, | Performed by: ANESTHESIOLOGY

## 2022-05-25 PROCEDURE — 25500020 PHARM REV CODE 255: Performed by: ANESTHESIOLOGY

## 2022-05-25 PROCEDURE — 63600175 PHARM REV CODE 636 W HCPCS: Performed by: ANESTHESIOLOGY

## 2022-05-25 RX ORDER — INDOMETHACIN 25 MG/1
CAPSULE ORAL
Status: DISCONTINUED | OUTPATIENT
Start: 2022-05-25 | End: 2022-05-25 | Stop reason: HOSPADM

## 2022-05-25 RX ORDER — LIDOCAINE HYDROCHLORIDE 10 MG/ML
INJECTION, SOLUTION EPIDURAL; INFILTRATION; INTRACAUDAL; PERINEURAL
Status: DISCONTINUED | OUTPATIENT
Start: 2022-05-25 | End: 2022-05-25 | Stop reason: HOSPADM

## 2022-05-25 RX ORDER — DEXAMETHASONE SODIUM PHOSPHATE 10 MG/ML
INJECTION INTRAMUSCULAR; INTRAVENOUS
Status: DISCONTINUED | OUTPATIENT
Start: 2022-05-25 | End: 2022-05-25 | Stop reason: HOSPADM

## 2022-05-25 NOTE — DISCHARGE INSTRUCTIONS

## 2022-05-25 NOTE — H&P
HPI  Patient presenting for Procedure(s) (LRB):  Bilateral L5/S1 + S1 TF REJI (Bilateral)     Patient on Anti-coagulation No    No health changes since previous encounter    Past Medical History:   Diagnosis Date    Allergy     Anxiety     Asthma     Back pain     Chronic venous insufficiency 11/19/2013    Depression     Diverticulosis 11/19/2013 1/8/8 colonoscopy    Dry eyes     Fracture, sacrum/coccyx     Dr. Sanchez     GERD (gastroesophageal reflux disease)     H/O total hip arthroplasty 12/30/2015    Hypertension     Hypothyroid     Osteoarthritis     Osteoporosis     Postlaminectomy syndrome of lumbar region 1/8/2014    Radius fracture     7/18    Simple chronic bronchitis 5/3/2017    Umbilical hernia 11/19/2013    Urinary incontinence     Vitamin D deficiency disease      Past Surgical History:   Procedure Laterality Date    ADENOIDECTOMY      BACK SURGERY      x2    breast implants  1973    CATARACT EXTRACTION Bilateral     CLOSED REDUCTION DISTAL RADIUS FRACTURE      Dr. Black, 8/18    cystoscope  1/6/16    DR. Brown    FRACTURE SURGERY  April 3 2015    left wrist    HAND SURGERY      HIP SURGERY Right     total hip- Dr. Dos Santos    HYSTERECTOMY      34y/o    INJECTION OF ANESTHETIC AGENT AROUND NERVE Right 9/16/2020    Procedure: Right suprascapular nerve block;  Surgeon: Raffi Wells MD;  Location: [facility]IN [facility];  Service: Pain Management;  Laterality: Right;    INJECTION OF ANESTHETIC AGENT AROUND NERVE Right 7/13/2021    Procedure: Block, Nerve Right Suprascapular Nerve Block with RN IV sedation;  Surgeon: Raffi Wells MD;  Location: [facility] PAIN [facility];  Service: Pain Management;  Laterality: Right;    INJECTION OF ANESTHETIC AGENT INTO SACROILIAC JOINT N/A 8/12/2020    Procedure: Left IA hip Joint + Left SIJ + Right shoulder injection;  Surgeon: Raffi Wells MD;  Location: [facility]IN [facility];  Service: Pain Management;  Laterality: N/A;    INJECTION OF JOINT N/A 8/12/2020     Procedure: Left IA hip Joint + Left SIJ + Right shoulder injection;  Surgeon: Raffi Wells MD;  Location: [facility] PAIN [facility]GT;  Service: Pain Management;  Laterality: N/A;    JOINT REPLACEMENT Right     R NNAMDI - Dr. Dos Santos    LEG SURGERY Right     ex-fix tib/fib    SELECTIVE INJECTION OF ANESTHETIC AGENT AROUND LUMBAR SPINAL NERVE ROOT BY TRANSFORAMINAL APPROACH Bilateral 2/10/2021    Procedure: Bilateral L5/S1 TF REJI;  Surgeon: Raffi Wells MD;  Location: [facility] PAIN [facility]GT;  Service: Pain Management;  Laterality: Bilateral;    TONSILLECTOMY      TRANSFORAMINAL EPIDURAL INJECTION OF STEROID Left 7/8/2020    Procedure: left L2/3 + L5/S1 TF REJI;  Surgeon: Raffi Wells MD;  Location: [facility] PAIN [facility];  Service: Pain Management;  Laterality: Left;    TRANSFORAMINAL EPIDURAL INJECTION OF STEROID Left 7/1/2021    Procedure: left L5/S1 + S1 TF REJI;  Surgeon: Raffi Wells MD;  Location: [facility]IN [facility];  Service: Pain Management;  Laterality: Left;     Review of patient's allergies indicates:   Allergen Reactions    Cephalexin Hives, Itching, Swelling, Anxiety, Dermatitis and Rash        No current facility-administered medications on file prior to encounter.     Current Outpatient Medications on File Prior to Encounter   Medication Sig Dispense Refill    levothyroxine (SYNTHROID) 100 MCG tablet Take 1 tablet (100 mcg total) by mouth once daily. 90 tablet 3    losartan (COZAAR) 50 MG tablet TAKE 1 TABLET EVERY DAY 90 tablet 3    omeprazole (PRILOSEC) 40 MG capsule TAKE 1 CAPSULE (40 MG TOTAL) BY MOUTH EVERY MORNING. 90 capsule 3    albuterol (VENTOLIN HFA) 90 mcg/actuation inhaler Inhale 2 puffs into the lungs 4 (four) times daily as needed. 54 g 3    apixaban (ELIQUIS DVT-PE TREAT 30D START) 5 mg (74 tabs) DsPk For the first 7 days take two 5 mg tablets twice daily.  After 7 days take one 5 mg tablet twice daily. 1 each 0    azelastine (ASTELIN) 137 mcg (0.1 %) nasal spray 2 sprays by Nasal route 2 (two) times daily.       "busPIRone (BUSPAR) 5 MG Tab TAKE 1 TABLET TWICE DAILY 180 tablet 5    cholecalciferol, vitamin D3, (D3-2000) 50 mcg (2,000 unit) Cap Take 1 capsule (2,000 Units total) by mouth once daily. 90 capsule 11    cyclobenzaprine (FLEXERIL) 5 MG tablet TAKE 1 TABLET (5 MG TOTAL) BY MOUTH NIGHTLY AS NEEDED FOR MUSCLE SPASMS. 90 tablet 1    cycloSPORINE (RESTASIS) 0.05 % ophthalmic emulsion Place 1 drop into both eyes 2 (two) times daily. 60 each 12    EScitalopram oxalate (LEXAPRO) 20 MG tablet TAKE 1 TABLET EVERY DAY 90 tablet 3    fluocinonide 0.05% (LIDEX) 0.05 % cream AAA bid to leg for 2-4 weeks per course (Patient taking differently: Apply to affected area(s) of leg topically twice daily for 2-4 weeks per course) 60 g 1    gabapentin (NEURONTIN) 300 MG capsule Take 1 capsule (300 mg total) by mouth 3 (three) times daily. 270 capsule 3    oxybutynin (DITROPAN-XL) 5 MG TR24 Take 1 tablet (5 mg total) by mouth once daily. 30 tablet 2    traZODone (DESYREL) 50 MG tablet TAKE 1 TABLET (50 MG TOTAL) BY MOUTH NIGHTLY AS NEEDED FOR INSOMNIA. 30 tablet 3    triamcinolone acetonide 0.025% (KENALOG) 0.025 % cream AAA bid (Patient taking differently: Apply to affected area(s) topically two times daily) 80 g 0        PMHx, PSHx, Allergies, Medications reviewed in epic    ROS negative except pain complaints in HPI    OBJECTIVE:    BP (!) 174/81   Pulse 68   Temp 97.7 °F (36.5 °C)   Resp 15   Ht 5' 3" (1.6 m)   Wt 75 kg (165 lb 5.5 oz)   SpO2 97%   BMI 29.29 kg/m²     PHYSICAL EXAMINATION:    GENERAL: Well appearing, in no acute distress, alert and oriented x3.  PSYCH:  Mood and affect appropriate.  SKIN: Skin color, texture, turgor normal, no rashes or lesions which will impact the procedure.  CV: RRR with palpation of the radial artery.  PULM: No evidence of respiratory difficulty, symmetric chest rise. Clear to auscultation.  NEURO: Cranial nerves grossly intact.    Plan:    Proceed with procedure as planned " Procedure(s) (LRB):  Bilateral L5/S1 + S1 TF REJI (Bilateral)    Raffi Wells MD  05/25/2022

## 2022-05-25 NOTE — DISCHARGE SUMMARY
[facility]  Discharge Note       Description of Procedure: Lumbar Transforaminal Epidural Steroid Injection under Fluoroscopic Guidance    Procedure Date: 5/25/2022    Admit Date: 5/25/2022  Discharge Date: 5/25/2022     Attending Physician: Raffi Wells   Discharge Provider: Raffi Wells    Preoperative Diagnosis: Lumbar Radiculopathy     Postoperative Diagnosis: as above, same as preoperative diagnosis    Discharged Condition: Stable    Hospital Course: Patient was admitted for an outpatient procedure. The procedure was tolerated well with no complications.    Final Diagnoses: Same as principal problem.    Disposition: Home, self-care.    Follow up/Patient Instructions:  Follow-up in clinic in 2-3 weeks.    Medications: No medications were prescribed today. The patient was advised to resume normal medication regimen without change.  Specific information was provided regarding restarting any anticoagulant/s.    Discharge Procedure Orders (must include Diet, Follow-up, Activity):  Light activity for the remainder of the day, resume normal activity tomorrow. Resume normal diet. Follow-up in clinic in 2-3 weeks.

## 2022-05-25 NOTE — OP NOTE
"Procedure: Lumbar and Sacral Transforaminal Epidural Steroid Injection under Fluoroscopic Guidance (supraneural approach)     Level: L5/S1 S1     Side: Bilateral      PROCEDURE DATE: 5/25/2022     Pre-operative Diagnosis: Lumbar Radiculopathy  Post-operative Diagnosis: Lumbar Radiculopathy     Provider: Raffi Wells MD  Assistant(s): None     Anesthesia: Local, No Sedation    >> 0 mg of VERSED    >> 0 mcg of FENTANYL      Indication: Low back pain with radiculopathy consistent with distribution of targeted nerve. Symptoms unresponsive to conservative treatments. Fluoroscopy was used to optimize visualization of needle placement and to maximize safety.      Procedure Description / Technique:  The patient was seen and identified in the preoperative area. Risks, benefits, complications, and alternatives were discussed with the patient. The patient agreed to proceed with the procedure and signed the consent. The site and side of the procedure was identified and marked. An IV was started. The patient was taken to the procedural suite.     The patient was positioned in prone orientation on procedure table and a pillow was placed under the abdomen to reduce lumbar lordosis. A time out was performed prior to any intervention. The procedure, site, side, and allergies were stated and agreed to by all present. The lumbosacral area was widely prepped with ChloraPrep. The procedural site was draped in usual sterile fashion. Vital signs were closely monitored throughout this procedure. Conscious sedation was used for this procedure to decrease patient anxiety.     The target area was visualized under fluoroscopy. The cephalocaudal angle of the fluoroscope was adjusted as to align the vertebral end plates. The fluoroscopic arm was rotated ipsilaterally to an angle of approximately 30 degrees until the "ted dog" outline came into view and the tip of the inferior superior articular process pointed towards the midline, 6:00 " "position of the above pedicle. A 25 gauge 3.5 inch spinal needle was directed towards the "chin" of the "ted dog" (adjacent to the pars interarticularis and inferior to the pedicle). The needle was advanced until OS was met at the inferior border of the pedicle / pars interface. The needle was adjusted so that it would pass inferior to the osseous border. The fluoroscope was then placed in the lateral position and the needle was slowly advanced until it rested in the posterior 1/3rd of the vertebral foramen. AP fluoroscopy was checked and the needle tip rested at the 6:00 position under the pedicle. No paresthesia was elicited during needle placement. With the needle tip in its final position, gentle aspiration was negative for blood and CSF. Gadavist (gadobutrol) 1 mmol/mL (1 to 2 mL) was injected under live fluoroscopy. Microbore tubing was used for injection. There was no pain or paresthesia on injection. The contrast clearly delineated the targeted nerve root on AP fluoroscopy. No vascular uptake was seen. A solution containing 2 mL of 1% PF Lidocaine and 2 mL of Dexamethasone (10 mg/mL) was mixed and 2 mL was injected slowly at each level targeted. There was minimal resistance on injection. No pain or paresthesia was elicited on injection. The stylet was replaced and the needle was withdrawn intact. This procedure was performed for each of the above indicated levels.      The Left S1 area was visualized under fluoroscopy. The cephalocaudal angle of the fluoroscope was adjusted as to align the sacral end plate. The fluoroscopic arm was tilted and rotated cephalad and ipsilateral to bring the S1 dorsal foramen into view. A 25 gauge 3.5 inch spinal needle was directed towards the base of the foramen and advanced until OS was met. The needle was directed cephalad and the fluoroscope was positioned in lateral view. The needle tip was advanced past the dorsal sacral border.  The fluoroscope was then positioned " into AP orientation, gentle aspiration was negative for blood and CSF. Omnipaque 240 (1 to 2 mL) was injected under live fluoroscopy. Microbore tubing was used for injection. There was no pain or paresthesia on injection. The contrast clearly delineated the targeted nerve root on AP fluoroscopy. No vascular uptake was seen. A solution containing 2 mL of 1% PF Lidocaine and 2 mL of Dexamethasone (10 mg/mL) was mixed and 2 mL was injected slowly at each level targeted. There was minimal resistance on injection. No pain or paresthesia was elicited on injection. The stylet was replaced and the needle was withdrawn intact. This procedure was performed for each of the above indicated levels.      Description of Findings: Not applicable     Prosthetic devices, grafts, tissues, or devices implanted: None     Specimen Removed: No     Estimated Blood Loss: minimal     COMPLICATIONS: None     DISPOSITION / PLANS: The patient was transferred to the recovery area in a stable condition for observation. The patient was reexamined prior to discharge. There was no evidence of acute neurologic injury following the procedure.  Patient was discharged from the recovery room after meeting discharge criteria. Home discharge instructions were given to the patient by the staff.

## 2022-06-06 NOTE — TELEPHONE ENCOUNTER
No new care gaps identified.  [facility] Embedded Care Gaps. Reference number: [id number]. 6/06/2022   5:44:22 PM CDT

## 2022-06-07 RX ORDER — LOSARTAN POTASSIUM 50 MG/1
TABLET ORAL
Qty: 90 TABLET | Refills: 3 | Status: SHIPPED | OUTPATIENT
Start: 2022-06-07 | End: 2022-12-02

## 2022-06-07 NOTE — TELEPHONE ENCOUNTER
Refill Routing Note   Medication(s) are not appropriate for processing by [facility] for the following reason(s):      - Required vitals are abnormal  - Patient has been seen in the ED/Hospital since the last PCP visit    [facility] action(s):  Defer          Medication reconciliation completed: No     Appointments  past 12m or future 3m with PCP    Date Provider   Last Visit   4/13/2022 Jeanette Molina MD   Next Visit   7/14/2022 Jeanette Molina MD   ED visits in past 90 days: 1        Note composed:8:30 AM 06/07/2022

## 2022-06-14 ENCOUNTER — PATIENT MESSAGE (OUTPATIENT)
Dept: FAMILY MEDICINE | Facility: CLINIC | Age: 87
End: 2022-06-14
Payer: MEDICARE

## 2022-06-14 DIAGNOSIS — I82.411 DVT OF DEEP FEMORAL VEIN, RIGHT: Primary | ICD-10-CM

## 2022-06-14 RX ORDER — APIXABAN 5 MG (74)
KIT ORAL
Qty: 1 EACH | Refills: 0 | Status: CANCELLED | OUTPATIENT
Start: 2022-06-14

## 2022-06-16 ENCOUNTER — OFFICE VISIT (OUTPATIENT)
Dept: DERMATOLOGY | Facility: CLINIC | Age: 87
End: 2022-06-16
Payer: MEDICARE

## 2022-06-16 DIAGNOSIS — L30.8 ASTEATOTIC DERMATITIS: Primary | ICD-10-CM

## 2022-06-16 DIAGNOSIS — L82.1 SEBORRHEIC KERATOSIS: ICD-10-CM

## 2022-06-16 PROCEDURE — 1160F RVW MEDS BY RX/DR IN RCRD: CPT | Mod: CPTII,S$GLB,, | Performed by: DERMATOLOGY

## 2022-06-16 PROCEDURE — 1101F PR PT FALLS ASSESS DOC 0-1 FALLS W/OUT INJ PAST YR: ICD-10-PCS | Mod: CPTII,S$GLB,, | Performed by: DERMATOLOGY

## 2022-06-16 PROCEDURE — 1101F PT FALLS ASSESS-DOCD LE1/YR: CPT | Mod: CPTII,S$GLB,, | Performed by: DERMATOLOGY

## 2022-06-16 PROCEDURE — 1126F PR PAIN SEVERITY QUANTIFIED, NO PAIN PRESENT: ICD-10-PCS | Mod: CPTII,S$GLB,, | Performed by: DERMATOLOGY

## 2022-06-16 PROCEDURE — 1159F PR MEDICATION LIST DOCUMENTED IN MEDICAL RECORD: ICD-10-PCS | Mod: CPTII,S$GLB,, | Performed by: DERMATOLOGY

## 2022-06-16 PROCEDURE — 99214 OFFICE O/P EST MOD 30 MIN: CPT | Mod: S$GLB,,, | Performed by: DERMATOLOGY

## 2022-06-16 PROCEDURE — 1126F AMNT PAIN NOTED NONE PRSNT: CPT | Mod: CPTII,S$GLB,, | Performed by: DERMATOLOGY

## 2022-06-16 PROCEDURE — 99214 PR OFFICE/OUTPT VISIT, EST, LEVL IV, 30-39 MIN: ICD-10-PCS | Mod: S$GLB,,, | Performed by: DERMATOLOGY

## 2022-06-16 PROCEDURE — 1160F PR REVIEW ALL MEDS BY PRESCRIBER/CLIN PHARMACIST DOCUMENTED: ICD-10-PCS | Mod: CPTII,S$GLB,, | Performed by: DERMATOLOGY

## 2022-06-16 PROCEDURE — 99999 PR PBB SHADOW E&M-EST. PATIENT-LVL III: ICD-10-PCS | Mod: PBBFAC,,, | Performed by: DERMATOLOGY

## 2022-06-16 PROCEDURE — 1159F MED LIST DOCD IN RCRD: CPT | Mod: CPTII,S$GLB,, | Performed by: DERMATOLOGY

## 2022-06-16 PROCEDURE — 1157F ADVNC CARE PLAN IN RCRD: CPT | Mod: CPTII,S$GLB,, | Performed by: DERMATOLOGY

## 2022-06-16 PROCEDURE — 3288F FALL RISK ASSESSMENT DOCD: CPT | Mod: CPTII,S$GLB,, | Performed by: DERMATOLOGY

## 2022-06-16 PROCEDURE — 99999 PR PBB SHADOW E&M-EST. PATIENT-LVL III: CPT | Mod: PBBFAC,,, | Performed by: DERMATOLOGY

## 2022-06-16 PROCEDURE — 3288F PR FALLS RISK ASSESSMENT DOCUMENTED: ICD-10-PCS | Mod: CPTII,S$GLB,, | Performed by: DERMATOLOGY

## 2022-06-16 PROCEDURE — 1157F PR ADVANCE CARE PLAN OR EQUIV PRESENT IN MEDICAL RECORD: ICD-10-PCS | Mod: CPTII,S$GLB,, | Performed by: DERMATOLOGY

## 2022-06-16 RX ORDER — CLOBETASOL PROPIONATE 0.46 MG/ML
SOLUTION TOPICAL
Qty: 50 ML | Refills: 3 | Status: SHIPPED | OUTPATIENT
Start: 2022-06-16

## 2022-06-16 RX ORDER — CLOBETASOL PROPIONATE 0.5 MG/G
CREAM TOPICAL 2 TIMES DAILY
Qty: 60 G | Refills: 0 | Status: SHIPPED | OUTPATIENT
Start: 2022-06-16 | End: 2022-07-13

## 2022-06-16 RX ORDER — MUPIROCIN 20 MG/G
OINTMENT TOPICAL 2 TIMES DAILY
Qty: 22 G | Refills: 0 | Status: SHIPPED | OUTPATIENT
Start: 2022-06-16 | End: 2023-02-27

## 2022-06-16 RX ORDER — CLOBETASOL PROPIONATE 0.5 MG/G
CREAM TOPICAL 2 TIMES DAILY
Qty: 60 G | Refills: 0 | Status: SHIPPED | OUTPATIENT
Start: 2022-06-16 | End: 2022-06-16 | Stop reason: SDUPTHER

## 2022-06-16 NOTE — PROGRESS NOTES
Subjective:       Patient ID:  Clau Nunn is a 87 y.o. female who presents for   Last seen 8/31/2020 for Sks, ACD of the nipple (biopsied; resolved with desonide + keto creams), RLE asteatotic derm vs ACD (lidex cream, Amlactin/CeraVe SA).  Reports the rash on the nipple is resolved but the rash on the RLE comes and goes but always stays dry and itchy, currently in flaring.  Reports she cannot stop scratching, and scratched so much she made an open sore that scabbed up. Requests clobetasol solution to mix in jar of CeraVe cream. Dryness of both legs.     Patient complains of lesion(s)  Location: upper chest  Duration: many months  Symptoms: rough brown spot  Relieving factors/Previous treatments: none              Review of Systems   Skin: Positive for itching, rash and dry skin.        Objective:    Physical Exam   Constitutional: She appears well-developed and well-nourished. No distress.   Neurological: She is alert and oriented to person, place, and time. She is not disoriented.   Psychiatric: She has a normal mood and affect.   Skin:   Areas Examined (abnormalities noted in diagram):   RLE Inspected  LLE Inspection Performed              Diagram Legend     Erythematous scaling macule/papule c/w actinic keratosis       Vascular papule c/w angioma      Pigmented verrucoid papule/plaque c/w seborrheic keratosis      Yellow umbilicated papule c/w sebaceous hyperplasia      Irregularly shaped tan macule c/w lentigo     1-2 mm smooth white papules consistent with Milia      Movable subcutaneous cyst with punctum c/w epidermal inclusion cyst      Subcutaneous movable cyst c/w pilar cyst      Firm pink to brown papule c/w dermatofibroma      Pedunculated fleshy papule(s) c/w skin tag(s)      Evenly pigmented macule c/w junctional nevus     Mildly variegated pigmented, slightly irregular-bordered macule c/w mildly atypical nevus      Flesh colored to evenly pigmented papule c/w intradermal nevus       Pink  pearly papule/plaque c/w basal cell carcinoma      Erythematous hyperkeratotic cursted plaque c/w SCC      Surgical scar with no sign of skin cancer recurrence      Open and closed comedones      Inflammatory papules and pustules      Verrucoid papule consistent consistent with wart     Erythematous eczematous patches and plaques     Dystrophic onycholytic nail with subungual debris c/w onychomycosis     Umbilicated papule    Erythematous-base heme-crusted tan verrucoid plaque consistent with inflamed seborrheic keratosis     Erythematous Silvery Scaling Plaque c/w Psoriasis     See annotation      Assessment / Plan:        Asteatotic dermatitis  Good skin care regimen discussed including limiting to one bath or shower/day, using lukewarm water with mild soap and moisturizing cream to skin BID  -     clobetasoL (TEMOVATE) 0.05 % external solution; Pt to mix in 1 jar of cerave cream and apply to affected areas bid for 2-4 weeks per course  Dispense: 50 mL; Refill: 3  -     clobetasoL (TEMOVATE) 0.05 % cream; Apply topically 2 (two) times daily. To the itchiest areas of rash on on legs for 2-4 weeks per course  Dispense: 60 g; Refill: 0    Side effect profile reviewed for topical steroids including atrophy, telangiectasia and striae development with prolonged usage.  Patient acknowledged understanding.      Seborrheic keratosis  These are benign inherited growths without a malignant potential. Reassurance given to patient. No treatment is necessary.     Wound of skin  - suspect 2/2 scratching, but want to closely monitor to ensure it resolves with wound care. If it does not, low threshold to biopsy to r/o malignancy  - cleanse twice daily with gentle soap and water, then apply mupirocin ointment followed by a bandage  -     mupirocin (BACTROBAN) 2 % ointment; Apply topically 2 (two) times daily. To wound on right lower leg  Dispense: 22 g; Refill: 0           Follow up in about 4 weeks (around 7/14/2022).        LOS  NUMBER AND COMPLEXITY OF PROBLEMS    COMPLEXITY OF DATA RISK TOTAL TIME (m)   57317  04028 [] 1 self-limited or minor problem [x] Minimal to none [] No treatment recommended or patient to monitor. Reassurance.  15-29  10-19   38728  86704 Low  [] 2 or more self limited or minor problems  [] 1 stable chronic illness  [] 1 acute, uncomplicated illness or injury Limited (2)  [] Prior external notes from each unique source  [] Review result of each unique test  [] Order each unique test  OR [] Independent historian Low  []  OTC medications   []  Discussed/Decision for minor skin surgery (no risk factors) 30-44  20-29   81495  74298 Moderate  [x]  1 or more chronic unstable illness (not at goal or progression or exacerbation) or SE of treatment  []  2 or more stable chronic illnesses  []  1 acute illness with systemic symptoms  []  1 acute complicated injury  []  1 undiagnosed new problem with uncertain prognosis Moderate (1/3 below)  []  3 or more data items        *Now includes independent historian  []  Independent interpretation of a test  []  Discuss management/test with another provider Moderate  [x]  Prescription drug mgmt  []  Discussed/Decision for Minor surgery with risk factors  []  Mgmt limited by social determinates 45-59  30-39   77918  26133 High  []  1 or more chronic illness with severe exacerbation, progression or SE of treatment  []  1 acute or chronic illness/injury that poses a threat to life or bodily function Extensive (2/3 below)  []  3 or more data items        *Now includes independent historian.  []  Independent interpretation of a test  []  Discuss management/test with another provider High  []  Major surgery with risk discussed  []  Drug therapy requiring intensive monitoring for toxicity  []  Hospitalization  []  Decision for DNR 60-74  40-54

## 2022-06-16 NOTE — PATIENT INSTRUCTIONS
- Apply mupirocin ointment twice daily under a bandage to wound on right lower leg  - Apply clobetasol cream twice daily to thickest/itchiest areas of rash on leg  - Apply clobetasol solution mixed in jar of CeraVe cream twice daily to other itchy areas  - Use plain CeraVe cream twice daily to other areas for moisturizing

## 2022-06-22 ENCOUNTER — TELEPHONE (OUTPATIENT)
Dept: PAIN MEDICINE | Facility: CLINIC | Age: 87
End: 2022-06-22
Payer: MEDICARE

## 2022-06-22 NOTE — TELEPHONE ENCOUNTER
----- Message from Ludy Adler sent at 6/22/2022 11:31 AM CDT -----  Contact: Joelle  Patient is calling to speak with the nurse regarding scheduling another appt. Reports that the patient was told to call back to schedule. Please give patient a call back [phone].

## 2022-06-23 ENCOUNTER — TELEPHONE (OUTPATIENT)
Dept: ADMINISTRATIVE | Facility: HOSPITAL | Age: 87
End: 2022-06-23
Payer: MEDICARE

## 2022-06-29 ENCOUNTER — OFFICE VISIT (OUTPATIENT)
Dept: PAIN MEDICINE | Facility: CLINIC | Age: 87
End: 2022-06-29
Payer: MEDICARE

## 2022-06-29 VITALS
WEIGHT: 168.19 LBS | DIASTOLIC BLOOD PRESSURE: 66 MMHG | BODY MASS INDEX: 29.8 KG/M2 | HEART RATE: 55 BPM | SYSTOLIC BLOOD PRESSURE: 135 MMHG | RESPIRATION RATE: 17 BRPM | HEIGHT: 63 IN

## 2022-06-29 DIAGNOSIS — Z98.1 HISTORY OF FUSION OF THORACIC SPINE: ICD-10-CM

## 2022-06-29 DIAGNOSIS — M53.3 SACROILIAC JOINT PAIN: ICD-10-CM

## 2022-06-29 DIAGNOSIS — M54.16 BILATERAL LUMBAR RADICULOPATHY: ICD-10-CM

## 2022-06-29 DIAGNOSIS — M46.1 SACROILIITIS: Primary | ICD-10-CM

## 2022-06-29 PROCEDURE — 1159F MED LIST DOCD IN RCRD: CPT | Mod: CPTII,S$GLB,, | Performed by: PHYSICIAN ASSISTANT

## 2022-06-29 PROCEDURE — 1160F PR REVIEW ALL MEDS BY PRESCRIBER/CLIN PHARMACIST DOCUMENTED: ICD-10-PCS | Mod: CPTII,S$GLB,, | Performed by: PHYSICIAN ASSISTANT

## 2022-06-29 PROCEDURE — 1125F AMNT PAIN NOTED PAIN PRSNT: CPT | Mod: CPTII,S$GLB,, | Performed by: PHYSICIAN ASSISTANT

## 2022-06-29 PROCEDURE — 99214 OFFICE O/P EST MOD 30 MIN: CPT | Mod: S$GLB,,, | Performed by: PHYSICIAN ASSISTANT

## 2022-06-29 PROCEDURE — 1157F ADVNC CARE PLAN IN RCRD: CPT | Mod: CPTII,S$GLB,, | Performed by: PHYSICIAN ASSISTANT

## 2022-06-29 PROCEDURE — 1159F PR MEDICATION LIST DOCUMENTED IN MEDICAL RECORD: ICD-10-PCS | Mod: CPTII,S$GLB,, | Performed by: PHYSICIAN ASSISTANT

## 2022-06-29 PROCEDURE — 1157F PR ADVANCE CARE PLAN OR EQUIV PRESENT IN MEDICAL RECORD: ICD-10-PCS | Mod: CPTII,S$GLB,, | Performed by: PHYSICIAN ASSISTANT

## 2022-06-29 PROCEDURE — 1160F RVW MEDS BY RX/DR IN RCRD: CPT | Mod: CPTII,S$GLB,, | Performed by: PHYSICIAN ASSISTANT

## 2022-06-29 PROCEDURE — 1101F PR PT FALLS ASSESS DOC 0-1 FALLS W/OUT INJ PAST YR: ICD-10-PCS | Mod: CPTII,S$GLB,, | Performed by: PHYSICIAN ASSISTANT

## 2022-06-29 PROCEDURE — 1101F PT FALLS ASSESS-DOCD LE1/YR: CPT | Mod: CPTII,S$GLB,, | Performed by: PHYSICIAN ASSISTANT

## 2022-06-29 PROCEDURE — 1125F PR PAIN SEVERITY QUANTIFIED, PAIN PRESENT: ICD-10-PCS | Mod: CPTII,S$GLB,, | Performed by: PHYSICIAN ASSISTANT

## 2022-06-29 PROCEDURE — 99214 PR OFFICE/OUTPT VISIT, EST, LEVL IV, 30-39 MIN: ICD-10-PCS | Mod: S$GLB,,, | Performed by: PHYSICIAN ASSISTANT

## 2022-06-29 PROCEDURE — 3288F PR FALLS RISK ASSESSMENT DOCUMENTED: ICD-10-PCS | Mod: CPTII,S$GLB,, | Performed by: PHYSICIAN ASSISTANT

## 2022-06-29 PROCEDURE — 3288F FALL RISK ASSESSMENT DOCD: CPT | Mod: CPTII,S$GLB,, | Performed by: PHYSICIAN ASSISTANT

## 2022-06-29 PROCEDURE — 99999 PR PBB SHADOW E&M-EST. PATIENT-LVL V: ICD-10-PCS | Mod: PBBFAC,,, | Performed by: PHYSICIAN ASSISTANT

## 2022-06-29 PROCEDURE — 99999 PR PBB SHADOW E&M-EST. PATIENT-LVL V: CPT | Mod: PBBFAC,,, | Performed by: PHYSICIAN ASSISTANT

## 2022-06-29 RX ORDER — FLUTICASONE PROPIONATE 50 MCG
SPRAY, SUSPENSION (ML) NASAL
COMMUNITY
Start: 2022-05-31
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
## 2022-06-30 NOTE — PROGRESS NOTES
"Subjective:      Patient ID: Clau Nunn is a 87 y.o. female.    Chief Complaint: Follow-up      HPI  Here for follow up of medical problems.  REJI helped some.  Taking eliquis.  Elevating legs.  Anx/depression doing well on rx.  Joint pains knees/ankles/hips/all over joints.  BMs normal.  No cp/sob/palp.  Albuterol prn cough.  Sometimes feels off balance after getting up.  Occas red meat, but not regular.    Per 5/22 HemeOnc:  History of DVT in adulthood     Stay on Eliquis 5 mg PO bid x 6 months.  At that point we may reduce to 2.5 mg PO bid due to advanced age and increased risk of bleeding. Stop ASA now.  I believe that her DVT is due to chronic venous insufficiency.  Patient should stay on life long anticoagulation due to multiple clotting episodes in the past - discussed with patient and verbalized understanding.  She will elevate legs while in a seated position.  She will continue to monitor for symptoms of bleeding or clotting and will notify us if symptoms noted.    -------------------------------------------------------------------      Updated/ annual due 10/22:  HM: 10/21 fluvax, 1/21 covid vaccine/booster, 11/19 HAV, 5/16 [id number], 5/17 booster [id number], 2/22 TDaP, 11/09 zoster, 1/20 BMD rep 3y, 1/14 Cscope no more needed, 4/21 MMG/ Gyn Dr. Tere bauer outside, 9/19 Eye Dr. Rubalcava.     Review of Systems   Constitutional: Negative for chills, diaphoresis and fever.   Respiratory: Negative for cough and shortness of breath.    Cardiovascular: Negative for chest pain, palpitations and leg swelling.   Gastrointestinal: Negative for blood in stool, constipation, diarrhea, nausea and vomiting.   Genitourinary: Negative for dysuria, frequency and hematuria.   Psychiatric/Behavioral: The patient is not nervous/anxious.          Objective:   /74   Pulse 88   Temp 97.6 °F (36.4 °C) (Temporal)   Ht 5' 3" (1.6 m)   Wt 76 kg (167 lb 8.8 oz)   SpO2 95%   BMI 29.68 kg/m²     Physical " Exam  Constitutional:       Appearance: She is well-developed.   Neck:      Thyroid: No thyroid mass.      Vascular: No carotid bruit.   Cardiovascular:      Rate and Rhythm: Normal rate and regular rhythm.      Heart sounds: No murmur heard.    No friction rub. No gallop.   Pulmonary:      Effort: Pulmonary effort is normal.      Breath sounds: Normal breath sounds. No wheezing or rales.   Abdominal:      General: Bowel sounds are normal.      Palpations: Abdomen is soft. There is no mass.      Tenderness: There is no abdominal tenderness.   Musculoskeletal:      Cervical back: Neck supple.   Lymphadenopathy:      Cervical: No cervical adenopathy.   Neurological:      Mental Status: She is alert and oriented to person, place, and time.             Assessment:       1. Essential hypertension    2. History of DVT in adulthood    3. Acquired hypothyroidism    4. Chronic anticoagulation    5. Stage 3a chronic kidney disease    6. Recurrent major depressive disorder, in full remission    7. Chronic venous insufficiency    8. Anxiety    9. Ataxia    10. Cough          Plan:     Essential hypertension- stable, cont rx.  -     CBC Auto Differential; Future; Expected date: 07/14/2022  -     Comprehensive Metabolic Panel; Future; Expected date: 07/14/2022  -     Cancel: Lipid Panel; Future; Expected date: 07/14/2022    History of DVT in adulthood, Chronic anticoagulation    Acquired hypothyroidism- Clinically stable, continue present treatment.  -     TSH; Future; Expected date: 07/14/2022    Stage 3a chronic kidney disease- cont hydration.    Recurrent major depressive disorder, in full remission, Anxiety- doing well, cont lexapro.    Chronic venous insufficiency- elevate prn.    Ataxia-  -     Vitamin B12; Future; Expected date: 07/14/2022    Cough- cont albuterol prn.    Tylenol BID to TID prn.  Work on core exercises at [facility].    Lab in 3mo.

## 2022-07-12 ENCOUNTER — PES CALL (OUTPATIENT)
Dept: ADMINISTRATIVE | Facility: CLINIC | Age: 87
End: 2022-07-12
Payer: MEDICARE

## 2022-07-13 ENCOUNTER — OFFICE VISIT (OUTPATIENT)
Dept: INTERNAL MEDICINE | Facility: CLINIC | Age: 87
End: 2022-07-13
Payer: MEDICARE

## 2022-07-13 VITALS
HEART RATE: 77 BPM | WEIGHT: 170.88 LBS | TEMPERATURE: 98 F | DIASTOLIC BLOOD PRESSURE: 72 MMHG | OXYGEN SATURATION: 96 % | HEIGHT: 63 IN | BODY MASS INDEX: 30.28 KG/M2 | SYSTOLIC BLOOD PRESSURE: 128 MMHG | RESPIRATION RATE: 18 BRPM

## 2022-07-13 DIAGNOSIS — Z86.718 HISTORY OF DVT IN ADULTHOOD: ICD-10-CM

## 2022-07-13 DIAGNOSIS — M15.9 PRIMARY OSTEOARTHRITIS INVOLVING MULTIPLE JOINTS: ICD-10-CM

## 2022-07-13 DIAGNOSIS — R26.9 ABNORMALITY OF GAIT AND MOBILITY: ICD-10-CM

## 2022-07-13 DIAGNOSIS — E55.9 VITAMIN D DEFICIENCY DISEASE: ICD-10-CM

## 2022-07-13 DIAGNOSIS — N18.32 STAGE 3B CHRONIC KIDNEY DISEASE: ICD-10-CM

## 2022-07-13 DIAGNOSIS — I87.2 CHRONIC VENOUS INSUFFICIENCY: Chronic | ICD-10-CM

## 2022-07-13 DIAGNOSIS — Z00.00 ENCOUNTER FOR PREVENTIVE HEALTH EXAMINATION: Primary | ICD-10-CM

## 2022-07-13 DIAGNOSIS — E03.9 ACQUIRED HYPOTHYROIDISM: ICD-10-CM

## 2022-07-13 DIAGNOSIS — F41.9 ANXIETY: ICD-10-CM

## 2022-07-13 DIAGNOSIS — J41.0 SIMPLE CHRONIC BRONCHITIS: ICD-10-CM

## 2022-07-13 DIAGNOSIS — N25.81 SECONDARY HYPERPARATHYROIDISM: ICD-10-CM

## 2022-07-13 DIAGNOSIS — F33.42 RECURRENT MAJOR DEPRESSIVE DISORDER, IN FULL REMISSION: ICD-10-CM

## 2022-07-13 DIAGNOSIS — I10 ESSENTIAL HYPERTENSION: Chronic | ICD-10-CM

## 2022-07-13 DIAGNOSIS — M54.16 LUMBAR RADICULOPATHY: ICD-10-CM

## 2022-07-13 DIAGNOSIS — M80.00XG AGE-RELATED OSTEOPOROSIS WITH CURRENT PATHOLOGICAL FRACTURE WITH DELAYED HEALING: ICD-10-CM

## 2022-07-13 DIAGNOSIS — I70.0 ATHEROSCLEROSIS OF AORTA: ICD-10-CM

## 2022-07-13 PROCEDURE — 1101F PT FALLS ASSESS-DOCD LE1/YR: CPT | Mod: CPTII,S$GLB,, | Performed by: NURSE PRACTITIONER

## 2022-07-13 PROCEDURE — 1157F ADVNC CARE PLAN IN RCRD: CPT | Mod: CPTII,S$GLB,, | Performed by: NURSE PRACTITIONER

## 2022-07-13 PROCEDURE — 99499 UNLISTED E&M SERVICE: CPT | Mod: S$GLB,,, | Performed by: NURSE PRACTITIONER

## 2022-07-13 PROCEDURE — 1125F PR PAIN SEVERITY QUANTIFIED, PAIN PRESENT: ICD-10-PCS | Mod: CPTII,S$GLB,, | Performed by: NURSE PRACTITIONER

## 2022-07-13 PROCEDURE — 3288F FALL RISK ASSESSMENT DOCD: CPT | Mod: CPTII,S$GLB,, | Performed by: NURSE PRACTITIONER

## 2022-07-13 PROCEDURE — G0439 PPPS, SUBSEQ VISIT: HCPCS | Mod: S$GLB,,, | Performed by: NURSE PRACTITIONER

## 2022-07-13 PROCEDURE — 1157F PR ADVANCE CARE PLAN OR EQUIV PRESENT IN MEDICAL RECORD: ICD-10-PCS | Mod: CPTII,S$GLB,, | Performed by: NURSE PRACTITIONER

## 2022-07-13 PROCEDURE — 99999 PR PBB SHADOW E&M-EST. PATIENT-LVL V: CPT | Mod: PBBFAC,,, | Performed by: NURSE PRACTITIONER

## 2022-07-13 PROCEDURE — 99999 PR PBB SHADOW E&M-EST. PATIENT-LVL V: ICD-10-PCS | Mod: PBBFAC,,, | Performed by: NURSE PRACTITIONER

## 2022-07-13 PROCEDURE — 1159F PR MEDICATION LIST DOCUMENTED IN MEDICAL RECORD: ICD-10-PCS | Mod: CPTII,S$GLB,, | Performed by: NURSE PRACTITIONER

## 2022-07-13 PROCEDURE — 1170F PR FUNCTIONAL STATUS ASSESSED: ICD-10-PCS | Mod: CPTII,S$GLB,, | Performed by: NURSE PRACTITIONER

## 2022-07-13 PROCEDURE — 1170F FXNL STATUS ASSESSED: CPT | Mod: CPTII,S$GLB,, | Performed by: NURSE PRACTITIONER

## 2022-07-13 PROCEDURE — 1159F MED LIST DOCD IN RCRD: CPT | Mod: CPTII,S$GLB,, | Performed by: NURSE PRACTITIONER

## 2022-07-13 PROCEDURE — G0439 PR MEDICARE ANNUAL WELLNESS SUBSEQUENT VISIT: ICD-10-PCS | Mod: S$GLB,,, | Performed by: NURSE PRACTITIONER

## 2022-07-13 PROCEDURE — 3288F PR FALLS RISK ASSESSMENT DOCUMENTED: ICD-10-PCS | Mod: CPTII,S$GLB,, | Performed by: NURSE PRACTITIONER

## 2022-07-13 PROCEDURE — 1101F PR PT FALLS ASSESS DOC 0-1 FALLS W/OUT INJ PAST YR: ICD-10-PCS | Mod: CPTII,S$GLB,, | Performed by: NURSE PRACTITIONER

## 2022-07-13 PROCEDURE — 1160F PR REVIEW ALL MEDS BY PRESCRIBER/CLIN PHARMACIST DOCUMENTED: ICD-10-PCS | Mod: CPTII,S$GLB,, | Performed by: NURSE PRACTITIONER

## 2022-07-13 PROCEDURE — 99499 RISK ADDL DX/OHS AUDIT: ICD-10-PCS | Mod: S$GLB,,, | Performed by: NURSE PRACTITIONER

## 2022-07-13 PROCEDURE — 1125F AMNT PAIN NOTED PAIN PRSNT: CPT | Mod: CPTII,S$GLB,, | Performed by: NURSE PRACTITIONER

## 2022-07-13 PROCEDURE — 1160F RVW MEDS BY RX/DR IN RCRD: CPT | Mod: CPTII,S$GLB,, | Performed by: NURSE PRACTITIONER

## 2022-07-13 NOTE — PROGRESS NOTES
"  Clau Nunn presented for a  Medicare AWV and comprehensive Health Risk Assessment today. The following components were reviewed and updated:    · Medical history  · Family History  · Social history  · Allergies and Current Medications  · Health Risk Assessment  · Health Maintenance  · Care Team         ** See Completed Assessments for Annual Wellness Visit within the encounter summary.**         The following assessments were completed:  · Living Situation  · CAGE  · Depression Screening  · Timed Get Up and Go  · Whisper Test  · Cognitive Function Screening  · Nutrition Screening  · ADL Screening  · PAQ Screening        Vitals:    07/13/22 1052   BP: 128/72   Pulse: 77   Resp: 18   Temp: 98 °F (36.7 °C)   TempSrc: Temporal   SpO2: 96%   Weight: 77.5 kg (170 lb 13.7 oz)   Height: 5' 3" (1.6 m)     Body mass index is 30.27 kg/m².  Physical Exam  Constitutional:       Appearance: Normal appearance. She is well-developed. She is not ill-appearing, toxic-appearing or diaphoretic.   HENT:      Head: Normocephalic and atraumatic.      Right Ear: External ear normal.      Left Ear: External ear normal.      Nose: Nose normal.      Mouth/Throat:      Mouth: Mucous membranes are moist.      Pharynx: No posterior oropharyngeal erythema.   Eyes:      General: No scleral icterus.        Right eye: No discharge.         Left eye: No discharge.      Extraocular Movements: Extraocular movements intact.      Conjunctiva/sclera: Conjunctivae normal.      Pupils: Pupils are equal, round, and reactive to light.   Neck:      Thyroid: No thyromegaly.      Vascular: No carotid bruit.      Trachea: No tracheal deviation.   Cardiovascular:      Rate and Rhythm: Normal rate and regular rhythm.      Pulses: Normal pulses.      Heart sounds: Normal heart sounds. No murmur heard.    No friction rub. No gallop.   Pulmonary:      Effort: Pulmonary effort is normal. No respiratory distress.      Breath sounds: Normal breath sounds. No " stridor. No wheezing, rhonchi or rales.   Chest:      Chest wall: No tenderness.   Breasts:      Right: No supraclavicular adenopathy.      Left: No supraclavicular adenopathy.       Abdominal:      General: Bowel sounds are normal. There is no distension.      Palpations: Abdomen is soft. There is no mass.      Tenderness: There is no abdominal tenderness. There is no guarding or rebound.      Hernia: No hernia is present.   Musculoskeletal:         General: No tenderness. Normal range of motion.      Cervical back: Normal range of motion and neck supple. No edema or tenderness. Normal range of motion.   Lymphadenopathy:      Head:      Right side of head: No tonsillar adenopathy.      Left side of head: No tonsillar adenopathy.      Cervical: No cervical adenopathy.      Upper Body:      Right upper body: No supraclavicular adenopathy.      Left upper body: No supraclavicular adenopathy.   Skin:     General: Skin is warm and dry.      Findings: No lesion or rash.      Comments: Right lower leg skin lesion, sees derm   Neurological:      Mental Status: She is alert and oriented to person, place, and time.      Deep Tendon Reflexes: Reflexes are normal and symmetric.   Psychiatric:         Mood and Affect: Mood normal.         Behavior: Behavior normal.               Diagnoses and health risks identified today and associated recommendations/orders:    1. Encounter for preventive health examination  Screenings performed, as noted above.  Personal preventative testing needs reviewed.     2. Abnormality of gait and mobility  Monitored/treated on meds, continue the same tx, stable, follow up with pcp    3. Recurrent major depressive disorder, in full remission  Monitored/treated on meds, continue the same tx, stable, follow up with pcp    4. Simple chronic bronchitis  Monitored/treated on meds, continue the same tx, stable, follow up with pcp, uses inhaler on occasion    5. Atherosclerosis of aorta  Monitored/treated on  meds, continue the same tx, stable, follow up with pcp    6. Essential hypertension  Monitored/treated on meds, continue the same tx, stable, follow up with pcp    7. Chronic venous insufficiency  Monitored/treated on meds, continue the same tx, stable, follow up with pcp    8. Secondary hyperparathyroidism  Monitored/treated on meds, continue the same tx, stable, follow up with pcp and rheumatology, taking Vit D    9. Acquired hypothyroidism  Monitored/treated on meds, continue the same tx, stable, follow up with pcp    10. Vitamin D deficiency disease  Monitored/treated on meds, continue the same tx, stable, follow up with pcp     11. Stage 3b chronic kidney disease  Monitored/treated on meds, continue the same tx, stable, follow up with pcp    12. History of DVT in adulthood  Monitored/treated on meds, continue the same tx, stable, follow up with pcp, on Eliquis    13. Anxiety  Monitored/treated on meds, continue the same tx, stable, follow up with pcp    14. Lumbar radiculopathy  Monitored/treated on meds, continue the same tx, stable, follow up with pcp    15. Primary osteoarthritis involving multiple joints  Monitored/treated on meds, continue the same tx, stable, follow up with pcp and rheumatology    16. Age-related osteoporosis with current pathological fracture with delayed healing  Monitored/treated on meds, continue the same tx, stable, follow up with pcp and rheumatology    Discussed second shingrix shot      Provided Clau with a 5-10 year written screening schedule and personal prevention plan. Recommendations were developed using the USPSTF age appropriate recommendations. Education, counseling, and referrals were provided as needed. After Visit Summary printed and given to patient which includes a list of additional screenings\tests needed.    No follow-ups on file.    Bladimir Feng NP  I offered to discuss advanced care planning, including how to pick a person who would make decisions for you  if you were unable to make them for yourself, called a health care power of , and what kind of decisions you might make such as use of life sustaining treatments such as ventilators and tube feeding when faced with a life limiting illness recorded on a living will that they will need to know. (How you want to be cared for as you near the end of your natural life)     X Patient is interested in learning more about how to make advanced directives.  I provided them paperwork and offered to discuss this with them.

## 2022-07-13 NOTE — PATIENT INSTRUCTIONS
Counseling and Referral of Other Preventative  (Italic type indicates deductible and co-insurance are waived)    Patient Name: Clau Nunn  Today's Date: 7/13/2022    Health Maintenance       Date Due Completion Date    COVID-19 Vaccine (4 - Booster for Pfizer series) 02/12/2022 10/12/2021    Shingles Vaccine (3 of 3) 04/22/2022 2/25/2022    Influenza Vaccine (1) 09/01/2022 10/4/2021    Lipid Panel 07/29/2026 7/29/2021    TETANUS VACCINE 02/25/2032 2/25/2022        No orders of the defined types were placed in this encounter.    The following information is provided to all patients.  This information is to help you find resources for any of the problems found today that may be affecting your health:                Living healthy guide: www.[facility].[place].gov      Understanding Diabetes: www.diabetes.org      Eating healthy: www.cdc.gov/healthyweight      [facility] home safety checklist: www.cdc.gov/steadi/patient.html      Agency on Aging: www.goea.[place].[facility]      Alcoholics anonymous (AA): www.aa.org      Physical Activity: www.eligio.nih.gov/mo1ebbi      Tobacco use: www.quitwithusla.org

## 2022-07-14 ENCOUNTER — OFFICE VISIT (OUTPATIENT)
Dept: FAMILY MEDICINE | Facility: CLINIC | Age: 87
End: 2022-07-14
Payer: MEDICARE

## 2022-07-14 ENCOUNTER — LAB VISIT (OUTPATIENT)
Dept: LAB | Facility: HOSPITAL | Age: 87
End: 2022-07-14
Payer: MEDICARE

## 2022-07-14 VITALS
BODY MASS INDEX: 29.69 KG/M2 | HEIGHT: 63 IN | TEMPERATURE: 98 F | SYSTOLIC BLOOD PRESSURE: 134 MMHG | WEIGHT: 167.56 LBS | HEART RATE: 88 BPM | OXYGEN SATURATION: 95 % | DIASTOLIC BLOOD PRESSURE: 74 MMHG

## 2022-07-14 DIAGNOSIS — I87.2 CHRONIC VENOUS INSUFFICIENCY: Chronic | ICD-10-CM

## 2022-07-14 DIAGNOSIS — F41.9 ANXIETY: ICD-10-CM

## 2022-07-14 DIAGNOSIS — N18.31 STAGE 3A CHRONIC KIDNEY DISEASE: ICD-10-CM

## 2022-07-14 DIAGNOSIS — R27.0 ATAXIA: ICD-10-CM

## 2022-07-14 DIAGNOSIS — F33.42 RECURRENT MAJOR DEPRESSIVE DISORDER, IN FULL REMISSION: ICD-10-CM

## 2022-07-14 DIAGNOSIS — R05.9 COUGH: ICD-10-CM

## 2022-07-14 DIAGNOSIS — E03.9 ACQUIRED HYPOTHYROIDISM: ICD-10-CM

## 2022-07-14 DIAGNOSIS — Z79.01 CHRONIC ANTICOAGULATION: ICD-10-CM

## 2022-07-14 DIAGNOSIS — I10 ESSENTIAL HYPERTENSION: Primary | Chronic | ICD-10-CM

## 2022-07-14 DIAGNOSIS — Z86.718 HISTORY OF DVT IN ADULTHOOD: ICD-10-CM

## 2022-07-14 LAB — VIT B12 SERPL-MCNC: 247 PG/ML (ref 210–950)

## 2022-07-14 PROCEDURE — 36415 COLL VENOUS BLD VENIPUNCTURE: CPT | Mod: PO | Performed by: INTERNAL MEDICINE

## 2022-07-14 PROCEDURE — 1101F PR PT FALLS ASSESS DOC 0-1 FALLS W/OUT INJ PAST YR: ICD-10-PCS | Mod: CPTII,S$GLB,, | Performed by: INTERNAL MEDICINE

## 2022-07-14 PROCEDURE — 1126F AMNT PAIN NOTED NONE PRSNT: CPT | Mod: CPTII,S$GLB,, | Performed by: INTERNAL MEDICINE

## 2022-07-14 PROCEDURE — 1157F ADVNC CARE PLAN IN RCRD: CPT | Mod: CPTII,S$GLB,, | Performed by: INTERNAL MEDICINE

## 2022-07-14 PROCEDURE — 1159F MED LIST DOCD IN RCRD: CPT | Mod: CPTII,S$GLB,, | Performed by: INTERNAL MEDICINE

## 2022-07-14 PROCEDURE — 1126F PR PAIN SEVERITY QUANTIFIED, NO PAIN PRESENT: ICD-10-PCS | Mod: CPTII,S$GLB,, | Performed by: INTERNAL MEDICINE

## 2022-07-14 PROCEDURE — 99214 OFFICE O/P EST MOD 30 MIN: CPT | Mod: S$GLB,,, | Performed by: INTERNAL MEDICINE

## 2022-07-14 PROCEDURE — 99214 PR OFFICE/OUTPT VISIT, EST, LEVL IV, 30-39 MIN: ICD-10-PCS | Mod: S$GLB,,, | Performed by: INTERNAL MEDICINE

## 2022-07-14 PROCEDURE — 1101F PT FALLS ASSESS-DOCD LE1/YR: CPT | Mod: CPTII,S$GLB,, | Performed by: INTERNAL MEDICINE

## 2022-07-14 PROCEDURE — 99999 PR PBB SHADOW E&M-EST. PATIENT-LVL IV: CPT | Mod: PBBFAC,,, | Performed by: INTERNAL MEDICINE

## 2022-07-14 PROCEDURE — 3288F PR FALLS RISK ASSESSMENT DOCUMENTED: ICD-10-PCS | Mod: CPTII,S$GLB,, | Performed by: INTERNAL MEDICINE

## 2022-07-14 PROCEDURE — 3288F FALL RISK ASSESSMENT DOCD: CPT | Mod: CPTII,S$GLB,, | Performed by: INTERNAL MEDICINE

## 2022-07-14 PROCEDURE — 1159F PR MEDICATION LIST DOCUMENTED IN MEDICAL RECORD: ICD-10-PCS | Mod: CPTII,S$GLB,, | Performed by: INTERNAL MEDICINE

## 2022-07-14 PROCEDURE — 82607 VITAMIN B-12: CPT | Performed by: INTERNAL MEDICINE

## 2022-07-14 PROCEDURE — 99999 PR PBB SHADOW E&M-EST. PATIENT-LVL IV: ICD-10-PCS | Mod: PBBFAC,,, | Performed by: INTERNAL MEDICINE

## 2022-07-14 PROCEDURE — 1157F PR ADVANCE CARE PLAN OR EQUIV PRESENT IN MEDICAL RECORD: ICD-10-PCS | Mod: CPTII,S$GLB,, | Performed by: INTERNAL MEDICINE

## 2022-07-15 ENCOUNTER — PATIENT MESSAGE (OUTPATIENT)
Dept: FAMILY MEDICINE | Facility: CLINIC | Age: 87
End: 2022-07-15
Payer: MEDICARE

## 2022-07-15 DIAGNOSIS — E53.8 B12 DEFICIENCY: Primary | ICD-10-CM

## 2022-07-19 ENCOUNTER — PATIENT MESSAGE (OUTPATIENT)
Dept: FAMILY MEDICINE | Facility: CLINIC | Age: 87
End: 2022-07-19
Payer: MEDICARE

## 2022-07-21 ENCOUNTER — OFFICE VISIT (OUTPATIENT)
Dept: DERMATOLOGY | Facility: CLINIC | Age: 87
End: 2022-07-21
Payer: MEDICARE

## 2022-07-21 DIAGNOSIS — L30.8 ASTEATOTIC DERMATITIS: Primary | ICD-10-CM

## 2022-07-21 PROCEDURE — 1157F ADVNC CARE PLAN IN RCRD: CPT | Mod: CPTII,S$GLB,, | Performed by: DERMATOLOGY

## 2022-07-21 PROCEDURE — 1126F PR PAIN SEVERITY QUANTIFIED, NO PAIN PRESENT: ICD-10-PCS | Mod: CPTII,S$GLB,, | Performed by: DERMATOLOGY

## 2022-07-21 PROCEDURE — 99999 PR PBB SHADOW E&M-EST. PATIENT-LVL III: ICD-10-PCS | Mod: PBBFAC,,, | Performed by: DERMATOLOGY

## 2022-07-21 PROCEDURE — 1160F RVW MEDS BY RX/DR IN RCRD: CPT | Mod: CPTII,S$GLB,, | Performed by: DERMATOLOGY

## 2022-07-21 PROCEDURE — 99213 OFFICE O/P EST LOW 20 MIN: CPT | Mod: S$GLB,,, | Performed by: DERMATOLOGY

## 2022-07-21 PROCEDURE — 1159F PR MEDICATION LIST DOCUMENTED IN MEDICAL RECORD: ICD-10-PCS | Mod: CPTII,S$GLB,, | Performed by: DERMATOLOGY

## 2022-07-21 PROCEDURE — 1160F PR REVIEW ALL MEDS BY PRESCRIBER/CLIN PHARMACIST DOCUMENTED: ICD-10-PCS | Mod: CPTII,S$GLB,, | Performed by: DERMATOLOGY

## 2022-07-21 PROCEDURE — 99213 PR OFFICE/OUTPT VISIT, EST, LEVL III, 20-29 MIN: ICD-10-PCS | Mod: S$GLB,,, | Performed by: DERMATOLOGY

## 2022-07-21 PROCEDURE — 1159F MED LIST DOCD IN RCRD: CPT | Mod: CPTII,S$GLB,, | Performed by: DERMATOLOGY

## 2022-07-21 PROCEDURE — 3288F FALL RISK ASSESSMENT DOCD: CPT | Mod: CPTII,S$GLB,, | Performed by: DERMATOLOGY

## 2022-07-21 PROCEDURE — 1101F PT FALLS ASSESS-DOCD LE1/YR: CPT | Mod: CPTII,S$GLB,, | Performed by: DERMATOLOGY

## 2022-07-21 PROCEDURE — 3288F PR FALLS RISK ASSESSMENT DOCUMENTED: ICD-10-PCS | Mod: CPTII,S$GLB,, | Performed by: DERMATOLOGY

## 2022-07-21 PROCEDURE — 1157F PR ADVANCE CARE PLAN OR EQUIV PRESENT IN MEDICAL RECORD: ICD-10-PCS | Mod: CPTII,S$GLB,, | Performed by: DERMATOLOGY

## 2022-07-21 PROCEDURE — 1101F PR PT FALLS ASSESS DOC 0-1 FALLS W/OUT INJ PAST YR: ICD-10-PCS | Mod: CPTII,S$GLB,, | Performed by: DERMATOLOGY

## 2022-07-21 PROCEDURE — 99999 PR PBB SHADOW E&M-EST. PATIENT-LVL III: CPT | Mod: PBBFAC,,, | Performed by: DERMATOLOGY

## 2022-07-21 PROCEDURE — 1126F AMNT PAIN NOTED NONE PRSNT: CPT | Mod: CPTII,S$GLB,, | Performed by: DERMATOLOGY

## 2022-07-21 NOTE — PROGRESS NOTES
Subjective:       Patient ID:  Clau Nunn is a 87 y.o. female who presents for   Chief Complaint   Patient presents with    Rash     Last seen 6/16/22 for asteatotic dermatitis, rx'd clobetasol cream BID x 2-4 weeks for RLE and clobetasol solution to be mixed into CeraVe cream BID for other areas of the body.  Reports rash has resolved. Also reports the sore she had from scratching has improved but looks like a scar is left.                  Review of Systems   Skin: Positive for dry skin. Negative for itching and rash.        Objective:    Physical Exam   Constitutional: She appears well-developed and well-nourished. No distress.   Neurological: She is alert and oriented to person, place, and time. She is not disoriented.   Psychiatric: She has a normal mood and affect.   Skin:   Areas Examined (abnormalities noted in diagram):   LLE Inspection Performed              Diagram Legend     Erythematous scaling macule/papule c/w actinic keratosis       Vascular papule c/w angioma      Pigmented verrucoid papule/plaque c/w seborrheic keratosis      Yellow umbilicated papule c/w sebaceous hyperplasia      Irregularly shaped tan macule c/w lentigo     1-2 mm smooth white papules consistent with Milia      Movable subcutaneous cyst with punctum c/w epidermal inclusion cyst      Subcutaneous movable cyst c/w pilar cyst      Firm pink to brown papule c/w dermatofibroma      Pedunculated fleshy papule(s) c/w skin tag(s)      Evenly pigmented macule c/w junctional nevus     Mildly variegated pigmented, slightly irregular-bordered macule c/w mildly atypical nevus      Flesh colored to evenly pigmented papule c/w intradermal nevus       Pink pearly papule/plaque c/w basal cell carcinoma      Erythematous hyperkeratotic cursted plaque c/w SCC      Surgical scar with no sign of skin cancer recurrence      Open and closed comedones      Inflammatory papules and pustules      Verrucoid papule consistent consistent with  wart     Erythematous eczematous patches and plaques     Dystrophic onycholytic nail with subungual debris c/w onychomycosis     Umbilicated papule    Erythematous-base heme-crusted tan verrucoid plaque consistent with inflamed seborrheic keratosis     Erythematous Silvery Scaling Plaque c/w Psoriasis     See annotation      Assessment / Plan:        Asteatotic dermatitis  - +/- venous stasis dermatitis  - currently controlled  - stop clobetasol  - continue CeraVe cream at least twice daily  - recommended compression stockings and leg elevation  - can resume topical steroids as needed for up to 2-4 weeks per course only, must give skin breaks from use    Side effect profile reviewed for topical steroids including atrophy, telangiectasia and striae development with prolonged usage.  Patient acknowledged understanding.                 Follow up if symptoms worsen or fail to improve.

## 2022-08-02 ENCOUNTER — HOSPITAL ENCOUNTER (OUTPATIENT)
Dept: RADIOLOGY | Facility: HOSPITAL | Age: 87
Discharge: HOME | End: 2022-08-02
Attending: INTERNAL MEDICINE
Payer: MEDICARE

## 2022-08-02 ENCOUNTER — OFFICE VISIT (OUTPATIENT)
Dept: HEMATOLOGY/ONCOLOGY | Facility: CLINIC | Age: 87
End: 2022-08-02
Payer: MEDICARE

## 2022-08-02 ENCOUNTER — INFUSION (OUTPATIENT)
Dept: INFUSION THERAPY | Facility: HOSPITAL | Age: 87
End: 2022-08-02
Attending: PHYSICIAN ASSISTANT
Payer: MEDICARE

## 2022-08-02 ENCOUNTER — OFFICE VISIT (OUTPATIENT)
Dept: RHEUMATOLOGY | Facility: CLINIC | Age: 87
End: 2022-08-02
Payer: MEDICARE

## 2022-08-02 ENCOUNTER — HOSPITAL ENCOUNTER (OUTPATIENT)
Dept: RADIOLOGY | Facility: HOSPITAL | Age: 87
Discharge: HOME | End: 2022-08-02
Attending: PHYSICIAN ASSISTANT
Payer: MEDICARE

## 2022-08-02 VITALS
WEIGHT: 168.44 LBS | SYSTOLIC BLOOD PRESSURE: 150 MMHG | HEART RATE: 73 BPM | HEIGHT: 63 IN | DIASTOLIC BLOOD PRESSURE: 72 MMHG | BODY MASS INDEX: 29.84 KG/M2

## 2022-08-02 DIAGNOSIS — M80.00XG AGE-RELATED OSTEOPOROSIS WITH CURRENT PATHOLOGICAL FRACTURE WITH DELAYED HEALING: Primary | ICD-10-CM

## 2022-08-02 DIAGNOSIS — N18.31 STAGE 3A CHRONIC KIDNEY DISEASE: ICD-10-CM

## 2022-08-02 DIAGNOSIS — M25.562 CHRONIC PAIN OF LEFT KNEE: ICD-10-CM
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
## 2022-08-02 DIAGNOSIS — M17.0 PRIMARY OSTEOARTHRITIS OF BOTH KNEES: ICD-10-CM

## 2022-08-02 DIAGNOSIS — E55.9 VITAMIN D DEFICIENCY DISEASE: ICD-10-CM

## 2022-08-02 DIAGNOSIS — M25.511 CHRONIC RIGHT SHOULDER PAIN: ICD-10-CM

## 2022-08-02 DIAGNOSIS — M15.9 PRIMARY OSTEOARTHRITIS INVOLVING MULTIPLE JOINTS: ICD-10-CM

## 2022-08-02 DIAGNOSIS — G89.29 CHRONIC PAIN OF LEFT KNEE: ICD-10-CM

## 2022-08-02 DIAGNOSIS — G89.29 CHRONIC RIGHT SHOULDER PAIN: ICD-10-CM

## 2022-08-02 DIAGNOSIS — Z51.81 MEDICATION MONITORING ENCOUNTER: ICD-10-CM

## 2022-08-02 DIAGNOSIS — Z78.0 ASYMPTOMATIC MENOPAUSAL STATE: ICD-10-CM

## 2022-08-02 PROCEDURE — 99499 RISK ADDL DX/OHS AUDIT: ICD-10-PCS | Mod: S$GLB,,, | Performed by: INTERNAL MEDICINE

## 2022-08-02 PROCEDURE — 1101F PT FALLS ASSESS-DOCD LE1/YR: CPT | Mod: CPTII,S$GLB,, | Performed by: PHYSICIAN ASSISTANT

## 2022-08-02 PROCEDURE — 70450 CT HEAD/BRAIN W/O DYE: CPT | Mod: TC

## 2022-08-02 PROCEDURE — 1101F PR PT FALLS ASSESS DOC 0-1 FALLS W/OUT INJ PAST YR: ICD-10-PCS | Mod: CPTII,S$GLB,, | Performed by: PHYSICIAN ASSISTANT

## 2022-08-02 PROCEDURE — 73070 XR ELBOW 2 VIEW BILATERAL: ICD-10-PCS | Mod: 26,50,, | Performed by: RADIOLOGY

## 2022-08-02 PROCEDURE — 99215 PR OFFICE/OUTPT VISIT, EST, LEVL V, 40-54 MIN: ICD-10-PCS | Mod: 25,S$GLB,, | Performed by: PHYSICIAN ASSISTANT

## 2022-08-02 PROCEDURE — 73070 X-RAY EXAM OF ELBOW: CPT | Mod: 26,50,, | Performed by: RADIOLOGY

## 2022-08-02 PROCEDURE — 3288F FALL RISK ASSESSMENT DOCD: CPT | Mod: CPTII,S$GLB,, | Performed by: PHYSICIAN ASSISTANT

## 2022-08-02 PROCEDURE — 73564 X-RAY EXAM KNEE 4 OR MORE: CPT | Mod: TC,50

## 2022-08-02 PROCEDURE — 1125F PR PAIN SEVERITY QUANTIFIED, PAIN PRESENT: ICD-10-PCS | Mod: CPTII,S$GLB,, | Performed by: PHYSICIAN ASSISTANT

## 2022-08-02 PROCEDURE — 99215 OFFICE O/P EST HI 40 MIN: CPT | Mod: 25,S$GLB,, | Performed by: PHYSICIAN ASSISTANT

## 2022-08-02 PROCEDURE — 96372 THER/PROPH/DIAG INJ SC/IM: CPT

## 2022-08-02 PROCEDURE — 70450 CT HEAD WITHOUT CONTRAST: ICD-10-PCS | Mod: 26,,, | Performed by: RADIOLOGY

## 2022-08-02 PROCEDURE — 1157F PR ADVANCE CARE PLAN OR EQUIV PRESENT IN MEDICAL RECORD: ICD-10-PCS | Mod: CPTII,S$GLB,, | Performed by: INTERNAL MEDICINE

## 2022-08-02 PROCEDURE — 20610 LARGE JOINT ASPIRATION/INJECTION: BILATERAL KNEE: ICD-10-PCS | Mod: 50,S$GLB,, | Performed by: PHYSICIAN ASSISTANT

## 2022-08-02 PROCEDURE — 99999 PR PBB SHADOW E&M-EST. PATIENT-LVL I: CPT | Mod: PBBFAC,,, | Performed by: INTERNAL MEDICINE

## 2022-08-02 PROCEDURE — 63600175 PHARM REV CODE 636 W HCPCS: Mod: JG | Performed by: PHYSICIAN ASSISTANT

## 2022-08-02 PROCEDURE — 3288F PR FALLS RISK ASSESSMENT DOCUMENTED: ICD-10-PCS | Mod: CPTII,S$GLB,, | Performed by: PHYSICIAN ASSISTANT

## 2022-08-02 PROCEDURE — 99213 OFFICE O/P EST LOW 20 MIN: CPT | Mod: S$GLB,,, | Performed by: INTERNAL MEDICINE

## 2022-08-02 PROCEDURE — 99499 UNLISTED E&M SERVICE: CPT | Mod: S$GLB,,, | Performed by: INTERNAL MEDICINE

## 2022-08-02 PROCEDURE — 99999 PR PBB SHADOW E&M-EST. PATIENT-LVL I: ICD-10-PCS | Mod: PBBFAC,,, | Performed by: INTERNAL MEDICINE

## 2022-08-02 PROCEDURE — 99999 PR PBB SHADOW E&M-EST. PATIENT-LVL V: ICD-10-PCS | Mod: PBBFAC,,, | Performed by: PHYSICIAN ASSISTANT

## 2022-08-02 PROCEDURE — 99999 PR PBB SHADOW E&M-EST. PATIENT-LVL V: CPT | Mod: PBBFAC,,, | Performed by: PHYSICIAN ASSISTANT

## 2022-08-02 PROCEDURE — 73564 X-RAY EXAM KNEE 4 OR MORE: CPT | Mod: 26,50,, | Performed by: RADIOLOGY

## 2022-08-02 PROCEDURE — 1157F ADVNC CARE PLAN IN RCRD: CPT | Mod: CPTII,S$GLB,, | Performed by: PHYSICIAN ASSISTANT

## 2022-08-02 PROCEDURE — 73564 XR KNEE ORTHO BILAT WITH FLEXION: ICD-10-PCS | Mod: 26,50,, | Performed by: RADIOLOGY

## 2022-08-02 PROCEDURE — 1157F ADVNC CARE PLAN IN RCRD: CPT | Mod: CPTII,S$GLB,, | Performed by: INTERNAL MEDICINE

## 2022-08-02 PROCEDURE — 99213 PR OFFICE/OUTPT VISIT, EST, LEVL III, 20-29 MIN: ICD-10-PCS | Mod: S$GLB,,, | Performed by: INTERNAL MEDICINE

## 2022-08-02 PROCEDURE — 20610 DRAIN/INJ JOINT/BURSA W/O US: CPT | Mod: 50,S$GLB,, | Performed by: PHYSICIAN ASSISTANT

## 2022-08-02 PROCEDURE — 73070 X-RAY EXAM OF ELBOW: CPT | Mod: TC,50

## 2022-08-02 PROCEDURE — 1125F AMNT PAIN NOTED PAIN PRSNT: CPT | Mod: CPTII,S$GLB,, | Performed by: PHYSICIAN ASSISTANT

## 2022-08-02 PROCEDURE — 70450 CT HEAD/BRAIN W/O DYE: CPT | Mod: 26,,, | Performed by: RADIOLOGY

## 2022-08-02 PROCEDURE — 1157F PR ADVANCE CARE PLAN OR EQUIV PRESENT IN MEDICAL RECORD: ICD-10-PCS | Mod: CPTII,S$GLB,, | Performed by: PHYSICIAN ASSISTANT

## 2022-08-02 RX ADMIN — DENOSUMAB 60 MG: 60 INJECTION SUBCUTANEOUS at 11:08

## 2022-08-02 NOTE — PROGRESS NOTES
Subjective:      Patient ID: Clau Nunn is a 87 y.o. female.    Chief Complaint: Blood clot    HPI:  Patient is an 87 year old female who presents today for further evaluation and recommendation of acute DVT of right leg diagnosed on 4/7/2022 by US.  Found with interval development of near completely occlusive thrombus within the distal right femoral vein that extends into the popliteal vein.  Was placed on Eliquis started pack on 4/7/2022 and is currently taking Eliquis 5 mg PO bid.  States that she was on an ASA during the time of clot; however was not on anticoagulation at that time.     She has a diagnosis of venous insufficiency and has had previous thrombotic event after back surgery many year ago per patient.  She states she cannot remember if she was place on anticoagulation at that time; however an IVC filter placed at that time.  She states that she had a clot prior to this but is unsure of when this was and cannot give me any details on what type of medication she was on. So this would be her third incidence of thrombotic event.       Denies h/o smoking.  Up to date with mammograms and has not had colonoscopy in many years due to advanced age.  States was never found with polpys in the past.  Has not had unintentional weight loss.  Denies any abnormal adenopathy.  States that she is very active and denies sitting more then 50% of the day.     I have reviewed all of the patient's relevant lab work available in the medical record and have utilized this in my evaluation and management recommendations today.         Social History     Socioeconomic History    Marital status:    Occupational History    Occupation: retired     Comment: [employer]   Tobacco Use    Smoking status: Never Smoker    Smokeless tobacco: Never Used   Substance and Sexual Activity    Alcohol use: Yes     Alcohol/week: 0.0 standard drinks     Comment: rarely    Drug use: Never    Sexual activity:  Never     Partners: Male     Birth control/protection: Post-menopausal   Social History Narrative    Patient is retired from [employer]     Social Determinants of Health     Financial Resource Strain: Low Risk     Difficulty of Paying Living Expenses: Not hard at all   Food Insecurity: No Food Insecurity    Worried About Running Out of Food in the Last Year: Never true    Ran Out of Food in the Last Year: Never true   Transportation Needs: No Transportation Needs    Lack of Transportation (Medical): No    Lack of Transportation (Non-Medical): No   Physical Activity: Inactive    Days of Exercise per Week: 0 days    Minutes of Exercise per Session: 0 min   Stress: No Stress Concern Present    Feeling of Stress : Only a little   Social Connections: Moderately Integrated    Frequency of Communication with Friends and Family: More than three times a week    Frequency of Social Gatherings with Friends and Family: More than three times a week    Attends Faith Services: More than 4 times per year    Active Member of Clubs or Organizations: No    Attends Club or Organization Meetings: More than 4 times per year    Marital Status:    Housing Stability: Unknown    Unable to Pay for Housing in the Last Year: No    Unstable Housing in the Last Year: No       Family History   Problem Relation Age of Onset    COPD Mother     Cancer Mother         lung CA    Pulmonary fibrosis Sister     Cancer Daughter         colon    Stroke Father     Diabetes Son     Melanoma Neg Hx     Psoriasis Neg Hx     Lupus Neg Hx        Past Surgical History:   Procedure Laterality Date    ADENOIDECTOMY      BACK SURGERY      x2    breast implants  1973    CATARACT EXTRACTION Bilateral     CLOSED REDUCTION DISTAL RADIUS FRACTURE      Dr. Black, 8/18    cystoscope  1/6/16    DR. Brown    FRACTURE SURGERY  April 3 2015    left wrist    HAND SURGERY      HIP SURGERY Right     total hip-   Levon    HYSTERECTOMY      36y/o    INJECTION OF ANESTHETIC AGENT AROUND NERVE Right 9/16/2020    Procedure: Right suprascapular nerve block;  Surgeon: Raffi Wells MD;  Location: [facility];  Service: Pain Management;  Laterality: Right;    INJECTION OF ANESTHETIC AGENT AROUND NERVE Right 7/13/2021    Procedure: Block, Nerve Right Suprascapular Nerve Block with RN IV sedation;  Surgeon: Raffi Wells MD;  Location: [facility];  Service: Pain Management;  Laterality: Right;    INJECTION OF ANESTHETIC AGENT INTO SACROILIAC JOINT N/A 8/12/2020    Procedure: Left IA hip Joint + Left SIJ + Right shoulder injection;  Surgeon: Raffi Wells MD;  Location: [facility];  Service: Pain Management;  Laterality: N/A;    INJECTION OF JOINT N/A 8/12/2020    Procedure: Left IA hip Joint + Left SIJ + Right shoulder injection;  Surgeon: Raffi Wells MD;  Location: [facility];  Service: Pain Management;  Laterality: N/A;    JOINT REPLACEMENT Right     R NNAMDI - Dr. Dos Santos    LEG SURGERY Right     ex-fix tib/fib    SELECTIVE INJECTION OF ANESTHETIC AGENT AROUND LUMBAR SPINAL NERVE ROOT BY TRANSFORAMINAL APPROACH Bilateral 2/10/2021    Procedure: Bilateral L5/S1 TF REJI;  Surgeon: Raffi Wells MD;  Location: [facility];  Service: Pain Management;  Laterality: Bilateral;    SELECTIVE INJECTION OF ANESTHETIC AGENT AROUND LUMBAR SPINAL NERVE ROOT BY TRANSFORAMINAL APPROACH Bilateral 5/25/2022    Procedure: Bilateral L5/S1 + S1 TF REJI;  Surgeon: Raffi Wells MD;  Location: [facility];  Service: Pain Management;  Laterality: Bilateral;    TONSILLECTOMY      TRANSFORAMINAL EPIDURAL INJECTION OF STEROID Left 7/8/2020    Procedure: left L2/3 + L5/S1 TF REJI;  Surgeon: Raffi Wells MD;  Location: [facility];  Service: Pain Management;  Laterality: Left;    TRANSFORAMINAL EPIDURAL INJECTION OF STEROID Left 7/1/2021    Procedure: left L5/S1 + S1 TF REJI;  Surgeon: Raffi Wells MD;  Location: [facility];  Service: Pain  Management;  Laterality: Left;       Past Medical History:   Diagnosis Date    Allergy     Anxiety     Asthma     Back pain     Chronic venous insufficiency 11/19/2013    Depression     Diverticulosis 11/19/2013 1/8/8 colonoscopy    Dry eyes     Fracture, sacrum/coccyx     Dr. Sanchez     GERD (gastroesophageal reflux disease)     H/O total hip arthroplasty 12/30/2015    Hypertension     Hypothyroid     Osteoarthritis     Osteoporosis     Postlaminectomy syndrome of lumbar region 1/8/2014    Radius fracture     7/18    Simple chronic bronchitis 5/3/2017    Umbilical hernia 11/19/2013    Urinary incontinence     Vitamin D deficiency disease        Review of Systems   Constitutional: Negative.    HENT: Positive for postnasal drip.    Eyes: Negative.    Respiratory: Negative.    Cardiovascular: Positive for leg swelling (right medial lower leg swelling that has improved since starting anticoagulation).   Gastrointestinal: Negative.    Endocrine: Negative.    Genitourinary:        Vaginal incontinence   Musculoskeletal: Positive for arthralgias.   Skin: Positive for color change (chronic bilateral lower leg).   Allergic/Immunologic: Negative.    Neurological: Negative.    Hematological: Negative.    Psychiatric/Behavioral: Negative.           Medication List with Changes/Refills   Current Medications    ALBUTEROL (VENTOLIN HFA) 90 MCG/ACTUATION INHALER    Inhale 2 puffs into the lungs 4 (four) times daily as needed.    APIXABAN (ELIQUIS) 5 MG TAB    Take 1 tablet (5 mg total) by mouth 2 (two) times daily.    AZELASTINE (ASTELIN) 137 MCG (0.1 %) NASAL SPRAY    2 sprays by Nasal route 2 (two) times daily.    BUSPIRONE (BUSPAR) 5 MG TAB    TAKE 1 TABLET TWICE DAILY    CHOLECALCIFEROL, VITAMIN D3, (D3-2000) 50 MCG (2,000 UNIT) CAP    Take 1 capsule (2,000 Units total) by mouth once daily.    CLOBETASOL (TEMOVATE) 0.05 % EXTERNAL SOLUTION    Pt to mix in 1 jar of cerave cream and apply to affected  areas bid for 2-4 weeks per course    CYCLOBENZAPRINE (FLEXERIL) 5 MG TABLET    TAKE 1 TABLET (5 MG TOTAL) BY MOUTH NIGHTLY AS NEEDED FOR MUSCLE SPASMS.    CYCLOSPORINE (RESTASIS) 0.05 % OPHTHALMIC EMULSION    Place 1 drop into both eyes 2 (two) times daily.    ESCITALOPRAM OXALATE (LEXAPRO) 20 MG TABLET    TAKE 1 TABLET EVERY DAY    FLUOCINONIDE 0.05% (LIDEX) 0.05 % CREAM    AAA bid to leg for 2-4 weeks per course    FLUTICASONE PROPIONATE (FLONASE) 50 MCG/ACTUATION NASAL SPRAY        GABAPENTIN (NEURONTIN) 300 MG CAPSULE    Take 1 capsule (300 mg total) by mouth 3 (three) times daily.    LEVOTHYROXINE (SYNTHROID) 100 MCG TABLET    Take 1 tablet (100 mcg total) by mouth once daily.    LOSARTAN (COZAAR) 50 MG TABLET    TAKE 1 TABLET EVERY DAY    MUPIROCIN (BACTROBAN) 2 % OINTMENT    Apply topically 2 (two) times daily. To wound on right lower leg    OMEPRAZOLE (PRILOSEC) 40 MG CAPSULE    TAKE 1 CAPSULE (40 MG TOTAL) BY MOUTH EVERY MORNING.    OXYBUTYNIN (DITROPAN-XL) 5 MG TR24    Take 1 tablet (5 mg total) by mouth once daily.    TRAZODONE (DESYREL) 50 MG TABLET    TAKE 1 TABLET (50 MG TOTAL) BY MOUTH NIGHTLY AS NEEDED FOR INSOMNIA.    TRIAMCINOLONE ACETONIDE 0.025% (KENALOG) 0.025 % CREAM    AAA bid        Objective:     There were no vitals filed for this visit.    Physical Exam  Vitals and nursing note reviewed.   Constitutional:       Appearance: Normal appearance.   HENT:      Head: Normocephalic and atraumatic.      Right Ear: External ear normal.      Left Ear: External ear normal.   Cardiovascular:      Rate and Rhythm: Normal rate and regular rhythm.      Heart sounds: Normal heart sounds, S1 normal and S2 normal.   Pulmonary:      Effort: Pulmonary effort is normal.      Breath sounds: Normal breath sounds.   Chest:   Breasts:      Right: No axillary adenopathy or supraclavicular adenopathy.      Left: No axillary adenopathy or supraclavicular adenopathy.       Abdominal:      General: There is no  distension.   Musculoskeletal:         General: Normal range of motion.      Cervical back: Normal range of motion.   Lymphadenopathy:      Head:      Right side of head: No submental, submandibular, tonsillar, preauricular, posterior auricular or occipital adenopathy.      Left side of head: No submental, submandibular, tonsillar, preauricular, posterior auricular or occipital adenopathy.      Cervical: No cervical adenopathy.      Right cervical: No superficial, deep or posterior cervical adenopathy.     Left cervical: No superficial or posterior cervical adenopathy.      Upper Body:      Right upper body: No supraclavicular or axillary adenopathy.      Left upper body: No supraclavicular or axillary adenopathy.   Skin:     General: Skin is warm and dry.      Findings: Erythema (right lower leg - improving per patient since starting anticoagulation) present.      Comments: Dry skin   Neurological:      General: No focal deficit present.      Mental Status: She is alert and oriented to person, place, and time.   Psychiatric:         Attention and Perception: Attention and perception normal.         Mood and Affect: Mood and affect normal.         Speech: Speech normal.         Behavior: Behavior normal. Behavior is cooperative.         Thought Content: Thought content normal.         Cognition and Memory: Cognition and memory normal.         Judgment: Judgment normal.         Assessment:     Problem List Items Addressed This Visit    None     Visit Diagnoses     Fall, initial encounter    -  Primary    Relevant Orders    CT Head W Wo Contrast    X-Ray Knee 1 or 2 View Right    X-Ray Elbow 2 Views Right        Lab Results   Component Value Date    WBC 5.80 05/11/2022    HGB 14.7 05/11/2022    HCT 44.6 05/11/2022    MCV 97 05/11/2022     05/11/2022       BMP  Lab Results   Component Value Date     08/02/2022    K 4.7 08/02/2022     08/02/2022    CO2 29 08/02/2022    BUN 15 08/02/2022    CREATININE  1.2 08/02/2022    CALCIUM 9.5 08/02/2022    ANIONGAP 8 08/02/2022    ESTGFRAFRICA 59 (A) 05/11/2022    EGFRNONAA 51 (A) 05/11/2022     Lab Results   Component Value Date    ALT 12 08/02/2022    AST 18 08/02/2022    ALKPHOS 52 (L) 08/02/2022    BILITOT 0.9 08/02/2022       Plan:   Fall, initial encounter  -     CT Head W Wo Contrast; Future; Expected date: 08/02/2022  -     X-Ray Knee 1 or 2 View Right; Future; Expected date: 08/02/2022  -     X-Ray Elbow 2 Views Right; Future; Expected date: 08/02/2022    Rapid response called for Ms. Arline Olguin who experienced mechanical fall while checking out at the registration. She denies dizziness or lightheadedness. Admit to hitting her head on the floor. Vitals sign were reasonably ok except for elevated systolic BP >190 mmHg. She was noted to have bleeding from right elbow and c/o right elbow pain.     We have ordered STAT CT head and right elbow xray. He was advised to proceed to nearest ER incase of worsening symptoms such as headaches or bleeding. She verbalized understanding.     Update: review of CT scan of head was unremarkable. Xray of right elbow showed no fracture.     Yoan Preciado MD

## 2022-08-02 NOTE — DISCHARGE INSTRUCTIONS
FALL PREVENTION   Falls often occur due to slipping, tripping or losing your balance. Here are ways to reduce your risk of falling again.   Was there anything that caused your fall that can be fixed, removed or replaced?   Make your home safe by keeping walkways clear of objects you may trip over.   Use non-slip pads under rugs.   Do not walk in poorly lit areas.   Do not stand on chairs or wobbly ladders.   Use caution when reaching overhead or looking upward. This position can cause a loss of balance.   Be sure your shoes fit properly, have non-slip bottoms and are in good condition.   Be cautious when going up and down stairs, curbs, and when walking on uneven sidewalks.   If your balance is poor, consider using a cane or walker.   If your fall was related to alcohol use, stop or limit alcohol intake.   If your fall was related to use of sleeping medicines, talk to your doctor about this. You may need to reduce your dosage at bedtime if you awaken during the night to go to the bathroom.   To reduce the need for nighttime bathroom trips:   Avoid drinking fluids for several hours before going to bed   Empty your bladder before going to bed   Men can keep a urinal at the bedside   © 2092-6218 [facility], [address] [place] [address], [place] [address]. All rights reserved. This information is not intended as a substitute for professional medical care. Always follow your healthcare professional's instructions.

## 2022-08-02 NOTE — PROGRESS NOTES
RHEUMATOLOGY OUTPATIENT CLINIC NOTE    8/2/2022    Attending Rheumatologist: WALTER MckayC  Primary Care Provider: Jeanette Gomez MD   Chief Complaint/Reason For Consultation:  Osteoporosis      Subjective:        Clau Nunn is a 87 y.o. female here today for routine follow up of osteoporosis and OA- due for monovisc injections today. h/o of a right hip fx, right tibia fx, spine fxs s/p trauma and left wrist fx. Leg pain biggest issue;  Pain today 8/10 rebecca legs; knees and shins. DDD/DJD spine- mri shows diffuse multilevel lumbar spondylosis. Dr valdez injected her back which has helped. She also has osteoarthritis in multiple  Joints, hips, hands, knees, and chronic back pain (two surgeries s/p trauma) takes gabapentin at night.   She is taking tumeric and tylenol. Some times aleve or ibuprofen. We recommend no nsaids w/ckd. C/o chronic right shoulder pain w/ decreased ROM. Had an injection approx 1 year ago that helped some. No help previously w/ PT. She is right hand dominant. Rheumatologic systems otherwise negative. Physical exam shows no synovitis, no knee effusion.    Current Rheum Medications:  · Prolia, monovisc  Previous Rheum Medications:   · forteo (SE), reclast (stopped 2' CKD)    Past Medical History:   Diagnosis Date    Allergy     Anxiety     Asthma     Back pain     Chronic venous insufficiency 11/19/2013    Depression     Diverticulosis 11/19/2013 1/8/8 colonoscopy    Dry eyes     Fracture, sacrum/coccyx     Dr. Sanchez     GERD (gastroesophageal reflux disease)     H/O total hip arthroplasty 12/30/2015    Hypertension     Hypothyroid     Osteoarthritis     Osteoporosis     Postlaminectomy syndrome of lumbar region 1/8/2014    Radius fracture     7/18    Simple chronic bronchitis 5/3/2017    Umbilical hernia 11/19/2013    Urinary incontinence     Vitamin D deficiency disease        Review of patient's allergies indicates:   Allergen Reactions     "Cephalexin Hives, Itching, Swelling, Anxiety, Dermatitis and Rash       Objective:   BP (!) 150/72   Pulse 73   Ht 5' 3" (1.6 m)   Wt 76.4 kg (168 lb 6.9 oz)   BMI 29.84 kg/m²     Immunization History   Administered Date(s) Administered    COVID-19, MRNA, LN-S, PF (Pfizer) (Purple Cap) 01/06/2021, 01/27/2021, 10/12/2021    Hepatitis A, Adult 11/26/2019    Influenza 10/14/2009, 10/14/2010, 10/25/2018    Influenza (FLUAD) - Quadrivalent - Adjuvanted - PF *Preferred* (65+) 10/16/2020    Influenza - High Dose - PF (65 years and older) 10/26/2011, 11/05/2013, 12/03/2014, 10/29/2015, 10/13/2016, 11/03/2017, 10/25/2018, 11/21/2019    Influenza - Quadrivalent - High Dose - PF (65 years and older) 10/04/2021    Pneumococcal Conjugate - 13 Valent 05/04/2016    Pneumococcal Polysaccharide - 23 Valent 12/13/2011, 05/03/2017    Tdap 12/13/2011, 08/25/2016, 07/27/2018, 02/25/2022    Zoster 11/06/2009    Zoster Recombinant 02/25/2022       Current Outpatient Medications:     albuterol (VENTOLIN HFA) 90 mcg/actuation inhaler, Inhale 2 puffs into the lungs 4 (four) times daily as needed., Disp: 54 g, Rfl: 3    apixaban (ELIQUIS) 5 mg Tab, Take 1 tablet (5 mg total) by mouth 2 (two) times daily., Disp: 180 tablet, Rfl: 3    azelastine (ASTELIN) 137 mcg (0.1 %) nasal spray, 2 sprays by Nasal route 2 (two) times daily., Disp: , Rfl:     busPIRone (BUSPAR) 5 MG Tab, TAKE 1 TABLET TWICE DAILY, Disp: 180 tablet, Rfl: 5    cholecalciferol, vitamin D3, (D3-2000) 50 mcg (2,000 unit) Cap, Take 1 capsule (2,000 Units total) by mouth once daily., Disp: 90 capsule, Rfl: 11    clobetasoL (TEMOVATE) 0.05 % external solution, Pt to mix in 1 jar of cerave cream and apply to affected areas bid for 2-4 weeks per course, Disp: 50 mL, Rfl: 3    cyclobenzaprine (FLEXERIL) 5 MG tablet, TAKE 1 TABLET (5 MG TOTAL) BY MOUTH NIGHTLY AS NEEDED FOR MUSCLE SPASMS., Disp: 90 tablet, Rfl: 1    cycloSPORINE (RESTASIS) 0.05 % ophthalmic " emulsion, Place 1 drop into both eyes 2 (two) times daily., Disp: 60 each, Rfl: 12    EScitalopram oxalate (LEXAPRO) 20 MG tablet, TAKE 1 TABLET EVERY DAY, Disp: 90 tablet, Rfl: 3    fluocinonide 0.05% (LIDEX) 0.05 % cream, AAA bid to leg for 2-4 weeks per course (Patient taking differently: Apply to affected area(s) of leg topically twice daily for 2-4 weeks per course), Disp: 60 g, Rfl: 1    fluticasone propionate (FLONASE) 50 mcg/actuation nasal spray, , Disp: , Rfl:     gabapentin (NEURONTIN) 300 MG capsule, Take 1 capsule (300 mg total) by mouth 3 (three) times daily., Disp: 270 capsule, Rfl: 3    levothyroxine (SYNTHROID) 100 MCG tablet, Take 1 tablet (100 mcg total) by mouth once daily., Disp: 90 tablet, Rfl: 3    losartan (COZAAR) 50 MG tablet, TAKE 1 TABLET EVERY DAY, Disp: 90 tablet, Rfl: 3    mupirocin (BACTROBAN) 2 % ointment, Apply topically 2 (two) times daily. To wound on right lower leg, Disp: 22 g, Rfl: 0    omeprazole (PRILOSEC) 40 MG capsule, TAKE 1 CAPSULE (40 MG TOTAL) BY MOUTH EVERY MORNING., Disp: 90 capsule, Rfl: 3    oxybutynin (DITROPAN-XL) 5 MG TR24, Take 1 tablet (5 mg total) by mouth once daily., Disp: 30 tablet, Rfl: 2    traZODone (DESYREL) 50 MG tablet, TAKE 1 TABLET (50 MG TOTAL) BY MOUTH NIGHTLY AS NEEDED FOR INSOMNIA., Disp: 30 tablet, Rfl: 3    triamcinolone acetonide 0.025% (KENALOG) 0.025 % cream, AAA bid (Patient taking differently: Apply to affected area(s) topically two times daily), Disp: 80 g, Rfl: 0    Current Facility-Administered Medications:     denosumab (PROLIA) injection 60 mg, 60 mg, Subcutaneous, Q6 Months, Carly Mora PA-C, 60 mg at 01/28/19 1548       Recent Results (from the past 336 hour(s))   COMPREHENSIVE METABOLIC PANEL    Collection Time: 08/02/22  9:39 AM   Result Value Ref Range    Sodium 139 136 - 145 mmol/L    Potassium 4.7 3.5 - 5.1 mmol/L    Chloride 102 95 - 110 mmol/L    CO2 29 23 - 29 mmol/L    Glucose 86 70 - 110 mg/dL    BUN 15 8  - 23 mg/dL    Creatinine 1.2 0.5 - 1.4 mg/dL    Calcium 9.5 8.7 - 10.5 mg/dL    Total Protein 7.1 6.0 - 8.4 g/dL    Albumin 3.9 3.5 - 5.2 g/dL    Total Bilirubin 0.9 0.1 - 1.0 mg/dL    Alkaline Phosphatase 52 (L) 55 - 135 U/L    AST 18 10 - 40 U/L    ALT 12 10 - 44 U/L    Anion Gap 8 8 - 16 mmol/L    eGFR 44 (A) >60 mL/min/1.73 m^2         Imaging:  All imaging reviewed and independently interpreted by me.    DEXA BONE DENSITY SPINE HIP 1/2/20  COMPARISON:  12/13/2016     FINDINGS:  The L3-L4 vertebral bone mineral density is equal to 1.52 g/cm squared with a T score of 2.6.  There has been a 13.2% statistically significant change relative to the prior study.     The left femoral neck bone mineral density is equal to 0.88 g/cm squared with a T score of -1.2.  There has been  no significant change relative to the prior study.     There is a 18% risk of a major osteoporotic fracture and a 4% risk of hip fracture in the next 10 years (FRAX).     Impression: Osteopenia    XR KNEE ORTHO LEFT 2/11/20  COMPARISON:  07/23/2019     FINDINGS:  Similar advanced tricompartmental degenerative changes of the right knee with old trauma deformity of the proximal tibia.  No acute osseous abnormality.     Left knee demonstrates similar tricompartmental degenerative changes.  Small suprapatellar joint effusion present.  Chondrocalcinosis noted.    Assessment:     1. Age-related osteoporosis with current pathological fracture with delayed healing    2. Chronic pain of left knee    3. Medication monitoring encounter    4. Primary osteoarthritis involving multiple joints    5. Stage 3a chronic kidney disease    6. Primary osteoarthritis of both knees    7. Vitamin D deficiency disease    8. Asymptomatic menopausal state     9. Chronic right shoulder pain          kellgren trace grade 3 to bilateral knees  Plan:       · Safe to admin prolia today; update dexa next available slot  · Refer to ortho for further eval and tx right  shoulder  · Bilateral monovisc done today- see proc note. Update rebecca knee xray today  · no current medication related issues, no evidence of toxicity. I ordered labs for toxicity monitoring;  results reviewed and discussed findings with the patient   · Patient understands and contact clinic at any time regarding questions about today's office visit and any medication changes that were made  · Return to clinic: 6 mos w/ early cmp, prolia, bilateral monovisc    The patient understands, chooses and consents to this plan and accepts all the risks which include but are not limited to the risks mentioned above.     Method of contact with patient concerns: [facility]    60 minutes of total time spent on the encounter, which includes face to face time and non-face to face time preparing to see the patient (eg, review of tests), Obtaining and/or reviewing separately obtained history, Documenting clinical information in the electronic or other health record, Independently interpreting results (not separately reported) and communicating results to the patient/family/caregiver, or Care coordination (not separately reported).          Disclaimer: This note was prepared using a voice recognition system and is likely to have sound alike errors within the text.       Harika Ceron PA-C  Rheumatology Department   [facility]

## 2022-08-02 NOTE — NURSING
When leaving after receiving her Prolia shot, patient fell in front of the patient registration desk.  Rapid Response called.  Pt fell on right side of body with noted skin tear to R elbow.  Dr Preciado at side.  Ordered CT and Xrays.  Pt went downstairs in wheelchair.

## 2022-08-02 NOTE — PROCEDURES
Large Joint Aspiration/Injection: bilateral knee    Date/Time: 8/2/2022 10:30 AM  Performed by: Harika Ceron PA-C  Authorized by: Harika Ceron PA-C     Consent Done?:  Yes (Verbal)  Indications:  Pain and arthritis  Site marked: the procedure site was marked    Timeout: prior to procedure the correct patient, procedure, and site was verified    Prep: patient was prepped and draped in usual sterile fashion    Local anesthetic:  Topical anesthetic    Details:  Needle Size:  25 G and 22 G  Ultrasonic Guidance for needle placement?: No    Approach:  Anterolateral  Location:  Knee  Laterality:  Bilateral  Site:  Bilateral knee  Medications (Right):  88 mg hyaluronate sodium, stabilized 88 mg/4 mL  Medications (Left):  88 mg hyaluronate sodium, stabilized 88 mg/4 mL  Patient tolerance:  Patient tolerated the procedure well with no immediate complications

## 2022-08-15 ENCOUNTER — HOSPITAL ENCOUNTER (OUTPATIENT)
Dept: RADIOLOGY | Facility: CLINIC | Age: 87
Discharge: HOME | End: 2022-08-15
Attending: NURSE PRACTITIONER
Payer: MEDICARE

## 2022-08-15 ENCOUNTER — OFFICE VISIT (OUTPATIENT)
Dept: URGENT CARE | Facility: CLINIC | Age: 87
End: 2022-08-15
Payer: MEDICARE

## 2022-08-15 VITALS
WEIGHT: 160 LBS | DIASTOLIC BLOOD PRESSURE: 91 MMHG | HEIGHT: 63 IN | RESPIRATION RATE: 16 BRPM | SYSTOLIC BLOOD PRESSURE: 122 MMHG | BODY MASS INDEX: 28.35 KG/M2 | HEART RATE: 104 BPM | OXYGEN SATURATION: 96 % | TEMPERATURE: 99 F

## 2022-08-15 DIAGNOSIS — M25.511 ACUTE PAIN OF RIGHT SHOULDER: ICD-10-CM

## 2022-08-15 DIAGNOSIS — M25.511 ACUTE PAIN OF RIGHT SHOULDER: Primary | ICD-10-CM

## 2022-08-15 PROCEDURE — 1157F ADVNC CARE PLAN IN RCRD: CPT | Mod: CPTII,S$GLB,, | Performed by: NURSE PRACTITIONER

## 2022-08-15 PROCEDURE — 99214 PR OFFICE/OUTPT VISIT, EST, LEVL IV, 30-39 MIN: ICD-10-PCS | Mod: S$GLB,,, | Performed by: NURSE PRACTITIONER

## 2022-08-15 PROCEDURE — 1159F MED LIST DOCD IN RCRD: CPT | Mod: CPTII,S$GLB,, | Performed by: NURSE PRACTITIONER

## 2022-08-15 PROCEDURE — 1125F PR PAIN SEVERITY QUANTIFIED, PAIN PRESENT: ICD-10-PCS | Mod: CPTII,S$GLB,, | Performed by: NURSE PRACTITIONER

## 2022-08-15 PROCEDURE — 1157F PR ADVANCE CARE PLAN OR EQUIV PRESENT IN MEDICAL RECORD: ICD-10-PCS | Mod: CPTII,S$GLB,, | Performed by: NURSE PRACTITIONER

## 2022-08-15 PROCEDURE — 99214 OFFICE O/P EST MOD 30 MIN: CPT | Mod: S$GLB,,, | Performed by: NURSE PRACTITIONER

## 2022-08-15 PROCEDURE — 73030 X-RAY EXAM OF SHOULDER: CPT | Mod: RT,S$GLB,, | Performed by: RADIOLOGY

## 2022-08-15 PROCEDURE — 1160F PR REVIEW ALL MEDS BY PRESCRIBER/CLIN PHARMACIST DOCUMENTED: ICD-10-PCS | Mod: CPTII,S$GLB,, | Performed by: NURSE PRACTITIONER

## 2022-08-15 PROCEDURE — 1160F RVW MEDS BY RX/DR IN RCRD: CPT | Mod: CPTII,S$GLB,, | Performed by: NURSE PRACTITIONER

## 2022-08-15 PROCEDURE — 1159F PR MEDICATION LIST DOCUMENTED IN MEDICAL RECORD: ICD-10-PCS | Mod: CPTII,S$GLB,, | Performed by: NURSE PRACTITIONER

## 2022-08-15 PROCEDURE — 1125F AMNT PAIN NOTED PAIN PRSNT: CPT | Mod: CPTII,S$GLB,, | Performed by: NURSE PRACTITIONER

## 2022-08-15 PROCEDURE — 73030 XR SHOULDER COMPLETE 2 OR MORE VIEWS RIGHT: ICD-10-PCS | Mod: RT,S$GLB,, | Performed by: RADIOLOGY

## 2022-08-15 NOTE — PROGRESS NOTES
"Subjective:       Patient ID: Clau Nunn is a 87 y.o. female.    Vitals:  height is 5' 3" (1.6 m) and weight is 72.6 kg (160 lb). Her temperature is 98.9 °F (37.2 °C). Her blood pressure is 122/91 (abnormal) and her pulse is 104. Her respiration is 16 and oxygen saturation is 96%.     Chief Complaint: Shoulder Injury    08/12/22 pt was getting out of a vehicle and felt bad pain in her right shoulder. Now she states it is very painful to try and move. Pt fell on 08/02/22 and injured both arms and elbows. Pt has experienced pain ever since early last week after the fall. Pain in shoulder has gotten worse since. Pt takes otc pain reliever with no relief.    Shoulder Injury   The right shoulder is affected. The incident occurred more than 1 week ago. The injury mechanism is unknown. The quality of the pain is described as shooting and aching. The pain does not radiate. The pain is severe. The symptoms are aggravated by movement, overhead lifting and palpation. She has tried NSAIDs and rest for the symptoms. The treatment provided no relief.       Skin: Negative for erythema.       Objective:      Physical Exam   Constitutional: She is oriented to person, place, and time. She appears well-developed.   HENT:   Head: Normocephalic and atraumatic. Head is without abrasion, without contusion and without laceration.   Ears:   Right Ear: External ear normal.   Left Ear: External ear normal.   Nose: Nose normal.   Mouth/Throat: Oropharynx is clear and moist and mucous membranes are normal.   Eyes: Conjunctivae, EOM and lids are normal. Pupils are equal, round, and reactive to light.   Neck: Trachea normal and phonation normal. Neck supple.   Cardiovascular: Normal rate, regular rhythm, normal heart sounds and normal pulses.   Pulmonary/Chest: Effort normal and breath sounds normal. No stridor. No respiratory distress. She has no wheezes.   Musculoskeletal:      Right shoulder: She exhibits decreased range of motion, " tenderness and decreased strength.        Arms:    Neurological: She is alert and oriented to person, place, and time.   Skin: Skin is warm, dry, intact and no rash. Capillary refill takes less than 2 seconds. No abrasion, No burn, No bruising, No erythema and No ecchymosis   Psychiatric: Her speech is normal and behavior is normal. Judgment and thought content normal.   Nursing note and vitals reviewed.        Assessment:       1. Acute pain of right shoulder          Plan:         Acute pain of right shoulder  -     X-ray Shoulder 2 or More Views Right; Future; Expected date: 08/15/2022  -     SLING FOR HOME USE                 X-ray Shoulder 2 or More Views Right    Result Date: 8/15/2022  EXAMINATION: XR SHOULDER COMPLETE 2 OR MORE VIEWS RIGHT CLINICAL HISTORY: Pain in right shoulder TECHNIQUE: Two or three views of the right shoulder were performed. COMPARISON: 08/05/2020 FINDINGS: Osteopenia.  No acute fracture or dislocation.  Chronic fracture deformity noted of the proximal humerus with prominent glenohumeral joint degenerative findings.  Minimal AC joint degenerative changes.  Lung parenchyma clear.     As above Electronically signed by: Marc Holloway MD Date:    08/15/2022 Time:    15:50    · Rest your affected extremity as much as possible.  · Keep extremity elevated when possible.  · Take tylenol or ibuprofen as directed on label for pain. You may alternate the two every four hours.  · Apply ice packs/frozen peas to affected site four times daily for 15-20 minutes each time.  · May apply ace wrap as needed for support of injured extremity and compression to reduce swelling.   · Follow up with your primary care provider if symptoms do not improve within a few days or sooner for any worsening.   · Go to the ER immediately for any numbness, weakness, tingling, color change, sudden pain and swelling, or for any other new and concerning symptoms.

## 2022-08-15 NOTE — PATIENT INSTRUCTIONS
Rest your affected extremity as much as possible.  Keep extremity elevated when possible.  Take tylenol or ibuprofen as directed on label for pain. You may alternate the two every four hours.  Apply ice packs/frozen peas to affected site four times daily for 15-20 minutes each time.  May apply sling as needed for support of injured extremity.   Follow up with your primary care provider or orthopedist if symptoms do not improve within a few days or sooner for any worsening.   Go to the ER immediately for any numbness, weakness, tingling, color change, sudden pain and swelling, or for any other new and concerning symptoms.

## 2022-08-17 ENCOUNTER — TELEPHONE (OUTPATIENT)
Dept: SPORTS MEDICINE | Facility: CLINIC | Age: 87
End: 2022-08-17
Payer: MEDICARE

## 2022-08-17 NOTE — TELEPHONE ENCOUNTER
Spoke with patient about sooner appt. I infomred the pt she has the earliest available appt and ensured she was added to the waitlist. Pt verbalized understanding.    ----- Message from Rafael Albrecht sent at 8/17/2022  1:32 PM CDT -----  Contact: patient  Type:  Sooner Apoointment Request    Caller is requesting a sooner appointment.  Caller declined first available appointment listed below.  Caller will not accept being placed on the waitlist and is requesting a message be sent to doctor.  Name of Caller:Clauapolinar Nunn   When is the first available appointment? 9/9/22  Symptoms: right shoulder pain, pt states that she had a fall and she's not able to lift her shoulder   Would the patient rather a call back or a response via [facility]?  Call back   Best Call Back Number: [phone]   Additional Information:

## 2022-09-07 ENCOUNTER — APPOINTMENT (OUTPATIENT)
Dept: RADIOLOGY | Facility: HOSPITAL | Age: 87
End: 2022-09-07
Attending: PHYSICIAN ASSISTANT
Payer: MEDICARE

## 2022-09-07 DIAGNOSIS — Z78.0 ASYMPTOMATIC MENOPAUSAL STATE: ICD-10-CM

## 2022-09-07 DIAGNOSIS — M80.00XG AGE-RELATED OSTEOPOROSIS WITH CURRENT PATHOLOGICAL FRACTURE WITH DELAYED HEALING: ICD-10-CM

## 2022-09-07 PROCEDURE — 77080 DEXA BONE DENSITY SPINE HIP: ICD-10-PCS | Mod: 26,,, | Performed by: RADIOLOGY

## 2022-09-07 PROCEDURE — 77080 DXA BONE DENSITY AXIAL: CPT | Mod: 26,,, | Performed by: RADIOLOGY

## 2022-09-07 PROCEDURE — 77080 DXA BONE DENSITY AXIAL: CPT | Mod: TC

## 2022-09-09 ENCOUNTER — OFFICE VISIT (OUTPATIENT)
Dept: ORTHOPEDICS | Facility: CLINIC | Age: 87
End: 2022-09-09
Payer: MEDICARE

## 2022-09-09 VITALS — HEIGHT: 63 IN | WEIGHT: 160 LBS | BODY MASS INDEX: 28.35 KG/M2

## 2022-09-09 DIAGNOSIS — G89.29 CHRONIC RIGHT SHOULDER PAIN: ICD-10-CM

## 2022-09-09 DIAGNOSIS — M19.111 POST-TRAUMATIC OSTEOARTHRITIS OF RIGHT SHOULDER: Primary | ICD-10-CM

## 2022-09-09 DIAGNOSIS — M25.511 CHRONIC RIGHT SHOULDER PAIN: ICD-10-CM

## 2022-09-09 PROCEDURE — 99999 PR PBB SHADOW E&M-EST. PATIENT-LVL IV: ICD-10-PCS | Mod: PBBFAC,,, | Performed by: STUDENT IN AN ORGANIZED HEALTH CARE EDUCATION/TRAINING PROGRAM

## 2022-09-09 PROCEDURE — 1125F PR PAIN SEVERITY QUANTIFIED, PAIN PRESENT: ICD-10-PCS | Mod: CPTII,S$GLB,, | Performed by: STUDENT IN AN ORGANIZED HEALTH CARE EDUCATION/TRAINING PROGRAM

## 2022-09-09 PROCEDURE — 1125F AMNT PAIN NOTED PAIN PRSNT: CPT | Mod: CPTII,S$GLB,, | Performed by: STUDENT IN AN ORGANIZED HEALTH CARE EDUCATION/TRAINING PROGRAM

## 2022-09-09 PROCEDURE — 1157F PR ADVANCE CARE PLAN OR EQUIV PRESENT IN MEDICAL RECORD: ICD-10-PCS | Mod: CPTII,S$GLB,, | Performed by: STUDENT IN AN ORGANIZED HEALTH CARE EDUCATION/TRAINING PROGRAM

## 2022-09-09 PROCEDURE — 1100F PR PT FALLS ASSESS DOC 2+ FALLS/FALL W/INJURY/YR: ICD-10-PCS | Mod: CPTII,S$GLB,, | Performed by: STUDENT IN AN ORGANIZED HEALTH CARE EDUCATION/TRAINING PROGRAM

## 2022-09-09 PROCEDURE — 1160F RVW MEDS BY RX/DR IN RCRD: CPT | Mod: CPTII,S$GLB,, | Performed by: STUDENT IN AN ORGANIZED HEALTH CARE EDUCATION/TRAINING PROGRAM

## 2022-09-09 PROCEDURE — 3288F PR FALLS RISK ASSESSMENT DOCUMENTED: ICD-10-PCS | Mod: CPTII,S$GLB,, | Performed by: STUDENT IN AN ORGANIZED HEALTH CARE EDUCATION/TRAINING PROGRAM

## 2022-09-09 PROCEDURE — 99203 OFFICE O/P NEW LOW 30 MIN: CPT | Mod: S$GLB,,, | Performed by: STUDENT IN AN ORGANIZED HEALTH CARE EDUCATION/TRAINING PROGRAM

## 2022-09-09 PROCEDURE — 99999 PR PBB SHADOW E&M-EST. PATIENT-LVL IV: CPT | Mod: PBBFAC,,, | Performed by: STUDENT IN AN ORGANIZED HEALTH CARE EDUCATION/TRAINING PROGRAM

## 2022-09-09 PROCEDURE — 1157F ADVNC CARE PLAN IN RCRD: CPT | Mod: CPTII,S$GLB,, | Performed by: STUDENT IN AN ORGANIZED HEALTH CARE EDUCATION/TRAINING PROGRAM

## 2022-09-09 PROCEDURE — 99203 PR OFFICE/OUTPT VISIT, NEW, LEVL III, 30-44 MIN: ICD-10-PCS | Mod: S$GLB,,, | Performed by: STUDENT IN AN ORGANIZED HEALTH CARE EDUCATION/TRAINING PROGRAM

## 2022-09-09 PROCEDURE — 1159F MED LIST DOCD IN RCRD: CPT | Mod: CPTII,S$GLB,, | Performed by: STUDENT IN AN ORGANIZED HEALTH CARE EDUCATION/TRAINING PROGRAM

## 2022-09-09 PROCEDURE — 1159F PR MEDICATION LIST DOCUMENTED IN MEDICAL RECORD: ICD-10-PCS | Mod: CPTII,S$GLB,, | Performed by: STUDENT IN AN ORGANIZED HEALTH CARE EDUCATION/TRAINING PROGRAM

## 2022-09-09 PROCEDURE — 1100F PTFALLS ASSESS-DOCD GE2>/YR: CPT | Mod: CPTII,S$GLB,, | Performed by: STUDENT IN AN ORGANIZED HEALTH CARE EDUCATION/TRAINING PROGRAM

## 2022-09-09 PROCEDURE — 1160F PR REVIEW ALL MEDS BY PRESCRIBER/CLIN PHARMACIST DOCUMENTED: ICD-10-PCS | Mod: CPTII,S$GLB,, | Performed by: STUDENT IN AN ORGANIZED HEALTH CARE EDUCATION/TRAINING PROGRAM

## 2022-09-09 PROCEDURE — 3288F FALL RISK ASSESSMENT DOCD: CPT | Mod: CPTII,S$GLB,, | Performed by: STUDENT IN AN ORGANIZED HEALTH CARE EDUCATION/TRAINING PROGRAM

## 2022-09-09 NOTE — PROGRESS NOTES
Orthopaedics Sports Medicine     Shoulder Initial Visit         9/9/2022    Referring MD: Harika Ceron PA-C    Chief Complaint   Patient presents with    Right Shoulder - Pain         History of Present Illness:   Clau Nunn is a 87 y.o. right-hand dominant female who presents with RIGHT shoulder pain and dysfunction.    Onset of the symptoms was about 1 month ago.     Inciting event: Patient fell onto her right shoulder while here at the [facility]. She underwent non operative management for proximal humerus fracture by Dr. Kd Murcia at [facility] a couple years ago.       Current symptoms include constant pain     Pain is aggravated by movement and use of her arm.       Evaluation to date: X-Ray      Treatment to date: Rest, activity modification, oral anti-inflammatories     Past Medical History:   Past Medical History:   Diagnosis Date    Allergy     Anxiety     Asthma     Back pain     Chronic venous insufficiency 11/19/2013    Depression     Diverticulosis 11/19/2013 1/8/8 colonoscopy    Dry eyes     Fracture, sacrum/coccyx     Dr. Sanchez     GERD (gastroesophageal reflux disease)     H/O total hip arthroplasty 12/30/2015    Hypertension     Hypothyroid     Osteoarthritis     Osteoporosis     Postlaminectomy syndrome of lumbar region 1/8/2014    Radius fracture     7/18    Simple chronic bronchitis 5/3/2017    Umbilical hernia 11/19/2013    Urinary incontinence     Vitamin D deficiency disease        Past Surgical History:   Past Surgical History:   Procedure Laterality Date    ADENOIDECTOMY      BACK SURGERY      x2    breast implants  1973    CATARACT EXTRACTION Bilateral     CLOSED REDUCTION DISTAL RADIUS FRACTURE      Dr. Black, 8/18    cystoscope  1/6/16    DR. Brown    FRACTURE SURGERY  April 3 2015    left wrist    HAND SURGERY      HIP SURGERY Right     total hip- Dr. Dos Santos    HYSTERECTOMY      36y/o    INJECTION OF ANESTHETIC AGENT AROUND NERVE Right 9/16/2020     Procedure: Right suprascapular nerve block;  Surgeon: Raffi Wells MD;  Location: [facility];  Service: Pain Management;  Laterality: Right;    INJECTION OF ANESTHETIC AGENT AROUND NERVE Right 7/13/2021    Procedure: Block, Nerve Right Suprascapular Nerve Block with RN IV sedation;  Surgeon: Raffi Wells MD;  Location: [facility];  Service: Pain Management;  Laterality: Right;    INJECTION OF ANESTHETIC AGENT INTO SACROILIAC JOINT N/A 8/12/2020    Procedure: Left IA hip Joint + Left SIJ + Right shoulder injection;  Surgeon: Raffi Wells MD;  Location: [facility];  Service: Pain Management;  Laterality: N/A;    INJECTION OF JOINT N/A 8/12/2020    Procedure: Left IA hip Joint + Left SIJ + Right shoulder injection;  Surgeon: Raffi Wells MD;  Location: [facility];  Service: Pain Management;  Laterality: N/A;    JOINT REPLACEMENT Right     R NNAMDI - Dr. Dos Santos    LEG SURGERY Right     ex-fix tib/fib    SELECTIVE INJECTION OF ANESTHETIC AGENT AROUND LUMBAR SPINAL NERVE ROOT BY TRANSFORAMINAL APPROACH Bilateral 2/10/2021    Procedure: Bilateral L5/S1 TF REJI;  Surgeon: Raffi Wells MD;  Location: [facility];  Service: Pain Management;  Laterality: Bilateral;    SELECTIVE INJECTION OF ANESTHETIC AGENT AROUND LUMBAR SPINAL NERVE ROOT BY TRANSFORAMINAL APPROACH Bilateral 5/25/2022    Procedure: Bilateral L5/S1 + S1 TF REJI;  Surgeon: Raffi Wells MD;  Location: [facility];  Service: Pain Management;  Laterality: Bilateral;    TONSILLECTOMY      TRANSFORAMINAL EPIDURAL INJECTION OF STEROID Left 7/8/2020    Procedure: left L2/3 + L5/S1 TF REJI;  Surgeon: Raffi Wells MD;  Location: [facility];  Service: Pain Management;  Laterality: Left;    TRANSFORAMINAL EPIDURAL INJECTION OF STEROID Left 7/1/2021    Procedure: left L5/S1 + S1 TF REJI;  Surgeon: Raffi Wells MD;  Location: [facility];  Service: Pain Management;  Laterality: Left;       Medications:  Patient's Medications   New Prescriptions    No medications  on file   Previous Medications    ALBUTEROL (VENTOLIN HFA) 90 MCG/ACTUATION INHALER    Inhale 2 puffs into the lungs 4 (four) times daily as needed.    APIXABAN (ELIQUIS) 5 MG TAB    Take 1 tablet (5 mg total) by mouth 2 (two) times daily.    AZELASTINE (ASTELIN) 137 MCG (0.1 %) NASAL SPRAY    2 sprays by Nasal route 2 (two) times daily.    BUSPIRONE (BUSPAR) 5 MG TAB    TAKE 1 TABLET TWICE DAILY    CHOLECALCIFEROL, VITAMIN D3, (D3-2000) 50 MCG (2,000 UNIT) CAP    Take 1 capsule (2,000 Units total) by mouth once daily.    CLOBETASOL (TEMOVATE) 0.05 % EXTERNAL SOLUTION    Pt to mix in 1 jar of cerave cream and apply to affected areas bid for 2-4 weeks per course    CYCLOBENZAPRINE (FLEXERIL) 5 MG TABLET    TAKE 1 TABLET (5 MG TOTAL) BY MOUTH NIGHTLY AS NEEDED FOR MUSCLE SPASMS.    CYCLOSPORINE (RESTASIS) 0.05 % OPHTHALMIC EMULSION    Place 1 drop into both eyes 2 (two) times daily.    ESCITALOPRAM OXALATE (LEXAPRO) 20 MG TABLET    TAKE 1 TABLET EVERY DAY    FLUOCINONIDE 0.05% (LIDEX) 0.05 % CREAM    AAA bid to leg for 2-4 weeks per course    FLUTICASONE PROPIONATE (FLONASE) 50 MCG/ACTUATION NASAL SPRAY        GABAPENTIN (NEURONTIN) 300 MG CAPSULE    Take 1 capsule (300 mg total) by mouth 3 (three) times daily.    LEVOTHYROXINE (SYNTHROID) 100 MCG TABLET    Take 1 tablet (100 mcg total) by mouth once daily.    LOSARTAN (COZAAR) 50 MG TABLET    TAKE 1 TABLET EVERY DAY    MUPIROCIN (BACTROBAN) 2 % OINTMENT    Apply topically 2 (two) times daily. To wound on right lower leg    OMEPRAZOLE (PRILOSEC) 40 MG CAPSULE    TAKE 1 CAPSULE (40 MG TOTAL) BY MOUTH EVERY MORNING.    OXYBUTYNIN (DITROPAN-XL) 5 MG TR24    Take 1 tablet (5 mg total) by mouth once daily.    TRAZODONE (DESYREL) 50 MG TABLET    TAKE 1 TABLET (50 MG TOTAL) BY MOUTH NIGHTLY AS NEEDED FOR INSOMNIA.    TRIAMCINOLONE ACETONIDE 0.025% (KENALOG) 0.025 % CREAM    AAA bid   Modified Medications    No medications on file   Discontinued Medications    No medications  "on file       Allergies:   Review of patient's allergies indicates:   Allergen Reactions    Cephalexin Hives, Itching, Swelling, Anxiety, Dermatitis and Rash       Social History:   Home town: [place], [place]  Occupation: Retired  Alcohol use: She reports current alcohol use.  Tobacco use: She reports that she has never smoked. She has never used smokeless tobacco.    Review of systems:  History of recent illness, fevers, shakes, or chills: no  History of cardiac problems or chest pain: HTN  History of pulmonary problems or asthma: no  History of diabetes: no  History of prior dvt or clotting problems: no  History of sleep apnea: no      Physical Examination:  Estimated body mass index is 28.34 kg/m² as calculated from the following:    Height as of this encounter: 5' 3" (1.6 m).    Weight as of this encounter: 72.6 kg (160 lb).    General  Healthy appearing female in no acute distress  Alert and oriented, normal mood, appropriate affect    Shoulder Examination:  Patient is alert and oriented, no distress. Skin is intact. Neuro is normal with no focal motor or sensory findings.    Cervical exam is unremarkable. Intact cervical ROM. Negative Spurling's test    Physical Exam:  RIGHT    LEFT    Scap Dyskinesis/Winging (-)    (-)    Tenderness:          Greater Tuberosity             +    (-)  Bicipital Groove  +    (-)  AC joint   (-)    (-)  Other:     ROM:  Forward Elevation 70    150  Abduction  50    110  ER (at side)  10    50      Strength:   Supraspinatus  3/5    5/5  Infraspinatus  3/5    5/5  Subscap / IR  4/5    5/5     Special Tests:   Neer:    +    (-)   [place]:   +    (-)   SS Stress:   +    (-)   Bear Hug:   (-)    (-)   [place]'s:   +    (-)   Resisted Thrower's:   +    (-)    Neurovascular examination  - Motor grossly intact bilaterally to shoulder abduction, elbow flexion and extension, wrist flexion and extension, and intrinsic hand musculature  - Sensation intact to light touch bilaterally in " axillary, median, radial, and ulnar distributions  - Symmetrical radial pulses      Imaging:    XR SHOULDER COMPLETE 2 OR MORE VIEWS RIGHT     CLINICAL HISTORY:  Pain in right shoulder     TECHNIQUE:  Two or three views of the right shoulder were performed.     COMPARISON:  08/05/2020     FINDINGS:  Osteopenia.  No acute fracture or dislocation.  Chronic fracture deformity noted of the proximal humerus with prominent glenohumeral joint degenerative findings.  Minimal AC joint degenerative changes.  Lung parenchyma clear.     Impression:     As above        Electronically signed by: Marc Holloway MD  Date:                                            08/15/2022  Time:                                           15:50     Physician Read: I agree with the above impression.      Impression:  87 y.o. female with right shoulder post-traumatic osteoarthritis with exacerbation of her arthritis due to recent fall.       Plan:  Discussed diagnosis and treatment options with patient today. Her history, physical exam, and imaging are most consistent with post post-traumatic osteoarthritis with exacerbation of her arthritis due to recent fall.   We discussed non-operative treatment options in the form of rest, activity modifications, oral anti-inflammatories, and corticosteroid injection versus operative treatment in the form of total shoulder arthroplasty. She is not interested in operative treatment at this time.  She was doing well prior to her fall. I recommend proceeding with right shoulder glenohumeral corticosteroid injection today. The patient is in agreement with this plan.   Right shoulder glenohumeral corticosteroid injection performed today. Patient tolerated the procedure well with no immediate complications.   Follow up 6 weeks.            Kd Varela MD

## 2022-09-14 ENCOUNTER — TELEPHONE (OUTPATIENT)
Dept: FAMILY MEDICINE | Facility: CLINIC | Age: 87
End: 2022-09-14
Payer: MEDICARE

## 2022-09-14 NOTE — TELEPHONE ENCOUNTER
----- Message from Ludy Adler sent at 9/12/2022  9:58 AM CDT -----  Contact: Doni/ Son  Patients son is calling to speak to the nurse regarding information. Reports sent over paperwork and wanted to know if it was received. Please give patient a call back at ..[phone]

## 2022-09-14 NOTE — TELEPHONE ENCOUNTER
----- Message from Ludy Adler sent at 9/12/2022  9:58 AM CDT -----  Contact: Doni/ Son  Patients son is calling to speak to the nurse regarding information. Reports sent over paperwork and wanted to know if it was received. Please give patient a call back at ..[phone]

## 2022-10-20 ENCOUNTER — PATIENT MESSAGE (OUTPATIENT)
Dept: PRIMARY CARE CLINIC | Facility: CLINIC | Age: 87
End: 2022-10-20
Payer: MEDICARE

## 2022-10-20 DIAGNOSIS — Z12.31 ENCOUNTER FOR SCREENING MAMMOGRAM FOR MALIGNANT NEOPLASM OF BREAST: Primary | ICD-10-CM

## 2022-10-20 PROBLEM — Z00.00 WELLNESS EXAMINATION: Status: ACTIVE | Noted: 2022-10-20

## 2022-10-20 PROBLEM — N18.32 STAGE 3B CHRONIC KIDNEY DISEASE: Status: ACTIVE | Noted: 2017-01-17

## 2022-10-20 NOTE — PROGRESS NOTES
"Subjective:      Patient ID: Clau Nunn is a 87 y.o. female.    Chief Complaint: Follow-up (Pt has no issues or concerns today. Pt is currently on antibiotics for a root canal. Pt has had her flu shot.)      HPI  Here for f/u medical problems and preventive exam.  Taking B12 1000mcg sublingual for 3 mo, for B12 lower end of normal.  Not sure if any better with balance.  No f/c/sw.  Lots coughing, albuterol resolved it but her latest MDI are unable to be pressed to get an effective puff.  Coughs so hard she gags.  Ran out of PPI, and now having heartburn. BMs normal.  Urine incontinence, stress.  Meds have not helped.  Currently taking 600mg ibuprofen and 500mg acet, both BID x 3 days for a root canal.  BPs at home 129-151/63-86.  Anxiety and depression doing well on meds.        HM: 10/22 fluvax, 1/21 covid vaccine/booster, 11/19 HAV, 5/16 [id number], 5/17 booster [id number], 2/22 TDaP, 11/09 zoster, 7/22 Shingrix #2, 9/22 BMD on prolia, 1/14 Cscope no more needed, 12/22 scheduled MMG, 10/22 Eye Dr. Rubalcava.     Review of Systems   Constitutional:  Negative for appetite change, chills, diaphoresis and fever.   HENT:  Negative for congestion, ear pain, rhinorrhea, sinus pressure and sore throat.    Respiratory:  Negative for cough, chest tightness and shortness of breath.    Cardiovascular:  Negative for chest pain and palpitations.   Gastrointestinal:  Negative for blood in stool, constipation, diarrhea, nausea and vomiting.   Genitourinary:  Negative for dysuria, frequency, hematuria, menstrual problem, urgency and vaginal discharge.   Musculoskeletal:  Negative for arthralgias.   Skin:  Negative for rash.   Neurological:  Negative for dizziness and headaches.   Psychiatric/Behavioral:  Negative for sleep disturbance. The patient is not nervous/anxious.        Objective:   /80 (BP Location: Right arm)   Pulse 70   Temp 98.1 °F (36.7 °C) (Oral)   Ht 5' 4" (1.626 m)   Wt 77.5 kg (170 lb 12.8 oz)   SpO2 " 97%   BMI 29.32 kg/m²     Physical Exam  Constitutional:       Appearance: She is well-developed.   HENT:      Right Ear: External ear normal. Tympanic membrane is not injected.      Left Ear: External ear normal. Tympanic membrane is not injected.   Eyes:      Conjunctiva/sclera: Conjunctivae normal.   Neck:      Thyroid: No thyromegaly.   Cardiovascular:      Rate and Rhythm: Normal rate and regular rhythm.      Heart sounds: No murmur heard.    No friction rub. No gallop.   Pulmonary:      Effort: Pulmonary effort is normal.      Breath sounds: Normal breath sounds. No wheezing or rales.   Abdominal:      General: Bowel sounds are normal.      Palpations: Abdomen is soft. There is no mass.      Tenderness: There is no abdominal tenderness.   Musculoskeletal:      Cervical back: Normal range of motion and neck supple.   Lymphadenopathy:      Cervical: No cervical adenopathy.   Skin:     General: Skin is warm.      Findings: No rash.   Neurological:      Mental Status: She is alert and oriented to person, place, and time.        Latest Reference Range & Units 05/11/22 08:45 07/14/22 10:16 08/02/22 09:39   WBC 3.90 - 12.70 K/uL 5.80     RBC 4.00 - 5.40 M/uL 4.62     Hemoglobin 12.0 - 16.0 g/dL 14.7     Hematocrit 37.0 - 48.5 % 44.6     MCV 82 - 98 fL 97     MCH 27.0 - 31.0 pg 31.8 (H)     MCHC 32.0 - 36.0 g/dL 33.0     RDW 11.5 - 14.5 % 12.9     Platelets 150 - 450 K/uL 256     MPV 9.2 - 12.9 fL 11.1     Gran % 38.0 - 73.0 % 56.7     Lymph % 18.0 - 48.0 % 28.3     Mono % 4.0 - 15.0 % 9.7     Eosinophil % 0.0 - 8.0 % 3.8     Basophil % 0.0 - 1.9 % 1.2     Immature Granulocytes 0.0 - 0.5 % 0.3     Gran # (ANC) 1.8 - 7.7 K/uL 3.3     Lymph # 1.0 - 4.8 K/uL 1.6     Mono # 0.3 - 1.0 K/uL 0.6     Eos # 0.0 - 0.5 K/uL 0.2     Baso # 0.00 - 0.20 K/uL 0.07     Immature Grans (Abs) 0.00 - 0.04 K/uL 0.02     nRBC 0 /100 WBC 0     Differential Method  Automated     Vitamin B-12 210 - 950 pg/mL  247    Sodium 136 - 145 mmol/L  140  139   Potassium 3.5 - 5.1 mmol/L 4.6  4.7   Chloride 95 - 110 mmol/L 106  102   CO2 23 - 29 mmol/L 22 (L)  29   Anion Gap 8 - 16 mmol/L 12  8   BUN 8 - 23 mg/dL 18  15   Creatinine 0.5 - 1.4 mg/dL 1.0  1.2   eGFR >60 mL/min/1.73 m^2   44 !   eGFR if non African American >60 mL/min/1.73 m^2 51 !     eGFR if African American >60 mL/min/1.73 m^2 59 !     Glucose 70 - 110 mg/dL 89  86   Calcium 8.7 - 10.5 mg/dL 9.6  9.5   Alkaline Phosphatase 55 - 135 U/L 47 (L)  52 (L)   PROTEIN TOTAL 6.0 - 8.4 g/dL 7.0  7.1   Albumin 3.5 - 5.2 g/dL 3.9  3.9   BILIRUBIN TOTAL 0.1 - 1.0 mg/dL 0.6  0.9   AST 10 - 40 U/L 16  18   ALT 10 - 44 U/L 12  12       Assessment:       1. Essential hypertension    2. Recurrent major depressive disorder, in full remission    3. Stage 3b chronic kidney disease    4. Simple chronic bronchitis    5. Secondary hyperparathyroidism    6. Vitamin D deficiency disease    7. History of DVT in adulthood    8. Chronic anticoagulation    9. Anxiety    10. Age-related osteoporosis with current pathological fracture with delayed healing    11. Atherosclerosis of aorta    12. Acquired hypothyroidism    13. Encounter for preventive health examination    14. Wellness examination    15. B12 deficiency    16. Gastroesophageal reflux disease without esophagitis          Plan:     Essential hypertension- stable, cont meds.  -     Cancel: CBC Auto Differential; Future; Expected date: 10/28/2022  -     Comprehensive Metabolic Panel; Future; Expected date: 10/28/2022  -     Lipid Panel; Future; Expected date: 10/28/2022  -     TSH; Future; Expected date: 10/28/2022    Recurrent major depressive disorder, Anxiety- doing well on meds, cont.    Stage 3b chronic kidney disease- recheck now.  On NSAIDs for one more day.  Will need to avoid in the future.    Simple chronic bronchitis- albuterol per neb prn.  RTC 6wk.  If needing daily, will add steroid inhaler.  -     NEBULIZER FOR HOME USE  -     albuterol (PROVENTIL) 2.5  mg /3 mL (0.083 %) nebulizer solution; Take 3 mLs (2.5 mg total) by nebulization every 6 (six) hours as needed for Wheezing. Rescue  Dispense: 1 each; Refill: 6    Secondary hyperparathyroidism- recheck now.    Vitamin D deficiency disease  -     Vitamin D; Future    History of DVT in adulthood, Chronic anticoagulation    Age-related osteoporosis with current pathological fracture with delayed healing- now BMD much improved on prolia.    Atherosclerosis of aorta- activity as tolerated.    Acquired hypothyroidism- Clinically stable, continue present treatment.    Encounter for preventive health examination- utd.    B12 deficiency/ lower normal and balance issues- on oral x 3mo, recheck now.  -     Vitamin B12; Future; Expected date: 10/28/2022    GERD  -     omeprazole (PRILOSEC) 40 MG capsule; Take 1 capsule (40 mg total) by mouth every morning.  Dispense: 90 capsule; Refill: 3    NEEDS PT advice to start core strengthening and quad strength to reduce falls.

## 2022-10-21 ENCOUNTER — OFFICE VISIT (OUTPATIENT)
Dept: ORTHOPEDICS | Facility: CLINIC | Age: 87
End: 2022-10-21
Payer: MEDICARE

## 2022-10-21 VITALS — HEIGHT: 63 IN | WEIGHT: 160 LBS | BODY MASS INDEX: 28.35 KG/M2

## 2022-10-21 DIAGNOSIS — M19.111 POST-TRAUMATIC OSTEOARTHRITIS OF RIGHT SHOULDER: Primary | ICD-10-CM

## 2022-10-21 DIAGNOSIS — M25.511 ACUTE PAIN OF RIGHT SHOULDER: ICD-10-CM

## 2022-10-21 PROCEDURE — 1157F ADVNC CARE PLAN IN RCRD: CPT | Mod: CPTII,S$GLB,, | Performed by: STUDENT IN AN ORGANIZED HEALTH CARE EDUCATION/TRAINING PROGRAM

## 2022-10-21 PROCEDURE — 3288F PR FALLS RISK ASSESSMENT DOCUMENTED: ICD-10-PCS | Mod: CPTII,S$GLB,, | Performed by: STUDENT IN AN ORGANIZED HEALTH CARE EDUCATION/TRAINING PROGRAM

## 2022-10-21 PROCEDURE — 99999 PR PBB SHADOW E&M-EST. PATIENT-LVL IV: ICD-10-PCS | Mod: PBBFAC,,, | Performed by: STUDENT IN AN ORGANIZED HEALTH CARE EDUCATION/TRAINING PROGRAM

## 2022-10-21 PROCEDURE — 99213 OFFICE O/P EST LOW 20 MIN: CPT | Mod: S$GLB,,, | Performed by: STUDENT IN AN ORGANIZED HEALTH CARE EDUCATION/TRAINING PROGRAM

## 2022-10-21 PROCEDURE — 1160F RVW MEDS BY RX/DR IN RCRD: CPT | Mod: CPTII,S$GLB,, | Performed by: STUDENT IN AN ORGANIZED HEALTH CARE EDUCATION/TRAINING PROGRAM

## 2022-10-21 PROCEDURE — 1125F PR PAIN SEVERITY QUANTIFIED, PAIN PRESENT: ICD-10-PCS | Mod: CPTII,S$GLB,, | Performed by: STUDENT IN AN ORGANIZED HEALTH CARE EDUCATION/TRAINING PROGRAM

## 2022-10-21 PROCEDURE — 99999 PR PBB SHADOW E&M-EST. PATIENT-LVL IV: CPT | Mod: PBBFAC,,, | Performed by: STUDENT IN AN ORGANIZED HEALTH CARE EDUCATION/TRAINING PROGRAM

## 2022-10-21 PROCEDURE — 1125F AMNT PAIN NOTED PAIN PRSNT: CPT | Mod: CPTII,S$GLB,, | Performed by: STUDENT IN AN ORGANIZED HEALTH CARE EDUCATION/TRAINING PROGRAM

## 2022-10-21 PROCEDURE — 3288F FALL RISK ASSESSMENT DOCD: CPT | Mod: CPTII,S$GLB,, | Performed by: STUDENT IN AN ORGANIZED HEALTH CARE EDUCATION/TRAINING PROGRAM

## 2022-10-21 PROCEDURE — 99213 PR OFFICE/OUTPT VISIT, EST, LEVL III, 20-29 MIN: ICD-10-PCS | Mod: S$GLB,,, | Performed by: STUDENT IN AN ORGANIZED HEALTH CARE EDUCATION/TRAINING PROGRAM

## 2022-10-21 PROCEDURE — 1159F MED LIST DOCD IN RCRD: CPT | Mod: CPTII,S$GLB,, | Performed by: STUDENT IN AN ORGANIZED HEALTH CARE EDUCATION/TRAINING PROGRAM

## 2022-10-21 PROCEDURE — 1101F PT FALLS ASSESS-DOCD LE1/YR: CPT | Mod: CPTII,S$GLB,, | Performed by: STUDENT IN AN ORGANIZED HEALTH CARE EDUCATION/TRAINING PROGRAM

## 2022-10-21 PROCEDURE — 1157F PR ADVANCE CARE PLAN OR EQUIV PRESENT IN MEDICAL RECORD: ICD-10-PCS | Mod: CPTII,S$GLB,, | Performed by: STUDENT IN AN ORGANIZED HEALTH CARE EDUCATION/TRAINING PROGRAM

## 2022-10-21 PROCEDURE — 1159F PR MEDICATION LIST DOCUMENTED IN MEDICAL RECORD: ICD-10-PCS | Mod: CPTII,S$GLB,, | Performed by: STUDENT IN AN ORGANIZED HEALTH CARE EDUCATION/TRAINING PROGRAM

## 2022-10-21 PROCEDURE — 1101F PR PT FALLS ASSESS DOC 0-1 FALLS W/OUT INJ PAST YR: ICD-10-PCS | Mod: CPTII,S$GLB,, | Performed by: STUDENT IN AN ORGANIZED HEALTH CARE EDUCATION/TRAINING PROGRAM

## 2022-10-21 PROCEDURE — 1160F PR REVIEW ALL MEDS BY PRESCRIBER/CLIN PHARMACIST DOCUMENTED: ICD-10-PCS | Mod: CPTII,S$GLB,, | Performed by: STUDENT IN AN ORGANIZED HEALTH CARE EDUCATION/TRAINING PROGRAM

## 2022-10-21 NOTE — PROGRESS NOTES
Orthopaedic Follow-Up Visit    Last Appointment: 9/9/22  Diagnosis: Right shoulder post-traumatic osteoarthritis with exacerbation of her arthritis due to recent fall  Prior Procedure: Right GH CSI    Clau Nunn is a 87 y.o. female who is here for f/u evaluation of her right shoulder. The patient was last seen here by me on 9/9/22 at which point we decided proceed with right shoulder glenohumeral corticosteroid injection prior to considering further treatment options. The patient returns today reporting that the symptoms have improved since the injection and is interested in discussing further treatment options.     To review her history, Clau Nunn is a 87 y.o. right-hand dominant female who presented with RIGHT shoulder pain and dysfunction that began about 2.5 months ago when she fell onto her right shoulder while here at the [place]. She underwent non operative management for proximal humerus fracture by Dr. Murcia at [facility] a couple years ago.   XR showed arthritis in her right shoulder but were negative for fracture. She was felt to have had an exacerbation of her arthritis due to her fall. We proceeded with right shoulder glenohumeral corticosteroid injection.     Patient's medications, allergies, past medical, surgical, social and family histories were reviewed and updated as appropriate.    Review of Systems   All systems reviewed were negative.  Specifically, the patient denies fever, chills, weight loss, chest pain, shortness of breath, or dyspnea on exertion.      Past Medical History:   Diagnosis Date    Allergy     Anxiety     Asthma     Back pain     Chronic venous insufficiency 11/19/2013    Depression     Diverticulosis 11/19/2013 1/8/8 colonoscopy    Dry eyes     Fracture, sacrum/coccyx     Dr. Sanchez     GERD (gastroesophageal reflux disease)     H/O total hip arthroplasty 12/30/2015    Hypertension     Hypothyroid     Osteoarthritis     Osteoporosis      Postlaminectomy syndrome of lumbar region 1/8/2014    Radius fracture     7/18    Simple chronic bronchitis 5/3/2017    Umbilical hernia 11/19/2013    Urinary incontinence     Vitamin D deficiency disease        Past Surgical History:   Procedure Laterality Date    ADENOIDECTOMY      BACK SURGERY      x2    breast implants  1973    CATARACT EXTRACTION Bilateral     CLOSED REDUCTION DISTAL RADIUS FRACTURE      Dr. Black, 8/18    cystoscope  1/6/16    DR. Brown    FRACTURE SURGERY  April 3 2015    left wrist    HAND SURGERY      HIP SURGERY Right     total hip- Dr. Dos Santos    HYSTERECTOMY      34y/o    INJECTION OF ANESTHETIC AGENT AROUND NERVE Right 9/16/2020    Procedure: Right suprascapular nerve block;  Surgeon: Raffi Wells MD;  Location: [facility];  Service: Pain Management;  Laterality: Right;    INJECTION OF ANESTHETIC AGENT AROUND NERVE Right 7/13/2021    Procedure: Block, Nerve Right Suprascapular Nerve Block with RN IV sedation;  Surgeon: Raffi Wells MD;  Location: [facility];  Service: Pain Management;  Laterality: Right;    INJECTION OF ANESTHETIC AGENT INTO SACROILIAC JOINT N/A 8/12/2020    Procedure: Left IA hip Joint + Left SIJ + Right shoulder injection;  Surgeon: Raffi Wells MD;  Location: [facility];  Service: Pain Management;  Laterality: N/A;    INJECTION OF JOINT N/A 8/12/2020    Procedure: Left IA hip Joint + Left SIJ + Right shoulder injection;  Surgeon: Raffi Wells MD;  Location: [facility];  Service: Pain Management;  Laterality: N/A;    JOINT REPLACEMENT Right     R NNAMDI - Dr. Dos Santos    LEG SURGERY Right     ex-fix tib/fib    SELECTIVE INJECTION OF ANESTHETIC AGENT AROUND LUMBAR SPINAL NERVE ROOT BY TRANSFORAMINAL APPROACH Bilateral 2/10/2021    Procedure: Bilateral L5/S1 TF REJI;  Surgeon: Raffi Wells MD;  Location: [facility];  Service: Pain Management;  Laterality: Bilateral;    SELECTIVE INJECTION OF ANESTHETIC AGENT AROUND LUMBAR SPINAL NERVE ROOT BY TRANSFORAMINAL  APPROACH Bilateral 5/25/2022    Procedure: Bilateral L5/S1 + S1 TF REJI;  Surgeon: Raffi Wells MD;  Location: [facility];  Service: Pain Management;  Laterality: Bilateral;    TONSILLECTOMY      TRANSFORAMINAL EPIDURAL INJECTION OF STEROID Left 7/8/2020    Procedure: left L2/3 + L5/S1 TF REJI;  Surgeon: Raffi Wells MD;  Location: [facility];  Service: Pain Management;  Laterality: Left;    TRANSFORAMINAL EPIDURAL INJECTION OF STEROID Left 7/1/2021    Procedure: left L5/S1 + S1 TF REJI;  Surgeon: Raffi Wells MD;  Location: [facility];  Service: Pain Management;  Laterality: Left;       Patient's Medications   New Prescriptions    No medications on file   Previous Medications    ALBUTEROL (VENTOLIN HFA) 90 MCG/ACTUATION INHALER    Inhale 2 puffs into the lungs 4 (four) times daily as needed.    APIXABAN (ELIQUIS) 5 MG TAB    Take 1 tablet (5 mg total) by mouth 2 (two) times daily.    AZELASTINE (ASTELIN) 137 MCG (0.1 %) NASAL SPRAY    2 sprays by Nasal route 2 (two) times daily.    BUSPIRONE (BUSPAR) 5 MG TAB    TAKE 1 TABLET TWICE DAILY    CHOLECALCIFEROL, VITAMIN D3, (D3-2000) 50 MCG (2,000 UNIT) CAP    Take 1 capsule (2,000 Units total) by mouth once daily.    CLOBETASOL (TEMOVATE) 0.05 % EXTERNAL SOLUTION    Pt to mix in 1 jar of cerave cream and apply to affected areas bid for 2-4 weeks per course    CYCLOBENZAPRINE (FLEXERIL) 5 MG TABLET    TAKE 1 TABLET (5 MG TOTAL) BY MOUTH NIGHTLY AS NEEDED FOR MUSCLE SPASMS.    CYCLOSPORINE (RESTASIS) 0.05 % OPHTHALMIC EMULSION    Place 1 drop into both eyes 2 (two) times daily.    ESCITALOPRAM OXALATE (LEXAPRO) 20 MG TABLET    TAKE 1 TABLET EVERY DAY    FLUOCINONIDE 0.05% (LIDEX) 0.05 % CREAM    AAA bid to leg for 2-4 weeks per course    FLUTICASONE PROPIONATE (FLONASE) 50 MCG/ACTUATION NASAL SPRAY        GABAPENTIN (NEURONTIN) 300 MG CAPSULE    Take 1 capsule (300 mg total) by mouth 3 (three) times daily.    LEVOTHYROXINE (SYNTHROID) 100 MCG TABLET    Take 1  tablet (100 mcg total) by mouth once daily.    LOSARTAN (COZAAR) 50 MG TABLET    TAKE 1 TABLET EVERY DAY    MUPIROCIN (BACTROBAN) 2 % OINTMENT    Apply topically 2 (two) times daily. To wound on right lower leg    OMEPRAZOLE (PRILOSEC) 40 MG CAPSULE    TAKE 1 CAPSULE (40 MG TOTAL) BY MOUTH EVERY MORNING.    OXYBUTYNIN (DITROPAN-XL) 5 MG TR24    Take 1 tablet (5 mg total) by mouth once daily.    TRAZODONE (DESYREL) 50 MG TABLET    TAKE 1 TABLET (50 MG TOTAL) BY MOUTH NIGHTLY AS NEEDED FOR INSOMNIA.    TRIAMCINOLONE ACETONIDE 0.025% (KENALOG) 0.025 % CREAM    AAA bid   Modified Medications    No medications on file   Discontinued Medications    No medications on file       Family History   Problem Relation Age of Onset    COPD Mother     Cancer Mother         lung CA    Pulmonary fibrosis Sister     Cancer Daughter         colon    Stroke Father     Diabetes Son     Melanoma Neg Hx     Psoriasis Neg Hx     Lupus Neg Hx        Review of patient's allergies indicates:   Allergen Reactions    Cephalexin Hives, Itching, Swelling, Anxiety, Dermatitis and Rash         Objective:      Physical Exam  Patient is alert and oriented, no distress. Skin is intact. Neuro is normal with no focal motor or sensory findings.    Cervical exam is unremarkable. Intact cervical ROM. Negative Spurling's test    Physical Exam:                       RIGHT                                     LEFT     Scap Dyskinesis/Winging       (-)                                             (-)     Tenderness:                                                                              Greater Tuberosity                  +                                              (-)  Bicipital Groove                       +                                              (-)  AC joint                                   (-)                                             (-)  Other:      ROM:  Forward Elevation       80                                            150  Abduction                     60                                            110  ER (at side)                 10                                            50        Strength:   Supraspinatus             3/5                                           5/5  Infraspinatus               3/5                                           5/5  Subscap / IR               4/5                                           5/5      Special Tests:              Neer:                                       +                                              (-)              [place]:                                 +                                              (-)              SS Stress:                               +                                              (-)              Bear Hug:                                (-)                                             (-)              [place]'s:                                 +                                              (-)              Resisted Thrower's:                +                                              (-)    Neurovascular examination  - Motor grossly intact bilaterally to shoulder abduction, elbow flexion and extension, wrist flexion and extension, and intrinsic hand musculature  - Sensation intact to light touch bilaterally in axillary, median, radial, and ulnar distributions  - Symmetrical radial pulses    Imaging:    XR Results:  Results for orders placed during the hospital encounter of 08/15/22    X-ray Shoulder 2 or More Views Right    Narrative  EXAMINATION:  XR SHOULDER COMPLETE 2 OR MORE VIEWS RIGHT    CLINICAL HISTORY:  Pain in right shoulder    TECHNIQUE:  Two or three views of the right shoulder were performed.    COMPARISON:  08/05/2020    FINDINGS:  Osteopenia.  No acute fracture or dislocation.  Chronic fracture deformity noted of the proximal humerus with prominent glenohumeral joint degenerative findings.  Minimal AC joint degenerative changes.  Lung parenchyma  clear.    Impression  As above      Electronically signed by: Marc Holloway MD  Date:    08/15/2022  Time:    15:50    MRI Results:  No results found for this or any previous visit.    CT Results:  No results found for this or any previous visit.      Physician read: I agree with the above impression.    Assessment/Plan:   Clau Nunn is a 87 y.o. female with right shoulder post-traumatic osteoarthritis     Plan:    She has known post-traumatic osteoarthritis. She has gotten good relief with prior corticosteroid injection though she is still symptomatic.  Currently, her symptoms are manageable and she is able to perform activities of daily living.  We discussed that her if her symptoms worsen in the future we can repeat injection.   Follow up around the end of December.           Kd Varela MD    I, Lance Kaba, acted as a scribe for Kd Varela MD for the duration of this office visit.

## 2022-10-25 ENCOUNTER — OFFICE VISIT (OUTPATIENT)
Dept: OPHTHALMOLOGY | Facility: CLINIC | Age: 87
End: 2022-10-25
Payer: MEDICARE

## 2022-10-25 DIAGNOSIS — H52.7 REFRACTIVE ERROR: ICD-10-CM

## 2022-10-25 DIAGNOSIS — Z96.1 PSEUDOPHAKIA OF BOTH EYES: Primary | ICD-10-CM

## 2022-10-25 DIAGNOSIS — M35.01 KERATITIS SICCA, BILATERAL: ICD-10-CM

## 2022-10-25 PROCEDURE — 92015 DETERMINE REFRACTIVE STATE: CPT | Mod: S$GLB,,, | Performed by: OPHTHALMOLOGY

## 2022-10-25 PROCEDURE — 92014 PR EYE EXAM, EST PATIENT,COMPREHESV: ICD-10-PCS | Mod: S$GLB,,, | Performed by: OPHTHALMOLOGY

## 2022-10-25 PROCEDURE — 1160F PR REVIEW ALL MEDS BY PRESCRIBER/CLIN PHARMACIST DOCUMENTED: ICD-10-PCS | Mod: CPTII,S$GLB,, | Performed by: OPHTHALMOLOGY

## 2022-10-25 PROCEDURE — 92015 PR REFRACTION: ICD-10-PCS | Mod: S$GLB,,, | Performed by: OPHTHALMOLOGY

## 2022-10-25 PROCEDURE — 92014 COMPRE OPH EXAM EST PT 1/>: CPT | Mod: S$GLB,,, | Performed by: OPHTHALMOLOGY

## 2022-10-25 PROCEDURE — 1159F PR MEDICATION LIST DOCUMENTED IN MEDICAL RECORD: ICD-10-PCS | Mod: CPTII,S$GLB,, | Performed by: OPHTHALMOLOGY

## 2022-10-25 PROCEDURE — 99999 PR PBB SHADOW E&M-EST. PATIENT-LVL III: CPT | Mod: PBBFAC,,, | Performed by: OPHTHALMOLOGY

## 2022-10-25 PROCEDURE — 1157F PR ADVANCE CARE PLAN OR EQUIV PRESENT IN MEDICAL RECORD: ICD-10-PCS | Mod: CPTII,S$GLB,, | Performed by: OPHTHALMOLOGY

## 2022-10-25 PROCEDURE — 99999 PR PBB SHADOW E&M-EST. PATIENT-LVL III: ICD-10-PCS | Mod: PBBFAC,,, | Performed by: OPHTHALMOLOGY

## 2022-10-25 PROCEDURE — 1157F ADVNC CARE PLAN IN RCRD: CPT | Mod: CPTII,S$GLB,, | Performed by: OPHTHALMOLOGY

## 2022-10-25 PROCEDURE — 1160F RVW MEDS BY RX/DR IN RCRD: CPT | Mod: CPTII,S$GLB,, | Performed by: OPHTHALMOLOGY

## 2022-10-25 PROCEDURE — 1159F MED LIST DOCD IN RCRD: CPT | Mod: CPTII,S$GLB,, | Performed by: OPHTHALMOLOGY

## 2022-10-25 RX ORDER — CYCLOSPORINE 0.5 MG/ML
1 EMULSION OPHTHALMIC EVERY 12 HOURS
Qty: 5.5 ML | Refills: 12 | Status: SHIPPED | OUTPATIENT
Start: 2022-10-25

## 2022-10-25 NOTE — PROGRESS NOTES
SUBJECTIVE  Clau Nunn is 87 y.o. female  Corrected distance visual acuity was 20/30 -2 in the right eye and 20/30 in the left eye.   Chief Complaint   Patient presents with    Annual Exam     Pt here for annual exam. No pain or discomfort. Pt states she has noticed she has trouble focusing in the morning. It gets better throughout the day. She would like an alternative to Restasis as her hands have trouble squeezing the tube for the drops.           HPI     Annual Exam     Additional comments: Pt here for annual exam. No pain or discomfort. Pt   states she has noticed she has trouble focusing in the morning. It gets   better throughout the day. She would like an alternative to Restasis as   her hands have trouble squeezing the tube for the drops.            Comments    1. Restor IOL OU-Delvin   2. Dry eyes-CSM   3. Dizzness-No Ocular involvement   4. RE- RX for SV glasses.   5. Blepharitis     Restasis bid prn   AT's PRN OU   Emycin krista as needed            Last edited by Ajay Carter MA on 10/25/2022  8:02 AM.         Assessment /Plan :  1. Pseudophakia of both eyes  -- Condition stable, no therapeutic change required. Monitoring routinely.     2. Keratitis sicca, bilateral  -- Condition stable, no therapeutic change required. Monitoring routinely.     3. Refractive error - distance Rx     RTC in 1 year or prn any changes

## 2022-10-28 ENCOUNTER — OFFICE VISIT (OUTPATIENT)
Dept: PRIMARY CARE CLINIC | Facility: CLINIC | Age: 87
End: 2022-10-28
Payer: MEDICARE

## 2022-10-28 VITALS
WEIGHT: 170.81 LBS | HEIGHT: 64 IN | BODY MASS INDEX: 29.16 KG/M2 | SYSTOLIC BLOOD PRESSURE: 130 MMHG | OXYGEN SATURATION: 97 % | TEMPERATURE: 98 F | HEART RATE: 70 BPM | DIASTOLIC BLOOD PRESSURE: 80 MMHG

## 2022-10-28 DIAGNOSIS — E55.9 VITAMIN D DEFICIENCY DISEASE: ICD-10-CM

## 2022-10-28 DIAGNOSIS — E03.9 ACQUIRED HYPOTHYROIDISM: ICD-10-CM

## 2022-10-28 DIAGNOSIS — I10 ESSENTIAL HYPERTENSION: Primary | Chronic | ICD-10-CM

## 2022-10-28 DIAGNOSIS — F33.42 RECURRENT MAJOR DEPRESSIVE DISORDER, IN FULL REMISSION: ICD-10-CM

## 2022-10-28 DIAGNOSIS — E53.8 B12 DEFICIENCY: ICD-10-CM

## 2022-10-28 DIAGNOSIS — Z00.00 WELLNESS EXAMINATION: ICD-10-CM

## 2022-10-28 DIAGNOSIS — M80.00XG AGE-RELATED OSTEOPOROSIS WITH CURRENT PATHOLOGICAL FRACTURE WITH DELAYED HEALING: ICD-10-CM

## 2022-10-28 DIAGNOSIS — Z00.00 ENCOUNTER FOR PREVENTIVE HEALTH EXAMINATION: ICD-10-CM

## 2022-10-28 DIAGNOSIS — N18.32 STAGE 3B CHRONIC KIDNEY DISEASE: ICD-10-CM

## 2022-10-28 DIAGNOSIS — N25.81 SECONDARY HYPERPARATHYROIDISM: ICD-10-CM

## 2022-10-28 DIAGNOSIS — I70.0 ATHEROSCLEROSIS OF AORTA: ICD-10-CM

## 2022-10-28 DIAGNOSIS — F41.9 ANXIETY: ICD-10-CM

## 2022-10-28 DIAGNOSIS — J41.0 SIMPLE CHRONIC BRONCHITIS: ICD-10-CM

## 2022-10-28 DIAGNOSIS — K21.9 GASTROESOPHAGEAL REFLUX DISEASE WITHOUT ESOPHAGITIS: ICD-10-CM

## 2022-10-28 DIAGNOSIS — Z86.718 HISTORY OF DVT IN ADULTHOOD: ICD-10-CM

## 2022-10-28 DIAGNOSIS — Z79.01 CHRONIC ANTICOAGULATION: ICD-10-CM

## 2022-10-28 PROCEDURE — 1157F PR ADVANCE CARE PLAN OR EQUIV PRESENT IN MEDICAL RECORD: ICD-10-PCS | Mod: CPTII,S$GLB,, | Performed by: INTERNAL MEDICINE

## 2022-10-28 PROCEDURE — 1100F PR PT FALLS ASSESS DOC 2+ FALLS/FALL W/INJURY/YR: ICD-10-PCS | Mod: CPTII,S$GLB,, | Performed by: INTERNAL MEDICINE

## 2022-10-28 PROCEDURE — 84443 ASSAY THYROID STIM HORMONE: CPT | Performed by: INTERNAL MEDICINE

## 2022-10-28 PROCEDURE — 84439 ASSAY OF FREE THYROXINE: CPT | Performed by: INTERNAL MEDICINE

## 2022-10-28 PROCEDURE — 99999 PR PBB SHADOW E&M-EST. PATIENT-LVL IV: ICD-10-PCS | Mod: PBBFAC,,, | Performed by: INTERNAL MEDICINE

## 2022-10-28 PROCEDURE — 83970 ASSAY OF PARATHORMONE: CPT | Performed by: INTERNAL MEDICINE

## 2022-10-28 PROCEDURE — 1157F ADVNC CARE PLAN IN RCRD: CPT | Mod: CPTII,S$GLB,, | Performed by: INTERNAL MEDICINE

## 2022-10-28 PROCEDURE — 80053 COMPREHEN METABOLIC PANEL: CPT | Performed by: INTERNAL MEDICINE

## 2022-10-28 PROCEDURE — 1159F MED LIST DOCD IN RCRD: CPT | Mod: CPTII,S$GLB,, | Performed by: INTERNAL MEDICINE

## 2022-10-28 PROCEDURE — 3288F PR FALLS RISK ASSESSMENT DOCUMENTED: ICD-10-PCS | Mod: CPTII,S$GLB,, | Performed by: INTERNAL MEDICINE

## 2022-10-28 PROCEDURE — 1100F PTFALLS ASSESS-DOCD GE2>/YR: CPT | Mod: CPTII,S$GLB,, | Performed by: INTERNAL MEDICINE

## 2022-10-28 PROCEDURE — 99499 UNLISTED E&M SERVICE: CPT | Mod: S$GLB,,, | Performed by: INTERNAL MEDICINE

## 2022-10-28 PROCEDURE — 99499 RISK ADDL DX/OHS AUDIT: ICD-10-PCS | Mod: S$GLB,,, | Performed by: INTERNAL MEDICINE

## 2022-10-28 PROCEDURE — G0008 ADMIN INFLUENZA VIRUS VAC: HCPCS | Mod: S$GLB,,, | Performed by: INTERNAL MEDICINE

## 2022-10-28 PROCEDURE — 99999 PR PBB SHADOW E&M-EST. PATIENT-LVL IV: CPT | Mod: PBBFAC,,, | Performed by: INTERNAL MEDICINE

## 2022-10-28 PROCEDURE — 90694 FLU VACCINE - QUADRIVALENT - ADJUVANTED: ICD-10-PCS | Mod: S$GLB,,, | Performed by: INTERNAL MEDICINE

## 2022-10-28 PROCEDURE — G0008 FLU VACCINE - QUADRIVALENT - ADJUVANTED: ICD-10-PCS | Mod: S$GLB,,, | Performed by: INTERNAL MEDICINE

## 2022-10-28 PROCEDURE — 1159F PR MEDICATION LIST DOCUMENTED IN MEDICAL RECORD: ICD-10-PCS | Mod: CPTII,S$GLB,, | Performed by: INTERNAL MEDICINE

## 2022-10-28 PROCEDURE — 3288F FALL RISK ASSESSMENT DOCD: CPT | Mod: CPTII,S$GLB,, | Performed by: INTERNAL MEDICINE

## 2022-10-28 PROCEDURE — 90694 VACC AIIV4 NO PRSRV 0.5ML IM: CPT | Mod: S$GLB,,, | Performed by: INTERNAL MEDICINE

## 2022-10-28 PROCEDURE — 99214 OFFICE O/P EST MOD 30 MIN: CPT | Mod: 25,S$GLB,, | Performed by: INTERNAL MEDICINE

## 2022-10-28 PROCEDURE — 82306 VITAMIN D 25 HYDROXY: CPT | Performed by: INTERNAL MEDICINE

## 2022-10-28 PROCEDURE — 99214 PR OFFICE/OUTPT VISIT, EST, LEVL IV, 30-39 MIN: ICD-10-PCS | Mod: 25,S$GLB,, | Performed by: INTERNAL MEDICINE

## 2022-10-28 PROCEDURE — 82607 VITAMIN B-12: CPT | Performed by: INTERNAL MEDICINE

## 2022-10-28 PROCEDURE — 80061 LIPID PANEL: CPT | Performed by: INTERNAL MEDICINE

## 2022-10-28 RX ORDER — OMEPRAZOLE 40 MG/1
40 CAPSULE, DELAYED RELEASE ORAL EVERY MORNING
Qty: 90 CAPSULE | Refills: 3 | Status: SHIPPED | OUTPATIENT
Start: 2022-10-28 | End: 2023-10-07

## 2022-10-28 RX ORDER — ALBUTEROL SULFATE 0.83 MG/ML
2.5 SOLUTION RESPIRATORY (INHALATION) EVERY 6 HOURS PRN
Qty: 1 EACH | Refills: 6 | Status: SHIPPED | OUTPATIENT
Start: 2022-10-28 | End: 2023-10-28

## 2022-10-29 LAB
25(OH)D3+25(OH)D2 SERPL-MCNC: 33 NG/ML (ref 30–96)
ALBUMIN SERPL BCP-MCNC: 3.9 G/DL (ref 3.5–5.2)
ALP SERPL-CCNC: 51 U/L (ref 55–135)
ALT SERPL W/O P-5'-P-CCNC: 15 U/L (ref 10–44)
ANION GAP SERPL CALC-SCNC: 13 MMOL/L (ref 8–16)
AST SERPL-CCNC: 24 U/L (ref 10–40)
BILIRUB SERPL-MCNC: 0.7 MG/DL (ref 0.1–1)
BUN SERPL-MCNC: 16 MG/DL (ref 8–23)
CALCIUM SERPL-MCNC: 10 MG/DL (ref 8.7–10.5)
CHLORIDE SERPL-SCNC: 102 MMOL/L (ref 95–110)
CHOLEST SERPL-MCNC: 181 MG/DL (ref 120–199)
CHOLEST/HDLC SERPL: 3.5 {RATIO} (ref 2–5)
CO2 SERPL-SCNC: 25 MMOL/L (ref 23–29)
CREAT SERPL-MCNC: 1 MG/DL (ref 0.5–1.4)
EST. GFR  (NO RACE VARIABLE): 54.9 ML/MIN/1.73 M^2
GLUCOSE SERPL-MCNC: 80 MG/DL (ref 70–110)
HDLC SERPL-MCNC: 52 MG/DL (ref 40–75)
HDLC SERPL: 28.7 % (ref 20–50)
LDLC SERPL CALC-MCNC: 110 MG/DL (ref 63–159)
NONHDLC SERPL-MCNC: 129 MG/DL
POTASSIUM SERPL-SCNC: 4.2 MMOL/L (ref 3.5–5.1)
PROT SERPL-MCNC: 7.2 G/DL (ref 6–8.4)
PTH-INTACT SERPL-MCNC: 53.3 PG/ML (ref 9–77)
SODIUM SERPL-SCNC: 140 MMOL/L (ref 136–145)
T4 FREE SERPL-MCNC: 0.89 NG/DL (ref 0.71–1.51)
TRIGL SERPL-MCNC: 95 MG/DL (ref 30–150)
TSH SERPL DL<=0.005 MIU/L-ACNC: 4.69 UIU/ML (ref 0.4–4)
VIT B12 SERPL-MCNC: >2000 PG/ML (ref 210–950)

## 2022-10-31 ENCOUNTER — PATIENT MESSAGE (OUTPATIENT)
Dept: PRIMARY CARE CLINIC | Facility: CLINIC | Age: 87
End: 2022-10-31
Payer: MEDICARE

## 2022-11-03 ENCOUNTER — PATIENT MESSAGE (OUTPATIENT)
Dept: PRIMARY CARE CLINIC | Facility: CLINIC | Age: 87
End: 2022-11-03
Payer: MEDICARE

## 2022-11-04 DIAGNOSIS — J41.0 SIMPLE CHRONIC BRONCHITIS: Primary | ICD-10-CM

## 2022-11-11 ENCOUNTER — TELEPHONE (OUTPATIENT)
Dept: HEMATOLOGY/ONCOLOGY | Facility: CLINIC | Age: 87
End: 2022-11-11
Payer: MEDICARE

## 2022-11-11 ENCOUNTER — LAB VISIT (OUTPATIENT)
Dept: LAB | Facility: HOSPITAL | Age: 87
End: 2022-11-11
Attending: NURSE PRACTITIONER
Payer: MEDICARE

## 2022-11-11 ENCOUNTER — OFFICE VISIT (OUTPATIENT)
Dept: HEMATOLOGY/ONCOLOGY | Facility: CLINIC | Age: 87
End: 2022-11-11
Payer: MEDICARE

## 2022-11-11 VITALS
HEIGHT: 64 IN | DIASTOLIC BLOOD PRESSURE: 78 MMHG | TEMPERATURE: 98 F | WEIGHT: 166.69 LBS | BODY MASS INDEX: 28.46 KG/M2 | OXYGEN SATURATION: 96 % | HEART RATE: 58 BPM | SYSTOLIC BLOOD PRESSURE: 158 MMHG

## 2022-11-11 DIAGNOSIS — N39.41 URGE INCONTINENCE OF URINE: ICD-10-CM

## 2022-11-11 DIAGNOSIS — Z86.718 HISTORY OF DVT IN ADULTHOOD: ICD-10-CM

## 2022-11-11 DIAGNOSIS — I82.431 ACUTE DEEP VEIN THROMBOSIS (DVT) OF POPLITEAL VEIN OF RIGHT LOWER EXTREMITY: ICD-10-CM

## 2022-11-11 DIAGNOSIS — N30.00 ACUTE CYSTITIS WITHOUT HEMATURIA: ICD-10-CM

## 2022-11-11 DIAGNOSIS — I82.411 DVT OF DEEP FEMORAL VEIN, RIGHT: ICD-10-CM

## 2022-11-11 DIAGNOSIS — Z79.01 CHRONIC ANTICOAGULATION: ICD-10-CM

## 2022-11-11 DIAGNOSIS — I87.2 CHRONIC VENOUS INSUFFICIENCY: ICD-10-CM

## 2022-11-11 DIAGNOSIS — R35.0 URINARY FREQUENCY: Primary | ICD-10-CM

## 2022-11-11 LAB
ALBUMIN SERPL BCP-MCNC: 4 G/DL (ref 3.5–5.2)
ALP SERPL-CCNC: 52 U/L (ref 55–135)
ALT SERPL W/O P-5'-P-CCNC: 13 U/L (ref 10–44)
ANION GAP SERPL CALC-SCNC: 8 MMOL/L (ref 8–16)
AST SERPL-CCNC: 18 U/L (ref 10–40)
BASOPHILS # BLD AUTO: 0.06 K/UL (ref 0–0.2)
BASOPHILS NFR BLD: 0.8 % (ref 0–1.9)
BILIRUB SERPL-MCNC: 0.7 MG/DL (ref 0.1–1)
BUN SERPL-MCNC: 18 MG/DL (ref 8–23)
CALCIUM SERPL-MCNC: 9.6 MG/DL (ref 8.7–10.5)
CHLORIDE SERPL-SCNC: 106 MMOL/L (ref 95–110)
CO2 SERPL-SCNC: 27 MMOL/L (ref 23–29)
CREAT SERPL-MCNC: 1.2 MG/DL (ref 0.5–1.4)
DIFFERENTIAL METHOD: ABNORMAL
EOSINOPHIL # BLD AUTO: 0.1 K/UL (ref 0–0.5)
EOSINOPHIL NFR BLD: 1.8 % (ref 0–8)
ERYTHROCYTE [DISTWIDTH] IN BLOOD BY AUTOMATED COUNT: 13.2 % (ref 11.5–14.5)
EST. GFR  (NO RACE VARIABLE): 44 ML/MIN/1.73 M^2
GLUCOSE SERPL-MCNC: 89 MG/DL (ref 70–110)
HCT VFR BLD AUTO: 45 % (ref 37–48.5)
HGB BLD-MCNC: 14.8 G/DL (ref 12–16)
IMM GRANULOCYTES # BLD AUTO: 0.04 K/UL (ref 0–0.04)
IMM GRANULOCYTES NFR BLD AUTO: 0.5 % (ref 0–0.5)
LYMPHOCYTES # BLD AUTO: 1.7 K/UL (ref 1–4.8)
LYMPHOCYTES NFR BLD: 22.9 % (ref 18–48)
MCH RBC QN AUTO: 31.4 PG (ref 27–31)
MCHC RBC AUTO-ENTMCNC: 32.9 G/DL (ref 32–36)
MCV RBC AUTO: 96 FL (ref 82–98)
MONOCYTES # BLD AUTO: 0.5 K/UL (ref 0.3–1)
MONOCYTES NFR BLD: 6.8 % (ref 4–15)
NEUTROPHILS # BLD AUTO: 5 K/UL (ref 1.8–7.7)
NEUTROPHILS NFR BLD: 67.2 % (ref 38–73)
NRBC BLD-RTO: 0 /100 WBC
PLATELET # BLD AUTO: 251 K/UL (ref 150–450)
PMV BLD AUTO: 10.8 FL (ref 9.2–12.9)
POTASSIUM SERPL-SCNC: 4.8 MMOL/L (ref 3.5–5.1)
PROT SERPL-MCNC: 7.3 G/DL (ref 6–8.4)
RBC # BLD AUTO: 4.71 M/UL (ref 4–5.4)
SODIUM SERPL-SCNC: 141 MMOL/L (ref 136–145)
WBC # BLD AUTO: 7.39 K/UL (ref 3.9–12.7)

## 2022-11-11 PROCEDURE — 85025 COMPLETE CBC W/AUTO DIFF WBC: CPT | Performed by: NURSE PRACTITIONER

## 2022-11-11 PROCEDURE — 1100F PR PT FALLS ASSESS DOC 2+ FALLS/FALL W/INJURY/YR: ICD-10-PCS | Mod: CPTII,S$GLB,, | Performed by: NURSE PRACTITIONER

## 2022-11-11 PROCEDURE — 99999 PR PBB SHADOW E&M-EST. PATIENT-LVL IV: CPT | Mod: PBBFAC,,, | Performed by: NURSE PRACTITIONER

## 2022-11-11 PROCEDURE — 1160F PR REVIEW ALL MEDS BY PRESCRIBER/CLIN PHARMACIST DOCUMENTED: ICD-10-PCS | Mod: CPTII,S$GLB,, | Performed by: NURSE PRACTITIONER

## 2022-11-11 PROCEDURE — 1126F PR PAIN SEVERITY QUANTIFIED, NO PAIN PRESENT: ICD-10-PCS | Mod: CPTII,S$GLB,, | Performed by: NURSE PRACTITIONER

## 2022-11-11 PROCEDURE — 3288F FALL RISK ASSESSMENT DOCD: CPT | Mod: CPTII,S$GLB,, | Performed by: NURSE PRACTITIONER

## 2022-11-11 PROCEDURE — 1157F PR ADVANCE CARE PLAN OR EQUIV PRESENT IN MEDICAL RECORD: ICD-10-PCS | Mod: CPTII,S$GLB,, | Performed by: NURSE PRACTITIONER

## 2022-11-11 PROCEDURE — 1157F ADVNC CARE PLAN IN RCRD: CPT | Mod: CPTII,S$GLB,, | Performed by: NURSE PRACTITIONER

## 2022-11-11 PROCEDURE — 36415 COLL VENOUS BLD VENIPUNCTURE: CPT | Performed by: NURSE PRACTITIONER

## 2022-11-11 PROCEDURE — 1160F RVW MEDS BY RX/DR IN RCRD: CPT | Mod: CPTII,S$GLB,, | Performed by: NURSE PRACTITIONER

## 2022-11-11 PROCEDURE — 99214 OFFICE O/P EST MOD 30 MIN: CPT | Mod: S$GLB,,, | Performed by: NURSE PRACTITIONER

## 2022-11-11 PROCEDURE — 1100F PTFALLS ASSESS-DOCD GE2>/YR: CPT | Mod: CPTII,S$GLB,, | Performed by: NURSE PRACTITIONER

## 2022-11-11 PROCEDURE — 3288F PR FALLS RISK ASSESSMENT DOCUMENTED: ICD-10-PCS | Mod: CPTII,S$GLB,, | Performed by: NURSE PRACTITIONER

## 2022-11-11 PROCEDURE — 1126F AMNT PAIN NOTED NONE PRSNT: CPT | Mod: CPTII,S$GLB,, | Performed by: NURSE PRACTITIONER

## 2022-11-11 PROCEDURE — 99214 PR OFFICE/OUTPT VISIT, EST, LEVL IV, 30-39 MIN: ICD-10-PCS | Mod: S$GLB,,, | Performed by: NURSE PRACTITIONER

## 2022-11-11 PROCEDURE — 1159F MED LIST DOCD IN RCRD: CPT | Mod: CPTII,S$GLB,, | Performed by: NURSE PRACTITIONER

## 2022-11-11 PROCEDURE — 99999 PR PBB SHADOW E&M-EST. PATIENT-LVL IV: ICD-10-PCS | Mod: PBBFAC,,, | Performed by: NURSE PRACTITIONER

## 2022-11-11 PROCEDURE — 80053 COMPREHEN METABOLIC PANEL: CPT | Performed by: NURSE PRACTITIONER

## 2022-11-11 PROCEDURE — 1159F PR MEDICATION LIST DOCUMENTED IN MEDICAL RECORD: ICD-10-PCS | Mod: CPTII,S$GLB,, | Performed by: NURSE PRACTITIONER

## 2022-11-11 RX ORDER — AMOXICILLIN AND CLAVULANATE POTASSIUM 875; 125 MG/1; MG/1
1 TABLET, FILM COATED ORAL EVERY 12 HOURS
Qty: 10 TABLET | Refills: 0 | Status: SHIPPED | OUTPATIENT
Start: 2022-11-11 | End: 2022-11-16

## 2022-11-11 RX ORDER — AMOXICILLIN 500 MG/1
CAPSULE ORAL
COMMUNITY
Start: 2022-10-24 | End: 2022-11-11 | Stop reason: ALTCHOICE

## 2022-11-11 NOTE — TELEPHONE ENCOUNTER
Contacted pt and informed her of urinalysis results. Also, informed her of recommendations and new prescription sen to her pharmacy by Ara Hernández NP.

## 2022-11-11 NOTE — PROGRESS NOTES
Subjective:      Patient ID: Clau Nunn is a 87 y.o. female.    Chief Complaint: increased urinary frequency    HPI:   Patient is an 87 year old female who presents today for further evaluation and recommendation of acute DVT of right leg diagnosed on 4/7/2022 by US.  Found with interval development of near completely occlusive thrombus within the distal right femoral vein that extends into the popliteal vein.  Was placed on Eliquis started pack on 4/7/2022 and is currently taking Eliquis 5 mg PO bid.  States that she was on an ASA during the time of clot; however was not on anticoagulation at that time.      She has a diagnosis of venous insufficiency and has had previous thrombotic event after back surgery many year ago per patient.  She states she cannot remember if she was place on anticoagulation at that time; however an IVC filter placed at that time.  She states that she had a clot prior to this but is unsure of when this was and cannot give me any details on what type of medication she was on. So this would be her third incidence of thrombotic event.        Denies h/o smoking.  Up to date with mammograms and has not had colonoscopy in many years due to advanced age.  States was never found with polpys in the past.  Has not had unintentional weight loss.  Denies any abnormal adenopathy.  States that she is very active and denies sitting more then 50% of the day.      I have reviewed all of the patient's relevant lab work available in the medical record and have utilized this in my evaluation and management recommendations today.    Interval History:   11/11/2022 Presents today to evaluate labs.  Currently no anemia present.  Has easy bruising.  No falls since last fall in the clinic.  Still not sitting more then 50% of the day.  Is taking Eliquis 5mg PO bid.  C/o increase urinary frequency and incontinence for the past for about 3 days.     Denies sob or pain to legs.  Is currently on amoxicillin with  recent root canal.         Social History     Socioeconomic History    Marital status:    Occupational History    Occupation: retired     Comment: [employer]   Tobacco Use    Smoking status: Never    Smokeless tobacco: Never   Substance and Sexual Activity    Alcohol use: Yes     Alcohol/week: 0.0 standard drinks     Comment: rarely    Drug use: Never    Sexual activity: Never     Partners: Male     Birth control/protection: Post-menopausal   Social History Narrative    Patient is retired from [employer]     Social Determinants of Health     Financial Resource Strain: Low Risk     Difficulty of Paying Living Expenses: Not hard at all   Food Insecurity: No Food Insecurity    Worried About Running Out of Food in the Last Year: Never true    Ran Out of Food in the Last Year: Never true   Transportation Needs: No Transportation Needs    Lack of Transportation (Medical): No    Lack of Transportation (Non-Medical): No   Physical Activity: Inactive    Days of Exercise per Week: 0 days    Minutes of Exercise per Session: 0 min   Stress: No Stress Concern Present    Feeling of Stress : Only a little   Social Connections: Moderately Integrated    Frequency of Communication with Friends and Family: More than three times a week    Frequency of Social Gatherings with Friends and Family: More than three times a week    Attends Hinduism Services: More than 4 times per year    Active Member of Clubs or Organizations: No    Attends Club or Organization Meetings: More than 4 times per year    Marital Status:    Housing Stability: Unknown    Unable to Pay for Housing in the Last Year: No    Unstable Housing in the Last Year: No       Family History   Problem Relation Age of Onset    COPD Mother     Cancer Mother         lung CA    Pulmonary fibrosis Sister     Cancer Daughter         colon    Stroke Father     Diabetes Son     Melanoma Neg Hx     Psoriasis Neg Hx     Lupus Neg Hx        Past  Surgical History:   Procedure Laterality Date    ADENOIDECTOMY      BACK SURGERY      x2    breast implants  1973    CATARACT EXTRACTION Bilateral     CLOSED REDUCTION DISTAL RADIUS FRACTURE      Dr. Black, 8/18    cystoscope  1/6/16    DR. Brown    FRACTURE SURGERY  April 3 2015    left wrist    HAND SURGERY      HIP SURGERY Right     total hip- Dr. Dos Santos    HYSTERECTOMY      36y/o    INJECTION OF ANESTHETIC AGENT AROUND NERVE Right 9/16/2020    Procedure: Right suprascapular nerve block;  Surgeon: Raffi Wells MD;  Location: [facility];  Service: Pain Management;  Laterality: Right;    INJECTION OF ANESTHETIC AGENT AROUND NERVE Right 7/13/2021    Procedure: Block, Nerve Right Suprascapular Nerve Block with RN IV sedation;  Surgeon: Raffi Wells MD;  Location: [facility];  Service: Pain Management;  Laterality: Right;    INJECTION OF ANESTHETIC AGENT INTO SACROILIAC JOINT N/A 8/12/2020    Procedure: Left IA hip Joint + Left SIJ + Right shoulder injection;  Surgeon: Raffi Wells MD;  Location: [facility];  Service: Pain Management;  Laterality: N/A;    INJECTION OF JOINT N/A 8/12/2020    Procedure: Left IA hip Joint + Left SIJ + Right shoulder injection;  Surgeon: Raffi Wells MD;  Location: [facility];  Service: Pain Management;  Laterality: N/A;    JOINT REPLACEMENT Right     R NNAMDI - Dr. Dos Santos    LEG SURGERY Right     ex-fix tib/fib    SELECTIVE INJECTION OF ANESTHETIC AGENT AROUND LUMBAR SPINAL NERVE ROOT BY TRANSFORAMINAL APPROACH Bilateral 2/10/2021    Procedure: Bilateral L5/S1 TF REJI;  Surgeon: Raffi Wells MD;  Location: [facility];  Service: Pain Management;  Laterality: Bilateral;    SELECTIVE INJECTION OF ANESTHETIC AGENT AROUND LUMBAR SPINAL NERVE ROOT BY TRANSFORAMINAL APPROACH Bilateral 5/25/2022    Procedure: Bilateral L5/S1 + S1 TF REJI;  Surgeon: Raffi Wells MD;  Location: [facility];  Service: Pain Management;  Laterality: Bilateral;    TONSILLECTOMY      TRANSFORAMINAL  EPIDURAL INJECTION OF STEROID Left 7/8/2020    Procedure: left L2/3 + L5/S1 TF REJI;  Surgeon: Raffi Wells MD;  Location: [facility];  Service: Pain Management;  Laterality: Left;    TRANSFORAMINAL EPIDURAL INJECTION OF STEROID Left 7/1/2021    Procedure: left L5/S1 + S1 TF REJI;  Surgeon: Raffi Wells MD;  Location: [facility];  Service: Pain Management;  Laterality: Left;       Past Medical History:   Diagnosis Date    Allergy     Anxiety     Asthma     Back pain     Chronic venous insufficiency 11/19/2013    Depression     Diverticulosis 11/19/2013 1/8/8 colonoscopy    Dry eyes     Fracture, sacrum/coccyx     Dr. Sanchez     GERD (gastroesophageal reflux disease)     H/O total hip arthroplasty 12/30/2015    Hypertension     Hypothyroid     Osteoarthritis     Osteoporosis     Postlaminectomy syndrome of lumbar region 1/8/2014    Radius fracture     7/18    Simple chronic bronchitis 5/3/2017    Umbilical hernia 11/19/2013    Urinary incontinence     Vitamin D deficiency disease        Review of Systems   Constitutional: Negative.    HENT:  Negative for postnasal drip.    Eyes: Negative.    Respiratory:  Positive for choking.    Cardiovascular:  Positive for leg swelling (right medial lower leg swelling that has improved since starting anticoagulation).   Gastrointestinal: Negative.    Endocrine: Negative.    Genitourinary:         Vaginal incontinence   Musculoskeletal:  Positive for arthralgias and back pain.   Skin:  Positive for color change (chronic bilateral lower leg).   Allergic/Immunologic: Negative.    Neurological: Negative.    Hematological: Negative.    Psychiatric/Behavioral: Negative.          Medication List with Changes/Refills   Current Medications    ALBUTEROL (PROVENTIL) 2.5 MG /3 ML (0.083 %) NEBULIZER SOLUTION    Take 3 mLs (2.5 mg total) by nebulization every 6 (six) hours as needed for Wheezing. Rescue    AMOXICILLIN (AMOXIL) 500 MG CAPSULE        APIXABAN (ELIQUIS) 5 MG TAB    Take 1  tablet (5 mg total) by mouth 2 (two) times daily.    AZELASTINE (ASTELIN) 137 MCG (0.1 %) NASAL SPRAY    2 sprays by Nasal route 2 (two) times daily.    BUSPIRONE (BUSPAR) 5 MG TAB    TAKE 1 TABLET TWICE DAILY    CHOLECALCIFEROL, VITAMIN D3, (D3-2000) 50 MCG (2,000 UNIT) CAP    Take 1 capsule (2,000 Units total) by mouth once daily.    CLOBETASOL (TEMOVATE) 0.05 % EXTERNAL SOLUTION    Pt to mix in 1 jar of cerave cream and apply to affected areas bid for 2-4 weeks per course    CYCLOBENZAPRINE (FLEXERIL) 5 MG TABLET    TAKE 1 TABLET (5 MG TOTAL) BY MOUTH NIGHTLY AS NEEDED FOR MUSCLE SPASMS.    CYCLOSPORINE (RESTASIS MULTIDOSE) 0.05 % DROP    Place 1 drop into both eyes every 12 (twelve) hours.    ESCITALOPRAM OXALATE (LEXAPRO) 20 MG TABLET    TAKE 1 TABLET EVERY DAY    FLUOCINONIDE 0.05% (LIDEX) 0.05 % CREAM    AAA bid to leg for 2-4 weeks per course    FLUTICASONE PROPIONATE (FLONASE) 50 MCG/ACTUATION NASAL SPRAY        GABAPENTIN (NEURONTIN) 300 MG CAPSULE    Take 1 capsule (300 mg total) by mouth 3 (three) times daily.    LEVOTHYROXINE (SYNTHROID) 100 MCG TABLET    Take 1 tablet (100 mcg total) by mouth once daily.    LOSARTAN (COZAAR) 50 MG TABLET    TAKE 1 TABLET EVERY DAY    MUPIROCIN (BACTROBAN) 2 % OINTMENT    Apply topically 2 (two) times daily. To wound on right lower leg    OMEPRAZOLE (PRILOSEC) 40 MG CAPSULE    Take 1 capsule (40 mg total) by mouth every morning.    OXYBUTYNIN (DITROPAN-XL) 5 MG TR24    Take 1 tablet (5 mg total) by mouth once daily.    TRAZODONE (DESYREL) 50 MG TABLET    TAKE 1 TABLET (50 MG TOTAL) BY MOUTH NIGHTLY AS NEEDED FOR INSOMNIA.    TRIAMCINOLONE ACETONIDE 0.025% (KENALOG) 0.025 % CREAM    AAA bid        Objective:     Vitals:    11/11/22 0948   BP: (!) 158/78   Pulse: (!) 58   Temp: 97.6 °F (36.4 °C)       Physical Exam  Vitals and nursing note reviewed.   Constitutional:       Appearance: Normal appearance.   HENT:      Head: Normocephalic and atraumatic.      Right Ear:  External ear normal.      Left Ear: External ear normal.   Cardiovascular:      Rate and Rhythm: Normal rate and regular rhythm.      Heart sounds: Normal heart sounds, S1 normal and S2 normal.   Pulmonary:      Effort: Pulmonary effort is normal.      Breath sounds: Normal breath sounds.   Abdominal:      General: There is no distension.   Musculoskeletal:         General: Normal range of motion.      Cervical back: Normal range of motion.   Lymphadenopathy:      Head:      Right side of head: No submental, submandibular, tonsillar, preauricular, posterior auricular or occipital adenopathy.      Left side of head: No submental, submandibular, tonsillar, preauricular, posterior auricular or occipital adenopathy.      Cervical: No cervical adenopathy.      Right cervical: No superficial, deep or posterior cervical adenopathy.     Left cervical: No superficial or posterior cervical adenopathy.      Upper Body:      Right upper body: No supraclavicular or axillary adenopathy.      Left upper body: No supraclavicular or axillary adenopathy.   Skin:     General: Skin is warm and dry.      Findings: Erythema (right lower leg - improving per patient since starting anticoagulation) present.      Comments: Dry skin   Neurological:      General: No focal deficit present.      Mental Status: She is alert and oriented to person, place, and time.   Psychiatric:         Attention and Perception: Attention and perception normal.         Mood and Affect: Mood and affect normal.         Speech: Speech normal.         Behavior: Behavior normal. Behavior is cooperative.         Thought Content: Thought content normal.         Cognition and Memory: Cognition and memory normal.         Judgment: Judgment normal.       Assessment:     Problem List Items Addressed This Visit          Hematology    History of DVT in adulthood    Relevant Orders    CBC Auto Differential    Comprehensive Metabolic Panel    Chronic anticoagulation    Relevant  Orders    CBC Auto Differential    Comprehensive Metabolic Panel     Other Visit Diagnoses       Urinary frequency    -  Primary    Relevant Orders    Urinalysis, Reflex to Urine Culture Urine, Clean Catch    CBC Auto Differential    Comprehensive Metabolic Panel    Urge incontinence of urine        Relevant Orders    Urinalysis, Reflex to Urine Culture Urine, Clean Catch    CBC Auto Differential    Comprehensive Metabolic Panel          Lab Results   Component Value Date    WBC 7.39 11/11/2022    RBC 4.71 11/11/2022    HGB 14.8 11/11/2022    HCT 45.0 11/11/2022    MCV 96 11/11/2022    MCH 31.4 (H) 11/11/2022    MCHC 32.9 11/11/2022    RDW 13.2 11/11/2022     11/11/2022    MPV 10.8 11/11/2022    GRAN 5.0 11/11/2022    GRAN 67.2 11/11/2022    LYMPH 1.7 11/11/2022    LYMPH 22.9 11/11/2022    MONO 0.5 11/11/2022    MONO 6.8 11/11/2022    EOS 0.1 11/11/2022    BASO 0.06 11/11/2022    EOSINOPHIL 1.8 11/11/2022    BASOPHIL 0.8 11/11/2022      Lab Results   Component Value Date     11/11/2022    K 4.8 11/11/2022     11/11/2022    CO2 27 11/11/2022    BUN 18 11/11/2022    CREATININE 1.2 11/11/2022    CALCIUM 9.6 11/11/2022    ANIONGAP 8 11/11/2022    ESTGFRAFRICA 59 (A) 05/11/2022    EGFRNONAA 51 (A) 05/11/2022     Lab Results   Component Value Date    ALT 13 11/11/2022    AST 18 11/11/2022    ALKPHOS 52 (L) 11/11/2022    BILITOT 0.7 11/11/2022       Plan:   Urinary frequency  -     Urinalysis, Reflex to Urine Culture Urine, Clean Catch; Future; Expected date: 11/11/2022  -     CBC Auto Differential; Future; Expected date: 11/11/2022  -     Comprehensive Metabolic Panel; Future; Expected date: 11/11/2022    Urge incontinence of urine  -     Urinalysis, Reflex to Urine Culture Urine, Clean Catch; Future; Expected date: 11/11/2022  -     CBC Auto Differential; Future; Expected date: 11/11/2022  -     Comprehensive Metabolic Panel; Future; Expected date: 11/11/2022    History of DVT in adulthood  -     CBC  Auto Differential; Future; Expected date: 11/11/2022  -     Comprehensive Metabolic Panel; Future; Expected date: 11/11/2022    Chronic anticoagulation  -     CBC Auto Differential; Future; Expected date: 11/11/2022  -     Comprehensive Metabolic Panel; Future; Expected date: 11/11/2022    Med Onc Chart Routing      Follow up with physician    Follow up with CYNTHIA 6 months. with labs cbc and cmp.  Needs urinalysis with reflex culture today.   Infusion scheduling note none   Injection scheduling note none   Labs   Lab interval:  None   Imaging   None   Pharmacy appointment No pharmacy appointment needed      Other referrals No additional referrals needed        Continue Eliquis 5 mg PO every 12 hours.  May reduce to 2.5 mg every 12 hours in the future due to increased risk of falling and bleeding.     Update:  UA positive for UTI.  Will have her stop her Amoxicillin and start Augmentin 875-125 po every 12 hours x 5 days.    Collaborating Provider:  Dr. Chris Barnhart    Thank You,  Che Hernández, FNP-C  Hematology Oncology

## 2022-11-11 NOTE — TELEPHONE ENCOUNTER
----- Message from Ara Hernández NP sent at 11/11/2022 12:13 PM CST -----  Please let patient know that she does have a UTI.  Would like her to stop her amoxicillin and start Augmentin 875-125 twice daily x 5 days.  Sent to her pharmacy.    Thank you,  Ara'

## 2022-11-25 NOTE — PROGRESS NOTES
"Subjective:      Patient ID: Clau Nunn is a 87 y.o. female.    Chief Complaint: 6 week f/u (Patient saw hematologist last week and they checked urine and she had UTI again. Medication was prescribed.)      HPI  Here for follow up of medical problems.  BP good range at home.  Now B12 qod.  Breathing much better on inhaler.  Ongoing urinary pressure on urinating, s/p augmentin course given by ASHLEY Medellin.  No f/c/sw/cough.  No cp/sob/palp.    Updated/ annual due 10/23:  HM: 10/22 fluvax, 1/21 covid vaccine/booster, 11/19 HAV, 5/16 [id number], 5/17 booster [id number], 12/22 today [id number], 2/22 TDaP, 11/09 zoster, 7/22 Shingrix #2, 9/22 BMD on prolia, 1/14 Cscope no more needed, 12/22 scheduled MMG, 10/22 Eye Dr. Rubalcava.     Review of Systems   Constitutional:  Negative for chills, diaphoresis and fever.   Respiratory:  Negative for cough and shortness of breath.    Cardiovascular:  Negative for chest pain, palpitations and leg swelling.   Gastrointestinal:  Negative for blood in stool, constipation, diarrhea, nausea and vomiting.   Genitourinary:  Negative for dysuria, frequency and hematuria.   Psychiatric/Behavioral:  The patient is not nervous/anxious.        Objective:   /82   Pulse (!) 57   Temp 98.9 °F (37.2 °C) (Oral)   Ht 5' 4" (1.626 m)   Wt 76.4 kg (168 lb 6.4 oz)   SpO2 98%   BMI 28.91 kg/m²     Physical Exam  Constitutional:       Appearance: She is well-developed.   Neck:      Thyroid: No thyroid mass.      Vascular: No carotid bruit.   Cardiovascular:      Rate and Rhythm: Normal rate and regular rhythm.      Heart sounds: No murmur heard.    No friction rub. No gallop.   Pulmonary:      Effort: Pulmonary effort is normal.      Breath sounds: Normal breath sounds. No wheezing or rales.   Abdominal:      General: Bowel sounds are normal.      Palpations: Abdomen is soft. There is no mass.      Tenderness: There is no abdominal tenderness.   Musculoskeletal:      Cervical back: Neck supple. "   Lymphadenopathy:      Cervical: No cervical adenopathy.   Neurological:      Mental Status: She is alert and oriented to person, place, and time.           Assessment:       1. Essential hypertension    2. Stage 3b chronic kidney disease    3. History of DVT in adulthood    4. Simple chronic bronchitis    5. Chronic anticoagulation    6. Anxiety    7. Acquired hypothyroidism    8. Gastroesophageal reflux disease without esophagitis    9. Dysuria    10. Chronic lumbar radiculopathy          Plan:     Essential hypertension- change from los to valsartan, send me BPs in 2 weeks.  -     valsartan (DIOVAN) 80 MG tablet; Take 1 tablet (80 mg total) by mouth once daily.  Dispense: 90 tablet; Refill: 3    Stage 3b chronic kidney disease, again discussed no nsaids, and gave list.    History of DVT in adulthood, Chronic anticoagulation    Simple chronic bronchitis- doing much better, cont meds.    Anxiety- doing well, cont rx.    Acquired hypothyroidism    Gastroesophageal reflux disease without esophagitis    Dysuria, s/p augmentin course-  -     Urinalysis, Reflex to Urine Culture Urine, Clean Catch; Future; Expected date: 12/02/2022    Chronic lumbar radiculopathy- cont rx.  -     gabapentin (NEURONTIN) 300 MG capsule; Take 1 capsule (300 mg total) by mouth 3 (three) times daily.  Dispense: 270 capsule; Refill: 3    Rec PT for core strengthening/ gait stability.

## 2022-11-28 ENCOUNTER — PATIENT MESSAGE (OUTPATIENT)
Dept: HEMATOLOGY/ONCOLOGY | Facility: CLINIC | Age: 87
End: 2022-11-28
Payer: MEDICARE

## 2022-12-02 ENCOUNTER — OFFICE VISIT (OUTPATIENT)
Dept: PRIMARY CARE CLINIC | Facility: CLINIC | Age: 87
End: 2022-12-02
Payer: MEDICARE

## 2022-12-02 VITALS
BODY MASS INDEX: 28.75 KG/M2 | WEIGHT: 168.38 LBS | SYSTOLIC BLOOD PRESSURE: 130 MMHG | HEART RATE: 57 BPM | OXYGEN SATURATION: 98 % | HEIGHT: 64 IN | TEMPERATURE: 99 F | DIASTOLIC BLOOD PRESSURE: 82 MMHG

## 2022-12-02 DIAGNOSIS — M54.16 CHRONIC LUMBAR RADICULOPATHY: ICD-10-CM

## 2022-12-02 DIAGNOSIS — Z79.01 CHRONIC ANTICOAGULATION: ICD-10-CM

## 2022-12-02 DIAGNOSIS — E03.9 ACQUIRED HYPOTHYROIDISM: ICD-10-CM

## 2022-12-02 DIAGNOSIS — K21.9 GASTROESOPHAGEAL REFLUX DISEASE WITHOUT ESOPHAGITIS: ICD-10-CM

## 2022-12-02 DIAGNOSIS — F41.9 ANXIETY: ICD-10-CM

## 2022-12-02 DIAGNOSIS — Z86.718 HISTORY OF DVT IN ADULTHOOD: ICD-10-CM

## 2022-12-02 DIAGNOSIS — I10 ESSENTIAL HYPERTENSION: Primary | Chronic | ICD-10-CM

## 2022-12-02 DIAGNOSIS — J41.0 SIMPLE CHRONIC BRONCHITIS: ICD-10-CM

## 2022-12-02 DIAGNOSIS — R30.0 DYSURIA: ICD-10-CM

## 2022-12-02 DIAGNOSIS — N18.32 STAGE 3B CHRONIC KIDNEY DISEASE: ICD-10-CM

## 2022-12-02 LAB
BILIRUB UR QL STRIP: NEGATIVE
CLARITY UR REFRACT.AUTO: CLEAR
COLOR UR AUTO: YELLOW
GLUCOSE UR QL STRIP: NEGATIVE
HGB UR QL STRIP: NEGATIVE
KETONES UR QL STRIP: NEGATIVE
LEUKOCYTE ESTERASE UR QL STRIP: NEGATIVE
NITRITE UR QL STRIP: NEGATIVE
PH UR STRIP: 5 [PH] (ref 5–8)
PROT UR QL STRIP: NEGATIVE
SP GR UR STRIP: 1.02 (ref 1–1.03)
URN SPEC COLLECT METH UR: NORMAL

## 2022-12-02 PROCEDURE — 1159F MED LIST DOCD IN RCRD: CPT | Mod: CPTII,S$GLB,, | Performed by: INTERNAL MEDICINE

## 2022-12-02 PROCEDURE — 1101F PT FALLS ASSESS-DOCD LE1/YR: CPT | Mod: CPTII,S$GLB,, | Performed by: INTERNAL MEDICINE

## 2022-12-02 PROCEDURE — 1159F PR MEDICATION LIST DOCUMENTED IN MEDICAL RECORD: ICD-10-PCS | Mod: CPTII,S$GLB,, | Performed by: INTERNAL MEDICINE

## 2022-12-02 PROCEDURE — 3288F PR FALLS RISK ASSESSMENT DOCUMENTED: ICD-10-PCS | Mod: CPTII,S$GLB,, | Performed by: INTERNAL MEDICINE

## 2022-12-02 PROCEDURE — 99999 PR PBB SHADOW E&M-EST. PATIENT-LVL V: CPT | Mod: PBBFAC,,, | Performed by: INTERNAL MEDICINE

## 2022-12-02 PROCEDURE — 81003 URINALYSIS AUTO W/O SCOPE: CPT | Performed by: INTERNAL MEDICINE

## 2022-12-02 PROCEDURE — 1125F AMNT PAIN NOTED PAIN PRSNT: CPT | Mod: CPTII,S$GLB,, | Performed by: INTERNAL MEDICINE

## 2022-12-02 PROCEDURE — 1157F ADVNC CARE PLAN IN RCRD: CPT | Mod: CPTII,S$GLB,, | Performed by: INTERNAL MEDICINE

## 2022-12-02 PROCEDURE — 99214 OFFICE O/P EST MOD 30 MIN: CPT | Mod: 25,S$GLB,, | Performed by: INTERNAL MEDICINE

## 2022-12-02 PROCEDURE — G0009 PNEUMOCOCCAL CONJUGATE VACCINE 20-VALENT: ICD-10-PCS | Mod: S$GLB,,, | Performed by: INTERNAL MEDICINE

## 2022-12-02 PROCEDURE — 1125F PR PAIN SEVERITY QUANTIFIED, PAIN PRESENT: ICD-10-PCS | Mod: CPTII,S$GLB,, | Performed by: INTERNAL MEDICINE

## 2022-12-02 PROCEDURE — 3288F FALL RISK ASSESSMENT DOCD: CPT | Mod: CPTII,S$GLB,, | Performed by: INTERNAL MEDICINE

## 2022-12-02 PROCEDURE — 99214 PR OFFICE/OUTPT VISIT, EST, LEVL IV, 30-39 MIN: ICD-10-PCS | Mod: 25,S$GLB,, | Performed by: INTERNAL MEDICINE

## 2022-12-02 PROCEDURE — 99999 PR PBB SHADOW E&M-EST. PATIENT-LVL V: ICD-10-PCS | Mod: PBBFAC,,, | Performed by: INTERNAL MEDICINE

## 2022-12-02 PROCEDURE — 90677 PCV20 VACCINE IM: CPT | Mod: S$GLB,,, | Performed by: INTERNAL MEDICINE

## 2022-12-02 PROCEDURE — 90677 PNEUMOCOCCAL CONJUGATE VACCINE 20-VALENT: ICD-10-PCS | Mod: S$GLB,,, | Performed by: INTERNAL MEDICINE

## 2022-12-02 PROCEDURE — 1157F PR ADVANCE CARE PLAN OR EQUIV PRESENT IN MEDICAL RECORD: ICD-10-PCS | Mod: CPTII,S$GLB,, | Performed by: INTERNAL MEDICINE

## 2022-12-02 PROCEDURE — 1101F PR PT FALLS ASSESS DOC 0-1 FALLS W/OUT INJ PAST YR: ICD-10-PCS | Mod: CPTII,S$GLB,, | Performed by: INTERNAL MEDICINE

## 2022-12-02 PROCEDURE — G0009 ADMIN PNEUMOCOCCAL VACCINE: HCPCS | Mod: S$GLB,,, | Performed by: INTERNAL MEDICINE

## 2022-12-02 RX ORDER — GABAPENTIN 300 MG/1
300 CAPSULE ORAL 3 TIMES DAILY
Qty: 270 CAPSULE | Refills: 3 | Status: SHIPPED | OUTPATIENT
Start: 2022-12-02 | End: 2023-06-28 | Stop reason: ALTCHOICE

## 2022-12-02 RX ORDER — VALSARTAN 80 MG/1
80 TABLET ORAL DAILY
Qty: 90 TABLET | Refills: 3 | Status: SHIPPED | OUTPATIENT
Start: 2022-12-02 | End: 2023-10-08 | Stop reason: SDUPTHER

## 2022-12-05 ENCOUNTER — OFFICE VISIT (OUTPATIENT)
Dept: PODIATRY | Facility: CLINIC | Age: 87
End: 2022-12-05
Payer: MEDICARE

## 2022-12-05 ENCOUNTER — PATIENT MESSAGE (OUTPATIENT)
Dept: PRIMARY CARE CLINIC | Facility: CLINIC | Age: 87
End: 2022-12-05
Payer: MEDICARE

## 2022-12-05 DIAGNOSIS — L60.3 ONYCHODYSTROPHY: ICD-10-CM

## 2022-12-05 DIAGNOSIS — Z79.01 CURRENT USE OF LONG TERM ANTICOAGULATION: Primary | ICD-10-CM

## 2022-12-05 DIAGNOSIS — Z86.718 HISTORY OF DVT (DEEP VEIN THROMBOSIS): ICD-10-CM

## 2022-12-05 DIAGNOSIS — I87.2 CHRONIC VENOUS INSUFFICIENCY: Chronic | ICD-10-CM

## 2022-12-05 PROCEDURE — 3288F PR FALLS RISK ASSESSMENT DOCUMENTED: ICD-10-PCS | Mod: CPTII,S$GLB,, | Performed by: PODIATRIST

## 2022-12-05 PROCEDURE — 3288F FALL RISK ASSESSMENT DOCD: CPT | Mod: CPTII,S$GLB,, | Performed by: PODIATRIST

## 2022-12-05 PROCEDURE — 99202 OFFICE O/P NEW SF 15 MIN: CPT | Mod: 25,S$GLB,, | Performed by: PODIATRIST

## 2022-12-05 PROCEDURE — 1160F RVW MEDS BY RX/DR IN RCRD: CPT | Mod: CPTII,S$GLB,, | Performed by: PODIATRIST

## 2022-12-05 PROCEDURE — 1157F ADVNC CARE PLAN IN RCRD: CPT | Mod: CPTII,S$GLB,, | Performed by: PODIATRIST

## 2022-12-05 PROCEDURE — 1101F PR PT FALLS ASSESS DOC 0-1 FALLS W/OUT INJ PAST YR: ICD-10-PCS | Mod: CPTII,S$GLB,, | Performed by: PODIATRIST

## 2022-12-05 PROCEDURE — 99202 PR OFFICE/OUTPT VISIT, NEW, LEVL II, 15-29 MIN: ICD-10-PCS | Mod: 25,S$GLB,, | Performed by: PODIATRIST

## 2022-12-05 PROCEDURE — 1157F PR ADVANCE CARE PLAN OR EQUIV PRESENT IN MEDICAL RECORD: ICD-10-PCS | Mod: CPTII,S$GLB,, | Performed by: PODIATRIST

## 2022-12-05 PROCEDURE — 1160F PR REVIEW ALL MEDS BY PRESCRIBER/CLIN PHARMACIST DOCUMENTED: ICD-10-PCS | Mod: CPTII,S$GLB,, | Performed by: PODIATRIST

## 2022-12-05 PROCEDURE — 1159F PR MEDICATION LIST DOCUMENTED IN MEDICAL RECORD: ICD-10-PCS | Mod: CPTII,S$GLB,, | Performed by: PODIATRIST

## 2022-12-05 PROCEDURE — 99999 PR PBB SHADOW E&M-EST. PATIENT-LVL III: CPT | Mod: PBBFAC,,, | Performed by: PODIATRIST

## 2022-12-05 PROCEDURE — 1159F MED LIST DOCD IN RCRD: CPT | Mod: CPTII,S$GLB,, | Performed by: PODIATRIST

## 2022-12-05 PROCEDURE — 11721 DEBRIDE NAIL 6 OR MORE: CPT | Mod: Q9,S$GLB,, | Performed by: PODIATRIST

## 2022-12-05 PROCEDURE — 1101F PT FALLS ASSESS-DOCD LE1/YR: CPT | Mod: CPTII,S$GLB,, | Performed by: PODIATRIST

## 2022-12-05 PROCEDURE — 11721 PR DEBRIDEMENT OF NAILS, 6 OR MORE: ICD-10-PCS | Mod: Q9,S$GLB,, | Performed by: PODIATRIST

## 2022-12-05 PROCEDURE — 99999 PR PBB SHADOW E&M-EST. PATIENT-LVL III: ICD-10-PCS | Mod: PBBFAC,,, | Performed by: PODIATRIST

## 2022-12-05 NOTE — PROGRESS NOTES
Subjective:       Patient ID: Clau Nunn is a 87 y.o. female.    Chief Complaint: Nail Problem (Patient reports bilateral hallux nails curving and causing pain after pedicure. Patient continues eliquis daily. )      HPI: Patient presents to the office today with the chief complaint of elongated, thickened and dystrophic nail plates to the B/L foot.  Patient does have a past medical history long-term anticoagulation use secondary to history of DVT in chronic venous insufficiency.. Patient does follow with Primary Care for management of comorbid states. This patient's PMD is Jeanette Gomez MD. This patient last saw his/her primary care provider on 12/02/2022.    Review of patient's allergies indicates:   Allergen Reactions    Cephalexin Hives, Itching, Swelling, Anxiety, Dermatitis and Rash       Past Medical History:   Diagnosis Date    Allergy     Anxiety     Asthma     Back pain     Chronic venous insufficiency 11/19/2013    Depression     Diverticulosis 11/19/2013 1/8/8 colonoscopy    Dry eyes     Fracture, sacrum/coccyx     Dr. Sanchez     GERD (gastroesophageal reflux disease)     H/O total hip arthroplasty 12/30/2015    Hypertension     Hypothyroid     Osteoarthritis     Osteoporosis     Postlaminectomy syndrome of lumbar region 1/8/2014    Radius fracture     7/18    Simple chronic bronchitis 5/3/2017    Umbilical hernia 11/19/2013    Urinary incontinence     Vitamin D deficiency disease        Family History   Problem Relation Age of Onset    COPD Mother     Cancer Mother         lung CA    Pulmonary fibrosis Sister     Cancer Daughter         colon    Stroke Father     Diabetes Son     Melanoma Neg Hx     Psoriasis Neg Hx     Lupus Neg Hx        Social History     Socioeconomic History    Marital status:    Occupational History    Occupation: retired     Comment: [employer]   Tobacco Use    Smoking status: Never    Smokeless tobacco: Never   Substance and Sexual Activity     Alcohol use: Yes     Alcohol/week: 0.0 standard drinks     Comment: rarely    Drug use: Never    Sexual activity: Never     Partners: Male     Birth control/protection: Post-menopausal   Social History Narrative    Patient is retired from [employer]     Social Determinants of Health     Financial Resource Strain: Low Risk     Difficulty of Paying Living Expenses: Not hard at all   Food Insecurity: No Food Insecurity    Worried About Running Out of Food in the Last Year: Never true    Ran Out of Food in the Last Year: Never true   Transportation Needs: No Transportation Needs    Lack of Transportation (Medical): No    Lack of Transportation (Non-Medical): No   Physical Activity: Inactive    Days of Exercise per Week: 0 days    Minutes of Exercise per Session: 0 min   Stress: No Stress Concern Present    Feeling of Stress : Only a little   Social Connections: Moderately Integrated    Frequency of Communication with Friends and Family: More than three times a week    Frequency of Social Gatherings with Friends and Family: More than three times a week    Attends Bahai Services: More than 4 times per year    Active Member of Clubs or Organizations: No    Attends Club or Organization Meetings: More than 4 times per year    Marital Status:    Housing Stability: Unknown    Unable to Pay for Housing in the Last Year: No    Unstable Housing in the Last Year: No       Past Surgical History:   Procedure Laterality Date    ADENOIDECTOMY      BACK SURGERY      x2    breast implants  1973    CATARACT EXTRACTION Bilateral     CLOSED REDUCTION DISTAL RADIUS FRACTURE      Dr. Black, 8/18    cystoscope  1/6/16    DR. Brown    FRACTURE SURGERY  April 3 2015    left wrist    HAND SURGERY      HIP SURGERY Right     total hip- Dr. Dos Santos    HYSTERECTOMY      36y/o    INJECTION OF ANESTHETIC AGENT AROUND NERVE Right 9/16/2020    Procedure: Right suprascapular nerve block;  Surgeon: Raffi Wells MD;  Location:  [facility];  Service: Pain Management;  Laterality: Right;    INJECTION OF ANESTHETIC AGENT AROUND NERVE Right 7/13/2021    Procedure: Block, Nerve Right Suprascapular Nerve Block with RN IV sedation;  Surgeon: Raffi Wells MD;  Location: [facility];  Service: Pain Management;  Laterality: Right;    INJECTION OF ANESTHETIC AGENT INTO SACROILIAC JOINT N/A 8/12/2020    Procedure: Left IA hip Joint + Left SIJ + Right shoulder injection;  Surgeon: Raffi Wells MD;  Location: [facility];  Service: Pain Management;  Laterality: N/A;    INJECTION OF JOINT N/A 8/12/2020    Procedure: Left IA hip Joint + Left SIJ + Right shoulder injection;  Surgeon: Raffi Wells MD;  Location: [facility];  Service: Pain Management;  Laterality: N/A;    JOINT REPLACEMENT Right     R NNAMDI - Dr. Dos Santos    LEG SURGERY Right     ex-fix tib/fib    SELECTIVE INJECTION OF ANESTHETIC AGENT AROUND LUMBAR SPINAL NERVE ROOT BY TRANSFORAMINAL APPROACH Bilateral 2/10/2021    Procedure: Bilateral L5/S1 TF REJI;  Surgeon: Raffi Wells MD;  Location: [facility];  Service: Pain Management;  Laterality: Bilateral;    SELECTIVE INJECTION OF ANESTHETIC AGENT AROUND LUMBAR SPINAL NERVE ROOT BY TRANSFORAMINAL APPROACH Bilateral 5/25/2022    Procedure: Bilateral L5/S1 + S1 TF REJI;  Surgeon: Raffi Wells MD;  Location: [facility];  Service: Pain Management;  Laterality: Bilateral;    TONSILLECTOMY      TRANSFORAMINAL EPIDURAL INJECTION OF STEROID Left 7/8/2020    Procedure: left L2/3 + L5/S1 TF REJI;  Surgeon: Raffi Wells MD;  Location: [facility];  Service: Pain Management;  Laterality: Left;    TRANSFORAMINAL EPIDURAL INJECTION OF STEROID Left 7/1/2021    Procedure: left L5/S1 + S1 TF REJI;  Surgeon: Raffi Wells MD;  Location: [facility];  Service: Pain Management;  Laterality: Left;       Review of Systems       Objective:   There were no vitals taken for this visit.    Physical Exam  LOWER EXTREMITY PHYSICAL EXAMINATION    VASCULAR:  The  right dorsalis pedis pulse 2/4 and the right posterior tibial pulse 1/4.  The left dorsalis pedis pulse 2/4 and posterior tibial pulse on the left is 1/4.  Capillary refill is intact.  Pedal hair growth decreased.  Varicosities and telangiectasias noted to the bilateral lower extremities.      NEUROLOGY:  Sharp/dull, light touch, proprioception all intact and equal bilaterally.  Vibratory sensation is intact.  Protective sensation intact    DERMATOLOGY:  Skin is supple, moist, intact.  There is no signs of callusing, ulcerations, other lesions identified to the dorsal or plantar aspect of the right or left foot.  The R1, 2, 5 and left L1,2, 5 are thickened, discolored dystrophic.  There is subungual debris.  Nail plates have area of dark discoloration.  The remaining nails 3-4 on the right foot and the left foot are elongated but of normal color, thickness, and texture.   There is no signs of ingrowing into the medial or lateral borders.  There is no evidence of wounds or skin breakdown.  No edema or erythema.  No obvious lacerations or fissuring.  Interdigital spaces are clean, dry, intact.  No rashes or scars appreciated.    ORTHOPEDIC: Manual Muscle Testing is 5/5 in all planes on the left and right, without pains, with and without resistance. Gait pattern is non-antalgic.    Assessment:     1. Current use of long term anticoagulation    2. History of DVT (deep vein thrombosis)    3. Chronic venous insufficiency    4. Onychodystrophy        Plan:     Current use of long term anticoagulation    History of DVT (deep vein thrombosis)    Chronic venous insufficiency    Onychodystrophy        Foot counseling and education is provided at this visit. Patient is advised to wear socks and shoes at all times.  Do not walk barefoot, or with just socks, even when indoors.  Be sure to check and inspect the inside of the shoe before putting them on her feet.  Protect your feet at all times.  Walking shoes and/or athletic shoes  are the best types of shoe gear. Do not wear vinyl or plastic type shoe gear, as they do not stretch and/or breathe.  Protect your feet from hot and/or cold. Elevate the extremities when sitting.  Do not wear excessively tight socks and/or shoe gear. Wiggle your toes for a few minutes throughout the day. Move your ankles up and down, in and out, to help blood flow in your lower extremity.    Dystrophic nail plates, as outlined above (R#1,2,5  ; L#1,2,5 ), are sharply debrided with double action nail nipper, and/or with the assistance of a mechanical rotary gamal, with removal of all offending nail and nail border(s), for reduction of pains. Nails are reduced in terms of length, width and girth with removal of subungual debris to facilitate pain free weight bearing and ambulation. The elongated nails as outlined in the objective portion of this note, were trimmed to appropriate length, with a double action nail nipper, for alleviation/reduction of pains as well. Follow up in approx. 3-4 months.          Future Appointments   Date Time Provider Department Center   12/20/2022 11:00 AM Kd Varela MD [facility] ORTHO [facility]   1/26/2023 10:00 AM LABORATORY, [facility]   2/2/2023 10:00 AM Harika Ceron PA-C [facility] RHEUM [facility]   2/2/2023 10:30 AM INJECTION 1, [facility] INFUSION [facility] INFSN [facility]   2/8/2023 10:20 AM Yanna Lebron MD [facility]   10/24/2023  8:30 AM [facility]

## 2022-12-20 ENCOUNTER — OFFICE VISIT (OUTPATIENT)
Dept: ORTHOPEDICS | Facility: CLINIC | Age: 87
End: 2022-12-20
Payer: MEDICARE

## 2022-12-20 DIAGNOSIS — M19.111 POST-TRAUMATIC OSTEOARTHRITIS OF RIGHT SHOULDER: Primary | ICD-10-CM

## 2022-12-20 PROCEDURE — 3288F FALL RISK ASSESSMENT DOCD: CPT | Mod: CPTII,S$GLB,, | Performed by: STUDENT IN AN ORGANIZED HEALTH CARE EDUCATION/TRAINING PROGRAM

## 2022-12-20 PROCEDURE — 1160F PR REVIEW ALL MEDS BY PRESCRIBER/CLIN PHARMACIST DOCUMENTED: ICD-10-PCS | Mod: CPTII,S$GLB,, | Performed by: STUDENT IN AN ORGANIZED HEALTH CARE EDUCATION/TRAINING PROGRAM

## 2022-12-20 PROCEDURE — 1157F ADVNC CARE PLAN IN RCRD: CPT | Mod: CPTII,S$GLB,, | Performed by: STUDENT IN AN ORGANIZED HEALTH CARE EDUCATION/TRAINING PROGRAM

## 2022-12-20 PROCEDURE — 1160F RVW MEDS BY RX/DR IN RCRD: CPT | Mod: CPTII,S$GLB,, | Performed by: STUDENT IN AN ORGANIZED HEALTH CARE EDUCATION/TRAINING PROGRAM

## 2022-12-20 PROCEDURE — 3288F PR FALLS RISK ASSESSMENT DOCUMENTED: ICD-10-PCS | Mod: CPTII,S$GLB,, | Performed by: STUDENT IN AN ORGANIZED HEALTH CARE EDUCATION/TRAINING PROGRAM

## 2022-12-20 PROCEDURE — 1159F MED LIST DOCD IN RCRD: CPT | Mod: CPTII,S$GLB,, | Performed by: STUDENT IN AN ORGANIZED HEALTH CARE EDUCATION/TRAINING PROGRAM

## 2022-12-20 PROCEDURE — 1101F PR PT FALLS ASSESS DOC 0-1 FALLS W/OUT INJ PAST YR: ICD-10-PCS | Mod: CPTII,S$GLB,, | Performed by: STUDENT IN AN ORGANIZED HEALTH CARE EDUCATION/TRAINING PROGRAM

## 2022-12-20 PROCEDURE — 1101F PT FALLS ASSESS-DOCD LE1/YR: CPT | Mod: CPTII,S$GLB,, | Performed by: STUDENT IN AN ORGANIZED HEALTH CARE EDUCATION/TRAINING PROGRAM

## 2022-12-20 PROCEDURE — 99999 PR PBB SHADOW E&M-EST. PATIENT-LVL III: ICD-10-PCS | Mod: PBBFAC,,, | Performed by: STUDENT IN AN ORGANIZED HEALTH CARE EDUCATION/TRAINING PROGRAM

## 2022-12-20 PROCEDURE — 99213 PR OFFICE/OUTPT VISIT, EST, LEVL III, 20-29 MIN: ICD-10-PCS | Mod: 25,S$GLB,, | Performed by: STUDENT IN AN ORGANIZED HEALTH CARE EDUCATION/TRAINING PROGRAM

## 2022-12-20 PROCEDURE — 99999 PR PBB SHADOW E&M-EST. PATIENT-LVL III: CPT | Mod: PBBFAC,,, | Performed by: STUDENT IN AN ORGANIZED HEALTH CARE EDUCATION/TRAINING PROGRAM

## 2022-12-20 PROCEDURE — 1157F PR ADVANCE CARE PLAN OR EQUIV PRESENT IN MEDICAL RECORD: ICD-10-PCS | Mod: CPTII,S$GLB,, | Performed by: STUDENT IN AN ORGANIZED HEALTH CARE EDUCATION/TRAINING PROGRAM

## 2022-12-20 PROCEDURE — 20610 LARGE JOINT ASPIRATION/INJECTION: R GLENOHUMERAL: ICD-10-PCS | Mod: RT,S$GLB,, | Performed by: STUDENT IN AN ORGANIZED HEALTH CARE EDUCATION/TRAINING PROGRAM

## 2022-12-20 PROCEDURE — 99213 OFFICE O/P EST LOW 20 MIN: CPT | Mod: 25,S$GLB,, | Performed by: STUDENT IN AN ORGANIZED HEALTH CARE EDUCATION/TRAINING PROGRAM

## 2022-12-20 PROCEDURE — 1159F PR MEDICATION LIST DOCUMENTED IN MEDICAL RECORD: ICD-10-PCS | Mod: CPTII,S$GLB,, | Performed by: STUDENT IN AN ORGANIZED HEALTH CARE EDUCATION/TRAINING PROGRAM

## 2022-12-20 PROCEDURE — 20610 DRAIN/INJ JOINT/BURSA W/O US: CPT | Mod: RT,S$GLB,, | Performed by: STUDENT IN AN ORGANIZED HEALTH CARE EDUCATION/TRAINING PROGRAM

## 2022-12-20 RX ORDER — TRIAMCINOLONE ACETONIDE 40 MG/ML
40 INJECTION, SUSPENSION INTRA-ARTICULAR; INTRAMUSCULAR
Status: DISCONTINUED | OUTPATIENT
Start: 2022-12-20 | End: 2022-12-20 | Stop reason: HOSPADM

## 2022-12-20 RX ADMIN — TRIAMCINOLONE ACETONIDE 40 MG: 40 INJECTION, SUSPENSION INTRA-ARTICULAR; INTRAMUSCULAR at 11:12

## 2022-12-20 NOTE — PROGRESS NOTES
Orthopaedic Follow-Up Visit    Last Appointment: 10/21/22  Diagnosis: Right shoulder post-traumatic osteoarthritis   Prior Procedure: Right [facility] CSI (9/9/22)    Clau Nunn is a 87 y.o. female who is here for f/u evaluation of her right shoulder. The patient was last seen here by me on 10/21/22 at which point she had gotten good relief with previous CSI and was happy with her progress thus far. The patient returns today reporting that the symptoms have persisted and is interested in discussing further treatment options.     To review her history, To review her history, Clau Nunn is a 87 y.o. right-hand dominant female who presented with RIGHT shoulder pain and dysfunction that began about 2.5 months ago when she fell onto her right shoulder while here at the [place]. She underwent non operative management for proximal humerus fracture by Dr. Murcia at [facility] years ago.   XR showed arthritis in her right shoulder but were negative for fracture. She was felt to have had an exacerbation of her arthritis due to her fall. We proceeded with right shoulder glenohumeral corticosteroid injection for which she got good relief.    Patient's medications, allergies, past medical, surgical, social and family histories were reviewed and updated as appropriate.    Review of Systems   All systems reviewed were negative.  Specifically, the patient denies fever, chills, weight loss, chest pain, shortness of breath, or dyspnea on exertion.      Past Medical History:   Diagnosis Date    Allergy     Anxiety     Asthma     Back pain     Chronic venous insufficiency 11/19/2013    Depression     Diverticulosis 11/19/2013 1/8/8 colonoscopy    Dry eyes     Fracture, sacrum/coccyx     Dr. Sanchez     GERD (gastroesophageal reflux disease)     H/O total hip arthroplasty 12/30/2015    Hypertension     Hypothyroid     Osteoarthritis     Osteoporosis     Postlaminectomy syndrome of lumbar region 1/8/2014     Radius fracture     7/18    Simple chronic bronchitis 5/3/2017    Umbilical hernia 11/19/2013    Urinary incontinence     Vitamin D deficiency disease        Past Surgical History:   Procedure Laterality Date    ADENOIDECTOMY      BACK SURGERY      x2    breast implants  1973    CATARACT EXTRACTION Bilateral     CLOSED REDUCTION DISTAL RADIUS FRACTURE      Dr. Black, 8/18    cystoscope  1/6/16    DR. Brown    FRACTURE SURGERY  April 3 2015    left wrist    HAND SURGERY      HIP SURGERY Right     total hip- Dr. Dos Santos    HYSTERECTOMY      36y/o    INJECTION OF ANESTHETIC AGENT AROUND NERVE Right 9/16/2020    Procedure: Right suprascapular nerve block;  Surgeon: Raffi Wells MD;  Location: [facility];  Service: Pain Management;  Laterality: Right;    INJECTION OF ANESTHETIC AGENT AROUND NERVE Right 7/13/2021    Procedure: Block, Nerve Right Suprascapular Nerve Block with RN IV sedation;  Surgeon: Raffi Wells MD;  Location: [facility];  Service: Pain Management;  Laterality: Right;    INJECTION OF ANESTHETIC AGENT INTO SACROILIAC JOINT N/A 8/12/2020    Procedure: Left IA hip Joint + Left SIJ + Right shoulder injection;  Surgeon: Raffi Wells MD;  Location: [facility];  Service: Pain Management;  Laterality: N/A;    INJECTION OF JOINT N/A 8/12/2020    Procedure: Left IA hip Joint + Left SIJ + Right shoulder injection;  Surgeon: Raffi Wells MD;  Location: [facility];  Service: Pain Management;  Laterality: N/A;    JOINT REPLACEMENT Right     R NNAMDI - Dr. Dos Santos    LEG SURGERY Right     ex-fix tib/fib    SELECTIVE INJECTION OF ANESTHETIC AGENT AROUND LUMBAR SPINAL NERVE ROOT BY TRANSFORAMINAL APPROACH Bilateral 2/10/2021    Procedure: Bilateral L5/S1 TF REJI;  Surgeon: Raffi Wells MD;  Location: [facility];  Service: Pain Management;  Laterality: Bilateral;    SELECTIVE INJECTION OF ANESTHETIC AGENT AROUND LUMBAR SPINAL NERVE ROOT BY TRANSFORAMINAL APPROACH Bilateral 5/25/2022    Procedure: Bilateral  L5/S1 + S1 TF REJI;  Surgeon: Raffi Wells MD;  Location: [facility]IN [facility]GT;  Service: Pain Management;  Laterality: Bilateral;    TONSILLECTOMY      TRANSFORAMINAL EPIDURAL INJECTION OF STEROID Left 7/8/2020    Procedure: left L2/3 + L5/S1 TF REJI;  Surgeon: Raffi Wells MD;  Location: [facility];  Service: Pain Management;  Laterality: Left;    TRANSFORAMINAL EPIDURAL INJECTION OF STEROID Left 7/1/2021    Procedure: left L5/S1 + S1 TF REJI;  Surgeon: Raffi Wells MD;  Location: [facility];  Service: Pain Management;  Laterality: Left;       Patient's Medications   New Prescriptions    No medications on file   Previous Medications    ALBUTEROL (PROVENTIL) 2.5 MG /3 ML (0.083 %) NEBULIZER SOLUTION    Take 3 mLs (2.5 mg total) by nebulization every 6 (six) hours as needed for Wheezing. Rescue    APIXABAN (ELIQUIS) 5 MG TAB    Take 1 tablet (5 mg total) by mouth 2 (two) times daily.    AZELASTINE (ASTELIN) 137 MCG (0.1 %) NASAL SPRAY    2 sprays by Nasal route 2 (two) times daily.    BUSPIRONE (BUSPAR) 5 MG TAB    TAKE 1 TABLET TWICE DAILY    CHOLECALCIFEROL, VITAMIN D3, (D3-2000) 50 MCG (2,000 UNIT) CAP    Take 1 capsule (2,000 Units total) by mouth once daily.    CLOBETASOL (TEMOVATE) 0.05 % EXTERNAL SOLUTION    Pt to mix in 1 jar of cerave cream and apply to affected areas bid for 2-4 weeks per course    CYCLOBENZAPRINE (FLEXERIL) 5 MG TABLET    TAKE 1 TABLET (5 MG TOTAL) BY MOUTH NIGHTLY AS NEEDED FOR MUSCLE SPASMS.    CYCLOSPORINE (RESTASIS MULTIDOSE) 0.05 % DROP    Place 1 drop into both eyes every 12 (twelve) hours.    ESCITALOPRAM OXALATE (LEXAPRO) 20 MG TABLET    TAKE 1 TABLET EVERY DAY    FLUOCINONIDE 0.05% (LIDEX) 0.05 % CREAM    AAA bid to leg for 2-4 weeks per course    FLUTICASONE PROPIONATE (FLONASE) 50 MCG/ACTUATION NASAL SPRAY        GABAPENTIN (NEURONTIN) 300 MG CAPSULE    Take 1 capsule (300 mg total) by mouth 3 (three) times daily.    LEVOTHYROXINE (SYNTHROID) 100 MCG TABLET    Take 1 tablet (100  mcg total) by mouth once daily.    MUPIROCIN (BACTROBAN) 2 % OINTMENT    Apply topically 2 (two) times daily. To wound on right lower leg    OMEPRAZOLE (PRILOSEC) 40 MG CAPSULE    Take 1 capsule (40 mg total) by mouth every morning.    OXYBUTYNIN (DITROPAN-XL) 5 MG TR24    Take 1 tablet (5 mg total) by mouth once daily.    TRAZODONE (DESYREL) 50 MG TABLET    TAKE 1 TABLET (50 MG TOTAL) BY MOUTH NIGHTLY AS NEEDED FOR INSOMNIA.    TRIAMCINOLONE ACETONIDE 0.025% (KENALOG) 0.025 % CREAM    AAA bid    VALSARTAN (DIOVAN) 80 MG TABLET    Take 1 tablet (80 mg total) by mouth once daily.   Modified Medications    No medications on file   Discontinued Medications    No medications on file       Family History   Problem Relation Age of Onset    COPD Mother     Cancer Mother         lung CA    Pulmonary fibrosis Sister     Cancer Daughter         colon    Stroke Father     Diabetes Son     Melanoma Neg Hx     Psoriasis Neg Hx     Lupus Neg Hx        Review of patient's allergies indicates:   Allergen Reactions    Cephalexin Hives, Itching, Swelling, Anxiety, Dermatitis and Rash         Objective:      Physical Exam  Patient is alert and oriented, no distress. Skin is intact. Neuro is normal with no focal motor or sensory findings.    Cervical exam is unremarkable. Intact cervical ROM. Negative Spurling's test    Physical Exam:                       RIGHT                                     LEFT     Scap Dyskinesis/Winging       (-)                                             (-)     Tenderness:                                                                              Greater Tuberosity                  +                                              (-)  Bicipital Groove                       +                                              (-)  AC joint                                   (-)                                             (-)  Other:      ROM:  Forward Elevation       80                                             150  Abduction                    60                                            110  ER (at side)                 10                                            50        Strength:   Supraspinatus             3/5                                           5/5  Infraspinatus               3/5                                           5/5  Subscap / IR               4/5                                           5/5      Special Tests:              Neer:                                       +                                              (-)              Valverde:                                 +                                              (-)              SS Stress:                               +                                              (-)              Bear Hug:                                (-)                                             (-)              Giles's:                                 +                                              (-)              Resisted Thrower's:                +                                              (-)    Neurovascular examination  - Motor grossly intact bilaterally to shoulder abduction, elbow flexion and extension, wrist flexion and extension, and intrinsic hand musculature  - Sensation intact to light touch bilaterally in axillary, median, radial, and ulnar distributions  - Symmetrical radial pulses    Imaging:    XR Results:  Results for orders placed during the hospital encounter of 08/15/22    X-ray Shoulder 2 or More Views Right    Narrative  EXAMINATION:  XR SHOULDER COMPLETE 2 OR MORE VIEWS RIGHT    CLINICAL HISTORY:  Pain in right shoulder    TECHNIQUE:  Two or three views of the right shoulder were performed.    COMPARISON:  08/05/2020    FINDINGS:  Osteopenia.  No acute fracture or dislocation.  Chronic fracture deformity noted of the proximal humerus with prominent glenohumeral joint degenerative findings.  Minimal AC joint degenerative changes.  Lung  parenchyma clear.    Impression  As above      Electronically signed by: Marc Holloway MD  Date:    08/15/2022  Time:    15:50      MRI Results:  No results found for this or any previous visit.      CT Results:  No results found for this or any previous visit.      Physician read: I agree with the above impression.    Assessment/Plan:   Clau Nunn is a 87 y.o. female with right proximal humerus malunion, post-traumatic osteoarthritis     Plan:    She has known malunion and post-traumatic osteoarthritis. She has gotten good relief with prior corticosteroid injection though she is still symptomatic.  Currently, her symptoms are manageable and she is able to perform activities of daily living.   Given scapular strengthening home exercise program today.  Right glenohumeral shoulder CSI performed today  Follow up as needed        Kd Varela MD    I, Valeri Rosenberg, acted as a scribe for Kd Varela MD for the duration of this office visit.

## 2022-12-20 NOTE — PROCEDURES
Large Joint Aspiration/Injection: R glenohumeral    Date/Time: 12/20/2022 11:00 AM  Performed by: Kd Varela MD  Authorized by: Kd Varela MD     Consent Done?:  Yes (Verbal)  Indications:  Pain  Site marked: the procedure site was marked    Timeout: prior to procedure the correct patient, procedure, and site was verified    Prep: patient was prepped and draped in usual sterile fashion      Local anesthesia used?: Yes    Anesthesia:  Local infiltration  Local anesthetic:  Lidocaine 1% without epinephrine    Details:  Needle Size:  22 G  Ultrasonic Guidance for needle placement?: No    Approach:  Posterior  Location:  Shoulder  Site:  R glenohumeral  Medications:  40 mg triamcinolone acetonide 40 mg/mL  Patient tolerance:  Patient tolerated the procedure well with no immediate complications

## 2023-01-10 ENCOUNTER — OFFICE VISIT (OUTPATIENT)
Dept: PAIN MEDICINE | Facility: CLINIC | Age: 88
End: 2023-01-10
Payer: MEDICARE

## 2023-01-10 VITALS
SYSTOLIC BLOOD PRESSURE: 159 MMHG | HEART RATE: 56 BPM | HEIGHT: 64 IN | WEIGHT: 160.38 LBS | BODY MASS INDEX: 27.38 KG/M2 | DIASTOLIC BLOOD PRESSURE: 74 MMHG

## 2023-01-10 DIAGNOSIS — M46.1 SACROILIITIS: Primary | ICD-10-CM

## 2023-01-10 DIAGNOSIS — M54.50 LUMBOSACRAL PAIN: ICD-10-CM

## 2023-01-10 DIAGNOSIS — M51.36 DDD (DEGENERATIVE DISC DISEASE), LUMBAR: ICD-10-CM

## 2023-01-10 PROCEDURE — 3288F FALL RISK ASSESSMENT DOCD: CPT | Mod: HCNC,CPTII,S$GLB, | Performed by: PHYSICIAN ASSISTANT

## 2023-01-10 PROCEDURE — 1157F ADVNC CARE PLAN IN RCRD: CPT | Mod: HCNC,CPTII,S$GLB, | Performed by: PHYSICIAN ASSISTANT

## 2023-01-10 PROCEDURE — 1100F PTFALLS ASSESS-DOCD GE2>/YR: CPT | Mod: HCNC,CPTII,S$GLB, | Performed by: PHYSICIAN ASSISTANT

## 2023-01-10 PROCEDURE — 1160F PR REVIEW ALL MEDS BY PRESCRIBER/CLIN PHARMACIST DOCUMENTED: ICD-10-PCS | Mod: HCNC,CPTII,S$GLB, | Performed by: PHYSICIAN ASSISTANT

## 2023-01-10 PROCEDURE — 1125F AMNT PAIN NOTED PAIN PRSNT: CPT | Mod: HCNC,CPTII,S$GLB, | Performed by: PHYSICIAN ASSISTANT

## 2023-01-10 PROCEDURE — 99999 PR PBB SHADOW E&M-EST. PATIENT-LVL IV: CPT | Mod: PBBFAC,HCNC,, | Performed by: PHYSICIAN ASSISTANT

## 2023-01-10 PROCEDURE — 1159F MED LIST DOCD IN RCRD: CPT | Mod: HCNC,CPTII,S$GLB, | Performed by: PHYSICIAN ASSISTANT

## 2023-01-10 PROCEDURE — 99999 PR PBB SHADOW E&M-EST. PATIENT-LVL IV: ICD-10-PCS | Mod: PBBFAC,HCNC,, | Performed by: PHYSICIAN ASSISTANT

## 2023-01-10 PROCEDURE — 1125F PR PAIN SEVERITY QUANTIFIED, PAIN PRESENT: ICD-10-PCS | Mod: HCNC,CPTII,S$GLB, | Performed by: PHYSICIAN ASSISTANT

## 2023-01-10 PROCEDURE — 1160F RVW MEDS BY RX/DR IN RCRD: CPT | Mod: HCNC,CPTII,S$GLB, | Performed by: PHYSICIAN ASSISTANT

## 2023-01-10 PROCEDURE — 1100F PR PT FALLS ASSESS DOC 2+ FALLS/FALL W/INJURY/YR: ICD-10-PCS | Mod: HCNC,CPTII,S$GLB, | Performed by: PHYSICIAN ASSISTANT

## 2023-01-10 PROCEDURE — 1157F PR ADVANCE CARE PLAN OR EQUIV PRESENT IN MEDICAL RECORD: ICD-10-PCS | Mod: HCNC,CPTII,S$GLB, | Performed by: PHYSICIAN ASSISTANT

## 2023-01-10 PROCEDURE — 1159F PR MEDICATION LIST DOCUMENTED IN MEDICAL RECORD: ICD-10-PCS | Mod: HCNC,CPTII,S$GLB, | Performed by: PHYSICIAN ASSISTANT

## 2023-01-10 PROCEDURE — 99214 PR OFFICE/OUTPT VISIT, EST, LEVL IV, 30-39 MIN: ICD-10-PCS | Mod: HCNC,S$GLB,, | Performed by: PHYSICIAN ASSISTANT

## 2023-01-10 PROCEDURE — 3288F PR FALLS RISK ASSESSMENT DOCUMENTED: ICD-10-PCS | Mod: HCNC,CPTII,S$GLB, | Performed by: PHYSICIAN ASSISTANT

## 2023-01-10 PROCEDURE — 99214 OFFICE O/P EST MOD 30 MIN: CPT | Mod: HCNC,S$GLB,, | Performed by: PHYSICIAN ASSISTANT

## 2023-01-10 NOTE — PROGRESS NOTES
"Chief Pain Complaint:  Chief Complaint   Patient presents with    Low-back Pain    Back Pain    Hip Pain     Left hip     Interval History (1/10/2023):  Clau Nunn presents today for follow-up visit.  Patient was last seen about 6 months ago. At that visit, she was feeling better since REJI, but she was having localized SIJ pain - still overall better since injection. She returns today with continued posterior "hip pain". Pain is worse on left. Patient reports pain as 8/10 today.    Interval History (6/29/2022): Clau Nunn presents today for follow-up visit.  she underwent Bilateral L5/S1 + S1 TF REJI on 5/25/22.  The patient reports that she is/was better following the procedure.  she reports 75% pain relief with regards to leg pain.  The changes lasted 4 weeks so far.  The changes have continued through this visit.  Patient reports pain as 5/10 today. Having localized SIJ pain today.     Interval History (5/11/2022): Clau Nunn presents today for follow-up visit.  she underwent left L5/S1 + S1 TF REJI on 7/1/21 with excellent pain relief for about 3 months.  The patient reports that she is/was better following the procedure.  The changes have NOT continued through this visit.   she underwent right suprascapular nerve block on 7/13/21.  The patient reports that she is/was unchanged following the procedure. She reports limited pain relief for short term. But, she mainly has limited ROM.   Patient reports pain as 5/10 today - due to low back pain and leg pain.    Interval History (6/24/2021): Patient was seen on 2/10/21. At that time she underwent Bilateral L5/S1 TF REJI.  The patient reports that she is/was unchanged following the procedure.  she reports 20% pain relief.  The changes lasted 1 weeks.  The changes have NOT continued through this visit.  Patient reports pain as 5/10 today.  Her main pain complaint today is LLE radiculopathy worse in popliteal space and shin.   S/p left knee " injection with Dr. Burns about 2 months ago with some pain relief.    Interval History (2/2/2021): Patient was last seen on 10/16/2020.  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  She is still never seen orthopedics for this issue, and she has not improved from intra-articular shoulder joint injections nor suprascapular nerve block.  Patient reports pain as 8/10 today.  Today, she is complaining of back and bilateral leg pain, previously just on the left side, although the left side still remains the worst.    Interval History (10/19/2020): Patient was seen on 9/16/20. At that time she underwent  Right suprascapular nerve block.  The patient reports that she is/was unchanged following the procedure.  she reports no pain relief.  Patient reports pain as 6/10 today - only when she moves her arm.    Interval History (9/9/2020): Patient was seen on 8/12/20. At that time she underwent left SIJ + left hip joint + right shoulder joint injection.  The patient reports that she is/was better following the procedure.  she reports 75% pain relief with hip pain relief but only 25% relief of shoulder pain.  The changes lasted 4 weeks so far.  The changes have continued through this visit.  Patient reports pain as /10 today.    Interval History (8/5/2020): Patient was seen on 7/8/20. At that time she underwent left L2/3 + L5/S1 TF REJI.  The patient reports that she is/was slightly better following the procedure.  she reports only 50 % pain relief.  The changes lasted 4 weeks so far.  The changes have continued through this visit.  She also reports that her right shoulder has been bothering her.  She has history of right humerus fracture years ago.    Interval History (6/12/2020): She is here today to review MRI. She reports 10/10 pain today. She also has concerns about Prolia as she read that it can cause rashes and joint pain.  She has been having a  rash on her right breast for a few weeks.  She will see Dr. Gomez soon to discuss.     Interval History(11/20/18): Patient was seen on 10/24/18. At that time she underwent left SIJ + left GT bursa injection with local.  The patient reports that she is/was better following the procedure.  she reports 100% pain relief.  The changes lasted 4 weeks so far.  The changes have continued through this visit.    History of Present Illness:   Clau Nunn is a 87 y.o. female  who is presenting with a chief complaint of low back pain and hip pain. The patient began experiencing this problem insidiously, and the pain has been gradually worsening over the past 6 month(s). The pain is described as throbbing, cramping, aching and heavy and is located in the bilateral buttocks . Pain is intermittent and lasts hours. The pain radiates to  lateral thigh and groin . The patient rates her pain a 5 out of ten and interferes with activities of daily living a 5 out of ten. Pain is exacerbated by getting up from a seated position, and is improved by rest. Patient reports prior trauma (fall in June 2018 causing right distal radius + right sacrum fracture), prior lumbar surgery in ~2005, right hip replacement, right distal radius fracture requiring ORIF in June 2018.    - pertinent negatives: No fever, No chills, No weight loss, No bladder dysfunction, No bowel dysfunction, No extremity weakness, No saddle anesthesia  - pertinent positives: none    - medications, other therapies tried (physical therapy, injections):     >> Tylenol    >> Has previously undergone Physical Therapy (aquatic and land) with limited relief    >> Has previously undergone spinal injection/s:   - bilateral SIJ injection on 11-9-17 with ~30% relief for 2 weeks   - left hip injection on 12-28-17 with some relief   - bilateral L3-5 MBB on 5/11/18 with reported 100% pain relief   - left SIJ + left GT bursa injection with local on 10/24/18 with 100% pain relief   -  "left L2/3 + L5/S1 TF REJI on 7/8/20 with about 50% pain relief   - left SIJ + left hip joint + right shoulder joint injection on 8/12/20 with 75% pain relief with hip pain relief but only 25% relief of shoulder pain - no longer having groin pain after this procedure    - Right suprascapular nerve block on 9/16/20 with limited pain relief    - Bilateral L5/S1 TF REJI on 2/10/21 with 20% pain relief for short term   - left knee injection with Dr. Burns in April 2021 with some pain relief   - left L5/S1 + S1 TF REJI on 7/1/21 with excellent pain relief for about 3 months   - right suprascapular nerve block on 7/13/21 with limited pain relief    - Bilateral L5/S1 + S1 TF REJI on 5/25/22 with 75% pain relief - regarding leg pain, now having localized SIJ         Imaging / Labs / Studies (reviewed on 1/10/2023):     Records from [facility] regarding right shoulder fracture- uploaded into chart under "Media"    Results for orders placed during the hospital encounter of 08/05/20   X-ray Shoulder 2 or More Views Right    Narrative COMPARISON:  12/10/2018  FINDINGS:  There is a chronic fracture deformity of the right humeral head and neck.  Associated osseous productive changes approximate the inferior margins of the articular surface of the humeral head.  No definite acute fractures or dislocations visualized.  There are mild degenerative changes present at the AC joint and glenoid.  Postoperative findings noted in the lower thoracic spine.     Results for orders placed during the hospital encounter of 12/10/18   X-Ray Shoulder Trauma Right    Narrative FINDINGS:  Comminuted fracture involving the surgical neck and right humeral head.  No evidence of dislocation.  Degenerative changes are present at the right AC joint.    Impression Comminuted fracture involving the right humeral head and surgical neck.     Results for orders placed during the hospital encounter of 06/29/20   MRI Lumbar Spine W WO Cont    Narrative " COMPARISON:  Prior MRI from June 29, 2020.  FINDINGS:  Again demonstrated are postoperative findings from thoracolumbar spinal fusion and laminectomy at T12.  Chronic compression deformity of T12 with chronic retropulsion that flattens the thecal sac to 14 mm.  This is unchanged.  Mild, grade 1 degenerative spondylolisthesis at L4-L5.  Vertebral body height is normal.  No abnormal enhancement patterns. The conus medullaris terminates at the level of L2-L3, low lying.  No abnormal signal within the conus. Intervertebral disc levels are as follows:  T11-T12 disc: Fusion at this level.  Mild, chronic retropulsion that flattens the thecal sac to 14 mm.  No significant foraminal stenosis.  T12-L1 disc: Prior laminectomy and fusion.  The thecal sac measures 19 mm.  No significant foraminal stenosis.  L1-L2 disc : Posterior fusion.  No significant spinal stenosis or foraminal stenosis.  L2-L3 disc: Disc space height loss.  Broad-based posterior disc bulge which encroaches slightly into the floors of the exit foramina.  Moderate buckling of ligamentum flavum.  The thecal sac measures 8-9 mm AP.  Mild bilateral foraminal stenosis.  This has progressed since the prior MRI.  L3-L4 disc: Broad-based posterior disc bulge that encroaches slightly into the floors of the exit foramina.  Moderate buckling of ligamentum flavum.  The thecal sac measures 10 mm AP.  Mild bilateral neural foraminal stenosis, right greater than left.  These findings are similar to prior.  L4-L5 disc: Minimal grade 1 spondylolisthesis with severe degenerative facet change and hypertrophy.  Left-sided facet joint effusion.  Is buckling of ligamentum flavum.  The thecal sac measures 11 mm.  No significant foraminal stenosis.  The spondylolisthesis has slightly progressed measuring 4 mm compared to prior 2 mm.  L5-S1 disc: Severe disc space height loss with marginal disc and osteophyte encroach into the exit foramina bilaterally.  Moderate degenerative facet  hypertrophy.  The thecal sac measures 14 mm.    Impression 1. 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  2. Progression of mild foraminal stenosis and mild spinal stenosis at L2-L3.  3. Minimal progression of grade 1 spondylolisthesis at L4-L5.  4. Unchanged mild, chronic retropulsion from T12 where there has been a laminectomy and fusion.       7/30/2018 XR LUMBAR SPINE COMPLETE 5 VIEW  TECHNIQUE:  AP, lateral, spot and bilateral oblique views of the lumbar spine were performed.  COMPARISON:  07/25/2018  FINDINGS:  There is grade 1 spondylolisthesis of L4 on L5.  Pedicle screws and fixation rods are noted for at the T10, T11, L1 and L2 levels.  Chronic compression deformity at the L1 level unchanged.  Prominent bilateral facet arthropathy noted at the L4-5 and L5-S1 levels.  IVC filter noted.     7/30/2018 XR HIPS BILATERAL 2 VIEW INCL AP PELVIS  TECHNIQUE:  AP view of the pelvis and frogleg lateral views of both hips were performed.  COMPARISON:  07/26/2018  FINDINGS:  The bony pelvis is intact. A right total hip arthroplasty, plate and wires are in place.  No hardware failure or loosening suggested.  The appearance is unchanged when compared to the prior exam.  Mild degenerative changes noted at the left hip.  No acute fracture or dislocation of the left hip.  No significant joint space narrowing identified.  No plain film evidence to suggest AVN of either hip.     Results for orders placed during the hospital encounter of 01/13/14   MRI Lumbar Spine W WO Contrast    Narrative Findings: Pre- and postcontrast multiplanar multisequence imaging of the thoracic and lumbar spine was performed using 7 cc of Gadavist intravenous contrast material.  There is moderately severe chronic central compression deformity of the T12 vertebral body which is stabilized by paraspinous rods and pedicle screws which extend from T10 through L2.  Postsurgical changes of laminectomy also noted at T12.  The posterior cortex of the T12  "vertebral body is displaced posteriorly and indents the ventral thecal sac.  There is no anterior cord contact or significant central canal stenosis.  Degenerative disk desiccation is noted at multiple levels with moderate disk   narrowing at L5-S1.    Also L5-S1 is a broad-based disk protrusion which combined with bilateral facet hypertrophy results in moderately severe narrowing of both neural foramina.  No significant central canal stenosis noted.    From L2-3 through L4-5 there   is mild disk bulging which results in mild bilateral foraminal narrowing.  This is slightly more pronounced at L4-5 with bilateral facet hypertrophy a contributing factor.  No significant central canal stenosis noted.  No thoracic disk extrusion, and foraminal narrowing, canal stenosis is evident.  Thoracolumbar cord is intact.  Paravertebral soft tissues are symmetric and normal in appearance.         Review of Systems:  CONSTITUTIONAL: patient denies any fever, chills, or weight loss  SKIN: patient denies any rash or itching  RESPIRATORY: patient denies having any shortness of breath  GASTROINTESTINAL: patient denies having any diarrhea, constipation, or bowel incontinence  GENITOURINARY: patient denies having any abnormal bladder function    MUSCULOSKELETAL:  - patient complains of the above noted pain/s (see chief pain complaint)    NEUROLOGICAL:   - pain as above  - strength in Lower extremities is intact, BILATERALLY  - sensation in Lower extremities is intact, BILATERALLY  - patient denies any loss of bowel or bladder control      PSYCHIATRIC: patient denies any change in mood    Other:  All other systems reviewed and are negative        Physical Exam:  Vitals:    01/10/23 1048   BP: (!) 159/74   Pulse: (!) 56   Weight: 72.7 kg (160 lb 6.2 oz)   Height: 5' 4" (1.626 m)   PainSc:   8   PainLoc: Back      Body mass index is 27.53 kg/m².   (reviewed on 1/10/2023)    General: alert and oriented, in no apparent distress.  Gait: " antalgic gait   Skin: no rashes, no discoloration, no obvious lesions  HEENT: normocephalic, atraumatic.   Cardiovascular: no significant peripheral edema present.  Respiratory: without use of accessory muscles of respiration.    Musculoskeletal - Lumbar Spine:  - Midline scar present over thoracic spine  - Pain on flexion of lumbar spine: Present, worse than with extension  - Pain on extension of lumbar spine: Present  - Lumbar facet loading: Positive bilaterally  - TTP over the lumbar facet joints: Absent  - TTP over GT bursa: Minimal   - Straight Leg Raise: Positive bilaterally, L>R - improved   - Pain on Internal and external rotation of the hip: Present    SIJ testing:  - TTP over the SI joints: Present bilaterally, L>R    - Srikanth's/ Micheal's: Positive    - Sacroiliac Compression Test (lateral pressure): Positive   - SacralThrust Test (posterior pressure): Positive    Right Shoulder:  - Pain on abduction: Present   - ROM: decreased secondary to pain, very limited ROM      - TTP over the AC and GH joint: Present  - Neer's: Positive  - Hawkin's: Positive    Neuro - Lower Extremities:  - BLE Strength: R/L: HF: 5/5, HE: 5/5, KF: 5/5; KE: 5/5; FE: 5/5; FF: 5/5  - Extremity Reflexes: Brisk and symmetric throughout  - Sensory: Sensation to light touch intact bilaterally      Psych:  Mood and affect is appropriate            Assessment:  Clau Nunn is a 87 y.o. year old female who is presenting with       ICD-10-CM ICD-9-CM    1. Sacroiliitis  M46.1 720.2 IR SI Joint Injection w/Imaging      IR SI Joint Injection w/Imaging      Case Request-RAD/Other Procedure Area: Bilateral SIJ Injection w/Local      2. DDD (degenerative disc disease), lumbar  M51.36 722.52       3. Lumbosacral pain  M54.50 724.2      724.6              Plan:  1. Interventional:   - Schedule bilateral SIJ injection.  Patient will be set up for procedure with one of the other MDs as Dr. Wells is no longer with [facility].  - If limited pain  relief after SIJ injection, plan to repeat Bilateral L5/S1 + S1 TF REJI.  - S/p Bilateral L5/S1 + S1 TF REJI on 5/25/22 with 75% pain relief - regarding leg pain, now having localized SIJ.   - S/p Bilateral L5/S1 TF REJI on 2/10/21 with 20% pain relief x 1 week. Pain now more localized on left.   - S/p Right suprascapular nerve block on 9/16/20 with limited pain relief.    2. Pharmacologic:   - Refill gabapentin 600mg QHS.  She cannot tolerate during the day.  - Patient encouraged to take Tylenol 500mg x 2 tablets (1000mg) TID more regularly, not to exceed 3000mg per day.   - Anticoagulation use: apixaban (Eliquis) - 2° prevention (h/o DVT) - Heme/Onc.     3. Rehabilitative: Encouraged regular exercise. Continue exercises and activities as tolerated. Physical therapy for shoulder did not help in the past, so doesn't want to attend. SIJ exercises given on AVS.     4. Diagnostic: None.    5. Follow up: 4 weeks post-procedure - in clinic (per pt request)      - I discussed the risks, benefits, and alternatives to potential treatment options. All questions and concerns were fully addressed today in clinic.

## 2023-01-24 NOTE — PRE-PROCEDURE INSTRUCTIONS
Attempted to PAT patient for procedure on 1.31 with Dr. Parker. No answer. M with return phone number. No return call at this time.

## 2023-01-26 ENCOUNTER — PATIENT MESSAGE (OUTPATIENT)
Dept: PAIN MEDICINE | Facility: HOSPITAL | Age: 88
End: 2023-01-26
Payer: MEDICARE

## 2023-01-26 ENCOUNTER — LAB VISIT (OUTPATIENT)
Dept: LAB | Facility: HOSPITAL | Age: 88
End: 2023-01-26
Attending: INTERNAL MEDICINE
Payer: MEDICARE

## 2023-01-26 DIAGNOSIS — M80.00XG AGE-RELATED OSTEOPOROSIS WITH CURRENT PATHOLOGICAL FRACTURE WITH DELAYED HEALING: Primary | ICD-10-CM

## 2023-01-26 DIAGNOSIS — M80.00XG AGE-RELATED OSTEOPOROSIS WITH CURRENT PATHOLOGICAL FRACTURE WITH DELAYED HEALING: ICD-10-CM

## 2023-01-26 LAB
ALBUMIN SERPL BCP-MCNC: 3.8 G/DL (ref 3.5–5.2)
ALP SERPL-CCNC: 52 U/L (ref 55–135)
ALT SERPL W/O P-5'-P-CCNC: 15 U/L (ref 10–44)
ANION GAP SERPL CALC-SCNC: 11 MMOL/L (ref 8–16)
AST SERPL-CCNC: 18 U/L (ref 10–40)
BILIRUB SERPL-MCNC: 0.9 MG/DL (ref 0.1–1)
BUN SERPL-MCNC: 17 MG/DL (ref 8–23)
CALCIUM SERPL-MCNC: 9.1 MG/DL (ref 8.7–10.5)
CHLORIDE SERPL-SCNC: 102 MMOL/L (ref 95–110)
CO2 SERPL-SCNC: 25 MMOL/L (ref 23–29)
CREAT SERPL-MCNC: 1 MG/DL (ref 0.5–1.4)
EST. GFR  (NO RACE VARIABLE): 55 ML/MIN/1.73 M^2
GLUCOSE SERPL-MCNC: 92 MG/DL (ref 70–110)
POTASSIUM SERPL-SCNC: 4.4 MMOL/L (ref 3.5–5.1)
PROT SERPL-MCNC: 6.7 G/DL (ref 6–8.4)
SODIUM SERPL-SCNC: 138 MMOL/L (ref 136–145)

## 2023-01-26 PROCEDURE — 36415 COLL VENOUS BLD VENIPUNCTURE: CPT | Mod: HCNC,PO | Performed by: PHYSICIAN ASSISTANT

## 2023-01-26 PROCEDURE — 80053 COMPREHEN METABOLIC PANEL: CPT | Mod: HCNC | Performed by: PHYSICIAN ASSISTANT

## 2023-01-26 NOTE — PRE-PROCEDURE INSTRUCTIONS
Spoke with patient regarding procedure scheduled on 1.31    Arrival time 1045    Has patient been sick with fever or on antibiotics within the last 7 days? No    Does the patient have any open wounds, sores or rashes? No    Does the patient have any recent fractures? no    Has patient received a vaccination within the last 7 days? No    Received the COVID vaccination? yes    Has the patient stopped all medications as directed? NA    Does patient have a pacemaker and or defibrillator? no    Does the patient have a ride to and from procedure and someone reliable to remain with patient? Daughter Leslie     Is the patient diabetic? no    Does the patient have sleep apnea? Or use O2 at home? No and no     Is the patient receiving sedation? local    Is the patient instructed to remain NPO beginning at midnight the night before their procedure? yes    Procedure location confirmed with patient? Yes    Covid- Denies signs/symptoms. Instructed to notify PAT/MD if any changes.

## 2023-01-30 PROBLEM — Z00.00 WELLNESS EXAMINATION: Status: RESOLVED | Noted: 2022-10-20 | Resolved: 2023-01-30

## 2023-01-31 ENCOUNTER — HOSPITAL ENCOUNTER (OUTPATIENT)
Facility: HOSPITAL | Age: 88
Discharge: HOME | End: 2023-01-31
Attending: PHYSICAL MEDICINE & REHABILITATION | Admitting: PHYSICAL MEDICINE & REHABILITATION
Payer: MEDICARE

## 2023-01-31 VITALS
OXYGEN SATURATION: 98 % | TEMPERATURE: 97 F | BODY MASS INDEX: 28.56 KG/M2 | RESPIRATION RATE: 16 BRPM | HEART RATE: 58 BPM | DIASTOLIC BLOOD PRESSURE: 77 MMHG | SYSTOLIC BLOOD PRESSURE: 171 MMHG | HEIGHT: 64 IN | WEIGHT: 167.31 LBS

## 2023-01-31 DIAGNOSIS — M46.1 SACROILIITIS: Primary | ICD-10-CM

## 2023-01-31 PROCEDURE — 27096 INJECT SACROILIAC JOINT: CPT | Mod: 50,HCNC,, | Performed by: PHYSICAL MEDICINE & REHABILITATION

## 2023-01-31 PROCEDURE — 25500020 PHARM REV CODE 255: Mod: HCNC | Performed by: PHYSICAL MEDICINE & REHABILITATION

## 2023-01-31 PROCEDURE — 27096 PR INJECTION,SACROILIAC JOINT: ICD-10-PCS | Mod: 50,HCNC,, | Performed by: PHYSICAL MEDICINE & REHABILITATION

## 2023-01-31 PROCEDURE — 25000003 PHARM REV CODE 250: Mod: HCNC | Performed by: PHYSICAL MEDICINE & REHABILITATION

## 2023-01-31 PROCEDURE — 27096 INJECT SACROILIAC JOINT: CPT | Mod: 50,HCNC | Performed by: PHYSICAL MEDICINE & REHABILITATION

## 2023-01-31 PROCEDURE — 63600175 PHARM REV CODE 636 W HCPCS: Mod: HCNC | Performed by: PHYSICAL MEDICINE & REHABILITATION

## 2023-01-31 RX ORDER — INDOMETHACIN 25 MG/1
CAPSULE ORAL
Status: DISCONTINUED | OUTPATIENT
Start: 2023-01-31 | End: 2023-01-31 | Stop reason: HOSPADM

## 2023-01-31 RX ORDER — METHYLPREDNISOLONE ACETATE 80 MG/ML
INJECTION, SUSPENSION INTRA-ARTICULAR; INTRALESIONAL; INTRAMUSCULAR; SOFT TISSUE
Status: DISCONTINUED | OUTPATIENT
Start: 2023-01-31 | End: 2023-01-31 | Stop reason: HOSPADM

## 2023-01-31 RX ORDER — BUPIVACAINE HYDROCHLORIDE 2.5 MG/ML
INJECTION, SOLUTION EPIDURAL; INFILTRATION; INTRACAUDAL
Status: DISCONTINUED | OUTPATIENT
Start: 2023-01-31 | End: 2023-01-31 | Stop reason: HOSPADM

## 2023-01-31 RX ORDER — ONDANSETRON 2 MG/ML
4 INJECTION INTRAMUSCULAR; INTRAVENOUS ONCE AS NEEDED
Status: DISCONTINUED | OUTPATIENT
Start: 2023-01-31 | End: 2023-11-09

## 2023-01-31 NOTE — OP NOTE
Sacroiliac Joint Injection under Fluoroscopy    Date of procedure 01/31/2023    Time-out taken to identify patient and procedure side prior to starting the procedure.                                                                 PROCEDURE:  bilateral sacroiliac joint injection under fluoroscopy.    REASON FOR PROCEDURE: bilateral Sacroiliitis [M46.1]    PHYSICIAN: Abdirahman Parker MD    ASSISTANTS: None    MEDICATIONS INJECTED:  Depo-Medrol 40mg and 4 mL Bupivacaine 0.25%    LOCAL ANESTHETIC USED: Xylocaine 1% 5mL    SEDATION MEDICATIONS: None    ESTIMATED BLOOD LOSS:  None.    COMPLICATIONS:  None.    TECHNIQUE:   Laying in the prone position, the patient was prepped and draped in the usual sterile fashion using ChloraPrep and fenestrated drape.  The area was determined under fluoroscopy.  Local Xylocaine was injected by raising a wheel and going down to the periosteum using a 27-gauge hypodermic needle.  The 3.5 inch 22-gauge spinal needle was introduce into the bilateral sacroiliac joint.  Negative pressure applied to confirm no intravascular placement.  Omnipaque was injected to confirm placement and to confirm that there was no vascular runoff.  The medication was then injected slowly.  The patient tolerated the procedure well.      The patient was monitored for approximately 30 minutes after the procedure.  Patient was given post procedure and discharge instructions to follow at home.  We will see the patient back in two weeks or the patient may call to inform of status. The patient was discharged in a stable condition

## 2023-01-31 NOTE — DISCHARGE SUMMARY
Discharge Note  Short Stay      SUMMARY     Admit Date: 1/31/2023    Attending Physician: Abdirahman Parker MD        Discharge Physician: Abdirahman Parker MD        Discharge Date: 1/31/2023 11:41 AM    Procedure(s) (LRB):  Bilateral SIJ Injection w/Local (Bilateral)    Final Diagnosis: Sacroiliitis [M46.1]    Disposition: Home or self care    Patient Instructions:   Current Discharge Medication List        CONTINUE these medications which have NOT CHANGED    Details   apixaban (ELIQUIS) 5 mg Tab Take 1 tablet (5 mg total) by mouth 2 (two) times daily.  Qty: 180 tablet, Refills: 3    Associated Diagnoses: DVT of deep femoral vein, right      gabapentin (NEURONTIN) 300 MG capsule Take 1 capsule (300 mg total) by mouth 3 (three) times daily.  Qty: 270 capsule, Refills: 3    Associated Diagnoses: Chronic lumbar radiculopathy      levothyroxine (SYNTHROID) 100 MCG tablet Take 1 tablet (100 mcg total) by mouth once daily.  Qty: 90 tablet, Refills: 3    Associated Diagnoses: Acquired hypothyroidism      valsartan (DIOVAN) 80 MG tablet Take 1 tablet (80 mg total) by mouth once daily.  Qty: 90 tablet, Refills: 3    Comments: .  Associated Diagnoses: Essential hypertension      albuterol (PROVENTIL) 2.5 mg /3 mL (0.083 %) nebulizer solution Take 3 mLs (2.5 mg total) by nebulization every 6 (six) hours as needed for Wheezing. Rescue  Qty: 1 each, Refills: 6    Associated Diagnoses: Simple chronic bronchitis      azelastine (ASTELIN) 137 mcg (0.1 %) nasal spray 2 sprays by Nasal route 2 (two) times daily.      busPIRone (BUSPAR) 5 MG Tab TAKE 1 TABLET TWICE DAILY  Qty: 180 tablet, Refills: 5    Associated Diagnoses: Anxiety      cholecalciferol, vitamin D3, (D3-2000) 50 mcg (2,000 unit) Cap Take 1 capsule (2,000 Units total) by mouth once daily.  Qty: 90 capsule, Refills: 11    Comments: Please get Vit D3 2000 IU and take once daily  Associated Diagnoses: Vitamin D deficiency disease      clobetasoL (TEMOVATE) 0.05 %  external solution Pt to mix in 1 jar of cerave cream and apply to affected areas bid for 2-4 weeks per course  Qty: 50 mL, Refills: 3    Associated Diagnoses: Asteatotic dermatitis      cyclobenzaprine (FLEXERIL) 5 MG tablet TAKE 1 TABLET (5 MG TOTAL) BY MOUTH NIGHTLY AS NEEDED FOR MUSCLE SPASMS.  Qty: 90 tablet, Refills: 1    Associated Diagnoses: Osteoporosis, postmenopausal      cycloSPORINE (RESTASIS MULTIDOSE) 0.05 % Drop Place 1 drop into both eyes every 12 (twelve) hours.  Qty: 5.5 mL, Refills: 12    Associated Diagnoses: Keratitis sicca, bilateral      EScitalopram oxalate (LEXAPRO) 20 MG tablet TAKE 1 TABLET EVERY DAY  Qty: 90 tablet, Refills: 3    Associated Diagnoses: Anxiety      fluocinonide 0.05% (LIDEX) 0.05 % cream AAA bid to leg for 2-4 weeks per course  Qty: 60 g, Refills: 1    Associated Diagnoses: Disease of skin and subcutaneous tissue      fluticasone propionate (FLONASE) 50 mcg/actuation nasal spray       mupirocin (BACTROBAN) 2 % ointment Apply topically 2 (two) times daily. To wound on right lower leg  Qty: 22 g, Refills: 0    Associated Diagnoses: Asteatotic dermatitis      omeprazole (PRILOSEC) 40 MG capsule Take 1 capsule (40 mg total) by mouth every morning.  Qty: 90 capsule, Refills: 3      oxybutynin (DITROPAN-XL) 5 MG TR24 Take 1 tablet (5 mg total) by mouth once daily.  Qty: 30 tablet, Refills: 2      traZODone (DESYREL) 50 MG tablet TAKE 1 TABLET (50 MG TOTAL) BY MOUTH NIGHTLY AS NEEDED FOR INSOMNIA.  Qty: 30 tablet, Refills: 3    Associated Diagnoses: Primary insomnia      triamcinolone acetonide 0.025% (KENALOG) 0.025 % cream AAA bid  Qty: 80 g, Refills: 0    Associated Diagnoses: Dermatitis                 Discharge Diagnosis: Sacroiliitis [M46.1]  Condition on Discharge: Stable with no complications to procedure   Diet on Discharge: Same as before.  Activity: as per instruction sheet.  Discharge to: Home with a responsible adult.  Follow up: 2-4 weeks       Please call the  office at [phone] if you experience any weakness or loss of sensation, fever > 101.5, pain uncontrolled with oral medications, persistent nausea/vomiting/or diarrhea, redness or drainage from the incisions, or any other worrisome concerns. If physician on call was not reached or could not communicate with our office for any reason please go to the nearest emergency department

## 2023-01-31 NOTE — H&P
HPI  Patient presenting for Procedure(s) (LRB):  Bilateral SIJ Injection w/Local (Bilateral)       No health changes since previous encounter    Past Medical History:   Diagnosis Date    Allergy     Anxiety     Asthma     Back pain     Chronic venous insufficiency 11/19/2013    Depression     Diverticulosis 11/19/2013 1/8/8 colonoscopy    Dry eyes     Fracture, sacrum/coccyx     Dr. Sanchez     GERD (gastroesophageal reflux disease)     H/O total hip arthroplasty 12/30/2015    Hypertension     Hypothyroid     Osteoarthritis     Osteoporosis     Postlaminectomy syndrome of lumbar region 1/8/2014    Radius fracture     7/18    Simple chronic bronchitis 5/3/2017    Umbilical hernia 11/19/2013    Urinary incontinence     Vitamin D deficiency disease      Past Surgical History:   Procedure Laterality Date    ADENOIDECTOMY      BACK SURGERY      x2    breast implants  1973    CATARACT EXTRACTION Bilateral     CLOSED REDUCTION DISTAL RADIUS FRACTURE      Dr. Black, 8/18    cystoscope  1/6/16    DR. Brown    FRACTURE SURGERY  April 3 2015    left wrist    HAND SURGERY      HIP SURGERY Right     total hip- Dr. Dos Santos    HYSTERECTOMY      36y/o    INJECTION OF ANESTHETIC AGENT AROUND NERVE Right 9/16/2020    Procedure: Right suprascapular nerve block;  Surgeon: Raffi Wells MD;  Location: [facility];  Service: Pain Management;  Laterality: Right;    INJECTION OF ANESTHETIC AGENT AROUND NERVE Right 7/13/2021    Procedure: Block, Nerve Right Suprascapular Nerve Block with RN IV sedation;  Surgeon: Raffi Wells MD;  Location: [facility];  Service: Pain Management;  Laterality: Right;    INJECTION OF ANESTHETIC AGENT INTO SACROILIAC JOINT N/A 8/12/2020    Procedure: Left IA hip Joint + Left SIJ + Right shoulder injection;  Surgeon: Raffi Wells MD;  Location: [facility];  Service: Pain Management;  Laterality: N/A;    INJECTION OF JOINT N/A 8/12/2020    Procedure: Left IA hip Joint + Left SIJ + Right shoulder  injection;  Surgeon: Raffi Wells MD;  Location: [facility];  Service: Pain Management;  Laterality: N/A;    JOINT REPLACEMENT Right     R NNAMDI - Dr. Dos Santos    LEG SURGERY Right     ex-fix tib/fib    SELECTIVE INJECTION OF ANESTHETIC AGENT AROUND LUMBAR SPINAL NERVE ROOT BY TRANSFORAMINAL APPROACH Bilateral 2/10/2021    Procedure: Bilateral L5/S1 TF REJI;  Surgeon: Raffi Wells MD;  Location: [facility];  Service: Pain Management;  Laterality: Bilateral;    SELECTIVE INJECTION OF ANESTHETIC AGENT AROUND LUMBAR SPINAL NERVE ROOT BY TRANSFORAMINAL APPROACH Bilateral 5/25/2022    Procedure: Bilateral L5/S1 + S1 TF REJI;  Surgeon: Raffi Wells MD;  Location: [facility];  Service: Pain Management;  Laterality: Bilateral;    TONSILLECTOMY      TRANSFORAMINAL EPIDURAL INJECTION OF STEROID Left 7/8/2020    Procedure: left L2/3 + L5/S1 TF REJI;  Surgeon: Raffi Wells MD;  Location: [facility];  Service: Pain Management;  Laterality: Left;    TRANSFORAMINAL EPIDURAL INJECTION OF STEROID Left 7/1/2021    Procedure: left L5/S1 + S1 TF REJI;  Surgeon: Raffi Wells MD;  Location: [facility];  Service: Pain Management;  Laterality: Left;     Review of patient's allergies indicates:   Allergen Reactions    Cephalexin Hives, Itching, Swelling, Anxiety, Dermatitis and Rash        No current facility-administered medications on file prior to encounter.     Current Outpatient Medications on File Prior to Encounter   Medication Sig Dispense Refill    apixaban (ELIQUIS) 5 mg Tab Take 1 tablet (5 mg total) by mouth 2 (two) times daily. 180 tablet 3    gabapentin (NEURONTIN) 300 MG capsule Take 1 capsule (300 mg total) by mouth 3 (three) times daily. 270 capsule 3    levothyroxine (SYNTHROID) 100 MCG tablet Take 1 tablet (100 mcg total) by mouth once daily. 90 tablet 3    valsartan (DIOVAN) 80 MG tablet Take 1 tablet (80 mg total) by mouth once daily. 90 tablet 3    albuterol (PROVENTIL) 2.5 mg /3 mL (0.083 %) nebulizer solution  "Take 3 mLs (2.5 mg total) by nebulization every 6 (six) hours as needed for Wheezing. Rescue 1 each 6    azelastine (ASTELIN) 137 mcg (0.1 %) nasal spray 2 sprays by Nasal route 2 (two) times daily.      busPIRone (BUSPAR) 5 MG Tab TAKE 1 TABLET TWICE DAILY 180 tablet 5    cholecalciferol, vitamin D3, (D3-2000) 50 mcg (2,000 unit) Cap Take 1 capsule (2,000 Units total) by mouth once daily. 90 capsule 11    clobetasoL (TEMOVATE) 0.05 % external solution Pt to mix in 1 jar of cerave cream and apply to affected areas bid for 2-4 weeks per course 50 mL 3    cyclobenzaprine (FLEXERIL) 5 MG tablet TAKE 1 TABLET (5 MG TOTAL) BY MOUTH NIGHTLY AS NEEDED FOR MUSCLE SPASMS. 90 tablet 1    cycloSPORINE (RESTASIS MULTIDOSE) 0.05 % Drop Place 1 drop into both eyes every 12 (twelve) hours. 5.5 mL 12    EScitalopram oxalate (LEXAPRO) 20 MG tablet TAKE 1 TABLET EVERY DAY 90 tablet 3    fluocinonide 0.05% (LIDEX) 0.05 % cream AAA bid to leg for 2-4 weeks per course (Patient taking differently: Apply to affected area(s) of leg topically twice daily for 2-4 weeks per course) 60 g 1    fluticasone propionate (FLONASE) 50 mcg/actuation nasal spray       mupirocin (BACTROBAN) 2 % ointment Apply topically 2 (two) times daily. To wound on right lower leg 22 g 0    omeprazole (PRILOSEC) 40 MG capsule Take 1 capsule (40 mg total) by mouth every morning. 90 capsule 3    oxybutynin (DITROPAN-XL) 5 MG TR24 Take 1 tablet (5 mg total) by mouth once daily. 30 tablet 2    traZODone (DESYREL) 50 MG tablet TAKE 1 TABLET (50 MG TOTAL) BY MOUTH NIGHTLY AS NEEDED FOR INSOMNIA. 30 tablet 3    triamcinolone acetonide 0.025% (KENALOG) 0.025 % cream AAA bid (Patient taking differently: Apply to affected area(s) topically two times daily) 80 g 0        PMHx, PSHx, Allergies, Medications reviewed in epic    ROS negative except pain complaints in HPI    OBJECTIVE:    BP (!) 181/77   Pulse (!) 59   Temp 97.3 °F (36.3 °C) (Temporal)   Resp 17   Ht 5' 4" " (1.626 m)   Wt 75.9 kg (167 lb 5.3 oz)   SpO2 96%   Breastfeeding No   BMI 28.72 kg/m²     PHYSICAL EXAMINATION:    GENERAL: Well appearing, in no acute distress, alert and oriented x3.  PSYCH:  Mood and affect appropriate.  SKIN: Skin color, texture, turgor normal, no rashes or lesions which will impact the procedure.  CV: RRR with palpation of the radial artery.  PULM: No evidence of respiratory difficulty, symmetric chest rise. Clear to auscultation.  NEURO: Cranial nerves grossly intact.    Plan:    Proceed with procedure as planned Procedure(s) (LRB):  Bilateral SIJ Injection w/Local (Bilateral)    Abdirahman Parker MD  01/31/2023

## 2023-01-31 NOTE — DISCHARGE INSTRUCTIONS

## 2023-02-07 DIAGNOSIS — Z00.00 ENCOUNTER FOR MEDICARE ANNUAL WELLNESS EXAM: ICD-10-CM

## 2023-02-09 DIAGNOSIS — Z00.00 ENCOUNTER FOR MEDICARE ANNUAL WELLNESS EXAM: ICD-10-CM

## 2023-02-10 ENCOUNTER — LAB VISIT (OUTPATIENT)
Dept: LAB | Facility: HOSPITAL | Age: 88
End: 2023-02-10
Attending: PHYSICIAN ASSISTANT
Payer: MEDICARE

## 2023-02-10 DIAGNOSIS — M80.00XG AGE-RELATED OSTEOPOROSIS WITH CURRENT PATHOLOGICAL FRACTURE WITH DELAYED HEALING: ICD-10-CM

## 2023-02-10 LAB
ALBUMIN SERPL BCP-MCNC: 4.1 G/DL (ref 3.5–5.2)
ALP SERPL-CCNC: 70 U/L (ref 55–135)
ALT SERPL W/O P-5'-P-CCNC: 16 U/L (ref 10–44)
ANION GAP SERPL CALC-SCNC: 14 MMOL/L (ref 8–16)
AST SERPL-CCNC: 20 U/L (ref 10–40)
BILIRUB SERPL-MCNC: 1.1 MG/DL (ref 0.1–1)
BUN SERPL-MCNC: 24 MG/DL (ref 8–23)
CALCIUM SERPL-MCNC: 9.6 MG/DL (ref 8.7–10.5)
CHLORIDE SERPL-SCNC: 102 MMOL/L (ref 95–110)
CO2 SERPL-SCNC: 23 MMOL/L (ref 23–29)
CREAT SERPL-MCNC: 1.3 MG/DL (ref 0.5–1.4)
EST. GFR  (NO RACE VARIABLE): 40 ML/MIN/1.73 M^2
GLUCOSE SERPL-MCNC: 101 MG/DL (ref 70–110)
POTASSIUM SERPL-SCNC: 4.4 MMOL/L (ref 3.5–5.1)
PROT SERPL-MCNC: 7.9 G/DL (ref 6–8.4)
SODIUM SERPL-SCNC: 139 MMOL/L (ref 136–145)

## 2023-02-10 PROCEDURE — 36415 COLL VENOUS BLD VENIPUNCTURE: CPT | Mod: HCNC,PO | Performed by: PHYSICIAN ASSISTANT

## 2023-02-10 PROCEDURE — 80053 COMPREHEN METABOLIC PANEL: CPT | Mod: HCNC | Performed by: PHYSICIAN ASSISTANT

## 2023-02-14 ENCOUNTER — OFFICE VISIT (OUTPATIENT)
Dept: UROLOGY | Facility: CLINIC | Age: 88
End: 2023-02-14
Payer: MEDICARE

## 2023-02-14 VITALS
RESPIRATION RATE: 18 BRPM | WEIGHT: 167.31 LBS | HEART RATE: 62 BPM | BODY MASS INDEX: 28.72 KG/M2 | SYSTOLIC BLOOD PRESSURE: 158 MMHG | DIASTOLIC BLOOD PRESSURE: 68 MMHG

## 2023-02-14 DIAGNOSIS — N32.81 OAB (OVERACTIVE BLADDER): Primary | ICD-10-CM

## 2023-02-14 LAB
BILIRUB SERPL-MCNC: NEGATIVE MG/DL
BLOOD URINE, POC: NEGATIVE
CLARITY, POC UA: CLEAR
COLOR, POC UA: YELLOW
GLUCOSE UR QL STRIP: NEGATIVE
KETONES UR QL STRIP: NEGATIVE
LEUKOCYTE ESTERASE URINE, POC: NORMAL
NITRITE, POC UA: NEGATIVE
PH, POC UA: 5
POC RESIDUAL URINE VOLUME: 59 ML (ref 0–100)
PROTEIN, POC: NEGATIVE
SPECIFIC GRAVITY, POC UA: 1.01
UROBILINOGEN, POC UA: NEGATIVE

## 2023-02-14 PROCEDURE — 1159F PR MEDICATION LIST DOCUMENTED IN MEDICAL RECORD: ICD-10-PCS | Mod: HCNC,CPTII,S$GLB, | Performed by: NURSE PRACTITIONER

## 2023-02-14 PROCEDURE — 1157F ADVNC CARE PLAN IN RCRD: CPT | Mod: HCNC,CPTII,S$GLB, | Performed by: NURSE PRACTITIONER

## 2023-02-14 PROCEDURE — 81002 POCT URINE DIPSTICK WITHOUT MICROSCOPE: ICD-10-PCS | Mod: HCNC,S$GLB,, | Performed by: NURSE PRACTITIONER

## 2023-02-14 PROCEDURE — 1157F PR ADVANCE CARE PLAN OR EQUIV PRESENT IN MEDICAL RECORD: ICD-10-PCS | Mod: HCNC,CPTII,S$GLB, | Performed by: NURSE PRACTITIONER

## 2023-02-14 PROCEDURE — 99214 OFFICE O/P EST MOD 30 MIN: CPT | Mod: HCNC,S$GLB,, | Performed by: NURSE PRACTITIONER

## 2023-02-14 PROCEDURE — 1126F PR PAIN SEVERITY QUANTIFIED, NO PAIN PRESENT: ICD-10-PCS | Mod: HCNC,CPTII,S$GLB, | Performed by: NURSE PRACTITIONER

## 2023-02-14 PROCEDURE — 81002 URINALYSIS NONAUTO W/O SCOPE: CPT | Mod: HCNC,S$GLB,, | Performed by: NURSE PRACTITIONER

## 2023-02-14 PROCEDURE — 51798 US URINE CAPACITY MEASURE: CPT | Mod: HCNC,S$GLB,, | Performed by: NURSE PRACTITIONER

## 2023-02-14 PROCEDURE — 99999 PR PBB SHADOW E&M-EST. PATIENT-LVL IV: CPT | Mod: PBBFAC,HCNC,, | Performed by: NURSE PRACTITIONER

## 2023-02-14 PROCEDURE — 1159F MED LIST DOCD IN RCRD: CPT | Mod: HCNC,CPTII,S$GLB, | Performed by: NURSE PRACTITIONER

## 2023-02-14 PROCEDURE — 99999 PR PBB SHADOW E&M-EST. PATIENT-LVL IV: ICD-10-PCS | Mod: PBBFAC,HCNC,, | Performed by: NURSE PRACTITIONER

## 2023-02-14 PROCEDURE — 99214 PR OFFICE/OUTPT VISIT, EST, LEVL IV, 30-39 MIN: ICD-10-PCS | Mod: HCNC,S$GLB,, | Performed by: NURSE PRACTITIONER

## 2023-02-14 PROCEDURE — 1126F AMNT PAIN NOTED NONE PRSNT: CPT | Mod: HCNC,CPTII,S$GLB, | Performed by: NURSE PRACTITIONER

## 2023-02-14 PROCEDURE — 51798 POCT BLADDER SCAN: ICD-10-PCS | Mod: HCNC,S$GLB,, | Performed by: NURSE PRACTITIONER

## 2023-02-14 RX ORDER — MIRABEGRON 50 MG/1
50 TABLET, FILM COATED, EXTENDED RELEASE ORAL DAILY
Qty: 30 TABLET | Refills: 11 | Status: SHIPPED | OUTPATIENT
Start: 2023-02-14 | End: 2023-11-28

## 2023-02-14 NOTE — PROGRESS NOTES
Chief Complaint:   Urge incontinence  Nocturia  Recurrent urinary tract Infections    HPI:   Patient is a an 87-year-old female that has been followed by Dr. Kim for recurrent urinary tract infections and overactive bladder.  Patient states that she recently completed a course of antibiotics secondary to E coli cystitis.  Is currently asymptomatic.  Urine in clinic is negative and PVR is 59 mL.  Patient states that she is currently on oxybutynin, however, has urge incontinence that is keeping her from going to social events.  Patient states that she wears a diaper and a pad and is changing pads several times during the day.  Nocturia is 4 times nightly.  Denies gross hematuria.  States that she drinks water throughout the day, very little caffeine.  12/20/2021 Dr. Kim  Clau Nunn is a 86 y.o. female here for evaluation of Other (Weak bladder. Pt uses pads everytime. Pt gets UTI every 6 weeks )  12/20/21- She reports insensate incontinence going on for a few years. Changes pads 2-3 times a day. She does have some degree of UUI and also has incontinence upon valsalva. States that she gets UTIs every 6-8 weeks. She is normally seen at the walk-in clinic near her home. She states that it is not an [facility] facility. Culture has been done once and her antibiotics were changed once. Not sure of the organism. She reports that UTI symptoms generally include dysuria and chills and fatigue. She is currently not symptomatic. No gross hematuria. Symptoms generally do clear with antibiotics. UTIs have been ongoing for years. She is using vaginal estrogen, prescribed by another urologist. Uses it 1-2 times a week.        Allergies:  Cephalexin    Medications:  has a current medication list which includes the following prescription(s): albuterol, apixaban, azelastine, buspirone, cholecalciferol (vitamin d3), clobetasol, cyclobenzaprine, restasis multidose, escitalopram oxalate, fluocinonide 0.05%, gabapentin,  levothyroxine, mupirocin, omeprazole, trazodone, valsartan, fluticasone propionate, myrbetriq, and triamcinolone acetonide 0.025%, and the following Facility-Administered Medications: denosumab and ondansetron.    Review of Systems:  General: No fever, chills, fatigability, or weight loss.  Skin: No rashes, itching, or changes in color or texture of skin.  Chest: Denies PEREZ, cyanosis, wheezing, cough, and sputum production.  Abdomen: Appetite fine. No weight loss. Denies diarrhea, abdominal pain, hematemesis, or blood in stool.  Musculoskeletal: No joint stiffness or swelling. Denies back pain.  : As above.  All other review of systems negative.    PMH:   has a past medical history of Allergy, Anxiety, Asthma, Back pain, Chronic venous insufficiency (11/19/2013), Depression, Diverticulosis (11/19/2013), Dry eyes, Fracture, sacrum/coccyx, GERD (gastroesophageal reflux disease), H/O total hip arthroplasty (12/30/2015), Hypertension, Hypothyroid, Osteoarthritis, Osteoporosis, Postlaminectomy syndrome of lumbar region (1/8/2014), Radius fracture, Simple chronic bronchitis (5/3/2017), Umbilical hernia (11/19/2013), Urinary incontinence, and Vitamin D deficiency disease.    PSH:   has a past surgical history that includes Tonsillectomy; Adenoidectomy; Back surgery; Leg Surgery (Right); Hip surgery (Right); breast implants (1973); Hysterectomy; Fracture surgery (April 3 2015); Hand surgery; cystoscope (1/6/16); Cataract extraction (Bilateral); Joint replacement (Right); Closed reduction distal radius fracture; Transforaminal epidural injection of steroid (Left, 7/8/2020); Injection of joint (N/A, 8/12/2020); Injection of anesthetic agent into sacroiliac joint (N/A, 8/12/2020); Injection of anesthetic agent around nerve (Right, 9/16/2020); Selective injection of anesthetic agent around lumbar spinal nerve root by transforaminal approach (Bilateral, 2/10/2021); Transforaminal epidural injection of steroid (Left, 7/1/2021);  Injection of anesthetic agent around nerve (Right, 7/13/2021); Selective injection of anesthetic agent around lumbar spinal nerve root by transforaminal approach (Bilateral, 5/25/2022); and Injection of anesthetic agent into sacroiliac joint (Bilateral, 1/31/2023).    FamHx: family history includes COPD in her mother; Cancer in her daughter and mother; Diabetes in her son; Pulmonary fibrosis in her sister; Stroke in her father.    SocHx:  reports that she has never smoked. She has never used smokeless tobacco. She reports current alcohol use. She reports that she does not use drugs.      Physical Exam:  Vitals:    02/14/23 1119   BP: (!) 158/68   Pulse: 62   Resp: 18     General: A&Ox3, no apparent distress, no deformities  Neck: No masses, normal thyroid  Lungs: normal inspiration, no use of accessory muscles  Heart: normal pulse, no arrhythmias  Abdomen: Soft, NT, ND, no masses, no hernias, no hepatosplenomegaly  Lymphatic: Neck and groin nodes negative  Skin: The skin is warm and dry. No jaundice.  Labs/Studies:   See HPI    Impression/Plan:   1. Recurrent urinary tract infections  Patient is asymptomatic in urine in clinic is negative    2. Overactive bladder  Patient to discontinue oxybutynin and Myrbetriq 50 mg once daily prescription sent to her local pharmacy.  Patient to follow-up with Dr. Kim in 4 weeks.  If not a complete resolve to overactive bladder symptoms.  Patient wants to discuss possible Botox or InterStim.

## 2023-02-16 ENCOUNTER — OFFICE VISIT (OUTPATIENT)
Dept: RHEUMATOLOGY | Facility: CLINIC | Age: 88
End: 2023-02-16
Payer: MEDICARE

## 2023-02-16 ENCOUNTER — INFUSION (OUTPATIENT)
Dept: INFUSION THERAPY | Facility: HOSPITAL | Age: 88
End: 2023-02-16
Attending: PHYSICIAN ASSISTANT
Payer: MEDICARE

## 2023-02-16 VITALS — BODY MASS INDEX: 29.06 KG/M2 | WEIGHT: 170.19 LBS | TEMPERATURE: 98 F | OXYGEN SATURATION: 96 % | HEIGHT: 64 IN

## 2023-02-16 VITALS
HEART RATE: 62 BPM | SYSTOLIC BLOOD PRESSURE: 144 MMHG | BODY MASS INDEX: 29.06 KG/M2 | DIASTOLIC BLOOD PRESSURE: 71 MMHG | WEIGHT: 170.19 LBS | HEIGHT: 64 IN

## 2023-02-16 DIAGNOSIS — M80.00XG AGE-RELATED OSTEOPOROSIS WITH CURRENT PATHOLOGICAL FRACTURE WITH DELAYED HEALING: Primary | ICD-10-CM

## 2023-02-16 DIAGNOSIS — Z51.81 MEDICATION MONITORING ENCOUNTER: ICD-10-CM

## 2023-02-16 DIAGNOSIS — M17.0 PRIMARY OSTEOARTHRITIS OF BOTH KNEES: ICD-10-CM

## 2023-02-16 PROCEDURE — 3288F PR FALLS RISK ASSESSMENT DOCUMENTED: ICD-10-PCS | Mod: HCNC,CPTII,S$GLB, | Performed by: PHYSICIAN ASSISTANT

## 2023-02-16 PROCEDURE — 96372 THER/PROPH/DIAG INJ SC/IM: CPT | Mod: HCNC

## 2023-02-16 PROCEDURE — 1125F PR PAIN SEVERITY QUANTIFIED, PAIN PRESENT: ICD-10-PCS | Mod: HCNC,CPTII,S$GLB, | Performed by: PHYSICIAN ASSISTANT

## 2023-02-16 PROCEDURE — 63600175 PHARM REV CODE 636 W HCPCS: Mod: JG,HCNC | Performed by: PHYSICIAN ASSISTANT

## 2023-02-16 PROCEDURE — 99213 PR OFFICE/OUTPT VISIT, EST, LEVL III, 20-29 MIN: ICD-10-PCS | Mod: HCNC,25,S$GLB, | Performed by: PHYSICIAN ASSISTANT

## 2023-02-16 PROCEDURE — 1157F PR ADVANCE CARE PLAN OR EQUIV PRESENT IN MEDICAL RECORD: ICD-10-PCS | Mod: HCNC,CPTII,S$GLB, | Performed by: PHYSICIAN ASSISTANT

## 2023-02-16 PROCEDURE — 1159F MED LIST DOCD IN RCRD: CPT | Mod: HCNC,CPTII,S$GLB, | Performed by: PHYSICIAN ASSISTANT

## 2023-02-16 PROCEDURE — 20610 LARGE JOINT ASPIRATION/INJECTION: BILATERAL KNEE: ICD-10-PCS | Mod: 50,HCNC,S$GLB, | Performed by: PHYSICIAN ASSISTANT

## 2023-02-16 PROCEDURE — 99999 PR PBB SHADOW E&M-EST. PATIENT-LVL III: ICD-10-PCS | Mod: PBBFAC,HCNC,, | Performed by: PHYSICIAN ASSISTANT

## 2023-02-16 PROCEDURE — 99213 OFFICE O/P EST LOW 20 MIN: CPT | Mod: HCNC,25,S$GLB, | Performed by: PHYSICIAN ASSISTANT

## 2023-02-16 PROCEDURE — 3288F FALL RISK ASSESSMENT DOCD: CPT | Mod: HCNC,CPTII,S$GLB, | Performed by: PHYSICIAN ASSISTANT

## 2023-02-16 PROCEDURE — 1125F AMNT PAIN NOTED PAIN PRSNT: CPT | Mod: HCNC,CPTII,S$GLB, | Performed by: PHYSICIAN ASSISTANT

## 2023-02-16 PROCEDURE — 1159F PR MEDICATION LIST DOCUMENTED IN MEDICAL RECORD: ICD-10-PCS | Mod: HCNC,CPTII,S$GLB, | Performed by: PHYSICIAN ASSISTANT

## 2023-02-16 PROCEDURE — 1101F PT FALLS ASSESS-DOCD LE1/YR: CPT | Mod: HCNC,CPTII,S$GLB, | Performed by: PHYSICIAN ASSISTANT

## 2023-02-16 PROCEDURE — 99999 PR PBB SHADOW E&M-EST. PATIENT-LVL III: CPT | Mod: PBBFAC,HCNC,, | Performed by: PHYSICIAN ASSISTANT

## 2023-02-16 PROCEDURE — 1157F ADVNC CARE PLAN IN RCRD: CPT | Mod: HCNC,CPTII,S$GLB, | Performed by: PHYSICIAN ASSISTANT

## 2023-02-16 PROCEDURE — 1101F PR PT FALLS ASSESS DOC 0-1 FALLS W/OUT INJ PAST YR: ICD-10-PCS | Mod: HCNC,CPTII,S$GLB, | Performed by: PHYSICIAN ASSISTANT

## 2023-02-16 PROCEDURE — 20610 DRAIN/INJ JOINT/BURSA W/O US: CPT | Mod: 50,HCNC,S$GLB, | Performed by: PHYSICIAN ASSISTANT

## 2023-02-16 RX ADMIN — DENOSUMAB 60 MG: 60 INJECTION SUBCUTANEOUS at 02:02

## 2023-02-16 NOTE — PROGRESS NOTES
"       RHEUMATOLOGY OUTPATIENT CLINIC NOTE    2/16/2023    Attending Rheumatologist: Harika Ceron PA-C  Primary Care Provider: Jeanette Gomez MD   Chief Complaint/Reason For Consultation:  Osteoporosis      Subjective:        Clau Nunn is a 87 y.o. female here today for routine follow up of osteoporosis and OA- due for monovisc injections today. h/o of a right hip fx, right tibia fx, spine fxs s/p trauma and left wrist fx. Leg pain biggest issue;  Pain today 5/10 - back. DDD/DJD spine- mri shows diffuse multilevel lumbar spondylosis. takes gabapentin at night.   She is taking tumeric and tylenol. Some times aleve or ibuprofen. We recommend no nsaids w/ckd. Rheumatologic systems otherwise negative. Physical exam shows no synovitis, no knee effusion.    Current Rheum Medications:  Prolia, monovisc  Previous Rheum Medications:   forteo (SE), reclast (stopped 2' CKD)    Past Medical History:   Diagnosis Date    Allergy     Anxiety     Asthma     Back pain     Chronic venous insufficiency 11/19/2013    Depression     Diverticulosis 11/19/2013 1/8/8 colonoscopy    Dry eyes     Fracture, sacrum/coccyx     Dr. Sanchez     GERD (gastroesophageal reflux disease)     H/O total hip arthroplasty 12/30/2015    Hypertension     Hypothyroid     Osteoarthritis     Osteoporosis     Postlaminectomy syndrome of lumbar region 1/8/2014    Radius fracture     7/18    Simple chronic bronchitis 5/3/2017    Umbilical hernia 11/19/2013    Urinary incontinence     Vitamin D deficiency disease        Review of patient's allergies indicates:   Allergen Reactions    Cephalexin Hives, Itching, Swelling, Anxiety, Dermatitis and Rash       Objective:   BP (!) 144/71   Pulse 62   Ht 5' 4" (1.626 m)   Wt 77.2 kg (170 lb 3.1 oz)   BMI 29.21 kg/m²     Immunization History   Administered Date(s) Administered    COVID-19, MRNA, LN-S, PF (Pfizer) (Purple Cap) 01/06/2021, 01/27/2021, 10/12/2021    Hepatitis A, Adult 11/26/2019    " Influenza 10/14/2009, 10/14/2010, 10/25/2018    Influenza (FLUAD) - Quadrivalent - Adjuvanted - PF *Preferred* (65+) 10/16/2020, 10/28/2022    Influenza - High Dose - PF (65 years and older) 10/26/2011, 11/05/2013, 12/03/2014, 10/29/2015, 10/13/2016, 11/03/2017, 10/25/2018, 11/21/2019    Influenza - Quadrivalent - High Dose - PF (65 years and older) 10/04/2021    Pneumococcal Conjugate - 13 Valent 05/04/2016    Pneumococcal Conjugate - 20 Valent 12/02/2022    Pneumococcal Polysaccharide - 23 Valent 12/13/2011, 05/03/2017    Tdap 12/13/2011, 08/25/2016, 07/27/2018, 02/25/2022    Zoster 11/06/2009    Zoster Recombinant 02/25/2022     Current Outpatient Medications   Medication Instructions    albuterol (PROVENTIL) 2.5 mg, Nebulization, Every 6 hours PRN, Rescue    apixaban (ELIQUIS) 5 mg, Oral, 2 times daily    azelastine (ASTELIN) 137 mcg (0.1 %) nasal spray 2 sprays, Nasal, 2 times daily    busPIRone (BUSPAR) 5 MG Tab TAKE 1 TABLET TWICE DAILY    cholecalciferol (vitamin D3) (D3-2000) 2,000 Units, Oral, Daily    clobetasoL (TEMOVATE) 0.05 % external solution Pt to mix in 1 jar of cerave cream and apply to affected areas bid for 2-4 weeks per course    cyclobenzaprine (FLEXERIL) 5 mg, Oral, Nightly PRN    cycloSPORINE (RESTASIS MULTIDOSE) 0.05 % Drop 1 drop, Both Eyes, Every 12 hours    EScitalopram oxalate (LEXAPRO) 20 MG tablet TAKE 1 TABLET EVERY DAY    fluocinonide 0.05% (LIDEX) 0.05 % cream AAA bid to leg for 2-4 weeks per course    fluticasone propionate (FLONASE) 50 mcg/actuation nasal spray No dose, route, or frequency recorded.    gabapentin (NEURONTIN) 300 mg, Oral, 3 times daily    levothyroxine (SYNTHROID) 100 mcg, Oral, Daily    mupirocin (BACTROBAN) 2 % ointment Topical (Top), 2 times daily, To wound on right lower leg    MYRBETRIQ 50 mg, Oral, Daily    omeprazole (PRILOSEC) 40 mg, Oral, Every morning    traZODone (DESYREL) 50 mg, Oral, Nightly PRN    triamcinolone acetonide 0.025% (KENALOG) 0.025 %  cream AAA bid    valsartan (DIOVAN) 80 mg, Oral, Daily       Recent Results (from the past 336 hour(s))   Comprehensive Metabolic Panel    Collection Time: 02/10/23 11:30 AM   Result Value Ref Range    Sodium 139 136 - 145 mmol/L    Potassium 4.4 3.5 - 5.1 mmol/L    Chloride 102 95 - 110 mmol/L    CO2 23 23 - 29 mmol/L    Glucose 101 70 - 110 mg/dL    BUN 24 (H) 8 - 23 mg/dL    Creatinine 1.3 0.5 - 1.4 mg/dL    Calcium 9.6 8.7 - 10.5 mg/dL    Total Protein 7.9 6.0 - 8.4 g/dL    Albumin 4.1 3.5 - 5.2 g/dL    Total Bilirubin 1.1 (H) 0.1 - 1.0 mg/dL    Alkaline Phosphatase 70 55 - 135 U/L    AST 20 10 - 40 U/L    ALT 16 10 - 44 U/L    Anion Gap 14 8 - 16 mmol/L    eGFR 40 (A) >60 mL/min/1.73 m^2   POCT Bladder Scan    Collection Time: 02/14/23  1:37 PM   Result Value Ref Range    POC Residual Urine Volume 59 0 - 100 mL   POCT URINE DIPSTICK WITHOUT MICROSCOPE    Collection Time: 02/14/23  1:37 PM   Result Value Ref Range    Color, UA Yellow     pH, UA 5     WBC, UA trace     Nitrite, UA negative     Protein, POC negative     Glucose, UA negative     Ketones, UA negative     Urobilinogen, UA negative     Bilirubin, POC negative     Blood, UA negative     Clarity, UA Clear     Spec Grav UA 1.015          Imaging:  All imaging reviewed and independently interpreted by me.    DEXA BONE DENSITY SPINE HIP 1/2/20  COMPARISON:  12/13/2016     FINDINGS:  The L3-L4 vertebral bone mineral density is equal to 1.52 g/cm squared with a T score of 2.6.  There has been a 13.2% statistically significant change relative to the prior study.     The left femoral neck bone mineral density is equal to 0.88 g/cm squared with a T score of -1.2.  There has been  no significant change relative to the prior study.     There is a 18% risk of a major osteoporotic fracture and a 4% risk of hip fracture in the next 10 years (FRAX).     Impression: Osteopenia    XR KNEE ORTHO LEFT 2/11/20  COMPARISON:  07/23/2019     FINDINGS:  Similar advanced  tricompartmental degenerative changes of the right knee with old trauma deformity of the proximal tibia.  No acute osseous abnormality.     Left knee demonstrates similar tricompartmental degenerative changes.  Small suprapatellar joint effusion present.  Chondrocalcinosis noted.    Assessment:     1. Age-related osteoporosis with current pathological fracture with delayed healing    2. Medication monitoring encounter    3. Primary osteoarthritis of both knees            kellgren trace grade 3 to left knee; grade 4 right knee  Plan:       Safe to admin prolia today  Bilateral monovisc done today- see proc note. Update rebecca knee xray today  no current medication related issues, no evidence of toxicity. I ordered labs for toxicity monitoring;  results reviewed and discussed findings with the patient   Patient understands and contact clinic at any time regarding questions about today's office visit and any medication changes that were made  Return to clinic: 6 mos w/ early cmp, prolia, bilateral monovisc    The patient understands, chooses and consents to this plan and accepts all the risks which include but are not limited to the risks mentioned above.     Method of contact with patient concerns: [facility]    30 minutes of total time spent on the encounter, which includes face to face time and non-face to face time preparing to see the patient (eg, review of tests), Obtaining and/or reviewing separately obtained history, Documenting clinical information in the electronic or other health record, Independently interpreting results (not separately reported) and communicating results to the patient/family/caregiver, or Care coordination (not separately reported).          Disclaimer: This note was prepared using a voice recognition system and is likely to have sound alike errors within the text.       Harika Ceron PA-C  Rheumatology Department   [facility]

## 2023-02-16 NOTE — PROCEDURES
Large Joint Aspiration/Injection: bilateral knee    Date/Time: 2/16/2023 1:30 PM  Performed by: Harika Ceron PA-C  Authorized by: Harika Ceron PA-C     Consent Done?:  Yes (Verbal)  Indications:  Pain and arthritis  Site marked: the procedure site was marked    Timeout: prior to procedure the correct patient, procedure, and site was verified    Prep: patient was prepped and draped in usual sterile fashion      Local anesthesia used?: Yes    Local anesthetic:  Topical anesthetic    Details:  Needle Size:  22 G  Ultrasonic Guidance for needle placement?: No    Approach:  Anterolateral  Location:  Knee  Laterality:  Bilateral  Site:  Bilateral knee  Medications (Right):  88 mg hyaluronate sodium, stabilized 88 mg/4 mL  Medications (Left):  88 mg hyaluronate sodium, stabilized 88 mg/4 mL  Patient tolerance:  Patient tolerated the procedure well with no immediate complications

## 2023-02-24 ENCOUNTER — TELEPHONE (OUTPATIENT)
Dept: ORTHOPEDICS | Facility: CLINIC | Age: 88
End: 2023-02-24
Payer: MEDICARE

## 2023-02-24 NOTE — TELEPHONE ENCOUNTER
Returned patient's call and scheduled her podiatry appointment with Dr. Dhillon on 2/28 at 1:15pm. Patient verbalized understanding and was grateful for the call back.

## 2023-02-24 NOTE — TELEPHONE ENCOUNTER
----- Message from Vanessa Craven sent at 2/24/2023  3:04 PM CST -----  .Type:  Sooner Apoointment Request    Caller is requesting a sooner appointment.  Caller declined first available appointment listed below.  Caller will not accept being placed on the waitlist and is requesting a message be sent to doctor.  Name of Caller:.Clauana Nunn   When is the first available appointment?03/07/2023  Symptoms:stress fracture in her left foot  Would the patient rather a call back or a response via [facility]? Call back  Best Call Back Number:.[phone]   Additional Information: she went to urgent care and is wearing a boot

## 2023-02-28 ENCOUNTER — OFFICE VISIT (OUTPATIENT)
Dept: PODIATRY | Facility: CLINIC | Age: 88
End: 2023-02-28
Payer: MEDICARE

## 2023-02-28 VITALS — BODY MASS INDEX: 29.06 KG/M2 | HEIGHT: 64 IN | WEIGHT: 170.19 LBS

## 2023-02-28 DIAGNOSIS — M84.375A STRESS FRACTURE OF METATARSAL BONE OF LEFT FOOT, INITIAL ENCOUNTER: Primary | ICD-10-CM

## 2023-02-28 PROCEDURE — 1125F AMNT PAIN NOTED PAIN PRSNT: CPT | Mod: HCNC,CPTII,S$GLB, | Performed by: PODIATRIST

## 2023-02-28 PROCEDURE — 1101F PR PT FALLS ASSESS DOC 0-1 FALLS W/OUT INJ PAST YR: ICD-10-PCS | Mod: HCNC,CPTII,S$GLB, | Performed by: PODIATRIST

## 2023-02-28 PROCEDURE — 99214 PR OFFICE/OUTPT VISIT, EST, LEVL IV, 30-39 MIN: ICD-10-PCS | Mod: HCNC,S$GLB,, | Performed by: PODIATRIST

## 2023-02-28 PROCEDURE — 3288F FALL RISK ASSESSMENT DOCD: CPT | Mod: HCNC,CPTII,S$GLB, | Performed by: PODIATRIST

## 2023-02-28 PROCEDURE — 1101F PT FALLS ASSESS-DOCD LE1/YR: CPT | Mod: HCNC,CPTII,S$GLB, | Performed by: PODIATRIST

## 2023-02-28 PROCEDURE — 1159F PR MEDICATION LIST DOCUMENTED IN MEDICAL RECORD: ICD-10-PCS | Mod: HCNC,CPTII,S$GLB, | Performed by: PODIATRIST

## 2023-02-28 PROCEDURE — 3288F PR FALLS RISK ASSESSMENT DOCUMENTED: ICD-10-PCS | Mod: HCNC,CPTII,S$GLB, | Performed by: PODIATRIST

## 2023-02-28 PROCEDURE — 1125F PR PAIN SEVERITY QUANTIFIED, PAIN PRESENT: ICD-10-PCS | Mod: HCNC,CPTII,S$GLB, | Performed by: PODIATRIST

## 2023-02-28 PROCEDURE — 1160F RVW MEDS BY RX/DR IN RCRD: CPT | Mod: HCNC,CPTII,S$GLB, | Performed by: PODIATRIST

## 2023-02-28 PROCEDURE — 1159F MED LIST DOCD IN RCRD: CPT | Mod: HCNC,CPTII,S$GLB, | Performed by: PODIATRIST

## 2023-02-28 PROCEDURE — 99999 PR PBB SHADOW E&M-EST. PATIENT-LVL IV: ICD-10-PCS | Mod: PBBFAC,HCNC,, | Performed by: PODIATRIST

## 2023-02-28 PROCEDURE — 1157F ADVNC CARE PLAN IN RCRD: CPT | Mod: HCNC,CPTII,S$GLB, | Performed by: PODIATRIST

## 2023-02-28 PROCEDURE — 99214 OFFICE O/P EST MOD 30 MIN: CPT | Mod: HCNC,S$GLB,, | Performed by: PODIATRIST

## 2023-02-28 PROCEDURE — 99999 PR PBB SHADOW E&M-EST. PATIENT-LVL IV: CPT | Mod: PBBFAC,HCNC,, | Performed by: PODIATRIST

## 2023-02-28 PROCEDURE — 1157F PR ADVANCE CARE PLAN OR EQUIV PRESENT IN MEDICAL RECORD: ICD-10-PCS | Mod: HCNC,CPTII,S$GLB, | Performed by: PODIATRIST

## 2023-02-28 PROCEDURE — 1160F PR REVIEW ALL MEDS BY PRESCRIBER/CLIN PHARMACIST DOCUMENTED: ICD-10-PCS | Mod: HCNC,CPTII,S$GLB, | Performed by: PODIATRIST

## 2023-02-28 NOTE — PROGRESS NOTES
Subjective:     Patient ID: Clau Nunn is a 87 y.o. female.    Chief Complaint: Foot Injury (Pt c/o left foot fx, pt c/o left foot pain 3/10, non-diabetic pt, PCP Dr. Molina last seen 12-2-22)    Clau is a 87 y.o. female who presents to the podiatry clinic  with complaint of  left foot pain. Onset of the symptoms was a week ago. Patient rates pain 3/10. Patient points to left dorsal forefoot. Patient states she swelling has gone down. Patient is accompanied by her daughter today.     Patient Active Problem List   Diagnosis    Primary osteoarthritis involving multiple joints    Acquired hypothyroidism    Lumbar arthropathy    Chronic venous insufficiency    Anxiety    Vitamin D deficiency disease    Essential hypertension    Hiatal hernia with gastroesophageal reflux    Sacroiliac inflammation    Atherosclerosis of aorta    Chronic lumbar radiculopathy    Stage 3b chronic kidney disease    Simple chronic bronchitis    Pain of left hip joint    Lumbar spondylosis    Pain in both lower extremities    Age-related osteoporosis with current pathological fracture with delayed healing    Fracture, sacrum/coccyx    Radius fracture    History of DVT in adulthood    Lumbar radiculopathy    Secondary hyperparathyroidism    Major depression in full remission    Shoulder pain    Chronic anticoagulation    Gastroesophageal reflux disease without esophagitis       Medication List with Changes/Refills   Current Medications    ALBUTEROL (PROVENTIL) 2.5 MG /3 ML (0.083 %) NEBULIZER SOLUTION    Take 3 mLs (2.5 mg total) by nebulization every 6 (six) hours as needed for Wheezing. Rescue    APIXABAN (ELIQUIS) 5 MG TAB    Take 1 tablet (5 mg total) by mouth 2 (two) times daily.    AZELASTINE (ASTELIN) 137 MCG (0.1 %) NASAL SPRAY    2 sprays by Nasal route 2 (two) times daily.    BUSPIRONE (BUSPAR) 5 MG TAB    TAKE 1 TABLET TWICE DAILY    CHOLECALCIFEROL, VITAMIN D3, (D3-2000) 50 MCG (2,000 UNIT) CAP    Take 1 capsule (2,000  Units total) by mouth once daily.    CLOBETASOL (TEMOVATE) 0.05 % EXTERNAL SOLUTION    Pt to mix in 1 jar of cerave cream and apply to affected areas bid for 2-4 weeks per course    CYCLOBENZAPRINE (FLEXERIL) 5 MG TABLET    TAKE 1 TABLET (5 MG TOTAL) BY MOUTH NIGHTLY AS NEEDED FOR MUSCLE SPASMS.    CYCLOSPORINE (RESTASIS MULTIDOSE) 0.05 % DROP    Place 1 drop into both eyes every 12 (twelve) hours.    FLUOCINONIDE 0.05% (LIDEX) 0.05 % CREAM    AAA bid to leg for 2-4 weeks per course    FLUTICASONE PROPIONATE (FLONASE) 50 MCG/ACTUATION NASAL SPRAY        GABAPENTIN (NEURONTIN) 300 MG CAPSULE    Take 1 capsule (300 mg total) by mouth 3 (three) times daily.    LEVOTHYROXINE (SYNTHROID) 100 MCG TABLET    TAKE 1 TABLET EVERY DAY    MIRABEGRON (MYRBETRIQ) 50 MG TB24    Take 1 tablet (50 mg total) by mouth once daily.    MUPIROCIN (BACTROBAN) 2 % OINTMENT    APPLY TOPICALLY TWICE DAILY TO WOUND ON RIGHT LOWER LEG    OMEPRAZOLE (PRILOSEC) 40 MG CAPSULE    Take 1 capsule (40 mg total) by mouth every morning.    TRAZODONE (DESYREL) 50 MG TABLET    TAKE 1 TABLET (50 MG TOTAL) BY MOUTH NIGHTLY AS NEEDED FOR INSOMNIA.    TRIAMCINOLONE ACETONIDE 0.025% (KENALOG) 0.025 % CREAM    AAA bid    VALSARTAN (DIOVAN) 80 MG TABLET    Take 1 tablet (80 mg total) by mouth once daily.   Changed and/or Refilled Medications    Modified Medication Previous Medication    ESCITALOPRAM OXALATE (LEXAPRO) 20 MG TABLET EScitalopram oxalate (LEXAPRO) 20 MG tablet       Take 1 tablet (20 mg total) by mouth once daily.    TAKE 1 TABLET EVERY DAY       Review of patient's allergies indicates:   Allergen Reactions    Cephalexin Hives, Itching, Swelling, Anxiety, Dermatitis and Rash       Past Surgical History:   Procedure Laterality Date    ADENOIDECTOMY      BACK SURGERY      x2    breast implants  1973    CATARACT EXTRACTION Bilateral     CLOSED REDUCTION DISTAL RADIUS FRACTURE      Dr. Black, 8/18    cystoscope  1/6/16    DR. Brown    FRACTURE  SURGERY  April 3 2015    left wrist    HAND SURGERY      HIP SURGERY Right     total hip- Dr. Dos Santos    HYSTERECTOMY      34y/o    INJECTION OF ANESTHETIC AGENT AROUND NERVE Right 9/16/2020    Procedure: Right suprascapular nerve block;  Surgeon: Raffi Wells MD;  Location: [facility];  Service: Pain Management;  Laterality: Right;    INJECTION OF ANESTHETIC AGENT AROUND NERVE Right 7/13/2021    Procedure: Block, Nerve Right Suprascapular Nerve Block with RN IV sedation;  Surgeon: Raffi Wells MD;  Location: [facility];  Service: Pain Management;  Laterality: Right;    INJECTION OF ANESTHETIC AGENT INTO SACROILIAC JOINT N/A 8/12/2020    Procedure: Left IA hip Joint + Left SIJ + Right shoulder injection;  Surgeon: Raffi Wells MD;  Location: [facility];  Service: Pain Management;  Laterality: N/A;    INJECTION OF ANESTHETIC AGENT INTO SACROILIAC JOINT Bilateral 1/31/2023    Procedure: Bilateral SIJ Injection w/Local;  Surgeon: Abdirahman Parker MD;  Location: [facility];  Service: Pain Management;  Laterality: Bilateral;    INJECTION OF JOINT N/A 8/12/2020    Procedure: Left IA hip Joint + Left SIJ + Right shoulder injection;  Surgeon: Raffi Wells MD;  Location: [facility];  Service: Pain Management;  Laterality: N/A;    JOINT REPLACEMENT Right     R NNAMDI - Dr. Dos Santos    LEG SURGERY Right     ex-fix tib/fib    SELECTIVE INJECTION OF ANESTHETIC AGENT AROUND LUMBAR SPINAL NERVE ROOT BY TRANSFORAMINAL APPROACH Bilateral 2/10/2021    Procedure: Bilateral L5/S1 TF REJI;  Surgeon: Raffi Wells MD;  Location: [facility];  Service: Pain Management;  Laterality: Bilateral;    SELECTIVE INJECTION OF ANESTHETIC AGENT AROUND LUMBAR SPINAL NERVE ROOT BY TRANSFORAMINAL APPROACH Bilateral 5/25/2022    Procedure: Bilateral L5/S1 + S1 TF REJI;  Surgeon: Raffi Wells MD;  Location: [facility];  Service: Pain Management;  Laterality: Bilateral;    TONSILLECTOMY      TRANSFORAMINAL EPIDURAL INJECTION OF STEROID Left  7/8/2020    Procedure: left L2/3 + L5/S1 TF REJI;  Surgeon: Raffi Wells MD;  [facility];  Service: Pain Management;  Laterality: Left;    TRANSFORAMINAL EPIDURAL INJECTION OF STEROID Left 7/1/2021    Procedure: left L5/S1 + S1 TF REJI;  Surgeon: Raffi Wells MD;  Location: [facility];  Service: Pain Management;  Laterality: Left;       Family History   Problem Relation Age of Onset    COPD Mother     Cancer Mother         lung CA    Pulmonary fibrosis Sister     Cancer Daughter         colon    Stroke Father     Diabetes Son     Melanoma Neg Hx     Psoriasis Neg Hx     Lupus Neg Hx        Social History     Socioeconomic History    Marital status:    Occupational History    Occupation: retired     Comment: [employer]   Tobacco Use    Smoking status: Never    Smokeless tobacco: Never   Substance and Sexual Activity    Alcohol use: Yes     Alcohol/week: 0.0 standard drinks     Comment: rarely    Drug use: Never    Sexual activity: Never     Partners: Male     Birth control/protection: Post-menopausal   Social History Narrative    Patient is retired from [employer]     Social Determinants of Health     Financial Resource Strain: Low Risk     Difficulty of Paying Living Expenses: Not hard at all   Food Insecurity: No Food Insecurity    Worried About Running Out of Food in the Last Year: Never true    Ran Out of Food in the Last Year: Never true   Transportation Needs: No Transportation Needs    Lack of Transportation (Medical): No    Lack of Transportation (Non-Medical): No   Physical Activity: Inactive    Days of Exercise per Week: 0 days    Minutes of Exercise per Session: 0 min   Stress: No Stress Concern Present    Feeling of Stress : Only a little   Social Connections: Moderately Integrated    Frequency of Communication with Friends and Family: More than three times a week    Frequency of Social Gatherings with Friends and Family: More than three times a week     "Attends Muslim Services: More than 4 times per year    Active Member of Clubs or Organizations: No    Attends Club or Organization Meetings: More than 4 times per year    Marital Status:    Housing Stability: Unknown    Unable to Pay for Housing in the Last Year: No    Unstable Housing in the Last Year: No       Vitals:    02/28/23 1438   Weight: 77.2 kg (170 lb 3.1 oz)   Height: 5' 4" (1.626 m)   PainSc:   3       Hemoglobin A1C   Date Value Ref Range Status   03/11/2019 5.0 4.0 - 5.6 % Final     Comment:     ADA Screening Guidelines:  5.7-6.4%  Consistent with prediabetes  >or=6.5%  Consistent with diabetes  High levels of fetal hemoglobin interfere with the HbA1C  assay. Heterozygous hemoglobin variants (HbS, HgC, etc)do  not significantly interfere with this assay.   However, presence of multiple variants may affect accuracy.     03/02/2018 5.1 4.0 - 5.6 % Final     Comment:     According to ADA guidelines, hemoglobin A1c <7.0% represents  optimal control in non-pregnant diabetic patients. Different  metrics may apply to specific patient populations.   Standards of Medical Care in Diabetes-2016.  For the purpose of screening for the presence of diabetes:  <5.7%     Consistent with the absence of diabetes  5.7-6.4%  Consistent with increasing risk for diabetes   (prediabetes)  >or=6.5%  Consistent with diabetes  Currently, no consensus exists for use of hemoglobin A1c  for diagnosis of diabetes for children.  This Hemoglobin A1c assay has significant interference with fetal   hemoglobin   (HbF). The results are invalid for patients with abnormal amounts of   HbF,   including those with known Hereditary Persistence   of Fetal Hemoglobin. Heterozygous hemoglobin variants (HbAS, HbAC,   HbAD, HbAE, HbA2) do not significantly interfere with this assay;   however, presence of multiple variants in a sample may impact the %   interference.     04/19/2004 5.2 4.5 - 6.2 % Final       Review of Systems "   Constitutional:  Negative for chills and fever.   Respiratory:  Negative for shortness of breath.    Cardiovascular:  Negative for chest pain, palpitations, orthopnea, claudication and leg swelling.   Gastrointestinal:  Negative for diarrhea, nausea and vomiting.   Musculoskeletal:  Negative for joint pain.   Skin:  Negative for rash.   Neurological:  Negative for dizziness, tingling, sensory change, focal weakness and weakness.   Psychiatric/Behavioral: Negative.             Objective:       PHYSICAL EXAM: Apperance: Alert and orient in no distress,well developed, and with good attention to grooming and body habits  Lower Extremity Physical Exam:  VASCULAR: Dorsalis pedis pulses 1/4 bilateral and Posterior Tibial pulses 1/4 bilateral. Capillary fill time <blanched bilateral. Mild edema observed left. Varicosities present left. Skin temperature of the lower extremities is warm to warm, proximal to distal. Hair growth dim bilateral.  DERMATOLOGICAL: No skin rashes, subcutaneous nodules, lesions, or ulcers observed bilateral.  NEUROLOGICAL: Light touch, sharp-dull, proprioception all present and equal bilaterally.    MUSCULOSKELETAL: Muscle strength is 5/5 for foot inverters, everters, plantarflexors, and dorsiflexors. Muscle tone is normal. (+) pain on palpation of left dorsal 2nd metatarsal.         Assessment:       ICD-10-CM ICD-9-CM   1. Stress fracture of metatarsal bone of left foot, initial encounter  M84.375A 733.94       Plan:   Stress fracture of metatarsal bone of left foot, initial encounter  -     X-Ray Foot Complete Left; Future; Expected date: 02/28/2023      I counseled the patient on her conditions, regarding findings of my examination, my impressions, and usual treatment plan.   Ordered left foot x-rays to be completed.   Patient instructed on adequate icing techniques. Patient should ice the affected area at least once per day x 10 minutes for 10 days . I advised the  patient that extra icing  would also be beneficial to ensure adequate anti inflammatory effect.   Due to chronic lower back in instability issues. Patient was fitted with surgical shoe and instructed on proper usage. This should be worn daily for a minimum of 4 weeks at all times when ambulating. Patient instructed not to drive with walking boot if on right foot.   Patient to return in 1 month or sooner if needed.        Flaquito Dhillon DPM  [facility]

## 2023-03-03 ENCOUNTER — PATIENT MESSAGE (OUTPATIENT)
Dept: HEMATOLOGY/ONCOLOGY | Facility: CLINIC | Age: 88
End: 2023-03-03
Payer: MEDICARE

## 2023-03-13 ENCOUNTER — OFFICE VISIT (OUTPATIENT)
Dept: UROLOGY | Facility: CLINIC | Age: 88
End: 2023-03-13
Payer: MEDICARE

## 2023-03-13 VITALS
HEART RATE: 64 BPM | BODY MASS INDEX: 28.95 KG/M2 | TEMPERATURE: 98 F | HEIGHT: 64 IN | SYSTOLIC BLOOD PRESSURE: 151 MMHG | DIASTOLIC BLOOD PRESSURE: 79 MMHG | WEIGHT: 169.56 LBS

## 2023-03-13 DIAGNOSIS — N39.46 MIXED INCONTINENCE: ICD-10-CM

## 2023-03-13 DIAGNOSIS — N32.81 OAB (OVERACTIVE BLADDER): Primary | ICD-10-CM

## 2023-03-13 DIAGNOSIS — N95.8 GENITOURINARY SYNDROME OF MENOPAUSE: ICD-10-CM

## 2023-03-13 PROCEDURE — 99999 PR PBB SHADOW E&M-EST. PATIENT-LVL IV: ICD-10-PCS | Mod: PBBFAC,HCNC,, | Performed by: UROLOGY

## 2023-03-13 PROCEDURE — 1159F MED LIST DOCD IN RCRD: CPT | Mod: HCNC,CPTII,S$GLB, | Performed by: UROLOGY

## 2023-03-13 PROCEDURE — 3288F PR FALLS RISK ASSESSMENT DOCUMENTED: ICD-10-PCS | Mod: HCNC,CPTII,S$GLB, | Performed by: UROLOGY

## 2023-03-13 PROCEDURE — 1101F PR PT FALLS ASSESS DOC 0-1 FALLS W/OUT INJ PAST YR: ICD-10-PCS | Mod: HCNC,CPTII,S$GLB, | Performed by: UROLOGY

## 2023-03-13 PROCEDURE — 1159F PR MEDICATION LIST DOCUMENTED IN MEDICAL RECORD: ICD-10-PCS | Mod: HCNC,CPTII,S$GLB, | Performed by: UROLOGY

## 2023-03-13 PROCEDURE — 99214 OFFICE O/P EST MOD 30 MIN: CPT | Mod: HCNC,S$GLB,, | Performed by: UROLOGY

## 2023-03-13 PROCEDURE — 1157F ADVNC CARE PLAN IN RCRD: CPT | Mod: HCNC,CPTII,S$GLB, | Performed by: UROLOGY

## 2023-03-13 PROCEDURE — 99214 PR OFFICE/OUTPT VISIT, EST, LEVL IV, 30-39 MIN: ICD-10-PCS | Mod: HCNC,S$GLB,, | Performed by: UROLOGY

## 2023-03-13 PROCEDURE — 1101F PT FALLS ASSESS-DOCD LE1/YR: CPT | Mod: HCNC,CPTII,S$GLB, | Performed by: UROLOGY

## 2023-03-13 PROCEDURE — 1126F AMNT PAIN NOTED NONE PRSNT: CPT | Mod: HCNC,CPTII,S$GLB, | Performed by: UROLOGY

## 2023-03-13 PROCEDURE — 1157F PR ADVANCE CARE PLAN OR EQUIV PRESENT IN MEDICAL RECORD: ICD-10-PCS | Mod: HCNC,CPTII,S$GLB, | Performed by: UROLOGY

## 2023-03-13 PROCEDURE — 1160F PR REVIEW ALL MEDS BY PRESCRIBER/CLIN PHARMACIST DOCUMENTED: ICD-10-PCS | Mod: HCNC,CPTII,S$GLB, | Performed by: UROLOGY

## 2023-03-13 PROCEDURE — 99999 PR PBB SHADOW E&M-EST. PATIENT-LVL IV: CPT | Mod: PBBFAC,HCNC,, | Performed by: UROLOGY

## 2023-03-13 PROCEDURE — 1160F RVW MEDS BY RX/DR IN RCRD: CPT | Mod: HCNC,CPTII,S$GLB, | Performed by: UROLOGY

## 2023-03-13 PROCEDURE — 1126F PR PAIN SEVERITY QUANTIFIED, NO PAIN PRESENT: ICD-10-PCS | Mod: HCNC,CPTII,S$GLB, | Performed by: UROLOGY

## 2023-03-13 PROCEDURE — 3288F FALL RISK ASSESSMENT DOCD: CPT | Mod: HCNC,CPTII,S$GLB, | Performed by: UROLOGY

## 2023-03-13 RX ORDER — SOLIFENACIN SUCCINATE 5 MG/1
5 TABLET, FILM COATED ORAL DAILY
Qty: 30 TABLET | Refills: 11 | Status: SHIPPED | OUTPATIENT
Start: 2023-03-13 | End: 2024-03-12

## 2023-03-13 NOTE — PROGRESS NOTES
Chief Complaint   Patient presents with    Follow-up         History of Present Illness:   Clau Nunn is a 87 y.o. female here for evaluation of Follow-up    3/13/23- Using myrbetriq and it helps, but not enough. nocturia x 2 at least. She bought a pure wick, but she turns a lot during the night. She has UUI when she stands up from a seated position. She also has DIOMEDES with cough/laugh. 4 nighttime pads per day. Sometimes she has insensate incontinence. Still using vaginal estrogen cream. She gets vaginal itching from the moisture. No recent UTIs.   12/20/21- She reports insensate incontinence going on for a few years. Changes pads 2-3 times a day. She does have some degree of UUI and also has incontinence upon valsalva. States that she gets UTIs every 6-8 weeks. She is normally seen at the walk-in clinic near her home. She states that it is not an [facility] facility. Culture has been done once and her antibiotics were changed once. Not sure of the organism. She reports that UTI symptoms generally include dysuria and chills and fatigue. She is currently not symptomatic. No gross hematuria. Symptoms generally do clear with antibiotics. UTIs have been ongoing for years. She is using vaginal estrogen, prescribed by another urologist. Uses it 1-2 times a week.         Review of Systems   Constitutional:  Negative for chills and fever.   Respiratory:  Negative for shortness of breath.    Cardiovascular:  Negative for chest pain.   Gastrointestinal:  Negative for abdominal pain, constipation and diarrhea.   Neurological:  Negative for numbness.   All other systems reviewed and are negative.      Past Medical History:   Diagnosis Date    Allergy     Anxiety     Asthma     Back pain     Chronic venous insufficiency 11/19/2013    Depression     Diverticulosis 11/19/2013 1/8/8 colonoscopy    Dry eyes     Fracture, sacrum/coccyx     Dr. Sanchez     GERD (gastroesophageal reflux disease)     H/O total hip arthroplasty  12/30/2015    Hypertension     Hypothyroid     Osteoarthritis     Osteoporosis     Postlaminectomy syndrome of lumbar region 1/8/2014    Radius fracture     7/18    Simple chronic bronchitis 5/3/2017    Umbilical hernia 11/19/2013    Urinary incontinence     Vitamin D deficiency disease        Past Surgical History:   Procedure Laterality Date    ADENOIDECTOMY      BACK SURGERY      x2    breast implants  1973    CATARACT EXTRACTION Bilateral     CLOSED REDUCTION DISTAL RADIUS FRACTURE      Dr. Blakc, 8/18    cystoscope  1/6/16    DR. Brown    FRACTURE SURGERY  April 3 2015    left wrist    HAND SURGERY      HIP SURGERY Right     total hip- Dr. Dos Santos    HYSTERECTOMY      36y/o    INJECTION OF ANESTHETIC AGENT AROUND NERVE Right 9/16/2020    Procedure: Right suprascapular nerve block;  Surgeon: Raffi Wells MD;  Location: [facility];  Service: Pain Management;  Laterality: Right;    INJECTION OF ANESTHETIC AGENT AROUND NERVE Right 7/13/2021    Procedure: Block, Nerve Right Suprascapular Nerve Block with RN IV sedation;  Surgeon: Raffi Wells MD;  Location: [facility];  Service: Pain Management;  Laterality: Right;    INJECTION OF ANESTHETIC AGENT INTO SACROILIAC JOINT N/A 8/12/2020    Procedure: Left IA hip Joint + Left SIJ + Right shoulder injection;  Surgeon: Raffi Wells MD;  Location: [facility];  Service: Pain Management;  Laterality: N/A;    INJECTION OF ANESTHETIC AGENT INTO SACROILIAC JOINT Bilateral 1/31/2023    Procedure: Bilateral SIJ Injection w/Local;  Surgeon: Abdirahman Parker MD;  Location: [facility];  Service: Pain Management;  Laterality: Bilateral;    INJECTION OF JOINT N/A 8/12/2020    Procedure: Left IA hip Joint + Left SIJ + Right shoulder injection;  Surgeon: Raffi Wells MD;  Location: [facility];  Service: Pain Management;  Laterality: N/A;    JOINT REPLACEMENT Right     R NNAMDI - Dr. Dos Santos    LEG SURGERY Right     ex-fix tib/fib    SELECTIVE INJECTION OF ANESTHETIC AGENT  AROUND LUMBAR SPINAL NERVE ROOT BY TRANSFORAMINAL APPROACH Bilateral 2/10/2021    Procedure: Bilateral L5/S1 TF REJI;  Surgeon: Raffi Wells MD;  Location: [facility];  Service: Pain Management;  Laterality: Bilateral;    SELECTIVE INJECTION OF ANESTHETIC AGENT AROUND LUMBAR SPINAL NERVE ROOT BY TRANSFORAMINAL APPROACH Bilateral 5/25/2022    Procedure: Bilateral L5/S1 + S1 TF REJI;  Surgeon: Raffi Wells MD;  Location: [facility];  Service: Pain Management;  Laterality: Bilateral;    TONSILLECTOMY      TRANSFORAMINAL EPIDURAL INJECTION OF STEROID Left 7/8/2020    Procedure: left L2/3 + L5/S1 TF REJI;  Surgeon: Raffi Wells MD;  Location: [facility];  Service: Pain Management;  Laterality: Left;    TRANSFORAMINAL EPIDURAL INJECTION OF STEROID Left 7/1/2021    Procedure: left L5/S1 + S1 TF REJI;  Surgeon: Raffi Wells MD;  Location: [facility];  Service: Pain Management;  Laterality: Left;       Family History   Problem Relation Age of Onset    COPD Mother     Cancer Mother         lung CA    Pulmonary fibrosis Sister     Cancer Daughter         colon    Stroke Father     Diabetes Son     Melanoma Neg Hx     Psoriasis Neg Hx     Lupus Neg Hx        Social History     Tobacco Use    Smoking status: Never    Smokeless tobacco: Never   Substance Use Topics    Alcohol use: Yes     Alcohol/week: 0.0 standard drinks     Comment: rarely    Drug use: Never       Current Outpatient Medications   Medication Sig Dispense Refill    albuterol (PROVENTIL) 2.5 mg /3 mL (0.083 %) nebulizer solution Take 3 mLs (2.5 mg total) by nebulization every 6 (six) hours as needed for Wheezing. Rescue 1 each 6    apixaban (ELIQUIS) 5 mg Tab Take 1 tablet (5 mg total) by mouth 2 (two) times daily. 180 tablet 3    azelastine (ASTELIN) 137 mcg (0.1 %) nasal spray 2 sprays by Nasal route 2 (two) times daily.      busPIRone (BUSPAR) 5 MG Tab TAKE 1 TABLET TWICE DAILY 180 tablet 5    cholecalciferol, vitamin D3, (D3-2000) 50 mcg (2,000 unit)  Cap Take 1 capsule (2,000 Units total) by mouth once daily. 90 capsule 11    clobetasoL (TEMOVATE) 0.05 % external solution Pt to mix in 1 jar of cerave cream and apply to affected areas bid for 2-4 weeks per course 50 mL 3    cyclobenzaprine (FLEXERIL) 5 MG tablet TAKE 1 TABLET (5 MG TOTAL) BY MOUTH NIGHTLY AS NEEDED FOR MUSCLE SPASMS. 90 tablet 1    cycloSPORINE (RESTASIS MULTIDOSE) 0.05 % Drop Place 1 drop into both eyes every 12 (twelve) hours. 5.5 mL 12    EScitalopram oxalate (LEXAPRO) 20 MG tablet Take 1 tablet (20 mg total) by mouth once daily. 90 tablet 3    fluocinonide 0.05% (LIDEX) 0.05 % cream AAA bid to leg for 2-4 weeks per course (Patient taking differently: Apply to affected area(s) of leg topically twice daily for 2-4 weeks per course) 60 g 1    fluticasone propionate (FLONASE) 50 mcg/actuation nasal spray       gabapentin (NEURONTIN) 300 MG capsule Take 1 capsule (300 mg total) by mouth 3 (three) times daily. 270 capsule 3    levothyroxine (SYNTHROID) 100 MCG tablet TAKE 1 TABLET EVERY DAY 90 tablet 2    mirabegron (MYRBETRIQ) 50 mg Tb24 Take 1 tablet (50 mg total) by mouth once daily. 30 tablet 11    mupirocin (BACTROBAN) 2 % ointment APPLY TOPICALLY TWICE DAILY TO WOUND ON RIGHT LOWER LEG 22 g 0    omeprazole (PRILOSEC) 40 MG capsule Take 1 capsule (40 mg total) by mouth every morning. 90 capsule 3    traZODone (DESYREL) 50 MG tablet TAKE 1 TABLET (50 MG TOTAL) BY MOUTH NIGHTLY AS NEEDED FOR INSOMNIA. 30 tablet 3    triamcinolone acetonide 0.025% (KENALOG) 0.025 % cream AAA bid (Patient taking differently: Apply to affected area(s) topically two times daily) 80 g 0    valsartan (DIOVAN) 80 MG tablet Take 1 tablet (80 mg total) by mouth once daily. 90 tablet 3    solifenacin (VESICARE) 5 MG tablet Take 1 tablet (5 mg total) by mouth once daily. 30 tablet 11     Current Facility-Administered Medications   Medication Dose Route Frequency Provider Last Rate Last Admin    denosumab (PROLIA) injection  60 mg  60 mg Subcutaneous Q6 Months Carly Mora PA-C   60 mg at 01/28/19 1548     Facility-Administered Medications Ordered in Other Visits   Medication Dose Route Frequency Provider Last Rate Last Admin    ondansetron injection 4 mg  4 mg Intravenous Once PRN Abdirahman Parker MD           Review of patient's allergies indicates:   Allergen Reactions    Cephalexin Hives, Itching, Swelling, Anxiety, Dermatitis and Rash       Physical Exam  Vitals:    03/13/23 1050   BP: (!) 151/79   Pulse: 64   Temp: 97.8 °F (36.6 °C)     General: Well-developed, well-nourished, in no acute distress  HEENT: Normocephalic, atraumatic, extraocular movements intact  Neck: Supple, no supraclavicular or cervical lymphadenopathy, trachea midline  Respirations: even and unlabored  Extremities: moves all equally, no clubbing, cyanosis or edema  Skin: Warm and dry. No lesions  Psych: normal affect  Neuro: Alert and oriented x 3. Cranial nerves II-XII intact        Assessment:  1. OAB (overactive bladder)        2. Mixed incontinence        3. Genitourinary syndrome of menopause                Plan:   OAB (overactive bladder)    Mixed incontinence    Genitourinary syndrome of menopause    Other orders  -     solifenacin (VESICARE) 5 MG tablet; Take 1 tablet (5 mg total) by mouth once daily.  Dispense: 30 tablet; Refill: 11        Continue vaginal estrogen cream  Discussed botox and sacral neuromodulation and risks/benefits associated. Pt not particularly interested.   Will continue myrbetriq 50mg and add vesicare 5mg.       Follow up in about 6 weeks (around 4/24/2023).

## 2023-03-14 ENCOUNTER — TELEPHONE (OUTPATIENT)
Dept: PAIN MEDICINE | Facility: CLINIC | Age: 88
End: 2023-03-14
Payer: MEDICARE

## 2023-03-14 NOTE — TELEPHONE ENCOUNTER
Called pt to schedule injection follow up. Appointment scheduled for 04/25. All questions answered.

## 2023-03-28 ENCOUNTER — OFFICE VISIT (OUTPATIENT)
Dept: PAIN MEDICINE | Facility: CLINIC | Age: 88
End: 2023-03-28
Payer: MEDICARE

## 2023-03-28 ENCOUNTER — OFFICE VISIT (OUTPATIENT)
Dept: PODIATRY | Facility: CLINIC | Age: 88
End: 2023-03-28
Payer: MEDICARE

## 2023-03-28 ENCOUNTER — HOSPITAL ENCOUNTER (OUTPATIENT)
Dept: RADIOLOGY | Facility: HOSPITAL | Age: 88
Discharge: HOME | End: 2023-03-28
Attending: PODIATRIST
Payer: MEDICARE

## 2023-03-28 VITALS — BODY MASS INDEX: 28.85 KG/M2 | WEIGHT: 169 LBS | HEIGHT: 64 IN

## 2023-03-28 VITALS
HEART RATE: 63 BPM | DIASTOLIC BLOOD PRESSURE: 75 MMHG | BODY MASS INDEX: 28.36 KG/M2 | SYSTOLIC BLOOD PRESSURE: 146 MMHG | HEIGHT: 64 IN | WEIGHT: 166.13 LBS

## 2023-03-28 DIAGNOSIS — M51.36 DDD (DEGENERATIVE DISC DISEASE), LUMBAR: ICD-10-CM

## 2023-03-28 DIAGNOSIS — M54.16 BILATERAL LUMBAR RADICULOPATHY: ICD-10-CM

## 2023-03-28 DIAGNOSIS — M46.1 SACROILIITIS: Primary | ICD-10-CM

## 2023-03-28 DIAGNOSIS — G89.29 ACUTE EXACERBATION OF CHRONIC LOW BACK PAIN: ICD-10-CM

## 2023-03-28 DIAGNOSIS — M84.375A STRESS FRACTURE OF METATARSAL BONE OF LEFT FOOT, INITIAL ENCOUNTER: ICD-10-CM

## 2023-03-28 DIAGNOSIS — M54.50 ACUTE EXACERBATION OF CHRONIC LOW BACK PAIN: ICD-10-CM

## 2023-03-28 DIAGNOSIS — M84.375D STRESS FRACTURE OF METATARSAL BONE OF LEFT FOOT WITH ROUTINE HEALING, SUBSEQUENT ENCOUNTER: Primary | ICD-10-CM

## 2023-03-28 DIAGNOSIS — I87.2 CHRONIC VENOUS INSUFFICIENCY: ICD-10-CM

## 2023-03-28 DIAGNOSIS — L60.3 ONYCHODYSTROPHY: ICD-10-CM

## 2023-03-28 DIAGNOSIS — Z79.01 CURRENT USE OF LONG TERM ANTICOAGULATION: ICD-10-CM

## 2023-03-28 PROCEDURE — 73630 X-RAY EXAM OF FOOT: CPT | Mod: TC,HCNC,LT

## 2023-03-28 PROCEDURE — 1125F AMNT PAIN NOTED PAIN PRSNT: CPT | Mod: HCNC,CPTII,S$GLB, | Performed by: PHYSICIAN ASSISTANT

## 2023-03-28 PROCEDURE — 1157F ADVNC CARE PLAN IN RCRD: CPT | Mod: HCNC,CPTII,S$GLB, | Performed by: PODIATRIST

## 2023-03-28 PROCEDURE — 1126F AMNT PAIN NOTED NONE PRSNT: CPT | Mod: HCNC,CPTII,S$GLB, | Performed by: PODIATRIST

## 2023-03-28 PROCEDURE — 1160F RVW MEDS BY RX/DR IN RCRD: CPT | Mod: HCNC,CPTII,S$GLB, | Performed by: PHYSICIAN ASSISTANT

## 2023-03-28 PROCEDURE — 11719 TRIM NAIL(S) ANY NUMBER: CPT | Mod: Q9,HCNC,S$GLB, | Performed by: PODIATRIST

## 2023-03-28 PROCEDURE — 1101F PT FALLS ASSESS-DOCD LE1/YR: CPT | Mod: HCNC,CPTII,S$GLB, | Performed by: PODIATRIST

## 2023-03-28 PROCEDURE — 1125F PR PAIN SEVERITY QUANTIFIED, PAIN PRESENT: ICD-10-PCS | Mod: HCNC,CPTII,S$GLB, | Performed by: PHYSICIAN ASSISTANT

## 2023-03-28 PROCEDURE — 1101F PR PT FALLS ASSESS DOC 0-1 FALLS W/OUT INJ PAST YR: ICD-10-PCS | Mod: HCNC,CPTII,S$GLB, | Performed by: PHYSICIAN ASSISTANT

## 2023-03-28 PROCEDURE — 1159F PR MEDICATION LIST DOCUMENTED IN MEDICAL RECORD: ICD-10-PCS | Mod: HCNC,CPTII,S$GLB, | Performed by: PHYSICIAN ASSISTANT

## 2023-03-28 PROCEDURE — 1159F MED LIST DOCD IN RCRD: CPT | Mod: HCNC,CPTII,S$GLB, | Performed by: PHYSICIAN ASSISTANT

## 2023-03-28 PROCEDURE — 1159F PR MEDICATION LIST DOCUMENTED IN MEDICAL RECORD: ICD-10-PCS | Mod: HCNC,CPTII,S$GLB, | Performed by: PODIATRIST

## 2023-03-28 PROCEDURE — 3288F PR FALLS RISK ASSESSMENT DOCUMENTED: ICD-10-PCS | Mod: HCNC,CPTII,S$GLB, | Performed by: PHYSICIAN ASSISTANT

## 2023-03-28 PROCEDURE — 11719 PR TRIM NAIL(S): ICD-10-PCS | Mod: Q9,HCNC,S$GLB, | Performed by: PODIATRIST

## 2023-03-28 PROCEDURE — 99999 PR PBB SHADOW E&M-EST. PATIENT-LVL IV: ICD-10-PCS | Mod: PBBFAC,HCNC,, | Performed by: PHYSICIAN ASSISTANT

## 2023-03-28 PROCEDURE — 1101F PT FALLS ASSESS-DOCD LE1/YR: CPT | Mod: HCNC,CPTII,S$GLB, | Performed by: PHYSICIAN ASSISTANT

## 2023-03-28 PROCEDURE — 1157F PR ADVANCE CARE PLAN OR EQUIV PRESENT IN MEDICAL RECORD: ICD-10-PCS | Mod: HCNC,CPTII,S$GLB, | Performed by: PHYSICIAN ASSISTANT

## 2023-03-28 PROCEDURE — 99214 OFFICE O/P EST MOD 30 MIN: CPT | Mod: HCNC,25,S$GLB, | Performed by: PHYSICIAN ASSISTANT

## 2023-03-28 PROCEDURE — 99213 OFFICE O/P EST LOW 20 MIN: CPT | Mod: 25,HCNC,S$GLB, | Performed by: PODIATRIST

## 2023-03-28 PROCEDURE — 3288F PR FALLS RISK ASSESSMENT DOCUMENTED: ICD-10-PCS | Mod: HCNC,CPTII,S$GLB, | Performed by: PODIATRIST

## 2023-03-28 PROCEDURE — 1101F PR PT FALLS ASSESS DOC 0-1 FALLS W/OUT INJ PAST YR: ICD-10-PCS | Mod: HCNC,CPTII,S$GLB, | Performed by: PODIATRIST

## 2023-03-28 PROCEDURE — 3288F FALL RISK ASSESSMENT DOCD: CPT | Mod: HCNC,CPTII,S$GLB, | Performed by: PODIATRIST

## 2023-03-28 PROCEDURE — 99999 PR PBB SHADOW E&M-EST. PATIENT-LVL IV: CPT | Mod: PBBFAC,HCNC,, | Performed by: PODIATRIST

## 2023-03-28 PROCEDURE — 3288F FALL RISK ASSESSMENT DOCD: CPT | Mod: HCNC,CPTII,S$GLB, | Performed by: PHYSICIAN ASSISTANT

## 2023-03-28 PROCEDURE — 1157F PR ADVANCE CARE PLAN OR EQUIV PRESENT IN MEDICAL RECORD: ICD-10-PCS | Mod: HCNC,CPTII,S$GLB, | Performed by: PODIATRIST

## 2023-03-28 PROCEDURE — 96372 THER/PROPH/DIAG INJ SC/IM: CPT | Mod: HCNC,S$GLB,, | Performed by: PHYSICIAN ASSISTANT

## 2023-03-28 PROCEDURE — 73630 X-RAY EXAM OF FOOT: CPT | Mod: 26,HCNC,LT, | Performed by: RADIOLOGY

## 2023-03-28 PROCEDURE — 1157F ADVNC CARE PLAN IN RCRD: CPT | Mod: HCNC,CPTII,S$GLB, | Performed by: PHYSICIAN ASSISTANT

## 2023-03-28 PROCEDURE — 96372 PR INJECTION,THERAP/PROPH/DIAG2ST, IM OR SUBCUT: ICD-10-PCS | Mod: HCNC,S$GLB,, | Performed by: PHYSICIAN ASSISTANT

## 2023-03-28 PROCEDURE — 1160F RVW MEDS BY RX/DR IN RCRD: CPT | Mod: HCNC,CPTII,S$GLB, | Performed by: PODIATRIST

## 2023-03-28 PROCEDURE — 1159F MED LIST DOCD IN RCRD: CPT | Mod: HCNC,CPTII,S$GLB, | Performed by: PODIATRIST

## 2023-03-28 PROCEDURE — 1160F PR REVIEW ALL MEDS BY PRESCRIBER/CLIN PHARMACIST DOCUMENTED: ICD-10-PCS | Mod: HCNC,CPTII,S$GLB, | Performed by: PHYSICIAN ASSISTANT

## 2023-03-28 PROCEDURE — 1126F PR PAIN SEVERITY QUANTIFIED, NO PAIN PRESENT: ICD-10-PCS | Mod: HCNC,CPTII,S$GLB, | Performed by: PODIATRIST

## 2023-03-28 PROCEDURE — 99213 PR OFFICE/OUTPT VISIT, EST, LEVL III, 20-29 MIN: ICD-10-PCS | Mod: 25,HCNC,S$GLB, | Performed by: PODIATRIST

## 2023-03-28 PROCEDURE — 99999 PR PBB SHADOW E&M-EST. PATIENT-LVL IV: ICD-10-PCS | Mod: PBBFAC,HCNC,, | Performed by: PODIATRIST

## 2023-03-28 PROCEDURE — 1160F PR REVIEW ALL MEDS BY PRESCRIBER/CLIN PHARMACIST DOCUMENTED: ICD-10-PCS | Mod: HCNC,CPTII,S$GLB, | Performed by: PODIATRIST

## 2023-03-28 PROCEDURE — 99999 PR PBB SHADOW E&M-EST. PATIENT-LVL IV: CPT | Mod: PBBFAC,HCNC,, | Performed by: PHYSICIAN ASSISTANT

## 2023-03-28 PROCEDURE — 99214 PR OFFICE/OUTPT VISIT, EST, LEVL IV, 30-39 MIN: ICD-10-PCS | Mod: HCNC,25,S$GLB, | Performed by: PHYSICIAN ASSISTANT

## 2023-03-28 PROCEDURE — 73630 XR FOOT COMPLETE 3 VIEW LEFT: ICD-10-PCS | Mod: 26,HCNC,LT, | Performed by: RADIOLOGY

## 2023-03-28 RX ORDER — KETOROLAC TROMETHAMINE 30 MG/ML
30 INJECTION, SOLUTION INTRAMUSCULAR; INTRAVENOUS
Status: COMPLETED | OUTPATIENT
Start: 2023-03-28 | End: 2023-03-28

## 2023-03-28 RX ADMIN — KETOROLAC TROMETHAMINE 30 MG: 30 INJECTION, SOLUTION INTRAMUSCULAR; INTRAVENOUS at 02:03
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
## 2023-03-28 NOTE — PROGRESS NOTES
Subjective:     Patient ID: Clau Nunn is a 87 y.o. female.    Chief Complaint: Follow-up (Pt is following up on fracture, pt would like to get her toenail clipped, pt also states that she has a corn between the last 2 toes on the left foot, pt states that she is not in any pain, non-diabetic, pt is wearing tennis shoes,  Pt was last seen on 12/2/22 by PCP Dr. Molina )    Clau is a 87 y.o. female who presents to the podiatry clinic for follow up of left foot fracture. Patient states the pain is much better. Patient states she she has been wearing the post op shoe but put tennis shoes on today since there was no pain. Patient has no other pedal complaints at this time.   DOI: 02/19/2023    Patient Active Problem List   Diagnosis    Primary osteoarthritis involving multiple joints    Acquired hypothyroidism    Lumbar arthropathy    Chronic venous insufficiency    Anxiety    Vitamin D deficiency disease    Essential hypertension    Hiatal hernia with gastroesophageal reflux    Sacroiliac inflammation    Atherosclerosis of aorta    Chronic lumbar radiculopathy    Stage 3b chronic kidney disease    Simple chronic bronchitis    Pain of left hip joint    Lumbar spondylosis    Pain in both lower extremities    Age-related osteoporosis with current pathological fracture with delayed healing    Fracture, sacrum/coccyx    Radius fracture    History of DVT in adulthood    Lumbar radiculopathy    Secondary hyperparathyroidism    Major depression in full remission    Shoulder pain    Chronic anticoagulation    Gastroesophageal reflux disease without esophagitis       Medication List with Changes/Refills   Current Medications    ALBUTEROL (PROVENTIL) 2.5 MG /3 ML (0.083 %) NEBULIZER SOLUTION    Take 3 mLs (2.5 mg total) by nebulization every 6 (six) hours as needed for Wheezing. Rescue    APIXABAN (ELIQUIS) 5 MG TAB    Take 1 tablet (5 mg total) by mouth 2 (two) times daily.    AZELASTINE (ASTELIN) 137 MCG (0.1 %)  NASAL SPRAY    2 sprays by Nasal route 2 (two) times daily.    BUSPIRONE (BUSPAR) 5 MG TAB    TAKE 1 TABLET TWICE DAILY    CHOLECALCIFEROL, VITAMIN D3, (D3-2000) 50 MCG (2,000 UNIT) CAP    Take 1 capsule (2,000 Units total) by mouth once daily.    CLOBETASOL (TEMOVATE) 0.05 % EXTERNAL SOLUTION    Pt to mix in 1 jar of cerave cream and apply to affected areas bid for 2-4 weeks per course    CYCLOBENZAPRINE (FLEXERIL) 5 MG TABLET    TAKE 1 TABLET (5 MG TOTAL) BY MOUTH NIGHTLY AS NEEDED FOR MUSCLE SPASMS.    CYCLOSPORINE (RESTASIS MULTIDOSE) 0.05 % DROP    Place 1 drop into both eyes every 12 (twelve) hours.    ESCITALOPRAM OXALATE (LEXAPRO) 20 MG TABLET    Take 1 tablet (20 mg total) by mouth once daily.    FLUOCINONIDE 0.05% (LIDEX) 0.05 % CREAM    AAA bid to leg for 2-4 weeks per course    FLUTICASONE PROPIONATE (FLONASE) 50 MCG/ACTUATION NASAL SPRAY        GABAPENTIN (NEURONTIN) 300 MG CAPSULE    Take 1 capsule (300 mg total) by mouth 3 (three) times daily.    LEVOTHYROXINE (SYNTHROID) 100 MCG TABLET    TAKE 1 TABLET EVERY DAY    MIRABEGRON (MYRBETRIQ) 50 MG TB24    Take 1 tablet (50 mg total) by mouth once daily.    MUPIROCIN (BACTROBAN) 2 % OINTMENT    APPLY TOPICALLY TWICE DAILY TO WOUND ON RIGHT LOWER LEG    OMEPRAZOLE (PRILOSEC) 40 MG CAPSULE    Take 1 capsule (40 mg total) by mouth every morning.    SOLIFENACIN (VESICARE) 5 MG TABLET    Take 1 tablet (5 mg total) by mouth once daily.    TRAZODONE (DESYREL) 50 MG TABLET    TAKE 1 TABLET (50 MG TOTAL) BY MOUTH NIGHTLY AS NEEDED FOR INSOMNIA.    TRIAMCINOLONE ACETONIDE 0.025% (KENALOG) 0.025 % CREAM    AAA bid    VALSARTAN (DIOVAN) 80 MG TABLET    Take 1 tablet (80 mg total) by mouth once daily.       Review of patient's allergies indicates:   Allergen Reactions    Cephalexin Hives, Itching, Swelling, Anxiety, Dermatitis and Rash       Past Surgical History:   Procedure Laterality Date    ADENOIDECTOMY      BACK SURGERY      x2    breast implants  1973     CATARACT EXTRACTION Bilateral     CLOSED REDUCTION DISTAL RADIUS FRACTURE      Dr. Black, 8/18    cystoscope  1/6/16    DR. Brown    FRACTURE SURGERY  April 3 2015    left wrist    HAND SURGERY      HIP SURGERY Right     total hip- Dr. Dos Santos    HYSTERECTOMY      36y/o    INJECTION OF ANESTHETIC AGENT AROUND NERVE Right 9/16/2020    Procedure: Right suprascapular nerve block;  Surgeon: Raffi Wells MD;  Location: [facility];  Service: Pain Management;  Laterality: Right;    INJECTION OF ANESTHETIC AGENT AROUND NERVE Right 7/13/2021    Procedure: Block, Nerve Right Suprascapular Nerve Block with RN IV sedation;  Surgeon: Raffi Wells MD;  Location: [facility];  Service: Pain Management;  Laterality: Right;    INJECTION OF ANESTHETIC AGENT INTO SACROILIAC JOINT N/A 8/12/2020    Procedure: Left IA hip Joint + Left SIJ + Right shoulder injection;  Surgeon: Raffi Wells MD;  Location: [facility];  Service: Pain Management;  Laterality: N/A;    INJECTION OF ANESTHETIC AGENT INTO SACROILIAC JOINT Bilateral 1/31/2023    Procedure: Bilateral SIJ Injection w/Local;  Surgeon: Abdirahman Parker MD;  Location: [facility];  Service: Pain Management;  Laterality: Bilateral;    INJECTION OF JOINT N/A 8/12/2020    Procedure: Left IA hip Joint + Left SIJ + Right shoulder injection;  Surgeon: Raffi Wells MD;  Location: [facility];  Service: Pain Management;  Laterality: N/A;    JOINT REPLACEMENT Right     R NNAMDI - Dr. Dos Santos    LEG SURGERY Right     ex-fix tib/fib    SELECTIVE INJECTION OF ANESTHETIC AGENT AROUND LUMBAR SPINAL NERVE ROOT BY TRANSFORAMINAL APPROACH Bilateral 2/10/2021    Procedure: Bilateral L5/S1 TF REJI;  Surgeon: Raffi Wells MD;  Location: [facility];  Service: Pain Management;  Laterality: Bilateral;    SELECTIVE INJECTION OF ANESTHETIC AGENT AROUND LUMBAR SPINAL NERVE ROOT BY TRANSFORAMINAL APPROACH Bilateral 5/25/2022    Procedure: Bilateral L5/S1 + S1 TF REJI;  Surgeon: Raffi Wells MD;   Location: [facility];  Service: Pain Management;  Laterality: Bilateral;    TONSILLECTOMY      TRANSFORAMINAL EPIDURAL INJECTION OF STEROID Left 7/8/2020    Procedure: left L2/3 + L5/S1 TF REJI;  Surgeon: Raffi Wells MD;  Location: [facility];  Service: Pain Management;  Laterality: Left;    TRANSFORAMINAL EPIDURAL INJECTION OF STEROID Left 7/1/2021    Procedure: left L5/S1 + S1 TF REJI;  Surgeon: Raffi Wells MD;  Location: [facility];  Service: Pain Management;  Laterality: Left;       Family History   Problem Relation Age of Onset    COPD Mother     Cancer Mother         lung CA    Pulmonary fibrosis Sister     Cancer Daughter         colon    Stroke Father     Diabetes Son     Melanoma Neg Hx     Psoriasis Neg Hx     Lupus Neg Hx        Social History     Socioeconomic History    Marital status:    Occupational History    Occupation: retired     Comment: [employer]   Tobacco Use    Smoking status: Never    Smokeless tobacco: Never   Substance and Sexual Activity    Alcohol use: Yes     Alcohol/week: 0.0 standard drinks     Comment: rarely    Drug use: Never    Sexual activity: Never     Partners: Male     Birth control/protection: Post-menopausal   Social History Narrative    Patient is retired from [employer]     Social Determinants of Health     Financial Resource Strain: Low Risk     Difficulty of Paying Living Expenses: Not hard at all   Food Insecurity: No Food Insecurity    Worried About Running Out of Food in the Last Year: Never true    Ran Out of Food in the Last Year: Never true   Transportation Needs: No Transportation Needs    Lack of Transportation (Medical): No    Lack of Transportation (Non-Medical): No   Physical Activity: Inactive    Days of Exercise per Week: 0 days    Minutes of Exercise per Session: 0 min   Stress: No Stress Concern Present    Feeling of Stress : Only a little   Social Connections: Moderately Integrated    Frequency of Communication  "with Friends and Family: More than three times a week    Frequency of Social Gatherings with Friends and Family: More than three times a week    Attends Holiness Services: More than 4 times per year    Active Member of Clubs or Organizations: No    Attends Club or Organization Meetings: More than 4 times per year    Marital Status:    Housing Stability: Unknown    Unable to Pay for Housing in the Last Year: No    Unstable Housing in the Last Year: No       Vitals:    03/28/23 1027   Weight: 76.7 kg (169 lb)   Height: 5' 4" (1.626 m)   PainSc: 0-No pain       Review of Systems   Constitutional:  Negative for chills and fever.   Respiratory:  Negative for shortness of breath.    Cardiovascular:  Negative for chest pain, palpitations, orthopnea, claudication and leg swelling.   Gastrointestinal:  Negative for diarrhea, nausea and vomiting.   Musculoskeletal:  Negative for joint pain.   Skin:  Negative for rash.   Neurological:  Negative for dizziness, tingling, sensory change, focal weakness and weakness.   Psychiatric/Behavioral: Negative.           Objective:      PHYSICAL EXAM: Apperance: Alert and orient in no distress,well developed, and with good attention to grooming and body habits  Lower Extremity Physical Exam:  VASCULAR: Dorsalis pedis pulses 1/4 bilateral and Posterior Tibial pulses 1/4 bilateral. Capillary fill time <blanched bilateral. Mild edema observed left. Varicosities present left. Skin temperature of the lower extremities is warm to warm, proximal to distal. Hair growth dim bilateral.  DERMATOLOGICAL: No skin rashes, subcutaneous nodules, lesions, or ulcers observed bilateral. Nails 1,2,3,4,5 bilateral elongated, thickened, and discolored.    NEUROLOGICAL: Light touch, sharp-dull, proprioception all present and equal bilaterally.    MUSCULOSKELETAL: Muscle strength is 5/5 for foot inverters, everters, plantarflexors, and dorsiflexors. Muscle tone is normal. (-) pain on palpation of left " dorsal 5th metatarsal shaft.     TEST RESULTS: Radiographs of left foot/ankle taken today reveals there is a complete fracture transversely through the proximal left fifth metatarsal with 2 mm distraction.  Overall alignment appears normal.            Assessment:       ICD-10-CM ICD-9-CM   1. Stress fracture of metatarsal bone of left foot with routine healing, subsequent encounter - Left Foot  M84.375D V54.29   2. Current use of long term anticoagulation  Z79.01 V58.61   3. Chronic venous insufficiency  I87.2 459.81   4. Onychodystrophy  L60.3 703.8       Plan:   Stress fracture of metatarsal bone of left foot with routine healing, subsequent encounter - Left Foot  -     X-Ray Foot Complete Left; Future; Expected date: 03/28/2023    Current use of long term anticoagulation    Chronic venous insufficiency    Onychodystrophy      I counseled the patient on her conditions, regarding findings of my examination, my impressions, and usual treatment plan.   Reveiwed left foot x-rays in exam room with patient.   Patient to to continue surgical shoe. This should be worn daily for a minimum of 2 weeks at all times when ambulating.   Ordered left foot x-rays to be completed in 1 month.   With patient's permission, nails 1-5 bilateral were debrided/trimmed in length and thickness aggressively to their soft tissue attachment mechanically and with electric , removing all offending nail and debris. Patient relates relief following the procedure.  Patient  will continue to monitor the areas daily, inspect feet, wear protective shoe gear when ambulatory, moisturizer to maintain skin integrity.   Patient to return 3 months or sooner if needed.              Flaquito Dhillon DPM  [facility]

## 2023-04-10 ENCOUNTER — PATIENT MESSAGE (OUTPATIENT)
Dept: UROLOGY | Facility: CLINIC | Age: 88
End: 2023-04-10
Payer: MEDICARE

## 2023-04-17 ENCOUNTER — HOSPITAL ENCOUNTER (OUTPATIENT)
Dept: RADIOLOGY | Facility: HOSPITAL | Age: 88
Discharge: HOME | End: 2023-04-17
Attending: PODIATRIST
Payer: MEDICARE

## 2023-04-17 DIAGNOSIS — M84.375D STRESS FRACTURE OF METATARSAL BONE OF LEFT FOOT WITH ROUTINE HEALING, SUBSEQUENT ENCOUNTER: ICD-10-CM

## 2023-04-17 PROCEDURE — 73630 XR FOOT COMPLETE 3 VIEW LEFT: ICD-10-PCS | Mod: 26,HCNC,LT, | Performed by: RADIOLOGY

## 2023-04-17 PROCEDURE — 73630 X-RAY EXAM OF FOOT: CPT | Mod: 26,HCNC,LT, | Performed by: RADIOLOGY

## 2023-04-17 PROCEDURE — 73630 X-RAY EXAM OF FOOT: CPT | Mod: TC,HCNC,PO,LT

## 2023-04-24 ENCOUNTER — OFFICE VISIT (OUTPATIENT)
Dept: UROLOGY | Facility: CLINIC | Age: 88
End: 2023-04-24
Payer: MEDICARE

## 2023-04-24 VITALS
HEART RATE: 61 BPM | WEIGHT: 166.25 LBS | HEIGHT: 64 IN | DIASTOLIC BLOOD PRESSURE: 72 MMHG | SYSTOLIC BLOOD PRESSURE: 159 MMHG | BODY MASS INDEX: 28.38 KG/M2

## 2023-04-24 DIAGNOSIS — N32.81 OAB (OVERACTIVE BLADDER): Primary | ICD-10-CM

## 2023-04-24 DIAGNOSIS — N39.46 MIXED INCONTINENCE: ICD-10-CM

## 2023-04-24 DIAGNOSIS — N95.8 GENITOURINARY SYNDROME OF MENOPAUSE: ICD-10-CM

## 2023-04-24 DIAGNOSIS — N39.0 RECURRENT UTI: ICD-10-CM

## 2023-04-24 PROCEDURE — 3288F PR FALLS RISK ASSESSMENT DOCUMENTED: ICD-10-PCS | Mod: HCNC,CPTII,S$GLB, | Performed by: UROLOGY

## 2023-04-24 PROCEDURE — 1159F MED LIST DOCD IN RCRD: CPT | Mod: HCNC,CPTII,S$GLB, | Performed by: UROLOGY

## 2023-04-24 PROCEDURE — 99214 OFFICE O/P EST MOD 30 MIN: CPT | Mod: HCNC,S$GLB,, | Performed by: UROLOGY

## 2023-04-24 PROCEDURE — 87077 CULTURE AEROBIC IDENTIFY: CPT | Mod: HCNC | Performed by: UROLOGY

## 2023-04-24 PROCEDURE — 1157F PR ADVANCE CARE PLAN OR EQUIV PRESENT IN MEDICAL RECORD: ICD-10-PCS | Mod: HCNC,CPTII,S$GLB, | Performed by: UROLOGY

## 2023-04-24 PROCEDURE — 81003 URINALYSIS AUTO W/O SCOPE: CPT | Mod: QW,HCNC,S$GLB, | Performed by: UROLOGY

## 2023-04-24 PROCEDURE — 99999 PR PBB SHADOW E&M-EST. PATIENT-LVL IV: ICD-10-PCS | Mod: PBBFAC,HCNC,, | Performed by: UROLOGY

## 2023-04-24 PROCEDURE — 81003 PR URINALYSIS, AUTO, W/O SCOPE: ICD-10-PCS | Mod: QW,HCNC,S$GLB, | Performed by: UROLOGY

## 2023-04-24 PROCEDURE — 87088 URINE BACTERIA CULTURE: CPT | Mod: HCNC | Performed by: UROLOGY

## 2023-04-24 PROCEDURE — 99214 PR OFFICE/OUTPT VISIT, EST, LEVL IV, 30-39 MIN: ICD-10-PCS | Mod: HCNC,S$GLB,, | Performed by: UROLOGY

## 2023-04-24 PROCEDURE — 1125F AMNT PAIN NOTED PAIN PRSNT: CPT | Mod: HCNC,CPTII,S$GLB, | Performed by: UROLOGY

## 2023-04-24 PROCEDURE — 87186 SC STD MICRODIL/AGAR DIL: CPT | Mod: HCNC | Performed by: UROLOGY

## 2023-04-24 PROCEDURE — 1157F ADVNC CARE PLAN IN RCRD: CPT | Mod: HCNC,CPTII,S$GLB, | Performed by: UROLOGY

## 2023-04-24 PROCEDURE — 1101F PR PT FALLS ASSESS DOC 0-1 FALLS W/OUT INJ PAST YR: ICD-10-PCS | Mod: HCNC,CPTII,S$GLB, | Performed by: UROLOGY

## 2023-04-24 PROCEDURE — 3288F FALL RISK ASSESSMENT DOCD: CPT | Mod: HCNC,CPTII,S$GLB, | Performed by: UROLOGY

## 2023-04-24 PROCEDURE — 1125F PR PAIN SEVERITY QUANTIFIED, PAIN PRESENT: ICD-10-PCS | Mod: HCNC,CPTII,S$GLB, | Performed by: UROLOGY

## 2023-04-24 PROCEDURE — 99999 PR PBB SHADOW E&M-EST. PATIENT-LVL IV: CPT | Mod: PBBFAC,HCNC,, | Performed by: UROLOGY

## 2023-04-24 PROCEDURE — 1101F PT FALLS ASSESS-DOCD LE1/YR: CPT | Mod: HCNC,CPTII,S$GLB, | Performed by: UROLOGY

## 2023-04-24 PROCEDURE — 87086 URINE CULTURE/COLONY COUNT: CPT | Mod: HCNC | Performed by: UROLOGY

## 2023-04-24 PROCEDURE — 1159F PR MEDICATION LIST DOCUMENTED IN MEDICAL RECORD: ICD-10-PCS | Mod: HCNC,CPTII,S$GLB, | Performed by: UROLOGY

## 2023-04-24 RX ORDER — LEVOFLOXACIN 250 MG/1
250 TABLET ORAL DAILY
Qty: 7 TABLET | Refills: 0 | Status: SHIPPED | OUTPATIENT
Start: 2023-04-24 | End: 2023-07-07

## 2023-04-24 NOTE — PROGRESS NOTES
Chief Complaint   Patient presents with    Urinary Tract Infection     Patient states she still has a UTI and her urethra hurts when she is mostly going to the bathroom but overall it just hurts.         History of Present Illness:   Clau Nunn is a 88 y.o. female here for evaluation of Urinary Tract Infection (Patient states she still has a UTI and her urethra hurts when she is mostly going to the bathroom but overall it just hurts.)      4/24/23-on myrbetriq 50mg + vesicare 5mg. Not really using vaginal estrogen cream as much. She reports urethral pain for the last 2 weeks. Nocturia Q2 hours. Before current UTI started, she did notice improvement in UUI. No gross hematuria or fever. She does have suprapubic pain.   3/13/23- Using myrbetriq and it helps, but not enough. nocturia x 2 at least. She bought a pure wick, but she turns a lot during the night. She has UUI when she stands up from a seated position. She also has DIOMEDES with cough/laugh. 4 nighttime pads per day. Sometimes she has insensate incontinence. Still using vaginal estrogen cream. She gets vaginal itching from the moisture. No recent UTIs.   12/20/21- She reports insensate incontinence going on for a few years. Changes pads 2-3 times a day. She does have some degree of UUI and also has incontinence upon valsalva. States that she gets UTIs every 6-8 weeks. She is normally seen at the walk-in clinic near her home. She states that it is not an [facility] facility. Culture has been done once and her antibiotics were changed once. Not sure of the organism. She reports that UTI symptoms generally include dysuria and chills and fatigue. She is currently not symptomatic. No gross hematuria. Symptoms generally do clear with antibiotics. UTIs have been ongoing for years. She is using vaginal estrogen, prescribed by another urologist. Uses it 1-2 times a week.         Review of Systems   Constitutional:  Negative for chills and fever.   Respiratory:   Negative for shortness of breath.    Cardiovascular:  Negative for chest pain.   Gastrointestinal:  Negative for abdominal pain, constipation and diarrhea.   Neurological:  Negative for numbness.   All other systems reviewed and are negative.      Past Medical History:   Diagnosis Date    Allergy     Anxiety     Asthma     Back pain     Chronic venous insufficiency 11/19/2013    Depression     Diverticulosis 11/19/2013 1/8/8 colonoscopy    Dry eyes     Fracture, sacrum/coccyx     Dr. Sanchez     GERD (gastroesophageal reflux disease)     H/O total hip arthroplasty 12/30/2015    Hypertension     Hypothyroid     Osteoarthritis     Osteoporosis     Postlaminectomy syndrome of lumbar region 1/8/2014    Radius fracture     7/18    Simple chronic bronchitis 5/3/2017    Umbilical hernia 11/19/2013    Urinary incontinence     Vitamin D deficiency disease        Past Surgical History:   Procedure Laterality Date    ADENOIDECTOMY      BACK SURGERY      x2    breast implants  1973    CATARACT EXTRACTION Bilateral     CLOSED REDUCTION DISTAL RADIUS FRACTURE      Dr. Black, 8/18    cystoscope  1/6/16    DR. Brown    FRACTURE SURGERY  April 3 2015    left wrist    HAND SURGERY      HIP SURGERY Right     total hip- Dr. Dos Santos    HYSTERECTOMY      34y/o    INJECTION OF ANESTHETIC AGENT AROUND NERVE Right 9/16/2020    Procedure: Right suprascapular nerve block;  Surgeon: Raffi Wlels MD;  Location: [facility];  Service: Pain Management;  Laterality: Right;    INJECTION OF ANESTHETIC AGENT AROUND NERVE Right 7/13/2021    Procedure: Block, Nerve Right Suprascapular Nerve Block with RN IV sedation;  Surgeon: Raffi Wells MD;  Location: [facility];  Service: Pain Management;  Laterality: Right;    INJECTION OF ANESTHETIC AGENT INTO SACROILIAC JOINT N/A 8/12/2020    Procedure: Left IA hip Joint + Left SIJ + Right shoulder injection;  Surgeon: Raffi Wells MD;  Location: [facility];   Service: Pain Management;  Laterality: N/A;    INJECTION OF ANESTHETIC AGENT INTO SACROILIAC JOINT Bilateral 1/31/2023    Procedure: Bilateral SIJ Injection w/Local;  Surgeon: Abdirahman Parker MD;  Location: [facility];  Service: Pain Management;  Laterality: Bilateral;    INJECTION OF JOINT N/A 8/12/2020    Procedure: Left IA hip Joint + Left SIJ + Right shoulder injection;  Surgeon: Raffi Wells MD;  Location: [facility];  Service: Pain Management;  Laterality: N/A;    JOINT REPLACEMENT Right     R NNAMDI - Dr. Dos Santos    LEG SURGERY Right     ex-fix tib/fib    SELECTIVE INJECTION OF ANESTHETIC AGENT AROUND LUMBAR SPINAL NERVE ROOT BY TRANSFORAMINAL APPROACH Bilateral 2/10/2021    Procedure: Bilateral L5/S1 TF REJI;  Surgeon: Raffi Wells MD;  Location: [facility];  Service: Pain Management;  Laterality: Bilateral;    SELECTIVE INJECTION OF ANESTHETIC AGENT AROUND LUMBAR SPINAL NERVE ROOT BY TRANSFORAMINAL APPROACH Bilateral 5/25/2022    Procedure: Bilateral L5/S1 + S1 TF REJI;  Surgeon: Raffi Wells MD;  Location: [facility];  Service: Pain Management;  Laterality: Bilateral;    TONSILLECTOMY      TRANSFORAMINAL EPIDURAL INJECTION OF STEROID Left 7/8/2020    Procedure: left L2/3 + L5/S1 TF REJI;  Surgeon: Raffi Wells MD;  Location: [facility];  Service: Pain Management;  Laterality: Left;    TRANSFORAMINAL EPIDURAL INJECTION OF STEROID Left 7/1/2021    Procedure: left L5/S1 + S1 TF REJI;  Surgeon: Raffi Wells MD;  Location: [facility];  Service: Pain Management;  Laterality: Left;       Family History   Problem Relation Age of Onset    COPD Mother     Cancer Mother         lung CA    Pulmonary fibrosis Sister     Cancer Daughter         colon    Stroke Father     Diabetes Son     Melanoma Neg Hx     Psoriasis Neg Hx     Lupus Neg Hx        Social History     Tobacco Use    Smoking status: Never    Smokeless tobacco: Never   Substance Use Topics    Alcohol use: Yes     Alcohol/week:  0.0 standard drinks     Comment: rarely    Drug use: Never       Current Outpatient Medications   Medication Sig Dispense Refill    albuterol (PROVENTIL) 2.5 mg /3 mL (0.083 %) nebulizer solution Take 3 mLs (2.5 mg total) by nebulization every 6 (six) hours as needed for Wheezing. Rescue 1 each 6    apixaban (ELIQUIS) 5 mg Tab Take 1 tablet (5 mg total) by mouth 2 (two) times daily. 180 tablet 3    azelastine (ASTELIN) 137 mcg (0.1 %) nasal spray 2 sprays by Nasal route 2 (two) times daily.      busPIRone (BUSPAR) 5 MG Tab TAKE 1 TABLET TWICE DAILY 180 tablet 5    cholecalciferol, vitamin D3, (D3-2000) 50 mcg (2,000 unit) Cap Take 1 capsule (2,000 Units total) by mouth once daily. 90 capsule 11    clobetasoL (TEMOVATE) 0.05 % external solution Pt to mix in 1 jar of cerave cream and apply to affected areas bid for 2-4 weeks per course 50 mL 3    cyclobenzaprine (FLEXERIL) 5 MG tablet TAKE 1 TABLET (5 MG TOTAL) BY MOUTH NIGHTLY AS NEEDED FOR MUSCLE SPASMS. 90 tablet 1    cycloSPORINE (RESTASIS MULTIDOSE) 0.05 % Drop Place 1 drop into both eyes every 12 (twelve) hours. 5.5 mL 12    EScitalopram oxalate (LEXAPRO) 20 MG tablet Take 1 tablet (20 mg total) by mouth once daily. 90 tablet 3    fluocinonide 0.05% (LIDEX) 0.05 % cream AAA bid to leg for 2-4 weeks per course (Patient taking differently: Apply to affected area(s) of leg topically twice daily for 2-4 weeks per course) 60 g 1    fluticasone propionate (FLONASE) 50 mcg/actuation nasal spray       gabapentin (NEURONTIN) 300 MG capsule Take 1 capsule (300 mg total) by mouth 3 (three) times daily. 270 capsule 3    levothyroxine (SYNTHROID) 100 MCG tablet TAKE 1 TABLET EVERY DAY 90 tablet 2    mirabegron (MYRBETRIQ) 50 mg Tb24 Take 1 tablet (50 mg total) by mouth once daily. 30 tablet 11    mupirocin (BACTROBAN) 2 % ointment APPLY TOPICALLY TWICE DAILY TO WOUND ON RIGHT LOWER LEG 22 g 0    omeprazole (PRILOSEC) 40 MG capsule Take 1 capsule (40 mg  total) by mouth every morning. 90 capsule 3    solifenacin (VESICARE) 5 MG tablet Take 1 tablet (5 mg total) by mouth once daily. 30 tablet 11    traZODone (DESYREL) 50 MG tablet TAKE 1 TABLET (50 MG TOTAL) BY MOUTH NIGHTLY AS NEEDED FOR INSOMNIA. 30 tablet 3    triamcinolone acetonide 0.025% (KENALOG) 0.025 % cream AAA bid (Patient taking differently: Apply to affected area(s) topically two times daily) 80 g 0    valsartan (DIOVAN) 80 MG tablet Take 1 tablet (80 mg total) by mouth once daily. 90 tablet 3    levoFLOXacin (LEVAQUIN) 250 MG tablet Take 1 tablet (250 mg total) by mouth once daily. 7 tablet 0     Current Facility-Administered Medications   Medication Dose Route Frequency Provider Last Rate Last Admin    denosumab (PROLIA) injection 60 mg  60 mg Subcutaneous Q6 Months Carly Mora PA-C   60 mg at 01/28/19 1548     Facility-Administered Medications Ordered in Other Visits   Medication Dose Route Frequency Provider Last Rate Last Admin    ondansetron injection 4 mg  4 mg Intravenous Once PRN Abdirahman Parker MD           Review of patient's allergies indicates:   Allergen Reactions    Cephalexin Hives, Itching, Swelling, Anxiety, Dermatitis and Rash       Physical Exam  Vitals:    04/24/23 0931   BP: (!) 159/72   Pulse: 61       General: Well-developed, well-nourished, in no acute distress  HEENT: Normocephalic, atraumatic, extraocular movements intact  Neck: Supple, no supraclavicular or cervical lymphadenopathy, trachea midline  Respirations: even and unlabored  Extremities: moves all equally, no clubbing, cyanosis or edema  Skin: Warm and dry. No lesions  Psych: normal affect  Neuro: Alert and oriented x 3. Cranial nerves II-XII intact        Assessment:  1. OAB (overactive bladder)        2. Mixed incontinence        3. Genitourinary syndrome of menopause        4. Recurrent UTI  Urine culture    POCT URINE DIPSTICK WITHOUT MICROSCOPE                Plan:   OAB (overactive  bladder)    Mixed incontinence    Genitourinary syndrome of menopause    Recurrent UTI  -     Urine culture  -     POCT URINE DIPSTICK WITHOUT MICROSCOPE    Other orders  -     levoFLOXacin (LEVAQUIN) 250 MG tablet; Take 1 tablet (250 mg total) by mouth once daily.  Dispense: 7 tablet; Refill: 0        Continue vesicare and myrbetriq  Encouraged use of vag est    Follow up in about 6 months (around 10/24/2023).

## 2023-04-25 ENCOUNTER — TELEPHONE (OUTPATIENT)
Dept: PRIMARY CARE CLINIC | Facility: CLINIC | Age: 88
End: 2023-04-25
Payer: MEDICARE

## 2023-04-25 DIAGNOSIS — R52 PAINFUL VEINS: Primary | ICD-10-CM

## 2023-04-25 DIAGNOSIS — R09.89 PAINFUL VEINS: Primary | ICD-10-CM

## 2023-04-26 ENCOUNTER — TELEPHONE (OUTPATIENT)
Dept: PRIMARY CARE CLINIC | Facility: CLINIC | Age: 88
End: 2023-04-26
Payer: MEDICARE

## 2023-04-26 ENCOUNTER — TELEPHONE (OUTPATIENT)
Dept: VASCULAR SURGERY | Facility: CLINIC | Age: 88
End: 2023-04-26
Payer: MEDICARE

## 2023-04-26 LAB — BACTERIA UR CULT: ABNORMAL

## 2023-04-26 NOTE — TELEPHONE ENCOUNTER
----- Message from Rupert Frias sent at 4/26/2023 11:28 AM CDT -----  Contact: [phone]  Patient needs first available appointment due to scheduling. Please call patient at [phone]. Thanks KB

## 2023-05-02 ENCOUNTER — OFFICE VISIT (OUTPATIENT)
Dept: PAIN MEDICINE | Facility: CLINIC | Age: 88
End: 2023-05-02
Payer: MEDICARE

## 2023-05-02 VITALS
SYSTOLIC BLOOD PRESSURE: 145 MMHG | HEART RATE: 60 BPM | HEIGHT: 64 IN | WEIGHT: 167.44 LBS | BODY MASS INDEX: 28.59 KG/M2 | DIASTOLIC BLOOD PRESSURE: 72 MMHG

## 2023-05-02 DIAGNOSIS — Z96.641 HISTORY OF RIGHT HIP REPLACEMENT: ICD-10-CM

## 2023-05-02 DIAGNOSIS — M46.1 SACROILIITIS: ICD-10-CM

## 2023-05-02 DIAGNOSIS — M47.818 ARTHROPATHY OF LEFT SACROILIAC JOINT: Primary | ICD-10-CM

## 2023-05-02 PROCEDURE — 1125F PR PAIN SEVERITY QUANTIFIED, PAIN PRESENT: ICD-10-PCS | Mod: CPTII,S$GLB,, | Performed by: PHYSICIAN ASSISTANT

## 2023-05-02 PROCEDURE — 99999 PR PBB SHADOW E&M-EST. PATIENT-LVL IV: CPT | Mod: PBBFAC,,, | Performed by: PHYSICIAN ASSISTANT

## 2023-05-02 PROCEDURE — 99214 OFFICE O/P EST MOD 30 MIN: CPT | Mod: S$GLB,,, | Performed by: PHYSICIAN ASSISTANT

## 2023-05-02 PROCEDURE — 99999 PR PBB SHADOW E&M-EST. PATIENT-LVL IV: ICD-10-PCS | Mod: PBBFAC,,, | Performed by: PHYSICIAN ASSISTANT

## 2023-05-02 PROCEDURE — 3288F FALL RISK ASSESSMENT DOCD: CPT | Mod: CPTII,S$GLB,, | Performed by: PHYSICIAN ASSISTANT

## 2023-05-02 PROCEDURE — 1157F PR ADVANCE CARE PLAN OR EQUIV PRESENT IN MEDICAL RECORD: ICD-10-PCS | Mod: CPTII,S$GLB,, | Performed by: PHYSICIAN ASSISTANT

## 2023-05-02 PROCEDURE — 99214 PR OFFICE/OUTPT VISIT, EST, LEVL IV, 30-39 MIN: ICD-10-PCS | Mod: S$GLB,,, | Performed by: PHYSICIAN ASSISTANT

## 2023-05-02 PROCEDURE — 1125F AMNT PAIN NOTED PAIN PRSNT: CPT | Mod: CPTII,S$GLB,, | Performed by: PHYSICIAN ASSISTANT

## 2023-05-02 PROCEDURE — 1159F PR MEDICATION LIST DOCUMENTED IN MEDICAL RECORD: ICD-10-PCS | Mod: CPTII,S$GLB,, | Performed by: PHYSICIAN ASSISTANT

## 2023-05-02 PROCEDURE — 1101F PR PT FALLS ASSESS DOC 0-1 FALLS W/OUT INJ PAST YR: ICD-10-PCS | Mod: CPTII,S$GLB,, | Performed by: PHYSICIAN ASSISTANT

## 2023-05-02 PROCEDURE — 3288F PR FALLS RISK ASSESSMENT DOCUMENTED: ICD-10-PCS | Mod: CPTII,S$GLB,, | Performed by: PHYSICIAN ASSISTANT

## 2023-05-02 PROCEDURE — 1160F RVW MEDS BY RX/DR IN RCRD: CPT | Mod: CPTII,S$GLB,, | Performed by: PHYSICIAN ASSISTANT

## 2023-05-02 PROCEDURE — 1160F PR REVIEW ALL MEDS BY PRESCRIBER/CLIN PHARMACIST DOCUMENTED: ICD-10-PCS | Mod: CPTII,S$GLB,, | Performed by: PHYSICIAN ASSISTANT

## 2023-05-02 PROCEDURE — 1159F MED LIST DOCD IN RCRD: CPT | Mod: CPTII,S$GLB,, | Performed by: PHYSICIAN ASSISTANT

## 2023-05-02 PROCEDURE — 1101F PT FALLS ASSESS-DOCD LE1/YR: CPT | Mod: CPTII,S$GLB,, | Performed by: PHYSICIAN ASSISTANT

## 2023-05-02 PROCEDURE — 1157F ADVNC CARE PLAN IN RCRD: CPT | Mod: CPTII,S$GLB,, | Performed by: PHYSICIAN ASSISTANT

## 2023-05-02 NOTE — PROGRESS NOTES
"Chief Pain Complaint:  Chief Complaint   Patient presents with    Low-back Pain    Hip Pain   Bilateral SIJ pain - much worse on left      Interval History (5/2/2023):  Clau Nunn presents today for follow-up visit.  Patient was last seen on 3/28/2023.  Patient reports pain as 5/10 today.  Pain is much worse and left posterior hip, more so over left SI joint.    Interval History (3/28/2023): Clau Nunn presents today for follow-up visit.  she underwent bilateral SIJ injection on 1/31/23.  The patient reports that she is/was better following the procedure.  she reports about 50% pain relief on left, right side feeling much better.  The changes lasted 4 weeks so far.  The changes have continued through this visit.  Patient reports pain as 5/10 today.  Seeing Podiatry for left foot fracture, which is not healing. Still having left SIJ pain.     Interval History (1/10/2023):  Clau Nunn presents today for follow-up visit.  Patient was last seen about 6 months ago. At that visit, she was feeling better since REJI, but she was having localized SIJ pain - still overall better since injection. She returns today with continued posterior "hip pain". Pain is worse on left. Patient reports pain as 8/10 today.    Interval History (6/29/2022): Clau Nunn presents today for follow-up visit.  she underwent Bilateral L5/S1 + S1 TF REJI on 5/25/22.  The patient reports that she is/was better following the procedure.  she reports 75% pain relief with regards to leg pain.  The changes lasted 4 weeks so far.  The changes have continued through this visit.  Patient reports pain as 5/10 today. Having localized SIJ pain today.     Interval History (5/11/2022): Clau Nunn presents today for follow-up visit.  she underwent left L5/S1 + S1 TF REJI on 7/1/21 with excellent pain relief for about 3 months.  The patient reports that she is/was better following the procedure.  The changes have NOT continued " through this visit.   she underwent right suprascapular nerve block on 7/13/21.  The patient reports that she is/was unchanged following the procedure. She reports limited pain relief for short term. But, she mainly has limited ROM.   Patient reports pain as 5/10 today - due to low back pain and leg pain.    Interval History (6/24/2021): Patient was seen on 2/10/21. At that time she underwent Bilateral L5/S1 TF REJI.  The patient reports that she is/was unchanged following the procedure.  she reports 20% pain relief.  The changes lasted 1 weeks.  The changes have NOT continued through this visit.  Patient reports pain as 5/10 today.  Her main pain complaint today is LLE radiculopathy worse in popliteal space and shin.   S/p left knee injection with Dr. Burns about 2 months ago with some pain relief.    Interval History (2/2/2021): Patient was last seen on 10/16/2020.  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  She is still never seen orthopedics for this issue, and she has not improved from intra-articular shoulder joint injections nor suprascapular nerve block.  Patient reports pain as 8/10 today.  Today, she is complaining of back and bilateral leg pain, previously just on the left side, although the left side still remains the worst.    Interval History (10/19/2020): Patient was seen on 9/16/20. At that time she underwent  Right suprascapular nerve block.  The patient reports that she is/was unchanged following the procedure.  she reports no pain relief.  Patient reports pain as 6/10 today - only when she moves her arm.    Interval History (9/9/2020): Patient was seen on 8/12/20. At that time she underwent left SIJ + left hip joint + right shoulder joint injection.  The patient reports that she is/was better following the procedure.  she reports 75% pain relief with hip pain relief but only 25% relief of shoulder pain.  The changes  lasted 4 weeks so far.  The changes have continued through this visit.  Patient reports pain as /10 today.    Interval History (8/5/2020): Patient was seen on 7/8/20. At that time she underwent left L2/3 + L5/S1 TF REJI.  The patient reports that she is/was slightly better following the procedure.  she reports only 50 % pain relief.  The changes lasted 4 weeks so far.  The changes have continued through this visit.  She also reports that her right shoulder has been bothering her.  She has history of right humerus fracture years ago.    Interval History (6/12/2020): She is here today to review MRI. She reports 10/10 pain today. She also has concerns about Prolia as she read that it can cause rashes and joint pain.  She has been having a rash on her right breast for a few weeks.  She will see Dr. Gomez soon to discuss.     Interval History(11/20/18): Patient was seen on 10/24/18. At that time she underwent left SIJ + left GT bursa injection with local.  The patient reports that she is/was better following the procedure.  she reports 100% pain relief.  The changes lasted 4 weeks so far.  The changes have continued through this visit.    History of Present Illness:   Clau Nunn is a 88 y.o. female  who is presenting with a chief complaint of low back pain and hip pain. The patient began experiencing this problem insidiously, and the pain has been gradually worsening over the past 6 month(s). The pain is described as throbbing, cramping, aching and heavy and is located in the bilateral buttocks . Pain is intermittent and lasts hours. The pain radiates to  lateral thigh and groin . The patient rates her pain a 5 out of ten and interferes with activities of daily living a 5 out of ten. Pain is exacerbated by getting up from a seated position, and is improved by rest. Patient reports prior trauma (fall in June 2018 causing right distal radius + right sacrum fracture), prior lumbar surgery in ~2005, right hip  replacement, right distal radius fracture requiring ORIF in June 2018.    - pertinent negatives: No fever, No chills, No weight loss, No bladder dysfunction, No bowel dysfunction, No extremity weakness, No saddle anesthesia  - pertinent positives: none    - medications, other therapies tried (physical therapy, injections):     >> Tylenol    >> Has previously undergone Physical Therapy (aquatic and land) with limited relief    >> Has previously undergone spinal injection/s:   - bilateral SIJ injection on 11-9-17 with ~30% relief for 2 weeks   - left hip injection on 12-28-17 with some relief   - bilateral L3-5 MBB on 5/11/18 with reported 100% pain relief   - left SIJ + left GT bursa injection with local on 10/24/18 with 100% pain relief   - left L2/3 + L5/S1 TF REJI on 7/8/20 with about 50% pain relief   - left SIJ + left hip joint + right shoulder joint injection on 8/12/20 with 75% pain relief with hip pain relief but only 25% relief of shoulder pain - no longer having groin pain after this procedure    - Right suprascapular nerve block on 9/16/20 with limited pain relief    - Bilateral L5/S1 TF REJI on 2/10/21 with 20% pain relief for short term   - left knee injection with Dr. Burns in April 2021 with some pain relief   - left L5/S1 + S1 TF REJI on 7/1/21 with excellent pain relief for about 3 months   - right suprascapular nerve block on 7/13/21 with limited pain relief    - Bilateral L5/S1 + S1 TF REJI on 5/25/22 with 75% pain relief - regarding leg pain, now having localized SIJ   - bilateral SIJ injection on 1/31/23 with >50% pain relief on left, >70% pain relief on right - short-term pain relief      Past Surgical History:   Procedure Laterality Date    ADENOIDECTOMY      BACK SURGERY      x2    breast implants  1973    CATARACT EXTRACTION Bilateral     CLOSED REDUCTION DISTAL RADIUS FRACTURE      Dr. Black, 8/18    cystoscope  1/6/16    DR. Brown    FRACTURE SURGERY  April 3 2015    left wrist     HAND SURGERY      HIP SURGERY Right     total hip- Dr. Dos Santos    HYSTERECTOMY      34y/o    INJECTION OF ANESTHETIC AGENT AROUND NERVE Right 9/16/2020    Procedure: Right suprascapular nerve block;  Surgeon: Raffi Wells MD;  Location: [facility];  Service: Pain Management;  Laterality: Right;    INJECTION OF ANESTHETIC AGENT AROUND NERVE Right 7/13/2021    Procedure: Block, Nerve Right Suprascapular Nerve Block with RN IV sedation;  Surgeon: Raffi Wells MD;  Location: [facility];  Service: Pain Management;  Laterality: Right;    INJECTION OF ANESTHETIC AGENT INTO SACROILIAC JOINT N/A 8/12/2020    Procedure: Left IA hip Joint + Left SIJ + Right shoulder injection;  Surgeon: Raffi Wells MD;  Location: [facility];  Service: Pain Management;  Laterality: N/A;    INJECTION OF ANESTHETIC AGENT INTO SACROILIAC JOINT Bilateral 1/31/2023    Procedure: Bilateral SIJ Injection w/Local;  Surgeon: Abdirahman Parker MD;  Location: [facility];  Service: Pain Management;  Laterality: Bilateral;    INJECTION OF JOINT N/A 8/12/2020    Procedure: Left IA hip Joint + Left SIJ + Right shoulder injection;  Surgeon: Raffi Wells MD;  Location: [facility];  Service: Pain Management;  Laterality: N/A;    JOINT REPLACEMENT Right     R NNAMDI - Dr. Dos Santos    LEG SURGERY Right     ex-fix tib/fib    SELECTIVE INJECTION OF ANESTHETIC AGENT AROUND LUMBAR SPINAL NERVE ROOT BY TRANSFORAMINAL APPROACH Bilateral 2/10/2021    Procedure: Bilateral L5/S1 TF REJI;  Surgeon: Raffi Wells MD;  Location: [facility];  Service: Pain Management;  Laterality: Bilateral;    SELECTIVE INJECTION OF ANESTHETIC AGENT AROUND LUMBAR SPINAL NERVE ROOT BY TRANSFORAMINAL APPROACH Bilateral 5/25/2022    Procedure: Bilateral L5/S1 + S1 TF REJI;  Surgeon: Raffi Wells MD;  Location: [facility];  Service: Pain Management;  Laterality: Bilateral;    TONSILLECTOMY      TRANSFORAMINAL EPIDURAL INJECTION OF STEROID Left 7/8/2020    Procedure: left L2/3 + L5/S1 TF  "REJI;  Surgeon: Raffi Wells MD;  Location: [facility];  Service: Pain Management;  Laterality: Left;    TRANSFORAMINAL EPIDURAL INJECTION OF STEROID Left 7/1/2021    Procedure: left L5/S1 + S1 TF REJI;  Surgeon: Raffi Wells MD;  Location: [facility];  Service: Pain Management;  Laterality: Left;        Imaging / Labs / Studies (reviewed on 5/2/2023):     Records from [facility] regarding right shoulder fracture- uploaded into chart under "Media"    Results for orders placed during the hospital encounter of 08/05/20   X-ray Shoulder 2 or More Views Right    Narrative COMPARISON:  12/10/2018  FINDINGS:  There is a chronic fracture deformity of the right humeral head and neck.  Associated osseous productive changes approximate the inferior margins of the articular surface of the humeral head.  No definite acute fractures or dislocations visualized.  There are mild degenerative changes present at the AC joint and glenoid.  Postoperative findings noted in the lower thoracic spine.     Results for orders placed during the hospital encounter of 12/10/18   X-Ray Shoulder Trauma Right    Narrative FINDINGS:  Comminuted fracture involving the surgical neck and right humeral head.  No evidence of dislocation.  Degenerative changes are present at the right AC joint.    Impression Comminuted fracture involving the right humeral head and surgical neck.     Results for orders placed during the hospital encounter of 06/29/20   MRI Lumbar Spine W WO Cont    Narrative COMPARISON:  Prior MRI from June 29, 2020.  FINDINGS:  Again demonstrated are postoperative findings from thoracolumbar spinal fusion and laminectomy at T12.  Chronic compression deformity of T12 with chronic retropulsion that flattens the thecal sac to 14 mm.  This is unchanged.  Mild, grade 1 degenerative spondylolisthesis at L4-L5.  Vertebral body height is normal.  No abnormal enhancement patterns. The conus medullaris terminates at the level of L2-L3, low lying.  " No abnormal signal within the conus. Intervertebral disc levels are as follows:  T11-T12 disc: Fusion at this level.  Mild, chronic retropulsion that flattens the thecal sac to 14 mm.  No significant foraminal stenosis.  T12-L1 disc: Prior laminectomy and fusion.  The thecal sac measures 19 mm.  No significant foraminal stenosis.  L1-L2 disc : Posterior fusion.  No significant spinal stenosis or foraminal stenosis.  L2-L3 disc: Disc space height loss.  Broad-based posterior disc bulge which encroaches slightly into the floors of the exit foramina.  Moderate buckling of ligamentum flavum.  The thecal sac measures 8-9 mm AP.  Mild bilateral foraminal stenosis.  This has progressed since the prior MRI.  L3-L4 disc: Broad-based posterior disc bulge that encroaches slightly into the floors of the exit foramina.  Moderate buckling of ligamentum flavum.  The thecal sac measures 10 mm AP.  Mild bilateral neural foraminal stenosis, right greater than left.  These findings are similar to prior.  L4-L5 disc: Minimal grade 1 spondylolisthesis with severe degenerative facet change and hypertrophy.  Left-sided facet joint effusion.  Is buckling of ligamentum flavum.  The thecal sac measures 11 mm.  No significant foraminal stenosis.  The spondylolisthesis has slightly progressed measuring 4 mm compared to prior 2 mm.  L5-S1 disc: Severe disc space height loss with marginal disc and osteophyte encroach into the exit foramina bilaterally.  Moderate degenerative facet hypertrophy.  The thecal sac measures 14 mm.    Impression 1. 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  2. Progression of mild foraminal stenosis and mild spinal stenosis at L2-L3.  3. Minimal progression of grade 1 spondylolisthesis at L4-L5.  4. Unchanged mild, chronic retropulsion from T12 where there has been a laminectomy and fusion.       7/30/2018 XR LUMBAR SPINE COMPLETE 5 VIEW  TECHNIQUE:  AP, lateral, spot and bilateral oblique views of the lumbar spine were  performed.  COMPARISON:  07/25/2018  FINDINGS:  There is grade 1 spondylolisthesis of L4 on L5.  Pedicle screws and fixation rods are noted for at the T10, T11, L1 and L2 levels.  Chronic compression deformity at the L1 level unchanged.  Prominent bilateral facet arthropathy noted at the L4-5 and L5-S1 levels.  IVC filter noted.     7/30/2018 XR HIPS BILATERAL 2 VIEW INCL AP PELVIS  TECHNIQUE:  AP view of the pelvis and frogleg lateral views of both hips were performed.  COMPARISON:  07/26/2018  FINDINGS:  The bony pelvis is intact. A right total hip arthroplasty, plate and wires are in place.  No hardware failure or loosening suggested.  The appearance is unchanged when compared to the prior exam.  Mild degenerative changes noted at the left hip.  No acute fracture or dislocation of the left hip.  No significant joint space narrowing identified.  No plain film evidence to suggest AVN of either hip.     Results for orders placed during the hospital encounter of 01/13/14   MRI Lumbar Spine W WO Contrast    Narrative Findings: Pre- and postcontrast multiplanar multisequence imaging of the thoracic and lumbar spine was performed using 7 cc of Gadavist intravenous contrast material.  There is moderately severe chronic central compression deformity of the T12 vertebral body which is stabilized by paraspinous rods and pedicle screws which extend from T10 through L2.  Postsurgical changes of laminectomy also noted at T12.  The posterior cortex of the T12 vertebral body is displaced posteriorly and indents the ventral thecal sac.  There is no anterior cord contact or significant central canal stenosis.  Degenerative disk desiccation is noted at multiple levels with moderate disk   narrowing at L5-S1.    Also L5-S1 is a broad-based disk protrusion which combined with bilateral facet hypertrophy results in moderately severe narrowing of both neural foramina.  No significant central canal stenosis noted.    From L2-3 through  "L4-5 there   is mild disk bulging which results in mild bilateral foraminal narrowing.  This is slightly more pronounced at L4-5 with bilateral facet hypertrophy a contributing factor.  No significant central canal stenosis noted.  No thoracic disk extrusion, and foraminal narrowing, canal stenosis is evident.  Thoracolumbar cord is intact.  Paravertebral soft tissues are symmetric and normal in appearance.         Review of Systems:  CONSTITUTIONAL: patient denies any fever, chills, or weight loss  SKIN: patient denies any rash or itching  RESPIRATORY: patient denies having any shortness of breath  GASTROINTESTINAL: patient denies having any diarrhea, constipation, or bowel incontinence  GENITOURINARY: patient denies having any abnormal bladder function    MUSCULOSKELETAL:  - patient complains of the above noted pain/s (see chief pain complaint)    NEUROLOGICAL:   - pain as above  - strength in Lower extremities is intact, BILATERALLY  - sensation in Lower extremities is intact, BILATERALLY  - patient denies any loss of bowel or bladder control      PSYCHIATRIC: patient denies any change in mood    Other:  All other systems reviewed and are negative        Physical Exam:  Vitals:    05/02/23 0957   BP: (!) 145/72   Pulse: 60   Weight: 76 kg (167 lb 7 oz)   Height: 5' 4" (1.626 m)   PainSc:   5   PainLoc: Back   Body mass index is 28.74 kg/m².   (reviewed on 5/2/2023)    General: alert and oriented, in no apparent distress.  Gait: antalgic gait   Skin: no rashes, no discoloration, no obvious lesions  HEENT: normocephalic, atraumatic.   Respiratory: without use of accessory muscles of respiration.    Musculoskeletal - Lumbar Spine:  - Midline scar present over thoracic spine  - Pain on flexion of lumbar spine: Present, worse than with extension  - Pain on extension of lumbar spine: Present  - Lumbar facet loading: Positive bilaterally  - TTP over the lumbar facet joints: Absent  - TTP over GT bursa: Minimal   - " Straight Leg Raise: Positive bilaterally, L>R - improved   - Pain on Internal and external rotation of the hip: Present    SIJ testing:  - TTP over the SI joints: Present on left > right  - Srikanth's/ Micheal's: Positive    - Sacroiliac Compression Test (lateral pressure): Positive   - SacralThrust Test (posterior pressure): Positive    Right Shoulder:  - Pain on abduction: Present   - ROM: decreased secondary to pain, very limited ROM      - TTP over the AC and GH joint: Present  - Neer's: Positive  - Hawkin's: Positive    Neuro - Lower Extremities:  - BLE Strength: R/L: HF: 5/5, HE: 5/5, KF: 5/5; KE: 5/5; FE: 5/5; FF: 5/5  - Extremity Reflexes: Brisk and symmetric throughout  - Sensory: Sensation to light touch intact bilaterally      Psych:  Mood and affect is appropriate            Assessment:  Clau Nunn is a 88 y.o. year old female who is presenting with       ICD-10-CM ICD-9-CM    1. Arthropathy of left sacroiliac joint  M47.818 721.3 Case Request-RAD/Other Procedure Area: Left SIJ RFA w/ local (give Valium before)      2. Sacroiliitis  M46.1 720.2 Case Request-RAD/Other Procedure Area: Left SIJ RFA w/ local (give Valium before)      3. History of right hip replacement  Z96.641 V43.64             Plan:  1. Interventional:   - Schedule left SIJ RFA. Patient is taking ASA; she does not have to stop prior to procedure.     - Consider right SIJ RFA if warranted after.   - Consider repeat Bilateral L5/S1 + S1 TF REJI.Will hold off since radicular pain not an issue at this time.  - S/p bilateral SIJ injection on 1/31/23 with >50% pain relief on left, >70% pain relief on right - short-term pain relief.  - S/p Bilateral L5/S1 + S1 TF REJI on 5/25/22 with 75% pain relief - regarding leg pain, now having localized SIJ.   - S/p Bilateral L5/S1 TF ERJI on 2/10/21 with 20% pain relief x 1 week. Pain now more localized on left.   - S/p Right suprascapular nerve block on 9/16/20 with limited pain relief.    2.  Pharmacologic:   - Refill gabapentin 600mg QHS.  She cannot tolerate during the day. Consider Lyrica.   - Patient encouraged to take Tylenol 500mg x 2 tablets (1000mg) TID more regularly, not to exceed 3000mg per day.   - Anticoagulation use: apixaban (Eliquis) - 2° prevention (h/o DVT) - Heme/Onc.     3. Rehabilitative: Continue use of SIJ belt at home with housework. Physical therapy for shoulder did not help in the past, so doesn't want to attend. SIJ exercises given on AVS previously. Patient is very active.    4.  Diagnostic/ Imaging: No new imaging ordered. Previous imaging reviewed.     5. Consult/ Referral: She has an appointment with Dr. Vazquez (vascular specialist) tomorrow for right dorsal foot pain due to engorgement of vein.    6. Follow up: 6 weeks follow-up SIJ RFA - in clinic (per pt request)      - This condition does not require this patient to take time off of work, and the primary goal of our Pain Management services is to improve the patient's functional capacity.   - I discussed the risks, benefits, and alternatives to potential treatment options. All questions and concerns were fully addressed today in clinic.         Angelica Caballero PA-C  Interventional Pain Management - [facility]    Disclaimer:  This note was prepared using voice recognition system and is likely to have sound alike errors that may have been overlooked even after proof reading.  Please call me with any questions.

## 2023-05-03 ENCOUNTER — OFFICE VISIT (OUTPATIENT)
Dept: VASCULAR SURGERY | Facility: CLINIC | Age: 88
End: 2023-05-03
Payer: MEDICARE

## 2023-05-03 VITALS
WEIGHT: 168.19 LBS | SYSTOLIC BLOOD PRESSURE: 146 MMHG | BODY MASS INDEX: 28.87 KG/M2 | HEART RATE: 67 BPM | DIASTOLIC BLOOD PRESSURE: 70 MMHG

## 2023-05-03 DIAGNOSIS — I83.811 VARICOSE VEINS OF LEG WITH PAIN, RIGHT: Primary | ICD-10-CM

## 2023-05-03 DIAGNOSIS — R52 PAINFUL VEINS: ICD-10-CM

## 2023-05-03 DIAGNOSIS — R09.89 PAINFUL VEINS: ICD-10-CM

## 2023-05-03 PROCEDURE — 1125F AMNT PAIN NOTED PAIN PRSNT: CPT | Mod: CPTII,S$GLB,, | Performed by: SURGERY

## 2023-05-03 PROCEDURE — 99999 PR PBB SHADOW E&M-EST. PATIENT-LVL IV: CPT | Mod: PBBFAC,,, | Performed by: SURGERY

## 2023-05-03 PROCEDURE — 1157F ADVNC CARE PLAN IN RCRD: CPT | Mod: CPTII,S$GLB,, | Performed by: SURGERY

## 2023-05-03 PROCEDURE — 99204 OFFICE O/P NEW MOD 45 MIN: CPT | Mod: S$GLB,,, | Performed by: SURGERY

## 2023-05-03 PROCEDURE — 1160F PR REVIEW ALL MEDS BY PRESCRIBER/CLIN PHARMACIST DOCUMENTED: ICD-10-PCS | Mod: CPTII,S$GLB,, | Performed by: SURGERY

## 2023-05-03 PROCEDURE — 99204 PR OFFICE/OUTPT VISIT, NEW, LEVL IV, 45-59 MIN: ICD-10-PCS | Mod: S$GLB,,, | Performed by: SURGERY

## 2023-05-03 PROCEDURE — 1159F MED LIST DOCD IN RCRD: CPT | Mod: CPTII,S$GLB,, | Performed by: SURGERY

## 2023-05-03 PROCEDURE — 1160F RVW MEDS BY RX/DR IN RCRD: CPT | Mod: CPTII,S$GLB,, | Performed by: SURGERY

## 2023-05-03 PROCEDURE — 1125F PR PAIN SEVERITY QUANTIFIED, PAIN PRESENT: ICD-10-PCS | Mod: CPTII,S$GLB,, | Performed by: SURGERY

## 2023-05-03 PROCEDURE — 1157F PR ADVANCE CARE PLAN OR EQUIV PRESENT IN MEDICAL RECORD: ICD-10-PCS | Mod: CPTII,S$GLB,, | Performed by: SURGERY

## 2023-05-03 PROCEDURE — 99999 PR PBB SHADOW E&M-EST. PATIENT-LVL IV: ICD-10-PCS | Mod: PBBFAC,,, | Performed by: SURGERY

## 2023-05-03 PROCEDURE — 1159F PR MEDICATION LIST DOCUMENTED IN MEDICAL RECORD: ICD-10-PCS | Mod: CPTII,S$GLB,, | Performed by: SURGERY

## 2023-05-03 NOTE — PROGRESS NOTES
[facility] Vascular Surgery  Congenital Cardiovascular Surgery  Consult Note    Patient Name: Clau Nunn  MRN: [id number]  Admission Date: (Not on file)  Hospital Length of Stay: 0 days   Attending Physician: No att. providers found  Primary Care Provider: Jeanette Gomez MD    Patient information was obtained from patient.     Consults  Subjective:     Chief Complaint varicose veins    History of Present Illness:  88-year-old female presents today for symptomatic right lower extremity edema with painful inflamed varicose veins.  Patient states these have been present for the past 5 years after requiring orthopedic surgery.    Current Outpatient Medications   Medication    albuterol (PROVENTIL) 2.5 mg /3 mL (0.083 %) nebulizer solution    apixaban (ELIQUIS) 5 mg Tab    azelastine (ASTELIN) 137 mcg (0.1 %) nasal spray    busPIRone (BUSPAR) 5 MG Tab    cholecalciferol, vitamin D3, (D3-2000) 50 mcg (2,000 unit) Cap    clobetasoL (TEMOVATE) 0.05 % external solution    cyclobenzaprine (FLEXERIL) 5 MG tablet    cycloSPORINE (RESTASIS MULTIDOSE) 0.05 % Drop    EScitalopram oxalate (LEXAPRO) 20 MG tablet    fluocinonide 0.05% (LIDEX) 0.05 % cream    fluticasone propionate (FLONASE) 50 mcg/actuation nasal spray    gabapentin (NEURONTIN) 300 MG capsule    levoFLOXacin (LEVAQUIN) 250 MG tablet    levothyroxine (SYNTHROID) 100 MCG tablet    mirabegron (MYRBETRIQ) 50 mg Tb24    mupirocin (BACTROBAN) 2 % ointment    omeprazole (PRILOSEC) 40 MG capsule    solifenacin (VESICARE) 5 MG tablet    traZODone (DESYREL) 50 MG tablet    triamcinolone acetonide 0.025% (KENALOG) 0.025 % cream    valsartan (DIOVAN) 80 MG tablet     Current Facility-Administered Medications   Medication Frequency    denosumab (PROLIA) injection 60 mg Q6 Months     Facility-Administered Medications Ordered in Other Visits   Medication Frequency    ondansetron injection 4 mg Once PRN       Review of patient's allergies indicates:   Allergen Reactions     Cephalexin Hives, Itching, Swelling, Anxiety, Dermatitis and Rash       Past Medical History:   Diagnosis Date    Allergy     Anxiety     Asthma     Back pain     Chronic venous insufficiency 11/19/2013    Depression     Diverticulosis 11/19/2013 1/8/8 colonoscopy    Dry eyes     Fracture, sacrum/coccyx     Dr. Sanchez     GERD (gastroesophageal reflux disease)     H/O total hip arthroplasty 12/30/2015    Hypertension     Hypothyroid     Osteoarthritis     Osteoporosis     Postlaminectomy syndrome of lumbar region 1/8/2014    Radius fracture     7/18    Simple chronic bronchitis 5/3/2017    Umbilical hernia 11/19/2013    Urinary incontinence     Vitamin D deficiency disease      Past Surgical History:   Procedure Laterality Date    ADENOIDECTOMY      BACK SURGERY      x2    breast implants  1973    CATARACT EXTRACTION Bilateral     CLOSED REDUCTION DISTAL RADIUS FRACTURE      Dr. Black, 8/18    cystoscope  1/6/16    DR. Brown    FRACTURE SURGERY  April 3 2015    left wrist    HAND SURGERY      HIP SURGERY Right     total hip- Dr. Dos Santos    HYSTERECTOMY      36y/o    INJECTION OF ANESTHETIC AGENT AROUND NERVE Right 9/16/2020    Procedure: Right suprascapular nerve block;  Surgeon: Raffi Wells MD;  Location: [facility] PAIN [facility]GT;  Service: Pain Management;  Laterality: Right;    INJECTION OF ANESTHETIC AGENT AROUND NERVE Right 7/13/2021    Procedure: Block, Nerve Right Suprascapular Nerve Block with RN IV sedation;  Surgeon: Raffi Wells MD;  Location: [facility] PAIN [facility]GT;  Service: Pain Management;  Laterality: Right;    INJECTION OF ANESTHETIC AGENT INTO SACROILIAC JOINT N/A 8/12/2020    Procedure: Left IA hip Joint + Left SIJ + Right shoulder injection;  Surgeon: Raffi Wells MD;  Location: [facility]IN [facility];  Service: Pain Management;  Laterality: N/A;    INJECTION OF ANESTHETIC AGENT INTO SACROILIAC JOINT Bilateral 1/31/2023    Procedure: Bilateral SIJ Injection w/Local;  Surgeon: Abdirahman Parker MD;  Location: [facility]  PAIN MGT;  Service: Pain Management;  Laterality: Bilateral;    INJECTION OF JOINT N/A 8/12/2020    Procedure: Left IA hip Joint + Left SIJ + Right shoulder injection;  Surgeon: Raffi Wells MD;  Location: [facility] PAIN [facility]GT;  Service: Pain Management;  Laterality: N/A;    JOINT REPLACEMENT Right     R NNAMDI - Dr. Dos Santos    LEG SURGERY Right     ex-fix tib/fib    SELECTIVE INJECTION OF ANESTHETIC AGENT AROUND LUMBAR SPINAL NERVE ROOT BY TRANSFORAMINAL APPROACH Bilateral 2/10/2021    Procedure: Bilateral L5/S1 TF REJI;  Surgeon: Raffi Wells MD;  Location: [facility] PAIN [facility]GT;  Service: Pain Management;  Laterality: Bilateral;    SELECTIVE INJECTION OF ANESTHETIC AGENT AROUND LUMBAR SPINAL NERVE ROOT BY TRANSFORAMINAL APPROACH Bilateral 5/25/2022    Procedure: Bilateral L5/S1 + S1 TF REJI;  Surgeon: Raffi Wells MD;  Location: [facility];  Service: Pain Management;  Laterality: Bilateral;    TONSILLECTOMY      TRANSFORAMINAL EPIDURAL INJECTION OF STEROID Left 7/8/2020    Procedure: left L2/3 + L5/S1 TF REJI;  Surgeon: Raffi Wells MD;  Location: [facility]IN [facility];  Service: Pain Management;  Laterality: Left;    TRANSFORAMINAL EPIDURAL INJECTION OF STEROID Left 7/1/2021    Procedure: left L5/S1 + S1 TF REJI;  Surgeon: Raffi Wells MD;  Location: [facility] PAIN [facility];  Service: Pain Management;  Laterality: Left;     Family History       Problem Relation (Age of Onset)    COPD Mother    Cancer Mother, Daughter    Diabetes Son    Pulmonary fibrosis Sister    Stroke Father          Tobacco Use    Smoking status: Never    Smokeless tobacco: Never   Substance and Sexual Activity    Alcohol use: Yes     Alcohol/week: 0.0 standard drinks     Comment: rarely    Drug use: Never    Sexual activity: Never     Partners: Male     Birth control/protection: Post-menopausal     Review of Systems   Constitutional:  Negative for activity change, appetite change, fatigue and fever.   HENT:  Negative for congestion.    Eyes:  Negative for photophobia, redness and  visual disturbance.   Respiratory:  Negative for apnea, cough, chest tightness and shortness of breath.    Cardiovascular:  Negative for chest pain and leg swelling.   Gastrointestinal:  Negative for abdominal pain, nausea and vomiting.   Genitourinary:  Negative for difficulty urinating.   Musculoskeletal:  Negative for gait problem and myalgias.   Skin:  Negative for color change, rash and wound.   Neurological:  Negative for syncope, facial asymmetry, speech difficulty, weakness and numbness.   Objective:     Vital Signs (Most Recent):  Pulse: 67 (23 0856)  BP: (!) 146/70 (23 0856)   Vital Signs (24h Range):  [unfilled]     Weight: 76.3 kg (168 lb 3.4 oz)  Body mass index is 28.87 kg/m².            [unfilled]    Physical Exam  Vitals and nursing note reviewed.   Constitutional:       Appearance: Normal appearance. She is normal weight.   HENT:      Head: Normocephalic and atraumatic.      Mouth/Throat:      Mouth: Mucous membranes are moist.   Eyes:      Extraocular Movements: Extraocular movements intact.      Conjunctiva/sclera: Conjunctivae normal.      Pupils: Pupils are equal, round, and reactive to light.   Neck:      Vascular: No carotid bruit.   Cardiovascular:      Rate and Rhythm: Normal rate and regular rhythm.      Pulses: Normal pulses.           Femoral pulses are 2+ on the right side and 2+ on the left side.  Pulmonary:      Effort: Pulmonary effort is normal.      Breath sounds: Normal breath sounds.   Abdominal:      General: Abdomen is flat. Bowel sounds are normal.      Palpations: Abdomen is soft.   Musculoskeletal:      Cervical back: Neck supple.      Right lower le+ Edema present.      Left lower le+ Edema present.   Skin:     General: Skin is warm.      Capillary Refill: Capillary refill takes 2 to 3 seconds.   Neurological:      General: No focal deficit present.      Mental Status: She is alert and oriented to person, place, and time. Mental status is at baseline.       Cranial Nerves: No cranial nerve deficit.      Sensory: No sensory deficit.      Motor: No weakness.   Psychiatric:         Mood and Affect: Mood normal.         Behavior: Behavior normal.       Significant Labs:  I have reviewed all pertinent lab results within the past 24 hours.    Significant Diagnostics:  I have reviewed all pertinent imaging results/findings within the past 24 hours.    Assessment/Plan:     There are no hospital problems to display for this patient.      Right lower extremity edema with painful inflamed varicose veins.  Will obtain chronic venous ultrasound to evaluate for reflux.  Patient encouraged to wear knee-high compression socks.    Thank you for your consult. I will follow-up with patient. Please contact us if you have any additional questions.    Arslan Vazquez IV, MD  Vascular Surgery  [facility]

## 2023-05-09 ENCOUNTER — TELEPHONE (OUTPATIENT)
Dept: VASCULAR SURGERY | Facility: CLINIC | Age: 88
End: 2023-05-09
Payer: MEDICARE

## 2023-05-09 NOTE — TELEPHONE ENCOUNTER
----- Message from Alee Chapman sent at 5/9/2023  9:12 AM CDT -----  Contact: NATALIYA DE LA PAZ [[id number]]  Type: Appointment Request    Name of Caller: NATALIYA DE LA PAZ [[id number]]  When is the first available appointment? Do not have access  Reason for Visit:  Follow up  Best Call Back Number: [phone]  Additional Information: Pt indicates she is supposed to be scheduled to see this provider 5/25/23 but doesn't know what time.

## 2023-05-09 NOTE — TELEPHONE ENCOUNTER
Returned call to patient. She was call to find out the time of her appointment on 5/25. I advised that Dr. Vazquez is not in clinic here at [facility] on 5/25. She was given the number for C

## 2023-05-09 NOTE — PRE-PROCEDURE INSTRUCTIONS
Spoke with patients wife regarding procedure scheduled on 5.16     Arrival time 0745     Has patient been sick with fever or on antibiotics within the last 7 days? No     Does the patient have any open wounds, sores or rashes? No     Does the patient have any recent fractures? no     Has patient received a vaccination within the last 7 days? No     Received the COVID vaccination?      Has the patient stopped all medications as directed? na     Does patient have a pacemaker and or defibrillator? no     Does the patient have a ride to and from procedure and someone reliable to remain with patient? daughter     Is the patient diabetic? no     Does the patient have sleep apnea? Or use O2 at home? no     Is the patient receiving sedation?      Is the patient instructed to remain NPO beginning at midnight the night before their procedure? yes     Procedure location confirmed with patient? Yes     Covid- Denies signs/symptoms. Instructed to notify PAT/MD if any changes.

## 2023-05-15 ENCOUNTER — TELEPHONE (OUTPATIENT)
Dept: PAIN MEDICINE | Facility: CLINIC | Age: 88
End: 2023-05-15
Payer: MEDICARE

## 2023-05-15 NOTE — TELEPHONE ENCOUNTER
----- Message from Marcy Samaniego sent at 5/15/2023  8:02 AM CDT -----  Contact: self [phone]  Patient called in this morning to cancel her injection scheduled for tomorrow she is not feeling well. Please call back [phone]. Thanks tpw

## 2023-05-15 NOTE — TELEPHONE ENCOUNTER
----- Message from Serafin Amato sent at 5/15/2023  9:26 AM CDT -----  Contact: [phone]  Type:  Patient Returning Call    Who Called: Clau  Who Left Message for Patient: Nurse  Does the patient know what this is regarding?: Appt  Would the patient rather a call back or a response via [facility]? Call  Best Call Back Number: [phone]  Additional Information:

## 2023-05-23 NOTE — PRE-PROCEDURE INSTRUCTIONS
Spoke with patient regarding procedure scheduled on 6/8     Arrival time 0845     Has patient been sick with fever or on antibiotics within the last 7 days? No     Does the patient have any open wounds, sores or rashes? No     Does the patient have any recent fractures? no     Has patient received a vaccination within the last 7 days? No     Received the COVID vaccination?      Has the patient stopped all medications as directed? na     Does patient have a pacemaker and or defibrillator? no     Does the patient have a ride to and from procedure and someone reliable to remain with patient? daughter     Is the patient diabetic? no     Does the patient have sleep apnea? Or use O2 at home? no     Is the patient receiving sedation?      Is the patient instructed to remain NPO beginning at midnight the night before their procedure? yes     Procedure location confirmed with patient? Yes     Covid- Denies signs/symptoms. Instructed to notify PAT/MD if any changes.

## 2023-06-08 ENCOUNTER — HOSPITAL ENCOUNTER (OUTPATIENT)
Facility: HOSPITAL | Age: 88
Discharge: HOME | End: 2023-06-08
Attending: PHYSICAL MEDICINE & REHABILITATION | Admitting: PHYSICAL MEDICINE & REHABILITATION
Payer: MEDICARE

## 2023-06-08 VITALS
HEIGHT: 64 IN | RESPIRATION RATE: 16 BRPM | HEART RATE: 64 BPM | WEIGHT: 166.13 LBS | SYSTOLIC BLOOD PRESSURE: 179 MMHG | TEMPERATURE: 97 F | BODY MASS INDEX: 28.36 KG/M2 | DIASTOLIC BLOOD PRESSURE: 79 MMHG | OXYGEN SATURATION: 95 %

## 2023-06-08 DIAGNOSIS — M46.1 SACROILIITIS: Primary | ICD-10-CM

## 2023-06-08 PROCEDURE — 64625 RF ABLTJ NRV NRVTG SI JT: CPT | Mod: LT | Performed by: PHYSICAL MEDICINE & REHABILITATION

## 2023-06-08 PROCEDURE — 64625 PR ABLATION, RADIOFREQ, SACROILIAC JOINT, W/IMG: ICD-10-PCS | Mod: LT,,, | Performed by: PHYSICAL MEDICINE & REHABILITATION

## 2023-06-08 PROCEDURE — 64625 RF ABLTJ NRV NRVTG SI JT: CPT | Mod: LT,,, | Performed by: PHYSICAL MEDICINE & REHABILITATION

## 2023-06-08 PROCEDURE — 25000003 PHARM REV CODE 250: Performed by: PHYSICAL MEDICINE & REHABILITATION

## 2023-06-08 RX ORDER — SODIUM BICARBONATE 1 MEQ/ML
SYRINGE (ML) INTRAVENOUS
Status: DISCONTINUED | OUTPATIENT
Start: 2023-06-08 | End: 2023-06-08 | Stop reason: HOSPADM

## 2023-06-08 RX ORDER — BUPIVACAINE HYDROCHLORIDE 2.5 MG/ML
INJECTION, SOLUTION EPIDURAL; INFILTRATION; INTRACAUDAL
Status: DISCONTINUED | OUTPATIENT
Start: 2023-06-08 | End: 2023-06-08 | Stop reason: HOSPADM

## 2023-06-08 RX ORDER — DIAZEPAM 5 MG/1
5 TABLET ORAL ONCE
Status: COMPLETED | OUTPATIENT
Start: 2023-06-08 | End: 2023-06-08

## 2023-06-08 RX ADMIN — DIAZEPAM 5 MG: 5 TABLET ORAL at 09:06

## 2023-06-08 NOTE — DISCHARGE INSTRUCTIONS

## 2023-06-08 NOTE — H&P
HPI  Patient presenting for Procedure(s) (LRB):  Left SIJ RFA w/ local (give Valium before) (Left)       No health changes since previous encounter    Past Medical History:   Diagnosis Date    Allergy     Anxiety     Asthma     Back pain     Chronic venous insufficiency 11/19/2013    Depression     Diverticulosis 11/19/2013 1/8/8 colonoscopy    Dry eyes     Fracture, sacrum/coccyx     Dr. Sanchez     GERD (gastroesophageal reflux disease)     H/O total hip arthroplasty 12/30/2015    Hypertension     Hypothyroid     Osteoarthritis     Osteoporosis     Postlaminectomy syndrome of lumbar region 1/8/2014    Radius fracture     7/18    Simple chronic bronchitis 5/3/2017    Umbilical hernia 11/19/2013    Urinary incontinence     Vitamin D deficiency disease      Past Surgical History:   Procedure Laterality Date    ADENOIDECTOMY      BACK SURGERY      x2    breast implants  1973    CATARACT EXTRACTION Bilateral     CLOSED REDUCTION DISTAL RADIUS FRACTURE      Dr. Black, 8/18    cystoscope  1/6/16    DR. Brown    FRACTURE SURGERY  April 3 2015    left wrist    HAND SURGERY      HIP SURGERY Right     total hip- Dr. Dos Santos    HYSTERECTOMY      34y/o    INJECTION OF ANESTHETIC AGENT AROUND NERVE Right 9/16/2020    Procedure: Right suprascapular nerve block;  Surgeon: Raffi Wells MD;  Location: [facility]IN [facility];  Service: Pain Management;  Laterality: Right;    INJECTION OF ANESTHETIC AGENT AROUND NERVE Right 7/13/2021    Procedure: Block, Nerve Right Suprascapular Nerve Block with RN IV sedation;  Surgeon: Raffi Wells MD;  Location: [facility]IN [facility];  Service: Pain Management;  Laterality: Right;    INJECTION OF ANESTHETIC AGENT INTO SACROILIAC JOINT N/A 8/12/2020    Procedure: Left IA hip Joint + Left SIJ + Right shoulder injection;  Surgeon: aRffi Wells MD;  Location: [facility]IN [facility];  Service: Pain Management;  Laterality: N/A;    INJECTION OF ANESTHETIC AGENT INTO SACROILIAC JOINT Bilateral 1/31/2023    Procedure:  Bilateral SIJ Injection w/Local;  Surgeon: Abdirahman Parker MD;  Location: [facility]IN [facility];  Service: Pain Management;  Laterality: Bilateral;    INJECTION OF JOINT N/A 8/12/2020    Procedure: Left IA hip Joint + Left SIJ + Right shoulder injection;  Surgeon: Raffi Wells MD;  Location: [facility]IN [facility];  Service: Pain Management;  Laterality: N/A;    JOINT REPLACEMENT Right     R NNAMDI - Dr. Dos Santos    LEG SURGERY Right     ex-fix tib/fib    SELECTIVE INJECTION OF ANESTHETIC AGENT AROUND LUMBAR SPINAL NERVE ROOT BY TRANSFORAMINAL APPROACH Bilateral 2/10/2021    Procedure: Bilateral L5/S1 TF REJI;  Surgeon: Raffi Wells MD;  Location: [facility];  Service: Pain Management;  Laterality: Bilateral;    SELECTIVE INJECTION OF ANESTHETIC AGENT AROUND LUMBAR SPINAL NERVE ROOT BY TRANSFORAMINAL APPROACH Bilateral 5/25/2022    Procedure: Bilateral L5/S1 + S1 TF REJI;  Surgeon: Raffi Wells MD;  Location: [facility];  Service: Pain Management;  Laterality: Bilateral;    TONSILLECTOMY      TRANSFORAMINAL EPIDURAL INJECTION OF STEROID Left 7/8/2020    Procedure: left L2/3 + L5/S1 TF REJI;  Surgeon: Raffi Wells MD;  Location: [facility];  Service: Pain Management;  Laterality: Left;    TRANSFORAMINAL EPIDURAL INJECTION OF STEROID Left 7/1/2021    Procedure: left L5/S1 + S1 TF REJI;  Surgeon: Raffi Wells MD;  Location: [facility];  Service: Pain Management;  Laterality: Left;     Review of patient's allergies indicates:   Allergen Reactions    Cephalexin Hives, Itching, Swelling, Anxiety, Dermatitis and Rash        Current Facility-Administered Medications on File Prior to Encounter   Medication Dose Route Frequency Provider Last Rate Last Admin    ondansetron injection 4 mg  4 mg Intravenous Once PRN Abdirahman Parker MD         Current Outpatient Medications on File Prior to Encounter   Medication Sig Dispense Refill    apixaban (ELIQUIS) 5 mg Tab Take 1 tablet (5 mg total) by mouth 2 (two) times daily. 180 tablet 3     busPIRone (BUSPAR) 5 MG Tab TAKE 1 TABLET TWICE DAILY 180 tablet 5    EScitalopram oxalate (LEXAPRO) 20 MG tablet Take 1 tablet (20 mg total) by mouth once daily. 90 tablet 3    gabapentin (NEURONTIN) 300 MG capsule Take 1 capsule (300 mg total) by mouth 3 (three) times daily. 270 capsule 3    valsartan (DIOVAN) 80 MG tablet Take 1 tablet (80 mg total) by mouth once daily. 90 tablet 3    albuterol (PROVENTIL) 2.5 mg /3 mL (0.083 %) nebulizer solution Take 3 mLs (2.5 mg total) by nebulization every 6 (six) hours as needed for Wheezing. Rescue 1 each 6    azelastine (ASTELIN) 137 mcg (0.1 %) nasal spray 2 sprays by Nasal route 2 (two) times daily.      cholecalciferol, vitamin D3, (D3-2000) 50 mcg (2,000 unit) Cap Take 1 capsule (2,000 Units total) by mouth once daily. 90 capsule 11    clobetasoL (TEMOVATE) 0.05 % external solution Pt to mix in 1 jar of cerave cream and apply to affected areas bid for 2-4 weeks per course 50 mL 3    cyclobenzaprine (FLEXERIL) 5 MG tablet TAKE 1 TABLET (5 MG TOTAL) BY MOUTH NIGHTLY AS NEEDED FOR MUSCLE SPASMS. 90 tablet 1    cycloSPORINE (RESTASIS MULTIDOSE) 0.05 % Drop Place 1 drop into both eyes every 12 (twelve) hours. 5.5 mL 12    fluocinonide 0.05% (LIDEX) 0.05 % cream AAA bid to leg for 2-4 weeks per course (Patient taking differently: Apply to affected area(s) of leg topically twice daily for 2-4 weeks per course) 60 g 1    fluticasone propionate (FLONASE) 50 mcg/actuation nasal spray       levoFLOXacin (LEVAQUIN) 250 MG tablet Take 1 tablet (250 mg total) by mouth once daily. 7 tablet 0    levothyroxine (SYNTHROID) 100 MCG tablet TAKE 1 TABLET EVERY DAY 90 tablet 2    mirabegron (MYRBETRIQ) 50 mg Tb24 Take 1 tablet (50 mg total) by mouth once daily. 30 tablet 11    mupirocin (BACTROBAN) 2 % ointment APPLY TOPICALLY TWICE DAILY TO WOUND ON RIGHT LOWER LEG 22 g 0    omeprazole (PRILOSEC) 40 MG capsule Take 1 capsule (40 mg total) by mouth every morning. 90 capsule 3     "solifenacin (VESICARE) 5 MG tablet Take 1 tablet (5 mg total) by mouth once daily. 30 tablet 11    traZODone (DESYREL) 50 MG tablet TAKE 1 TABLET (50 MG TOTAL) BY MOUTH NIGHTLY AS NEEDED FOR INSOMNIA. 30 tablet 3    triamcinolone acetonide 0.025% (KENALOG) 0.025 % cream AAA bid (Patient taking differently: Apply to affected area(s) topically two times daily) 80 g 0        PMHx, PSHx, Allergies, Medications reviewed in epic    ROS negative except pain complaints in HPI    OBJECTIVE:    BP (!) 162/72 (BP Location: Right arm, Patient Position: Sitting)   Pulse 62   Temp 97.2 °F (36.2 °C) (Temporal)   Resp 14   Ht 5' 4" (1.626 m)   Wt 75.4 kg (166 lb 1.9 oz)   SpO2 96%   Breastfeeding No   BMI 28.51 kg/m²     PHYSICAL EXAMINATION:    GENERAL: Well appearing, in no acute distress, alert and oriented x3.  PSYCH:  Mood and affect appropriate.  SKIN: Skin color, texture, turgor normal, no rashes or lesions which will impact the procedure.  CV: RRR with palpation of the radial artery.  PULM: No evidence of respiratory difficulty, symmetric chest rise. Clear to auscultation.  NEURO: Cranial nerves grossly intact.    Plan:    Proceed with procedure as planned Procedure(s) (LRB):  Left SIJ RFA w/ local (give Valium before) (Left)    Abdirahman Parker MD  06/08/2023            "

## 2023-06-08 NOTE — OP NOTE
Sacroiliac Joint radiofrequency ablation under Fluoroscopy   Time-out taken to identify patient and procedure side prior to starting the procedure.     06/08/2023                                                           PROCEDURE:  Left  Sacroiliac joint thermal RFA under fluoroscopy.   1)thermal ablation of the L5 medial branch nerve  2)thermal ablation of the lateral branches of S1  3)thermal ablation of the lateral branches of S2  4)thermal ablation of the lateral branches of S3  5)Conscious sedation provided by M.D.    REASON FOR PROCEDURE:   Left  Sacroiliitis [M46.1]     PHYSICIAN: Abdirahman Parker MD    ASSISTANTS:  None    MEDICATIONS INJECTED: 10mL Bupivacaine 0.25%     LOCAL ANESTHETIC USED: Xylocaine 1% 10ml     ESTIMATED BLOOD LOSS: None.     COMPLICATIONS: None.     TECHNIQUE: Laying in the prone position, the patient was prepped and draped in the usual sterile fashion using ChloraPrep and fenestrated drape. The area was determined under fluoroscopy. Local Xylocaine was injected by raising a wheel and going down to the periosteum using a 27-gauge hypodermic needle. 17 gauge active tip needles were then advanced along the medial border of the sacroiliac joint with the needle tips overlapping. 6 needles were placed along the S1, S2, and S3 foramen, and one was placed at the level of the medial branch nerve of the sacral ala. There was no motor involvement in the lower extremity. After confirmation of the needle tip positions, cold thermal lesioning was performed at 80 degrees celsius for 90 seconds at each of the needle tips.      The patient was monitored for approximately 30 minutes after the procedure. Patient was given post procedure and discharge instructions to follow at home. We will see the patient back in two weeks or the patient may call to inform of status. The patient was discharged in a stable condition

## 2023-06-08 NOTE — DISCHARGE SUMMARY
Discharge Note  Short Stay      SUMMARY     Admit Date: 6/8/2023    Attending Physician: Abdirahman Parker MD        Discharge Physician: Abdirahman Parker MD        Discharge Date: 6/8/2023 10:33 AM    Procedure(s) (LRB):  Left SIJ RFA w/ local (give Valium before) (Left)    Final Diagnosis: Sacroiliitis [M46.1]    Disposition: Home or self care    Patient Instructions:   Current Discharge Medication List        CONTINUE these medications which have NOT CHANGED    Details   apixaban (ELIQUIS) 5 mg Tab Take 1 tablet (5 mg total) by mouth 2 (two) times daily.  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  Qty: 90 tablet, Refills: 3    Associated Diagnoses: Anxiety      gabapentin (NEURONTIN) 300 MG capsule Take 1 capsule (300 mg total) by mouth 3 (three) times daily.  Qty: 270 capsule, Refills: 3    Associated Diagnoses: Chronic lumbar radiculopathy      valsartan (DIOVAN) 80 MG tablet Take 1 tablet (80 mg total) by mouth once daily.  Qty: 90 tablet, Refills: 3    Comments: .  Associated Diagnoses: Essential hypertension      albuterol (PROVENTIL) 2.5 mg /3 mL (0.083 %) nebulizer solution Take 3 mLs (2.5 mg total) by nebulization every 6 (six) hours as needed for Wheezing. Rescue  Qty: 1 each, Refills: 6    Associated Diagnoses: Simple chronic bronchitis      azelastine (ASTELIN) 137 mcg (0.1 %) nasal spray 2 sprays by Nasal route 2 (two) times daily.      cholecalciferol, vitamin D3, (D3-2000) 50 mcg (2,000 unit) Cap Take 1 capsule (2,000 Units total) by mouth once daily.  Qty: 90 capsule, Refills: 11    Comments: Please get Vit D3 2000 IU and take once daily  Associated Diagnoses: Vitamin D deficiency disease      clobetasoL (TEMOVATE) 0.05 % external solution  Pt to mix in 1 jar of cerave cream and apply to affected areas bid for 2-4 weeks per course  Qty: 50 mL, Refills: 3    Associated Diagnoses: Asteatotic dermatitis      cyclobenzaprine (FLEXERIL) 5 MG tablet TAKE 1 TABLET (5 MG TOTAL) BY MOUTH NIGHTLY AS NEEDED FOR MUSCLE SPASMS.  Qty: 90 tablet, Refills: 1    Associated Diagnoses: Osteoporosis, postmenopausal      cycloSPORINE (RESTASIS MULTIDOSE) 0.05 % Drop Place 1 drop into both eyes every 12 (twelve) hours.  Qty: 5.5 mL, Refills: 12    Associated Diagnoses: Keratitis sicca, bilateral      fluocinonide 0.05% (LIDEX) 0.05 % cream AAA bid to leg for 2-4 weeks per course  Qty: 60 g, Refills: 1    Associated Diagnoses: Disease of skin and subcutaneous tissue      fluticasone propionate (FLONASE) 50 mcg/actuation nasal spray       levoFLOXacin (LEVAQUIN) 250 MG tablet Take 1 tablet (250 mg total) by mouth once daily.  Qty: 7 tablet, Refills: 0      levothyroxine (SYNTHROID) 100 MCG tablet TAKE 1 TABLET EVERY DAY  Qty: 90 tablet, Refills: 2    Associated Diagnoses: Acquired hypothyroidism      mirabegron (MYRBETRIQ) 50 mg Tb24 Take 1 tablet (50 mg total) by mouth once daily.  Qty: 30 tablet, Refills: 11      mupirocin (BACTROBAN) 2 % ointment APPLY TOPICALLY TWICE DAILY TO WOUND ON RIGHT LOWER LEG  Qty: 22 g, Refills: 0    Associated Diagnoses: Asteatotic dermatitis      omeprazole (PRILOSEC) 40 MG capsule Take 1 capsule (40 mg total) by mouth every morning.  Qty: 90 capsule, Refills: 3      solifenacin (VESICARE) 5 MG tablet Take 1 tablet (5 mg total) by mouth once daily.  Qty: 30 tablet, Refills: 11      traZODone (DESYREL) 50 MG tablet TAKE 1 TABLET (50 MG TOTAL) BY MOUTH NIGHTLY AS NEEDED FOR INSOMNIA.  Qty: 30 tablet, Refills: 3    Associated Diagnoses: Primary insomnia      triamcinolone acetonide 0.025% (KENALOG) 0.025 % cream AAA bid  Qty: 80 g, Refills: 0    Associated Diagnoses: Dermatitis                 Discharge Diagnosis: Sacroiliitis [M46.1]  Condition  on Discharge: Stable with no complications to procedure   Diet on Discharge: Same as before.  Activity: as per instruction sheet.  Discharge to: Home with a responsible adult.  Follow up: 2-4 weeks       Please call the office at [phone] if you experience any weakness or loss of sensation, fever > 101.5, pain uncontrolled with oral medications, persistent nausea/vomiting/or diarrhea, redness or drainage from the incisions, or any other worrisome concerns. If physician on call was not reached or could not communicate with our office for any reason please go to the nearest emergency department

## 2023-06-27 ENCOUNTER — TELEPHONE (OUTPATIENT)
Dept: PAIN MEDICINE | Facility: CLINIC | Age: 88
End: 2023-06-27
Payer: MEDICARE

## 2023-06-27 NOTE — PROGRESS NOTES
"Chief Pain Complaint:  Chief Complaint   Patient presents with    Low-back Pain    Hip Pain   Bilateral SIJ pain - much worse on left      Interval History (6/28/2023): Patient presents today for follow-up visit.  she underwent left SIJ RFA on 6/8/23 (about 3 weeks ago).  The patient reports that she is/was unchanged following the procedure.  she reports limited pain relief so far. Patient reports pain as 8/10 today.    Interval History (5/2/2023):  Clau Nunn presents today for follow-up visit.  Patient was last seen on 3/28/2023.  Patient reports pain as 5/10 today.  Pain is much worse and left posterior hip, more so over left SI joint.    Interval History (3/28/2023): Clau Nunn presents today for follow-up visit.  she underwent bilateral SIJ injection on 1/31/23.  The patient reports that she is/was better following the procedure.  she reports about 50% pain relief on left, right side feeling much better.  The changes lasted 4 weeks so far.  The changes have continued through this visit.  Patient reports pain as 5/10 today.  Seeing Podiatry for left foot fracture, which is not healing. Still having left SIJ pain.     Interval History (1/10/2023):  Clau Nunn presents today for follow-up visit.  Patient was last seen about 6 months ago. At that visit, she was feeling better since REJI, but she was having localized SIJ pain - still overall better since injection. She returns today with continued posterior "hip pain". Pain is worse on left. Patient reports pain as 8/10 today.    Interval History (6/29/2022): Clau Nunn presents today for follow-up visit.  she underwent Bilateral L5/S1 + S1 TF REJI on 5/25/22.  The patient reports that she is/was better following the procedure.  she reports 75% pain relief with regards to leg pain.  The changes lasted 4 weeks so far.  The changes have continued through this visit.  Patient reports pain as 5/10 today. Having localized SIJ pain today. "     Interval History (5/11/2022): Clau Nunn presents today for follow-up visit.  she underwent left L5/S1 + S1 TF REJI on 7/1/21 with excellent pain relief for about 3 months.  The patient reports that she is/was better following the procedure.  The changes have NOT continued through this visit.   she underwent right suprascapular nerve block on 7/13/21.  The patient reports that she is/was unchanged following the procedure. She reports limited pain relief for short term. But, she mainly has limited ROM.   Patient reports pain as 5/10 today - due to low back pain and leg pain.    Interval History (6/24/2021): Patient was seen on 2/10/21. At that time she underwent Bilateral L5/S1 TF REJI.  The patient reports that she is/was unchanged following the procedure.  she reports 20% pain relief.  The changes lasted 1 weeks.  The changes have NOT continued through this visit.  Patient reports pain as 5/10 today.  Her main pain complaint today is LLE radiculopathy worse in popliteal space and shin.   S/p left knee injection with Dr. Burns about 2 months ago with some pain relief.    Interval History (2/2/2021): Patient was last seen on 10/16/2020.  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  She is still never seen orthopedics for this issue, and she has not improved from intra-articular shoulder joint injections nor suprascapular nerve block.  Patient reports pain as 8/10 today.  Today, she is complaining of back and bilateral leg pain, previously just on the left side, although the left side still remains the worst.    Interval History (10/19/2020): Patient was seen on 9/16/20. At that time she underwent  Right suprascapular nerve block.  The patient reports that she is/was unchanged following the procedure.  she reports no pain relief.  Patient reports pain as 6/10 today - only when she moves her arm.    Interval History (9/9/2020):  Patient was seen on 8/12/20. At that time she underwent left SIJ + left hip joint + right shoulder joint injection.  The patient reports that she is/was better following the procedure.  she reports 75% pain relief with hip pain relief but only 25% relief of shoulder pain.  The changes lasted 4 weeks so far.  The changes have continued through this visit.  Patient reports pain as /10 today.    Interval History (8/5/2020): Patient was seen on 7/8/20. At that time she underwent left L2/3 + L5/S1 TF REJI.  The patient reports that she is/was slightly better following the procedure.  she reports only 50 % pain relief.  The changes lasted 4 weeks so far.  The changes have continued through this visit.  She also reports that her right shoulder has been bothering her.  She has history of right humerus fracture years ago.    Interval History (6/12/2020): She is here today to review MRI. She reports 10/10 pain today. She also has concerns about Prolia as she read that it can cause rashes and joint pain.  She has been having a rash on her right breast for a few weeks.  She will see Dr. Gomez soon to discuss.     Interval History(11/20/18): Patient was seen on 10/24/18. At that time she underwent left SIJ + left GT bursa injection with local.  The patient reports that she is/was better following the procedure.  she reports 100% pain relief.  The changes lasted 4 weeks so far.  The changes have continued through this visit.    History of Present Illness:   Clau Nunn is a 88 y.o. female  who is presenting with a chief complaint of low back pain and hip pain. The patient began experiencing this problem insidiously, and the pain has been gradually worsening over the past 6 month(s). The pain is described as throbbing, cramping, aching and heavy and is located in the bilateral buttocks . Pain is intermittent and lasts hours. The pain radiates to  lateral thigh and groin . The patient rates her pain a 5 out of ten and  interferes with activities of daily living a 5 out of ten. Pain is exacerbated by getting up from a seated position, and is improved by rest. Patient reports prior trauma (fall in June 2018 causing right distal radius + right sacrum fracture), prior lumbar surgery in ~2005, right hip replacement, right distal radius fracture requiring ORIF in June 2018.    - pertinent negatives: No fever, No chills, No weight loss, No bladder dysfunction, No bowel dysfunction, No extremity weakness, No saddle anesthesia  - pertinent positives: none    - medications, other therapies tried (physical therapy, injections):     >> Tylenol    >> Has previously undergone Physical Therapy (aquatic and land) with limited relief    >> Has previously undergone spinal injection/s:   - bilateral SIJ injection on 11-9-17 with ~30% relief for 2 weeks   - left hip injection on 12-28-17 with some relief   - bilateral L3-5 MBB on 5/11/18 with reported 100% pain relief   - left SIJ + left GT bursa injection with local on 10/24/18 with 100% pain relief   - left L2/3 + L5/S1 TF REJI on 7/8/20 with about 50% pain relief   - left SIJ + left hip joint + right shoulder joint injection on 8/12/20 with 75% pain relief with hip pain relief but only 25% relief of shoulder pain - no longer having groin pain after this procedure    - Right suprascapular nerve block on 9/16/20 with limited pain relief    - Bilateral L5/S1 TF REJI on 2/10/21 with 20% pain relief for short term   - left knee injection with Dr. Burns in April 2021 with some pain relief   - left L5/S1 + S1 TF REJI on 7/1/21 with excellent pain relief for about 3 months   - right suprascapular nerve block on 7/13/21 with limited pain relief    - Bilateral L5/S1 + S1 TF REJI on 5/25/22 with 75% pain relief - regarding leg pain, now having localized SIJ   - bilateral SIJ injection on 1/31/23 with >50% pain relief on left, >70% pain relief on right - short-term pain relief  - left SIJ RFA on 6/8/23 with  limited pain relief -- reported 3 weeks post      Past Surgical History:   Procedure Laterality Date    ADENOIDECTOMY      BACK SURGERY      x2    breast implants  1973    CATARACT EXTRACTION Bilateral     CLOSED REDUCTION DISTAL RADIUS FRACTURE      Dr. Black, 8/18    cystoscope  1/6/16    DR. Brown    FRACTURE SURGERY  April 3 2015    left wrist    HAND SURGERY      HIP SURGERY Right     total hip- Dr. Dos Santos    HYSTERECTOMY      36y/o    INJECTION OF ANESTHETIC AGENT AROUND NERVE Right 9/16/2020    Procedure: Right suprascapular nerve block;  Surgeon: Raffi Wells MD;  Location: [facility];  Service: Pain Management;  Laterality: Right;    INJECTION OF ANESTHETIC AGENT AROUND NERVE Right 7/13/2021    Procedure: Block, Nerve Right Suprascapular Nerve Block with RN IV sedation;  Surgeon: Raffi Wells MD;  Location: [facility];  Service: Pain Management;  Laterality: Right;    INJECTION OF ANESTHETIC AGENT INTO SACROILIAC JOINT N/A 8/12/2020    Procedure: Left IA hip Joint + Left SIJ + Right shoulder injection;  Surgeon: Raffi Wells MD;  Location: [facility];  Service: Pain Management;  Laterality: N/A;    INJECTION OF ANESTHETIC AGENT INTO SACROILIAC JOINT Bilateral 1/31/2023    Procedure: Bilateral SIJ Injection w/Local;  Surgeon: Abdirahman Parker MD;  Location: [facility];  Service: Pain Management;  Laterality: Bilateral;    INJECTION OF JOINT N/A 8/12/2020    Procedure: Left IA hip Joint + Left SIJ + Right shoulder injection;  Surgeon: Raffi Wells MD;  Location: [facility];  Service: Pain Management;  Laterality: N/A;    JOINT REPLACEMENT Right     R NNAMDI - Dr. Dos Santos    LEG SURGERY Right     ex-fix tib/fib    RADIOFREQUENCY THERMOCOAGULATION Left 6/8/2023    Procedure: Left SIJ RFA w/ local (give Valium before);  Surgeon: Abdirahman Parker MD;  Location: [facility];  Service: Pain Management;  Laterality: Left;    SELECTIVE INJECTION OF ANESTHETIC AGENT AROUND LUMBAR SPINAL NERVE ROOT BY  "TRANSFORAMINAL APPROACH Bilateral 2/10/2021    Procedure: Bilateral L5/S1 TF REJI;  Surgeon: Raffi Wells MD;  Location: [facility];  Service: Pain Management;  Laterality: Bilateral;    SELECTIVE INJECTION OF ANESTHETIC AGENT AROUND LUMBAR SPINAL NERVE ROOT BY TRANSFORAMINAL APPROACH Bilateral 5/25/2022    Procedure: Bilateral L5/S1 + S1 TF REJI;  Surgeon: Raffi Wells MD;  Location: [facility];  Service: Pain Management;  Laterality: Bilateral;    TONSILLECTOMY      TRANSFORAMINAL EPIDURAL INJECTION OF STEROID Left 7/8/2020    Procedure: left L2/3 + L5/S1 TF REJI;  Surgeon: Raffi Wells MD;  Location: [facility];  Service: Pain Management;  Laterality: Left;    TRANSFORAMINAL EPIDURAL INJECTION OF STEROID Left 7/1/2021    Procedure: left L5/S1 + S1 TF REJI;  Surgeon: Raffi Wells MD;  Location: [facility];  Service: Pain Management;  Laterality: Left;        Imaging / Labs / Studies (reviewed on 6/28/2023):     Records from [facility] regarding right shoulder fracture- uploaded into chart under "Media"    Results for orders placed during the hospital encounter of 08/05/20   X-ray Shoulder 2 or More Views Right    Narrative COMPARISON:  12/10/2018  FINDINGS:  There is a chronic fracture deformity of the right humeral head and neck.  Associated osseous productive changes approximate the inferior margins of the articular surface of the humeral head.  No definite acute fractures or dislocations visualized.  There are mild degenerative changes present at the AC joint and glenoid.  Postoperative findings noted in the lower thoracic spine.     Results for orders placed during the hospital encounter of 12/10/18   X-Ray Shoulder Trauma Right    Narrative FINDINGS:  Comminuted fracture involving the surgical neck and right humeral head.  No evidence of dislocation.  Degenerative changes are present at the right AC joint.    Impression Comminuted fracture involving the right humeral head and surgical neck.     Results for " orders placed during the hospital encounter of 06/29/20   MRI Lumbar Spine W WO Cont    Narrative COMPARISON:  Prior MRI from June 29, 2020.  FINDINGS:  Again demonstrated are postoperative findings from thoracolumbar spinal fusion and laminectomy at T12.  Chronic compression deformity of T12 with chronic retropulsion that flattens the thecal sac to 14 mm.  This is unchanged.  Mild, grade 1 degenerative spondylolisthesis at L4-L5.  Vertebral body height is normal.  No abnormal enhancement patterns. The conus medullaris terminates at the level of L2-L3, low lying.  No abnormal signal within the conus. Intervertebral disc levels are as follows:  T11-T12 disc: Fusion at this level.  Mild, chronic retropulsion that flattens the thecal sac to 14 mm.  No significant foraminal stenosis.  T12-L1 disc: Prior laminectomy and fusion.  The thecal sac measures 19 mm.  No significant foraminal stenosis.  L1-L2 disc : Posterior fusion.  No significant spinal stenosis or foraminal stenosis.  L2-L3 disc: Disc space height loss.  Broad-based posterior disc bulge which encroaches slightly into the floors of the exit foramina.  Moderate buckling of ligamentum flavum.  The thecal sac measures 8-9 mm AP.  Mild bilateral foraminal stenosis.  This has progressed since the prior MRI.  L3-L4 disc: Broad-based posterior disc bulge that encroaches slightly into the floors of the exit foramina.  Moderate buckling of ligamentum flavum.  The thecal sac measures 10 mm AP.  Mild bilateral neural foraminal stenosis, right greater than left.  These findings are similar to prior.  L4-L5 disc: Minimal grade 1 spondylolisthesis with severe degenerative facet change and hypertrophy.  Left-sided facet joint effusion.  Is buckling of ligamentum flavum.  The thecal sac measures 11 mm.  No significant foraminal stenosis.  The spondylolisthesis has slightly progressed measuring 4 mm compared to prior 2 mm.  L5-S1 disc: Severe disc space height loss with  marginal disc and osteophyte encroach into the exit foramina bilaterally.  Moderate degenerative facet hypertrophy.  The thecal sac measures 14 mm.    Impression 1. 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  2. Progression of mild foraminal stenosis and mild spinal stenosis at L2-L3.  3. Minimal progression of grade 1 spondylolisthesis at L4-L5.  4. Unchanged mild, chronic retropulsion from T12 where there has been a laminectomy and fusion.       7/30/2018 XR LUMBAR SPINE COMPLETE 5 VIEW  TECHNIQUE:  AP, lateral, spot and bilateral oblique views of the lumbar spine were performed.  COMPARISON:  07/25/2018  FINDINGS:  There is grade 1 spondylolisthesis of L4 on L5.  Pedicle screws and fixation rods are noted for at the T10, T11, L1 and L2 levels.  Chronic compression deformity at the L1 level unchanged.  Prominent bilateral facet arthropathy noted at the L4-5 and L5-S1 levels.  IVC filter noted.     7/30/2018 XR HIPS BILATERAL 2 VIEW INCL AP PELVIS  TECHNIQUE:  AP view of the pelvis and frogleg lateral views of both hips were performed.  COMPARISON:  07/26/2018  FINDINGS:  The bony pelvis is intact. A right total hip arthroplasty, plate and wires are in place.  No hardware failure or loosening suggested.  The appearance is unchanged when compared to the prior exam.  Mild degenerative changes noted at the left hip.  No acute fracture or dislocation of the left hip.  No significant joint space narrowing identified.  No plain film evidence to suggest AVN of either hip.     Results for orders placed during the hospital encounter of 01/13/14   MRI Lumbar Spine W WO Contrast    Narrative Findings: Pre- and postcontrast multiplanar multisequence imaging of the thoracic and lumbar spine was performed using 7 cc of Gadavist intravenous contrast material.  There is moderately severe chronic central compression deformity of the T12 vertebral body which is stabilized by paraspinous rods and pedicle screws which extend from T10  "through L2.  Postsurgical changes of laminectomy also noted at T12.  The posterior cortex of the T12 vertebral body is displaced posteriorly and indents the ventral thecal sac.  There is no anterior cord contact or significant central canal stenosis.  Degenerative disk desiccation is noted at multiple levels with moderate disk   narrowing at L5-S1.    Also L5-S1 is a broad-based disk protrusion which combined with bilateral facet hypertrophy results in moderately severe narrowing of both neural foramina.  No significant central canal stenosis noted.    From L2-3 through L4-5 there   is mild disk bulging which results in mild bilateral foraminal narrowing.  This is slightly more pronounced at L4-5 with bilateral facet hypertrophy a contributing factor.  No significant central canal stenosis noted.  No thoracic disk extrusion, and foraminal narrowing, canal stenosis is evident.  Thoracolumbar cord is intact.  Paravertebral soft tissues are symmetric and normal in appearance.         Review of Systems:  CONSTITUTIONAL: patient denies any fever, chills, or weight loss  SKIN: patient denies any rash or itching  RESPIRATORY: patient denies having any shortness of breath  GASTROINTESTINAL: patient denies having any diarrhea, constipation, or bowel incontinence  GENITOURINARY: patient denies having any abnormal bladder function    MUSCULOSKELETAL:  - patient complains of the above noted pain/s (see chief pain complaint)    NEUROLOGICAL:   - pain as above  - strength in Lower extremities is intact, BILATERALLY  - sensation in Lower extremities is intact, BILATERALLY  - patient denies any loss of bowel or bladder control      PSYCHIATRIC: patient denies any change in mood    Other:  All other systems reviewed and are negative        Physical Exam:  Vitals:    06/28/23 0911   BP: (!) 157/71   Pulse: (!) 58   Resp: 17   Weight: 75 kg (165 lb 5.5 oz)   Height: 5' 4" (1.626 m)   PainSc:   8   Body mass index is 28.38 kg/m². "   (reviewed on 6/28/2023)    General: alert and oriented, in no apparent distress.  Gait: antalgic gait   Skin: no rashes, no discoloration, no obvious lesions  HEENT: normocephalic, atraumatic.   Respiratory: without use of accessory muscles of respiration.  GI: no obvious distention.    Musculoskeletal - Lumbar Spine:  - Midline scar present over thoracic spine  - Pain on flexion of lumbar spine: Present, worse than with extension  - Pain on extension of lumbar spine: Present  - Lumbar facet loading: Positive bilaterally  - TTP over the lumbar facet joints: Absent  - TTP over GT bursa: Minimal   - Straight Leg Raise: Positive bilaterally, L>R - improved   - Pain on Internal and external rotation of the hip: Present    SIJ testing:  - TTP over the SI joints: Present on left > right  - Srikanth's/ Micheal's: Positive    - Sacroiliac Compression Test (lateral pressure): Positive   - SacralThrust Test (posterior pressure): Positive    Right Shoulder:  - Pain on abduction: Present   - ROM: decreased secondary to pain, very limited ROM      - TTP over the AC and GH joint: Present  - Neer's: Positive  - Hawkin's: Positive    Neuro - Lower Extremities:  - BLE Strength: R/L: HF: 5/5, HE: 5/5, KF: 5/5; KE: 5/5; FE: 5/5; FF: 5/5  - Extremity Reflexes: Brisk and symmetric throughout  - Sensory: Sensation to light touch intact bilaterally      Psych:  Mood and affect is appropriate            Assessment:  Clau Nunn is a 88 y.o. year old female who is presenting with       ICD-10-CM ICD-9-CM    1. Radicular pain  M54.10 729.2 pregabalin (LYRICA) 75 MG capsule      2. Acute exacerbation of chronic low back pain  M54.50 724.2 ketorolac injection 30 mg    G89.29 338.19      338.29       3. History of right hip replacement  Z96.641 V43.64       4. Left hip pain  M25.552 719.45               Plan:  1. Interventional:   - Procedure note: An IM injection of (ketolorac 30mg/1mL) was administered during clinic visit.  This was  well tolerated.   - S/p left SIJ RFA on 6/8/23 with limited pain relief -- reported 3 weeks post  - Consider right SIJ RFA if warranted after.   - Consider repeat Bilateral L5/S1 + S1 TF REJI.Will hold off since radicular pain not an issue at this time.  - S/p bilateral SIJ injection on 1/31/23 with >50% pain relief on left, >70% pain relief on right - short-term pain relief.  - S/p Bilateral L5/S1 + S1 TF REJI on 5/25/22 with 75% pain relief - regarding leg pain, now having localized SIJ.   - S/p Bilateral L5/S1 TF REJI on 2/10/21 with 20% pain relief x 1 week. Pain now more localized on left.   - S/p Right suprascapular nerve block on 9/16/20 with limited pain relief.    2. Pharmacologic:   - Start Lyrica 75mg BID (with titration instructions to take 75mg QHS x 5 days, then increase to BID if tolerated).  Consider further Increase if no improvement after 1-3 weeks.  Patient informed not to stop taking if she does not find significant pain relief.  D/c gabapentin 600mg QHS--to see if Lyrica is more effective and better tolerated during the day.   - Patient encouraged to take Tylenol 500mg x 2 tablets (1000mg) TID more regularly, not to exceed 3000mg per day.   - Anticoagulation use: apixaban (Eliquis) - 2° prevention (h/o DVT) - Heme/Onc.     3. Rehabilitative: Continue use of SIJ belt at home with housework. Physical therapy for shoulder did not help in the past, so doesn't want to attend. SIJ exercises given on AVS previously. Patient is very active.    4.  Diagnostic/ Imaging: No new imaging ordered. Previous imaging reviewed.     5. Consult/ Referral: She has an appointment with Dr. Vazquez (vascular specialist) tomorrow for right dorsal foot pain due to engorgement of vein.    6. Follow up: 1 month - audio call to see if pain improving from RFA/ Lyrica effectiveness.    - This condition does not require this patient to take time off of work, and the primary goal of our Pain Management services is to improve the  patient's functional capacity.   - I discussed the risks, benefits, and alternatives to potential treatment options. All questions and concerns were fully addressed today in clinic.         Angelica Caballero PA-C  Interventional Pain Management - [facility]    Disclaimer:  This note was prepared using voice recognition system and is likely to have sound alike errors that may have been overlooked even after proof reading.  Please call me with any questions.

## 2023-06-28 ENCOUNTER — OFFICE VISIT (OUTPATIENT)
Dept: PODIATRY | Facility: CLINIC | Age: 88
End: 2023-06-28
Payer: MEDICARE

## 2023-06-28 ENCOUNTER — OFFICE VISIT (OUTPATIENT)
Dept: PAIN MEDICINE | Facility: CLINIC | Age: 88
End: 2023-06-28
Payer: MEDICARE

## 2023-06-28 VITALS
HEIGHT: 64 IN | RESPIRATION RATE: 17 BRPM | HEART RATE: 58 BPM | BODY MASS INDEX: 28.24 KG/M2 | SYSTOLIC BLOOD PRESSURE: 157 MMHG | DIASTOLIC BLOOD PRESSURE: 71 MMHG | WEIGHT: 165.38 LBS

## 2023-06-28 DIAGNOSIS — Z79.01 CURRENT USE OF LONG TERM ANTICOAGULATION: Primary | ICD-10-CM

## 2023-06-28 DIAGNOSIS — Z96.641 HISTORY OF RIGHT HIP REPLACEMENT: ICD-10-CM

## 2023-06-28 DIAGNOSIS — L60.3 ONYCHODYSTROPHY: ICD-10-CM

## 2023-06-28 DIAGNOSIS — G89.29 ACUTE EXACERBATION OF CHRONIC LOW BACK PAIN: ICD-10-CM

## 2023-06-28 DIAGNOSIS — I87.2 CHRONIC VENOUS INSUFFICIENCY: ICD-10-CM

## 2023-06-28 DIAGNOSIS — M54.10 RADICULAR PAIN: Primary | ICD-10-CM

## 2023-06-28 DIAGNOSIS — Z86.718 HISTORY OF DVT (DEEP VEIN THROMBOSIS): ICD-10-CM

## 2023-06-28 DIAGNOSIS — M25.552 LEFT HIP PAIN: ICD-10-CM

## 2023-06-28 DIAGNOSIS — M54.50 ACUTE EXACERBATION OF CHRONIC LOW BACK PAIN: ICD-10-CM

## 2023-06-28 PROCEDURE — 99499 UNLISTED E&M SERVICE: CPT | Mod: S$GLB,,, | Performed by: PODIATRIST

## 2023-06-28 PROCEDURE — 99499 NO LOS: ICD-10-PCS | Mod: S$GLB,,, | Performed by: PODIATRIST

## 2023-06-28 PROCEDURE — 1157F PR ADVANCE CARE PLAN OR EQUIV PRESENT IN MEDICAL RECORD: ICD-10-PCS | Mod: CPTII,S$GLB,, | Performed by: PHYSICIAN ASSISTANT

## 2023-06-28 PROCEDURE — 3288F PR FALLS RISK ASSESSMENT DOCUMENTED: ICD-10-PCS | Mod: CPTII,S$GLB,, | Performed by: PHYSICIAN ASSISTANT

## 2023-06-28 PROCEDURE — 1101F PR PT FALLS ASSESS DOC 0-1 FALLS W/OUT INJ PAST YR: ICD-10-PCS | Mod: CPTII,S$GLB,, | Performed by: PHYSICIAN ASSISTANT

## 2023-06-28 PROCEDURE — 1125F PR PAIN SEVERITY QUANTIFIED, PAIN PRESENT: ICD-10-PCS | Mod: CPTII,S$GLB,, | Performed by: PHYSICIAN ASSISTANT

## 2023-06-28 PROCEDURE — 99999 PR PBB SHADOW E&M-EST. PATIENT-LVL IV: CPT | Mod: PBBFAC,,, | Performed by: PODIATRIST

## 2023-06-28 PROCEDURE — 99214 OFFICE O/P EST MOD 30 MIN: CPT | Mod: 25,S$GLB,, | Performed by: PHYSICIAN ASSISTANT

## 2023-06-28 PROCEDURE — 99999 PR PBB SHADOW E&M-EST. PATIENT-LVL V: CPT | Mod: PBBFAC,,, | Performed by: PHYSICIAN ASSISTANT

## 2023-06-28 PROCEDURE — 96372 PR INJECTION,THERAP/PROPH/DIAG2ST, IM OR SUBCUT: ICD-10-PCS | Mod: S$GLB,,, | Performed by: PHYSICIAN ASSISTANT

## 2023-06-28 PROCEDURE — 99999 PR PBB SHADOW E&M-EST. PATIENT-LVL IV: ICD-10-PCS | Mod: PBBFAC,,, | Performed by: PODIATRIST

## 2023-06-28 PROCEDURE — 3288F FALL RISK ASSESSMENT DOCD: CPT | Mod: CPTII,S$GLB,, | Performed by: PHYSICIAN ASSISTANT

## 2023-06-28 PROCEDURE — 1125F AMNT PAIN NOTED PAIN PRSNT: CPT | Mod: CPTII,S$GLB,, | Performed by: PHYSICIAN ASSISTANT

## 2023-06-28 PROCEDURE — 99999 PR PBB SHADOW E&M-EST. PATIENT-LVL V: ICD-10-PCS | Mod: PBBFAC,,, | Performed by: PHYSICIAN ASSISTANT

## 2023-06-28 PROCEDURE — 96372 THER/PROPH/DIAG INJ SC/IM: CPT | Mod: S$GLB,,, | Performed by: PHYSICIAN ASSISTANT

## 2023-06-28 PROCEDURE — 1101F PT FALLS ASSESS-DOCD LE1/YR: CPT | Mod: CPTII,S$GLB,, | Performed by: PHYSICIAN ASSISTANT

## 2023-06-28 PROCEDURE — 1157F ADVNC CARE PLAN IN RCRD: CPT | Mod: CPTII,S$GLB,, | Performed by: PHYSICIAN ASSISTANT

## 2023-06-28 PROCEDURE — 11721 DEBRIDE NAIL 6 OR MORE: CPT | Mod: Q9,S$GLB,, | Performed by: PODIATRIST

## 2023-06-28 PROCEDURE — 11721 PR DEBRIDEMENT OF NAILS, 6 OR MORE: ICD-10-PCS | Mod: Q9,S$GLB,, | Performed by: PODIATRIST

## 2023-06-28 PROCEDURE — 99214 PR OFFICE/OUTPT VISIT, EST, LEVL IV, 30-39 MIN: ICD-10-PCS | Mod: 25,S$GLB,, | Performed by: PHYSICIAN ASSISTANT

## 2023-06-28 RX ORDER — ACETAMINOPHEN AND CODEINE PHOSPHATE 300; 30 MG/1; MG/1
1 TABLET ORAL EVERY 6 HOURS PRN
COMMUNITY
Start: 2023-06-27 | End: 2023-11-09

## 2023-06-28 RX ORDER — PREGABALIN 75 MG/1
CAPSULE ORAL
Qty: 120 CAPSULE | Refills: 0 | Status: SHIPPED | OUTPATIENT
Start: 2023-06-28 | End: 2023-12-13 | Stop reason: SDUPTHER

## 2023-06-28 RX ORDER — KETOROLAC TROMETHAMINE 30 MG/ML
30 INJECTION, SOLUTION INTRAMUSCULAR; INTRAVENOUS
Status: COMPLETED | OUTPATIENT
Start: 2023-06-28 | End: 2023-06-28

## 2023-06-28 RX ORDER — AMOXICILLIN 875 MG/1
875 TABLET, FILM COATED ORAL 2 TIMES DAILY
COMMUNITY
Start: 2023-06-26 | End: 2023-11-09

## 2023-06-28 RX ADMIN — KETOROLAC TROMETHAMINE 30 MG: 30 INJECTION, SOLUTION INTRAMUSCULAR; INTRAVENOUS at 09:06

## 2023-06-28 NOTE — PROGRESS NOTES
Subjective:       Patient ID: Clau Nunn is a 88 y.o. female.    Chief Complaint: Routine Foot Care (Patient is non diabetic and not taking LTAT. She denies pain at present and is wearing strap sandal.s )      HPI: Patient presents to the office today with the chief complaint of elongated, thickened and dystrophic nail plates to the B/L foot.  Patient does have a past medical history long-term anticoagulation use secondary to history of DVT in chronic venous insufficiency.. Patient does follow with Primary Care for management of comorbid states. This patient's PMD is Jeanette Gomez MD.   Continues to follow with Dr. Vazquez with vascular 05/03/2023    Review of patient's allergies indicates:   Allergen Reactions    Cephalexin Hives, Itching, Swelling, Anxiety, Dermatitis and Rash       Past Medical History:   Diagnosis Date    Allergy     Anxiety     Asthma     Back pain     Chronic venous insufficiency 11/19/2013    Depression     Diverticulosis 11/19/2013 1/8/8 colonoscopy    Dry eyes     Fracture, sacrum/coccyx     Dr. Sanchez     GERD (gastroesophageal reflux disease)     H/O total hip arthroplasty 12/30/2015    Hypertension     Hypothyroid     Osteoarthritis     Osteoporosis     Postlaminectomy syndrome of lumbar region 1/8/2014    Radius fracture     7/18    Simple chronic bronchitis 5/3/2017    Umbilical hernia 11/19/2013    Urinary incontinence     Vitamin D deficiency disease        Family History   Problem Relation Age of Onset    COPD Mother     Cancer Mother         lung CA    Pulmonary fibrosis Sister     Cancer Daughter         colon    Stroke Father     Diabetes Son     Melanoma Neg Hx     Psoriasis Neg Hx     Lupus Neg Hx        Social History     Socioeconomic History    Marital status:    Occupational History    Occupation: retired     Comment: [employer]   Tobacco Use    Smoking status: Never    Smokeless tobacco: Never   Substance and Sexual Activity    Alcohol  use: Yes     Alcohol/week: 0.0 standard drinks     Comment: rarely    Drug use: Never    Sexual activity: Never     Partners: Male     Birth control/protection: Post-menopausal   Social History Narrative    Patient is retired from [employer]     Social Determinants of Health     Financial Resource Strain: Low Risk     Difficulty of Paying Living Expenses: Not hard at all   Food Insecurity: No Food Insecurity    Worried About Running Out of Food in the Last Year: Never true    Ran Out of Food in the Last Year: Never true   Transportation Needs: No Transportation Needs    Lack of Transportation (Medical): No    Lack of Transportation (Non-Medical): No   Physical Activity: Inactive    Days of Exercise per Week: 0 days    Minutes of Exercise per Session: 0 min   Stress: No Stress Concern Present    Feeling of Stress : Only a little   Social Connections: Moderately Integrated    Frequency of Communication with Friends and Family: More than three times a week    Frequency of Social Gatherings with Friends and Family: More than three times a week    Attends Sabianist Services: More than 4 times per year    Active Member of Clubs or Organizations: No    Attends Club or Organization Meetings: More than 4 times per year    Marital Status:    Housing Stability: Unknown    Unable to Pay for Housing in the Last Year: No    Unstable Housing in the Last Year: No       Past Surgical History:   Procedure Laterality Date    ADENOIDECTOMY      BACK SURGERY      x2    breast implants  1973    CATARACT EXTRACTION Bilateral     CLOSED REDUCTION DISTAL RADIUS FRACTURE      Dr. Black, 8/18    cystoscope  1/6/16    DR. Brown    FRACTURE SURGERY  April 3 2015    left wrist    HAND SURGERY      HIP SURGERY Right     total hip- Dr. Dos Santos    HYSTERECTOMY      36y/o    INJECTION OF ANESTHETIC AGENT AROUND NERVE Right 9/16/2020    Procedure: Right suprascapular nerve block;  Surgeon: Raffi Wells MD;  Location: [facility];  Service: Pain Management;  Laterality: Right;    INJECTION OF ANESTHETIC AGENT AROUND NERVE Right 7/13/2021    Procedure: Block, Nerve Right Suprascapular Nerve Block with RN IV sedation;  Surgeon: Raffi Wells MD;  Location: [facility];  Service: Pain Management;  Laterality: Right;    INJECTION OF ANESTHETIC AGENT INTO SACROILIAC JOINT N/A 8/12/2020    Procedure: Left IA hip Joint + Left SIJ + Right shoulder injection;  Surgeon: Raffi Wells MD;  Location: [facility];  Service: Pain Management;  Laterality: N/A;    INJECTION OF ANESTHETIC AGENT INTO SACROILIAC JOINT Bilateral 1/31/2023    Procedure: Bilateral SIJ Injection w/Local;  Surgeon: Abdirahman Parker MD;  Location: [facility];  Service: Pain Management;  Laterality: Bilateral;    INJECTION OF JOINT N/A 8/12/2020    Procedure: Left IA hip Joint + Left SIJ + Right shoulder injection;  Surgeon: Raffi Wells MD;  Location: [facility];  Service: Pain Management;  Laterality: N/A;    JOINT REPLACEMENT Right     R NNAMDI - Dr. Dos Santos    LEG SURGERY Right     ex-fix tib/fib    RADIOFREQUENCY THERMOCOAGULATION Left 6/8/2023    Procedure: Left SIJ RFA w/ local (give Valium before);  Surgeon: Abdirahman Parker MD;  Location: [facility];  Service: Pain Management;  Laterality: Left;    SELECTIVE INJECTION OF ANESTHETIC AGENT AROUND LUMBAR SPINAL NERVE ROOT BY TRANSFORAMINAL APPROACH Bilateral 2/10/2021    Procedure: Bilateral L5/S1 TF REJI;  Surgeon: Raffi Wells MD;  Location: [facility];  Service: Pain Management;  Laterality: Bilateral;    SELECTIVE INJECTION OF ANESTHETIC AGENT AROUND LUMBAR SPINAL NERVE ROOT BY TRANSFORAMINAL APPROACH Bilateral 5/25/2022    Procedure: Bilateral L5/S1 + S1 TF REJI;  Surgeon: Raffi Wells MD;  Location: [facility];  Service: Pain Management;  Laterality: Bilateral;    TONSILLECTOMY      TRANSFORAMINAL EPIDURAL INJECTION OF STEROID Left 7/8/2020    Procedure: left L2/3 + L5/S1 TF REJI;  Surgeon: Raffi Wells MD;   Location: [facility];  Service: Pain Management;  Laterality: Left;    TRANSFORAMINAL EPIDURAL INJECTION OF STEROID Left 7/1/2021    Procedure: left L5/S1 + S1 TF REJI;  Surgeon: Raffi Wells MD;  Location: [facility];  Service: Pain Management;  Laterality: Left;       Review of Systems       Objective:   There were no vitals taken for this visit.    Physical Exam  LOWER EXTREMITY PHYSICAL EXAMINATION    VASCULAR:  The right dorsalis pedis pulse 2/4 and the right posterior tibial pulse 1/4.  The left dorsalis pedis pulse 2/4 and posterior tibial pulse on the left is 1/4.  Capillary refill is intact.  Pedal hair growth decreased.  Varicosities and telangiectasias noted to the bilateral lower extremities.      NEUROLOGY:  Sharp/dull, light touch, proprioception all intact and equal bilaterally.  Vibratory sensation is intact.  Protective sensation intact    DERMATOLOGY:  Skin is supple, moist, intact.  There is no signs of callusing, ulcerations, other lesions identified to the dorsal or plantar aspect of the right or left foot.  The R1, 2, 5 and left L1,2, 5 are thickened, discolored dystrophic.  There is subungual debris.  Nail plates have area of dark discoloration.  The remaining nails 3-4 on the right foot and the left foot are elongated but of normal color, thickness, and texture.   There is no signs of ingrowing into the medial or lateral borders.  There is no evidence of wounds or skin breakdown.  No edema or erythema.  No obvious lacerations or fissuring.  Interdigital spaces are clean, dry, intact.  No rashes or scars appreciated.    ORTHOPEDIC: Manual Muscle Testing is 5/5 in all planes on the left and right, without pains, with and without resistance. Gait pattern is non-antalgic.    Assessment:     1. Current use of long term anticoagulation    2. History of DVT (deep vein thrombosis)    3. Chronic venous insufficiency    4. Onychodystrophy        Plan:     Current use of long term  anticoagulation    History of DVT (deep vein thrombosis)    Chronic venous insufficiency    Onychodystrophy        Foot counseling and education is provided at this visit. Patient is advised to wear socks and shoes at all times.  Do not walk barefoot, or with just socks, even when indoors.  Be sure to check and inspect the inside of the shoe before putting them on her feet.  Protect your feet at all times.  Walking shoes and/or athletic shoes are the best types of shoe gear. Do not wear vinyl or plastic type shoe gear, as they do not stretch and/or breathe.  Protect your feet from hot and/or cold. Elevate the extremities when sitting.  Do not wear excessively tight socks and/or shoe gear. Wiggle your toes for a few minutes throughout the day. Move your ankles up and down, in and out, to help blood flow in your lower extremity.    Dystrophic nail plates, as outlined above (R#1,2,5  ; L#1,2,5 ), are sharply debrided with double action nail nipper, and/or with the assistance of a mechanical rotary gamal, with removal of all offending nail and nail border(s), for reduction of pains. Nails are reduced in terms of length, width and girth with removal of subungual debris to facilitate pain free weight bearing and ambulation. The elongated nails as outlined in the objective portion of this note, were trimmed to appropriate length, with a double action nail nipper, for alleviation/reduction of pains as well. Follow up in approx. 3-4 months.          Future Appointments   Date Time Provider Department Center   8/10/2023 10:00 AM LAB[facility]ATORYNE[facility]   8/11/2023 10:40 AM [facility]   8/21/2023  1:30 PM Venus Bowman PA-C [facility]   8/21/2023  2:15 PM INJECTION 1, [facility]   10/11/2023 10:15 AM Iliana Amaro DPM [facility] POD [facility]   10/24/2023  8:30 AM Kd Rubalcava MD [facility] OPHTHAL [facility]   10/30/2023 10:45  AM Kymberly Kim MD [facility] UROLOGY [facility]

## 2023-06-29 NOTE — TELEPHONE ENCOUNTER
----- Message from Carly Joseph, Bia sent at 8/29/2018  2:57 PM CDT -----  Regarding: Forteo  Good Afternoon,    I'm working on Ms. Nunn's Forteo.  I just wanted to verify with her history of bone cancer - Paget's disease that Dr. Mora was ok with the risk. I do see documentation where the info was sent to the patient, but just wanted to double check for this osteosarcoma risk.      Thank You,    Carly Joseph, PharmD  Specialty Pharmacy Clinical Pharmacist  [facility]  P: [phone]     Unknown

## 2023-07-07 ENCOUNTER — OFFICE VISIT (OUTPATIENT)
Dept: PRIMARY CARE CLINIC | Facility: CLINIC | Age: 88
End: 2023-07-07
Payer: MEDICARE

## 2023-07-07 VITALS
HEIGHT: 64 IN | DIASTOLIC BLOOD PRESSURE: 64 MMHG | TEMPERATURE: 98 F | WEIGHT: 166 LBS | SYSTOLIC BLOOD PRESSURE: 116 MMHG | OXYGEN SATURATION: 98 % | BODY MASS INDEX: 28.34 KG/M2 | HEART RATE: 74 BPM

## 2023-07-07 DIAGNOSIS — Z79.01 CHRONIC ANTICOAGULATION: ICD-10-CM

## 2023-07-07 DIAGNOSIS — K21.9 GASTROESOPHAGEAL REFLUX DISEASE WITHOUT ESOPHAGITIS: ICD-10-CM

## 2023-07-07 DIAGNOSIS — R26.89 BALANCE PROBLEM: ICD-10-CM

## 2023-07-07 DIAGNOSIS — M54.16 LUMBAR RADICULOPATHY: ICD-10-CM

## 2023-07-07 DIAGNOSIS — Z86.718 HISTORY OF DVT IN ADULTHOOD: ICD-10-CM

## 2023-07-07 DIAGNOSIS — R10.2 PELVIC PRESSURE IN FEMALE: ICD-10-CM

## 2023-07-07 DIAGNOSIS — J41.0 SIMPLE CHRONIC BRONCHITIS: ICD-10-CM

## 2023-07-07 DIAGNOSIS — F41.9 ANXIETY: ICD-10-CM

## 2023-07-07 DIAGNOSIS — N18.32 STAGE 3B CHRONIC KIDNEY DISEASE: ICD-10-CM

## 2023-07-07 DIAGNOSIS — M80.00XG AGE-RELATED OSTEOPOROSIS WITH CURRENT PATHOLOGICAL FRACTURE WITH DELAYED HEALING: ICD-10-CM

## 2023-07-07 DIAGNOSIS — F33.42 RECURRENT MAJOR DEPRESSIVE DISORDER, IN FULL REMISSION: ICD-10-CM

## 2023-07-07 DIAGNOSIS — I70.0 ATHEROSCLEROSIS OF AORTA: ICD-10-CM

## 2023-07-07 DIAGNOSIS — I10 ESSENTIAL HYPERTENSION: Primary | Chronic | ICD-10-CM

## 2023-07-07 DIAGNOSIS — E03.9 ACQUIRED HYPOTHYROIDISM: ICD-10-CM

## 2023-07-07 DIAGNOSIS — I82.411 DVT OF DEEP FEMORAL VEIN, RIGHT: ICD-10-CM

## 2023-07-07 PROCEDURE — 87086 URINE CULTURE/COLONY COUNT: CPT | Performed by: INTERNAL MEDICINE

## 2023-07-07 PROCEDURE — 3288F FALL RISK ASSESSMENT DOCD: CPT | Mod: CPTII,S$GLB,, | Performed by: INTERNAL MEDICINE

## 2023-07-07 PROCEDURE — 80053 COMPREHEN METABOLIC PANEL: CPT | Performed by: INTERNAL MEDICINE

## 2023-07-07 PROCEDURE — 1101F PR PT FALLS ASSESS DOC 0-1 FALLS W/OUT INJ PAST YR: ICD-10-PCS | Mod: CPTII,S$GLB,, | Performed by: INTERNAL MEDICINE

## 2023-07-07 PROCEDURE — 99214 OFFICE O/P EST MOD 30 MIN: CPT | Mod: S$GLB,,, | Performed by: INTERNAL MEDICINE

## 2023-07-07 PROCEDURE — 87186 SC STD MICRODIL/AGAR DIL: CPT | Performed by: INTERNAL MEDICINE

## 2023-07-07 PROCEDURE — 99214 PR OFFICE/OUTPT VISIT, EST, LEVL IV, 30-39 MIN: ICD-10-PCS | Mod: S$GLB,,, | Performed by: INTERNAL MEDICINE

## 2023-07-07 PROCEDURE — 99999 PR PBB SHADOW E&M-EST. PATIENT-LVL III: CPT | Mod: PBBFAC,,, | Performed by: INTERNAL MEDICINE

## 2023-07-07 PROCEDURE — 1101F PT FALLS ASSESS-DOCD LE1/YR: CPT | Mod: CPTII,S$GLB,, | Performed by: INTERNAL MEDICINE

## 2023-07-07 PROCEDURE — 1157F ADVNC CARE PLAN IN RCRD: CPT | Mod: CPTII,S$GLB,, | Performed by: INTERNAL MEDICINE

## 2023-07-07 PROCEDURE — 85025 COMPLETE CBC W/AUTO DIFF WBC: CPT | Performed by: INTERNAL MEDICINE

## 2023-07-07 PROCEDURE — 87088 URINE BACTERIA CULTURE: CPT | Performed by: INTERNAL MEDICINE

## 2023-07-07 PROCEDURE — 1157F PR ADVANCE CARE PLAN OR EQUIV PRESENT IN MEDICAL RECORD: ICD-10-PCS | Mod: CPTII,S$GLB,, | Performed by: INTERNAL MEDICINE

## 2023-07-07 PROCEDURE — 87077 CULTURE AEROBIC IDENTIFY: CPT | Performed by: INTERNAL MEDICINE

## 2023-07-07 PROCEDURE — 81001 URINALYSIS AUTO W/SCOPE: CPT | Performed by: INTERNAL MEDICINE

## 2023-07-07 PROCEDURE — 84443 ASSAY THYROID STIM HORMONE: CPT | Performed by: INTERNAL MEDICINE

## 2023-07-07 PROCEDURE — 3288F PR FALLS RISK ASSESSMENT DOCUMENTED: ICD-10-PCS | Mod: CPTII,S$GLB,, | Performed by: INTERNAL MEDICINE

## 2023-07-07 PROCEDURE — 99999 PR PBB SHADOW E&M-EST. PATIENT-LVL III: ICD-10-PCS | Mod: PBBFAC,,, | Performed by: INTERNAL MEDICINE

## 2023-07-07 PROCEDURE — 80061 LIPID PANEL: CPT | Performed by: INTERNAL MEDICINE

## 2023-07-07 RX ORDER — CIPROFLOXACIN 250 MG/1
250 TABLET, FILM COATED ORAL 2 TIMES DAILY
Qty: 14 TABLET | Refills: 0 | Status: SHIPPED | OUTPATIENT
Start: 2023-07-07 | End: 2023-07-14

## 2023-07-07 RX ORDER — DULOXETIN HYDROCHLORIDE 30 MG/1
30 CAPSULE, DELAYED RELEASE ORAL DAILY
Qty: 90 CAPSULE | Refills: 3 | Status: SHIPPED | OUTPATIENT
Start: 2023-07-07 | End: 2023-10-12 | Stop reason: SDUPTHER

## 2023-07-07 NOTE — PROGRESS NOTES
"Subjective:      Patient ID: Clau Nunn is a 88 y.o. female.    Chief Complaint: Annual Exam (Pt is here for annual exam. )      HPI  Here for follow up of medical problems.  Chronic pain, not very active.  Urine pressure for 2 days.  No f/c/sw/cough.  No exertional cp/sob/palp.  BMs normal.   No energy.  Chronic pain.  Frustrated.  Can't do much activities due to stopping for back pain.  Using nebulizer about 2x a week.  Breathing well most of time.    Updated/ annual due 10/23:  HM: 10/22 fluvax, 1/21 covid vaccine/booster, 11/19 HAV, 5/16 [id number], 5/17 booster [id number], 12/22 [id number], 2/22 TDaP, 11/09 zoster, 7/22 Shingrix #2, 9/22 BMD on prolia, 1/14 Cscope no more needed, 12/22 MMG, 10/22 Eye Dr. Rubalcava.     Review of Systems   Constitutional:  Negative for chills, diaphoresis and fever.   Respiratory:  Negative for cough and shortness of breath.    Cardiovascular:  Negative for chest pain, palpitations and leg swelling.   Gastrointestinal:  Negative for blood in stool, constipation, diarrhea, nausea and vomiting.   Genitourinary:  Negative for dysuria, frequency and hematuria.   Psychiatric/Behavioral:  The patient is not nervous/anxious.        Objective:   /64 (BP Location: Right arm)   Pulse 74   Temp 97.6 °F (36.4 °C) (Oral)   Ht 5' 4" (1.626 m)   Wt 75.3 kg (166 lb)   SpO2 98%   BMI 28.49 kg/m²     Physical Exam  Constitutional:       Appearance: She is well-developed.   Neck:      Thyroid: No thyroid mass.      Vascular: No carotid bruit.   Cardiovascular:      Rate and Rhythm: Normal rate and regular rhythm.      Heart sounds: No murmur heard.    No friction rub. No gallop.   Pulmonary:      Effort: Pulmonary effort is normal.      Breath sounds: Normal breath sounds. No wheezing or rales.   Abdominal:      General: Bowel sounds are normal.      Palpations: Abdomen is soft. There is no mass.      Tenderness: There is no abdominal tenderness.   Musculoskeletal:      Cervical back: " Neck supple.   Lymphadenopathy:      Cervical: No cervical adenopathy.   Neurological:      Mental Status: She is alert and oriented to person, place, and time.           Assessment:       1. Essential hypertension    2. Acquired hypothyroidism    3. Age-related osteoporosis with current pathological fracture with delayed healing    4. Anxiety    5. Recurrent major depressive disorder, in full remission    6. Simple chronic bronchitis    7. Stage 3b chronic kidney disease    8. History of DVT in adulthood    9. Chronic anticoagulation    10. Lumbar radiculopathy    11. Atherosclerosis of aorta    12. Pelvic pressure in female    13. Gastroesophageal reflux disease without esophagitis    14. Balance problem    15. DVT of deep femoral vein, right          Plan:     Essential hypertension- stable, cont rx.  -     CBC Auto Differential; Future; Expected date: 07/07/2023  -     Comprehensive Metabolic Panel; Future; Expected date: 07/07/2023  -     Lipid Panel; Future; Expected date: 07/07/2023  -     TSH; Future; Expected date: 07/07/2023    Acquired hypothyroidism- check lab.  -     TSH; Future; Expected date: 07/07/2023    Age-related osteoporosis with current pathological fracture with delayed healing    Anxiety, Recurrent major depressive disorder, change ssri to snri.  -     DULoxetine (CYMBALTA) 30 MG capsule; Take 1 capsule (30 mg total) by mouth once daily.  Dispense: 90 capsule; Refill: 3    Simple chronic bronchitis- cont neb prn.    Stage 3b chronic kidney disease- recheck now.    History of DVT in adulthood, Atherosclerosis of aorta, Chronic anticoagulation    Lumbar radiculopathy- add xnri.  -     DULoxetine (CYMBALTA) 30 MG capsule; Take 1 capsule (30 mg total) by mouth once daily.  Dispense: 90 capsule; Refill: 3    Pelvic pressure in female  -     Urinalysis, Reflex to Urine Culture Urine, Clean Catch; Future; Expected date: 07/07/2023  -     ciprofloxacin HCl (CIPRO) 250 MG tablet; Take 1 tablet (250 mg  total) by mouth 2 (two) times daily. for 7 days  Dispense: 14 tablet; Refill: 0    Gastroesophageal reflux disease without esophagitis    Balance problem  -     Ambulatory referral/consult to Physical/Occupational Therapy; Future; Expected date: 07/14/2023    DVT of deep femoral vein, right  -     apixaban (ELIQUIS) 5 mg Tab; Take 1 tablet (5 mg total) by mouth 2 (two) times daily.  Dispense: 180 tablet; Refill: 3    RTC 6wk, f/u depression and pain on snri.

## 2023-07-08 LAB
ALBUMIN SERPL BCP-MCNC: 3.8 G/DL (ref 3.5–5.2)
ALP SERPL-CCNC: 57 U/L (ref 55–135)
ALT SERPL W/O P-5'-P-CCNC: 12 U/L (ref 10–44)
ANION GAP SERPL CALC-SCNC: 11 MMOL/L (ref 8–16)
AST SERPL-CCNC: 23 U/L (ref 10–40)
BACTERIA #/AREA URNS AUTO: ABNORMAL /HPF
BASOPHILS # BLD AUTO: 0.07 K/UL (ref 0–0.2)
BASOPHILS NFR BLD: 0.6 % (ref 0–1.9)
BILIRUB SERPL-MCNC: 0.5 MG/DL (ref 0.1–1)
BILIRUB UR QL STRIP: NEGATIVE
BUN SERPL-MCNC: 21 MG/DL (ref 8–23)
CALCIUM SERPL-MCNC: 10 MG/DL (ref 8.7–10.5)
CHLORIDE SERPL-SCNC: 105 MMOL/L (ref 95–110)
CHOLEST SERPL-MCNC: 194 MG/DL (ref 120–199)
CHOLEST/HDLC SERPL: 4.1 {RATIO} (ref 2–5)
CLARITY UR REFRACT.AUTO: ABNORMAL
CO2 SERPL-SCNC: 24 MMOL/L (ref 23–29)
COLOR UR AUTO: YELLOW
CREAT SERPL-MCNC: 1.3 MG/DL (ref 0.5–1.4)
DIFFERENTIAL METHOD: ABNORMAL
EOSINOPHIL # BLD AUTO: 1 K/UL (ref 0–0.5)
EOSINOPHIL NFR BLD: 7.9 % (ref 0–8)
ERYTHROCYTE [DISTWIDTH] IN BLOOD BY AUTOMATED COUNT: 12.7 % (ref 11.5–14.5)
EST. GFR  (NO RACE VARIABLE): 39.8 ML/MIN/1.73 M^2
GLUCOSE SERPL-MCNC: 95 MG/DL (ref 70–110)
GLUCOSE UR QL STRIP: NEGATIVE
HCT VFR BLD AUTO: 45.7 % (ref 37–48.5)
HDLC SERPL-MCNC: 47 MG/DL (ref 40–75)
HDLC SERPL: 24.2 % (ref 20–50)
HGB BLD-MCNC: 14.2 G/DL (ref 12–16)
HGB UR QL STRIP: ABNORMAL
HYALINE CASTS UR QL AUTO: 0 /LPF
IMM GRANULOCYTES # BLD AUTO: 0.05 K/UL (ref 0–0.04)
IMM GRANULOCYTES NFR BLD AUTO: 0.4 % (ref 0–0.5)
KETONES UR QL STRIP: NEGATIVE
LDLC SERPL CALC-MCNC: 117.2 MG/DL (ref 63–159)
LEUKOCYTE ESTERASE UR QL STRIP: ABNORMAL
LYMPHOCYTES # BLD AUTO: 2.1 K/UL (ref 1–4.8)
LYMPHOCYTES NFR BLD: 17 % (ref 18–48)
MCH RBC QN AUTO: 31.8 PG (ref 27–31)
MCHC RBC AUTO-ENTMCNC: 31.1 G/DL (ref 32–36)
MCV RBC AUTO: 102 FL (ref 82–98)
MICROSCOPIC COMMENT: ABNORMAL
MONOCYTES # BLD AUTO: 0.6 K/UL (ref 0.3–1)
MONOCYTES NFR BLD: 5 % (ref 4–15)
NEUTROPHILS # BLD AUTO: 8.4 K/UL (ref 1.8–7.7)
NEUTROPHILS NFR BLD: 69.1 % (ref 38–73)
NITRITE UR QL STRIP: NEGATIVE
NONHDLC SERPL-MCNC: 147 MG/DL
NRBC BLD-RTO: 0 /100 WBC
PH UR STRIP: 5 [PH] (ref 5–8)
PLATELET # BLD AUTO: 283 K/UL (ref 150–450)
PMV BLD AUTO: 12.1 FL (ref 9.2–12.9)
POTASSIUM SERPL-SCNC: 4.9 MMOL/L (ref 3.5–5.1)
PROT SERPL-MCNC: 7.2 G/DL (ref 6–8.4)
PROT UR QL STRIP: ABNORMAL
RBC # BLD AUTO: 4.47 M/UL (ref 4–5.4)
RBC #/AREA URNS AUTO: >100 /HPF (ref 0–4)
SODIUM SERPL-SCNC: 140 MMOL/L (ref 136–145)
SP GR UR STRIP: 1.02 (ref 1–1.03)
TRIGL SERPL-MCNC: 149 MG/DL (ref 30–150)
TSH SERPL DL<=0.005 MIU/L-ACNC: 1.59 UIU/ML (ref 0.4–4)
URN SPEC COLLECT METH UR: ABNORMAL
WBC # BLD AUTO: 12.12 K/UL (ref 3.9–12.7)
WBC #/AREA URNS AUTO: >100 /HPF (ref 0–5)
WBC CLUMPS UR QL AUTO: ABNORMAL

## 2023-07-10 LAB — BACTERIA UR CULT: ABNORMAL

## 2023-07-17 ENCOUNTER — CLINICAL SUPPORT (OUTPATIENT)
Dept: REHABILITATION | Facility: HOSPITAL | Age: 88
End: 2023-07-17
Payer: MEDICARE

## 2023-07-17 DIAGNOSIS — Z74.09 IMPAIRED FUNCTIONAL MOBILITY, BALANCE, GAIT, AND ENDURANCE: ICD-10-CM

## 2023-07-17 DIAGNOSIS — R26.89 BALANCE PROBLEM: ICD-10-CM

## 2023-07-17 PROCEDURE — 97162 PT EVAL MOD COMPLEX 30 MIN: CPT | Mod: PN

## 2023-07-17 PROCEDURE — 97112 NEUROMUSCULAR REEDUCATION: CPT | Mod: PN

## 2023-07-17 NOTE — PATIENT INSTRUCTIONS
HOME EXERCISE PROGRAM  Created by Katie Hurd PT, DPT  Mar 9th, 2023 View videos at [email or web address]     Total 5      BALANCE - SINGLE LEG    Start by standing with your back to a corner and a chair in front of you for a handhold. Lift up one foot and balance on the other foot. Stand upright, keep your hips level to the ground, and keep your gaze ahead. Hold this position for 10-20 seconds then relax. Switch feet and repeat. Also remember to not let your hip drop during this balancing exercise. Repeat 2 Times   Hold 10 Seconds   Complete 1 Set   Perform 1 Times a Day            BALANCE - TANDEM    Start by standing with your back to a corner and a chair in front of you for a handhold. Line up your feet in a heel-toe position. Stand upright, keeping your gaze ahead. Hold this position for 30 seconds then relax. Switch feet and repeat. Repeat 1 Time   Hold 30 Seconds   Complete 1 Set   Perform 1 Times a Day            BALANCE - ROMBERG    Start by standing with your back to a corner and a chair in front of you for a handhold. Bring your feet together and stand upright, keeping your gaze ahead. Hold this position for 1 minute then relax.    Progress with:  - Head turns/nods 5x's each  - Arm lifts/reaches 5x's each  - Stand on foam cushion or folded towel Repeat 1 Time   Hold 1 Minute   Complete 1 Set   Perform 1 Times a Day            LATERAL STEPS WITH SUPPORT - TABLE - COUNTERTOP     front of a sturdy table or countertop. Take side steps and use the table or countertop for support. Walk about 8 - 10 steps in each direction.  - Repeat 3x's in each direction  - Can progress with head turns and arm reach     Complete 3 Sets   Perform 1 Times a Day            Standing Backward Pivot Step  - Stand facing countertop with both hands on counter for support.  -Then slowly take a step backward with Right foot while rotating your body to the right  - Then step back facing the counter.    - After 8 repetitions,  repeat with L side. Repeat 8 Times   Complete 1 Set   Perform 1 Times a Day

## 2023-07-18 NOTE — PLAN OF CARE
[facility]   Physical Therapy Initial Evaluation        Date: 7/17/2023   Name: Clau Nunn  Clinic Number: [id number]    Therapy Diagnosis:   Encounter Diagnosis   Name Primary?    Balance problem      Physician: Jeanette Molina MD    Physician Orders: PT Eval and Treat   Medical Diagnosis from Referral: R26.89 (ICD-10-CM) - Balance problem  Evaluation Date: 7/17/2023  Authorization Period Expiration: 7/6/24  Plan of Care Expiration: 9/923  Progress Note Due: 8/17/23  Visit # / Visits authorized: 1/ 1   FOTO: 1/3    Precautions: Standard and Fall     Time In: 1233  Time Out: 1320  Total Appointment Time (timed & untimed codes): 47 minutes      SUBJECTIVE     Date of onset: She has noticed a decline in function and balance over the past 6 months to 1 year.    History of current condition - Clau reports: That she has had ongoing SI/hip pain for the past year. She also reports that she has trouble moving around and staying active due to the pain. She states that within the past 4 months she has felt less sure about her balance. She states that she has to be careful when ambulating outside.     Current Activity Level: Independent with no set exercise routine.     Falls: a near fall several months ago    Imaging: No recent significant imaging    Prior Therapy: yes  Social History: two story house that she does not use the second floor, and lives alone.  Occupation: retired  Prior Level of Function: independent  Current Level of Function: independent with decreased balance and avoidance of unstable surfaces.     Pain: chronic pain in lower back, and knees.   Current 0/10, worst 10/10, best 0/10   Location: left SI joint, bilateral back and knees.   Description: Dull and Throbbing  Aggravating Factors: end of range movement.   Easing Factors: rest    Patients goals: to be able to get back to independence with household chores and walk on the grass.      Medical History:   Past  Medical History:   Diagnosis Date    Allergy     Anxiety     Asthma     Back pain     Chronic venous insufficiency 11/19/2013    Depression     Diverticulosis 11/19/2013 1/8/8 colonoscopy    Dry eyes     Fracture, sacrum/coccyx     Dr. Sanchez     GERD (gastroesophageal reflux disease)     H/O total hip arthroplasty 12/30/2015    Hypertension     Hypothyroid     Osteoarthritis     Osteoporosis     Postlaminectomy syndrome of lumbar region 1/8/2014    Radius fracture     7/18    Simple chronic bronchitis 5/3/2017    Umbilical hernia 11/19/2013    Urinary incontinence     Vitamin D deficiency disease        Surgical History:   Clau Nunn  has a past surgical history that includes Tonsillectomy; Adenoidectomy; Back surgery; Leg Surgery (Right); Hip surgery (Right); breast implants (1973); Hysterectomy; Fracture surgery (April 3 2015); Hand surgery; cystoscope (1/6/16); Cataract extraction (Bilateral); Joint replacement (Right); Closed reduction distal radius fracture; Transforaminal epidural injection of steroid (Left, 7/8/2020); Injection of joint (N/A, 8/12/2020); Injection of anesthetic agent into sacroiliac joint (N/A, 8/12/2020); Injection of anesthetic agent around nerve (Right, 9/16/2020); Selective injection of anesthetic agent around lumbar spinal nerve root by transforaminal approach (Bilateral, 2/10/2021); Transforaminal epidural injection of steroid (Left, 7/1/2021); Injection of anesthetic agent around nerve (Right, 7/13/2021); Selective injection of anesthetic agent around lumbar spinal nerve root by transforaminal approach (Bilateral, 5/25/2022); Injection of anesthetic agent into sacroiliac joint (Bilateral, 1/31/2023); and Radiofrequency thermocoagulation (Left, 6/8/2023).    Medications:   Clau has a current medication list which includes the following prescription(s): acetaminophen-codeine 300-30mg, albuterol, amoxicillin, apixaban, azelastine, buspirone, cholecalciferol (vitamin d3),  clobetasol, cyclobenzaprine, restasis multidose, duloxetine, fluocinonide 0.05%, fluticasone propionate, levothyroxine, myrbetriq, mupirocin, omeprazole, pregabalin, solifenacin, trazodone, triamcinolone acetonide 0.025%, and valsartan, and the following Facility-Administered Medications: denosumab and ondansetron.    Allergies:   Review of patient's allergies indicates:   Allergen Reactions    Cephalexin Hives, Itching, Swelling, Anxiety, Dermatitis and Rash          OBJECTIVE       Posture:  Pt presents with postural abnormalities which include:    [] Forward Head   [] Increased Lumbar Lordosis   [] Rounded Shoulder   [] Genu Recurvatum   [] Increased Thoracic Kyphosis [] Genu Valgus   [] Trunk Deviated    [] Pes Planus   [] Scapular Winging    [x] Other: Within Functional Limits     Strength:    L/E MMT Right  (spine) Left Pain/Dysfunction with Movement   Hip Flexion  4/5 3+/5 Pain reported    Hip Extension  3+/5 3+/5    Hip Abduction  3+/5 3+/5    Knee Extension 5/5 5/5    Knee Flexion 4+/5 4/5    Ankle DF 5/5 4+/5    Ankle PF 4+/5 4+/5         Sensation:  [x] Intact to Light Touch   [] Impaired:    Gait Analysis: The patient ambulated with the following assistive device: none; the pt presents with the following gait abnormalities: increased ERIC, decreased step length bilateral, decreased hip extension bilateral, decreased pelvic/trunk rotation, and unsteady at times with turns    Fall risk assessment:    Performance Measurements Pt score Norms   Single leg stance Right: 14.6 seconds ; Left 2 seconds Less than 4.9 sec high risk  5-6.4 sec increased risk  6.5 or greater low risk   Timed up and go 15.76 seconds Greater than 14 sec high risk  11.1-14 sec increased risk  11 sec or less low risk         Intake Outcome Measure for FOTO Neuro Balance Survey    Therapist reviewed FOTO scores for Clau Nunn on 7/17/2023.   FOTO documents entered into EP[employer] - see Media section.    Intake Score: 48/100          TREATMENT     Total Treatment time (time-based codes) separate from Evaluation: (15) minutes      Clau received the treatments listed below:      therapeutic exercises to develop strength, endurance, ROM, flexibility, posture, and core stabilization for (5) minutes including:    Intervention 7/17/2023  Parameters   Supine Hooklying transverse abdominal set performing education on set up and purpose of exercise X    X   Working on transverse abdominal contraction 1x10   Progressing with addition of ball squeeze between legs 1x10                                neuromuscular re-education activities to improve: Balance, Coordination, Kinesthetic, Sense, Proprioception, and Posture for (10) minutes. The following activities were included:    Intervention 7/17/2023  Parameters   Patient educated on balance program for Home Exercise Program  x Good understanding reported                                  PATIENT EDUCATION AND HOME EXERCISES     Education provided:   - Patient  was instructed in home exercise program  to address the deficits listed above and to address overall condition and quality of life. Patient  was encouraged to participate in cardiovascular exercise and wellness exercise in fitness center with assistance of health  at [address].   - Patient was educated on all the above exercise prior/during/after for proper posture, positioning, and execution for safe performance with home exercise program.    Written Home Exercises Provided: yes. Exercises were reviewed and Clau was able to demonstrate them prior to the end of the session.  Clau demonstrated good  understanding of the education provided. See EMR under Patient Instructions for exercises provided during therapy sessions.    ASSESSMENT     Clau is a 88 y.o. female referred to outpatient Physical Therapy with a medical diagnosis of R26.89 (ICD-10-CM) - Balance problem. The patient presents with signs and symptoms  consistent with diagnosis and impairments which include weakness, impaired endurance, gait instability, impaired balance, decreased coordination, and pain.    Patient  will require follow up with physical therapy to monitor and establish safe and effective exercise program    Patient prognosis is Good.   Patient will benefit from skilled outpatient Physical Therapy to address the deficits stated above and in the chart below, provide patient /family education, and to maximize patientt's level of independence.     Plan of care discussed with patient: Yes  Patient's spiritual, cultural and educational needs considered and patient is agreeable to the plan of care and goals as stated below:     Anticipated Barriers for therapy: co-morbidities and chronicity of condition    Medical Necessity is demonstrated by the following   History  Co-morbidities and personal factors that may impact the plan of care [] LOW: no personal factors / co-morbidities  [] MODERATE: 1-2 personal factors / co-morbidities  [x] HIGH: 3+ personal factors / co-morbidities    Moderate / High Support Documentation:   Past Medical History:   Diagnosis Date    Allergy     Anxiety     Asthma     Back pain     Chronic venous insufficiency 11/19/2013    Depression     Diverticulosis 11/19/2013 1/8/8 colonoscopy    Dry eyes     Fracture, sacrum/coccyx     Dr. Sanchez     GERD (gastroesophageal reflux disease)     H/O total hip arthroplasty 12/30/2015    Hypertension     Hypothyroid     Osteoarthritis     Osteoporosis     Postlaminectomy syndrome of lumbar region 1/8/2014    Radius fracture     7/18    Simple chronic bronchitis 5/3/2017    Umbilical hernia 11/19/2013    Urinary incontinence     Vitamin D deficiency disease          Examination  Body Structures and Functions, activity limitations and participation restrictions that may impact the plan of care [] LOW: addressing 1-2 elements  [x] MODERATE: 2+ elements  [] HIGH: 3+ elements (please support  below)    Moderate / High Support Documentation: see evaluation/objective measurements above.     Clinical Presentation [] LOW: stable  [x] MODERATE: Evolving  [] HIGH: Unstable     Decision Making/ Complexity Score: moderate         Goals:    Long Term Goals: 4 weeks   Pt will be independent with self management of his/her condition with home exercise program, posture, positioning and improved body mechanics.  2.   Pt will safely transition to and participate in wellness/fitness program.     PLAN   Plan of care Certification: 7/17/2023 to 8/13/23.     Patient is being seen for 1 visit to establish safe and effective home exercise program that can be performed independently. Health  will contact patient either by phone or in person weekly to monitor exercise program, answer questions regarding exercises and encourage follow up in fitness center.   Follow up visit scheduled for 8/7/23 to assess progress and answer any questions related to discharge.     Katie Hurd, PT

## 2023-08-07 ENCOUNTER — CLINICAL SUPPORT (OUTPATIENT)
Dept: REHABILITATION | Facility: HOSPITAL | Age: 88
End: 2023-08-07
Payer: MEDICARE

## 2023-08-07 DIAGNOSIS — Z74.09 IMPAIRED FUNCTIONAL MOBILITY, BALANCE, GAIT, AND ENDURANCE: Primary | ICD-10-CM

## 2023-08-07 PROCEDURE — 97110 THERAPEUTIC EXERCISES: CPT | Mod: HCNC,PN

## 2023-08-07 PROCEDURE — 97112 NEUROMUSCULAR REEDUCATION: CPT | Mod: HCNC,PN

## 2023-08-07 NOTE — PROGRESS NOTES
[facility]   Physical Therapy Treatment Note + Discharge     Name: Clau Nunn  Clinic Number: [id number]    Therapy Diagnosis:   Encounter Diagnosis   Name Primary?    Impaired functional mobility, balance, gait, and endurance Yes     Physician: Jeanette Molina MD    Visit Date: 8/7/2023    Physician Orders: PT Eval and Treat   Medical Diagnosis from Referral: R26.89 (ICD-10-CM) - Balance problem  Evaluation Date: 7/17/2023  Authorization Period Expiration: 12/31/23  Plan of Care Expiration: 9/923  Progress Note Due: 8/17/23  Visit # / Visits authorized: 1/20 ( + eval)   FOTO: 1/3     Precautions: Standard and Fall     PTA Visit #: 0/5     Time In: 1236  Time Out: 1314  Total Billable Time: 38 minutes    Subjective     Pt reports: that she is doing good and feels confident with exercises that she is performing at home.  She was compliant with home exercise program.  Response to previous treatment: She responded well to last session.   Functional change: She reports no functional change since last assessed.     Pain: 3/10  Location: intermittent Right shoulder, Right knee and low back pain.     Objective      Objective Measures updated at progress report unless specified.   POSADAS BALANCE ASSESSMENT  Max score of 4 each item.    8/7/23   1.Sitting to Standing  3/4   2.Standing Unsupported 4/4   3.Sitting with Back Unsupported  4/4   4.Standing to Sitting  4/4   5.Transfers 3/4   6.Standing Unsupported with Eyes Closed 4/4   7.Standing Unsupported with Feet Together 4/4   8.Reaching Forward with Outstretched Arm while Standing  1/4   9. Object from the Floor from a Standing Position  3/4   10.Turning to Look Behind Shoulder while Standing  2/4   11.Turn 360 Degrees  4/4   12.Placing Alternate Foot on Step while Standing Unsupported 1/4   13.Standing Unsupported One Foot in Front 2/4   14.Standing on One Leg 2/4     Patient reports a score of 41/56 on the Posadas Balance  Assessment on 8/7/23.    8/7/23 - TUG = 14.59 seconds  Falls Efficacy Scale given - Patient scored 36/64 (see media section)             Treatment     Clau received the treatments listed below:        THERAPEUTIC EXERCISES to develop strength, endurance, ROM, flexibility, posture, and core stabilization for (8) minutes including:    Intervention Performed Today    Education on standing and sitting lower extremity exercises for Home Exercise Program  x - each exercise demonstrated and discussed set up for home   NuStep x Level 1, 4 minutes                                        neuromuscular re-education activities to improve: Balance, Coordination, Kinesthetic, Sense, Proprioception, and Posture for (30) minutes. The following activities were included:    Intervention Performed Today    Santos Balance Scale x See above   TUG re-assessment x - see above   Air Ex Pad X  X          X - Weight shifts Left to Right 1x10  - Weight shifts forward/back 1x10  - single leg standing holds   - Eyes closed 10 seconds  - One lower extremity on pad and opposite lower extremity on step tossing ball to wall   - backward pivot step 1x5 bilateral    Falls Efficacy Scale provided  x - see attached sheet in media section.    Core Exercises in supine  Bilateral lower extremities over Tball for each:   - lower trunk rotation 2x10  - bilateral knees to chest 2x10  - Bridges with chest press between each rep 2x10                       Patient Education and Home Exercises       Education provided:   -  see above on lower extremity exercises.      Written Home Exercises Provided: yes. Exercises were reviewed and Clau was able to demonstrate them prior to the end of the session.  Clau demonstrated good  understanding of the education provided. See EMR under Patient Instructions for exercises provided during therapy sessions    Assessment     Patient responded well to session today demonstrating improvements with Left single leg  standing and TUG time since last assessed.  She reports that she feels comfortable performing her Home Exercise Program and would like to discharge at this time.  She would benefit from continuing with 65+ health  to maintain her gains and work towards improved strength globally. She will be discharged from PT at this time.     Clau Is progressing well towards her goals.   Pt prognosis is Good.     Pt will continue to benefit from skilled outpatient physical therapy to address the deficits listed in the problem list box on initial evaluation, provide pt/family education and to maximize pt's level of independence in the home and community environment.     Pt's spiritual, cultural and educational needs considered and pt agreeable to plan of care and goals.     Anticipated barriers to physical therapy: co-morbidities and chronicity of condition     Goals:     Long Term Goals: 4 weeks   Pt will be independent with self management of his/her condition with home exercise program, posture, positioning and improved body mechanics. Met  2.   Pt will safely transition to and participate in wellness/fitness program.    Plan     Continue Plan of Care (POC) and progress per patient tolerance. See Treatment section for exercise progression.    Katie Hurd, PT

## 2023-08-07 NOTE — PLAN OF CARE
[facility]   Discharge Note    Name: Clau Nunn  Clinic Number: [id number]    Therapy Diagnosis:   Encounter Diagnosis   Name Primary?    Impaired functional mobility, balance, gait, and endurance Yes     Physician: Jeanette Molina MD    Physician Orders: PT Eval and Treat   Medical Diagnosis from Referral: R26.89 (ICD-10-CM) - Balance problem  Evaluation Date: 7/17/2023      Date of Last visit: 8/7/23  Total Visits Received: 2 including eval    ASSESSMENT      Patient responded well to session today demonstrating improvements with Left single leg standing and TUG time since last assessed.  She reports that she feels comfortable performing her Home Exercise Program and would like to discharge at this time.  She would benefit from continuing with 65+ health  to maintain her gains and work towards improved strength globally. She will be discharged from PT at this time.     Discharge reason: Patient requested discharge    Discharge FOTO Score: not obtained    Long Term Goals: 4 weeks   Pt will be independent with self management of his/her condition with home exercise program, posture, positioning and improved body mechanics. Met  2.   Pt will safely transition to and participate in wellness/fitness program.    PLAN   This patient is discharged from Physical Therapy      Katie Hurd, PT

## 2023-08-07 NOTE — PLAN OF CARE
Patient prepared for procedure.   
[de-identified] : On examination of the right ankle swelling noted subtle ecchymosis laterally, no erythema.  Skin is intact.  Tenderness over the ATFL, PTFL and CFL.  No tenderness over the deltoid ligament.  No tenderness over the medial malleolus, mild tenderness along the lateral malleolus.  No tenderness over the metatarsals or toes, no tenderness over the Lisfranc.  No tenderness of the Achilles or calcaneus.  She is able to plantarflex, dorsiflex, invert and young although with restriction and pain.  X-ray right ankle reviewed from [facility] no acute fracture, large calcaneal heel spur, rounded densities medial and lateral ankle appear to be chronic

## 2023-08-10 NOTE — PROGRESS NOTES
Established Patient - TeleHealth Visit (Audio)     The patient location is: [place]  The chief complaint leading to consultation is: chronic pain   Visit type: Virtual visit with audio only (telephone)  Total time spent with patient: 10-15 minutes  At least 20 minutes of total time spent on the encounter, which includes time with patient and time spent preparing to see the patient (eg, review of tests), Obtaining and/or reviewing separately obtained history, Documenting clinical information in the electronic or other health record, Independently interpreting results (not separately reported) and communicating results to the patient/family/caregiver, or Care coordination (not separately reported).     The reason for the audio only service rather than synchronous audio and video virtual visit was related to technical difficulties or patient preference/necessity.     Each patient to whom I provide medical services by telemedicine is:  (1) informed of the relationship between the physician and patient and the respective role of any other health care provider with respect to management of the patient; and (2) notified that they may decline to receive medical services by telemedicine and may withdraw from such care at any time. Patient verbally consented to receive this service via voice-only telephone call.    This service was not originating from a related E/M service provided within the previous 7 days nor will  to an E/M service or procedure within the next 24 hours or my soonest available appointment.  Prevailing standard of care was able to be met in this audio-only visit.      Chronic Pain -- Established Patient (Follow-up visit)    Chief Pain Complaint:  No chief complaint on file.  Bilateral SIJ pain - much worse on left    Interval History (8/11/2023):  Clau CLEO Nunn presents today for follow-up visit. At that visit, the plan was to start Lyrica, which has helped.  S/p left SIJ RFA on 6/8/23 (over a month  "ago) and is reporting better pain relief than she did initially. She is also getting around better. Patient reports pain as 3/10 today. Pain is worse the most in the morning when she wakes up, but pain improves as she walks around during the day.     Interval History (6/28/2023): Patient presents today for follow-up visit.  she underwent left SIJ RFA on 6/8/23 (about 3 weeks ago).  The patient reports that she is/was unchanged following the procedure.  she reports limited pain relief so far. Patient reports pain as 8/10 today.    Interval History (5/2/2023):  Clau Nunn presents today for follow-up visit.  Patient was last seen on 3/28/2023.  Patient reports pain as 5/10 today.  Pain is much worse and left posterior hip, more so over left SI joint.    Interval History (3/28/2023): Clau Nunn presents today for follow-up visit.  she underwent bilateral SIJ injection on 1/31/23.  The patient reports that she is/was better following the procedure.  she reports about 50% pain relief on left, right side feeling much better.  The changes lasted 4 weeks so far.  The changes have continued through this visit.  Patient reports pain as 5/10 today.  Seeing Podiatry for left foot fracture, which is not healing. Still having left SIJ pain.     Interval History (1/10/2023):  Clau Nunn presents today for follow-up visit.  Patient was last seen about 6 months ago. At that visit, she was feeling better since REJI, but she was having localized SIJ pain - still overall better since injection. She returns today with continued posterior "hip pain". Pain is worse on left. Patient reports pain as 8/10 today.    Interval History (6/29/2022): Clau Nunn presents today for follow-up visit.  she underwent Bilateral L5/S1 + S1 TF REJI on 5/25/22.  The patient reports that she is/was better following the procedure.  she reports 75% pain relief with regards to leg pain.  The changes lasted 4 weeks so far.  The " changes have continued through this visit.  Patient reports pain as 5/10 today. Having localized SIJ pain today.     Interval History (5/11/2022): Clau Nunn presents today for follow-up visit.  she underwent left L5/S1 + S1 TF REJI on 7/1/21 with excellent pain relief for about 3 months.  The patient reports that she is/was better following the procedure.  The changes have NOT continued through this visit.   she underwent right suprascapular nerve block on 7/13/21.  The patient reports that she is/was unchanged following the procedure. She reports limited pain relief for short term. But, she mainly has limited ROM.   Patient reports pain as 5/10 today - due to low back pain and leg pain.    Interval History (6/24/2021): Patient was seen on 2/10/21. At that time she underwent Bilateral L5/S1 TF REJI.  The patient reports that she is/was unchanged following the procedure.  she reports 20% pain relief.  The changes lasted 1 weeks.  The changes have NOT continued through this visit.  Patient reports pain as 5/10 today.  Her main pain complaint today is LLE radiculopathy worse in popliteal space and shin.   S/p left knee injection with Dr. Burns about 2 months ago with some pain relief.    Interval History (2/2/2021): Patient was last seen on 10/16/2020.  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  She is still never seen orthopedics for this issue, and she has not improved from intra-articular shoulder joint injections nor suprascapular nerve block.  Patient reports pain as 8/10 today.  Today, she is complaining of back and bilateral leg pain, previously just on the left side, although the left side still remains the worst.    Interval History (10/19/2020): Patient was seen on 9/16/20. At that time she underwent  Right suprascapular nerve block.  The patient reports that she is/was unchanged following the procedure.  she reports no  pain relief.  Patient reports pain as 6/10 today - only when she moves her arm.    Interval History (9/9/2020): Patient was seen on 8/12/20. At that time she underwent left SIJ + left hip joint + right shoulder joint injection.  The patient reports that she is/was better following the procedure.  she reports 75% pain relief with hip pain relief but only 25% relief of shoulder pain.  The changes lasted 4 weeks so far.  The changes have continued through this visit.  Patient reports pain as /10 today.    Interval History (8/5/2020): Patient was seen on 7/8/20. At that time she underwent left L2/3 + L5/S1 TF REJI.  The patient reports that she is/was slightly better following the procedure.  she reports only 50 % pain relief.  The changes lasted 4 weeks so far.  The changes have continued through this visit.  She also reports that her right shoulder has been bothering her.  She has history of right humerus fracture years ago.    Interval History (6/12/2020): She is here today to review MRI. She reports 10/10 pain today. She also has concerns about Prolia as she read that it can cause rashes and joint pain.  She has been having a rash on her right breast for a few weeks.  She will see Dr. Gomez soon to discuss.     Interval History(11/20/18): Patient was seen on 10/24/18. At that time she underwent left SIJ + left GT bursa injection with local.  The patient reports that she is/was better following the procedure.  she reports 100% pain relief.  The changes lasted 4 weeks so far.  The changes have continued through this visit.    History of Present Illness:   Clau Nunn is a 88 y.o. female  who is presenting with a chief complaint of low back pain and hip pain. The patient began experiencing this problem insidiously, and the pain has been gradually worsening over the past 6 month(s). The pain is described as throbbing, cramping, aching and heavy and is located in the bilateral buttocks . Pain is intermittent and  lasts hours. The pain radiates to  lateral thigh and groin . The patient rates her pain a 5 out of ten and interferes with activities of daily living a 5 out of ten. Pain is exacerbated by getting up from a seated position, and is improved by rest. Patient reports prior trauma (fall in June 2018 causing right distal radius + right sacrum fracture), prior lumbar surgery in ~2005, right hip replacement, right distal radius fracture requiring ORIF in June 2018.    - pertinent negatives: No fever, No chills, No weight loss, No bladder dysfunction, No bowel dysfunction, No extremity weakness, No saddle anesthesia  - pertinent positives: none    - medications, other therapies tried (physical therapy, injections):     >> Tylenol    >> Has previously undergone Physical Therapy (aquatic and land) with limited relief    >> Has previously undergone spinal injection/s:   - bilateral SIJ injection on 11-9-17 with ~30% relief for 2 weeks   - left hip injection on 12-28-17 with some relief   - bilateral L3-5 MBB on 5/11/18 with reported 100% pain relief   - left SIJ + left GT bursa injection with local on 10/24/18 with 100% pain relief   - left L2/3 + L5/S1 TF REJI on 7/8/20 with about 50% pain relief   - left SIJ + left hip joint + right shoulder joint injection on 8/12/20 with 75% pain relief with hip pain relief but only 25% relief of shoulder pain - no longer having groin pain after this procedure    - Right suprascapular nerve block on 9/16/20 with limited pain relief    - Bilateral L5/S1 TF REJI on 2/10/21 with 20% pain relief for short term   - left knee injection with Dr. Burns in April 2021 with some pain relief   - left L5/S1 + S1 TF REJI on 7/1/21 with excellent pain relief for about 3 months   - right suprascapular nerve block on 7/13/21 with limited pain relief    - Bilateral L5/S1 + S1 TF REJI on 5/25/22 with 75% pain relief - regarding leg pain, now having localized SIJ   - bilateral SIJ injection on 1/31/23 with  >50% pain relief on left, >70% pain relief on right - short-term pain relief  - left SIJ RFA on 6/8/23 with better pain relief now      Past Surgical History:   Procedure Laterality Date    ADENOIDECTOMY      BACK SURGERY      x2    breast implants  1973    CATARACT EXTRACTION Bilateral     CLOSED REDUCTION DISTAL RADIUS FRACTURE      Dr. Black, 8/18    cystoscope  1/6/16    DR. Brown    FRACTURE SURGERY  April 3 2015    left wrist    HAND SURGERY      HIP SURGERY Right     total hip- Dr. Dos Santos    HYSTERECTOMY      34y/o    INJECTION OF ANESTHETIC AGENT AROUND NERVE Right 9/16/2020    Procedure: Right suprascapular nerve block;  Surgeon: Raffi Wells MD;  Location: [facility];  Service: Pain Management;  Laterality: Right;    INJECTION OF ANESTHETIC AGENT AROUND NERVE Right 7/13/2021    Procedure: Block, Nerve Right Suprascapular Nerve Block with RN IV sedation;  Surgeon: Raffi Wells MD;  Location: [facility];  Service: Pain Management;  Laterality: Right;    INJECTION OF ANESTHETIC AGENT INTO SACROILIAC JOINT N/A 8/12/2020    Procedure: Left IA hip Joint + Left SIJ + Right shoulder injection;  Surgeon: Raffi Wells MD;  Location: [facility];  Service: Pain Management;  Laterality: N/A;    INJECTION OF ANESTHETIC AGENT INTO SACROILIAC JOINT Bilateral 1/31/2023    Procedure: Bilateral SIJ Injection w/Local;  Surgeon: Abdirahman Parker MD;  Location: [facility];  Service: Pain Management;  Laterality: Bilateral;    INJECTION OF JOINT N/A 8/12/2020    Procedure: Left IA hip Joint + Left SIJ + Right shoulder injection;  Surgeon: Raffi Wells MD;  Location: [facility];  Service: Pain Management;  Laterality: N/A;    JOINT REPLACEMENT Right     R NNAMDI - Dr. Dos Santos    LEG SURGERY Right     ex-fix tib/fib    RADIOFREQUENCY THERMOCOAGULATION Left 6/8/2023    Procedure: Left SIJ RFA w/ local (give Valium before);  Surgeon: Abdirahman Parker MD;  Location: [facility];  Service: Pain Management;   "Laterality: Left;    SELECTIVE INJECTION OF ANESTHETIC AGENT AROUND LUMBAR SPINAL NERVE ROOT BY TRANSFORAMINAL APPROACH Bilateral 2/10/2021    Procedure: Bilateral L5/S1 TF REJI;  Surgeon: Raffi Wells MD;  Location: [facility];  Service: Pain Management;  Laterality: Bilateral;    SELECTIVE INJECTION OF ANESTHETIC AGENT AROUND LUMBAR SPINAL NERVE ROOT BY TRANSFORAMINAL APPROACH Bilateral 5/25/2022    Procedure: Bilateral L5/S1 + S1 TF REJI;  Surgeon: Raffi Wells MD;  Location: [facility];  Service: Pain Management;  Laterality: Bilateral;    TONSILLECTOMY      TRANSFORAMINAL EPIDURAL INJECTION OF STEROID Left 7/8/2020    Procedure: left L2/3 + L5/S1 TF REJI;  Surgeon: Raffi Wells MD;  Location: [facility];  Service: Pain Management;  Laterality: Left;    TRANSFORAMINAL EPIDURAL INJECTION OF STEROID Left 7/1/2021    Procedure: left L5/S1 + S1 TF REJI;  Surgeon: Raffi Wells MD;  Location: [facility];  Service: Pain Management;  Laterality: Left;        Imaging / Labs / Studies (reviewed on 8/11/2023):     Records from [facility] regarding right shoulder fracture- uploaded into chart under "Media"    Results for orders placed during the hospital encounter of 08/05/20   X-ray Shoulder 2 or More Views Right    Narrative COMPARISON:  12/10/2018  FINDINGS:  There is a chronic fracture deformity of the right humeral head and neck.  Associated osseous productive changes approximate the inferior margins of the articular surface of the humeral head.  No definite acute fractures or dislocations visualized.  There are mild degenerative changes present at the AC joint and glenoid.  Postoperative findings noted in the lower thoracic spine.     Results for orders placed during the hospital encounter of 12/10/18   X-Ray Shoulder Trauma Right    Narrative FINDINGS:  Comminuted fracture involving the surgical neck and right humeral head.  No evidence of dislocation.  Degenerative changes are present at the right AC joint.    " Impression Comminuted fracture involving the right humeral head and surgical neck.     Results for orders placed during the hospital encounter of 06/29/20   MRI Lumbar Spine W WO Cont    Narrative COMPARISON:  Prior MRI from June 29, 2020.  FINDINGS:  Again demonstrated are postoperative findings from thoracolumbar spinal fusion and laminectomy at T12.  Chronic compression deformity of T12 with chronic retropulsion that flattens the thecal sac to 14 mm.  This is unchanged.  Mild, grade 1 degenerative spondylolisthesis at L4-L5.  Vertebral body height is normal.  No abnormal enhancement patterns. The conus medullaris terminates at the level of L2-L3, low lying.  No abnormal signal within the conus. Intervertebral disc levels are as follows:  T11-T12 disc: Fusion at this level.  Mild, chronic retropulsion that flattens the thecal sac to 14 mm.  No significant foraminal stenosis.  T12-L1 disc: Prior laminectomy and fusion.  The thecal sac measures 19 mm.  No significant foraminal stenosis.  L1-L2 disc : Posterior fusion.  No significant spinal stenosis or foraminal stenosis.  L2-L3 disc: Disc space height loss.  Broad-based posterior disc bulge which encroaches slightly into the floors of the exit foramina.  Moderate buckling of ligamentum flavum.  The thecal sac measures 8-9 mm AP.  Mild bilateral foraminal stenosis.  This has progressed since the prior MRI.  L3-L4 disc: Broad-based posterior disc bulge that encroaches slightly into the floors of the exit foramina.  Moderate buckling of ligamentum flavum.  The thecal sac measures 10 mm AP.  Mild bilateral neural foraminal stenosis, right greater than left.  These findings are similar to prior.  L4-L5 disc: Minimal grade 1 spondylolisthesis with severe degenerative facet change and hypertrophy.  Left-sided facet joint effusion.  Is buckling of ligamentum flavum.  The thecal sac measures 11 mm.  No significant foraminal stenosis.  The spondylolisthesis has slightly  progressed measuring 4 mm compared to prior 2 mm.  L5-S1 disc: Severe disc space height loss with marginal disc and osteophyte encroach into the exit foramina bilaterally.  Moderate degenerative facet hypertrophy.  The thecal sac measures 14 mm.    Impression 1. 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  2. Progression of mild foraminal stenosis and mild spinal stenosis at L2-L3.  3. Minimal progression of grade 1 spondylolisthesis at L4-L5.  4. Unchanged mild, chronic retropulsion from T12 where there has been a laminectomy and fusion.       7/30/2018 XR LUMBAR SPINE COMPLETE 5 VIEW  TECHNIQUE:  AP, lateral, spot and bilateral oblique views of the lumbar spine were performed.  COMPARISON:  07/25/2018  FINDINGS:  There is grade 1 spondylolisthesis of L4 on L5.  Pedicle screws and fixation rods are noted for at the T10, T11, L1 and L2 levels.  Chronic compression deformity at the L1 level unchanged.  Prominent bilateral facet arthropathy noted at the L4-5 and L5-S1 levels.  IVC filter noted.     7/30/2018 XR HIPS BILATERAL 2 VIEW INCL AP PELVIS  TECHNIQUE:  AP view of the pelvis and frogleg lateral views of both hips were performed.  COMPARISON:  07/26/2018  FINDINGS:  The bony pelvis is intact. A right total hip arthroplasty, plate and wires are in place.  No hardware failure or loosening suggested.  The appearance is unchanged when compared to the prior exam.  Mild degenerative changes noted at the left hip.  No acute fracture or dislocation of the left hip.  No significant joint space narrowing identified.  No plain film evidence to suggest AVN of either hip.     Results for orders placed during the hospital encounter of 01/13/14   MRI Lumbar Spine W WO Contrast    Narrative Findings: Pre- and postcontrast multiplanar multisequence imaging of the thoracic and lumbar spine was performed using 7 cc of Gadavist intravenous contrast material.  There is moderately severe chronic central compression deformity of the T12  vertebral body which is stabilized by paraspinous rods and pedicle screws which extend from T10 through L2.  Postsurgical changes of laminectomy also noted at T12.  The posterior cortex of the T12 vertebral body is displaced posteriorly and indents the ventral thecal sac.  There is no anterior cord contact or significant central canal stenosis.  Degenerative disk desiccation is noted at multiple levels with moderate disk   narrowing at L5-S1.    Also L5-S1 is a broad-based disk protrusion which combined with bilateral facet hypertrophy results in moderately severe narrowing of both neural foramina.  No significant central canal stenosis noted.    From L2-3 through L4-5 there   is mild disk bulging which results in mild bilateral foraminal narrowing.  This is slightly more pronounced at L4-5 with bilateral facet hypertrophy a contributing factor.  No significant central canal stenosis noted.  No thoracic disk extrusion, and foraminal narrowing, canal stenosis is evident.  Thoracolumbar cord is intact.  Paravertebral soft tissues are symmetric and normal in appearance.         Review of Systems:  CONSTITUTIONAL: patient denies any fever, chills, or weight loss  SKIN: patient denies any rash or itching  RESPIRATORY: patient denies having any shortness of breath  GASTROINTESTINAL: patient denies having any diarrhea, constipation, or bowel incontinence  GENITOURINARY: patient denies having any abnormal bladder function    MUSCULOSKELETAL:  - patient complains of the above noted pain/s (see chief pain complaint)    NEUROLOGICAL:   - pain as above  - strength in Lower extremities is intact, BILATERALLY  - sensation in Lower extremities is intact, BILATERALLY  - patient denies any loss of bowel or bladder control      PSYCHIATRIC: patient denies any change in mood    Other:  All other systems reviewed and are negative        Physical Exam:  There were no vitals filed for this visit.  There is no height or weight on file to  calculate BMI.   (reviewed on 8/11/2023)     *No Physical Exam performed today - audio visit only*    Physical Exam from last in clinic visit:   General: alert and oriented, in no apparent distress.  Gait: antalgic gait   Skin: no rashes, no discoloration, no obvious lesions  HEENT: normocephalic, atraumatic.   Respiratory: without use of accessory muscles of respiration.  GI: no obvious distention.    Musculoskeletal - Lumbar Spine:  - Midline scar present over thoracic spine  - Pain on flexion of lumbar spine: Present, worse than with extension  - Pain on extension of lumbar spine: Present  - Lumbar facet loading: Positive bilaterally  - TTP over the lumbar facet joints: Absent  - TTP over GT bursa: Minimal   - Straight Leg Raise: Positive bilaterally, L>R - improved   - Pain on Internal and external rotation of the hip: Present    SIJ testing:  - TTP over the SI joints: Present on left > right  - Srikanth's/ Micheal's: Positive    - Sacroiliac Compression Test (lateral pressure): Positive   - SacralThrust Test (posterior pressure): Positive    Right Shoulder:  - Pain on abduction: Present   - ROM: decreased secondary to pain, very limited ROM      - TTP over the AC and GH joint: Present  - Neer's: Positive  - Hawkin's: Positive    Neuro - Lower Extremities:  - BLE Strength: R/L: HF: 5/5, HE: 5/5, KF: 5/5; KE: 5/5; FE: 5/5; FF: 5/5  - Extremity Reflexes: Brisk and symmetric throughout  - Sensory: Sensation to light touch intact bilaterally      Psych:  Mood and affect is appropriate            Assessment:  Clau Nunn is a 88 y.o. year old female who is presenting with       ICD-10-CM ICD-9-CM    1. Radicular pain  M54.10 729.2       2. History of right hip replacement  Z96.641 V43.64       3. Sacroiliitis  M46.1 720.2             Plan:  1. Interventional:   - S/p left SIJ RFA on 6/8/23 with better pain relief than she was reporting initially.  She is also getting around better.  - Consider right SIJ RFA if  warranted after.   - Consider repeat Bilateral L5/S1 + S1 TF REJI.Will hold off since radicular pain not an issue at this time.  - S/p bilateral SIJ injection on 1/31/23 with >50% pain relief on left, >70% pain relief on right - short-term pain relief.  - S/p Bilateral L5/S1 + S1 TF REJI on 5/25/22 with 75% pain relief - regarding leg pain, now having localized SIJ.   - S/p Bilateral L5/S1 TF REJI on 2/10/21 with 20% pain relief x 1 week. Pain now more localized on left.   - S/p Right suprascapular nerve block on 9/16/20 with limited pain relief.    2. Pharmacologic:   - Refill Lyrica 75mg BID -- started last visit, which is helping. Consider Increase if warranted.  - Patient encouraged to take Tylenol 500mg x 2 tablets (1000mg) TID more regularly, not to exceed 3000mg per day.   - Anticoagulation use: apixaban (Eliquis) - 2° prevention (h/o DVT) - Heme/Onc.     3. Rehabilitative: Continue use of SIJ belt at home with housework. Physical therapy for shoulder did not help in the past, so doesn't want to attend. SIJ exercises given on AVS previously. Patient is very active.    4.  Diagnostic/ Imaging: No new imaging ordered. Previous imaging reviewed.     5. Consult/ Referral: She has an appointment with Dr. Vazquez (vascular specialist) tomorrow for right dorsal foot pain due to engorgement of vein.    6. Follow up: 1-2 months audio call    - This condition does not require this patient to take time off of work, and the primary goal of our Pain Management services is to improve the patient's functional capacity.   - I discussed the risks, benefits, and alternatives to potential treatment options. All questions and concerns were fully addressed today in clinic.         Angelica Caballero PA-C  Interventional Pain Management - [facility]    Disclaimer:  This note was prepared using voice recognition system and is likely to have sound alike errors that may have been overlooked even after proof reading.  Please call  me with any questions.

## 2023-08-11 ENCOUNTER — OFFICE VISIT (OUTPATIENT)
Dept: PAIN MEDICINE | Facility: CLINIC | Age: 88
End: 2023-08-11
Payer: MEDICARE

## 2023-08-11 ENCOUNTER — LAB VISIT (OUTPATIENT)
Dept: LAB | Facility: HOSPITAL | Age: 88
End: 2023-08-11
Attending: PHYSICIAN ASSISTANT
Payer: MEDICARE

## 2023-08-11 DIAGNOSIS — M46.1 SACROILIITIS: ICD-10-CM

## 2023-08-11 DIAGNOSIS — Z96.641 HISTORY OF RIGHT HIP REPLACEMENT: ICD-10-CM

## 2023-08-11 DIAGNOSIS — M54.10 RADICULAR PAIN: Primary | ICD-10-CM

## 2023-08-11 DIAGNOSIS — M80.00XG AGE-RELATED OSTEOPOROSIS WITH CURRENT PATHOLOGICAL FRACTURE WITH DELAYED HEALING: ICD-10-CM

## 2023-08-11 LAB
ALBUMIN SERPL BCP-MCNC: 3.9 G/DL (ref 3.5–5.2)
ALP SERPL-CCNC: 63 U/L (ref 55–135)
ALT SERPL W/O P-5'-P-CCNC: 8 U/L (ref 10–44)
ANION GAP SERPL CALC-SCNC: 14 MMOL/L (ref 8–16)
AST SERPL-CCNC: 19 U/L (ref 10–40)
BILIRUB SERPL-MCNC: 0.8 MG/DL (ref 0.1–1)
BUN SERPL-MCNC: 14 MG/DL (ref 8–23)
CALCIUM SERPL-MCNC: 9.6 MG/DL (ref 8.7–10.5)
CHLORIDE SERPL-SCNC: 103 MMOL/L (ref 95–110)
CO2 SERPL-SCNC: 23 MMOL/L (ref 23–29)
CREAT SERPL-MCNC: 1.4 MG/DL (ref 0.5–1.4)
EST. GFR  (NO RACE VARIABLE): 36 ML/MIN/1.73 M^2
GLUCOSE SERPL-MCNC: 95 MG/DL (ref 70–110)
POTASSIUM SERPL-SCNC: 4.7 MMOL/L (ref 3.5–5.1)
PROT SERPL-MCNC: 7.4 G/DL (ref 6–8.4)
SODIUM SERPL-SCNC: 140 MMOL/L (ref 136–145)

## 2023-08-11 PROCEDURE — 99442 PR PHYSICIAN TELEPHONE EVALUATION 11-20 MIN: CPT | Mod: 95,HCNC,, | Performed by: PHYSICIAN ASSISTANT

## 2023-08-11 PROCEDURE — 1159F PR MEDICATION LIST DOCUMENTED IN MEDICAL RECORD: ICD-10-PCS | Mod: HCNC,CPTII,95, | Performed by: PHYSICIAN ASSISTANT

## 2023-08-11 PROCEDURE — 1159F MED LIST DOCD IN RCRD: CPT | Mod: HCNC,CPTII,95, | Performed by: PHYSICIAN ASSISTANT

## 2023-08-11 PROCEDURE — 1160F RVW MEDS BY RX/DR IN RCRD: CPT | Mod: HCNC,CPTII,95, | Performed by: PHYSICIAN ASSISTANT

## 2023-08-11 PROCEDURE — 36415 COLL VENOUS BLD VENIPUNCTURE: CPT | Mod: HCNC,PO | Performed by: PHYSICIAN ASSISTANT

## 2023-08-11 PROCEDURE — 1157F PR ADVANCE CARE PLAN OR EQUIV PRESENT IN MEDICAL RECORD: ICD-10-PCS | Mod: HCNC,CPTII,95, | Performed by: PHYSICIAN ASSISTANT

## 2023-08-11 PROCEDURE — 80053 COMPREHEN METABOLIC PANEL: CPT | Mod: HCNC | Performed by: PHYSICIAN ASSISTANT

## 2023-08-11 PROCEDURE — 1157F ADVNC CARE PLAN IN RCRD: CPT | Mod: HCNC,CPTII,95, | Performed by: PHYSICIAN ASSISTANT

## 2023-08-11 PROCEDURE — 99442 PR PHYSICIAN TELEPHONE EVALUATION 11-20 MIN: ICD-10-PCS | Mod: 95,HCNC,, | Performed by: PHYSICIAN ASSISTANT

## 2023-08-11 PROCEDURE — 1160F PR REVIEW ALL MEDS BY PRESCRIBER/CLIN PHARMACIST DOCUMENTED: ICD-10-PCS | Mod: HCNC,CPTII,95, | Performed by: PHYSICIAN ASSISTANT

## 2023-08-21 ENCOUNTER — INFUSION (OUTPATIENT)
Dept: INFUSION THERAPY | Facility: HOSPITAL | Age: 88
End: 2023-08-21
Attending: INTERNAL MEDICINE
Payer: MEDICARE

## 2023-08-21 ENCOUNTER — OFFICE VISIT (OUTPATIENT)
Dept: RHEUMATOLOGY | Facility: CLINIC | Age: 88
End: 2023-08-21
Payer: MEDICARE

## 2023-08-21 VITALS
DIASTOLIC BLOOD PRESSURE: 84 MMHG | BODY MASS INDEX: 28.38 KG/M2 | HEIGHT: 64 IN | SYSTOLIC BLOOD PRESSURE: 160 MMHG | WEIGHT: 166.25 LBS | RESPIRATION RATE: 16 BRPM | HEART RATE: 83 BPM

## 2023-08-21 VITALS
DIASTOLIC BLOOD PRESSURE: 79 MMHG | RESPIRATION RATE: 18 BRPM | SYSTOLIC BLOOD PRESSURE: 164 MMHG | TEMPERATURE: 98 F | OXYGEN SATURATION: 96 % | HEART RATE: 77 BPM

## 2023-08-21 DIAGNOSIS — M17.0 PRIMARY OSTEOARTHRITIS OF BOTH KNEES: ICD-10-CM

## 2023-08-21 DIAGNOSIS — N18.31 STAGE 3A CHRONIC KIDNEY DISEASE: ICD-10-CM

## 2023-08-21 DIAGNOSIS — M80.00XG AGE-RELATED OSTEOPOROSIS WITH CURRENT PATHOLOGICAL FRACTURE WITH DELAYED HEALING: Primary | ICD-10-CM

## 2023-08-21 DIAGNOSIS — Z51.81 MEDICATION MONITORING ENCOUNTER: ICD-10-CM

## 2023-08-21 DIAGNOSIS — M15.9 PRIMARY OSTEOARTHRITIS INVOLVING MULTIPLE JOINTS: ICD-10-CM

## 2023-08-21 PROCEDURE — 3288F PR FALLS RISK ASSESSMENT DOCUMENTED: ICD-10-PCS | Mod: HCNC,CPTII,S$GLB,

## 2023-08-21 PROCEDURE — 99215 PR OFFICE/OUTPT VISIT, EST, LEVL V, 40-54 MIN: ICD-10-PCS | Mod: 25,HCNC,S$GLB,

## 2023-08-21 PROCEDURE — 1125F AMNT PAIN NOTED PAIN PRSNT: CPT | Mod: HCNC,CPTII,S$GLB,

## 2023-08-21 PROCEDURE — 1160F PR REVIEW ALL MEDS BY PRESCRIBER/CLIN PHARMACIST DOCUMENTED: ICD-10-PCS | Mod: HCNC,CPTII,S$GLB,

## 2023-08-21 PROCEDURE — 99999 PR PBB SHADOW E&M-EST. PATIENT-LVL V: ICD-10-PCS | Mod: PBBFAC,HCNC,,

## 2023-08-21 PROCEDURE — 1101F PR PT FALLS ASSESS DOC 0-1 FALLS W/OUT INJ PAST YR: ICD-10-PCS | Mod: HCNC,CPTII,S$GLB,

## 2023-08-21 PROCEDURE — 1157F ADVNC CARE PLAN IN RCRD: CPT | Mod: HCNC,CPTII,S$GLB,

## 2023-08-21 PROCEDURE — 1159F PR MEDICATION LIST DOCUMENTED IN MEDICAL RECORD: ICD-10-PCS | Mod: HCNC,CPTII,S$GLB,

## 2023-08-21 PROCEDURE — 1157F PR ADVANCE CARE PLAN OR EQUIV PRESENT IN MEDICAL RECORD: ICD-10-PCS | Mod: HCNC,CPTII,S$GLB,

## 2023-08-21 PROCEDURE — 99215 OFFICE O/P EST HI 40 MIN: CPT | Mod: 25,HCNC,S$GLB,

## 2023-08-21 PROCEDURE — 3288F FALL RISK ASSESSMENT DOCD: CPT | Mod: HCNC,CPTII,S$GLB,

## 2023-08-21 PROCEDURE — 1160F RVW MEDS BY RX/DR IN RCRD: CPT | Mod: HCNC,CPTII,S$GLB,

## 2023-08-21 PROCEDURE — 1101F PT FALLS ASSESS-DOCD LE1/YR: CPT | Mod: HCNC,CPTII,S$GLB,

## 2023-08-21 PROCEDURE — 1125F PR PAIN SEVERITY QUANTIFIED, PAIN PRESENT: ICD-10-PCS | Mod: HCNC,CPTII,S$GLB,

## 2023-08-21 PROCEDURE — 20610 LARGE JOINT ASPIRATION/INJECTION: BILATERAL KNEE: ICD-10-PCS | Mod: 50,HCNC,S$GLB,

## 2023-08-21 PROCEDURE — 20610 DRAIN/INJ JOINT/BURSA W/O US: CPT | Mod: 50,HCNC,S$GLB,

## 2023-08-21 PROCEDURE — 1159F MED LIST DOCD IN RCRD: CPT | Mod: HCNC,CPTII,S$GLB,

## 2023-08-21 PROCEDURE — 99999 PR PBB SHADOW E&M-EST. PATIENT-LVL V: CPT | Mod: PBBFAC,HCNC,,

## 2023-08-21 PROCEDURE — 96372 THER/PROPH/DIAG INJ SC/IM: CPT | Mod: HCNC

## 2023-08-21 PROCEDURE — 63600175 PHARM REV CODE 636 W HCPCS: Mod: JZ,JG,HCNC

## 2023-08-21 RX ADMIN — DENOSUMAB 60 MG: 60 INJECTION SUBCUTANEOUS at 02:08

## 2023-08-21 NOTE — PROCEDURES
Large Joint Aspiration/Injection: bilateral knee    Date/Time: 8/21/2023 1:30 PM    Performed by: Venus Bowman PA-C  Authorized by: Venus Bowman PA-C    Consent Done?:  Yes (Verbal)  Indications:  Arthritis and pain  Site marked: the procedure site was marked    Timeout: prior to procedure the correct patient, procedure, and site was verified    Prep: patient was prepped and draped in usual sterile fashion      Local anesthesia used?: Yes    Anesthesia:  Local infiltration  Local anesthetic:  Lidocaine 2% without epinephrine  Anesthetic total (ml):  2      Details:  Needle Size:  22 G  Ultrasonic Guidance for needle placement?: No    Approach:  Anterior  Location:  Knee  Laterality:  Bilateral  Site:  Bilateral knee  Medications (Right):  4 mL hyaluronate sodium, stabilized 88 mg/4 mL  Aspirate (Right) amount (mL):  0  Medications (Left):  4 mL hyaluronate sodium, stabilized 88 mg/4 mL  Aspirate (Left) amount (mL):  0  Patient tolerance:  Patient tolerated the procedure well with no immediate complications

## 2023-08-21 NOTE — DISCHARGE INSTRUCTIONS
.[facility]  [address] [facility]  [address]  [phone] phone     [phone] fax  Hours of Operation: Monday- Friday 8:00am- 5:00pm  After hours phone  [phone]  Hematology / Oncology Physicians on call    Dr. Obey Salcido      Nurse Practitioners:    Ara Hernández, ASHLEY Mariee, ASHLEY Lazaro, ASHLEY Hurd, ASHLEY Martin, PA      Please don't hesitate to call if you have any concerns.    .Remember to take your calcium with vitamin D supplement as directed.   It is not advisable to undergo invasive dental procedure, within 3 months of your injection, unless approved by your treating provider.

## 2023-08-21 NOTE — PATIENT INSTRUCTIONS
Post knee injection care:  Apply ice or heat to joint for 10-15 minutes at a time a few times a day for the next few days.   Starting tomorrow, apply topical voltaren/diclofenac to the knee 3-4 times daily for the next few days. Then use as needed.   Take it easy on that joint for the next week.   Strengthening exercises and stretches recommended.

## 2023-08-21 NOTE — PROGRESS NOTES
"         RHEUMATOLOGY OUTPATIENT CLINIC NOTE    08/21/2023    Subjective:     Patient ID: Clau Nunn is a 88 y.o. female.    Chief Complaint: Osteoporosis      Clau Nunn is a 88 y.o. pleasant female here for rheumatology follow up for osteoporosis and osteoarthritis.    Here for Prolia injection.      Gets Monovisc injections in bilateral knees every 6 months.  Right knee worse than left.  At this time does not want knee replacement.    Vitamin D and calcium: otc "when she thinks about it"  Falls since last appointment: No   Planned invasive dental work: Planning for permanent bridge in November     Current therapy:  Prolia     Prior Therapy:  Reclast 7/30/2013      Physical Exam:  Able to rise from a chair independently.  Walks without assistance.  Steady gait.  No kyphosis.   Lymphedema in rebecca LE.        Objective:     BP (!) 160/84 (BP Location: Left arm, Patient Position: Sitting, BP Method: Medium (Automatic))   Pulse 83   Resp 16   Ht 5' 4" (1.626 m)   Wt 75.4 kg (166 lb 3.6 oz)   BMI 28.53 kg/m²     Recent Results (from the past 672 hour(s))   Comprehensive Metabolic Panel    Collection Time: 08/11/23  1:37 PM   Result Value Ref Range    Sodium 140 136 - 145 mmol/L    Potassium 4.7 3.5 - 5.1 mmol/L    Chloride 103 95 - 110 mmol/L    CO2 23 23 - 29 mmol/L    Glucose 95 70 - 110 mg/dL    BUN 14 8 - 23 mg/dL    Creatinine 1.4 0.5 - 1.4 mg/dL    Calcium 9.6 8.7 - 10.5 mg/dL    Total Protein 7.4 6.0 - 8.4 g/dL    Albumin 3.9 3.5 - 5.2 g/dL    Total Bilirubin 0.8 0.1 - 1.0 mg/dL    Alkaline Phosphatase 63 55 - 135 U/L    AST 19 10 - 40 U/L    ALT 8 (L) 10 - 44 U/L    eGFR 36 (A) >60 mL/min/1.73 m^2    Anion Gap 14 8 - 16 mmol/L         Assessment:       1. Age-related osteoporosis with current pathological fracture with delayed healing    2. Medication monitoring encounter    3. Primary osteoarthritis of both knees    4. Primary osteoarthritis involving multiple joints    5. Stage 3a chronic " kidney disease          Impression:   Osteoporosis:   Currently on Prolia therapy.  Stable T-scores on DEXA 09/2022    Osteoarthritis:  Monovisc injections do well  Kellgren Edgar grade 4 on right knee, Kellgren Edgar grade 3 on left knee    Vitamin D deficiency:  Prior low Vitamin D. Normal 33 on 10/28/2022     Counseling on health promotion and disease prevention  Over 10 minutes spent regarding below topics:  - Nutrition and exercise counseling.  - Medication counseling provided.      Plan:     Osteoporosis  Medication  Prolia today and continue every 6 m unless CI   Labs reviewed calcium 9.6, GFR 36, creatinine 1.4.  No prior hypocalcemia recently  Dexa  Repeat 9/2024  Vitamin Supplement  Continue vitamin D and calcium  Counseling   Weight bearing activity as tolerated  Caution to avoid falls    Vitamin D deficiency  Continue supplement   Osteoarthritis  Medication  Inject bilateral knees Monovisc today  Counseling   Strengthening exercises  Provided patient with post injection care instructions      Follow up 6 months for prolia and rebecca monovisc injections with [place].  Pt would prefer Neil Bowman PA-C  [facility]  Rheumatology       40 minutes of total time spent on the encounter, which includes face to face time and non-face to face time preparing to see the patient (eg, review of tests), Obtaining and/or reviewing separately obtained history, Documenting clinical information in the electronic or other health record, Independently interpreting results (not separately reported) and communicating results to the patient/family/caregiver, or Care coordination (not separately reported).     Disclaimer: This note was prepared using voice recognition system and is likely to have sound alike errors and is not proof read.  Please call me with any questions

## 2023-09-08 ENCOUNTER — OFFICE VISIT (OUTPATIENT)
Dept: PAIN MEDICINE | Facility: CLINIC | Age: 88
End: 2023-09-08
Payer: MEDICARE

## 2023-09-08 VITALS — RESPIRATION RATE: 17 BRPM

## 2023-09-08 DIAGNOSIS — M54.10 RADICULAR PAIN: Primary | ICD-10-CM

## 2023-09-08 DIAGNOSIS — Z96.641 HISTORY OF RIGHT HIP REPLACEMENT: ICD-10-CM

## 2023-09-08 DIAGNOSIS — M46.1 SACROILIITIS: ICD-10-CM

## 2023-09-08 PROCEDURE — 1160F PR REVIEW ALL MEDS BY PRESCRIBER/CLIN PHARMACIST DOCUMENTED: ICD-10-PCS | Mod: HCNC,CPTII,95, | Performed by: PHYSICIAN ASSISTANT

## 2023-09-08 PROCEDURE — 1159F MED LIST DOCD IN RCRD: CPT | Mod: HCNC,CPTII,95, | Performed by: PHYSICIAN ASSISTANT

## 2023-09-08 PROCEDURE — 1157F ADVNC CARE PLAN IN RCRD: CPT | Mod: HCNC,CPTII,95, | Performed by: PHYSICIAN ASSISTANT

## 2023-09-08 PROCEDURE — 1157F PR ADVANCE CARE PLAN OR EQUIV PRESENT IN MEDICAL RECORD: ICD-10-PCS | Mod: HCNC,CPTII,95, | Performed by: PHYSICIAN ASSISTANT

## 2023-09-08 PROCEDURE — 99442 PR PHYSICIAN TELEPHONE EVALUATION 11-20 MIN: ICD-10-PCS | Mod: HCNC,95,, | Performed by: PHYSICIAN ASSISTANT

## 2023-09-08 PROCEDURE — 1159F PR MEDICATION LIST DOCUMENTED IN MEDICAL RECORD: ICD-10-PCS | Mod: HCNC,CPTII,95, | Performed by: PHYSICIAN ASSISTANT

## 2023-09-08 PROCEDURE — 1160F RVW MEDS BY RX/DR IN RCRD: CPT | Mod: HCNC,CPTII,95, | Performed by: PHYSICIAN ASSISTANT

## 2023-09-08 PROCEDURE — 99442 PR PHYSICIAN TELEPHONE EVALUATION 11-20 MIN: CPT | Mod: HCNC,95,, | Performed by: PHYSICIAN ASSISTANT

## 2023-09-08 NOTE — PROGRESS NOTES
Established Patient - TeleHealth Visit (Audio)     The patient location is: [place]  The chief complaint leading to consultation is: chronic pain   Visit type: Virtual visit with audio only (telephone)  Total time spent with patient: 10-15 minutes  At least 20 minutes of total time spent on the encounter, which includes time with patient and time spent preparing to see the patient (eg, review of tests), Obtaining and/or reviewing separately obtained history, Documenting clinical information in the electronic or other health record, Independently interpreting results (not separately reported) and communicating results to the patient/family/caregiver, or Care coordination (not separately reported).     The reason for the audio only service rather than synchronous audio and video virtual visit was related to technical difficulties or patient preference/necessity.     Each patient to whom I provide medical services by telemedicine is:  (1) informed of the relationship between the physician and patient and the respective role of any other health care provider with respect to management of the patient; and (2) notified that they may decline to receive medical services by telemedicine and may withdraw from such care at any time. Patient verbally consented to receive this service via voice-only telephone call.    This service was not originating from a related E/M service provided within the previous 7 days nor will  to an E/M service or procedure within the next 24 hours or my soonest available appointment.  Prevailing standard of care was able to be met in this audio-only visit.      Chronic Pain -- Established Patient (Follow-up visit)    Chief Pain Complaint:  Chief Complaint   Patient presents with    Pain   Bilateral SIJ pain - much worse on left    Interval History (9/8/2023):  Patient presents today for follow-up visit.  Patient was last seen on 8/11/2023. At that visit, the plan was to continue Lyrica. She  "reports that her pain is worse in the morning, but as she moves around, it improves. Patient reports pain as 4/10 today.    Interval History (8/11/2023):  Clau Nunn presents today for follow-up visit. At that visit, the plan was to start Lyrica, which has helped.  S/p left SIJ RFA on 6/8/23 (over a month ago) and is reporting better pain relief than she did initially. She is also getting around better. Patient reports pain as 3/10 today. Pain is worse the most in the morning when she wakes up, but pain improves as she walks around during the day.     Interval History (6/28/2023): Patient presents today for follow-up visit.  she underwent left SIJ RFA on 6/8/23 (about 3 weeks ago).  The patient reports that she is/was unchanged following the procedure.  she reports limited pain relief so far. Patient reports pain as 8/10 today.    Interval History (5/2/2023):  Clau Nunn presents today for follow-up visit.  Patient was last seen on 3/28/2023.  Patient reports pain as 5/10 today.  Pain is much worse and left posterior hip, more so over left SI joint.    Interval History (3/28/2023): Clau Nunn presents today for follow-up visit.  she underwent bilateral SIJ injection on 1/31/23.  The patient reports that she is/was better following the procedure.  she reports about 50% pain relief on left, right side feeling much better.  The changes lasted 4 weeks so far.  The changes have continued through this visit.  Patient reports pain as 5/10 today.  Seeing Podiatry for left foot fracture, which is not healing. Still having left SIJ pain.     Interval History (1/10/2023):  Clau Nunn presents today for follow-up visit.  Patient was last seen about 6 months ago. At that visit, she was feeling better since REJI, but she was having localized SIJ pain - still overall better since injection. She returns today with continued posterior "hip pain". Pain is worse on left. Patient reports pain as 8/10 " today.    Interval History (6/29/2022): Clau Nunn presents today for follow-up visit.  she underwent Bilateral L5/S1 + S1 TF REJI on 5/25/22.  The patient reports that she is/was better following the procedure.  she reports 75% pain relief with regards to leg pain.  The changes lasted 4 weeks so far.  The changes have continued through this visit.  Patient reports pain as 5/10 today. Having localized SIJ pain today.     Interval History (5/11/2022): Clau Nunn presents today for follow-up visit.  she underwent left L5/S1 + S1 TF REJI on 7/1/21 with excellent pain relief for about 3 months.  The patient reports that she is/was better following the procedure.  The changes have NOT continued through this visit.   she underwent right suprascapular nerve block on 7/13/21.  The patient reports that she is/was unchanged following the procedure. She reports limited pain relief for short term. But, she mainly has limited ROM.   Patient reports pain as 5/10 today - due to low back pain and leg pain.    Interval History (6/24/2021): Patient was seen on 2/10/21. At that time she underwent Bilateral L5/S1 TF REJI.  The patient reports that she is/was unchanged following the procedure.  she reports 20% pain relief.  The changes lasted 1 weeks.  The changes have NOT continued through this visit.  Patient reports pain as 5/10 today.  Her main pain complaint today is LLE radiculopathy worse in popliteal space and shin.   S/p left knee injection with Dr. Burns about 2 months ago with some pain relief.    Interval History (2/2/2021): Patient was last seen on 10/16/2020.  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  She is still never seen orthopedics for this issue, and she has not improved from intra-articular shoulder joint injections nor suprascapular nerve block.  Patient reports pain as 8/10 today.  Today, she is complaining of back and  bilateral leg pain, previously just on the left side, although the left side still remains the worst.    Interval History (10/19/2020): Patient was seen on 9/16/20. At that time she underwent  Right suprascapular nerve block.  The patient reports that she is/was unchanged following the procedure.  she reports no pain relief.  Patient reports pain as 6/10 today - only when she moves her arm.    Interval History (9/9/2020): Patient was seen on 8/12/20. At that time she underwent left SIJ + left hip joint + right shoulder joint injection.  The patient reports that she is/was better following the procedure.  she reports 75% pain relief with hip pain relief but only 25% relief of shoulder pain.  The changes lasted 4 weeks so far.  The changes have continued through this visit.  Patient reports pain as /10 today.    Interval History (8/5/2020): Patient was seen on 7/8/20. At that time she underwent left L2/3 + L5/S1 TF REJI.  The patient reports that she is/was slightly better following the procedure.  she reports only 50 % pain relief.  The changes lasted 4 weeks so far.  The changes have continued through this visit.  She also reports that her right shoulder has been bothering her.  She has history of right humerus fracture years ago.    Interval History (6/12/2020): She is here today to review MRI. She reports 10/10 pain today. She also has concerns about Prolia as she read that it can cause rashes and joint pain.  She has been having a rash on her right breast for a few weeks.  She will see Dr. Gomez soon to discuss.     Interval History(11/20/18): Patient was seen on 10/24/18. At that time she underwent left SIJ + left GT bursa injection with local.  The patient reports that she is/was better following the procedure.  she reports 100% pain relief.  The changes lasted 4 weeks so far.  The changes have continued through this visit.    History of Present Illness:   Clau Nunn is a 88 y.o. female  who is  presenting with a chief complaint of low back pain and hip pain. The patient began experiencing this problem insidiously, and the pain has been gradually worsening over the past 6 month(s). The pain is described as throbbing, cramping, aching and heavy and is located in the bilateral buttocks . Pain is intermittent and lasts hours. The pain radiates to  lateral thigh and groin . The patient rates her pain a 5 out of ten and interferes with activities of daily living a 5 out of ten. Pain is exacerbated by getting up from a seated position, and is improved by rest. Patient reports prior trauma (fall in June 2018 causing right distal radius + right sacrum fracture), prior lumbar surgery in ~2005, right hip replacement, right distal radius fracture requiring ORIF in June 2018.    - pertinent negatives: No fever, No chills, No weight loss, No bladder dysfunction, No bowel dysfunction, No extremity weakness, No saddle anesthesia  - pertinent positives: none    - medications, other therapies tried (physical therapy, injections):     >> Tylenol    >> Has previously undergone Physical Therapy (aquatic and land) with limited relief    >> Has previously undergone spinal injection/s:   - bilateral SIJ injection on 11-9-17 with ~30% relief for 2 weeks   - left hip injection on 12-28-17 with some relief   - bilateral L3-5 MBB on 5/11/18 with reported 100% pain relief   - left SIJ + left GT bursa injection with local on 10/24/18 with 100% pain relief   - left L2/3 + L5/S1 TF REJI on 7/8/20 with about 50% pain relief   - left SIJ + left hip joint + right shoulder joint injection on 8/12/20 with 75% pain relief with hip pain relief but only 25% relief of shoulder pain - no longer having groin pain after this procedure    - Right suprascapular nerve block on 9/16/20 with limited pain relief    - Bilateral L5/S1 TF REJI on 2/10/21 with 20% pain relief for short term   - left knee injection with Dr. Burns in April 2021 with some pain  relief   - left L5/S1 + S1 TF REJI on 7/1/21 with excellent pain relief for about 3 months   - right suprascapular nerve block on 7/13/21 with limited pain relief    - Bilateral L5/S1 + S1 TF REJI on 5/25/22 with 75% pain relief - regarding leg pain, now having localized SIJ   - bilateral SIJ injection on 1/31/23 with >50% pain relief on left, >70% pain relief on right - short-term pain relief  - left SIJ RFA on 6/8/23 with better pain relief now      Past Surgical History:   Procedure Laterality Date    ADENOIDECTOMY      BACK SURGERY      x2    breast implants  1973    CATARACT EXTRACTION Bilateral     CLOSED REDUCTION DISTAL RADIUS FRACTURE      Dr. Black, 8/18    cystoscope  1/6/16    DR. Brown    FRACTURE SURGERY  April 3 2015    left wrist    HAND SURGERY      HIP SURGERY Right     total hip- Dr. oDs Santos    HYSTERECTOMY      36y/o    INJECTION OF ANESTHETIC AGENT AROUND NERVE Right 9/16/2020    Procedure: Right suprascapular nerve block;  Surgeon: Raffi Wells MD;  Location: [facility];  Service: Pain Management;  Laterality: Right;    INJECTION OF ANESTHETIC AGENT AROUND NERVE Right 7/13/2021    Procedure: Block, Nerve Right Suprascapular Nerve Block with RN IV sedation;  Surgeon: Raffi Wells MD;  Location: [facility];  Service: Pain Management;  Laterality: Right;    INJECTION OF ANESTHETIC AGENT INTO SACROILIAC JOINT N/A 8/12/2020    Procedure: Left IA hip Joint + Left SIJ + Right shoulder injection;  Surgeon: Raffi Wells MD;  Location: [facility];  Service: Pain Management;  Laterality: N/A;    INJECTION OF ANESTHETIC AGENT INTO SACROILIAC JOINT Bilateral 1/31/2023    Procedure: Bilateral SIJ Injection w/Local;  Surgeon: Abdirahman Parker MD;  Location: [facility];  Service: Pain Management;  Laterality: Bilateral;    INJECTION OF JOINT N/A 8/12/2020    Procedure: Left IA hip Joint + Left SIJ + Right shoulder injection;  Surgeon: Raffi Wells MD;  Location: [facility];  Service: Pain  "Management;  Laterality: N/A;    JOINT REPLACEMENT Right     R NNAMDI - Dr. Dos Santos    LEG SURGERY Right     ex-fix tib/fib    RADIOFREQUENCY THERMOCOAGULATION Left 6/8/2023    Procedure: Left SIJ RFA w/ local (give Valium before);  Surgeon: Abdirahman Parker MD;  Location: [facility];  Service: Pain Management;  Laterality: Left;    SELECTIVE INJECTION OF ANESTHETIC AGENT AROUND LUMBAR SPINAL NERVE ROOT BY TRANSFORAMINAL APPROACH Bilateral 2/10/2021    Procedure: Bilateral L5/S1 TF REJI;  Surgeon: Raffi Wells MD;  Location: [facility];  Service: Pain Management;  Laterality: Bilateral;    SELECTIVE INJECTION OF ANESTHETIC AGENT AROUND LUMBAR SPINAL NERVE ROOT BY TRANSFORAMINAL APPROACH Bilateral 5/25/2022    Procedure: Bilateral L5/S1 + S1 TF REJI;  Surgeon: Raffi Wells MD;  Location: [facility];  Service: Pain Management;  Laterality: Bilateral;    TONSILLECTOMY      TRANSFORAMINAL EPIDURAL INJECTION OF STEROID Left 7/8/2020    Procedure: left L2/3 + L5/S1 TF REJI;  Surgeon: Raffi Wells MD;  Location: [facility];  Service: Pain Management;  Laterality: Left;    TRANSFORAMINAL EPIDURAL INJECTION OF STEROID Left 7/1/2021    Procedure: left L5/S1 + S1 TF REJI;  Surgeon: Raffi Wells MD;  Location: [facility];  Service: Pain Management;  Laterality: Left;        Imaging / Labs / Studies (reviewed on 9/8/2023):     Records from [facility] regarding right shoulder fracture- uploaded into chart under "Media"    Results for orders placed during the hospital encounter of 08/05/20   X-ray Shoulder 2 or More Views Right    Narrative COMPARISON:  12/10/2018  FINDINGS:  There is a chronic fracture deformity of the right humeral head and neck.  Associated osseous productive changes approximate the inferior margins of the articular surface of the humeral head.  No definite acute fractures or dislocations visualized.  There are mild degenerative changes present at the AC joint and glenoid.  Postoperative findings noted in the " lower thoracic spine.     Results for orders placed during the hospital encounter of 12/10/18   X-Ray Shoulder Trauma Right    Narrative FINDINGS:  Comminuted fracture involving the surgical neck and right humeral head.  No evidence of dislocation.  Degenerative changes are present at the right AC joint.    Impression Comminuted fracture involving the right humeral head and surgical neck.     Results for orders placed during the hospital encounter of 06/29/20   MRI Lumbar Spine W WO Cont    Narrative COMPARISON:  Prior MRI from June 29, 2020.  FINDINGS:  Again demonstrated are postoperative findings from thoracolumbar spinal fusion and laminectomy at T12.  Chronic compression deformity of T12 with chronic retropulsion that flattens the thecal sac to 14 mm.  This is unchanged.  Mild, grade 1 degenerative spondylolisthesis at L4-L5.  Vertebral body height is normal.  No abnormal enhancement patterns. The conus medullaris terminates at the level of L2-L3, low lying.  No abnormal signal within the conus. Intervertebral disc levels are as follows:  T11-T12 disc: Fusion at this level.  Mild, chronic retropulsion that flattens the thecal sac to 14 mm.  No significant foraminal stenosis.  T12-L1 disc: Prior laminectomy and fusion.  The thecal sac measures 19 mm.  No significant foraminal stenosis.  L1-L2 disc : Posterior fusion.  No significant spinal stenosis or foraminal stenosis.  L2-L3 disc: Disc space height loss.  Broad-based posterior disc bulge which encroaches slightly into the floors of the exit foramina.  Moderate buckling of ligamentum flavum.  The thecal sac measures 8-9 mm AP.  Mild bilateral foraminal stenosis.  This has progressed since the prior MRI.  L3-L4 disc: Broad-based posterior disc bulge that encroaches slightly into the floors of the exit foramina.  Moderate buckling of ligamentum flavum.  The thecal sac measures 10 mm AP.  Mild bilateral neural foraminal stenosis, right greater than left.  These  findings are similar to prior.  L4-L5 disc: Minimal grade 1 spondylolisthesis with severe degenerative facet change and hypertrophy.  Left-sided facet joint effusion.  Is buckling of ligamentum flavum.  The thecal sac measures 11 mm.  No significant foraminal stenosis.  The spondylolisthesis has slightly progressed measuring 4 mm compared to prior 2 mm.  L5-S1 disc: Severe disc space height loss with marginal disc and osteophyte encroach into the exit foramina bilaterally.  Moderate degenerative facet hypertrophy.  The thecal sac measures 14 mm.    Impression 1. 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  2. Progression of mild foraminal stenosis and mild spinal stenosis at L2-L3.  3. Minimal progression of grade 1 spondylolisthesis at L4-L5.  4. Unchanged mild, chronic retropulsion from T12 where there has been a laminectomy and fusion.       7/30/2018 XR LUMBAR SPINE COMPLETE 5 VIEW  TECHNIQUE:  AP, lateral, spot and bilateral oblique views of the lumbar spine were performed.  COMPARISON:  07/25/2018  FINDINGS:  There is grade 1 spondylolisthesis of L4 on L5.  Pedicle screws and fixation rods are noted for at the T10, T11, L1 and L2 levels.  Chronic compression deformity at the L1 level unchanged.  Prominent bilateral facet arthropathy noted at the L4-5 and L5-S1 levels.  IVC filter noted.     7/30/2018 XR HIPS BILATERAL 2 VIEW INCL AP PELVIS  TECHNIQUE:  AP view of the pelvis and frogleg lateral views of both hips were performed.  COMPARISON:  07/26/2018  FINDINGS:  The bony pelvis is intact. A right total hip arthroplasty, plate and wires are in place.  No hardware failure or loosening suggested.  The appearance is unchanged when compared to the prior exam.  Mild degenerative changes noted at the left hip.  No acute fracture or dislocation of the left hip.  No significant joint space narrowing identified.  No plain film evidence to suggest AVN of either hip.     Results for orders placed during the hospital encounter  of 01/13/14   MRI Lumbar Spine W WO Contrast    Narrative Findings: Pre- and postcontrast multiplanar multisequence imaging of the thoracic and lumbar spine was performed using 7 cc of Gadavist intravenous contrast material.  There is moderately severe chronic central compression deformity of the T12 vertebral body which is stabilized by paraspinous rods and pedicle screws which extend from T10 through L2.  Postsurgical changes of laminectomy also noted at T12.  The posterior cortex of the T12 vertebral body is displaced posteriorly and indents the ventral thecal sac.  There is no anterior cord contact or significant central canal stenosis.  Degenerative disk desiccation is noted at multiple levels with moderate disk   narrowing at L5-S1.    Also L5-S1 is a broad-based disk protrusion which combined with bilateral facet hypertrophy results in moderately severe narrowing of both neural foramina.  No significant central canal stenosis noted.    From L2-3 through L4-5 there   is mild disk bulging which results in mild bilateral foraminal narrowing.  This is slightly more pronounced at L4-5 with bilateral facet hypertrophy a contributing factor.  No significant central canal stenosis noted.  No thoracic disk extrusion, and foraminal narrowing, canal stenosis is evident.  Thoracolumbar cord is intact.  Paravertebral soft tissues are symmetric and normal in appearance.         Review of Systems:  CONSTITUTIONAL: patient denies any fever, chills, or weight loss  SKIN: patient denies any rash or itching  RESPIRATORY: patient denies having any shortness of breath  GASTROINTESTINAL: patient denies having any diarrhea, constipation, or bowel incontinence  GENITOURINARY: patient denies having any abnormal bladder function    MUSCULOSKELETAL:  - patient complains of the above noted pain/s (see chief pain complaint)    NEUROLOGICAL:   - pain as above  - strength in Lower extremities is intact, BILATERALLY  - sensation in Lower  extremities is intact, BILATERALLY  - patient denies any loss of bowel or bladder control      PSYCHIATRIC: patient denies any change in mood    Other:  All other systems reviewed and are negative        Physical Exam:  Vitals:    09/08/23 1048   Resp: 17     There is no height or weight on file to calculate BMI.   (reviewed on 9/8/2023)     *No Physical Exam performed today - audio visit only*    Physical Exam from last in clinic visit:   General: alert and oriented, in no apparent distress.  Gait: antalgic gait   Skin: no rashes, no discoloration, no obvious lesions  HEENT: normocephalic, atraumatic.   Respiratory: without use of accessory muscles of respiration.  GI: no obvious distention.    Musculoskeletal - Lumbar Spine:  - Midline scar present over thoracic spine  - Pain on flexion of lumbar spine: Present, worse than with extension  - Pain on extension of lumbar spine: Present  - Lumbar facet loading: Positive bilaterally  - TTP over the lumbar facet joints: Absent  - TTP over GT bursa: Minimal   - Straight Leg Raise: Positive bilaterally, L>R - improved   - Pain on Internal and external rotation of the hip: Present    SIJ testing:  - TTP over the SI joints: Present on left > right  - Srikanth's/ Micheal's: Positive    - Sacroiliac Compression Test (lateral pressure): Positive   - SacralThrust Test (posterior pressure): Positive    Right Shoulder:  - Pain on abduction: Present   - ROM: decreased secondary to pain, very limited ROM      - TTP over the AC and GH joint: Present  - Neer's: Positive  - Hawkin's: Positive    Neuro - Lower Extremities:  - BLE Strength: R/L: HF: 5/5, HE: 5/5, KF: 5/5; KE: 5/5; FE: 5/5; FF: 5/5  - Extremity Reflexes: Brisk and symmetric throughout  - Sensory: Sensation to light touch intact bilaterally      Psych:  Mood and affect is appropriate            Assessment:  Clau Nunn is a 88 y.o. year old female who is presenting with       ICD-10-CM ICD-9-CM    1. Radicular pain   M54.10 729.2       2. History of right hip replacement  Z96.641 V43.64       3. Sacroiliitis  M46.1 720.2               Plan:  1. Interventional:   - S/p left SIJ RFA on 6/8/23 with better pain relief than she was reporting initially.  She is also getting around better.  - Consider right SIJ RFA if warranted after.   - Consider repeat Bilateral L5/S1 + S1 TF REJI.Will hold off since radicular pain not an issue at this time.  - S/p bilateral SIJ injection on 1/31/23 with >50% pain relief on left, >70% pain relief on right - short-term pain relief.  - S/p Bilateral L5/S1 + S1 TF REJI on 5/25/22 with 75% pain relief - regarding leg pain, now having localized SIJ.   - S/p Bilateral L5/S1 TF REJI on 2/10/21 with 20% pain relief x 1 week. Pain now more localized on left.   - S/p Right suprascapular nerve block on 9/16/20 with limited pain relief.    2. Pharmacologic:   - Refill Lyrica 75mg BID -- started at previous visit, which is helping. Consider Increase if warranted.  - Patient encouraged to take Tylenol 500mg x 2 tablets (1000mg) TID more regularly, not to exceed 3000mg per day.   - Anticoagulation use: apixaban (Eliquis) - 2° prevention (h/o DVT) - Heme/Onc.     3. Rehabilitative: Continue use of SIJ belt at home with housework. Physical therapy for shoulder did not help in the past, so doesn't want to attend. SIJ exercises given on AVS previously. Patient is very active.    4.  Diagnostic/ Imaging: No new imaging ordered. Previous imaging reviewed.     5. Consult/ Referral: She has an appointment with Dr. Vazquez (vascular specialist) tomorrow for right dorsal foot pain due to engorgement of vein.    6. Follow up: 3 months follow-up     - This condition does not require this patient to take time off of work, and the primary goal of our Pain Management services is to improve the patient's functional capacity.   - I discussed the risks, benefits, and alternatives to potential treatment options. All questions and  concerns were fully addressed today in clinic.         Angelica Caballero PA-C  Interventional Pain Management - [facility]    Disclaimer:  This note was prepared using voice recognition system and is likely to have sound alike errors that may have been overlooked even after proof reading.  Please call me with any questions.

## 2023-09-28 NOTE — PROGRESS NOTES
"Subjective:      Patient ID: Clau Nunn is a 88 y.o. female.    Chief Complaint: Follow-up (Three month follow up. Pt states she has chest pains and back pain for a month. Can't taste or smell.)      HPI  Here for f/u medical problems and preventive exam.  BMs ok with stool softener.  Active daily, but exercise restricted by back and knee pain.  Taking vit D.    Avoiding NSAIDs.  No f/c/sw.  Coughing occas, nebulizer albuterol.  No exertional cp/sob/palp.    Had chest burning last week, tums relieved.  Weight pretty stable.  Urine normal.      HM: 10/23 fluvax, 1/21 covid vaccine/booster, 11/19 HAV, 5/16 [id number], 5/17 booster [id number], 12/22 [email or web address], 2/22 TDaP, 11/09 zoster, 7/22 Shingrix #2, 9/22 BMD on prolia, 1/14 Cscope no more needed, 12/22 MMG/ no more, 10/22 Eye Dr. Rubalcava.     Review of Systems   Constitutional:  Negative for appetite change, chills, diaphoresis and fever.   HENT:  Negative for congestion, ear pain, rhinorrhea, sinus pressure and sore throat.    Respiratory:  Negative for cough, chest tightness and shortness of breath.    Cardiovascular:  Negative for chest pain and palpitations.   Gastrointestinal:  Negative for blood in stool, constipation, diarrhea, nausea and vomiting.   Genitourinary:  Negative for dysuria, frequency, hematuria, menstrual problem, urgency and vaginal discharge.   Musculoskeletal:  Negative for arthralgias.   Skin:  Negative for rash.   Neurological:  Negative for dizziness and headaches.   Psychiatric/Behavioral:  Negative for sleep disturbance. The patient is not nervous/anxious.          Objective:   BP (!) 186/82 (BP Location: Right arm)   Pulse 70   Ht 5' 4" (1.626 m)   Wt 75.9 kg (167 lb 6.4 oz)   SpO2 97%   BMI 28.73 kg/m²     Physical Exam  Constitutional:       Appearance: She is well-developed.   HENT:      Right Ear: External ear normal. Tympanic membrane is not injected.      Left Ear: External ear normal. Tympanic membrane is not injected. "   Eyes:      Conjunctiva/sclera: Conjunctivae normal.   Neck:      Thyroid: No thyromegaly.   Cardiovascular:      Rate and Rhythm: Normal rate and regular rhythm.      Heart sounds: No murmur heard.     No friction rub. No gallop.   Pulmonary:      Effort: Pulmonary effort is normal.      Breath sounds: Normal breath sounds. No wheezing or rales.   Abdominal:      General: Bowel sounds are normal.      Palpations: Abdomen is soft. There is no mass.      Tenderness: There is no abdominal tenderness.   Musculoskeletal:      Cervical back: Normal range of motion and neck supple.   Lymphadenopathy:      Cervical: No cervical adenopathy.   Skin:     General: Skin is warm.      Findings: No rash.   Neurological:      Mental Status: She is alert and oriented to person, place, and time.          Latest Reference Range & Units 07/07/23 15:37 08/11/23 13:37   WBC 3.90 - 12.70 K/uL 12.12    RBC 4.00 - 5.40 M/uL 4.47    Hemoglobin 12.0 - 16.0 g/dL 14.2    Hematocrit 37.0 - 48.5 % 45.7    MCV 82 - 98 fL 102 (H)    MCH 27.0 - 31.0 pg 31.8 (H)    MCHC 32.0 - 36.0 g/dL 31.1 (L)    RDW 11.5 - 14.5 % 12.7    Platelet Count 150 - 450 K/uL 283    MPV 9.2 - 12.9 fL 12.1    Gran % 38.0 - 73.0 % 69.1    Lymph % 18.0 - 48.0 % 17.0 (L)    Mono % 4.0 - 15.0 % 5.0    Eosinophil % 0.0 - 8.0 % 7.9    Basophil % 0.0 - 1.9 % 0.6    Immature Granulocytes 0.0 - 0.5 % 0.4    Gran # (ANC) 1.8 - 7.7 K/uL 8.4 (H)    Lymph # 1.0 - 4.8 K/uL 2.1    Mono # 0.3 - 1.0 K/uL 0.6    Eos # 0.0 - 0.5 K/uL 1.0 (H)    Baso # 0.00 - 0.20 K/uL 0.07    Immature Grans (Abs) 0.00 - 0.04 K/uL 0.05 (H)    nRBC 0 /100 WBC 0    Differential Method  Automated    Sodium 136 - 145 mmol/L 140 140   Potassium 3.5 - 5.1 mmol/L 4.9 4.7   Chloride 95 - 110 mmol/L 105 103   CO2 23 - 29 mmol/L 24 23   Anion Gap 8 - 16 mmol/L 11 14   BUN 8 - 23 mg/dL 21 14   Creatinine 0.5 - 1.4 mg/dL 1.3 1.4   eGFR >60 mL/min/1.73 m^2 39.8 ! 36 !   Glucose 70 - 110 mg/dL 95 95   Calcium 8.7 - 10.5  mg/dL 10.0 9.6   ALP 55 - 135 U/L 57 63   PROTEIN TOTAL 6.0 - 8.4 g/dL 7.2 7.4   Albumin 3.5 - 5.2 g/dL 3.8 3.9   BILIRUBIN TOTAL 0.1 - 1.0 mg/dL 0.5 0.8   AST 10 - 40 U/L 23 19   ALT 10 - 44 U/L 12 8 (L)   Cholesterol Total 120 - 199 mg/dL 194    HDL 40 - 75 mg/dL 47    HDL/Cholesterol Ratio 20.0 - 50.0 % 24.2    LDL Cholesterol External 63.0 - 159.0 mg/dL 117.2    Non-HDL Cholesterol mg/dL 147    Total Cholesterol/HDL Ratio 2.0 - 5.0  4.1    Triglycerides 30 - 150 mg/dL 149    TSH 0.400 - 4.000 uIU/mL 1.593        Assessment:       1. Essential hypertension    2. Stage 3b chronic kidney disease    3. Recurrent major depressive disorder, in full remission    4. Secondary hyperparathyroidism    5. Simple chronic bronchitis    6. History of DVT in adulthood    7. Chronic anticoagulation    8. Acquired hypothyroidism    9. Age-related osteoporosis with current pathological fracture with delayed healing    10. Gastroesophageal reflux disease without esophagitis    11. Vitamin D deficiency disease    12. Encounter for preventive health examination    13. Constipation, unspecified constipation type    14. Lumbar radiculopathy    15. Early satiety    16. Epigastric pain          Plan:     Essential hypertension- white coat component, record at home, RTC 2wk.    Stage 3b chronic kidney disease- recheck now.  -     Basic Metabolic Panel; Future; Expected date: 10/12/2023  -     Microalbumin/Creatinine Ratio, Urine; Future; Expected date: 10/12/2023    Recurrent major depressive disorder, in full remission- increase SNRI.  RTC 2mo.  -     DULoxetine (CYMBALTA) 60 MG capsule; Take 1 capsule (60 mg total) by mouth once daily.  Dispense: 90 capsule; Refill: 3    Secondary hyperparathyroidism- recheck lab now.    Simple chronic bronchitis- cont albuterol prn.    History of DVT in adulthood, Chronic anticoagulation    Acquired hypothyroidism- Clinically stable, continue present treatment.    Age-related osteoporosis with current  pathological fracture with delayed healing- on prolia.    Gastroesophageal reflux disease without esophagitis- cont PPI.    Vitamin D deficiency disease- recheck now.    Encounter for preventive health examination- fluvax.    Constipation, unspecified constipation type- ? Worse since SNRI- check KUB.  -     X-Ray Abdomen AP 1 View; Future; Expected date: 10/12/2023    Lumbar radiculopathy  -     DULoxetine (CYMBALTA) 60 MG capsule; Take 1 capsule (60 mg total) by mouth once daily.  Dispense: 90 capsule; Refill: 3    Early satiety, no wt loss, epig pain on and off- poss u/s vs CT, if KUB not showing constipation.

## 2023-10-07 DIAGNOSIS — I10 ESSENTIAL HYPERTENSION: Chronic | ICD-10-CM

## 2023-10-07 RX ORDER — OMEPRAZOLE 40 MG/1
40 CAPSULE, DELAYED RELEASE ORAL EVERY MORNING
Qty: 90 CAPSULE | Refills: 2 | Status: SHIPPED | OUTPATIENT
Start: 2023-10-07 | End: 2024-01-09 | Stop reason: SDUPTHER

## 2023-10-07 NOTE — TELEPHONE ENCOUNTER
Refill Routing Note   Medication(s) are not appropriate for processing by [facility] for the following reason(s):      Required vitals abnormal    [facility] action(s):  Defer  Approve Care Due:  None identified              Appointments  past 12m or future 3m with PCP    Date Provider   Last Visit   7/7/2023 Jeanette Molina MD   Next Visit   10/12/2023 eJanette Molina MD   ED visits in past 90 days: 0        Note composed:4:17 PM 10/07/2023

## 2023-10-07 NOTE — TELEPHONE ENCOUNTER
No care due was identified.  [facility] Embedded Care Due Messages. Reference number: [id number].   10/07/2023 2:03:31 PM CDT

## 2023-10-08 RX ORDER — VALSARTAN 80 MG/1
80 TABLET ORAL
Qty: 90 TABLET | Refills: 2 | Status: SHIPPED | OUTPATIENT
Start: 2023-10-08 | End: 2023-12-15 | Stop reason: SDUPTHER

## 2023-10-11 ENCOUNTER — OFFICE VISIT (OUTPATIENT)
Dept: PODIATRY | Facility: CLINIC | Age: 88
End: 2023-10-11
Payer: MEDICARE

## 2023-10-11 DIAGNOSIS — L60.3 ONYCHODYSTROPHY: ICD-10-CM

## 2023-10-11 DIAGNOSIS — I87.2 CHRONIC VENOUS INSUFFICIENCY: ICD-10-CM

## 2023-10-11 DIAGNOSIS — Z79.01 CURRENT USE OF LONG TERM ANTICOAGULATION: Primary | ICD-10-CM

## 2023-10-11 DIAGNOSIS — Z86.718 HISTORY OF DVT (DEEP VEIN THROMBOSIS): ICD-10-CM

## 2023-10-11 PROCEDURE — 99999 PR PBB SHADOW E&M-EST. PATIENT-LVL III: ICD-10-PCS | Mod: PBBFAC,HCNC,, | Performed by: PODIATRIST

## 2023-10-11 PROCEDURE — 99499 NO LOS: ICD-10-PCS | Mod: HCNC,S$GLB,, | Performed by: PODIATRIST

## 2023-10-11 PROCEDURE — 11721 DEBRIDE NAIL 6 OR MORE: CPT | Mod: Q9,HCNC,S$GLB, | Performed by: PODIATRIST

## 2023-10-11 PROCEDURE — 11721 PR DEBRIDEMENT OF NAILS, 6 OR MORE: ICD-10-PCS | Mod: Q9,HCNC,S$GLB, | Performed by: PODIATRIST

## 2023-10-11 PROCEDURE — 99999 PR PBB SHADOW E&M-EST. PATIENT-LVL III: CPT | Mod: PBBFAC,HCNC,, | Performed by: PODIATRIST

## 2023-10-11 PROCEDURE — 99499 UNLISTED E&M SERVICE: CPT | Mod: HCNC,S$GLB,, | Performed by: PODIATRIST

## 2023-10-11 NOTE — PROGRESS NOTES
Subjective:       Patient ID: Clau Nunn is a 88 y.o. female.    Chief Complaint: Routine Foot Care (7.7.23 last seen PCP Jeanette Molina. She denies pain at present and continues on LTAT)    HPI: Patient presents to the office today with the chief complaint of elongated, thickened and dystrophic nail plates to the B/L foot.  Patient does have a past medical history long-term anticoagulation use secondary to history of DVT in chronic venous insufficiency.. Patient does follow with Primary Care for management of comorbid states. This patient's PMD is Jeanette Molina MD with last appointment on 7/7/2023.   Continues to follow with Dr. Vazquez with vascular surgery.    Review of patient's allergies indicates:   Allergen Reactions    Cephalexin Hives, Itching, Swelling, Anxiety, Dermatitis and Rash       Past Medical History:   Diagnosis Date    Allergy     Anxiety     Asthma     Back pain     Chronic venous insufficiency 11/19/2013    Depression     Diverticulosis 11/19/2013 1/8/8 colonoscopy    Dry eyes     Fracture, sacrum/coccyx     Dr. Sanchez     GERD (gastroesophageal reflux disease)     H/O total hip arthroplasty 12/30/2015    Hypertension     Hypothyroid     Osteoarthritis     Osteoporosis     Postlaminectomy syndrome of lumbar region 1/8/2014    Radius fracture     7/18    Simple chronic bronchitis 5/3/2017    Umbilical hernia 11/19/2013    Urinary incontinence     Vitamin D deficiency disease        Family History   Problem Relation Age of Onset    COPD Mother     Cancer Mother         lung CA    Pulmonary fibrosis Sister     Cancer Daughter         colon    Stroke Father     Diabetes Son     Melanoma Neg Hx     Psoriasis Neg Hx     Lupus Neg Hx        Social History     Socioeconomic History    Marital status:    Occupational History    Occupation: retired     Comment: [employer]   Tobacco Use    Smoking status: Never    Smokeless tobacco: Never   Substance and Sexual  Activity    Alcohol use: Yes     Alcohol/week: 0.0 standard drinks of alcohol     Comment: rarely    Drug use: Never    Sexual activity: Never     Partners: Male     Birth control/protection: Post-menopausal   Social History Narrative    Patient is retired from [employer]     Social Determinants of Health     Financial Resource Strain: Low Risk  (7/13/2022)    Overall Financial Resource Strain (CARDIA)     Difficulty of Paying Living Expenses: Not hard at all   Food Insecurity: No Food Insecurity (7/13/2022)    Hunger Vital Sign     Worried About Running Out of Food in the Last Year: Never true     Ran Out of Food in the Last Year: Never true   Transportation Needs: No Transportation Needs (7/13/2022)    PRAPARE - Transportation     Lack of Transportation (Medical): No     Lack of Transportation (Non-Medical): No   Physical Activity: Inactive (7/13/2022)    Exercise Vital Sign     Days of Exercise per Week: 0 days     Minutes of Exercise per Session: 0 min   Stress: No Stress Concern Present (7/13/2022)    [employer] - Occupational Stress Questionnaire     Feeling of Stress : Only a little   Social Connections: Moderately Integrated (7/13/2022)    Social Connection and Isolation Panel [NHANES]     Frequency of Communication with Friends and Family: More than three times a week     Frequency of Social Gatherings with Friends and Family: More than three times a week     Attends Jew Services: More than 4 times per year     Active Member of Clubs or Organizations: No     Attends Club or Organization Meetings: More than 4 times per year     Marital Status:    Housing Stability: Unknown (7/13/2022)    Housing Stability Vital Sign     Unable to Pay for Housing in the Last Year: No     Unstable Housing in the Last Year: No       Past Surgical History:   Procedure Laterality Date    ADENOIDECTOMY      BACK SURGERY      x2    breast implants  1973    CATARACT EXTRACTION  Bilateral     CLOSED REDUCTION DISTAL RADIUS FRACTURE      Dr. Black, 8/18    cystoscope  1/6/16    DR. Brown    FRACTURE SURGERY  April 3 2015    left wrist    HAND SURGERY      HIP SURGERY Right     total hip- Dr. Dos Santos    HYSTERECTOMY      36y/o    INJECTION OF ANESTHETIC AGENT AROUND NERVE Right 9/16/2020    Procedure: Right suprascapular nerve block;  Surgeon: Raffi Wells MD;  Location: [facility];  Service: Pain Management;  Laterality: Right;    INJECTION OF ANESTHETIC AGENT AROUND NERVE Right 7/13/2021    Procedure: Block, Nerve Right Suprascapular Nerve Block with RN IV sedation;  Surgeon: Raffi Wells MD;  Location: [facility];  Service: Pain Management;  Laterality: Right;    INJECTION OF ANESTHETIC AGENT INTO SACROILIAC JOINT N/A 8/12/2020    Procedure: Left IA hip Joint + Left SIJ + Right shoulder injection;  Surgeon: aRffi Wells MD;  Location: [facility];  Service: Pain Management;  Laterality: N/A;    INJECTION OF ANESTHETIC AGENT INTO SACROILIAC JOINT Bilateral 1/31/2023    Procedure: Bilateral SIJ Injection w/Local;  Surgeon: Abdirahman Parker MD;  Location: [facility];  Service: Pain Management;  Laterality: Bilateral;    INJECTION OF JOINT N/A 8/12/2020    Procedure: Left IA hip Joint + Left SIJ + Right shoulder injection;  Surgeon: Raffi Wells MD;  Location: [facility];  Service: Pain Management;  Laterality: N/A;    JOINT REPLACEMENT Right     R NNAMDI - Dr. Dos Santos    LEG SURGERY Right     ex-fix tib/fib    RADIOFREQUENCY THERMOCOAGULATION Left 6/8/2023    Procedure: Left SIJ RFA w/ local (give Valium before);  Surgeon: Abdirahman Parker MD;  Location: [facility];  Service: Pain Management;  Laterality: Left;    SELECTIVE INJECTION OF ANESTHETIC AGENT AROUND LUMBAR SPINAL NERVE ROOT BY TRANSFORAMINAL APPROACH Bilateral 2/10/2021    Procedure: Bilateral L5/S1 TF REJI;  Surgeon: Raffi Wells MD;  Location: [facility];  Service: Pain Management;  Laterality: Bilateral;     SELECTIVE INJECTION OF ANESTHETIC AGENT AROUND LUMBAR SPINAL NERVE ROOT BY TRANSFORAMINAL APPROACH Bilateral 5/25/2022    Procedure: Bilateral L5/S1 + S1 TF REJI;  Surgeon: Raffi Wells MD;  Location: [facility];  Service: Pain Management;  Laterality: Bilateral;    TONSILLECTOMY      TRANSFORAMINAL EPIDURAL INJECTION OF STEROID Left 7/8/2020    Procedure: left L2/3 + L5/S1 TF REJI;  Surgeon: Raffi Wells MD;  Location: [facility];  Service: Pain Management;  Laterality: Left;    TRANSFORAMINAL EPIDURAL INJECTION OF STEROID Left 7/1/2021    Procedure: left L5/S1 + S1 TF REJI;  Surgeon: Raffi Wells MD;  Location: [facility];  Service: Pain Management;  Laterality: Left;       Review of Systems       Objective:   There were no vitals taken for this visit.    Physical Exam  LOWER EXTREMITY PHYSICAL EXAMINATION    VASCULAR:  The right dorsalis pedis pulse 2/4 and the right posterior tibial pulse 1/4.  The left dorsalis pedis pulse 2/4 and posterior tibial pulse on the left is 1/4.  Capillary refill is intact.  Pedal hair growth decreased.  Varicosities and telangiectasias noted to the bilateral lower extremities.      NEUROLOGY:  Sharp/dull, light touch, proprioception all intact and equal bilaterally.  Vibratory sensation is intact.  Protective sensation intact    DERMATOLOGY:  Skin is supple, moist, intact.  There is no signs of callusing, ulcerations, other lesions identified to the dorsal or plantar aspect of the right or left foot.  The R1, 2, 5 and left L1,2, 5 are thickened, discolored dystrophic.  There is subungual debris.  Nail plates have area of dark discoloration.  The remaining nails 3-4 on the right foot and the left foot are elongated but of normal color, thickness, and texture.   There is no signs of ingrowing into the medial or lateral borders.  There is no evidence of wounds or skin breakdown.  No edema or erythema.  No obvious lacerations or fissuring.  Interdigital spaces are clean, dry,  intact.  No rashes or scars appreciated.    ORTHOPEDIC: Manual Muscle Testing is 5/5 in all planes on the left and right, without pains, with and without resistance. Gait pattern is non-antalgic.    Assessment:     1. Current use of long term anticoagulation    2. History of DVT (deep vein thrombosis)    3. Chronic venous insufficiency    4. Onychodystrophy        Plan:     Current use of long term anticoagulation    History of DVT (deep vein thrombosis)    Chronic venous insufficiency    Onychodystrophy        Foot counseling and education is provided at this visit. Patient is advised to wear socks and shoes at all times.  Do not walk barefoot, or with just socks, even when indoors.  Be sure to check and inspect the inside of the shoe before putting them on her feet.  Protect your feet at all times.  Walking shoes and/or athletic shoes are the best types of shoe gear. Do not wear vinyl or plastic type shoe gear, as they do not stretch and/or breathe.  Protect your feet from hot and/or cold. Elevate the extremities when sitting.  Do not wear excessively tight socks and/or shoe gear. Wiggle your toes for a few minutes throughout the day. Move your ankles up and down, in and out, to help blood flow in your lower extremity.    Dystrophic nail plates, as outlined above (R#1,2,5  ; L#1,2,5 ), are sharply debrided with double action nail nipper, and/or with the assistance of a mechanical rotary gamal, with removal of all offending nail and nail border(s), for reduction of pains. Nails are reduced in terms of length, width and girth with removal of subungual debris to facilitate pain free weight bearing and ambulation. The elongated nails as outlined in the objective portion of this note, were trimmed to appropriate length, with a double action nail nipper, for alleviation/reduction of pains as well. Follow up in approx. 3-4 months.          Future Appointments   Date Time Provider Department Center   10/12/2023  2:20 PM  Jeanette Molina MD [id number]    10/24/2023  8:30 AM Kd Rubalcava MD [facility]   12/13/2023  1:40 PM Angelica Caballero PA-C [facility] IN PN [facility]   1/8/2024  8:30 AM Kymberly Kim MD [facility]   1/11/2024  9:30 AM Iliana Amaro DPM [facility] POD [facility]   2/19/2024 11:00 AM LABORATORY, [facility]   2/21/2024  1:00 PM Verna Su PA-C [facility] RHEU  [facility]   2/21/2024  1:45 PM INJECTION 1, [facility] INF[facility]

## 2023-10-12 ENCOUNTER — HOSPITAL ENCOUNTER (OUTPATIENT)
Dept: RADIOLOGY | Facility: HOSPITAL | Age: 88
Discharge: HOME | End: 2023-10-12
Attending: INTERNAL MEDICINE
Payer: MEDICARE

## 2023-10-12 ENCOUNTER — OFFICE VISIT (OUTPATIENT)
Dept: PRIMARY CARE CLINIC | Facility: CLINIC | Age: 88
End: 2023-10-12
Payer: MEDICARE

## 2023-10-12 VITALS
BODY MASS INDEX: 28.57 KG/M2 | DIASTOLIC BLOOD PRESSURE: 82 MMHG | SYSTOLIC BLOOD PRESSURE: 186 MMHG | HEIGHT: 64 IN | WEIGHT: 167.38 LBS | OXYGEN SATURATION: 97 % | HEART RATE: 70 BPM

## 2023-10-12 DIAGNOSIS — M54.16 LUMBAR RADICULOPATHY: ICD-10-CM

## 2023-10-12 DIAGNOSIS — R10.13 EPIGASTRIC PAIN: ICD-10-CM

## 2023-10-12 DIAGNOSIS — E03.9 ACQUIRED HYPOTHYROIDISM: ICD-10-CM

## 2023-10-12 DIAGNOSIS — N18.32 STAGE 3B CHRONIC KIDNEY DISEASE: ICD-10-CM

## 2023-10-12 DIAGNOSIS — K59.00 CONSTIPATION, UNSPECIFIED CONSTIPATION TYPE: ICD-10-CM

## 2023-10-12 DIAGNOSIS — Z00.00 ENCOUNTER FOR PREVENTIVE HEALTH EXAMINATION: ICD-10-CM

## 2023-10-12 DIAGNOSIS — Z86.718 HISTORY OF DVT IN ADULTHOOD: ICD-10-CM

## 2023-10-12 DIAGNOSIS — E55.9 VITAMIN D DEFICIENCY DISEASE: ICD-10-CM

## 2023-10-12 DIAGNOSIS — J41.0 SIMPLE CHRONIC BRONCHITIS: ICD-10-CM

## 2023-10-12 DIAGNOSIS — F33.42 RECURRENT MAJOR DEPRESSIVE DISORDER, IN FULL REMISSION: ICD-10-CM

## 2023-10-12 DIAGNOSIS — Z79.01 CHRONIC ANTICOAGULATION: ICD-10-CM

## 2023-10-12 DIAGNOSIS — I10 ESSENTIAL HYPERTENSION: Primary | Chronic | ICD-10-CM

## 2023-10-12 DIAGNOSIS — M80.00XG AGE-RELATED OSTEOPOROSIS WITH CURRENT PATHOLOGICAL FRACTURE WITH DELAYED HEALING: ICD-10-CM

## 2023-10-12 DIAGNOSIS — N25.81 SECONDARY HYPERPARATHYROIDISM: ICD-10-CM

## 2023-10-12 DIAGNOSIS — R68.81 EARLY SATIETY: ICD-10-CM

## 2023-10-12 DIAGNOSIS — K21.9 GASTROESOPHAGEAL REFLUX DISEASE WITHOUT ESOPHAGITIS: ICD-10-CM

## 2023-10-12 PROCEDURE — 74018 RADEX ABDOMEN 1 VIEW: CPT | Mod: 26,HCNC,, | Performed by: RADIOLOGY

## 2023-10-12 PROCEDURE — 1101F PT FALLS ASSESS-DOCD LE1/YR: CPT | Mod: HCNC,CPTII,S$GLB, | Performed by: INTERNAL MEDICINE

## 2023-10-12 PROCEDURE — 1159F PR MEDICATION LIST DOCUMENTED IN MEDICAL RECORD: ICD-10-PCS | Mod: HCNC,CPTII,S$GLB, | Performed by: INTERNAL MEDICINE

## 2023-10-12 PROCEDURE — 74018 XR ABDOMEN AP 1 VIEW: ICD-10-PCS | Mod: 26,HCNC,, | Performed by: RADIOLOGY

## 2023-10-12 PROCEDURE — 3288F FALL RISK ASSESSMENT DOCD: CPT | Mod: HCNC,CPTII,S$GLB, | Performed by: INTERNAL MEDICINE

## 2023-10-12 PROCEDURE — 90694 VACC AIIV4 NO PRSRV 0.5ML IM: CPT | Mod: HCNC,S$GLB,, | Performed by: INTERNAL MEDICINE

## 2023-10-12 PROCEDURE — 99214 PR OFFICE/OUTPT VISIT, EST, LEVL IV, 30-39 MIN: ICD-10-PCS | Mod: HCNC,S$GLB,, | Performed by: INTERNAL MEDICINE

## 2023-10-12 PROCEDURE — 90694 FLU VACCINE - QUADRIVALENT - ADJUVANTED: ICD-10-PCS | Mod: HCNC,S$GLB,, | Performed by: INTERNAL MEDICINE

## 2023-10-12 PROCEDURE — 1157F ADVNC CARE PLAN IN RCRD: CPT | Mod: HCNC,CPTII,S$GLB, | Performed by: INTERNAL MEDICINE

## 2023-10-12 PROCEDURE — 3288F PR FALLS RISK ASSESSMENT DOCUMENTED: ICD-10-PCS | Mod: HCNC,CPTII,S$GLB, | Performed by: INTERNAL MEDICINE

## 2023-10-12 PROCEDURE — 99214 OFFICE O/P EST MOD 30 MIN: CPT | Mod: HCNC,S$GLB,, | Performed by: INTERNAL MEDICINE

## 2023-10-12 PROCEDURE — 1101F PR PT FALLS ASSESS DOC 0-1 FALLS W/OUT INJ PAST YR: ICD-10-PCS | Mod: HCNC,CPTII,S$GLB, | Performed by: INTERNAL MEDICINE

## 2023-10-12 PROCEDURE — G0008 ADMIN INFLUENZA VIRUS VAC: HCPCS | Mod: HCNC,S$GLB,, | Performed by: INTERNAL MEDICINE

## 2023-10-12 PROCEDURE — 1157F PR ADVANCE CARE PLAN OR EQUIV PRESENT IN MEDICAL RECORD: ICD-10-PCS | Mod: HCNC,CPTII,S$GLB, | Performed by: INTERNAL MEDICINE

## 2023-10-12 PROCEDURE — 74018 RADEX ABDOMEN 1 VIEW: CPT | Mod: TC,HCNC,FY,PO

## 2023-10-12 PROCEDURE — 1159F MED LIST DOCD IN RCRD: CPT | Mod: HCNC,CPTII,S$GLB, | Performed by: INTERNAL MEDICINE

## 2023-10-12 PROCEDURE — G0008 FLU VACCINE - QUADRIVALENT - ADJUVANTED: ICD-10-PCS | Mod: HCNC,S$GLB,, | Performed by: INTERNAL MEDICINE

## 2023-10-12 PROCEDURE — 99999 PR PBB SHADOW E&M-EST. PATIENT-LVL IV: CPT | Mod: PBBFAC,HCNC,, | Performed by: INTERNAL MEDICINE

## 2023-10-12 PROCEDURE — 99999 PR PBB SHADOW E&M-EST. PATIENT-LVL IV: ICD-10-PCS | Mod: PBBFAC,HCNC,, | Performed by: INTERNAL MEDICINE

## 2023-10-12 RX ORDER — DULOXETIN HYDROCHLORIDE 60 MG/1
60 CAPSULE, DELAYED RELEASE ORAL DAILY
Qty: 90 CAPSULE | Refills: 3 | Status: SHIPPED | OUTPATIENT
Start: 2023-10-12 | End: 2023-10-12 | Stop reason: SDUPTHER

## 2023-10-12 RX ORDER — DULOXETIN HYDROCHLORIDE 60 MG/1
60 CAPSULE, DELAYED RELEASE ORAL DAILY
Qty: 90 CAPSULE | Refills: 3 | Status: SHIPPED | OUTPATIENT
Start: 2023-10-12

## 2023-10-13 ENCOUNTER — TELEPHONE (OUTPATIENT)
Dept: PRIMARY CARE CLINIC | Facility: CLINIC | Age: 88
End: 2023-10-13
Payer: MEDICARE

## 2023-10-13 ENCOUNTER — PATIENT MESSAGE (OUTPATIENT)
Dept: PRIMARY CARE CLINIC | Facility: CLINIC | Age: 88
End: 2023-10-13
Payer: MEDICARE

## 2023-10-13 DIAGNOSIS — R68.81 EARLY SATIETY: Primary | ICD-10-CM

## 2023-10-13 DIAGNOSIS — R10.13 EPIGASTRIC PAIN: ICD-10-CM

## 2023-10-13 NOTE — TELEPHONE ENCOUNTER
ANTICOAGULATION FOLLOW-UP CLINIC VISIT    Patient Name:  Haresh Rock  Date:  2019  Contact Type:  Face to Face    SUBJECTIVE:  Patient Findings     Comments:   The patient was assessed for diet, medication, and activity level changes, missed or extra doses, bruising or bleeding, with no problem findings.          Clinical Outcomes     Negatives:   Major bleeding event, Thromboembolic event, Anticoagulation-related hospital admission, Anticoagulation-related ED visit, Anticoagulation-related fatality    Comments:   The patient was assessed for diet, medication, and activity level changes, missed or extra doses, bruising or bleeding, with no problem findings.             OBJECTIVE    INR Protime   Date Value Ref Range Status   2019 2.0 (A) 0.86 - 1.14 Final     Factor 2 Assay   Date Value Ref Range Status   2007 58 (L) 60 - 140 % Final     Comment:     (Note)  CALLED TO TAMARA(INTERV. RADIOLOGY)[id number],        ASSESSMENT / PLAN  No question data found.  Anticoagulation Summary  As of 2019    INR goal:   2.0-3.0   TTR:   92.7 % (1 y)   INR used for dosin.0 (2019)   Warfarin maintenance plan:   1.25 mg (2.5 mg x 0.5) every Mon, Fri; 2.5 mg (2.5 mg x 1) all other days   Full warfarin instructions:   1.25 mg every Mon, Fri; 2.5 mg all other days   Weekly warfarin total:   15 mg   No change documented:   Laverne Ferguson RN   Plan last modified:   Laverne Ferguson RN (2018)   Next INR check:   2/3/2020   Priority:   INR   Target end date:   Indefinite    Indications    Long-term (current) use of anticoagulants [Z79.01] [Z79.01]  Atrial fibrillation (H) [I48.91]             Anticoagulation Episode Summary     INR check location:       Preferred lab:       Send INR reminders to:   CHELSEA CAMACHO    Comments:         Anticoagulation Care Providers     Provider Role Specialty Phone number    Anya Nowak MD [facility] [phone]            See the  Please tell pt labs and xray look pretty good, but I would like further evaluation with ultrasound, and schedule.  SM     Encounter Report to view Anticoagulation Flowsheet and Dosing Calendar (Go to Encounters tab in chart review, and find the Anticoagulation Therapy Visit)        Laverne Ferguson RN

## 2023-10-16 ENCOUNTER — HOSPITAL ENCOUNTER (OUTPATIENT)
Dept: RADIOLOGY | Facility: HOSPITAL | Age: 88
Discharge: HOME | End: 2023-10-16
Attending: INTERNAL MEDICINE
Payer: MEDICARE

## 2023-10-16 DIAGNOSIS — R68.81 EARLY SATIETY: ICD-10-CM

## 2023-10-16 DIAGNOSIS — R10.13 EPIGASTRIC PAIN: ICD-10-CM

## 2023-10-16 PROCEDURE — 76700 US EXAM ABDOM COMPLETE: CPT | Mod: 26,HCNC,, | Performed by: RADIOLOGY

## 2023-10-16 PROCEDURE — 76700 US ABDOMEN COMPLETE: ICD-10-PCS | Mod: 26,HCNC,, | Performed by: RADIOLOGY

## 2023-10-16 PROCEDURE — 76700 US EXAM ABDOM COMPLETE: CPT | Mod: TC,HCNC

## 2023-10-18 ENCOUNTER — PATIENT MESSAGE (OUTPATIENT)
Dept: PRIMARY CARE CLINIC | Facility: CLINIC | Age: 88
End: 2023-10-18
Payer: MEDICARE

## 2023-10-26 ENCOUNTER — OFFICE VISIT (OUTPATIENT)
Dept: PRIMARY CARE CLINIC | Facility: CLINIC | Age: 88
End: 2023-10-26
Payer: MEDICARE

## 2023-10-26 VITALS
HEIGHT: 64 IN | SYSTOLIC BLOOD PRESSURE: 122 MMHG | WEIGHT: 168.81 LBS | TEMPERATURE: 98 F | DIASTOLIC BLOOD PRESSURE: 64 MMHG | BODY MASS INDEX: 28.82 KG/M2 | OXYGEN SATURATION: 97 % | HEART RATE: 97 BPM

## 2023-10-26 DIAGNOSIS — I10 ESSENTIAL HYPERTENSION: Chronic | ICD-10-CM

## 2023-10-26 DIAGNOSIS — S33.8XXA COCCYX SPRAIN, INITIAL ENCOUNTER: ICD-10-CM

## 2023-10-26 DIAGNOSIS — S51.812A SKIN TEAR OF LEFT FOREARM WITHOUT COMPLICATION, INITIAL ENCOUNTER: ICD-10-CM

## 2023-10-26 DIAGNOSIS — K59.01 SLOW TRANSIT CONSTIPATION: ICD-10-CM

## 2023-10-26 DIAGNOSIS — M79.672 LEFT FOOT PAIN: Primary | ICD-10-CM

## 2023-10-26 PROCEDURE — 1159F PR MEDICATION LIST DOCUMENTED IN MEDICAL RECORD: ICD-10-PCS | Mod: HCNC,CPTII,S$GLB, | Performed by: NURSE PRACTITIONER

## 2023-10-26 PROCEDURE — 3288F FALL RISK ASSESSMENT DOCD: CPT | Mod: HCNC,CPTII,S$GLB, | Performed by: NURSE PRACTITIONER

## 2023-10-26 PROCEDURE — 1157F PR ADVANCE CARE PLAN OR EQUIV PRESENT IN MEDICAL RECORD: ICD-10-PCS | Mod: HCNC,CPTII,S$GLB, | Performed by: NURSE PRACTITIONER

## 2023-10-26 PROCEDURE — 3288F PR FALLS RISK ASSESSMENT DOCUMENTED: ICD-10-PCS | Mod: HCNC,CPTII,S$GLB, | Performed by: NURSE PRACTITIONER

## 2023-10-26 PROCEDURE — 1159F MED LIST DOCD IN RCRD: CPT | Mod: HCNC,CPTII,S$GLB, | Performed by: NURSE PRACTITIONER

## 2023-10-26 PROCEDURE — 99999 PR PBB SHADOW E&M-EST. PATIENT-LVL V: CPT | Mod: PBBFAC,HCNC,, | Performed by: NURSE PRACTITIONER

## 2023-10-26 PROCEDURE — 1101F PR PT FALLS ASSESS DOC 0-1 FALLS W/OUT INJ PAST YR: ICD-10-PCS | Mod: HCNC,CPTII,S$GLB, | Performed by: NURSE PRACTITIONER

## 2023-10-26 PROCEDURE — 99215 PR OFFICE/OUTPT VISIT, EST, LEVL V, 40-54 MIN: ICD-10-PCS | Mod: HCNC,S$GLB,, | Performed by: NURSE PRACTITIONER

## 2023-10-26 PROCEDURE — 1157F ADVNC CARE PLAN IN RCRD: CPT | Mod: HCNC,CPTII,S$GLB, | Performed by: NURSE PRACTITIONER

## 2023-10-26 PROCEDURE — 99215 OFFICE O/P EST HI 40 MIN: CPT | Mod: HCNC,S$GLB,, | Performed by: NURSE PRACTITIONER

## 2023-10-26 PROCEDURE — 99999 PR PBB SHADOW E&M-EST. PATIENT-LVL V: ICD-10-PCS | Mod: PBBFAC,HCNC,, | Performed by: NURSE PRACTITIONER

## 2023-10-26 PROCEDURE — 1101F PT FALLS ASSESS-DOCD LE1/YR: CPT | Mod: HCNC,CPTII,S$GLB, | Performed by: NURSE PRACTITIONER

## 2023-10-26 RX ORDER — POLYETHYLENE GLYCOL 3350 17 G/17G
17 POWDER, FOR SOLUTION ORAL DAILY
Qty: 595 G | Refills: 0 | Status: SHIPPED | OUTPATIENT
Start: 2023-10-26

## 2023-10-26 NOTE — PROGRESS NOTES
"Clau Nunn  10/30/2023  [id number]    Jeanette Molina MD  Patient Care Team:  Jeanette Molina MD as PCP - General (Internal Medicine)  Kandice Salcedo PA-C as Physician Assistant (Rheumatology)  Raffi Wells MD (Inactive) as Consulting Physician (Pain Medicine)  Jeanette Manriquez MD as Obstetrician (Obstetrics)  Bal Ventura LPN as Care Coordinator      [facility]      Chief Complaint:  Chief Complaint   Patient presents with    Fall     Pt had a fall on 10/19. Pt hurt  coccyx and has a cut to lower left leg.        History of Present Illness:    Ms. Nunn returns for F/U of blood pressure. During last visit with Dr. Molina 1/12/23 there were concerns regarding white coat HTN. Taking valsartan 80 mg daily.   10/19 fell - while walking outside. Slipped wearing slippers. She laid there for 2 hours. Laceration to the left lateral lower leg, right elbow  Denies hitting head or LOC. Notices pain mostly at the coccyx. Able to bear weight  Does have hx of foot neuropathy. This AM, neuropathy is painful. Taking Cymbalta and Lyrica 75 mg bid    Reported home BPs "Sometimes a little high - 184/75." Yesterday's pressure was not high. Taking Valsartan 80 mg.  Also, c/o purplish discoloration to left foot, toes #3 & 4. Also, has chronic pain to the left 5th metatarsal from previous stress fracture.  The discolored toes are nonpainful, without swelling. Does have bruising to left lateral lower leg.           The following were reviewed: Active problem list, medication list, allergies, family history, social history, and Health Maintenance.     History:  Past Medical History:   Diagnosis Date    Allergy     Anxiety     Asthma     Back pain     Chronic venous insufficiency 11/19/2013    Depression     Diverticulosis 11/19/2013 1/8/8 colonoscopy    Dry eyes     Fracture, sacrum/coccyx     Dr. Sanchez     GERD (gastroesophageal reflux disease)     H/O total hip arthroplasty 12/30/2015    " Hypertension     Hypothyroid     Osteoarthritis     Osteoporosis     Postlaminectomy syndrome of lumbar region 1/8/2014    Radius fracture     7/18    Simple chronic bronchitis 5/3/2017    Umbilical hernia 11/19/2013    Urinary incontinence     Vitamin D deficiency disease      Past Surgical History:   Procedure Laterality Date    ADENOIDECTOMY      BACK SURGERY      x2    breast implants  1973    CATARACT EXTRACTION Bilateral     CLOSED REDUCTION DISTAL RADIUS FRACTURE      Dr. Black, 8/18    cystoscope  1/6/16    DR. Brown    FRACTURE SURGERY  April 3 2015    left wrist    HAND SURGERY      HIP SURGERY Right     total hip- Dr. Dos Santos    HYSTERECTOMY      34y/o    INJECTION OF ANESTHETIC AGENT AROUND NERVE Right 9/16/2020    Procedure: Right suprascapular nerve block;  Surgeon: Raffi Wells MD;  Location: [facility];  Service: Pain Management;  Laterality: Right;    INJECTION OF ANESTHETIC AGENT AROUND NERVE Right 7/13/2021    Procedure: Block, Nerve Right Suprascapular Nerve Block with RN IV sedation;  Surgeon: Raffi Wells MD;  Location: [facility];  Service: Pain Management;  Laterality: Right;    INJECTION OF ANESTHETIC AGENT INTO SACROILIAC JOINT N/A 8/12/2020    Procedure: Left IA hip Joint + Left SIJ + Right shoulder injection;  Surgeon: Raffi Wells MD;  Location: [facility];  Service: Pain Management;  Laterality: N/A;    INJECTION OF ANESTHETIC AGENT INTO SACROILIAC JOINT Bilateral 1/31/2023    Procedure: Bilateral SIJ Injection w/Local;  Surgeon: Abdirahman Parker MD;  Location: [facility];  Service: Pain Management;  Laterality: Bilateral;    INJECTION OF JOINT N/A 8/12/2020    Procedure: Left IA hip Joint + Left SIJ + Right shoulder injection;  Surgeon: Raffi Wells MD;  Location: [facility];  Service: Pain Management;  Laterality: N/A;    JOINT REPLACEMENT Right     R NNAMDI - Dr. Dos Santos    LEG SURGERY Right     ex-fix tib/fib    RADIOFREQUENCY THERMOCOAGULATION Left 6/8/2023     Procedure: Left SIJ RFA w/ local (give Valium before);  Surgeon: Abdirahman Parker MD;  Location: [facility];  Service: Pain Management;  Laterality: Left;    SELECTIVE INJECTION OF ANESTHETIC AGENT AROUND LUMBAR SPINAL NERVE ROOT BY TRANSFORAMINAL APPROACH Bilateral 2/10/2021    Procedure: Bilateral L5/S1 TF REJI;  Surgeon: Raffi Wells MD;  Location: [facility];  Service: Pain Management;  Laterality: Bilateral;    SELECTIVE INJECTION OF ANESTHETIC AGENT AROUND LUMBAR SPINAL NERVE ROOT BY TRANSFORAMINAL APPROACH Bilateral 5/25/2022    Procedure: Bilateral L5/S1 + S1 TF REJI;  Surgeon: Raffi Wells MD;  Location: [facility];  Service: Pain Management;  Laterality: Bilateral;    TONSILLECTOMY      TRANSFORAMINAL EPIDURAL INJECTION OF STEROID Left 7/8/2020    Procedure: left L2/3 + L5/S1 TF REJI;  Surgeon: Raffi Wells MD;  Location: [facility];  Service: Pain Management;  Laterality: Left;    TRANSFORAMINAL EPIDURAL INJECTION OF STEROID Left 7/1/2021    Procedure: left L5/S1 + S1 TF REJI;  Surgeon: Raffi Wells MD;  Location: [facility];  Service: Pain Management;  Laterality: Left;     Family History   Problem Relation Age of Onset    COPD Mother     Cancer Mother         lung CA    Pulmonary fibrosis Sister     Cancer Daughter         colon    Stroke Father     Diabetes Son     Melanoma Neg Hx     Psoriasis Neg Hx     Lupus Neg Hx      Patient Active Problem List   Diagnosis    Primary osteoarthritis involving multiple joints    Acquired hypothyroidism    Lumbar arthropathy    Chronic venous insufficiency    Anxiety    Vitamin D deficiency disease    Essential hypertension    Hiatal hernia with gastroesophageal reflux    Sacroiliac inflammation    Atherosclerosis of aorta    Chronic lumbar radiculopathy    Stage 3b chronic kidney disease    Simple chronic bronchitis    Pain of left hip joint    Lumbar spondylosis    Pain in both lower extremities    Age-related osteoporosis with current pathological  fracture with delayed healing    Radius fracture    History of DVT in adulthood    Lumbar radiculopathy    Secondary hyperparathyroidism    Recurrent major depressive disorder, in full remission    Shoulder pain    Chronic anticoagulation    Gastroesophageal reflux disease without esophagitis    Impaired functional mobility, balance, gait, and endurance    Left foot pain    Slow transit constipation    Skin tear of left forearm without complication    Sprain of coccyx     Review of patient's allergies indicates:   Allergen Reactions    Cephalexin Hives, Itching, Swelling, Anxiety, Dermatitis and Rash       Medications:  Current Outpatient Medications on File Prior to Visit   Medication Sig Dispense Refill    acetaminophen-codeine 300-30mg (TYLENOL #3) 300-30 mg Tab Take 1 tablet by mouth every 6 (six) hours as needed. Tooth pulled      albuterol (PROVENTIL) 2.5 mg /3 mL (0.083 %) nebulizer solution Take 3 mLs (2.5 mg total) by nebulization every 6 (six) hours as needed for Wheezing. Rescue 1 each 6    amoxicillin (AMOXIL) 875 MG tablet Take 875 mg by mouth 2 (two) times daily. Tooth pulled      apixaban (ELIQUIS) 5 mg Tab Take 1 tablet (5 mg total) by mouth 2 (two) times daily. 180 tablet 3    azelastine (ASTELIN) 137 mcg (0.1 %) nasal spray 2 sprays by Nasal route 2 (two) times daily.      busPIRone (BUSPAR) 5 MG Tab TAKE 1 TABLET TWICE DAILY 180 tablet 5    cholecalciferol, vitamin D3, (D3-2000) 50 mcg (2,000 unit) Cap Take 1 capsule (2,000 Units total) by mouth once daily. 90 capsule 11    clobetasoL (TEMOVATE) 0.05 % external solution Pt to mix in 1 jar of cerave cream and apply to affected areas bid for 2-4 weeks per course 50 mL 3    cyclobenzaprine (FLEXERIL) 5 MG tablet TAKE 1 TABLET (5 MG TOTAL) BY MOUTH NIGHTLY AS NEEDED FOR MUSCLE SPASMS. 90 tablet 1    cycloSPORINE (RESTASIS MULTIDOSE) 0.05 % Drop Place 1 drop into both eyes every 12 (twelve) hours. 5.5 mL 12    DULoxetine (CYMBALTA) 60 MG capsule Take 1  capsule (60 mg total) by mouth once daily. 90 capsule 3    fluocinonide 0.05% (LIDEX) 0.05 % cream AAA bid to leg for 2-4 weeks per course (Patient taking differently: Apply to affected area(s) of leg topically twice daily for 2-4 weeks per course) 60 g 1    fluticasone propionate (FLONASE) 50 mcg/actuation nasal spray       levothyroxine (SYNTHROID) 100 MCG tablet TAKE 1 TABLET EVERY DAY 90 tablet 2    mirabegron (MYRBETRIQ) 50 mg Tb24 Take 1 tablet (50 mg total) by mouth once daily. 30 tablet 11    mupirocin (BACTROBAN) 2 % ointment APPLY TOPICALLY TWICE DAILY TO WOUND ON RIGHT LOWER LEG 22 g 0    omeprazole (PRILOSEC) 40 MG capsule TAKE 1 CAPSULE (40 MG TOTAL) BY MOUTH EVERY MORNING. 90 capsule 2    solifenacin (VESICARE) 5 MG tablet Take 1 tablet (5 mg total) by mouth once daily. 30 tablet 11    traZODone (DESYREL) 50 MG tablet TAKE 1 TABLET (50 MG TOTAL) BY MOUTH NIGHTLY AS NEEDED FOR INSOMNIA. 30 tablet 3    triamcinolone acetonide 0.025% (KENALOG) 0.025 % cream AAA bid 80 g 0    valsartan (DIOVAN) 80 MG tablet TAKE 1 TABLET ONE TIME DAILY 90 tablet 2    pregabalin (LYRICA) 75 MG capsule Take 1 capsule (75 mg total) by mouth every evening for 5 days, THEN 1 capsule (75 mg total) 2 (two) times daily. 120 capsule 0     Current Facility-Administered Medications on File Prior to Visit   Medication Dose Route Frequency Provider Last Rate Last Admin    denosumab (PROLIA) injection 60 mg  60 mg Subcutaneous Q6 Months Carly Mora PA-C   60 mg at 01/28/19 1548    ondansetron injection 4 mg  4 mg Intravenous Once PRN Abdirahman Parker MD           Medications have been reviewed and reconciled with patient at visit today.      Exam:  Vitals:    10/26/23 1441   BP: 122/64   Pulse: 97   Temp: 98.3 °F (36.8 °C)     Weight: 76.6 kg (168 lb 12.8 oz)   Body mass index is 28.97 kg/m².      BP Readings from Last 3 Encounters:   10/26/23 122/64   10/12/23 (!) 186/82   08/21/23 (!) 164/79     Wt Readings from Last 3  Encounters:   10/26/23 1441 76.6 kg (168 lb 12.8 oz)   10/12/23 1404 75.9 kg (167 lb 6.4 oz)   08/21/23 1323 75.4 kg (166 lb 3.6 oz)        REVIEW OF SYSTEMS  Review of Systems   Constitutional:  Positive for malaise/fatigue. Negative for chills and fever.   HENT:  Negative for congestion and sore throat.    Respiratory:  Negative for cough and shortness of breath.    Cardiovascular:  Positive for chest pain (right sided - not exertional) and leg swelling. Negative for palpitations.   Gastrointestinal:  Positive for constipation. Negative for abdominal pain, diarrhea, nausea and vomiting.   Genitourinary:  Negative for dysuria and frequency.   Musculoskeletal:  Positive for back pain, falls, joint pain and myalgias.   Skin:         wound   Neurological:  Negative for dizziness, focal weakness and headaches.   Psychiatric/Behavioral:  Negative for depression. The patient is not nervous/anxious.        Physical Exam  Vitals reviewed.   Constitutional:       General: She is not in acute distress.     Appearance: Normal appearance.   HENT:      Head: Normocephalic and atraumatic.      Nose: Nose normal.      Mouth/Throat:      Mouth: Mucous membranes are moist.   Eyes:      General: No scleral icterus.     Conjunctiva/sclera: Conjunctivae normal.   Cardiovascular:      Rate and Rhythm: Normal rate and regular rhythm.      Heart sounds: No murmur heard.  Pulmonary:      Effort: Pulmonary effort is normal. No respiratory distress.      Breath sounds: Normal breath sounds.   Abdominal:      Palpations: Abdomen is soft. There is no mass.      Tenderness: There is no abdominal tenderness.   Musculoskeletal:         General: No swelling or deformity.      Cervical back: Normal range of motion and neck supple.      Right lower leg: No edema.      Left lower leg: No edema.        Feet:    Lymphadenopathy:      Cervical: No cervical adenopathy.   Skin:     General: Skin is warm and dry.      Comments: Skin tear/lac to left  lateral lower leg - cleansed, ABX applied and redressed  Same with elbow abrasion   Neurological:      Mental Status: She is alert and oriented to person, place, and time. Mental status is at baseline.   Psychiatric:         Mood and Affect: Mood normal.         Thought Content: Thought content normal.         Laboratory Reviewed:     Lab Results   Component Value Date    WBC 12.12 07/07/2023    HGB 14.2 07/07/2023    HCT 45.7 07/07/2023     07/07/2023    CHOL 194 07/07/2023    TRIG 149 07/07/2023    HDL 47 07/07/2023    ALT 8 (L) 08/11/2023    AST 19 08/11/2023     10/12/2023    K 4.4 10/12/2023     10/12/2023    CREATININE 1.2 10/12/2023    BUN 15 10/12/2023    CO2 25 10/12/2023    TSH 1.593 07/07/2023    INR 1.1 04/07/2022    HGBA1C 5.0 03/11/2019       Screening or Prevention Patient's value Goal Complete/Controlled?   HgA1C Testing and Control   Lab Results   Component Value Date    HGBA1C 5.0 03/11/2019      Annually/Less than 8% No   Lipid profile : 07/07/2023 Annually Yes   LDL control Lab Results   Component Value Date    LDLCALC 117.2 07/07/2023    Annually/Less than 100 mg/dl  No   Nephropathy screening Lab Results   Component Value Date    LABMICR 13.0 10/12/2023     Lab Results   Component Value Date    PROTEINUA 2+ (A) 07/07/2023    Annually Yes   Blood pressure BP Readings from Last 1 Encounters:   10/26/23 122/64    Less than 140/90 No   Dilated retinal exam Most Recent Eye Exam Date: Not Found Annually Yes   Foot exam   Most Recent Foot Exam Date: Not Found Annually Yes       Health Maintenance  Health Maintenance Topics with due status: Not Due       Topic Last Completion Date    TETANUS VACCINE 02/25/2022    Lipid Panel 07/07/2023     Health Maintenance Due   Topic Date Due    RSV Vaccine (Age 60+) (1 - 1-dose 60+ series) Never done    COVID-19 Vaccine (4 - 2023-24 season) 09/01/2023       Assessment and Plan:  1. Left foot pain  -     X-Ray Foot Complete 3 view Left; Future;  Expected date: 10/26/2023    2. Slow transit constipation  -     polyethylene glycol (GLYCOLAX) 17 gram/dose powder; Take 17 g by mouth once daily.  Dispense: 595 g; Refill: 0    3. Skin tear of left forearm without complication, initial encounter  -     neomycin-bacitracin-polymyxin ointment    4. Essential hypertension - remain on current Valsartan 80 mg. Monitor and record BP at home, bring to next visit    5. Sprain of coccyx - vs contusion - denied need for xrays, Tylenol prn  Offset pressure of sacrum and coccyx, warm heating pad and can sit on donut pillow.       -Patient's lab results were reviewed and discussed with patient  -Treatment options and alternatives were discussed with the patient. Patient expressed understanding. Patient was given the opportunity to ask questions and be an active participant in their medical care. Patient had no further questions or concerns at this time.         Future Appointments   Date Time Provider Department Center   11/9/2023  2:00 PM Clau Michel NP [facility]   12/13/2023  1:40 PM Angelica Caballero PA-C [facility] IN PN [facility]   12/15/2023  3:40 PM Jeanette Molina MD [facility]   1/8/2024  8:30 AM Kymberly Kim MD [facility] UROLOGY [facility]   1/11/2024  9:30 AM Iliana Amaro DPM [facility] POD [facility]   2/19/2024 11:00 AM LABORATORY, [facility]   2/21/2024  1:00 PM Verna Su PA-C [facility] RHEU [facility]   2/21/2024  1:45 PM INJECTION 1, [facility]          After visit summary printed and given to patient upon discharge.  Patient goals and care plan are included in After visit summary.    Total medical decision making time was 47 min.    The following issues were discussed: The primary encounter diagnosis was Left foot pain. Diagnoses of Slow transit constipation, Skin tear of left forearm without complication, initial encounter, Essential hypertension, and Coccyx sprain, initial  encounter were also pertinent to this visit.    Health maintenance needs, recent test results and goals of care discussed with pt and questions answered.           MOMO Gutierrez, NP-C  [facility] [address] [place] [address]

## 2023-10-26 NOTE — PATIENT INSTRUCTIONS
Cleanse with soap and water, apply neosporin  Place dressing    Once wound is mostly dry, allow open to air while you are home.    Tylenol, warm heating, donut pillow        PLEASE BRING YOUR BLOOD PRESSURE CUFF AND WRITE YOUR BLOOD PRESSURE DOWN

## 2023-10-27 ENCOUNTER — HOSPITAL ENCOUNTER (OUTPATIENT)
Dept: RADIOLOGY | Facility: HOSPITAL | Age: 88
Discharge: HOME | End: 2023-10-27
Attending: NURSE PRACTITIONER
Payer: MEDICARE

## 2023-10-27 DIAGNOSIS — M79.672 LEFT FOOT PAIN: ICD-10-CM

## 2023-10-27 PROCEDURE — 73630 X-RAY EXAM OF FOOT: CPT | Mod: TC,HCNC,FY,PO,LT

## 2023-10-27 PROCEDURE — 73630 XR FOOT COMPLETE 3 VIEW LEFT: ICD-10-PCS | Mod: 26,HCNC,LT, | Performed by: RADIOLOGY

## 2023-10-27 PROCEDURE — 73630 X-RAY EXAM OF FOOT: CPT | Mod: 26,HCNC,LT, | Performed by: RADIOLOGY

## 2023-10-30 PROBLEM — S33.8XXA SPRAIN OF COCCYX: Status: ACTIVE | Noted: 2023-10-30

## 2023-10-30 NOTE — ASSESSMENT & PLAN NOTE
Sprain vs contusion  Pt denies need for xray  Advised to offset pressure off the sacrum/coccyx  Warm heating pad  Sit on donut pillow

## 2023-11-09 ENCOUNTER — OFFICE VISIT (OUTPATIENT)
Dept: PRIMARY CARE CLINIC | Facility: CLINIC | Age: 88
End: 2023-11-09
Payer: MEDICARE

## 2023-11-09 VITALS
OXYGEN SATURATION: 97 % | DIASTOLIC BLOOD PRESSURE: 80 MMHG | BODY MASS INDEX: 28.73 KG/M2 | SYSTOLIC BLOOD PRESSURE: 142 MMHG | HEART RATE: 64 BPM | WEIGHT: 168.31 LBS | TEMPERATURE: 98 F | HEIGHT: 64 IN

## 2023-11-09 DIAGNOSIS — I10 ESSENTIAL HYPERTENSION: Primary | Chronic | ICD-10-CM

## 2023-11-09 PROCEDURE — 99213 PR OFFICE/OUTPT VISIT, EST, LEVL III, 20-29 MIN: ICD-10-PCS | Mod: HCNC,S$GLB,, | Performed by: NURSE PRACTITIONER

## 2023-11-09 PROCEDURE — 99999 PR PBB SHADOW E&M-EST. PATIENT-LVL V: ICD-10-PCS | Mod: PBBFAC,HCNC,, | Performed by: NURSE PRACTITIONER

## 2023-11-09 PROCEDURE — 99213 OFFICE O/P EST LOW 20 MIN: CPT | Mod: HCNC,S$GLB,, | Performed by: NURSE PRACTITIONER

## 2023-11-09 PROCEDURE — 3288F PR FALLS RISK ASSESSMENT DOCUMENTED: ICD-10-PCS | Mod: HCNC,CPTII,S$GLB, | Performed by: NURSE PRACTITIONER

## 2023-11-09 PROCEDURE — 1159F MED LIST DOCD IN RCRD: CPT | Mod: HCNC,CPTII,S$GLB, | Performed by: NURSE PRACTITIONER

## 2023-11-09 PROCEDURE — 1101F PR PT FALLS ASSESS DOC 0-1 FALLS W/OUT INJ PAST YR: ICD-10-PCS | Mod: HCNC,CPTII,S$GLB, | Performed by: NURSE PRACTITIONER

## 2023-11-09 PROCEDURE — 1157F ADVNC CARE PLAN IN RCRD: CPT | Mod: HCNC,CPTII,S$GLB, | Performed by: NURSE PRACTITIONER

## 2023-11-09 PROCEDURE — 1101F PT FALLS ASSESS-DOCD LE1/YR: CPT | Mod: HCNC,CPTII,S$GLB, | Performed by: NURSE PRACTITIONER

## 2023-11-09 PROCEDURE — 99999 PR PBB SHADOW E&M-EST. PATIENT-LVL V: CPT | Mod: PBBFAC,HCNC,, | Performed by: NURSE PRACTITIONER

## 2023-11-09 PROCEDURE — 1159F PR MEDICATION LIST DOCUMENTED IN MEDICAL RECORD: ICD-10-PCS | Mod: HCNC,CPTII,S$GLB, | Performed by: NURSE PRACTITIONER

## 2023-11-09 PROCEDURE — 1157F PR ADVANCE CARE PLAN OR EQUIV PRESENT IN MEDICAL RECORD: ICD-10-PCS | Mod: HCNC,CPTII,S$GLB, | Performed by: NURSE PRACTITIONER

## 2023-11-09 PROCEDURE — 3288F FALL RISK ASSESSMENT DOCD: CPT | Mod: HCNC,CPTII,S$GLB, | Performed by: NURSE PRACTITIONER

## 2023-11-09 NOTE — PROGRESS NOTES
Clau Nunn  11/14/2023  [id number]    Jeanette Molina MD  Patient Care Team:  Jeanette Molina MD as PCP - General (Internal Medicine)  Kandice Salcedo PA-C as Physician Assistant (Rheumatology)  Raffi Wells MD (Inactive) as Consulting Physician (Pain Medicine)  Jeanette Manriquez MD as Obstetrician (Obstetrics)  Bal Ventura LPN as Care Coordinator      [facility]      Chief Complaint:  Chief Complaint   Patient presents with    Follow-up     Pt is here for follow up on elevated blood pressure.        History of Present Illness:    Ms Nunn returns today for F/U blood pressure. She did not take her antihypertensive meds today.  Currently prescribed Valsartan 80 mg daily  States recorded B/P at home: 116 - 153/ 64 - 89    Denies further falls. She states the lac to her left lower leg and right elbow is healing without S/S of infection.  Bruising has mostly resolved from the left foot.     Denies fever, chills, URI symptoms, chest pain, SOB, palpitations, vomiting, diarrhea, no gross neuro deficits, urinary symptoms and leg swelling           The following were reviewed: Active problem list, medication list, allergies, family history, social history, and Health Maintenance.     History:  Past Medical History:   Diagnosis Date    Allergy     Anxiety     Asthma     Back pain     Chronic venous insufficiency 11/19/2013    Depression     Diverticulosis 11/19/2013 1/8/8 colonoscopy    Dry eyes     Fracture, sacrum/coccyx     Dr. Sanchez     GERD (gastroesophageal reflux disease)     H/O total hip arthroplasty 12/30/2015    Hypertension     Hypothyroid     Osteoarthritis     Osteoporosis     Postlaminectomy syndrome of lumbar region 1/8/2014    Radius fracture     7/18    Simple chronic bronchitis 5/3/2017    Umbilical hernia 11/19/2013    Urinary incontinence     Vitamin D deficiency disease      Past Surgical History:   Procedure Laterality Date    ADENOIDECTOMY      BACK SURGERY       x2    breast implants  1973    CATARACT EXTRACTION Bilateral     CLOSED REDUCTION DISTAL RADIUS FRACTURE      Dr. Black, 8/18    cystoscope  1/6/16    DR. Brown    FRACTURE SURGERY  April 3 2015    left wrist    HAND SURGERY      HIP SURGERY Right     total hip- Dr. Dos Santos    HYSTERECTOMY      34y/o    INJECTION OF ANESTHETIC AGENT AROUND NERVE Right 9/16/2020    Procedure: Right suprascapular nerve block;  Surgeon: Raffi Wells MD;  Location: [facility];  Service: Pain Management;  Laterality: Right;    INJECTION OF ANESTHETIC AGENT AROUND NERVE Right 7/13/2021    Procedure: Block, Nerve Right Suprascapular Nerve Block with RN IV sedation;  Surgeon: Raffi Wells MD;  Location: [facility];  Service: Pain Management;  Laterality: Right;    INJECTION OF ANESTHETIC AGENT INTO SACROILIAC JOINT N/A 8/12/2020    Procedure: Left IA hip Joint + Left SIJ + Right shoulder injection;  Surgeon: Raffi Wells MD;  Location: [facility];  Service: Pain Management;  Laterality: N/A;    INJECTION OF ANESTHETIC AGENT INTO SACROILIAC JOINT Bilateral 1/31/2023    Procedure: Bilateral SIJ Injection w/Local;  Surgeon: Abdirahman Parker MD;  Location: [facility];  Service: Pain Management;  Laterality: Bilateral;    INJECTION OF JOINT N/A 8/12/2020    Procedure: Left IA hip Joint + Left SIJ + Right shoulder injection;  Surgeon: Raffi Wells MD;  Location: [facility];  Service: Pain Management;  Laterality: N/A;    JOINT REPLACEMENT Right     R NNAMDI - Dr. Dos Santos    LEG SURGERY Right     ex-fix tib/fib    RADIOFREQUENCY THERMOCOAGULATION Left 6/8/2023    Procedure: Left SIJ RFA w/ local (give Valium before);  Surgeon: Abdirahman Parker MD;  Location: [facility];  Service: Pain Management;  Laterality: Left;    SELECTIVE INJECTION OF ANESTHETIC AGENT AROUND LUMBAR SPINAL NERVE ROOT BY TRANSFORAMINAL APPROACH Bilateral 2/10/2021    Procedure: Bilateral L5/S1 TF REJI;  Surgeon: Raffi Wells MD;  Location: [facility];   Service: Pain Management;  Laterality: Bilateral;    SELECTIVE INJECTION OF ANESTHETIC AGENT AROUND LUMBAR SPINAL NERVE ROOT BY TRANSFORAMINAL APPROACH Bilateral 5/25/2022    Procedure: Bilateral L5/S1 + S1 TF REJI;  Surgeon: Raffi Wells MD;  Location: [facility];  Service: Pain Management;  Laterality: Bilateral;    TONSILLECTOMY      TRANSFORAMINAL EPIDURAL INJECTION OF STEROID Left 7/8/2020    Procedure: left L2/3 + L5/S1 TF REJI;  Surgeon: Raffi Wells MD;  Location: [facility];  Service: Pain Management;  Laterality: Left;    TRANSFORAMINAL EPIDURAL INJECTION OF STEROID Left 7/1/2021    Procedure: left L5/S1 + S1 TF REJI;  Surgeon: Raffi Wells MD;  Location: [facility];  Service: Pain Management;  Laterality: Left;     Family History   Problem Relation Age of Onset    COPD Mother     Cancer Mother         lung CA    Pulmonary fibrosis Sister     Cancer Daughter         colon    Stroke Father     Diabetes Son     Melanoma Neg Hx     Psoriasis Neg Hx     Lupus Neg Hx      Patient Active Problem List   Diagnosis    Primary osteoarthritis involving multiple joints    Acquired hypothyroidism    Lumbar arthropathy    Chronic venous insufficiency    Anxiety    Vitamin D deficiency disease    Essential hypertension    Hiatal hernia with gastroesophageal reflux    Sacroiliac inflammation    Atherosclerosis of aorta    Chronic lumbar radiculopathy    Stage 3b chronic kidney disease    Simple chronic bronchitis    Pain of left hip joint    Lumbar spondylosis    Pain in both lower extremities    Age-related osteoporosis with current pathological fracture with delayed healing    Radius fracture    History of DVT in adulthood    Lumbar radiculopathy    Secondary hyperparathyroidism    Recurrent major depressive disorder, in full remission    Shoulder pain    Chronic anticoagulation    Gastroesophageal reflux disease without esophagitis    Impaired functional mobility, balance, gait, and endurance    Left foot pain     Slow transit constipation    Skin tear of left forearm without complication    Sprain of coccyx     Review of patient's allergies indicates:   Allergen Reactions    Cephalexin Hives, Itching, Swelling, Anxiety, Dermatitis and Rash       Medications:  Current Outpatient Medications on File Prior to Visit   Medication Sig Dispense Refill    apixaban (ELIQUIS) 5 mg Tab Take 1 tablet (5 mg total) by mouth 2 (two) times daily. 180 tablet 3    azelastine (ASTELIN) 137 mcg (0.1 %) nasal spray 2 sprays by Nasal route 2 (two) times daily.      busPIRone (BUSPAR) 5 MG Tab TAKE 1 TABLET TWICE DAILY 180 tablet 5    cholecalciferol, vitamin D3, (D3-2000) 50 mcg (2,000 unit) Cap Take 1 capsule (2,000 Units total) by mouth once daily. 90 capsule 11    clobetasoL (TEMOVATE) 0.05 % external solution Pt to mix in 1 jar of cerave cream and apply to affected areas bid for 2-4 weeks per course 50 mL 3    cyclobenzaprine (FLEXERIL) 5 MG tablet TAKE 1 TABLET (5 MG TOTAL) BY MOUTH NIGHTLY AS NEEDED FOR MUSCLE SPASMS. 90 tablet 1    cycloSPORINE (RESTASIS MULTIDOSE) 0.05 % Drop Place 1 drop into both eyes every 12 (twelve) hours. 5.5 mL 12    DULoxetine (CYMBALTA) 60 MG capsule Take 1 capsule (60 mg total) by mouth once daily. 90 capsule 3    fluocinonide 0.05% (LIDEX) 0.05 % cream AAA bid to leg for 2-4 weeks per course (Patient taking differently: Apply to affected area(s) of leg topically twice daily for 2-4 weeks per course) 60 g 1    fluticasone propionate (FLONASE) 50 mcg/actuation nasal spray       levothyroxine (SYNTHROID) 100 MCG tablet TAKE 1 TABLET EVERY DAY 90 tablet 2    mirabegron (MYRBETRIQ) 50 mg Tb24 Take 1 tablet (50 mg total) by mouth once daily. 30 tablet 11    mupirocin (BACTROBAN) 2 % ointment APPLY TOPICALLY TWICE DAILY TO WOUND ON RIGHT LOWER LEG 22 g 0    omeprazole (PRILOSEC) 40 MG capsule TAKE 1 CAPSULE (40 MG TOTAL) BY MOUTH EVERY MORNING. 90 capsule 2    polyethylene glycol (GLYCOLAX) 17 gram/dose powder Take 17  g by mouth once daily. 595 g 0    solifenacin (VESICARE) 5 MG tablet Take 1 tablet (5 mg total) by mouth once daily. 30 tablet 11    traZODone (DESYREL) 50 MG tablet TAKE 1 TABLET (50 MG TOTAL) BY MOUTH NIGHTLY AS NEEDED FOR INSOMNIA. 30 tablet 3    triamcinolone acetonide 0.025% (KENALOG) 0.025 % cream AAA bid 80 g 0    valsartan (DIOVAN) 80 MG tablet TAKE 1 TABLET ONE TIME DAILY 90 tablet 2    albuterol (PROVENTIL) 2.5 mg /3 mL (0.083 %) nebulizer solution Take 3 mLs (2.5 mg total) by nebulization every 6 (six) hours as needed for Wheezing. Rescue 1 each 6    pregabalin (LYRICA) 75 MG capsule Take 1 capsule (75 mg total) by mouth every evening for 5 days, THEN 1 capsule (75 mg total) 2 (two) times daily. 120 capsule 0     Current Facility-Administered Medications on File Prior to Visit   Medication Dose Route Frequency Provider Last Rate Last Admin    denosumab (PROLIA) injection 60 mg  60 mg Subcutaneous Q6 Months Carly Mora PA-C   60 mg at 01/28/19 1548    neomycin-bacitracin-polymyxin ointment   Topical (Top) 1 time in Clinic/HOD Clau Michel NP           Medications have been reviewed and reconciled with patient at visit today.      Exam:  Vitals:    11/09/23 1455   BP: (!) 142/80   Pulse:    Temp:      Weight: 76.3 kg (168 lb 4.8 oz)   Body mass index is 28.89 kg/m².      BP Readings from Last 3 Encounters:   11/09/23 (!) 142/80   10/26/23 122/64   10/12/23 (!) 186/82     Wt Readings from Last 3 Encounters:   11/09/23 1420 76.3 kg (168 lb 4.8 oz)   10/26/23 1441 76.6 kg (168 lb 12.8 oz)   10/12/23 1404 75.9 kg (167 lb 6.4 oz)        REVIEW OF SYSTEMS  Review of Systems   Constitutional:  Positive for malaise/fatigue. Negative for chills and fever.   HENT:  Negative for congestion, ear pain and sore throat.    Respiratory:  Negative for cough and shortness of breath.    Cardiovascular:  Negative for chest pain, palpitations and leg swelling.   Gastrointestinal:  Negative for abdominal pain,  diarrhea, nausea and vomiting.   Genitourinary:  Negative for dysuria and frequency.   Musculoskeletal:  Positive for joint pain and myalgias. Negative for back pain.   Neurological:  Negative for dizziness, focal weakness and headaches.   Psychiatric/Behavioral:  Negative for depression. The patient is not nervous/anxious.        Physical Exam  Vitals reviewed.   Constitutional:       General: She is not in acute distress.     Appearance: Normal appearance.   HENT:      Head: Normocephalic and atraumatic.      Nose: Nose normal.      Mouth/Throat:      Mouth: Mucous membranes are moist.   Eyes:      General: No scleral icterus.     Conjunctiva/sclera: Conjunctivae normal.   Cardiovascular:      Rate and Rhythm: Normal rate and regular rhythm.      Heart sounds: No murmur heard.  Pulmonary:      Effort: Pulmonary effort is normal. No respiratory distress.      Breath sounds: Normal breath sounds.   Abdominal:      Palpations: Abdomen is soft. There is no mass.      Tenderness: There is no abdominal tenderness.   Musculoskeletal:         General: No swelling or deformity. Normal range of motion.      Cervical back: Normal range of motion and neck supple.      Right lower leg: No edema.      Left lower leg: No edema.        Feet:    Lymphadenopathy:      Cervical: No cervical adenopathy.   Skin:     General: Skin is warm and dry.   Neurological:      Mental Status: She is alert and oriented to person, place, and time. Mental status is at baseline.   Psychiatric:         Mood and Affect: Mood normal.         Thought Content: Thought content normal.         Laboratory Reviewed:     Lab Results   Component Value Date    WBC 12.12 07/07/2023    HGB 14.2 07/07/2023    HCT 45.7 07/07/2023     07/07/2023    CHOL 194 07/07/2023    TRIG 149 07/07/2023    HDL 47 07/07/2023    ALT 8 (L) 08/11/2023    AST 19 08/11/2023     10/12/2023    K 4.4 10/12/2023     10/12/2023    CREATININE 1.2 10/12/2023    BUN 15  10/12/2023    CO2 25 10/12/2023    TSH 1.593 07/07/2023    INR 1.1 04/07/2022    HGBA1C 5.0 03/11/2019       Screening or Prevention Patient's value Goal Complete/Controlled?   HgA1C Testing and Control   Lab Results   Component Value Date    HGBA1C 5.0 03/11/2019      Annually/Less than 8% No   Lipid profile : 07/07/2023 Annually Yes   LDL control Lab Results   Component Value Date    LDLCALC 117.2 07/07/2023    Annually/Less than 100 mg/dl  No   Nephropathy screening Lab Results   Component Value Date    LABMICR 13.0 10/12/2023     Lab Results   Component Value Date    PROTEINUA 2+ (A) 07/07/2023    Annually Yes   Blood pressure BP Readings from Last 1 Encounters:   11/09/23 (!) 142/80    Less than 140/90 Yes   Dilated retinal exam Most Recent Eye Exam Date: Not Found Annually Yes   Foot exam   Most Recent Foot Exam Date: Not Found Annually Yes       Health Maintenance  Health Maintenance Topics with due status: Not Due       Topic Last Completion Date    TETANUS VACCINE 02/25/2022    Lipid Panel 07/07/2023     Health Maintenance Due   Topic Date Due    RSV Vaccine (Age 60+) (1 - 1-dose 60+ series) Never done    COVID-19 Vaccine (4 - 2023-24 season) 09/01/2023       Assessment and Plan:  1. Essential hypertension  Assessment & Plan:  Mostly controlled  Continue valsartan  Monitor and record blood pressure  F/U with Dr. Molina                   -Patient's lab results were reviewed and discussed with patient  -Treatment options and alternatives were discussed with the patient. Patient expressed understanding. Patient was given the opportunity to ask questions and be an active participant in their medical care. Patient had no further questions or concerns at this time.         Future Appointments   Date Time Provider Department Center   12/13/2023  1:40 PM Angelica Caballero PA-C [facility]   12/15/2023  3:40 PM Jeanette Molina MD [facility]   1/8/2024  8:30 AM Kymberly Kim MD  [facility] [place]   1/11/2024  9:30 AM Iliana Amaro DPM [facility]   2/19/2024 11:00 AM LABORATORY, [facility]   2/21/2024  1:00 PM Verna Su PA-C [facility]   2/21/2024  1:45 PM INJECTION 1, [facility]          After visit summary printed and given to patient upon discharge.  Patient goals and care plan are included in After visit summary.      The following issues were discussed: The encounter diagnosis was Essential hypertension.    Health maintenance needs, recent test results and goals of care discussed with pt and questions answered.           MOMO Gutierrez, NP-C  [facility] [address] [place] [address]

## 2023-11-28 ENCOUNTER — OFFICE VISIT (OUTPATIENT)
Dept: INTERNAL MEDICINE | Facility: CLINIC | Age: 88
End: 2023-11-28
Payer: MEDICARE

## 2023-11-28 VITALS
RESPIRATION RATE: 18 BRPM | SYSTOLIC BLOOD PRESSURE: 142 MMHG | HEART RATE: 94 BPM | WEIGHT: 170.63 LBS | BODY MASS INDEX: 29.13 KG/M2 | DIASTOLIC BLOOD PRESSURE: 80 MMHG | HEIGHT: 64 IN | OXYGEN SATURATION: 96 %

## 2023-11-28 DIAGNOSIS — J41.0 SIMPLE CHRONIC BRONCHITIS: ICD-10-CM

## 2023-11-28 DIAGNOSIS — Z74.09 IMPAIRED FUNCTIONAL MOBILITY, BALANCE, GAIT, AND ENDURANCE: ICD-10-CM

## 2023-11-28 DIAGNOSIS — I10 ESSENTIAL HYPERTENSION: Chronic | ICD-10-CM

## 2023-11-28 DIAGNOSIS — M80.00XG AGE-RELATED OSTEOPOROSIS WITH CURRENT PATHOLOGICAL FRACTURE WITH DELAYED HEALING: ICD-10-CM

## 2023-11-28 DIAGNOSIS — Z86.718 HISTORY OF DVT IN ADULTHOOD: ICD-10-CM

## 2023-11-28 DIAGNOSIS — N18.32 STAGE 3B CHRONIC KIDNEY DISEASE: ICD-10-CM

## 2023-11-28 DIAGNOSIS — I70.0 ATHEROSCLEROSIS OF AORTA: ICD-10-CM

## 2023-11-28 DIAGNOSIS — M15.9 PRIMARY OSTEOARTHRITIS INVOLVING MULTIPLE JOINTS: ICD-10-CM

## 2023-11-28 DIAGNOSIS — F33.42 RECURRENT MAJOR DEPRESSIVE DISORDER, IN FULL REMISSION: ICD-10-CM

## 2023-11-28 DIAGNOSIS — N25.81 SECONDARY HYPERPARATHYROIDISM: ICD-10-CM

## 2023-11-28 DIAGNOSIS — D69.2 SOLAR PURPURA: ICD-10-CM

## 2023-11-28 DIAGNOSIS — Z00.00 ENCOUNTER FOR PREVENTIVE HEALTH EXAMINATION: Primary | ICD-10-CM

## 2023-11-28 DIAGNOSIS — E03.9 ACQUIRED HYPOTHYROIDISM: ICD-10-CM

## 2023-11-28 DIAGNOSIS — M46.1 SACROILIAC INFLAMMATION: ICD-10-CM

## 2023-11-28 DIAGNOSIS — E55.9 VITAMIN D DEFICIENCY DISEASE: ICD-10-CM

## 2023-11-28 DIAGNOSIS — Z00.00 ENCOUNTER FOR MEDICARE ANNUAL WELLNESS EXAM: ICD-10-CM

## 2023-11-28 DIAGNOSIS — I87.2 CHRONIC VENOUS INSUFFICIENCY: Chronic | ICD-10-CM

## 2023-11-28 DIAGNOSIS — R26.9 ABNORMALITY OF GAIT AND MOBILITY: ICD-10-CM

## 2023-11-28 PROCEDURE — 1101F PT FALLS ASSESS-DOCD LE1/YR: CPT | Mod: HCNC,CPTII,S$GLB, | Performed by: NURSE PRACTITIONER

## 2023-11-28 PROCEDURE — 99999 PR PBB SHADOW E&M-EST. PATIENT-LVL V: CPT | Mod: PBBFAC,HCNC,, | Performed by: NURSE PRACTITIONER

## 2023-11-28 PROCEDURE — 1157F ADVNC CARE PLAN IN RCRD: CPT | Mod: HCNC,CPTII,S$GLB, | Performed by: NURSE PRACTITIONER

## 2023-11-28 PROCEDURE — 1159F MED LIST DOCD IN RCRD: CPT | Mod: HCNC,CPTII,S$GLB, | Performed by: NURSE PRACTITIONER

## 2023-11-28 PROCEDURE — 1157F PR ADVANCE CARE PLAN OR EQUIV PRESENT IN MEDICAL RECORD: ICD-10-PCS | Mod: HCNC,CPTII,S$GLB, | Performed by: NURSE PRACTITIONER

## 2023-11-28 PROCEDURE — 1170F FXNL STATUS ASSESSED: CPT | Mod: HCNC,CPTII,S$GLB, | Performed by: NURSE PRACTITIONER

## 2023-11-28 PROCEDURE — 1159F PR MEDICATION LIST DOCUMENTED IN MEDICAL RECORD: ICD-10-PCS | Mod: HCNC,CPTII,S$GLB, | Performed by: NURSE PRACTITIONER

## 2023-11-28 PROCEDURE — 3288F FALL RISK ASSESSMENT DOCD: CPT | Mod: HCNC,CPTII,S$GLB, | Performed by: NURSE PRACTITIONER

## 2023-11-28 PROCEDURE — 1160F PR REVIEW ALL MEDS BY PRESCRIBER/CLIN PHARMACIST DOCUMENTED: ICD-10-PCS | Mod: HCNC,CPTII,S$GLB, | Performed by: NURSE PRACTITIONER

## 2023-11-28 PROCEDURE — 1170F PR FUNCTIONAL STATUS ASSESSED: ICD-10-PCS | Mod: HCNC,CPTII,S$GLB, | Performed by: NURSE PRACTITIONER

## 2023-11-28 PROCEDURE — 1101F PR PT FALLS ASSESS DOC 0-1 FALLS W/OUT INJ PAST YR: ICD-10-PCS | Mod: HCNC,CPTII,S$GLB, | Performed by: NURSE PRACTITIONER

## 2023-11-28 PROCEDURE — 3288F PR FALLS RISK ASSESSMENT DOCUMENTED: ICD-10-PCS | Mod: HCNC,CPTII,S$GLB, | Performed by: NURSE PRACTITIONER

## 2023-11-28 PROCEDURE — 1160F RVW MEDS BY RX/DR IN RCRD: CPT | Mod: HCNC,CPTII,S$GLB, | Performed by: NURSE PRACTITIONER

## 2023-11-28 PROCEDURE — G0439 PPPS, SUBSEQ VISIT: HCPCS | Mod: HCNC,S$GLB,, | Performed by: NURSE PRACTITIONER

## 2023-11-28 PROCEDURE — 99999 PR PBB SHADOW E&M-EST. PATIENT-LVL V: ICD-10-PCS | Mod: PBBFAC,HCNC,, | Performed by: NURSE PRACTITIONER

## 2023-11-28 PROCEDURE — G0439 PR MEDICARE ANNUAL WELLNESS SUBSEQUENT VISIT: ICD-10-PCS | Mod: HCNC,S$GLB,, | Performed by: NURSE PRACTITIONER

## 2023-11-28 NOTE — PROGRESS NOTES
"  Clau Nunn presented for a  Medicare AWV and comprehensive Health Risk Assessment today. The following components were reviewed and updated:    Medical history  Family History  Social history  Allergies and Current Medications  Health Risk Assessment  Health Maintenance  Care Team         ** See Completed Assessments for Annual Wellness Visit within the encounter summary.**         The following assessments were completed:  Living Situation  CAGE  Depression Screening  Timed Get Up and Go  Whisper Test  Cognitive Function Screening    Nutrition Screening  ADL Screening  PAQ Screening        Vitals:    11/28/23 1039   Pulse: 94   Resp: 18   SpO2: 96%   Weight: 77.4 kg (170 lb 10.2 oz)   Height: 5' 4" (1.626 m)     Body mass index is 29.29 kg/m².  Physical Exam  Constitutional:       General: She is not in acute distress.     Appearance: Normal appearance. She is well-developed. She is not ill-appearing, toxic-appearing or diaphoretic.   HENT:      Head: Normocephalic and atraumatic.      Right Ear: External ear normal.      Left Ear: External ear normal.      Nose: Nose normal.   Eyes:      General: No scleral icterus.        Right eye: No discharge.         Left eye: No discharge.      Conjunctiva/sclera: Conjunctivae normal.   Neck:      Thyroid: No thyromegaly.      Vascular: No carotid bruit.      Trachea: No tracheal deviation.   Cardiovascular:      Rate and Rhythm: Normal rate and regular rhythm.      Pulses: Normal pulses.      Heart sounds: Normal heart sounds. No murmur heard.     No friction rub. No gallop.   Pulmonary:      Effort: Pulmonary effort is normal. No respiratory distress.      Breath sounds: Normal breath sounds. No stridor. No wheezing, rhonchi or rales.   Chest:      Chest wall: No tenderness.   Abdominal:      General: Bowel sounds are normal. There is no distension.      Palpations: Abdomen is soft. There is no mass.      Tenderness: There is no abdominal tenderness. There is no " guarding or rebound.      Hernia: No hernia is present.   Musculoskeletal:         General: No tenderness. Normal range of motion.      Cervical back: Normal range of motion and neck supple. No edema or tenderness. Normal range of motion.   Lymphadenopathy:      Head:      Right side of head: No tonsillar adenopathy.      Left side of head: No tonsillar adenopathy.      Cervical: No cervical adenopathy.      Upper Body:      Right upper body: No supraclavicular adenopathy.      Left upper body: No supraclavicular adenopathy.   Skin:     General: Skin is warm and dry.      Findings: No lesion or rash.      Comments: Resolving bruises noted to forearms, legs   Neurological:      Mental Status: She is alert and oriented to person, place, and time.      Deep Tendon Reflexes: Reflexes are normal and symmetric.   Psychiatric:         Mood and Affect: Mood normal.         Behavior: Behavior normal.               Diagnoses and health risks identified today and associated recommendations/orders:    1. Encounter for preventive health examination  Screenings performed, as noted above.  Personal preventative testing needs reviewed.      2. History of DVT in adulthood  Treated with apixaban, stable, cont tx    3. Secondary hyperparathyroidism  Monitored, stable, .2, stable, follow up with pcp    4. Vitamin D deficiency disease  Treated with supplements, stable, cont tx    5. Solar purpura  Assessed, stable, wear long sleeves, follow up with pcp    6. Stage 3b chronic kidney disease  Monitored, stable, last GFR 43.8, follow up with pcp    7. Recurrent major depressive disorder, in full remission  Treated with Cymbalta, Buspar, stable, cont tx    8. Atherosclerosis of aorta  Monitored, stable, follow up with pcp    9. Chronic venous insufficiency  Monitored, stable, elevate legs as needed, follow up with pcp    10. Simple chronic bronchitis  Treated with prn inhaler, stable, cont tx    11. Sacroiliac inflammation  Treated  by pain mgmet, stable, cont tx    12. Essential hypertension  Treated with Valsartan, stable, cont tx    13. Abnormality of gait and mobility  Monitored, stable at this time, uses cane or walker at times    14. Impaired functional mobility, balance, gait, and endurance  Monitored, stable at this time, uses cane or walker at times, has been to PT, enc to continue exercises    15. Primary osteoarthritis involving multiple joints  Monitored, stable, follow up with pcp    16. Encounter for Medicare annual wellness exam  Screenings performed, as noted above.  Personal preventative testing needs reviewed.   - Ambulatory Referral/Consult to Enhanced Annual Wellness Visit (eAWV)    17. Acquired hypothyroidism  Treated with levothyroxine, stable, cont tx    18. Age-related osteoporosis with current pathological fracture with delayed healing  Treated with Prolia, stable, follow up with rheumatology    Review for Substance Use Disorders: patient does not use substances per chart    Review for opioid screening: Patient is not prescribed opioids       Provided Clau with a 5-10 year written screening schedule and personal prevention plan. Recommendations were developed using the USPSTF age appropriate recommendations. Education, counseling, and referrals were provided as needed. After Visit Summary printed and given to patient which includes a list of additional screenings\tests needed.    No follow-ups on file.    Bladimir Feng NP        I offered to discuss advanced care planning, including how to pick a person who would make decisions for you if you were unable to make them for yourself, called a health care power of , and what kind of decisions you might make such as use of life sustaining treatments such as ventilators and tube feeding when faced with a life limiting illness recorded on a living will that they will need to know. (How you want to be cared for as you near the end of your natural life)     X  Patient is interested in learning more about how to make advanced directives.  I provided them paperwork and offered to discuss this with them.

## 2023-11-28 NOTE — PATIENT INSTRUCTIONS
Counseling and Referral of Other Preventative  (Italic type indicates deductible and co-insurance are waived)    Patient Name: Clau Nunn  Today's Date: 11/28/2023    Health Maintenance       Date Due Completion Date    RSV Vaccine (Age 60+ and Pregnant patients) (1 - 1-dose 60+ series) Never done ---    COVID-19 Vaccine (4 - 2023-24 season) 09/01/2023 10/12/2021    Lipid Panel 07/07/2028 7/7/2023    TETANUS VACCINE 02/25/2032 2/25/2022        No orders of the defined types were placed in this encounter.    The following information is provided to all patients.  This information is to help you find resources for any of the problems found today that may be affecting your health:                Living healthy guide: www.[facility].[place].gov      Understanding Diabetes: www.diabetes.org      Eating healthy: www.cdc.gov/healthyweight      [facility] home safety checklist: www.cdc.gov/steadi/patient.html      Agency on Aging: www.goea.[place].gov      Alcoholics anonymous (AA): www.aa.org      Physical Activity: www.eligio.nih.gov/xd4xrkm      Tobacco use: www.quitwithusla.org

## 2023-12-05 ENCOUNTER — OFFICE VISIT (OUTPATIENT)
Dept: PRIMARY CARE CLINIC | Facility: CLINIC | Age: 88
End: 2023-12-05
Payer: MEDICARE

## 2023-12-05 VITALS
BODY MASS INDEX: 29.06 KG/M2 | OXYGEN SATURATION: 100 % | TEMPERATURE: 98 F | WEIGHT: 170.19 LBS | DIASTOLIC BLOOD PRESSURE: 68 MMHG | HEART RATE: 76 BPM | HEIGHT: 64 IN | SYSTOLIC BLOOD PRESSURE: 124 MMHG

## 2023-12-05 DIAGNOSIS — R23.4 ESCHAR OF LOWER LEG: Primary | ICD-10-CM

## 2023-12-05 PROCEDURE — 99999 PR PBB SHADOW E&M-EST. PATIENT-LVL III: CPT | Mod: PBBFAC,HCNC,, | Performed by: NURSE PRACTITIONER

## 2023-12-05 PROCEDURE — 99999 PR PBB SHADOW E&M-EST. PATIENT-LVL III: ICD-10-PCS | Mod: PBBFAC,HCNC,, | Performed by: NURSE PRACTITIONER

## 2023-12-05 PROCEDURE — 1157F ADVNC CARE PLAN IN RCRD: CPT | Mod: HCNC,CPTII,S$GLB, | Performed by: NURSE PRACTITIONER

## 2023-12-05 PROCEDURE — 1101F PR PT FALLS ASSESS DOC 0-1 FALLS W/OUT INJ PAST YR: ICD-10-PCS | Mod: HCNC,CPTII,S$GLB, | Performed by: NURSE PRACTITIONER

## 2023-12-05 PROCEDURE — 3288F PR FALLS RISK ASSESSMENT DOCUMENTED: ICD-10-PCS | Mod: HCNC,CPTII,S$GLB, | Performed by: NURSE PRACTITIONER

## 2023-12-05 PROCEDURE — 99214 PR OFFICE/OUTPT VISIT, EST, LEVL IV, 30-39 MIN: ICD-10-PCS | Mod: HCNC,S$GLB,, | Performed by: NURSE PRACTITIONER

## 2023-12-05 PROCEDURE — 1101F PT FALLS ASSESS-DOCD LE1/YR: CPT | Mod: HCNC,CPTII,S$GLB, | Performed by: NURSE PRACTITIONER

## 2023-12-05 PROCEDURE — 1157F PR ADVANCE CARE PLAN OR EQUIV PRESENT IN MEDICAL RECORD: ICD-10-PCS | Mod: HCNC,CPTII,S$GLB, | Performed by: NURSE PRACTITIONER

## 2023-12-05 PROCEDURE — 3288F FALL RISK ASSESSMENT DOCD: CPT | Mod: HCNC,CPTII,S$GLB, | Performed by: NURSE PRACTITIONER

## 2023-12-05 PROCEDURE — 1126F PR PAIN SEVERITY QUANTIFIED, NO PAIN PRESENT: ICD-10-PCS | Mod: HCNC,CPTII,S$GLB, | Performed by: NURSE PRACTITIONER

## 2023-12-05 PROCEDURE — 99214 OFFICE O/P EST MOD 30 MIN: CPT | Mod: HCNC,S$GLB,, | Performed by: NURSE PRACTITIONER

## 2023-12-05 PROCEDURE — 1126F AMNT PAIN NOTED NONE PRSNT: CPT | Mod: HCNC,CPTII,S$GLB, | Performed by: NURSE PRACTITIONER

## 2023-12-05 NOTE — ASSESSMENT & PLAN NOTE
Wash with soap and water - lightly debride with wash cloth  Per wound care nurse:   autolytic debridement, maybe with a simple tegaderm to cover   Mepilex dressing applied    No improvement or worsening - Refer to Wound Care

## 2023-12-05 NOTE — PROGRESS NOTES
Clau Nunn  12/05/2023  [id number]    Jeanette Molina MD  Patient Care Team:  Jeanette Molina MD as PCP - General (Internal Medicine)  Kandice Salcedo PA-C as Physician Assistant (Rheumatology)  Raffi Wells MD (Inactive) as Consulting Physician (Pain Medicine)  Jeanette Manriquez MD as Obstetrician (Obstetrics)  Bal Ventura LPN as Care Coordinator      [facility]      Chief Complaint:  Chief Complaint   Patient presents with    Wound Check       History of Present Illness:    Ms Nunn presents with c/o wound to left lateral lower leg. She experienced a fall with laceration to left lower leg around 10/26/23.   The lac is mostly healed but now an area of eschar is noted. She denies tenderness, swelling or drainage.         The following were reviewed: Active problem list, medication list, allergies, family history, social history, and Health Maintenance.     History:  Past Medical History:   Diagnosis Date    Allergy     Anxiety     Asthma     Back pain     Chronic venous insufficiency 11/19/2013    Depression     Diverticulosis 11/19/2013 1/8/8 colonoscopy    Dry eyes     Fracture, sacrum/coccyx     Dr. Sanchez     GERD (gastroesophageal reflux disease)     H/O total hip arthroplasty 12/30/2015    Hypertension     Hypothyroid     Osteoarthritis     Osteoporosis     Postlaminectomy syndrome of lumbar region 1/8/2014    Radius fracture     7/18    Simple chronic bronchitis 5/3/2017    Umbilical hernia 11/19/2013    Urinary incontinence     Vitamin D deficiency disease      Past Surgical History:   Procedure Laterality Date    ADENOIDECTOMY      BACK SURGERY      x2    breast implants  1973    CATARACT EXTRACTION Bilateral     CLOSED REDUCTION DISTAL RADIUS FRACTURE      Dr. Black, 8/18    cystoscope  1/6/16    DR. Brown    FRACTURE SURGERY  April 3 2015    left wrist    HAND SURGERY      HIP SURGERY Right     total hip- Dr. Dos Santos    HYSTERECTOMY      36y/o    INJECTION OF  ANESTHETIC AGENT AROUND NERVE Right 9/16/2020    Procedure: Right suprascapular nerve block;  Surgeon: Raffi Wells MD;  Location: [facility];  Service: Pain Management;  Laterality: Right;    INJECTION OF ANESTHETIC AGENT AROUND NERVE Right 7/13/2021    Procedure: Block, Nerve Right Suprascapular Nerve Block with RN IV sedation;  Surgeon: Raffi Wells MD;  Location: [facility];  Service: Pain Management;  Laterality: Right;    INJECTION OF ANESTHETIC AGENT INTO SACROILIAC JOINT N/A 8/12/2020    Procedure: Left IA hip Joint + Left SIJ + Right shoulder injection;  Surgeon: Raffi Wells MD;  Location: [facility];  Service: Pain Management;  Laterality: N/A;    INJECTION OF ANESTHETIC AGENT INTO SACROILIAC JOINT Bilateral 1/31/2023    Procedure: Bilateral SIJ Injection w/Local;  Surgeon: Abdirahman Parker MD;  Location: [facility];  Service: Pain Management;  Laterality: Bilateral;    INJECTION OF JOINT N/A 8/12/2020    Procedure: Left IA hip Joint + Left SIJ + Right shoulder injection;  Surgeon: Raffi Wells MD;  Location: [facility];  Service: Pain Management;  Laterality: N/A;    JOINT REPLACEMENT Right     R NNAMDI - Dr. Dos Santos    LEG SURGERY Right     ex-fix tib/fib    RADIOFREQUENCY THERMOCOAGULATION Left 6/8/2023    Procedure: Left SIJ RFA w/ local (give Valium before);  Surgeon: Abdirahman Parker MD;  Location: [facility];  Service: Pain Management;  Laterality: Left;    SELECTIVE INJECTION OF ANESTHETIC AGENT AROUND LUMBAR SPINAL NERVE ROOT BY TRANSFORAMINAL APPROACH Bilateral 2/10/2021    Procedure: Bilateral L5/S1 TF REJI;  Surgeon: Raffi Wells MD;  Location: [facility];  Service: Pain Management;  Laterality: Bilateral;    SELECTIVE INJECTION OF ANESTHETIC AGENT AROUND LUMBAR SPINAL NERVE ROOT BY TRANSFORAMINAL APPROACH Bilateral 5/25/2022    Procedure: Bilateral L5/S1 + S1 TF REJI;  Surgeon: Raffi Wells MD;  Location: [facility];  Service: Pain Management;  Laterality: Bilateral;     TONSILLECTOMY      TRANSFORAMINAL EPIDURAL INJECTION OF STEROID Left 7/8/2020    Procedure: left L2/3 + L5/S1 TF REJI;  Surgeon: Raffi Wells MD;  Location: [facility];  Service: Pain Management;  Laterality: Left;    TRANSFORAMINAL EPIDURAL INJECTION OF STEROID Left 7/1/2021    Procedure: left L5/S1 + S1 TF REJI;  Surgeon: Raffi Wells MD;  Location: [facility];  Service: Pain Management;  Laterality: Left;     Family History   Problem Relation Age of Onset    COPD Mother     Cancer Mother         lung CA    Pulmonary fibrosis Sister     Cancer Daughter         colon    Stroke Father     Diabetes Son     Melanoma Neg Hx     Psoriasis Neg Hx     Lupus Neg Hx      Patient Active Problem List   Diagnosis    Primary osteoarthritis involving multiple joints    Acquired hypothyroidism    Lumbar arthropathy    Chronic venous insufficiency    Anxiety    Vitamin D deficiency disease    Essential hypertension    Hiatal hernia with gastroesophageal reflux    Sacroiliac inflammation    Atherosclerosis of aorta    Chronic lumbar radiculopathy    Stage 3b chronic kidney disease    Simple chronic bronchitis    Pain of left hip joint    Lumbar spondylosis    Pain in both lower extremities    Age-related osteoporosis with current pathological fracture with delayed healing    Radius fracture    History of DVT in adulthood    Lumbar radiculopathy    Secondary hyperparathyroidism    Recurrent major depressive disorder, in full remission    Shoulder pain    Chronic anticoagulation    Gastroesophageal reflux disease without esophagitis    Impaired functional mobility, balance, gait, and endurance    Left foot pain    Slow transit constipation    Skin tear of left forearm without complication    Sprain of coccyx    Solar purpura    Eschar of lower leg     Review of patient's allergies indicates:   Allergen Reactions    Cephalexin Hives, Itching, Swelling, Anxiety, Dermatitis and Rash       Medications:  Current Outpatient Medications  on File Prior to Visit   Medication Sig Dispense Refill    apixaban (ELIQUIS) 5 mg Tab Take 1 tablet (5 mg total) by mouth 2 (two) times daily. 180 tablet 3    azelastine (ASTELIN) 137 mcg (0.1 %) nasal spray 2 sprays by Nasal route 2 (two) times daily.      busPIRone (BUSPAR) 5 MG Tab TAKE 1 TABLET TWICE DAILY 180 tablet 5    cholecalciferol, vitamin D3, (D3-2000) 50 mcg (2,000 unit) Cap Take 1 capsule (2,000 Units total) by mouth once daily. 90 capsule 11    clobetasoL (TEMOVATE) 0.05 % external solution Pt to mix in 1 jar of cerave cream and apply to affected areas bid for 2-4 weeks per course 50 mL 3    cyclobenzaprine (FLEXERIL) 5 MG tablet TAKE 1 TABLET (5 MG TOTAL) BY MOUTH NIGHTLY AS NEEDED FOR MUSCLE SPASMS. 90 tablet 1    cycloSPORINE (RESTASIS MULTIDOSE) 0.05 % Drop Place 1 drop into both eyes every 12 (twelve) hours. 5.5 mL 12    DULoxetine (CYMBALTA) 60 MG capsule Take 1 capsule (60 mg total) by mouth once daily. 90 capsule 3    fluocinonide 0.05% (LIDEX) 0.05 % cream AAA bid to leg for 2-4 weeks per course (Patient taking differently: Apply to affected area(s) of leg topically twice daily for 2-4 weeks per course) 60 g 1    fluticasone propionate (FLONASE) 50 mcg/actuation nasal spray       levothyroxine (SYNTHROID) 100 MCG tablet TAKE 1 TABLET EVERY DAY 90 tablet 2    mupirocin (BACTROBAN) 2 % ointment APPLY TOPICALLY TWICE DAILY TO WOUND ON RIGHT LOWER LEG 22 g 0    omeprazole (PRILOSEC) 40 MG capsule TAKE 1 CAPSULE (40 MG TOTAL) BY MOUTH EVERY MORNING. 90 capsule 2    polyethylene glycol (GLYCOLAX) 17 gram/dose powder Take 17 g by mouth once daily. 595 g 0    solifenacin (VESICARE) 5 MG tablet Take 1 tablet (5 mg total) by mouth once daily. 30 tablet 11    traZODone (DESYREL) 50 MG tablet TAKE 1 TABLET (50 MG TOTAL) BY MOUTH NIGHTLY AS NEEDED FOR INSOMNIA. 30 tablet 3    valsartan (DIOVAN) 80 MG tablet TAKE 1 TABLET ONE TIME DAILY 90 tablet 2    albuterol (PROVENTIL) 2.5 mg /3 mL (0.083 %) nebulizer  solution Take 3 mLs (2.5 mg total) by nebulization every 6 (six) hours as needed for Wheezing. Rescue 1 each 6    pregabalin (LYRICA) 75 MG capsule Take 1 capsule (75 mg total) by mouth every evening for 5 days, THEN 1 capsule (75 mg total) 2 (two) times daily. 120 capsule 0     Current Facility-Administered Medications on File Prior to Visit   Medication Dose Route Frequency Provider Last Rate Last Admin    denosumab (PROLIA) injection 60 mg  60 mg Subcutaneous Q6 Months Carly Mora PA-C   60 mg at 01/28/19 1548    neomycin-bacitracin-polymyxin ointment   Topical (Top) 1 time in Clinic/HOD Clau Michel NP           Medications have been reviewed and reconciled with patient at visit today.      Exam:  Vitals:    12/05/23 1144   BP: 124/68   Pulse: 76   Temp: 97.9 °F (36.6 °C)     Weight: 77.2 kg (170 lb 3.2 oz)   Body mass index is 29.21 kg/m².      BP Readings from Last 3 Encounters:   12/05/23 124/68   11/28/23 (!) 142/80   11/09/23 (!) 142/80     Wt Readings from Last 3 Encounters:   12/05/23 1144 77.2 kg (170 lb 3.2 oz)   11/28/23 1039 77.4 kg (170 lb 10.2 oz)   11/09/23 1420 76.3 kg (168 lb 4.8 oz)        REVIEW OF SYSTEMS  Review of Systems   Constitutional:  Negative for chills, fever and malaise/fatigue.   HENT:  Negative for congestion and sore throat.    Respiratory:  Negative for cough and shortness of breath.    Cardiovascular:  Negative for chest pain, palpitations and leg swelling.   Gastrointestinal:  Negative for abdominal pain, diarrhea, nausea and vomiting.   Genitourinary:  Negative for dysuria and frequency.   Musculoskeletal:  Positive for back pain. Negative for myalgias.   Skin:         wound   Neurological:  Negative for dizziness, focal weakness and headaches.   Psychiatric/Behavioral:  Negative for depression. The patient is not nervous/anxious.        Physical Exam  Vitals reviewed.   Constitutional:       General: She is not in acute distress.     Appearance: Normal  appearance.   HENT:      Head: Normocephalic and atraumatic.   Eyes:      Conjunctiva/sclera: Conjunctivae normal.   Cardiovascular:      Rate and Rhythm: Normal rate and regular rhythm.      Heart sounds: No murmur heard.  Pulmonary:      Effort: Pulmonary effort is normal. No respiratory distress.   Musculoskeletal:         General: No swelling or deformity. Normal range of motion.      Cervical back: Normal range of motion and neck supple.      Right lower leg: No edema.      Left lower leg: No edema.   Skin:     General: Skin is warm and dry.   Neurological:      Mental Status: She is alert and oriented to person, place, and time. Mental status is at baseline.   Psychiatric:         Mood and Affect: Mood normal.         Thought Content: Thought content normal.         Laboratory Reviewed:     Lab Results   Component Value Date    WBC 12.12 07/07/2023    HGB 14.2 07/07/2023    HCT 45.7 07/07/2023     07/07/2023    CHOL 194 07/07/2023    TRIG 149 07/07/2023    HDL 47 07/07/2023    ALT 8 (L) 08/11/2023    AST 19 08/11/2023     10/12/2023    K 4.4 10/12/2023     10/12/2023    CREATININE 1.2 10/12/2023    BUN 15 10/12/2023    CO2 25 10/12/2023    TSH 1.593 07/07/2023    INR 1.1 04/07/2022    HGBA1C 5.0 03/11/2019       Screening or Prevention Patient's value Goal Complete/Controlled?   HgA1C Testing and Control   Lab Results   Component Value Date    HGBA1C 5.0 03/11/2019      Annually/Less than 8% No   Lipid profile : 07/07/2023 Annually Yes   LDL control Lab Results   Component Value Date    LDLCALC 117.2 07/07/2023    Annually/Less than 100 mg/dl  No   Nephropathy screening Lab Results   Component Value Date    LABMICR 13.0 10/12/2023     Lab Results   Component Value Date    PROTEINUA 2+ (A) 07/07/2023    Annually Yes   Blood pressure BP Readings from Last 1 Encounters:   12/05/23 124/68    Less than 140/90 Yes   Dilated retinal exam Most Recent Eye Exam Date: Not Found Annually Yes   Foot  exam   Most Recent Foot Exam Date: Not Found Annually Yes       Health Maintenance  Health Maintenance Topics with due status: Not Due       Topic Last Completion Date    TETANUS VACCINE 02/25/2022    Lipid Panel 07/07/2023     Health Maintenance Due   Topic Date Due    RSV Vaccine (Age 60+ and Pregnant patients) (1 - 1-dose 60+ series) Never done    COVID-19 Vaccine (4 - 2023-24 season) 09/01/2023       Assessment and Plan:  1. Eschar of lower leg  Assessment & Plan:  Wash with soap and water - lightly debride with wash cloth  Per wound care nurse:   autolytic debridement, maybe with a simple tegaderm to cover   Mepilex dressing applied    No improvement or worsening - Refer to Wound Care                   -Patient's lab results were reviewed and discussed with patient  -Treatment options and alternatives were discussed with the patient. Patient expressed understanding. Patient was given the opportunity to ask questions and be an active participant in their medical care. Patient had no further questions or concerns at this time.         Future Appointments   Date Time Provider Department Center   12/13/2023  1:40 PM Angelica Caballero PA-C [facility] IN PN  Medical C   12/15/2023  3:40 PM Jeanette Molina MD [id number]   1/8/2024  8:30 AM Kymberly Kim MD [facility] UROLOGY [facility]   1/11/2024  9:30 AM Iliana Amaro DPM [facility] POD  Medical C   2/19/2024 11:00 AM LABORATORY, [facility]   2/21/2024  1:00 PM Verna Su PA-C [facility] RHEU  Medical C   2/21/2024  1:45 PM INJECTION 1, [facility]          After visit summary printed and given to patient upon discharge.  Patient goals and care plan are included in After visit summary.      The following issues were discussed: The encounter diagnosis was Eschar of lower leg.    Health maintenance needs, recent test results and goals of care discussed with pt and questions answered.           Clau Michel,  MOMO, NP-C  [facility] [address] [place] [address]

## 2023-12-06 NOTE — PROGRESS NOTES
"Subjective:      Patient ID: Clau Nunn is a 88 y.o. female.    Chief Complaint: Follow-up (2 month f/u)      HPI  Here for follow up of medical problems.  BP has been running high at home.  Happier, better on higher SNRI.  Breathing well lately, last albuterol last week due to cough.  BMs ok with miralax.  Pain not very bad right now.    Updated/ annual due 10/24:  HM: 10/23 fluvax, 1/21 covid vaccine/booster, 11/19 HAV, 5/16 [id number], 5/17 booster [id number], 12/22 [id number], 2/22 TDaP, 11/09 zoster, 7/22 Shingrix #2, 9/22 BMD on prolia, 1/14 Cscope no more needed, 12/22 MMG/ no more, 10/22 Eye Dr. Rubalcava.     Review of Systems   Constitutional:  Negative for chills, diaphoresis and fever.   Respiratory:  Negative for cough and shortness of breath.    Cardiovascular:  Negative for chest pain, palpitations and leg swelling.   Gastrointestinal:  Negative for blood in stool, constipation, diarrhea, nausea and vomiting.   Genitourinary:  Negative for dysuria, frequency and hematuria.   Psychiatric/Behavioral:  The patient is not nervous/anxious.          Objective:   BP (!) 148/90 (BP Location: Left arm, Patient Position: Sitting)   Pulse 86   Temp 98.1 °F (36.7 °C) (Oral)   Ht 5' 4" (1.626 m)   Wt 77.9 kg (171 lb 11.8 oz)   SpO2 98%   BMI 29.48 kg/m²     Physical Exam  Constitutional:       Appearance: She is well-developed.   Neck:      Thyroid: No thyroid mass.      Vascular: No carotid bruit.   Cardiovascular:      Rate and Rhythm: Normal rate and regular rhythm.      Heart sounds: No murmur heard.     No friction rub. No gallop.   Pulmonary:      Effort: Pulmonary effort is normal.      Breath sounds: Normal breath sounds. No wheezing or rales.   Abdominal:      General: Bowel sounds are normal.      Palpations: Abdomen is soft. There is no mass.      Tenderness: There is no abdominal tenderness.   Musculoskeletal:      Cervical back: Neck supple.   Lymphadenopathy:      Cervical: No cervical " adenopathy.   Neurological:      Mental Status: She is alert and oriented to person, place, and time.           Assessment:       1. Essential hypertension    2. Simple chronic bronchitis    3. Recurrent major depressive disorder, in partial remission    4. Stage 3b chronic kidney disease    5. History of DVT in adulthood    6. Chronic anticoagulation          Plan:     Essential hypertension- increase valsartan 160mg, RTC 1wk.  -     valsartan (DIOVAN) 160 MG tablet; Take 1 tablet (160 mg total) by mouth once daily.  Dispense: 90 tablet; Refill: 3    Simple chronic bronchitis, cont albut prn.    Recurrent major depressive disorder, in partial remission- on SNRI.    Stage 3b chronic kidney disease- recheck 1wk with increase ARB.  -     Basic Metabolic Panel; Future; Expected date: 12/15/2023    History of DVT in adulthood, Chronic anticoagulation

## 2023-12-13 ENCOUNTER — OFFICE VISIT (OUTPATIENT)
Dept: PAIN MEDICINE | Facility: CLINIC | Age: 88
End: 2023-12-13
Payer: MEDICARE

## 2023-12-13 VITALS
RESPIRATION RATE: 18 BRPM | HEART RATE: 69 BPM | WEIGHT: 169.06 LBS | DIASTOLIC BLOOD PRESSURE: 84 MMHG | SYSTOLIC BLOOD PRESSURE: 175 MMHG | HEIGHT: 64 IN | BODY MASS INDEX: 28.86 KG/M2

## 2023-12-13 DIAGNOSIS — M54.10 RADICULAR PAIN: ICD-10-CM

## 2023-12-13 DIAGNOSIS — M54.50 ACUTE EXACERBATION OF CHRONIC LOW BACK PAIN: ICD-10-CM

## 2023-12-13 DIAGNOSIS — G89.29 ACUTE EXACERBATION OF CHRONIC LOW BACK PAIN: ICD-10-CM

## 2023-12-13 DIAGNOSIS — M54.50 LUMBOSACRAL PAIN: Primary | ICD-10-CM

## 2023-12-13 PROCEDURE — 96372 THER/PROPH/DIAG INJ SC/IM: CPT | Mod: HCNC,S$GLB,, | Performed by: PHYSICIAN ASSISTANT

## 2023-12-13 PROCEDURE — 1101F PR PT FALLS ASSESS DOC 0-1 FALLS W/OUT INJ PAST YR: ICD-10-PCS | Mod: HCNC,CPTII,S$GLB, | Performed by: PHYSICIAN ASSISTANT

## 2023-12-13 PROCEDURE — 1125F AMNT PAIN NOTED PAIN PRSNT: CPT | Mod: HCNC,CPTII,S$GLB, | Performed by: PHYSICIAN ASSISTANT

## 2023-12-13 PROCEDURE — 1157F PR ADVANCE CARE PLAN OR EQUIV PRESENT IN MEDICAL RECORD: ICD-10-PCS | Mod: HCNC,CPTII,S$GLB, | Performed by: PHYSICIAN ASSISTANT

## 2023-12-13 PROCEDURE — 99999 PR PBB SHADOW E&M-EST. PATIENT-LVL IV: CPT | Mod: PBBFAC,HCNC,, | Performed by: PHYSICIAN ASSISTANT

## 2023-12-13 PROCEDURE — 99999 PR PBB SHADOW E&M-EST. PATIENT-LVL IV: ICD-10-PCS | Mod: PBBFAC,HCNC,, | Performed by: PHYSICIAN ASSISTANT

## 2023-12-13 PROCEDURE — 1159F PR MEDICATION LIST DOCUMENTED IN MEDICAL RECORD: ICD-10-PCS | Mod: HCNC,CPTII,S$GLB, | Performed by: PHYSICIAN ASSISTANT

## 2023-12-13 PROCEDURE — 1101F PT FALLS ASSESS-DOCD LE1/YR: CPT | Mod: HCNC,CPTII,S$GLB, | Performed by: PHYSICIAN ASSISTANT

## 2023-12-13 PROCEDURE — 1159F MED LIST DOCD IN RCRD: CPT | Mod: HCNC,CPTII,S$GLB, | Performed by: PHYSICIAN ASSISTANT

## 2023-12-13 PROCEDURE — 1160F RVW MEDS BY RX/DR IN RCRD: CPT | Mod: HCNC,CPTII,S$GLB, | Performed by: PHYSICIAN ASSISTANT

## 2023-12-13 PROCEDURE — 99214 PR OFFICE/OUTPT VISIT, EST, LEVL IV, 30-39 MIN: ICD-10-PCS | Mod: HCNC,25,S$GLB, | Performed by: PHYSICIAN ASSISTANT

## 2023-12-13 PROCEDURE — 96372 PR INJECTION,THERAP/PROPH/DIAG2ST, IM OR SUBCUT: ICD-10-PCS | Mod: HCNC,S$GLB,, | Performed by: PHYSICIAN ASSISTANT

## 2023-12-13 PROCEDURE — 99214 OFFICE O/P EST MOD 30 MIN: CPT | Mod: HCNC,25,S$GLB, | Performed by: PHYSICIAN ASSISTANT

## 2023-12-13 PROCEDURE — 3288F FALL RISK ASSESSMENT DOCD: CPT | Mod: HCNC,CPTII,S$GLB, | Performed by: PHYSICIAN ASSISTANT

## 2023-12-13 PROCEDURE — 1160F PR REVIEW ALL MEDS BY PRESCRIBER/CLIN PHARMACIST DOCUMENTED: ICD-10-PCS | Mod: HCNC,CPTII,S$GLB, | Performed by: PHYSICIAN ASSISTANT

## 2023-12-13 PROCEDURE — 3288F PR FALLS RISK ASSESSMENT DOCUMENTED: ICD-10-PCS | Mod: HCNC,CPTII,S$GLB, | Performed by: PHYSICIAN ASSISTANT

## 2023-12-13 PROCEDURE — 1125F PR PAIN SEVERITY QUANTIFIED, PAIN PRESENT: ICD-10-PCS | Mod: HCNC,CPTII,S$GLB, | Performed by: PHYSICIAN ASSISTANT

## 2023-12-13 PROCEDURE — 1157F ADVNC CARE PLAN IN RCRD: CPT | Mod: HCNC,CPTII,S$GLB, | Performed by: PHYSICIAN ASSISTANT

## 2023-12-13 RX ORDER — KETOROLAC TROMETHAMINE 30 MG/ML
30 INJECTION, SOLUTION INTRAMUSCULAR; INTRAVENOUS
Status: COMPLETED | OUTPATIENT
Start: 2023-12-13 | End: 2023-12-13

## 2023-12-13 RX ORDER — PREGABALIN 100 MG/1
100 CAPSULE ORAL 2 TIMES DAILY
Qty: 180 CAPSULE | Refills: 1 | Status: SHIPPED | OUTPATIENT
Start: 2023-12-13 | End: 2023-12-22 | Stop reason: SDUPTHER

## 2023-12-13 RX ORDER — DENOSUMAB 60 MG/ML
INJECTION SUBCUTANEOUS
COMMUNITY
Start: 2023-08-21

## 2023-12-13 RX ADMIN — KETOROLAC TROMETHAMINE 30 MG: 30 INJECTION, SOLUTION INTRAMUSCULAR; INTRAVENOUS at 01:12

## 2023-12-13 NOTE — PROGRESS NOTES
Chronic Pain -- Established Patient (Follow-up visit)    Chief Pain Complaint:  Chief Complaint   Patient presents with    Low-back Pain    Rectal Pain     Left buttock   Bilateral SIJ pain - much worse on left    Interval History (12/13/2023):  Clau Nunn presents today for follow-up visit.  Patient was last seen about 3 months ago.   Patient reports pain as 5/10 today. She had a fall recently where she fell backwards and didn't have the watch on her. She landed on tailbone and left hip.     Interval History (9/8/2023):  Patient presents today for follow-up visit.  Patient was last seen on 8/11/2023. At that visit, the plan was to continue Lyrica. She reports that her pain is worse in the morning, but as she moves around, it improves. Patient reports pain as 4/10 today.    Interval History (8/11/2023):  Clau Nunn presents today for follow-up visit. At that visit, the plan was to start Lyrica, which has helped.  S/p left SIJ RFA on 6/8/23 (over a month ago) and is reporting better pain relief than she did initially. She is also getting around better. Patient reports pain as 3/10 today. Pain is worse the most in the morning when she wakes up, but pain improves as she walks around during the day.     Interval History (6/28/2023): Patient presents today for follow-up visit.  she underwent left SIJ RFA on 6/8/23 (about 3 weeks ago).  The patient reports that she is/was unchanged following the procedure.  she reports limited pain relief so far. Patient reports pain as 8/10 today.    Interval History (5/2/2023):  Clau Nunn presents today for follow-up visit.  Patient was last seen on 3/28/2023.  Patient reports pain as 5/10 today.  Pain is much worse and left posterior hip, more so over left SI joint.    Interval History (3/28/2023): Clau Nunn presents today for follow-up visit.  she underwent bilateral SIJ injection on 1/31/23.  The patient reports that she is/was better following the  "procedure.  she reports about 50% pain relief on left, right side feeling much better.  The changes lasted 4 weeks so far.  The changes have continued through this visit.  Patient reports pain as 5/10 today.  Seeing Podiatry for left foot fracture, which is not healing. Still having left SIJ pain.     Interval History (1/10/2023):  Clau Nunn presents today for follow-up visit.  Patient was last seen about 6 months ago. At that visit, she was feeling better since REJI, but she was having localized SIJ pain - still overall better since injection. She returns today with continued posterior "hip pain". Pain is worse on left. Patient reports pain as 8/10 today.    Interval History (6/29/2022): Clau Nunn presents today for follow-up visit.  she underwent Bilateral L5/S1 + S1 TF REJI on 5/25/22.  The patient reports that she is/was better following the procedure.  she reports 75% pain relief with regards to leg pain.  The changes lasted 4 weeks so far.  The changes have continued through this visit.  Patient reports pain as 5/10 today. Having localized SIJ pain today.     Interval History (5/11/2022): Clau Nunn presents today for follow-up visit.  she underwent left L5/S1 + S1 TF REJI on 7/1/21 with excellent pain relief for about 3 months.  The patient reports that she is/was better following the procedure.  The changes have NOT continued through this visit.   she underwent right suprascapular nerve block on 7/13/21.  The patient reports that she is/was unchanged following the procedure. She reports limited pain relief for short term. But, she mainly has limited ROM.   Patient reports pain as 5/10 today - due to low back pain and leg pain.    Interval History (6/24/2021): Patient was seen on 2/10/21. At that time she underwent Bilateral L5/S1 TF REJI.  The patient reports that she is/was unchanged following the procedure.  she reports 20% pain relief.  The changes lasted 1 weeks.  The changes have " NOT continued through this visit.  Patient reports pain as 5/10 today.  Her main pain complaint today is LLE radiculopathy worse in popliteal space and shin.   S/p left knee injection with Dr. Burns about 2 months ago with some pain relief.    Interval History (2/2/2021): Patient was last seen on 10/16/2020.  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  She is still never seen orthopedics for this issue, and she has not improved from intra-articular shoulder joint injections nor suprascapular nerve block.  Patient reports pain as 8/10 today.  Today, she is complaining of back and bilateral leg pain, previously just on the left side, although the left side still remains the worst.    Interval History (10/19/2020): Patient was seen on 9/16/20. At that time she underwent  Right suprascapular nerve block.  The patient reports that she is/was unchanged following the procedure.  she reports no pain relief.  Patient reports pain as 6/10 today - only when she moves her arm.    Interval History (9/9/2020): Patient was seen on 8/12/20. At that time she underwent left SIJ + left hip joint + right shoulder joint injection.  The patient reports that she is/was better following the procedure.  she reports 75% pain relief with hip pain relief but only 25% relief of shoulder pain.  The changes lasted 4 weeks so far.  The changes have continued through this visit.  Patient reports pain as /10 today.    Interval History (8/5/2020): Patient was seen on 7/8/20. At that time she underwent left L2/3 + L5/S1 TF REJI.  The patient reports that she is/was slightly better following the procedure.  she reports only 50 % pain relief.  The changes lasted 4 weeks so far.  The changes have continued through this visit.  She also reports that her right shoulder has been bothering her.  She has history of right humerus fracture years ago.    Interval History (6/12/2020):  She is here today to review MRI. She reports 10/10 pain today. She also has concerns about Prolia as she read that it can cause rashes and joint pain.  She has been having a rash on her right breast for a few weeks.  She will see Dr. Gomez soon to discuss.     Interval History(11/20/18): Patient was seen on 10/24/18. At that time she underwent left SIJ + left GT bursa injection with local.  The patient reports that she is/was better following the procedure.  she reports 100% pain relief.  The changes lasted 4 weeks so far.  The changes have continued through this visit.    History of Present Illness:   Clau Nunn is a 88 y.o. female  who is presenting with a chief complaint of low back pain and hip pain. The patient began experiencing this problem insidiously, and the pain has been gradually worsening over the past 6 month(s). The pain is described as throbbing, cramping, aching and heavy and is located in the bilateral buttocks . Pain is intermittent and lasts hours. The pain radiates to  lateral thigh and groin . The patient rates her pain a 5 out of ten and interferes with activities of daily living a 5 out of ten. Pain is exacerbated by getting up from a seated position, and is improved by rest. Patient reports prior trauma (fall in June 2018 causing right distal radius + right sacrum fracture), prior lumbar surgery in ~2005, right hip replacement, right distal radius fracture requiring ORIF in June 2018.    - pertinent negatives: No fever, No chills, No weight loss, No bladder dysfunction, No bowel dysfunction, No extremity weakness, No saddle anesthesia  - pertinent positives: none    - medications, other therapies tried (physical therapy, injections):     >> Tylenol    >> Has previously undergone Physical Therapy (aquatic and land) with limited relief    >> Has previously undergone spinal injection/s:   - bilateral SIJ injection on 11-9-17 with ~30% relief for 2 weeks   - left hip injection on  12-28-17 with some relief   - bilateral L3-5 MBB on 5/11/18 with reported 100% pain relief   - left SIJ + left GT bursa injection with local on 10/24/18 with 100% pain relief   - left L2/3 + L5/S1 TF REJI on 7/8/20 with about 50% pain relief   - left SIJ + left hip joint + right shoulder joint injection on 8/12/20 with 75% pain relief with hip pain relief but only 25% relief of shoulder pain - no longer having groin pain after this procedure    - Right suprascapular nerve block on 9/16/20 with limited pain relief    - Bilateral L5/S1 TF REJI on 2/10/21 with 20% pain relief for short term   - left knee injection with Dr. Burns in April 2021 with some pain relief   - left L5/S1 + S1 TF REJI on 7/1/21 with excellent pain relief for about 3 months   - right suprascapular nerve block on 7/13/21 with limited pain relief    - Bilateral L5/S1 + S1 TF REJI on 5/25/22 with 75% pain relief - regarding leg pain, now having localized SIJ   - bilateral SIJ injection on 1/31/23 with >50% pain relief on left, >70% pain relief on right - short-term pain relief  - left SIJ RFA on 6/8/23 with better pain relief now      Past Surgical History:   Procedure Laterality Date    ADENOIDECTOMY      BACK SURGERY      x2    breast implants  1973    CATARACT EXTRACTION Bilateral     CLOSED REDUCTION DISTAL RADIUS FRACTURE      Dr. Black, 8/18    cystoscope  1/6/16    DR. Brown    FRACTURE SURGERY  April 3 2015    left wrist    HAND SURGERY      HIP SURGERY Right     total hip- Dr. Dos Santos    HYSTERECTOMY      34y/o    INJECTION OF ANESTHETIC AGENT AROUND NERVE Right 9/16/2020    Procedure: Right suprascapular nerve block;  Surgeon: Raffi Wells MD;  Location: [facility];  Service: Pain Management;  Laterality: Right;    INJECTION OF ANESTHETIC AGENT AROUND NERVE Right 7/13/2021    Procedure: Block, Nerve Right Suprascapular Nerve Block with RN IV sedation;  Surgeon: Raffi Wells MD;  Location: [facility];  Service: Pain Management;   Laterality: Right;    INJECTION OF ANESTHETIC AGENT INTO SACROILIAC JOINT N/A 8/12/2020    Procedure: Left IA hip Joint + Left SIJ + Right shoulder injection;  Surgeon: Raffi Wells MD;  Location: [facility];  Service: Pain Management;  Laterality: N/A;    INJECTION OF ANESTHETIC AGENT INTO SACROILIAC JOINT Bilateral 1/31/2023    Procedure: Bilateral SIJ Injection w/Local;  Surgeon: Abdirahman Parker MD;  Location: [facility];  Service: Pain Management;  Laterality: Bilateral;    INJECTION OF JOINT N/A 8/12/2020    Procedure: Left IA hip Joint + Left SIJ + Right shoulder injection;  Surgeon: Raffi Wells MD;  Location: [facility];  Service: Pain Management;  Laterality: N/A;    JOINT REPLACEMENT Right     R NNAMDI - Dr. Dos Santos    LEG SURGERY Right     ex-fix tib/fib    RADIOFREQUENCY THERMOCOAGULATION Left 6/8/2023    Procedure: Left SIJ RFA w/ local (give Valium before);  Surgeon: Abdirahman Parker MD;  Location: [facility];  Service: Pain Management;  Laterality: Left;    SELECTIVE INJECTION OF ANESTHETIC AGENT AROUND LUMBAR SPINAL NERVE ROOT BY TRANSFORAMINAL APPROACH Bilateral 2/10/2021    Procedure: Bilateral L5/S1 TF REJI;  Surgeon: Raffi Wells MD;  Location: [facility];  Service: Pain Management;  Laterality: Bilateral;    SELECTIVE INJECTION OF ANESTHETIC AGENT AROUND LUMBAR SPINAL NERVE ROOT BY TRANSFORAMINAL APPROACH Bilateral 5/25/2022    Procedure: Bilateral L5/S1 + S1 TF REJI;  Surgeon: Raffi Wells MD;  Location: [facility];  Service: Pain Management;  Laterality: Bilateral;    TONSILLECTOMY      TRANSFORAMINAL EPIDURAL INJECTION OF STEROID Left 7/8/2020    Procedure: left L2/3 + L5/S1 TF REJI;  Surgeon: Raffi Wells MD;  Location: [facility];  Service: Pain Management;  Laterality: Left;    TRANSFORAMINAL EPIDURAL INJECTION OF STEROID Left 7/1/2021    Procedure: left L5/S1 + S1 TF REJI;  Surgeon: Raffi Wells MD;  Location: [facility];  Service: Pain Management;  Laterality: Left;     "    Imaging / Labs / Studies (reviewed on 12/13/2023):     Records from [facility] regarding right shoulder fracture- uploaded into chart under "Media"    Results for orders placed during the hospital encounter of 08/05/20   X-ray Shoulder 2 or More Views Right    Narrative COMPARISON:  12/10/2018  FINDINGS:  There is a chronic fracture deformity of the right humeral head and neck.  Associated osseous productive changes approximate the inferior margins of the articular surface of the humeral head.  No definite acute fractures or dislocations visualized.  There are mild degenerative changes present at the AC joint and glenoid.  Postoperative findings noted in the lower thoracic spine.     Results for orders placed during the hospital encounter of 12/10/18   X-Ray Shoulder Trauma Right    Narrative FINDINGS:  Comminuted fracture involving the surgical neck and right humeral head.  No evidence of dislocation.  Degenerative changes are present at the right AC joint.    Impression Comminuted fracture involving the right humeral head and surgical neck.     Results for orders placed during the hospital encounter of 06/29/20   MRI Lumbar Spine W WO Cont    Narrative COMPARISON:  Prior MRI from June 29, 2020.  FINDINGS:  Again demonstrated are postoperative findings from thoracolumbar spinal fusion and laminectomy at T12.  Chronic compression deformity of T12 with chronic retropulsion that flattens the thecal sac to 14 mm.  This is unchanged.  Mild, grade 1 degenerative spondylolisthesis at L4-L5.  Vertebral body height is normal.  No abnormal enhancement patterns. The conus medullaris terminates at the level of L2-L3, low lying.  No abnormal signal within the conus. Intervertebral disc levels are as follows:  T11-T12 disc: Fusion at this level.  Mild, chronic retropulsion that flattens the thecal sac to 14 mm.  No significant foraminal stenosis.  T12-L1 disc: Prior laminectomy and fusion.  The thecal sac measures 19 mm.  No " significant foraminal stenosis.  L1-L2 disc : Posterior fusion.  No significant spinal stenosis or foraminal stenosis.  L2-L3 disc: Disc space height loss.  Broad-based posterior disc bulge which encroaches slightly into the floors of the exit foramina.  Moderate buckling of ligamentum flavum.  The thecal sac measures 8-9 mm AP.  Mild bilateral foraminal stenosis.  This has progressed since the prior MRI.  L3-L4 disc: Broad-based posterior disc bulge that encroaches slightly into the floors of the exit foramina.  Moderate buckling of ligamentum flavum.  The thecal sac measures 10 mm AP.  Mild bilateral neural foraminal stenosis, right greater than left.  These findings are similar to prior.  L4-L5 disc: Minimal grade 1 spondylolisthesis with severe degenerative facet change and hypertrophy.  Left-sided facet joint effusion.  Is buckling of ligamentum flavum.  The thecal sac measures 11 mm.  No significant foraminal stenosis.  The spondylolisthesis has slightly progressed measuring 4 mm compared to prior 2 mm.  L5-S1 disc: Severe disc space height loss with marginal disc and osteophyte encroach into the exit foramina bilaterally.  Moderate degenerative facet hypertrophy.  The thecal sac measures 14 mm.    Impression 1. 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  2. Progression of mild foraminal stenosis and mild spinal stenosis at L2-L3.  3. Minimal progression of grade 1 spondylolisthesis at L4-L5.  4. Unchanged mild, chronic retropulsion from T12 where there has been a laminectomy and fusion.       7/30/2018 XR LUMBAR SPINE COMPLETE 5 VIEW  TECHNIQUE:  AP, lateral, spot and bilateral oblique views of the lumbar spine were performed.  COMPARISON:  07/25/2018  FINDINGS:  There is grade 1 spondylolisthesis of L4 on L5.  Pedicle screws and fixation rods are noted for at the T10, T11, L1 and L2 levels.  Chronic compression deformity at the L1 level unchanged.  Prominent bilateral facet arthropathy noted at the L4-5 and L5-S1  levels.  IVC filter noted.     7/30/2018 XR HIPS BILATERAL 2 VIEW INCL AP PELVIS  TECHNIQUE:  AP view of the pelvis and frogleg lateral views of both hips were performed.  COMPARISON:  07/26/2018  FINDINGS:  The bony pelvis is intact. A right total hip arthroplasty, plate and wires are in place.  No hardware failure or loosening suggested.  The appearance is unchanged when compared to the prior exam.  Mild degenerative changes noted at the left hip.  No acute fracture or dislocation of the left hip.  No significant joint space narrowing identified.  No plain film evidence to suggest AVN of either hip.     Results for orders placed during the hospital encounter of 01/13/14   MRI Lumbar Spine W WO Contrast    Narrative Findings: Pre- and postcontrast multiplanar multisequence imaging of the thoracic and lumbar spine was performed using 7 cc of Gadavist intravenous contrast material.  There is moderately severe chronic central compression deformity of the T12 vertebral body which is stabilized by paraspinous rods and pedicle screws which extend from T10 through L2.  Postsurgical changes of laminectomy also noted at T12.  The posterior cortex of the T12 vertebral body is displaced posteriorly and indents the ventral thecal sac.  There is no anterior cord contact or significant central canal stenosis.  Degenerative disk desiccation is noted at multiple levels with moderate disk   narrowing at L5-S1.    Also L5-S1 is a broad-based disk protrusion which combined with bilateral facet hypertrophy results in moderately severe narrowing of both neural foramina.  No significant central canal stenosis noted.    From L2-3 through L4-5 there   is mild disk bulging which results in mild bilateral foraminal narrowing.  This is slightly more pronounced at L4-5 with bilateral facet hypertrophy a contributing factor.  No significant central canal stenosis noted.  No thoracic disk extrusion, and foraminal narrowing, canal stenosis is  "evident.  Thoracolumbar cord is intact.  Paravertebral soft tissues are symmetric and normal in appearance.         Review of Systems:  CONSTITUTIONAL: patient denies any fever, chills, or weight loss  SKIN: patient denies any rash or itching  RESPIRATORY: patient denies having any shortness of breath  GASTROINTESTINAL: patient denies having any diarrhea, constipation, or bowel incontinence  GENITOURINARY: patient denies having any abnormal bladder function    MUSCULOSKELETAL:  - patient complains of the above noted pain/s (see chief pain complaint)    NEUROLOGICAL:   - pain as above  - strength in Lower extremities is intact, BILATERALLY  - sensation in Lower extremities is intact, BILATERALLY  - patient denies any loss of bowel or bladder control      PSYCHIATRIC: patient denies any change in mood    Other:  All other systems reviewed and are negative        Physical Exam:  Vitals:    12/13/23 1347   BP: (!) 175/84   Pulse: 69   Resp: 18   Weight: 76.7 kg (169 lb 1.5 oz)   Height: 5' 4" (1.626 m)   PainSc:   5   PainLoc: Back    Body mass index is 29.02 kg/m².   (reviewed on 12/13/2023)    General: alert and oriented, in no apparent distress.  Gait: antalgic gait   Skin: no rashes, no discoloration, no obvious lesions  HEENT: normocephalic, atraumatic.   Respiratory: without use of accessory muscles of respiration.    Musculoskeletal - Lumbar Spine:  - Midline scar present over thoracic spine  - Pain on flexion of lumbar spine: Present, worse than with extension  - Pain on extension of lumbar spine: Present  - Lumbar facet loading: Positive bilaterally  - TTP over the lumbar facet joints: Absent  - TTP over GT bursa: Minimal   - Straight Leg Raise: Positive bilaterally, L>R - improved   - Pain on Internal and external rotation of the hip: Present    SIJ testing:  - TTP over the SI joints: Present on left > right  - Srikanth's/ Micheal's: Positive    - Sacroiliac Compression Test (lateral pressure): Positive   - " SacralThrust Test (posterior pressure): Positive    Right Shoulder:  - Pain on abduction: Present   - ROM: decreased secondary to pain, very limited ROM      - TTP over the AC and GH joint: Present  - Neer's: Positive  - Hawkin's: Positive    Neuro - Lower Extremities:  - BLE Strength: R/L: HF: 5/5, HE: 5/5, KF: 5/5; KE: 5/5; FE: 5/5; FF: 5/5  - Extremity Reflexes: Brisk and symmetric throughout  - Sensory: Sensation to light touch intact bilaterally      Psych:  Mood and affect is appropriate            Assessment:  Clau Nunn is a 88 y.o. year old female who is presenting with       ICD-10-CM ICD-9-CM    1. Lumbosacral pain  M54.50 724.2      724.6       2. Acute exacerbation of chronic low back pain  M54.50 724.2 ketorolac injection 30 mg    G89.29 338.19      338.29       3. Radicular pain  M54.10 729.2 pregabalin (LYRICA) 100 MG capsule            Plan:  1. Interventional:   - Consider right SIJ RFA if warranted.   - S/p left SIJ RFA on 6/8/23 with better pain relief than she was reporting initially.  She is also getting around better.  - Consider repeat Bilateral L5/S1 + S1 TF REJI.Will hold off since radicular pain not an issue at this time.  - S/p bilateral SIJ injection on 1/31/23 with >50% pain relief on left, >70% pain relief on right - short-term pain relief.  - S/p Bilateral L5/S1 + S1 TF REJI on 5/25/22 with 75% pain relief - regarding leg pain, now having localized SIJ.   - S/p Bilateral L5/S1 TF REJI on 2/10/21 with 20% pain relief x 1 week. Pain now more localized on left.   - S/p Right suprascapular nerve block on 9/16/20 with limited pain relief.    2. Pharmacologic:   - Procedure note: An IM injection of (ketolorac 30mg/1mL) was administered during clinic visit.  This was well tolerated.   - Increase Lyrica 75mg BID to Lyrica (pregabalin) 100mg BID.  - Patient encouraged to take Tylenol 500mg x 2 tablets (1000mg) TID more regularly, not to exceed 3000mg per day.   - Anticoagulation use:  apixaban (Eliquis) - 2° prevention (h/o DVT) - Heme/Onc.     3. Rehabilitative: Continue use of SIJ belt at home with housework. Physical therapy for shoulder did not help in the past, so doesn't want to attend. SIJ exercises given on AVS previously. Patient is very active.    4.  Diagnostic/ Imaging: No new imaging ordered. Previous imaging reviewed.     5. Consult/ Referral: Will see PCP this week regarding htn.     6. Follow up: 6 months follow-up     - This condition does not require this patient to take time off of work, and the primary goal of our Pain Management services is to improve the patient's functional capacity.   - I discussed the risks, benefits, and alternatives to potential treatment options. All questions and concerns were fully addressed today in clinic.         Angelica Caballero PA-C  Interventional Pain Management - [facility]    Disclaimer:  This note was prepared using voice recognition system and is likely to have sound alike errors that may have been overlooked even after proof reading.  Please call me with any questions.

## 2023-12-15 ENCOUNTER — OFFICE VISIT (OUTPATIENT)
Dept: PRIMARY CARE CLINIC | Facility: CLINIC | Age: 88
End: 2023-12-15
Payer: MEDICARE

## 2023-12-15 VITALS
TEMPERATURE: 98 F | BODY MASS INDEX: 29.32 KG/M2 | OXYGEN SATURATION: 98 % | DIASTOLIC BLOOD PRESSURE: 90 MMHG | HEART RATE: 86 BPM | WEIGHT: 171.75 LBS | HEIGHT: 64 IN | SYSTOLIC BLOOD PRESSURE: 148 MMHG

## 2023-12-15 DIAGNOSIS — J41.0 SIMPLE CHRONIC BRONCHITIS: ICD-10-CM

## 2023-12-15 DIAGNOSIS — F33.41 RECURRENT MAJOR DEPRESSIVE DISORDER, IN PARTIAL REMISSION: ICD-10-CM

## 2023-12-15 DIAGNOSIS — Z79.01 CHRONIC ANTICOAGULATION: ICD-10-CM

## 2023-12-15 DIAGNOSIS — I10 ESSENTIAL HYPERTENSION: Primary | Chronic | ICD-10-CM

## 2023-12-15 DIAGNOSIS — Z86.718 HISTORY OF DVT IN ADULTHOOD: ICD-10-CM

## 2023-12-15 DIAGNOSIS — N18.32 STAGE 3B CHRONIC KIDNEY DISEASE: ICD-10-CM

## 2023-12-15 PROCEDURE — 99213 OFFICE O/P EST LOW 20 MIN: CPT | Mod: HCNC,S$GLB,, | Performed by: INTERNAL MEDICINE

## 2023-12-15 PROCEDURE — 1159F MED LIST DOCD IN RCRD: CPT | Mod: HCNC,CPTII,S$GLB, | Performed by: INTERNAL MEDICINE

## 2023-12-15 PROCEDURE — 1159F PR MEDICATION LIST DOCUMENTED IN MEDICAL RECORD: ICD-10-PCS | Mod: HCNC,CPTII,S$GLB, | Performed by: INTERNAL MEDICINE

## 2023-12-15 PROCEDURE — 1126F PR PAIN SEVERITY QUANTIFIED, NO PAIN PRESENT: ICD-10-PCS | Mod: HCNC,CPTII,S$GLB, | Performed by: INTERNAL MEDICINE

## 2023-12-15 PROCEDURE — 1101F PT FALLS ASSESS-DOCD LE1/YR: CPT | Mod: HCNC,CPTII,S$GLB, | Performed by: INTERNAL MEDICINE

## 2023-12-15 PROCEDURE — 1101F PR PT FALLS ASSESS DOC 0-1 FALLS W/OUT INJ PAST YR: ICD-10-PCS | Mod: HCNC,CPTII,S$GLB, | Performed by: INTERNAL MEDICINE

## 2023-12-15 PROCEDURE — 1126F AMNT PAIN NOTED NONE PRSNT: CPT | Mod: HCNC,CPTII,S$GLB, | Performed by: INTERNAL MEDICINE

## 2023-12-15 PROCEDURE — 3288F FALL RISK ASSESSMENT DOCD: CPT | Mod: HCNC,CPTII,S$GLB, | Performed by: INTERNAL MEDICINE

## 2023-12-15 PROCEDURE — 1157F PR ADVANCE CARE PLAN OR EQUIV PRESENT IN MEDICAL RECORD: ICD-10-PCS | Mod: HCNC,CPTII,S$GLB, | Performed by: INTERNAL MEDICINE

## 2023-12-15 PROCEDURE — 3288F PR FALLS RISK ASSESSMENT DOCUMENTED: ICD-10-PCS | Mod: HCNC,CPTII,S$GLB, | Performed by: INTERNAL MEDICINE

## 2023-12-15 PROCEDURE — 99213 PR OFFICE/OUTPT VISIT, EST, LEVL III, 20-29 MIN: ICD-10-PCS | Mod: HCNC,S$GLB,, | Performed by: INTERNAL MEDICINE

## 2023-12-15 PROCEDURE — 99999 PR PBB SHADOW E&M-EST. PATIENT-LVL IV: CPT | Mod: PBBFAC,HCNC,, | Performed by: INTERNAL MEDICINE

## 2023-12-15 PROCEDURE — 1157F ADVNC CARE PLAN IN RCRD: CPT | Mod: HCNC,CPTII,S$GLB, | Performed by: INTERNAL MEDICINE

## 2023-12-15 PROCEDURE — 99999 PR PBB SHADOW E&M-EST. PATIENT-LVL IV: ICD-10-PCS | Mod: PBBFAC,HCNC,, | Performed by: INTERNAL MEDICINE

## 2023-12-15 RX ORDER — VALSARTAN 160 MG/1
160 TABLET ORAL DAILY
Qty: 90 TABLET | Refills: 3 | Status: SHIPPED | OUTPATIENT
Start: 2023-12-15

## 2023-12-20 NOTE — PROGRESS NOTES
"Subjective:      Patient ID: Clau Nunn is a 88 y.o. female.    Chief Complaint: Follow-up (1 week f/u)      HPI  Here for follow up of medical problems.  Doubled valsartan to 160mg 1 week ago, pt brings in list of BPs on her cuff: 141-175/.        Updated/ annual due 10/24:  HM: 10/23 fluvax, 1/21 covid vaccine/booster, 11/19 HAV, 5/16 [id number], 5/17 booster [id number], 12/22 [id number], 2/22 TDaP, 11/09 zoster, 7/22 Shingrix #2, 9/22 BMD on prolia, 1/14 Cscope no more needed, 12/22 MMG/ no more, 10/22 Eye Dr. Rubalcava.     Review of Systems   Constitutional:  Negative for chills, diaphoresis and fever.   Respiratory:  Negative for cough and shortness of breath.    Cardiovascular:  Negative for chest pain, palpitations and leg swelling.   Gastrointestinal:  Negative for blood in stool, constipation, diarrhea, nausea and vomiting.   Genitourinary:  Negative for dysuria, frequency and hematuria.   Psychiatric/Behavioral:  The patient is not nervous/anxious.          Objective:   BP (!) 148/82 (BP Location: Left arm, Patient Position: Sitting)   Pulse 71   Temp 98.2 °F (36.8 °C) (Oral)   Ht 5' 4" (1.626 m)   Wt 77.3 kg (170 lb 4.9 oz)   SpO2 98%   BMI 29.23 kg/m²     Physical Exam  Constitutional:       Appearance: She is well-developed.   Neck:      Thyroid: No thyroid mass.      Vascular: No carotid bruit.   Cardiovascular:      Rate and Rhythm: Normal rate and regular rhythm.      Heart sounds: No murmur heard.     No friction rub. No gallop.   Pulmonary:      Effort: Pulmonary effort is normal.      Breath sounds: Normal breath sounds. No wheezing or rales.   Abdominal:      General: Bowel sounds are normal.      Palpations: Abdomen is soft. There is no mass.      Tenderness: There is no abdominal tenderness.   Musculoskeletal:      Cervical back: Neck supple.   Lymphadenopathy:      Cervical: No cervical adenopathy.   Neurological:      Mental Status: She is alert and oriented to person, place, and " time.           Assessment:       1. Essential hypertension    2. Stage 3b chronic kidney disease          Plan:     Essential hypertension, ?control.  Bring cuff to check for reliability.    Stage 3b chronic kidney disease- lab now.  Increase fluids, at least 4 glasses water.    RTC 2wk.

## 2023-12-22 ENCOUNTER — OFFICE VISIT (OUTPATIENT)
Dept: PRIMARY CARE CLINIC | Facility: CLINIC | Age: 88
End: 2023-12-22
Payer: MEDICARE

## 2023-12-22 VITALS
TEMPERATURE: 98 F | WEIGHT: 170.31 LBS | OXYGEN SATURATION: 98 % | HEIGHT: 64 IN | DIASTOLIC BLOOD PRESSURE: 82 MMHG | HEART RATE: 71 BPM | SYSTOLIC BLOOD PRESSURE: 148 MMHG | BODY MASS INDEX: 29.08 KG/M2

## 2023-12-22 DIAGNOSIS — M54.10 RADICULAR PAIN: ICD-10-CM

## 2023-12-22 DIAGNOSIS — N18.32 STAGE 3B CHRONIC KIDNEY DISEASE: ICD-10-CM

## 2023-12-22 DIAGNOSIS — I10 ESSENTIAL HYPERTENSION: Primary | Chronic | ICD-10-CM

## 2023-12-22 LAB
ANION GAP SERPL CALC-SCNC: 9 MMOL/L (ref 8–16)
BUN SERPL-MCNC: 19 MG/DL (ref 8–23)
CALCIUM SERPL-MCNC: 9.5 MG/DL (ref 8.7–10.5)
CHLORIDE SERPL-SCNC: 104 MMOL/L (ref 95–110)
CO2 SERPL-SCNC: 26 MMOL/L (ref 23–29)
CREAT SERPL-MCNC: 1.1 MG/DL (ref 0.5–1.4)
EST. GFR  (NO RACE VARIABLE): 48.6 ML/MIN/1.73 M^2
GLUCOSE SERPL-MCNC: 93 MG/DL (ref 70–110)
POTASSIUM SERPL-SCNC: 4.8 MMOL/L (ref 3.5–5.1)
SODIUM SERPL-SCNC: 139 MMOL/L (ref 136–145)

## 2023-12-22 PROCEDURE — 1157F PR ADVANCE CARE PLAN OR EQUIV PRESENT IN MEDICAL RECORD: ICD-10-PCS | Mod: HCNC,CPTII,S$GLB, | Performed by: INTERNAL MEDICINE

## 2023-12-22 PROCEDURE — 80048 BASIC METABOLIC PNL TOTAL CA: CPT | Mod: HCNC | Performed by: INTERNAL MEDICINE

## 2023-12-22 PROCEDURE — 1126F AMNT PAIN NOTED NONE PRSNT: CPT | Mod: HCNC,CPTII,S$GLB, | Performed by: INTERNAL MEDICINE

## 2023-12-22 PROCEDURE — 1159F MED LIST DOCD IN RCRD: CPT | Mod: HCNC,CPTII,S$GLB, | Performed by: INTERNAL MEDICINE

## 2023-12-22 PROCEDURE — 1159F PR MEDICATION LIST DOCUMENTED IN MEDICAL RECORD: ICD-10-PCS | Mod: HCNC,CPTII,S$GLB, | Performed by: INTERNAL MEDICINE

## 2023-12-22 PROCEDURE — 3288F FALL RISK ASSESSMENT DOCD: CPT | Mod: HCNC,CPTII,S$GLB, | Performed by: INTERNAL MEDICINE

## 2023-12-22 PROCEDURE — 1101F PT FALLS ASSESS-DOCD LE1/YR: CPT | Mod: HCNC,CPTII,S$GLB, | Performed by: INTERNAL MEDICINE

## 2023-12-22 PROCEDURE — 3288F PR FALLS RISK ASSESSMENT DOCUMENTED: ICD-10-PCS | Mod: HCNC,CPTII,S$GLB, | Performed by: INTERNAL MEDICINE

## 2023-12-22 PROCEDURE — 1157F ADVNC CARE PLAN IN RCRD: CPT | Mod: HCNC,CPTII,S$GLB, | Performed by: INTERNAL MEDICINE

## 2023-12-22 PROCEDURE — 99213 OFFICE O/P EST LOW 20 MIN: CPT | Mod: HCNC,S$GLB,, | Performed by: INTERNAL MEDICINE

## 2023-12-22 PROCEDURE — 1101F PR PT FALLS ASSESS DOC 0-1 FALLS W/OUT INJ PAST YR: ICD-10-PCS | Mod: HCNC,CPTII,S$GLB, | Performed by: INTERNAL MEDICINE

## 2023-12-22 PROCEDURE — 99999 PR PBB SHADOW E&M-EST. PATIENT-LVL IV: CPT | Mod: PBBFAC,HCNC,, | Performed by: INTERNAL MEDICINE

## 2023-12-22 PROCEDURE — 99999 PR PBB SHADOW E&M-EST. PATIENT-LVL IV: ICD-10-PCS | Mod: PBBFAC,HCNC,, | Performed by: INTERNAL MEDICINE

## 2023-12-22 PROCEDURE — 99213 PR OFFICE/OUTPT VISIT, EST, LEVL III, 20-29 MIN: ICD-10-PCS | Mod: HCNC,S$GLB,, | Performed by: INTERNAL MEDICINE

## 2023-12-22 PROCEDURE — 1126F PR PAIN SEVERITY QUANTIFIED, NO PAIN PRESENT: ICD-10-PCS | Mod: HCNC,CPTII,S$GLB, | Performed by: INTERNAL MEDICINE

## 2023-12-22 RX ORDER — PREGABALIN 100 MG/1
100 CAPSULE ORAL 2 TIMES DAILY
Qty: 180 CAPSULE | Refills: 1 | Status: SHIPPED | OUTPATIENT
Start: 2023-12-22

## 2023-12-22 NOTE — TELEPHONE ENCOUNTER
----- Message from Angelica Caballero PA-C sent at 12/22/2023 11:19 AM CST -----  Contact: Sweetie/Miller  Please call miller and find out why. I have a [employer] license, which is how I sent Lyrica in first place.   ----- Message -----  From: Nicole Hays  Sent: 12/22/2023  10:36 AM CST  To: Ada SCHMITT Staff    Sweetie is calling in regards to clarification on  pregabalin (LYRICA) 100 MG capsule being written by PA, medication needs to be written by MD. Please faxed clarification  to [phone] (F [phone](P).    Thanks  CASSIE

## 2023-12-27 ENCOUNTER — PATIENT MESSAGE (OUTPATIENT)
Dept: PRIMARY CARE CLINIC | Facility: CLINIC | Age: 88
End: 2023-12-27
Payer: MEDICARE

## 2023-12-27 ENCOUNTER — TELEPHONE (OUTPATIENT)
Dept: PRIMARY CARE CLINIC | Facility: CLINIC | Age: 88
End: 2023-12-27

## 2023-12-27 ENCOUNTER — CLINICAL SUPPORT (OUTPATIENT)
Dept: PRIMARY CARE CLINIC | Facility: CLINIC | Age: 88
End: 2023-12-27
Payer: MEDICARE

## 2023-12-27 VITALS — OXYGEN SATURATION: 95 % | DIASTOLIC BLOOD PRESSURE: 70 MMHG | SYSTOLIC BLOOD PRESSURE: 172 MMHG | HEART RATE: 69 BPM

## 2023-12-27 DIAGNOSIS — I10 ESSENTIAL HYPERTENSION: Primary | Chronic | ICD-10-CM

## 2023-12-27 DIAGNOSIS — I10 ESSENTIAL HYPERTENSION: Primary | ICD-10-CM

## 2023-12-27 PROCEDURE — 99999 PR PBB SHADOW E&M-EST. PATIENT-LVL I: ICD-10-PCS | Mod: PBBFAC,HCNC,,

## 2023-12-27 PROCEDURE — 99999 PR PBB SHADOW E&M-EST. PATIENT-LVL I: CPT | Mod: PBBFAC,HCNC,,

## 2023-12-29 RX ORDER — AMLODIPINE BESYLATE 2.5 MG/1
2.5 TABLET ORAL DAILY
Qty: 30 TABLET | Refills: 11 | Status: SHIPPED | OUTPATIENT
Start: 2023-12-29 | End: 2024-01-30

## 2023-12-29 NOTE — TELEPHONE ENCOUNTER
Please tell pt I sent in a mild blood pressure medicine, to add on to her current medicine.  Make sure has appt in the next month with one of us.  SM

## 2024-01-09 ENCOUNTER — OFFICE VISIT (OUTPATIENT)
Dept: PRIMARY CARE CLINIC | Facility: CLINIC | Age: 89
End: 2024-01-09
Payer: MEDICARE

## 2024-01-09 ENCOUNTER — HOSPITAL ENCOUNTER (OUTPATIENT)
Dept: RADIOLOGY | Facility: HOSPITAL | Age: 89
Discharge: HOME | End: 2024-01-09
Attending: NURSE PRACTITIONER
Payer: MEDICARE

## 2024-01-09 VITALS
BODY MASS INDEX: 28.31 KG/M2 | WEIGHT: 165.81 LBS | DIASTOLIC BLOOD PRESSURE: 82 MMHG | HEIGHT: 64 IN | SYSTOLIC BLOOD PRESSURE: 140 MMHG | OXYGEN SATURATION: 98 % | TEMPERATURE: 98 F | HEART RATE: 62 BPM

## 2024-01-09 DIAGNOSIS — K59.01 SLOW TRANSIT CONSTIPATION: ICD-10-CM

## 2024-01-09 DIAGNOSIS — R07.89 INTERMITTENT RIGHT-SIDED CHEST PAIN: ICD-10-CM

## 2024-01-09 DIAGNOSIS — K21.9 GASTROESOPHAGEAL REFLUX DISEASE WITHOUT ESOPHAGITIS: ICD-10-CM

## 2024-01-09 DIAGNOSIS — R10.11 RUQ ABDOMINAL PAIN: ICD-10-CM

## 2024-01-09 DIAGNOSIS — R29.6 RECURRENT FALLS: ICD-10-CM

## 2024-01-09 DIAGNOSIS — K21.9 HIATAL HERNIA WITH GASTROESOPHAGEAL REFLUX: ICD-10-CM

## 2024-01-09 DIAGNOSIS — K44.9 HIATAL HERNIA WITH GASTROESOPHAGEAL REFLUX: ICD-10-CM

## 2024-01-09 DIAGNOSIS — R07.9 CHEST PAIN AT REST: Primary | ICD-10-CM

## 2024-01-09 PROCEDURE — 74018 RADEX ABDOMEN 1 VIEW: CPT | Mod: TC,HCNC,FY,PO

## 2024-01-09 PROCEDURE — 3288F FALL RISK ASSESSMENT DOCD: CPT | Mod: HCNC,CPTII,S$GLB, | Performed by: NURSE PRACTITIONER

## 2024-01-09 PROCEDURE — 1159F MED LIST DOCD IN RCRD: CPT | Mod: HCNC,CPTII,S$GLB, | Performed by: NURSE PRACTITIONER

## 2024-01-09 PROCEDURE — 93005 ELECTROCARDIOGRAM TRACING: CPT | Mod: HCNC,S$GLB,, | Performed by: NURSE PRACTITIONER

## 2024-01-09 PROCEDURE — 93010 ELECTROCARDIOGRAM REPORT: CPT | Mod: HCNC,S$GLB,, | Performed by: INTERNAL MEDICINE

## 2024-01-09 PROCEDURE — 1126F AMNT PAIN NOTED NONE PRSNT: CPT | Mod: HCNC,CPTII,S$GLB, | Performed by: NURSE PRACTITIONER

## 2024-01-09 PROCEDURE — 99999 PR PBB SHADOW E&M-EST. PATIENT-LVL V: CPT | Mod: PBBFAC,HCNC,, | Performed by: NURSE PRACTITIONER

## 2024-01-09 PROCEDURE — 1157F ADVNC CARE PLAN IN RCRD: CPT | Mod: HCNC,CPTII,S$GLB, | Performed by: NURSE PRACTITIONER

## 2024-01-09 PROCEDURE — 99215 OFFICE O/P EST HI 40 MIN: CPT | Mod: HCNC,S$GLB,, | Performed by: NURSE PRACTITIONER

## 2024-01-09 PROCEDURE — 1101F PT FALLS ASSESS-DOCD LE1/YR: CPT | Mod: HCNC,CPTII,S$GLB, | Performed by: NURSE PRACTITIONER

## 2024-01-09 PROCEDURE — 74018 RADEX ABDOMEN 1 VIEW: CPT | Mod: 26,HCNC,, | Performed by: RADIOLOGY

## 2024-01-09 RX ORDER — OMEPRAZOLE 40 MG/1
40 CAPSULE, DELAYED RELEASE ORAL
Qty: 90 CAPSULE | Refills: 2
Start: 2024-01-09

## 2024-01-09 NOTE — PROGRESS NOTES
"Clau Nunn  01/09/2024  [id number]    Jeanette Molina MD  Patient Care Team:  Jeanette Molina MD as PCP - General (Internal Medicine)  Kandice Salcedo PA-C as Physician Assistant (Rheumatology)  Raffi Wells MD (Inactive) as Consulting Physician (Pain Medicine)  Jeanette Manriquez MD as Obstetrician (Obstetrics)  Bal Ventura LPN as Care Coordinator      [facility]      Chief Complaint:  Chief Complaint   Patient presents with    Follow-up     2 week f/u on blood pressure       History of Present Illness:    Ms. Nunn returns today for follow up for blood pressure. She did not take her antihypertensives this AM. Currently taking:  Amlodipine 2.5 mg daily  Valsartan 160 mg daily  Blood pressure readings from home 141/108, 127/86, 116/80    Secondly pt notes hx of constipation, vomiting and heart burn/reflux  Right sided chest pain intermittently x 1 month -  radiate to right back - rated "8" at time.  Noted after eating or drinking. Resolves after TUMS and melania seltzer   Decreased eating due to early satiety and reflux and heartburn  Will vomit when straining to have a bowel movement  denies abdominal bloating - last BM yesterday  Says she has to strain  Takes Miralax intermittently - takes omeprazole daily  Weight  170 >>>> 165.79    Reports last fall 2 weeks ago - denies specific injury. Does not like using cane or walking. Asking for physical therapy              The following were reviewed: Active problem list, medication list, allergies, family history, social history, and Health Maintenance.     History:  Past Medical History:   Diagnosis Date    Allergy     Anxiety     Asthma     Back pain     Chronic venous insufficiency 11/19/2013    Depression     Diverticulosis 11/19/2013 1/8/8 colonoscopy    Dry eyes     Fracture, sacrum/coccyx     Dr. Sanchez     GERD (gastroesophageal reflux disease)     H/O total hip arthroplasty 12/30/2015    Hypertension     Hypothyroid     " Osteoarthritis     Osteoporosis     Postlaminectomy syndrome of lumbar region 1/8/2014    Radius fracture     7/18    Simple chronic bronchitis 5/3/2017    Umbilical hernia 11/19/2013    Urinary incontinence     Vitamin D deficiency disease      Past Surgical History:   Procedure Laterality Date    ADENOIDECTOMY      BACK SURGERY      x2    breast implants  1973    CATARACT EXTRACTION Bilateral     CLOSED REDUCTION DISTAL RADIUS FRACTURE      Dr. Black, 8/18    cystoscope  1/6/16    DR. Brown    FRACTURE SURGERY  April 3 2015    left wrist    HAND SURGERY      HIP SURGERY Right     total hip- Dr. Dos Santos    HYSTERECTOMY      34y/o    INJECTION OF ANESTHETIC AGENT AROUND NERVE Right 9/16/2020    Procedure: Right suprascapular nerve block;  Surgeon: Raffi Wells MD;  Location: [facility];  Service: Pain Management;  Laterality: Right;    INJECTION OF ANESTHETIC AGENT AROUND NERVE Right 7/13/2021    Procedure: Block, Nerve Right Suprascapular Nerve Block with RN IV sedation;  Surgeon: Raffi Wells MD;  Location: [facility];  Service: Pain Management;  Laterality: Right;    INJECTION OF ANESTHETIC AGENT INTO SACROILIAC JOINT N/A 8/12/2020    Procedure: Left IA hip Joint + Left SIJ + Right shoulder injection;  Surgeon: Raffi Wells MD;  Location: [facility];  Service: Pain Management;  Laterality: N/A;    INJECTION OF ANESTHETIC AGENT INTO SACROILIAC JOINT Bilateral 1/31/2023    Procedure: Bilateral SIJ Injection w/Local;  Surgeon: Abdirahman Parker MD;  Location: [facility];  Service: Pain Management;  Laterality: Bilateral;    INJECTION OF JOINT N/A 8/12/2020    Procedure: Left IA hip Joint + Left SIJ + Right shoulder injection;  Surgeon: Raffi Wells MD;  Location: [facility];  Service: Pain Management;  Laterality: N/A;    JOINT REPLACEMENT Right     R NNAMDI - Dr. Dos Santos    LEG SURGERY Right     ex-fix tib/fib    RADIOFREQUENCY THERMOCOAGULATION Left 6/8/2023    Procedure: Left SIJ RFA w/ local (give  Valium before);  Surgeon: Abdirahman Parker MD;  Location: [facility];  Service: Pain Management;  Laterality: Left;    SELECTIVE INJECTION OF ANESTHETIC AGENT AROUND LUMBAR SPINAL NERVE ROOT BY TRANSFORAMINAL APPROACH Bilateral 2/10/2021    Procedure: Bilateral L5/S1 TF REJI;  Surgeon: Raffi Wells MD;  Location: [facility];  Service: Pain Management;  Laterality: Bilateral;    SELECTIVE INJECTION OF ANESTHETIC AGENT AROUND LUMBAR SPINAL NERVE ROOT BY TRANSFORAMINAL APPROACH Bilateral 5/25/2022    Procedure: Bilateral L5/S1 + S1 TF REJI;  Surgeon: Raffi Wells MD;  Location: [facility];  Service: Pain Management;  Laterality: Bilateral;    TONSILLECTOMY      TRANSFORAMINAL EPIDURAL INJECTION OF STEROID Left 7/8/2020    Procedure: left L2/3 + L5/S1 TF REJI;  Surgeon: Raffi Wells MD;  Location: [facility];  Service: Pain Management;  Laterality: Left;    TRANSFORAMINAL EPIDURAL INJECTION OF STEROID Left 7/1/2021    Procedure: left L5/S1 + S1 TF REJI;  Surgeon: Raffi Wells MD;  Location: [facility];  Service: Pain Management;  Laterality: Left;     Family History   Problem Relation Age of Onset    COPD Mother     Cancer Mother         lung CA    Pulmonary fibrosis Sister     Cancer Daughter         colon    Stroke Father     Diabetes Son     Melanoma Neg Hx     Psoriasis Neg Hx     Lupus Neg Hx      Patient Active Problem List   Diagnosis    Primary osteoarthritis involving multiple joints    Acquired hypothyroidism    Lumbar arthropathy    Chronic venous insufficiency    Anxiety    Vitamin D deficiency disease    Essential hypertension    Hiatal hernia with gastroesophageal reflux    Sacroiliac inflammation    Atherosclerosis of aorta    Chronic lumbar radiculopathy    Stage 3b chronic kidney disease    Simple chronic bronchitis    Pain of left hip joint    Lumbar spondylosis    Pain in both lower extremities    Age-related osteoporosis with current pathological fracture with delayed healing    Radius  fracture    History of DVT in adulthood    Lumbar radiculopathy    Secondary hyperparathyroidism    Recurrent major depressive disorder, in partial remission    Shoulder pain    Chronic anticoagulation    Gastroesophageal reflux disease without esophagitis    Impaired functional mobility, balance, gait, and endurance    Left foot pain    Slow transit constipation    Skin tear of left forearm without complication    Sprain of coccyx    Solar purpura    Eschar of lower leg    Intermittent right-sided chest pain     Review of patient's allergies indicates:   Allergen Reactions    Cephalexin Hives, Itching, Swelling, Anxiety, Dermatitis and Rash       Medications:  Current Outpatient Medications on File Prior to Visit   Medication Sig Dispense Refill    amLODIPine (NORVASC) 2.5 MG tablet Take 1 tablet (2.5 mg total) by mouth once daily. 30 tablet 11    apixaban (ELIQUIS) 5 mg Tab Take 1 tablet (5 mg total) by mouth 2 (two) times daily. 180 tablet 3    azelastine (ASTELIN) 137 mcg (0.1 %) nasal spray 2 sprays by Nasal route 2 (two) times daily.      busPIRone (BUSPAR) 5 MG Tab TAKE 1 TABLET TWICE DAILY 180 tablet 5    cholecalciferol, vitamin D3, (D3-2000) 50 mcg (2,000 unit) Cap Take 1 capsule (2,000 Units total) by mouth once daily. 90 capsule 11    clobetasoL (TEMOVATE) 0.05 % external solution Pt to mix in 1 jar of cerave cream and apply to affected areas bid for 2-4 weeks per course 50 mL 3    cyclobenzaprine (FLEXERIL) 5 MG tablet TAKE 1 TABLET (5 MG TOTAL) BY MOUTH NIGHTLY AS NEEDED FOR MUSCLE SPASMS. 90 tablet 1    cycloSPORINE (RESTASIS MULTIDOSE) 0.05 % Drop Place 1 drop into both eyes every 12 (twelve) hours. 5.5 mL 12    DULoxetine (CYMBALTA) 60 MG capsule Take 1 capsule (60 mg total) by mouth once daily. 90 capsule 3    fluocinonide 0.05% (LIDEX) 0.05 % cream AAA bid to leg for 2-4 weeks per course (Patient taking differently: Apply to affected area(s) of leg topically twice daily for 2-4 weeks per course)  60 g 1    fluticasone propionate (FLONASE) 50 mcg/actuation nasal spray       levothyroxine (SYNTHROID) 100 MCG tablet TAKE 1 TABLET EVERY DAY 90 tablet 2    mupirocin (BACTROBAN) 2 % ointment APPLY TOPICALLY TWICE DAILY TO WOUND ON RIGHT LOWER LEG 22 g 0    polyethylene glycol (GLYCOLAX) 17 gram/dose powder Take 17 g by mouth once daily. 595 g 0    pregabalin (LYRICA) 100 MG capsule Take 1 capsule (100 mg total) by mouth 2 (two) times daily. 180 capsule 1    PROLIA 60 mg/mL Syrg       solifenacin (VESICARE) 5 MG tablet Take 1 tablet (5 mg total) by mouth once daily. 30 tablet 11    traZODone (DESYREL) 50 MG tablet TAKE 1 TABLET (50 MG TOTAL) BY MOUTH NIGHTLY AS NEEDED FOR INSOMNIA. 30 tablet 3    valsartan (DIOVAN) 160 MG tablet Take 1 tablet (160 mg total) by mouth once daily. 90 tablet 3    [DISCONTINUED] omeprazole (PRILOSEC) 40 MG capsule TAKE 1 CAPSULE (40 MG TOTAL) BY MOUTH EVERY MORNING. 90 capsule 2    albuterol (PROVENTIL) 2.5 mg /3 mL (0.083 %) nebulizer solution Take 3 mLs (2.5 mg total) by nebulization every 6 (six) hours as needed for Wheezing. Rescue 1 each 6     Current Facility-Administered Medications on File Prior to Visit   Medication Dose Route Frequency Provider Last Rate Last Admin    denosumab (PROLIA) injection 60 mg  60 mg Subcutaneous Q6 Months Carly Mora, DONNIE   60 mg at 01/28/19 1548    neomycin-bacitracin-polymyxin ointment   Topical (Top) 1 time in Clinic/HOD Clau Michel NP           Medications have been reviewed and reconciled with patient at visit today.      Exam:  Vitals:    01/09/24 1115   BP: (!) 140/82   Pulse:    Temp:      Weight: 75.2 kg (165 lb 12.6 oz)   Body mass index is 28.46 kg/m².      BP Readings from Last 3 Encounters:   01/09/24 (!) 140/82   12/27/23 (!) 172/70   12/22/23 (!) 148/82     Wt Readings from Last 3 Encounters:   01/09/24 1056 75.2 kg (165 lb 12.6 oz)   12/22/23 1123 77.3 kg (170 lb 4.9 oz)   12/15/23 1537 77.9 kg (171 lb 11.8 oz)         REVIEW OF SYSTEMS  Review of Systems   Constitutional:  Positive for weight loss. Negative for chills and fever.   HENT:  Negative for congestion and sore throat.    Respiratory:  Negative for cough and shortness of breath.    Cardiovascular:  Positive for chest pain.   Gastrointestinal:  Positive for constipation, heartburn and vomiting. Negative for diarrhea and nausea.   Genitourinary:  Negative for dysuria, frequency and urgency.   Musculoskeletal:  Positive for myalgias.   Neurological:  Positive for weakness. Negative for dizziness and focal weakness.       Physical Exam  Vitals reviewed.   Constitutional:       General: She is not in acute distress.     Appearance: Normal appearance.   HENT:      Head: Normocephalic and atraumatic.      Nose: Nose normal.      Mouth/Throat:      Mouth: Mucous membranes are moist.   Eyes:      General: No scleral icterus.     Conjunctiva/sclera: Conjunctivae normal.   Cardiovascular:      Rate and Rhythm: Normal rate and regular rhythm.      Heart sounds: No murmur heard.  Pulmonary:      Effort: Pulmonary effort is normal. No respiratory distress.      Breath sounds: Normal breath sounds.   Abdominal:      General: There is no distension.      Palpations: Abdomen is soft. There is no mass.      Tenderness: There is no abdominal tenderness. There is no guarding or rebound. Negative signs include Lopez's sign.   Musculoskeletal:         General: No swelling or deformity. Normal range of motion.      Cervical back: Normal range of motion and neck supple.      Right lower leg: No edema.      Left lower leg: No edema.   Lymphadenopathy:      Cervical: No cervical adenopathy.   Skin:     General: Skin is warm and dry.      Comments: Healing abrasions to bilateral pretibial area   Neurological:      General: No focal deficit present.      Mental Status: She is alert and oriented to person, place, and time. Mental status is at baseline.   Psychiatric:         Mood and Affect:  Mood normal.         Thought Content: Thought content normal.         Laboratory Reviewed:     Lab Results   Component Value Date    WBC 12.12 07/07/2023    HGB 14.2 07/07/2023    HCT 45.7 07/07/2023     07/07/2023    CHOL 194 07/07/2023    TRIG 149 07/07/2023    HDL 47 07/07/2023    ALT 8 (L) 08/11/2023    AST 19 08/11/2023     12/22/2023    K 4.8 12/22/2023     12/22/2023    CREATININE 1.1 12/22/2023    BUN 19 12/22/2023    CO2 26 12/22/2023    TSH 1.593 07/07/2023    INR 1.1 04/07/2022    HGBA1C 5.0 03/11/2019       Screening or Prevention Patient's value Goal Complete/Controlled?   HgA1C Testing and Control   Lab Results   Component Value Date    HGBA1C 5.0 03/11/2019      Annually/Less than 8% No   Lipid profile : 07/07/2023 Annually Yes   LDL control Lab Results   Component Value Date    LDLCALC 117.2 07/07/2023    Annually/Less than 100 mg/dl  No   Nephropathy screening Lab Results   Component Value Date    LABMICR 13.0 10/12/2023     Lab Results   Component Value Date    PROTEINUA 2+ (A) 07/07/2023    Annually Yes   Blood pressure BP Readings from Last 1 Encounters:   01/09/24 (!) 140/82    Less than 140/90 No   Dilated retinal exam Most Recent Eye Exam Date: Not Found Annually Yes   Foot exam   Most Recent Foot Exam Date: Not Found Annually Yes       Health Maintenance  Health Maintenance Topics with due status: Not Due       Topic Last Completion Date    TETANUS VACCINE 02/25/2022    Lipid Panel 07/07/2023     Health Maintenance Due   Topic Date Due    RSV Vaccine (Age 60+ and Pregnant patients) (1 - 1-dose 60+ series) Never done    COVID-19 Vaccine (4 - 2023-24 season) 09/01/2023       Assessment and Plan:  1. Chest pain at rest  -     IN OFFICE EKG 12-LEAD (to Muse)    2. Gastroesophageal reflux disease without esophagitis  Assessment & Plan:  Take omeprazole bid  R/O constipation    Orders:  -     X-Ray Abdomen AP 1 View; Future; Expected date: 01/09/2024  -     omeprazole (PRILOSEC)  40 MG capsule; Take 1 capsule (40 mg total) by mouth 2 (two) times daily before meals.  Dispense: 90 capsule; Refill: 2    3. Slow transit constipation  Assessment & Plan:  Increase Miralax to daily    Orders:  -     X-Ray Abdomen AP 1 View; Future; Expected date: 01/09/2024    4. Recurrent falls  -     Cancel: Ambulatory referral/consult to Physical/Occupational Therapy; Future; Expected date: 01/16/2024  -     Ambulatory referral/consult to Physical/Occupational Therapy; Future; Expected date: 01/16/2024    5. RUQ abdominal pain  -     US Abdomen Limited; Future; Expected date: 01/09/2024    6. Hiatal hernia with gastroesophageal reflux    7. Intermittent right-sided chest pain  Assessment & Plan:  EKG - Vent. Rate : 068 BPM     Atrial Rate : 068 BPM      P-R Int : 198 ms          QRS Dur : 142 ms       QT Int : 438 ms       P-R-T Axes : 031 018 -22 degrees      QTc Int : 465 ms     Sinus rhythm with Premature atrial complexes   Right bundle branch block   Nonspecific ST abnormality   Abnormal ECG   When compared with ECG of 07-APR-2022 15:50,   Right bundle branch block is now Present                    -Patient's lab results were reviewed and discussed with patient  -Treatment options and alternatives were discussed with the patient. Patient expressed understanding. Patient was given the opportunity to ask questions and be an active participant in their medical care. Patient had no further questions or concerns at this time.         Future Appointments   Date Time Provider Department Center   [facility]   1/30/2024 11:00 AM Clau Michel NP [facility]   2/19/2024 11:00 AM LABORATORY, [facility]   2/21/2024  1:00 PM Verna Su PA-C [facility]   2/21/2024  1:45 PM INJECTION 1, [facility]   6/12/2024  1:40 PM Angelica Caballero PA-C [facility] IN PN [facility]   12/5/2024 10:00 AM Clau Michel NP  [facility]           After visit summary printed and given to patient upon discharge.  Patient goals and care plan are included in After visit summary.      The following issues were discussed: The primary encounter diagnosis was Chest pain at rest. Diagnoses of Gastroesophageal reflux disease without esophagitis, Slow transit constipation, Recurrent falls, RUQ abdominal pain, Hiatal hernia with gastroesophageal reflux, and Intermittent right-sided chest pain were also pertinent to this visit.    Health maintenance needs, recent test results and goals of care discussed with pt and questions answered.           Clau Michel APRN, NP-C  [facility] [address] [place] [address]

## 2024-01-09 NOTE — PATIENT INSTRUCTIONS
Monitor and record blood pressure    Take Omeprazole 40 mg bid    Take Miralax daily for constipation

## 2024-01-09 NOTE — ASSESSMENT & PLAN NOTE
EKG - Vent. Rate : 068 BPM     Atrial Rate : 068 BPM      P-R Int : 198 ms          QRS Dur : 142 ms       QT Int : 438 ms       P-R-T Axes : 031 018 -22 degrees      QTc Int : 465 ms     Sinus rhythm with Premature atrial complexes   Right bundle branch block   Nonspecific ST abnormality   Abnormal ECG   When compared with ECG of 07-APR-2022 15:50,   Right bundle branch block is now Present

## 2024-01-24 ENCOUNTER — HOSPITAL ENCOUNTER (OUTPATIENT)
Dept: RADIOLOGY | Facility: HOSPITAL | Age: 89
Discharge: HOME | End: 2024-01-24
Attending: NURSE PRACTITIONER
Payer: MEDICARE

## 2024-01-24 DIAGNOSIS — R10.11 RUQ ABDOMINAL PAIN: ICD-10-CM

## 2024-01-24 PROCEDURE — 76705 ECHO EXAM OF ABDOMEN: CPT | Mod: 26,HCNC,, | Performed by: RADIOLOGY

## 2024-01-24 PROCEDURE — 76705 ECHO EXAM OF ABDOMEN: CPT | Mod: TC,HCNC

## 2024-01-30 ENCOUNTER — OFFICE VISIT (OUTPATIENT)
Dept: PRIMARY CARE CLINIC | Facility: CLINIC | Age: 89
End: 2024-01-30
Payer: MEDICARE

## 2024-01-30 ENCOUNTER — LAB VISIT (OUTPATIENT)
Dept: LAB | Facility: HOSPITAL | Age: 89
End: 2024-01-30
Attending: INTERNAL MEDICINE
Payer: MEDICARE

## 2024-01-30 VITALS
OXYGEN SATURATION: 95 % | SYSTOLIC BLOOD PRESSURE: 138 MMHG | TEMPERATURE: 97 F | WEIGHT: 172.06 LBS | HEART RATE: 70 BPM | BODY MASS INDEX: 29.38 KG/M2 | DIASTOLIC BLOOD PRESSURE: 78 MMHG | HEIGHT: 64 IN

## 2024-01-30 DIAGNOSIS — R07.9 CHEST PAIN, UNSPECIFIED TYPE: ICD-10-CM

## 2024-01-30 DIAGNOSIS — M46.1 SACROILIAC INFLAMMATION: ICD-10-CM

## 2024-01-30 DIAGNOSIS — N18.32 STAGE 3B CHRONIC KIDNEY DISEASE: ICD-10-CM

## 2024-01-30 DIAGNOSIS — I10 ESSENTIAL HYPERTENSION: Chronic | ICD-10-CM

## 2024-01-30 DIAGNOSIS — F33.41 RECURRENT MAJOR DEPRESSIVE DISORDER, IN PARTIAL REMISSION: ICD-10-CM

## 2024-01-30 DIAGNOSIS — D69.2 SOLAR PURPURA: ICD-10-CM

## 2024-01-30 DIAGNOSIS — N25.81 SECONDARY HYPERPARATHYROIDISM: ICD-10-CM

## 2024-01-30 DIAGNOSIS — J41.0 SIMPLE CHRONIC BRONCHITIS: ICD-10-CM

## 2024-01-30 DIAGNOSIS — R07.9 CHEST PAIN, UNSPECIFIED TYPE: Primary | ICD-10-CM

## 2024-01-30 DIAGNOSIS — I70.0 ATHEROSCLEROSIS OF AORTA: ICD-10-CM

## 2024-01-30 LAB — TROPONIN I SERPL DL<=0.01 NG/ML-MCNC: 0.01 NG/ML (ref 0–0.03)

## 2024-01-30 PROCEDURE — 36415 COLL VENOUS BLD VENIPUNCTURE: CPT | Mod: PO | Performed by: NURSE PRACTITIONER

## 2024-01-30 PROCEDURE — 3288F FALL RISK ASSESSMENT DOCD: CPT | Mod: CPTII,S$GLB,, | Performed by: NURSE PRACTITIONER

## 2024-01-30 PROCEDURE — 93010 ELECTROCARDIOGRAM REPORT: CPT | Mod: HCNC,S$GLB,, | Performed by: INTERNAL MEDICINE

## 2024-01-30 PROCEDURE — 1157F ADVNC CARE PLAN IN RCRD: CPT | Mod: CPTII,S$GLB,, | Performed by: NURSE PRACTITIONER

## 2024-01-30 PROCEDURE — 99214 OFFICE O/P EST MOD 30 MIN: CPT | Mod: S$GLB,,, | Performed by: NURSE PRACTITIONER

## 2024-01-30 PROCEDURE — 1159F MED LIST DOCD IN RCRD: CPT | Mod: CPTII,S$GLB,, | Performed by: NURSE PRACTITIONER

## 2024-01-30 PROCEDURE — 93005 ELECTROCARDIOGRAM TRACING: CPT | Mod: HCNC,S$GLB,, | Performed by: NURSE PRACTITIONER

## 2024-01-30 PROCEDURE — 1125F AMNT PAIN NOTED PAIN PRSNT: CPT | Mod: CPTII,S$GLB,, | Performed by: NURSE PRACTITIONER

## 2024-01-30 PROCEDURE — 84484 ASSAY OF TROPONIN QUANT: CPT | Performed by: NURSE PRACTITIONER

## 2024-01-30 PROCEDURE — 99999 PR PBB SHADOW E&M-EST. PATIENT-LVL V: CPT | Mod: PBBFAC,HCNC,, | Performed by: NURSE PRACTITIONER

## 2024-01-30 PROCEDURE — 1101F PT FALLS ASSESS-DOCD LE1/YR: CPT | Mod: CPTII,S$GLB,, | Performed by: NURSE PRACTITIONER

## 2024-01-30 RX ORDER — CHLORTHALIDONE 25 MG/1
25 TABLET ORAL DAILY
Qty: 30 TABLET | Refills: 0 | Status: SHIPPED | OUTPATIENT
Start: 2024-01-30 | End: 2025-01-29

## 2024-01-30 NOTE — ASSESSMENT & PLAN NOTE
BP moderately elevated  Stop amlodipine 2.5 mg daily  Continue valsartan 160 mg daily   Start chlorthalidone 25 mg daily

## 2024-01-30 NOTE — PROGRESS NOTES
Clau Nunn  01/30/2024  [id number]    Jeanette Molina MD  Patient Care Team:  Jeanette Molina MD as PCP - General (Internal Medicine)  Kandice Salcedo PA-C as Physician Assistant (Rheumatology)  Raffi Wells MD (Inactive) as Consulting Physician (Pain Medicine)  Jeanetet Manriquez MD as Obstetrician (Obstetrics)  Bal Ventura LPN as Care Coordinator      [facility]      Chief Complaint:  Chief Complaint   Patient presents with    Follow-up     3 week f/u  pt has pain in chest area         Assessment/Plan:  1. Chest pain, unspecified type  Assessment & Plan:  Atypical in nature  Last stress test 2012  Related to right breast implant?    Orders:  -     IN OFFICE EKG 12-LEAD (to Muse)  -     Troponin I; Future; Expected date: 01/30/2024  -     Exercise Stress - EKG; Future    2. Essential hypertension  Assessment & Plan:  BP moderately elevated  Stop amlodipine 2.5 mg daily  Continue valsartan 160 mg daily   Start chlorthalidone 25 mg daily    Orders:  -     chlorthalidone (HYGROTEN) 25 MG Tab; Take 1 tablet (25 mg total) by mouth once daily.  Dispense: 30 tablet; Refill: 0    3. Simple chronic bronchitis    4. Stage 3b chronic kidney disease    5. Secondary hyperparathyroidism    6. Sacroiliac inflammation    7. Solar purpura    8. Recurrent major depressive disorder, in partial remission    9. Atherosclerosis of aorta  Overview:  CT abd 11/11/13              History of Present Illness:    Ms. Nunn returns for concerns of blood pressure elevation  Daily BP meds include:  valsartan 160 mg daily  Amlodipine 2.5 mg daily  Home BP:   - 174/ DBP 63 - 97    Pt describes occasional cough is helped with nebulizer. GERD symptoms have improved.    Today, she was driving and noted a heavy sensation in mid to right chest associated with lightheadedness, tenderness under right breast - pain radiates to back. She denies nausea/diaphoresis, SOB, palpitations, presyncopal symptoms. Report  poor balance this AM.  Last stress test- 2012    No recent falls  Her legs have swelling most of time    Pt states she has breast implant to right breast.Wonders if can contribute to right sided chest pain.         The following were reviewed: Active problem list, medication list, allergies, family history, social history, and Health Maintenance.     History:  Past Medical History:   Diagnosis Date    Allergy     Anxiety     Asthma     Back pain     Chest pain 1/30/2024    Chronic venous insufficiency 11/19/2013    Depression     Diverticulosis 11/19/2013 1/8/8 colonoscopy    Dry eyes     Fracture, sacrum/coccyx     Dr. Sanchez     GERD (gastroesophageal reflux disease)     H/O total hip arthroplasty 12/30/2015    Hypertension     Hypothyroid     Osteoarthritis     Osteoporosis     Postlaminectomy syndrome of lumbar region 1/8/2014    Radius fracture     7/18    Simple chronic bronchitis 5/3/2017    Umbilical hernia 11/19/2013    Urinary incontinence     Vitamin D deficiency disease      Past Surgical History:   Procedure Laterality Date    ADENOIDECTOMY      BACK SURGERY      x2    breast implants  1973    CATARACT EXTRACTION Bilateral     CLOSED REDUCTION DISTAL RADIUS FRACTURE      Dr. Black, 8/18    cystoscope  1/6/16    DR. Brown    FRACTURE SURGERY  April 3 2015    left wrist    HAND SURGERY      HIP SURGERY Right     total hip- Dr. Dos Santos    HYSTERECTOMY      36y/o    INJECTION OF ANESTHETIC AGENT AROUND NERVE Right 9/16/2020    Procedure: Right suprascapular nerve block;  Surgeon: Raffi Wells MD;  Location: [facility];  Service: Pain Management;  Laterality: Right;    INJECTION OF ANESTHETIC AGENT AROUND NERVE Right 7/13/2021    Procedure: Block, Nerve Right Suprascapular Nerve Block with RN IV sedation;  Surgeon: Raffi Wells MD;  Location: [facility];  Service: Pain Management;  Laterality: Right;    INJECTION OF ANESTHETIC AGENT INTO SACROILIAC JOINT N/A 8/12/2020    Procedure: Left IA hip  Joint + Left SIJ + Right shoulder injection;  Surgeon: Raffi Wells MD;  Location: [facility];  Service: Pain Management;  Laterality: N/A;    INJECTION OF ANESTHETIC AGENT INTO SACROILIAC JOINT Bilateral 1/31/2023    Procedure: Bilateral SIJ Injection w/Local;  Surgeon: Abdirahman Parker MD;  Location: [facility];  Service: Pain Management;  Laterality: Bilateral;    INJECTION OF JOINT N/A 8/12/2020    Procedure: Left IA hip Joint + Left SIJ + Right shoulder injection;  Surgeon: Raffi Wells MD;  Location: [facility];  Service: Pain Management;  Laterality: N/A;    JOINT REPLACEMENT Right     R NNAMDI - Dr. Dos Santos    LEG SURGERY Right     ex-fix tib/fib    RADIOFREQUENCY THERMOCOAGULATION Left 6/8/2023    Procedure: Left SIJ RFA w/ local (give Valium before);  Surgeon: Abdirahman Parker MD;  Location: [facility];  Service: Pain Management;  Laterality: Left;    SELECTIVE INJECTION OF ANESTHETIC AGENT AROUND LUMBAR SPINAL NERVE ROOT BY TRANSFORAMINAL APPROACH Bilateral 2/10/2021    Procedure: Bilateral L5/S1 TF REJI;  Surgeon: Raffi Wells MD;  Location: [facility];  Service: Pain Management;  Laterality: Bilateral;    SELECTIVE INJECTION OF ANESTHETIC AGENT AROUND LUMBAR SPINAL NERVE ROOT BY TRANSFORAMINAL APPROACH Bilateral 5/25/2022    Procedure: Bilateral L5/S1 + S1 TF REJI;  Surgeon: Raffi Wells MD;  Location: [facility];  Service: Pain Management;  Laterality: Bilateral;    TONSILLECTOMY      TRANSFORAMINAL EPIDURAL INJECTION OF STEROID Left 7/8/2020    Procedure: left L2/3 + L5/S1 TF REJI;  Surgeon: Raffi Wells MD;  Location: [facility];  Service: Pain Management;  Laterality: Left;    TRANSFORAMINAL EPIDURAL INJECTION OF STEROID Left 7/1/2021    Procedure: left L5/S1 + S1 TF REJI;  Surgeon: Raffi Wells MD;  Location: [facility];  Service: Pain Management;  Laterality: Left;     Family History   Problem Relation Age of Onset    COPD Mother     Cancer Mother         lung CA    Pulmonary  fibrosis Sister     Cancer Daughter         colon    Stroke Father     Diabetes Son     Melanoma Neg Hx     Psoriasis Neg Hx     Lupus Neg Hx      Patient Active Problem List   Diagnosis    Primary osteoarthritis involving multiple joints    Acquired hypothyroidism    Lumbar arthropathy    Chronic venous insufficiency    Anxiety    Vitamin D deficiency disease    Essential hypertension    Hiatal hernia with gastroesophageal reflux    Sacroiliac inflammation    Atherosclerosis of aorta    Chronic lumbar radiculopathy    Stage 3b chronic kidney disease    Simple chronic bronchitis    Pain of left hip joint    Lumbar spondylosis    Pain in both lower extremities    Age-related osteoporosis with current pathological fracture with delayed healing    Radius fracture    History of DVT in adulthood    Lumbar radiculopathy    Secondary hyperparathyroidism    Recurrent major depressive disorder, in partial remission    Shoulder pain    Chronic anticoagulation    Gastroesophageal reflux disease without esophagitis    Impaired functional mobility, balance, gait, and endurance    Left foot pain    Slow transit constipation    Skin tear of left forearm without complication    Sprain of coccyx    Solar purpura    Eschar of lower leg    Intermittent right-sided chest pain    Chest pain     Review of patient's allergies indicates:   Allergen Reactions    Cephalexin Hives, Itching, Swelling, Anxiety, Dermatitis and Rash       Medications:  Current Outpatient Medications on File Prior to Visit   Medication Sig Dispense Refill    apixaban (ELIQUIS) 5 mg Tab Take 1 tablet (5 mg total) by mouth 2 (two) times daily. 180 tablet 3    azelastine (ASTELIN) 137 mcg (0.1 %) nasal spray 2 sprays by Nasal route 2 (two) times daily.      busPIRone (BUSPAR) 5 MG Tab TAKE 1 TABLET TWICE DAILY 180 tablet 5    cholecalciferol, vitamin D3, (D3-2000) 50 mcg (2,000 unit) Cap Take 1 capsule (2,000 Units total) by mouth once daily. 90 capsule 11     clobetasoL (TEMOVATE) 0.05 % external solution Pt to mix in 1 jar of cerave cream and apply to affected areas bid for 2-4 weeks per course 50 mL 3    cyclobenzaprine (FLEXERIL) 5 MG tablet TAKE 1 TABLET (5 MG TOTAL) BY MOUTH NIGHTLY AS NEEDED FOR MUSCLE SPASMS. 90 tablet 1    cycloSPORINE (RESTASIS MULTIDOSE) 0.05 % Drop Place 1 drop into both eyes every 12 (twelve) hours. 5.5 mL 12    DULoxetine (CYMBALTA) 60 MG capsule Take 1 capsule (60 mg total) by mouth once daily. 90 capsule 3    fluocinonide 0.05% (LIDEX) 0.05 % cream AAA bid to leg for 2-4 weeks per course (Patient taking differently: Apply to affected area(s) of leg topically twice daily for 2-4 weeks per course) 60 g 1    fluticasone propionate (FLONASE) 50 mcg/actuation nasal spray       levothyroxine (SYNTHROID) 100 MCG tablet TAKE 1 TABLET EVERY DAY 90 tablet 2    mupirocin (BACTROBAN) 2 % ointment APPLY TOPICALLY TWICE DAILY TO WOUND ON RIGHT LOWER LEG 22 g 0    omeprazole (PRILOSEC) 40 MG capsule Take 1 capsule (40 mg total) by mouth 2 (two) times daily before meals. 90 capsule 2    polyethylene glycol (GLYCOLAX) 17 gram/dose powder Take 17 g by mouth once daily. 595 g 0    pregabalin (LYRICA) 100 MG capsule Take 1 capsule (100 mg total) by mouth 2 (two) times daily. 180 capsule 1    PROLIA 60 mg/mL Syrg       solifenacin (VESICARE) 5 MG tablet Take 1 tablet (5 mg total) by mouth once daily. 30 tablet 11    traZODone (DESYREL) 50 MG tablet TAKE 1 TABLET (50 MG TOTAL) BY MOUTH NIGHTLY AS NEEDED FOR INSOMNIA. 30 tablet 3    valsartan (DIOVAN) 160 MG tablet Take 1 tablet (160 mg total) by mouth once daily. 90 tablet 3    [DISCONTINUED] amLODIPine (NORVASC) 2.5 MG tablet Take 1 tablet (2.5 mg total) by mouth once daily. 30 tablet 11    albuterol (PROVENTIL) 2.5 mg /3 mL (0.083 %) nebulizer solution Take 3 mLs (2.5 mg total) by nebulization every 6 (six) hours as needed for Wheezing. Rescue 1 each 6     Current Facility-Administered Medications on File  Prior to Visit   Medication Dose Route Frequency Provider Last Rate Last Admin    denosumab (PROLIA) injection 60 mg  60 mg Subcutaneous Q6 Months Carly Mora, DONNIE   60 mg at 01/28/19 1548    neomycin-bacitracin-polymyxin ointment   Topical (Top) 1 time in Clinic/HOD Clau Michel NP           Medications have been reviewed and reconciled with patient at visit today.      Exam:  Vitals:    01/30/24 1112   BP: 138/78   Pulse: 70   Temp: 97.1 °F (36.2 °C)     Weight: 78 kg (172 lb 1.1 oz)   Body mass index is 29.54 kg/m².      BP Readings from Last 3 Encounters:   01/30/24 138/78   01/09/24 (!) 140/82   12/27/23 (!) 172/70     Wt Readings from Last 3 Encounters:   01/30/24 1112 78 kg (172 lb 1.1 oz)   01/09/24 1056 75.2 kg (165 lb 12.6 oz)   12/22/23 1123 77.3 kg (170 lb 4.9 oz)        REVIEW OF SYSTEMS  Review of Systems   Constitutional:  Negative for chills, fever and malaise/fatigue.   HENT:  Negative for congestion and sore throat.    Respiratory:  Positive for cough. Negative for shortness of breath.    Cardiovascular:  Positive for chest pain and leg swelling. Negative for palpitations.   Gastrointestinal:  Negative for abdominal pain, constipation, diarrhea, nausea and vomiting.   Genitourinary:  Negative for dysuria and frequency.   Musculoskeletal:  Negative for back pain and myalgias.   Neurological:  Negative for dizziness, focal weakness and headaches.        Lightheadedness   Psychiatric/Behavioral:  Negative for depression. The patient is not nervous/anxious.        Physical Exam  Vitals reviewed.   Constitutional:       General: She is not in acute distress.     Appearance: Normal appearance.   HENT:      Head: Normocephalic and atraumatic.      Nose: Nose normal.      Mouth/Throat:      Mouth: Mucous membranes are moist.   Eyes:      General: No scleral icterus.     Conjunctiva/sclera: Conjunctivae normal.   Cardiovascular:      Rate and Rhythm: Normal rate. Rhythm irregular.      Heart  sounds: Murmur heard.   Pulmonary:      Effort: Pulmonary effort is normal. No respiratory distress.      Breath sounds: Normal breath sounds.   Chest:      Chest wall: No swelling or tenderness.   Breasts:     Right: No swelling, bleeding, inverted nipple, nipple discharge, skin change or tenderness.          Comments: Right breast firm in area  Abdominal:      Palpations: Abdomen is soft. There is no mass.      Tenderness: There is no abdominal tenderness.   Musculoskeletal:         General: No swelling or deformity.      Cervical back: Normal range of motion and neck supple.      Right lower leg: Edema present.      Left lower leg: Edema present.   Lymphadenopathy:      Cervical: No cervical adenopathy.   Skin:     General: Skin is warm and dry.   Neurological:      Mental Status: She is alert and oriented to person, place, and time. Mental status is at baseline.   Psychiatric:         Mood and Affect: Mood normal.         Thought Content: Thought content normal.         Laboratory Reviewed:     Lab Results   Component Value Date    WBC 12.12 07/07/2023    HGB 14.2 07/07/2023    HCT 45.7 07/07/2023     07/07/2023    CHOL 194 07/07/2023    TRIG 149 07/07/2023    HDL 47 07/07/2023    ALT 8 (L) 08/11/2023    AST 19 08/11/2023     12/22/2023    K 4.8 12/22/2023     12/22/2023    CREATININE 1.1 12/22/2023    BUN 19 12/22/2023    CO2 26 12/22/2023    TSH 1.593 07/07/2023    INR 1.1 04/07/2022    HGBA1C 5.0 03/11/2019       Screening or Prevention Patient's value Goal Complete/Controlled?   HgA1C Testing and Control   Lab Results   Component Value Date    HGBA1C 5.0 03/11/2019      Annually/Less than 8% No   Lipid profile : 07/07/2023 Annually Yes   LDL control Lab Results   Component Value Date    LDLCALC 117.2 07/07/2023    Annually/Less than 100 mg/dl  No   Nephropathy screening Lab Results   Component Value Date    LABMICR 13.0 10/12/2023     Lab Results   Component Value Date    PROTEINUA 2+ (A)  07/07/2023    Annually Yes   Blood pressure BP Readings from Last 1 Encounters:   01/30/24 138/78    Less than 140/90 Yes   Dilated retinal exam Most Recent Eye Exam Date: Not Found Annually Yes   Foot exam   Most Recent Foot Exam Date: Not Found Annually Yes       Health Maintenance  Health Maintenance Topics with due status: Not Due       Topic Last Completion Date    TETANUS VACCINE 02/25/2022    Lipid Panel 07/07/2023     Health Maintenance Due   Topic Date Due    RSV Vaccine (Age 60+ and Pregnant patients) (1 - 1-dose 60+ series) Never done    COVID-19 Vaccine (4 - 2023-24 season) 09/01/2023               -Patient's lab results were reviewed and discussed with patient  -Treatment options and alternatives were discussed with the patient. Patient expressed understanding. Patient was given the opportunity to ask questions and be an active participant in their medical care. Patient had no further questions or concerns at this time.         Future Appointments   Date Time Provider Department Center   1/31/2024  3:00 PM EXERCISETREADMILL, [facility] SPECCPR [place]   2/5/2024  1:30 PM Aura Rice, PT [facility] REHOP [facility]   2/13/2024 11:00 AM Clau Michel, NP [facility]   2/19/2024 11:00 AM LABORATORY, [facility]   2/21/2024  1:00 PM Verna Su PA-C [facility]  [facility]   2/21/2024  1:45 PM INJECTION 1, [facility] INFUSION [facility] IN[facility]   3/12/2024 10:20 AM Yanna Lebron MD [facility]   6/12/2024  1:40 PM Angelica Caballero, DONNIE [facility] IN PN  Medical C   12/5/2024 10:00 AM Clau Michel, NP [facility]          After visit summary printed and given to patient upon discharge.  Patient goals and care plan are included in After visit summary.      The following issues were discussed: The primary encounter diagnosis was Chest pain, unspecified type. Diagnoses of Essential hypertension, Simple chronic bronchitis, Stage 3b chronic kidney  disease, Secondary hyperparathyroidism, Sacroiliac inflammation, Solar purpura, Recurrent major depressive disorder, in partial remission, and Atherosclerosis of aorta were also pertinent to this visit.    Health maintenance needs, recent test results and goals of care discussed with pt and questions answered.           MOMO Gutierrez, NP-C  [facility] [id number] [place] [address]

## 2024-01-31 ENCOUNTER — TELEPHONE (OUTPATIENT)
Dept: PRIMARY CARE CLINIC | Facility: CLINIC | Age: 89
End: 2024-01-31
Payer: MEDICARE

## 2024-01-31 ENCOUNTER — TELEPHONE (OUTPATIENT)
Dept: CARDIOLOGY | Facility: HOSPITAL | Age: 89
End: 2024-01-31
Payer: MEDICARE

## 2024-01-31 DIAGNOSIS — R07.9 CHEST PAIN, UNSPECIFIED TYPE: Primary | ICD-10-CM

## 2024-02-05 ENCOUNTER — CLINICAL SUPPORT (OUTPATIENT)
Dept: REHABILITATION | Facility: HOSPITAL | Age: 89
End: 2024-02-05
Payer: MEDICARE

## 2024-02-05 DIAGNOSIS — R29.6 RECURRENT FALLS: ICD-10-CM

## 2024-02-05 DIAGNOSIS — M80.00XG AGE-RELATED OSTEOPOROSIS WITH CURRENT PATHOLOGICAL FRACTURE WITH DELAYED HEALING: Primary | ICD-10-CM

## 2024-02-05 PROCEDURE — 97530 THERAPEUTIC ACTIVITIES: CPT | Mod: HCNC,PN

## 2024-02-05 PROCEDURE — 97162 PT EVAL MOD COMPLEX 30 MIN: CPT | Mod: HCNC,PN

## 2024-02-05 NOTE — PLAN OF CARE
[facility] OUTPATIENT THERAPY AND WELLNESS   Physical Therapy Initial Evaluation        Date: 2/5/2024   Name: Clau Nunn  Clinic Number: [id number]    Therapy Diagnosis:   Encounter Diagnosis   Name Primary?    Recurrent falls      Physician: Clau Michel NP    Physician Orders: PT Eval and Treat   Medical Diagnosis from Referral: Recurrent falls [R29.6]   Evaluation Date: 2/5/2024  Authorization Period Expiration: 2/15/24  Plan of Care Expiration: 4/5/24  Progress Note Due: 3/5/24  Visit # / Visits authorized: 1/ 1   FOTO: 1/3    Precautions:  HTN      Time In: 1:30 pm  Time Out: 2:10 pm  Total Appointment Time (timed & untimed codes): 40 minutes      SUBJECTIVE     Date of onset: over a year    History of current condition - Clau reports: had two bad falls recently and was not able to get up on her own. Would like to get up from the ground. Reports she has two bad knees so that limits her being able to get up from the floor.     Current Activity Level: does housework    Falls: yes      Prior Therapy: yes  Social History: Pt lives alone  Occupation: retired  Prior Level of Function: Ind  Current Level of Function: Ind      Patients goals: to get up from the floor      Medical History:   Past Medical History:   Diagnosis Date    Allergy     Anxiety     Asthma     Back pain     Chest pain 1/30/2024    Chronic venous insufficiency 11/19/2013    Depression     Diverticulosis 11/19/2013 1/8/8 colonoscopy    Dry eyes     Fracture, sacrum/coccyx     Dr. Sanchez     GERD (gastroesophageal reflux disease)     H/O total hip arthroplasty 12/30/2015    Hypertension     Hypothyroid     Osteoarthritis     Osteoporosis     Postlaminectomy syndrome of lumbar region 1/8/2014    Radius fracture     7/18    Simple chronic bronchitis 5/3/2017    Umbilical hernia 11/19/2013    Urinary incontinence     Vitamin D deficiency disease        Surgical History:   Clau Nunn  has a past surgical history that includes  Tonsillectomy; Adenoidectomy; Back surgery; Leg Surgery (Right); Hip surgery (Right); breast implants (1973); Hysterectomy; Fracture surgery (April 3 2015); Hand surgery; cystoscope (1/6/16); Cataract extraction (Bilateral); Joint replacement (Right); Closed reduction distal radius fracture; Transforaminal epidural injection of steroid (Left, 7/8/2020); Injection of joint (N/A, 8/12/2020); Injection of anesthetic agent into sacroiliac joint (N/A, 8/12/2020); Injection of anesthetic agent around nerve (Right, 9/16/2020); Selective injection of anesthetic agent around lumbar spinal nerve root by transforaminal approach (Bilateral, 2/10/2021); Transforaminal epidural injection of steroid (Left, 7/1/2021); Injection of anesthetic agent around nerve (Right, 7/13/2021); Selective injection of anesthetic agent around lumbar spinal nerve root by transforaminal approach (Bilateral, 5/25/2022); Injection of anesthetic agent into sacroiliac joint (Bilateral, 1/31/2023); and Radiofrequency thermocoagulation (Left, 6/8/2023).    Medications:   Clau has a current medication list which includes the following prescription(s): albuterol, apixaban, azelastine, buspirone, chlorthalidone, cholecalciferol (vitamin d3), clobetasol, cyclobenzaprine, restasis multidose, duloxetine, fluocinonide 0.05%, fluticasone propionate, levothyroxine, mupirocin, omeprazole, polyethylene glycol, pregabalin, prolia, solifenacin, trazodone, and valsartan, and the following Facility-Administered Medications: denosumab and neomycin-bacitracin-polymyxin.    Allergies:   Review of patient's allergies indicates:   Allergen Reactions    Cephalexin Hives, Itching, Swelling, Anxiety, Dermatitis and Rash          OBJECTIVE       Gait Analysis: The patient ambulated with the following assistive device: none; the pt presents with the following gait abnormalities: decreased step length bilateral    Fall risk assessment:    Performance Measurements Pt score  Norms   Santos Balance Scale Santos Balance Scale    1. SITTING TO STANDING: 3 - able to stand independely using hands    2. STANDING UNSUPPORTED:4 - able to stand safely 2 minutes without hold    3. SITTING WITH BACK UNSUPPORTED BUT FEET SUPPORTED ON FLOOR OR ON A STOOL: 4 - able to sit safely and securely 2 minutes    4. STANDING TO SITTING: 3 - controls descent by using hands    5. TRANSFERS: 3 - able to transfer safely with definite use of hands    6. STANDING UNSUPPORTED WITH EYES CLOSED: 4 - albe to stand 10 seconds safely    7. STANDING UNSUPPORTED WITH FEET TOGETHER: 3 - able to place feet together independently and stand for 1 minute with supervision    8. REACHING FORWARD WITH OUTSTRETCHED ARM WHILE STANDING:  3 - can reach forward 12 cm/5 inches safely    9.  OBJECT FROM THE FLOOR FROM A STANDING POSITION:4 - able to  slipper safely and easily    10. TURNING TO LOOK BEHIND OVER LEFT AND RIGHT SHOULDERS WHILE STANDIN - turns sideways only but maintains balance    11. TURN 360 DEGREES: 2 - able to turn 360 safely but slowly    12. PLACE ALTERNATE FOOT ON STEP OR STOOL WHILE STANDING UNSUPPORTED: 1 - able to complete > 2 steps needs min assist     13. STANDING UNSUPPORTED ONE FOOT IN FRONT: 0 - Looses balance while stepping or standing    14. STANDING ON ONE LE - tries to lift leg and unable to hold 3 seconds but remains standing independently      TOTAL SCORE: 37/56       51-56 = low fall risk  41-50 = increased fall risk  0 -40 = high fall risk   Single leg stance Unable without HHA Less than 4.9 sec high risk  5-6.4 sec increased risk  6.5 or greater low risk   30 second sit to stand 3 reps (hands on knees)    Timed up and go 14.76 seconds Greater than 14 sec high risk  11.1-14 sec increased risk  11 sec or less low risk             TREATMENT     Total Treatment time (time-based codes) separate from Evaluation: 25 minutes      Clau received the treatments listed below:           therapeutic activities to improve functional performance for 25  minutes, including:    Intervention 2/5/2024  Parameters   Sit to stand  Min A   Floor transfer  Min A                                 PATIENT EDUCATION AND HOME EXERCISES     Education provided:   - Patient  was instructed in home exercise program  to address the deficits listed above and to address overall condition and quality of life. Patient  was encouraged to participate in cardiovascular exercise and wellness exercise in fitness center with assistance of health  at [address] location.   - Patient was educated on all the above exercise prior/during/after for proper posture, positioning, and execution for safe performance with home exercise program.    Written Home Exercises Provided:  pt reports she has balance exercises from previous therapies .     ASSESSMENT     Clau is a 88 y.o. female referred to outpatient Physical Therapy with a medical diagnosis of recurrent falls. The patient presents with signs and symptoms consistent with diagnosis and impairments which include weakness, impaired endurance, impaired functional mobility, gait instability, impaired balance, and decreased lower extremity function.    Patient  will benefit from instruction in home exercise program with follow up in wellness    Patient prognosis is Good.   Patient will benefit from skilled outpatient Physical Therapy to address the deficits stated above and in the chart below, provide patient /family education, and to maximize patientt's level of independence.     Plan of care discussed with patient: Yes  Patient's spiritual, cultural and educational needs considered and patient is agreeable to the plan of care and goals as stated below:     Anticipated Barriers for therapy: co-morbidities and sedentary lifestyle    Medical Necessity is demonstrated by the following   History  Co-morbidities and personal factors that may impact the plan of care [] LOW: no  personal factors / co-morbidities  [] MODERATE: 1-2 personal factors / co-morbidities  [x] HIGH: 3+ personal factors / co-morbidities    Moderate / High Support Documentation:   Past Medical History:   Diagnosis Date    Allergy     Anxiety     Asthma     Back pain     Chest pain 1/30/2024    Chronic venous insufficiency 11/19/2013    Depression     Diverticulosis 11/19/2013 1/8/8 colonoscopy    Dry eyes     Fracture, sacrum/coccyx     Dr. Sanchez     GERD (gastroesophageal reflux disease)     H/O total hip arthroplasty 12/30/2015    Hypertension     Hypothyroid     Osteoarthritis     Osteoporosis     Postlaminectomy syndrome of lumbar region 1/8/2014    Radius fracture     7/18    Simple chronic bronchitis 5/3/2017    Umbilical hernia 11/19/2013    Urinary incontinence     Vitamin D deficiency disease          Examination  Body Structures and Functions, activity limitations and participation restrictions that may impact the plan of care [] LOW: addressing 1-2 elements  [] MODERATE: 2+ elements  [x] HIGH: 3+ elements (please support below)    Moderate / High Support Documentation: see evaluation/objective measurements above.     Clinical Presentation [] LOW: stable  [x] MODERATE: Evolving  [] HIGH: Unstable     Decision Making/ Complexity Score: moderate         Goals:  Pt will be independent with self management of his/her condition with home exercise program, posture, positioning and improved body mechanics.  2.   Pt will safely transition to and participate in wellness/fitness program.     PLAN   Plan of care Certification: 2/5/2024 to 4/5/24.     Patient is being seen for 1 visit to establish safe and effective home exercise program that can be performed independently. Health  will contact patient either by phone or in person weekly to monitor exercise program, answer questions regarding exercises and encourage follow up in fitness center.     Aura Rice, PT

## 2024-02-14 ENCOUNTER — HOSPITAL ENCOUNTER (OUTPATIENT)
Dept: RADIOLOGY | Facility: HOSPITAL | Age: 89
Discharge: HOME | End: 2024-02-14
Attending: NURSE PRACTITIONER
Payer: MEDICARE

## 2024-02-14 ENCOUNTER — HOSPITAL ENCOUNTER (OUTPATIENT)
Dept: PULMONOLOGY | Facility: HOSPITAL | Age: 89
Discharge: HOME | End: 2024-02-14
Attending: NURSE PRACTITIONER
Payer: MEDICARE

## 2024-02-14 VITALS
SYSTOLIC BLOOD PRESSURE: 203 MMHG | HEIGHT: 64 IN | BODY MASS INDEX: 29.37 KG/M2 | HEART RATE: 56 BPM | DIASTOLIC BLOOD PRESSURE: 91 MMHG | WEIGHT: 172 LBS

## 2024-02-14 DIAGNOSIS — R07.9 CHEST PAIN, UNSPECIFIED TYPE: ICD-10-CM

## 2024-02-14 PROCEDURE — 63600175 PHARM REV CODE 636 W HCPCS: Mod: HCNC | Performed by: NURSE PRACTITIONER

## 2024-02-14 PROCEDURE — 93016 CV STRESS TEST SUPVJ ONLY: CPT | Mod: HCNC,,, | Performed by: INTERNAL MEDICINE

## 2024-02-14 PROCEDURE — 93018 CV STRESS TEST I&R ONLY: CPT | Mod: HCNC,,, | Performed by: INTERNAL MEDICINE

## 2024-02-14 PROCEDURE — 78452 HT MUSCLE IMAGE SPECT MULT: CPT | Mod: 26,HCNC,, | Performed by: INTERNAL MEDICINE

## 2024-02-14 PROCEDURE — A9502 TC99M TETROFOSMIN: HCPCS | Mod: HCNC

## 2024-02-14 RX ORDER — REGADENOSON 0.08 MG/ML
0.4 INJECTION, SOLUTION INTRAVENOUS ONCE
Status: COMPLETED | OUTPATIENT
Start: 2024-02-14 | End: 2024-02-14

## 2024-02-14 RX ADMIN — REGADENOSON 0.4 MG: 0.08 INJECTION, SOLUTION INTRAVENOUS at 10:02

## 2024-02-15 LAB
CV STRESS BASE HR: 58 BPM
DIASTOLIC BLOOD PRESSURE: 111 MMHG
EJECTION FRACTION- HIGH: 73 %
END DIASTOLIC INDEX-HIGH: 165 ML/M2
END DIASTOLIC INDEX-LOW: 101 ML/M2
END SYSTOLIC INDEX-HIGH: 64 ML/M2
END SYSTOLIC INDEX-LOW: 28 ML/M2
NUC REST EJECTION FRACTION: 85
NUC STRESS EJECTION FRACTION: 86 %
OHS CV CPX 85 PERCENT MAX PREDICTED HEART RATE MALE: 113
OHS CV CPX MAX PREDICTED HEART RATE: 133
OHS CV CPX PATIENT IS FEMALE: 1
OHS CV CPX PATIENT IS MALE: 0
OHS CV CPX PEAK DIASTOLIC BLOOD PRESSURE: 94 MMHG
OHS CV CPX PEAK HEAR RATE: 76 BPM
OHS CV CPX PEAK RATE PRESSURE PRODUCT: NORMAL
OHS CV CPX PEAK SYSTOLIC BLOOD PRESSURE: 212 MMHG
OHS CV CPX PERCENT MAX PREDICTED HEART RATE ACHIEVED: 59
OHS CV CPX RATE PRESSURE PRODUCT PRESENTING: NORMAL
RETIRED EF AND QEF - SEE NOTES: 59 %
SYSTOLIC BLOOD PRESSURE: 186 MMHG

## 2024-02-19 ENCOUNTER — LAB VISIT (OUTPATIENT)
Dept: LAB | Facility: HOSPITAL | Age: 89
End: 2024-02-19
Attending: PHYSICIAN ASSISTANT
Payer: MEDICARE

## 2024-02-19 ENCOUNTER — PATIENT MESSAGE (OUTPATIENT)
Dept: PRIMARY CARE CLINIC | Facility: CLINIC | Age: 89
End: 2024-02-19
Payer: MEDICARE

## 2024-02-19 DIAGNOSIS — M80.00XG AGE-RELATED OSTEOPOROSIS WITH CURRENT PATHOLOGICAL FRACTURE WITH DELAYED HEALING: ICD-10-CM

## 2024-02-19 LAB
ALBUMIN SERPL BCP-MCNC: 3.7 G/DL (ref 3.5–5.2)
ALP SERPL-CCNC: 60 U/L (ref 55–135)
ALT SERPL W/O P-5'-P-CCNC: 9 U/L (ref 10–44)
ANION GAP SERPL CALC-SCNC: 12 MMOL/L (ref 8–16)
AST SERPL-CCNC: 22 U/L (ref 10–40)
BILIRUB SERPL-MCNC: 0.7 MG/DL (ref 0.1–1)
BUN SERPL-MCNC: 18 MG/DL (ref 8–23)
CALCIUM SERPL-MCNC: 9.1 MG/DL (ref 8.7–10.5)
CHLORIDE SERPL-SCNC: 104 MMOL/L (ref 95–110)
CO2 SERPL-SCNC: 25 MMOL/L (ref 23–29)
CREAT SERPL-MCNC: 1.4 MG/DL (ref 0.5–1.4)
EST. GFR  (NO RACE VARIABLE): 36 ML/MIN/1.73 M^2
GLUCOSE SERPL-MCNC: 101 MG/DL (ref 70–110)
POTASSIUM SERPL-SCNC: 4.2 MMOL/L (ref 3.5–5.1)
PROT SERPL-MCNC: 7.2 G/DL (ref 6–8.4)
SODIUM SERPL-SCNC: 141 MMOL/L (ref 136–145)

## 2024-02-19 PROCEDURE — 36415 COLL VENOUS BLD VENIPUNCTURE: CPT | Mod: HCNC,PO | Performed by: PHYSICIAN ASSISTANT

## 2024-02-19 PROCEDURE — 80053 COMPREHEN METABOLIC PANEL: CPT | Mod: HCNC | Performed by: PHYSICIAN ASSISTANT

## 2024-02-20 ENCOUNTER — TELEPHONE (OUTPATIENT)
Dept: RHEUMATOLOGY | Facility: CLINIC | Age: 89
End: 2024-02-20
Payer: MEDICARE

## 2024-02-20 NOTE — TELEPHONE ENCOUNTER
----- Message from Yola Stone sent at 2/20/2024  9:45 AM CST -----  Contact: Clau Olguin is calling to speak to the nurse regarding her prolia injection and her scheduled appointment, she stated its always the same day and she want to know why its separate. Please give her a call at  [phone]    Thanks  LJ

## 2024-02-20 NOTE — TELEPHONE ENCOUNTER
Spoke with patient . Informed her that I have rescheduled the Prolia to after her appointment with Dr. Bermudez on 2-22.

## 2024-02-22 ENCOUNTER — INFUSION (OUTPATIENT)
Dept: INFUSION THERAPY | Facility: HOSPITAL | Age: 89
End: 2024-02-22
Attending: INTERNAL MEDICINE
Payer: MEDICARE

## 2024-02-22 ENCOUNTER — OFFICE VISIT (OUTPATIENT)
Dept: RHEUMATOLOGY | Facility: CLINIC | Age: 89
End: 2024-02-22
Payer: MEDICARE

## 2024-02-22 VITALS
WEIGHT: 174.38 LBS | HEART RATE: 69 BPM | HEIGHT: 64 IN | DIASTOLIC BLOOD PRESSURE: 62 MMHG | SYSTOLIC BLOOD PRESSURE: 118 MMHG | BODY MASS INDEX: 29.77 KG/M2

## 2024-02-22 VITALS
HEART RATE: 78 BPM | OXYGEN SATURATION: 97 % | DIASTOLIC BLOOD PRESSURE: 79 MMHG | SYSTOLIC BLOOD PRESSURE: 139 MMHG | RESPIRATION RATE: 18 BRPM | TEMPERATURE: 98 F

## 2024-02-22 DIAGNOSIS — M17.0 PRIMARY OSTEOARTHRITIS OF BOTH KNEES: ICD-10-CM

## 2024-02-22 DIAGNOSIS — N18.32 STAGE 3B CHRONIC KIDNEY DISEASE: ICD-10-CM

## 2024-02-22 DIAGNOSIS — M80.00XG AGE-RELATED OSTEOPOROSIS WITH CURRENT PATHOLOGICAL FRACTURE WITH DELAYED HEALING: Primary | ICD-10-CM

## 2024-02-22 DIAGNOSIS — E55.9 VITAMIN D INSUFFICIENCY: ICD-10-CM

## 2024-02-22 DIAGNOSIS — M15.9 PRIMARY OSTEOARTHRITIS INVOLVING MULTIPLE JOINTS: ICD-10-CM

## 2024-02-22 DIAGNOSIS — Z87.310 HISTORY OF HEALED FRAGILITY FRACTURE: ICD-10-CM

## 2024-02-22 DIAGNOSIS — Z51.81 MEDICATION MONITORING ENCOUNTER: ICD-10-CM

## 2024-02-22 DIAGNOSIS — Z86.718 HISTORY OF DVT IN ADULTHOOD: ICD-10-CM

## 2024-02-22 PROCEDURE — 20610 DRAIN/INJ JOINT/BURSA W/O US: CPT | Mod: HCNC,50,S$GLB, | Performed by: INTERNAL MEDICINE

## 2024-02-22 PROCEDURE — 1125F AMNT PAIN NOTED PAIN PRSNT: CPT | Mod: HCNC,CPTII,S$GLB, | Performed by: INTERNAL MEDICINE

## 2024-02-22 PROCEDURE — 63600175 PHARM REV CODE 636 W HCPCS: Mod: JZ,JG,HCNC | Performed by: INTERNAL MEDICINE

## 2024-02-22 PROCEDURE — 99215 OFFICE O/P EST HI 40 MIN: CPT | Mod: HCNC,25,S$GLB, | Performed by: INTERNAL MEDICINE

## 2024-02-22 PROCEDURE — 1100F PTFALLS ASSESS-DOCD GE2>/YR: CPT | Mod: HCNC,CPTII,S$GLB, | Performed by: INTERNAL MEDICINE

## 2024-02-22 PROCEDURE — 99999 PR PBB SHADOW E&M-EST. PATIENT-LVL III: CPT | Mod: PBBFAC,HCNC,, | Performed by: INTERNAL MEDICINE

## 2024-02-22 PROCEDURE — 1157F ADVNC CARE PLAN IN RCRD: CPT | Mod: HCNC,CPTII,S$GLB, | Performed by: INTERNAL MEDICINE

## 2024-02-22 PROCEDURE — 96372 THER/PROPH/DIAG INJ SC/IM: CPT | Mod: HCNC

## 2024-02-22 PROCEDURE — 3288F FALL RISK ASSESSMENT DOCD: CPT | Mod: HCNC,CPTII,S$GLB, | Performed by: INTERNAL MEDICINE

## 2024-02-22 RX ORDER — TRIAMCINOLONE ACETONIDE 40 MG/ML
40 INJECTION, SUSPENSION INTRA-ARTICULAR; INTRAMUSCULAR
Status: DISCONTINUED | OUTPATIENT
Start: 2024-02-22 | End: 2024-02-22 | Stop reason: HOSPADM

## 2024-02-22 RX ADMIN — TRIAMCINOLONE ACETONIDE 40 MG: 40 INJECTION, SUSPENSION INTRA-ARTICULAR; INTRAMUSCULAR at 01:02

## 2024-02-22 RX ADMIN — DENOSUMAB 60 MG: 60 INJECTION SUBCUTANEOUS at 02:02

## 2024-02-22 NOTE — PROCEDURES
Large Joint Aspiration/Injection: L knee    Date/Time: 2/22/2024 1:30 PM    Performed by: Jefrfy Bermudez MD  Authorized by: Jeffry Bermudez MD    Consent Done?:  Yes (Verbal)  Indications:  Pain  Site marked: the procedure site was marked    Timeout: prior to procedure the correct patient, procedure, and site was verified    Prep: patient was prepped and draped in usual sterile fashion    Local anesthetic:  Lidocaine 2% without epinephrine    Details:  Needle Size:  25 G  Ultrasonic Guidance for needle placement?: No    Approach:  Anterolateral  Location:  Knee  Site:  L knee  Medications:  40 mg triamcinolone acetonide 40 mg/mL; 88 mg hyaluronate sodium, stabilized 88 mg/4 mL  Patient tolerance:  Patient tolerated the procedure well with no immediate complications

## 2024-02-22 NOTE — PROCEDURES
Large Joint Aspiration/Injection: R knee    Date/Time: 2/22/2024 1:30 PM    Performed by: Jeffry Bermudez MD  Authorized by: Jeffry Bermudez MD    Consent Done?:  Yes (Verbal)  Indications:  Pain  Site marked: the procedure site was marked    Timeout: prior to procedure the correct patient, procedure, and site was verified    Prep: patient was prepped and draped in usual sterile fashion    Local anesthetic:  Lidocaine 2% without epinephrine    Details:  Needle Size:  25 G  Ultrasonic Guidance for needle placement?: No    Approach:  Anterolateral  Location:  Knee  Site:  R knee  Medications:  88 mg hyaluronate sodium, stabilized 88 mg/4 mL  Patient tolerance:  Patient tolerated the procedure well with no immediate complications

## 2024-02-22 NOTE — DISCHARGE INSTRUCTIONS
THANKS FOR ALLOWING ME TO CARE FOR YOU TODAY!!!!! ~SUNSHINE          THANKS FOR CHOOSING [facility]!!!          [facility]  [id number] [facility]  [id number] [place] [address] Drive  [phone] phone     [phone] fax  Hours of Operation: Monday- Friday 8:00am- 5:00pm  After hours phone  [phone]  Hematology / Oncology Physicians on call      DONNIE Zamora Dr., NP Sydney Prescott, ASHLEY Lazaro, INOCENCIO Diaz    Please call with any concerns regarding your appointment today.

## 2024-02-22 NOTE — PLAN OF CARE
Problem: Adult Inpatient Plan of Care  Goal: Plan of Care Review  Outcome: Ongoing, Progressing  Flowsheets (Taken 2/22/2024 1459)  Plan of Care Reviewed With: patient  Goal: Patient-Specific Goal (Individualized)  Outcome: Ongoing, Progressing  Flowsheets (Taken 2/22/2024 1459)  Anxieties, Fears or Concerns: none  Individualized Care Needs: printout  Goal: Optimal Comfort and Wellbeing  Outcome: Ongoing, Progressing  Intervention: Provide Person-Centered Care  Flowsheets (Taken 2/22/2024 1459)  Trust Relationship/Rapport:   care explained   reassurance provided   choices provided   thoughts/feelings acknowledged     Problem: Fall Injury Risk  Goal: Absence of Fall and Fall-Related Injury  Outcome: Ongoing, Progressing  Intervention: Identify and Manage Contributors  Flowsheets (Taken 2/22/2024 1459)  Self-Care Promotion: BADL personal routines maintained  Medication Review/Management: medications reviewed  Intervention: Promote Injury-Free Environment  Flowsheets (Taken 2/22/2024 1459)  Safety Promotion/Fall Prevention:   nonskid shoes/socks when out of bed   room near unit station   in recliner, wheels locked

## 2024-02-22 NOTE — PROGRESS NOTES
RHEUMATOLOGY OUTPATIENT CLINIC NOTE    2/22/2024    Attending Rheumatologist: Jeffry Bermudez  Primary Care Provider/Physician Requesting Consultation: Jeanette Molina MD   Chief Complaint/Reason For Consultation:  Osteoporosis and Osteoarthritis      Subjective:     Clau Nunn is a 88 y.o. White female with OP    Recurrent knee pain from knee OA.  Scheduled for bilateral monovisc injections.  No fractures since last encounter.  Not planned for invasive dental procedures.    Review of Systems   Eyes:  Negative for photophobia and pain.   Musculoskeletal:  Positive for joint pain. Negative for back pain and falls.   Skin:  Negative for rash.   Neurological:  Negative for focal weakness and weakness.       Chronic comorbid conditions affecting medical decision making today:  Past Medical History:   Diagnosis Date    Allergy     Anxiety     Asthma     Back pain     Chest pain 1/30/2024    Chronic venous insufficiency 11/19/2013    Depression     Diverticulosis 11/19/2013 1/8/8 colonoscopy    Dry eyes     Fracture, sacrum/coccyx     Dr. Sanchez     GERD (gastroesophageal reflux disease)     H/O total hip arthroplasty 12/30/2015    Hypertension     Hypothyroid     Osteoarthritis     Osteoporosis     Postlaminectomy syndrome of lumbar region 1/8/2014    Radius fracture     7/18    Simple chronic bronchitis 5/3/2017    Umbilical hernia 11/19/2013    Urinary incontinence     Vitamin D deficiency disease      Past Surgical History:   Procedure Laterality Date    ADENOIDECTOMY      BACK SURGERY      x2    breast implants  1973    CATARACT EXTRACTION Bilateral     CLOSED REDUCTION DISTAL RADIUS FRACTURE      Dr. Black, 8/18    cystoscope  1/6/16    DR. Brown    FRACTURE SURGERY  April 3 2015    left wrist    HAND SURGERY      HIP SURGERY Right     total hip- Dr. Dos Santos    HYSTERECTOMY      36y/o    INJECTION OF ANESTHETIC AGENT AROUND NERVE Right 9/16/2020    Procedure: Right suprascapular nerve block;   Surgeon: Raffi Wells MD;  Location: [facility];  Service: Pain Management;  Laterality: Right;    INJECTION OF ANESTHETIC AGENT AROUND NERVE Right 7/13/2021    Procedure: Block, Nerve Right Suprascapular Nerve Block with RN IV sedation;  Surgeon: Raffi Wells MD;  Location: [facility];  Service: Pain Management;  Laterality: Right;    INJECTION OF ANESTHETIC AGENT INTO SACROILIAC JOINT N/A 8/12/2020    Procedure: Left IA hip Joint + Left SIJ + Right shoulder injection;  Surgeon: Raffi Wells MD;  Location: [facility];  Service: Pain Management;  Laterality: N/A;    INJECTION OF ANESTHETIC AGENT INTO SACROILIAC JOINT Bilateral 1/31/2023    Procedure: Bilateral SIJ Injection w/Local;  Surgeon: Abdirahman Parker MD;  Location: [facility];  Service: Pain Management;  Laterality: Bilateral;    INJECTION OF JOINT N/A 8/12/2020    Procedure: Left IA hip Joint + Left SIJ + Right shoulder injection;  Surgeon: Raffi Wells MD;  Location: [facility];  Service: Pain Management;  Laterality: N/A;    JOINT REPLACEMENT Right     R NNAMDI - Dr. Dos Santos    LEG SURGERY Right     ex-fix tib/fib    RADIOFREQUENCY THERMOCOAGULATION Left 6/8/2023    Procedure: Left SIJ RFA w/ local (give Valium before);  Surgeon: Abdirahman Parker MD;  Location: [facility];  Service: Pain Management;  Laterality: Left;    SELECTIVE INJECTION OF ANESTHETIC AGENT AROUND LUMBAR SPINAL NERVE ROOT BY TRANSFORAMINAL APPROACH Bilateral 2/10/2021    Procedure: Bilateral L5/S1 TF REJI;  Surgeon: Raffi Wells MD;  Location: [facility];  Service: Pain Management;  Laterality: Bilateral;    SELECTIVE INJECTION OF ANESTHETIC AGENT AROUND LUMBAR SPINAL NERVE ROOT BY TRANSFORAMINAL APPROACH Bilateral 5/25/2022    Procedure: Bilateral L5/S1 + S1 TF REJI;  Surgeon: Raffi Wells MD;  Location: [facility];  Service: Pain Management;  Laterality: Bilateral;    TONSILLECTOMY      TRANSFORAMINAL EPIDURAL INJECTION OF STEROID Left 7/8/2020    Procedure: left  L2/3 + L5/S1 TF REJI;  Surgeon: Raffi Wells MD;  Location: [facility];  Service: Pain Management;  Laterality: Left;    TRANSFORAMINAL EPIDURAL INJECTION OF STEROID Left 7/1/2021    Procedure: left L5/S1 + S1 TF REJI;  Surgeon: Raffi Wells MD;  Location: [facility];  Service: Pain Management;  Laterality: Left;     Family History   Problem Relation Age of Onset    COPD Mother     Cancer Mother         lung CA    Pulmonary fibrosis Sister     Cancer Daughter         colon    Stroke Father     Diabetes Son     Melanoma Neg Hx     Psoriasis Neg Hx     Lupus Neg Hx      Social History     Tobacco Use   Smoking Status Never   Smokeless Tobacco Never       Current Outpatient Medications:     apixaban (ELIQUIS) 5 mg Tab, Take 1 tablet (5 mg total) by mouth 2 (two) times daily., Disp: 180 tablet, Rfl: 3    azelastine (ASTELIN) 137 mcg (0.1 %) nasal spray, 2 sprays by Nasal route 2 (two) times daily., Disp: , Rfl:     busPIRone (BUSPAR) 5 MG Tab, TAKE 1 TABLET TWICE DAILY, Disp: 180 tablet, Rfl: 5    chlorthalidone (HYGROTEN) 25 MG Tab, Take 1 tablet (25 mg total) by mouth once daily., Disp: 30 tablet, Rfl: 0    cholecalciferol, vitamin D3, (D3-2000) 50 mcg (2,000 unit) Cap, Take 1 capsule (2,000 Units total) by mouth once daily., Disp: 90 capsule, Rfl: 11    clobetasoL (TEMOVATE) 0.05 % external solution, Pt to mix in 1 jar of cerave cream and apply to affected areas bid for 2-4 weeks per course, Disp: 50 mL, Rfl: 3    cyclobenzaprine (FLEXERIL) 5 MG tablet, TAKE 1 TABLET (5 MG TOTAL) BY MOUTH NIGHTLY AS NEEDED FOR MUSCLE SPASMS., Disp: 90 tablet, Rfl: 1    cycloSPORINE (RESTASIS MULTIDOSE) 0.05 % Drop, Place 1 drop into both eyes every 12 (twelve) hours., Disp: 5.5 mL, Rfl: 12    DULoxetine (CYMBALTA) 60 MG capsule, Take 1 capsule (60 mg total) by mouth once daily., Disp: 90 capsule, Rfl: 3    fluocinonide 0.05% (LIDEX) 0.05 % cream, AAA bid to leg for 2-4 weeks per course (Patient taking differently: Apply to  affected area(s) of leg topically twice daily for 2-4 weeks per course), Disp: 60 g, Rfl: 1    fluticasone propionate (FLONASE) 50 mcg/actuation nasal spray, , Disp: , Rfl:     levothyroxine (SYNTHROID) 100 MCG tablet, TAKE 1 TABLET EVERY DAY, Disp: 90 tablet, Rfl: 2    mupirocin (BACTROBAN) 2 % ointment, APPLY TOPICALLY TWICE DAILY TO WOUND ON RIGHT LOWER LEG, Disp: 22 g, Rfl: 0    omeprazole (PRILOSEC) 40 MG capsule, Take 1 capsule (40 mg total) by mouth 2 (two) times daily before meals., Disp: 90 capsule, Rfl: 2    polyethylene glycol (GLYCOLAX) 17 gram/dose powder, Take 17 g by mouth once daily., Disp: 595 g, Rfl: 0    pregabalin (LYRICA) 100 MG capsule, Take 1 capsule (100 mg total) by mouth 2 (two) times daily., Disp: 180 capsule, Rfl: 1    PROLIA 60 mg/mL Syrg, , Disp: , Rfl:     solifenacin (VESICARE) 5 MG tablet, Take 1 tablet (5 mg total) by mouth once daily., Disp: 30 tablet, Rfl: 11    traZODone (DESYREL) 50 MG tablet, TAKE 1 TABLET (50 MG TOTAL) BY MOUTH NIGHTLY AS NEEDED FOR INSOMNIA., Disp: 30 tablet, Rfl: 3    valsartan (DIOVAN) 160 MG tablet, Take 1 tablet (160 mg total) by mouth once daily., Disp: 90 tablet, Rfl: 3    albuterol (PROVENTIL) 2.5 mg /3 mL (0.083 %) nebulizer solution, Take 3 mLs (2.5 mg total) by nebulization every 6 (six) hours as needed for Wheezing. Rescue, Disp: 1 each, Rfl: 6    Current Facility-Administered Medications:     denosumab (PROLIA) injection 60 mg, 60 mg, Subcutaneous, Q6 Months, Carly Mora PA-C, 60 mg at 01/28/19 1548    neomycin-bacitracin-polymyxin ointment, , Topical (Top), 1 time in Clinic/HOD, Clau Michel NP     Objective:     Vitals:    02/22/24 1316   BP: 118/62   Pulse: 69     Physical Exam   Eyes: Conjunctivae are normal.   Pulmonary/Chest: Effort normal. No respiratory distress.   Musculoskeletal:         General: Deformity present. No swelling or tenderness.   Neurological: She displays no weakness.   Skin: No rash noted.       Reviewed  available old and all outside pertinent medical records available.    All lab results personally reviewed and interpreted by me.       ASSESSMENT      Encounter Diagnoses   Name Primary?    Age-related osteoporosis with current pathological fracture with delayed healing Yes    Primary osteoarthritis involving multiple joints     Primary osteoarthritis of both knees     Stage 3b chronic kidney disease     History of DVT in adulthood     Medication monitoring encounter     Vitamin D insufficiency       PLAN     - recurrent knee pain from OA.  Great results w/ Monovisc, has been receiving q6mo.  - no synovitis, inflammatory rashes, or weakness  - kidney function appears at baseline.  No hypocalcemia.  Vit D WNR.  - greatly improved T scores since on prolia (1/2017-)  - at risk of recurrent fragility fx w/ prolia discontinuation.  Bisphosphonates CI w/ current GFR.  Plan to c/w Prolia unchanged q6m.  - CMP within 2 weeks of prolia.  Adding RA serologies and UrA w/ next set of labs for prognosis    Large Joint Aspiration/Injection: R knee    Date/Time: 2/22/2024 1:30 PM    Performed by: Jeffry Bermudez MD  Authorized by: Jeffry Bermudez MD    Consent Done?:  Yes (Verbal)  Indications:  Pain  Site marked: the procedure site was marked    Timeout: prior to procedure the correct patient, procedure, and site was verified    Prep: patient was prepped and draped in usual sterile fashion    Local anesthetic:  Lidocaine 2% without epinephrine    Details:  Needle Size:  25 G  Ultrasonic Guidance for needle placement?: No    Approach:  Anterolateral  Location:  Knee  Site:  R knee  Medications:  88 mg hyaluronate sodium, stabilized 88 mg/4 mL  Patient tolerance:  Patient tolerated the procedure well with no immediate complications      Large Joint Aspiration/Injection: L knee    Date/Time: 2/22/2024 1:30 PM    Performed by: Jeffry Bermudez MD  Authorized by: Jeffry Bermudez MD    Consent Done?:  Yes (Verbal)  Indications:   Pain  Site marked: the procedure site was marked    Timeout: prior to procedure the correct patient, procedure, and site was verified    Prep: patient was prepped and draped in usual sterile fashion    Local anesthetic:  Lidocaine 2% without epinephrine    Details:  Needle Size:  25 G  Ultrasonic Guidance for needle placement?: No    Approach:  Anterolateral  Location:  Knee  Site:  L knee  Medications:  40 mg triamcinolone acetonide 40 mg/mL; 88 mg hyaluronate sodium, stabilized 88 mg/4 mL  Patient tolerance:  Patient tolerated the procedure well with no immediate complications      Jeffry Bermudez M.D.

## 2024-02-27 ENCOUNTER — OFFICE VISIT (OUTPATIENT)
Dept: PRIMARY CARE CLINIC | Facility: CLINIC | Age: 89
End: 2024-02-27
Payer: MEDICARE

## 2024-02-27 ENCOUNTER — HOSPITAL ENCOUNTER (OUTPATIENT)
Dept: RADIOLOGY | Facility: HOSPITAL | Age: 89
Discharge: HOME | End: 2024-02-27
Attending: NURSE PRACTITIONER
Payer: MEDICARE

## 2024-02-27 VITALS
SYSTOLIC BLOOD PRESSURE: 142 MMHG | OXYGEN SATURATION: 95 % | WEIGHT: 174.69 LBS | TEMPERATURE: 98 F | DIASTOLIC BLOOD PRESSURE: 72 MMHG | BODY MASS INDEX: 29.82 KG/M2 | HEART RATE: 58 BPM | HEIGHT: 64 IN

## 2024-02-27 DIAGNOSIS — R29.6 RECURRENT FALLS: ICD-10-CM

## 2024-02-27 DIAGNOSIS — E55.9 VITAMIN D DEFICIENCY DISEASE: ICD-10-CM

## 2024-02-27 DIAGNOSIS — M54.40 CHRONIC LEFT-SIDED LOW BACK PAIN WITH SCIATICA, SCIATICA LATERALITY UNSPECIFIED: ICD-10-CM

## 2024-02-27 DIAGNOSIS — I87.2 VENOUS INSUFFICIENCY OF BOTH LOWER EXTREMITIES: ICD-10-CM

## 2024-02-27 DIAGNOSIS — M87.021 AVASCULAR NECROSIS OF RIGHT HUMERAL HEAD: Primary | ICD-10-CM

## 2024-02-27 DIAGNOSIS — K21.9 GASTROESOPHAGEAL REFLUX DISEASE WITHOUT ESOPHAGITIS: ICD-10-CM

## 2024-02-27 DIAGNOSIS — G89.29 CHRONIC LEFT-SIDED LOW BACK PAIN WITH SCIATICA, SCIATICA LATERALITY UNSPECIFIED: ICD-10-CM

## 2024-02-27 DIAGNOSIS — M47.816 LUMBAR SPONDYLOSIS: ICD-10-CM

## 2024-02-27 PROCEDURE — 1159F MED LIST DOCD IN RCRD: CPT | Mod: HCNC,CPTII,S$GLB, | Performed by: NURSE PRACTITIONER

## 2024-02-27 PROCEDURE — 96372 THER/PROPH/DIAG INJ SC/IM: CPT | Mod: HCNC,S$GLB,, | Performed by: FAMILY MEDICINE

## 2024-02-27 PROCEDURE — 99215 OFFICE O/P EST HI 40 MIN: CPT | Mod: HCNC,25,S$GLB, | Performed by: NURSE PRACTITIONER

## 2024-02-27 PROCEDURE — 1125F AMNT PAIN NOTED PAIN PRSNT: CPT | Mod: HCNC,CPTII,S$GLB, | Performed by: NURSE PRACTITIONER

## 2024-02-27 PROCEDURE — 1157F ADVNC CARE PLAN IN RCRD: CPT | Mod: HCNC,CPTII,S$GLB, | Performed by: NURSE PRACTITIONER

## 2024-02-27 PROCEDURE — 72100 X-RAY EXAM L-S SPINE 2/3 VWS: CPT | Mod: TC,HCNC,FY,PO

## 2024-02-27 PROCEDURE — 99999 PR PBB SHADOW E&M-EST. PATIENT-LVL V: CPT | Mod: PBBFAC,HCNC,, | Performed by: NURSE PRACTITIONER

## 2024-02-27 PROCEDURE — 1101F PT FALLS ASSESS-DOCD LE1/YR: CPT | Mod: HCNC,CPTII,S$GLB, | Performed by: NURSE PRACTITIONER

## 2024-02-27 PROCEDURE — 3288F FALL RISK ASSESSMENT DOCD: CPT | Mod: HCNC,CPTII,S$GLB, | Performed by: NURSE PRACTITIONER

## 2024-02-27 PROCEDURE — 72100 X-RAY EXAM L-S SPINE 2/3 VWS: CPT | Mod: 26,HCNC,, | Performed by: RADIOLOGY

## 2024-02-27 RX ORDER — OMEPRAZOLE 40 MG/1
40 CAPSULE, DELAYED RELEASE ORAL EVERY MORNING
Qty: 90 CAPSULE | Refills: 2
Start: 2024-02-27

## 2024-02-27 RX ORDER — ACETAMINOPHEN 500 MG
2000 TABLET ORAL DAILY
Qty: 90 CAPSULE | Refills: 11 | Status: SHIPPED | OUTPATIENT
Start: 2024-02-27

## 2024-02-27 RX ORDER — NITROFURANTOIN 25; 75 MG/1; MG/1
100 CAPSULE ORAL 2 TIMES DAILY
COMMUNITY
Start: 2024-02-25 | End: 2024-05-31

## 2024-02-27 RX ORDER — LIDOCAINE 50 MG/G
1 PATCH TOPICAL DAILY
Qty: 30 PATCH | Refills: 1 | Status: SHIPPED | OUTPATIENT
Start: 2024-02-27

## 2024-02-27 RX ORDER — FUROSEMIDE 40 MG/1
40 TABLET ORAL DAILY
Qty: 5 TABLET | Refills: 0 | Status: SHIPPED | OUTPATIENT
Start: 2024-02-27 | End: 2025-02-26

## 2024-02-27 RX ORDER — TRIAMCINOLONE ACETONIDE 40 MG/ML
40 INJECTION, SUSPENSION INTRA-ARTICULAR; INTRAMUSCULAR
Status: COMPLETED | OUTPATIENT
Start: 2024-02-27 | End: 2024-02-27

## 2024-02-27 RX ADMIN — TRIAMCINOLONE ACETONIDE 40 MG: 40 INJECTION, SUSPENSION INTRA-ARTICULAR; INTRAMUSCULAR at 12:02

## 2024-02-27 NOTE — PATIENT INSTRUCTIONS
Take pepcid before going to bed  Pepcid 20 mg before bed    TUMS as an antacid      Take furosemide for 3 days only for leg swelling    Over the counter - Lidocaine patches

## 2024-02-27 NOTE — PROGRESS NOTES
Clau Nunn  02/28/2024  [id number]    Jeanette Molina MD  Patient Care Team:  Jeanette Molina MD as PCP - General (Internal Medicine)  Kandice Salcedo PA-C as Physician Assistant (Rheumatology)  Raffi Wells MD (Inactive) as Consulting Physician (Pain Medicine)  Jeanette Manriquez MD as Obstetrician (Obstetrics)  Bal Ventura LPN as Care Coordinator        [facility]      Chief Complaint:  Chief Complaint   Patient presents with    Follow-up     2 week f/u         Assessment/Plan:  1. Avascular necrosis of right humeral head    2. Vitamin D deficiency disease  -     cholecalciferol, vitamin D3, (D3-2000) 50 mcg (2,000 unit) Cap capsule; Take 1 capsule (2,000 Units total) by mouth once daily.  Dispense: 90 capsule; Refill: 11    3. Gastroesophageal reflux disease without esophagitis  Assessment & Plan:  Omeprazole in AM  Pepcid nightly  TUMS prn    Orders:  -     omeprazole (PRILOSEC) 40 MG capsule; Take 1 capsule (40 mg total) by mouth every morning.  Dispense: 90 capsule; Refill: 2    4. Chronic left-sided low back pain with sciatica, sciatica laterality unspecified  -     LIDOcaine (LIDODERM) 5 %; Place 1 patch onto the skin once daily. Remove & Discard patch within 12 hours or as directed by MD  Dispense: 30 patch; Refill: 1  -     Cancel: X-Ray Lumbar Spine 5 View; Future; Expected date: 02/27/2024  -     triamcinolone acetonide injection 40 mg  -     Ambulatory referral/consult to Physical/Occupational Therapy; Future; Expected date: 03/05/2024    5. Lumbar spondylosis  -     Ambulatory referral/consult to Physical/Occupational Therapy; Future; Expected date: 03/05/2024    6. Recurrent falls  Assessment & Plan:  L spine xray 2024. Fusion bars and pedicle screws extending from T10 to L2. This is bridging a T12 compression fracture, which is chronic. Marked disc space narrowing at the L2-3 and L5-S1 levels, with moderate disc space narrowing in the remainder of the lumbar spine.  There are 2 mm of ventral subluxation at L4-5. Multilevel arthritic change involving the facets. There is an IVC filter in place.   Outpatient PT near home      7. Venous insufficiency of both lower extremities  Assessment & Plan:  Elevation, compression hose  Low Na intake  Continue chlorthalidone  3 days of lasix       Other orders  -     furosemide (LASIX) 40 MG tablet; Take 1 tablet (40 mg total) by mouth once daily.  Dispense: 5 tablet; Refill: 0          Worry Score: 3  History of Present Illness:      Saw Rheumatology on 2/20/24 - greatly improved T scores since on prolia (2017)    Here for F/U for medical problems. Last seen by myself on 1/30/24. Pt met with PT regarding leg weakness and recurrent falls. She was educated on home exercise program. Pt lives in [place] and difficult to see Health  at clinic.   On previous visit, pt reported chest pain (right sided/epigastric).   NM stress test indicated no ischemia.  Chest pain is presumed to be related to right breast implant that was placed years ago. Pt is wanting physical therapy to help with leg strength ing b/c of recurrent falls. Two months ago, had fall at home and notes left lower back pain that continues. Coccyx pain did improved.  This AM, pt noted epigastric pain, heartburn which resolved with TUMS. She does take PPI daily.   Using walker to help prevent falls.   Recent injections in bilateral knees by Rheumatology - slight improvement  Currently taking Macrobid for UTI diagnosed at local urgent care.   Recent bladder infection - [facility] - dysuria taking Macrobid   B/P at home: 176 - 109  DBP  70 - 84  Pt had chronic bilateral lower leg swelling that is worse at present. SOB denied.   Concerns regarding memory changes - decreased short term memory    Denies fever, chills, URI symptoms, chest pain, SOB, palpitations, vomiting, diarrhea, constipation no gross neuro deficits, urinary symptoms      The following were reviewed: Active  problem list, medication list, allergies, family history, social history, and Health Maintenance.     History:  Past Medical History:   Diagnosis Date    Allergy     Anxiety     Asthma     Back pain     Chest pain 1/30/2024    Chronic venous insufficiency 11/19/2013    Depression     Diverticulosis 11/19/2013 1/8/8 colonoscopy    Dry eyes     Fracture, sacrum/coccyx     Dr. Sanchez     GERD (gastroesophageal reflux disease)     H/O total hip arthroplasty 12/30/2015    Hypertension     Hypothyroid     Osteoarthritis     Osteoporosis     Postlaminectomy syndrome of lumbar region 1/8/2014    Radius fracture     7/18    Simple chronic bronchitis 5/3/2017    Umbilical hernia 11/19/2013    Urinary incontinence     Vitamin D deficiency disease      Past Surgical History:   Procedure Laterality Date    ADENOIDECTOMY      BACK SURGERY      x2    breast implants  1973    CATARACT EXTRACTION Bilateral     CLOSED REDUCTION DISTAL RADIUS FRACTURE      Dr. Black, 8/18    cystoscope  1/6/16    DR. Brown    FRACTURE SURGERY  April 3 2015    left wrist    HAND SURGERY      HIP SURGERY Right     total hip- Dr. Dos Santos    HYSTERECTOMY      34y/o    INJECTION OF ANESTHETIC AGENT AROUND NERVE Right 9/16/2020    Procedure: Right suprascapular nerve block;  Surgeon: Raffi Wells MD;  Location: [facility];  Service: Pain Management;  Laterality: Right;    INJECTION OF ANESTHETIC AGENT AROUND NERVE Right 7/13/2021    Procedure: Block, Nerve Right Suprascapular Nerve Block with RN IV sedation;  Surgeon: Raffi Wells MD;  Location: [facility]GT;  Service: Pain Management;  Laterality: Right;    INJECTION OF ANESTHETIC AGENT INTO SACROILIAC JOINT N/A 8/12/2020    Procedure: Left IA hip Joint + Left SIJ + Right shoulder injection;  Surgeon: Raffi Wells MD;  Location: [facility];  Service: Pain Management;  Laterality: N/A;    INJECTION OF ANESTHETIC AGENT INTO SACROILIAC JOINT Bilateral 1/31/2023    Procedure: Bilateral SIJ  Injection w/Local;  Surgeon: Abdirahman Parker MD;  Location: [facility]IN [facility];  Service: Pain Management;  Laterality: Bilateral;    INJECTION OF JOINT N/A 8/12/2020    Procedure: Left IA hip Joint + Left SIJ + Right shoulder injection;  Surgeon: Raffi Wells MD;  Location: [facility];  Service: Pain Management;  Laterality: N/A;    JOINT REPLACEMENT Right     R NNAMDI - Dr. Dos Santos    LEG SURGERY Right     ex-fix tib/fib    RADIOFREQUENCY THERMOCOAGULATION Left 6/8/2023    Procedure: Left SIJ RFA w/ local (give Valium before);  Surgeon: Abdirahman Parker MD;  Location: [facility];  Service: Pain Management;  Laterality: Left;    SELECTIVE INJECTION OF ANESTHETIC AGENT AROUND LUMBAR SPINAL NERVE ROOT BY TRANSFORAMINAL APPROACH Bilateral 2/10/2021    Procedure: Bilateral L5/S1 TF REJI;  Surgeon: Raffi Wells MD;  Location: [facility];  Service: Pain Management;  Laterality: Bilateral;    SELECTIVE INJECTION OF ANESTHETIC AGENT AROUND LUMBAR SPINAL NERVE ROOT BY TRANSFORAMINAL APPROACH Bilateral 5/25/2022    Procedure: Bilateral L5/S1 + S1 TF REJI;  Surgeon: Raffi Wells MD;  Location: [facility];  Service: Pain Management;  Laterality: Bilateral;    TONSILLECTOMY      TRANSFORAMINAL EPIDURAL INJECTION OF STEROID Left 7/8/2020    Procedure: left L2/3 + L5/S1 TF REJI;  Surgeon: Raffi Wells MD;  Location: [facility];  Service: Pain Management;  Laterality: Left;    TRANSFORAMINAL EPIDURAL INJECTION OF STEROID Left 7/1/2021    Procedure: left L5/S1 + S1 TF REJI;  Surgeon: Raffi Wells MD;  Location: [facility];  Service: Pain Management;  Laterality: Left;     Family History   Problem Relation Age of Onset    COPD Mother     Cancer Mother         lung CA    Pulmonary fibrosis Sister     Cancer Daughter         colon    Stroke Father     Diabetes Son     Melanoma Neg Hx     Psoriasis Neg Hx     Lupus Neg Hx      Patient Active Problem List   Diagnosis    Primary osteoarthritis involving multiple joints    Acquired  hypothyroidism    Lumbar arthropathy    Venous insufficiency of both lower extremities    Anxiety    Vitamin D deficiency disease    Essential hypertension    Hiatal hernia with gastroesophageal reflux    Sacroiliac inflammation    Atherosclerosis of aorta    Chronic lumbar radiculopathy    Stage 3b chronic kidney disease    Simple chronic bronchitis    Pain of left hip joint    Lumbar spondylosis    Pain in both lower extremities    Age-related osteoporosis with current pathological fracture with delayed healing    Radius fracture    History of DVT in adulthood    Lumbar radiculopathy    Secondary hyperparathyroidism    Recurrent major depressive disorder, in partial remission    Shoulder pain    Chronic anticoagulation    Gastroesophageal reflux disease without esophagitis    Impaired functional mobility, balance, gait, and endurance    Left foot pain    Slow transit constipation    Skin tear of left forearm without complication    Sprain of coccyx    Solar purpura    Eschar of lower leg    Intermittent right-sided chest pain    Chest pain    Avascular necrosis of right humeral head    Recurrent falls     Review of patient's allergies indicates:   Allergen Reactions    Cephalexin Hives, Itching, Swelling, Anxiety, Dermatitis and Rash       Medications:  Current Outpatient Medications on File Prior to Visit   Medication Sig Dispense Refill    apixaban (ELIQUIS) 5 mg Tab Take 1 tablet (5 mg total) by mouth 2 (two) times daily. 180 tablet 3    azelastine (ASTELIN) 137 mcg (0.1 %) nasal spray 2 sprays by Nasal route 2 (two) times daily.      busPIRone (BUSPAR) 5 MG Tab TAKE 1 TABLET TWICE DAILY 180 tablet 5    chlorthalidone (HYGROTEN) 25 MG Tab Take 1 tablet (25 mg total) by mouth once daily. 30 tablet 0    clobetasoL (TEMOVATE) 0.05 % external solution Pt to mix in 1 jar of cerave cream and apply to affected areas bid for 2-4 weeks per course 50 mL 3    cyclobenzaprine (FLEXERIL) 5 MG tablet TAKE 1 TABLET (5 MG  TOTAL) BY MOUTH NIGHTLY AS NEEDED FOR MUSCLE SPASMS. 90 tablet 1    cycloSPORINE (RESTASIS MULTIDOSE) 0.05 % Drop Place 1 drop into both eyes every 12 (twelve) hours. 5.5 mL 12    DULoxetine (CYMBALTA) 60 MG capsule Take 1 capsule (60 mg total) by mouth once daily. 90 capsule 3    fluocinonide 0.05% (LIDEX) 0.05 % cream AAA bid to leg for 2-4 weeks per course (Patient taking differently: Apply to affected area(s) of leg topically twice daily for 2-4 weeks per course) 60 g 1    fluticasone propionate (FLONASE) 50 mcg/actuation nasal spray       levothyroxine (SYNTHROID) 100 MCG tablet TAKE 1 TABLET EVERY DAY 90 tablet 2    mupirocin (BACTROBAN) 2 % ointment APPLY TOPICALLY TWICE DAILY TO WOUND ON RIGHT LOWER LEG 22 g 0    nitrofurantoin, macrocrystal-monohydrate, (MACROBID) 100 MG capsule Take 100 mg by mouth 2 (two) times daily.      polyethylene glycol (GLYCOLAX) 17 gram/dose powder Take 17 g by mouth once daily. 595 g 0    pregabalin (LYRICA) 100 MG capsule Take 1 capsule (100 mg total) by mouth 2 (two) times daily. 180 capsule 1    PROLIA 60 mg/mL Syrg       solifenacin (VESICARE) 5 MG tablet Take 1 tablet (5 mg total) by mouth once daily. 30 tablet 11    traZODone (DESYREL) 50 MG tablet TAKE 1 TABLET (50 MG TOTAL) BY MOUTH NIGHTLY AS NEEDED FOR INSOMNIA. 30 tablet 3    valsartan (DIOVAN) 160 MG tablet Take 1 tablet (160 mg total) by mouth once daily. 90 tablet 3    albuterol (PROVENTIL) 2.5 mg /3 mL (0.083 %) nebulizer solution Take 3 mLs (2.5 mg total) by nebulization every 6 (six) hours as needed for Wheezing. Rescue 1 each 6     Current Facility-Administered Medications on File Prior to Visit   Medication Dose Route Frequency Provider Last Rate Last Admin    denosumab (PROLIA) injection 60 mg  60 mg Subcutaneous Q6 Months Carly Mora, DONNIE   60 mg at 01/28/19 1548    neomycin-bacitracin-polymyxin ointment   Topical (Top) 1 time in Clinic/HOD Clau Michel, NP           Medications have been reviewed  and reconciled with patient at visit today.      Exam:  Vitals:    02/27/24 1104   BP: (!) 142/72   Pulse: (!) 58   Temp: 97.9 °F (36.6 °C)     Weight: 79.2 kg (174 lb 11.4 oz)   Body mass index is 29.99 kg/m².      BP Readings from Last 3 Encounters:   02/27/24 (!) 142/72   02/22/24 139/79   02/22/24 118/62     Wt Readings from Last 3 Encounters:   02/27/24 1104 79.2 kg (174 lb 11.4 oz)   02/22/24 1316 79.1 kg (174 lb 6.1 oz)   02/14/24 1111 78 kg (172 lb)        REVIEW OF SYSTEMS  Review of Systems   Constitutional:  Negative for chills, fatigue and fever.   HENT:  Negative for congestion, postnasal drip, rhinorrhea and sore throat.    Respiratory:  Negative for cough and shortness of breath.    Cardiovascular:  Positive for leg swelling. Negative for chest pain and palpitations.   Gastrointestinal:  Positive for abdominal pain (epigastric). Negative for diarrhea, nausea and vomiting.   Genitourinary:  Negative for dysuria and frequency.   Musculoskeletal:  Positive for arthralgias, back pain and myalgias.   Skin:  Negative for rash and wound.   Neurological:  Negative for dizziness, weakness, light-headedness and headaches.   Hematological:  Negative for adenopathy.   Psychiatric/Behavioral:  Negative for dysphoric mood and sleep disturbance. The patient is not nervous/anxious.         Physical Exam  Vitals reviewed.   Constitutional:       General: She is not in acute distress.     Appearance: Normal appearance.   HENT:      Head: Normocephalic and atraumatic.      Nose: Nose normal.      Mouth/Throat:      Mouth: Mucous membranes are moist.   Eyes:      General: No scleral icterus.     Conjunctiva/sclera: Conjunctivae normal.   Cardiovascular:      Rate and Rhythm: Normal rate and regular rhythm.      Heart sounds: No murmur heard.  Pulmonary:      Effort: Pulmonary effort is normal. No respiratory distress.      Breath sounds: Rales present.   Abdominal:      Palpations: Abdomen is soft. There is no mass.       Tenderness: There is no abdominal tenderness.   Musculoskeletal:      Cervical back: Normal range of motion and neck supple.      Lumbar back: No swelling, deformity, signs of trauma or bony tenderness. Normal range of motion.        Back:       Right lower leg: 3+ Edema present.      Left lower leg: 3+ Edema present.   Lymphadenopathy:      Cervical: No cervical adenopathy.   Skin:     General: Skin is warm and dry.   Neurological:      Mental Status: She is alert and oriented to person, place, and time. Mental status is at baseline.      Motor: Weakness present.   Psychiatric:         Mood and Affect: Mood normal.         Thought Content: Thought content normal.          Laboratory Reviewed:     Lab Results   Component Value Date    WBC 12.12 07/07/2023    HGB 14.2 07/07/2023    HCT 45.7 07/07/2023     07/07/2023    CHOL 194 07/07/2023    TRIG 149 07/07/2023    HDL 47 07/07/2023    ALT 9 (L) 02/19/2024    AST 22 02/19/2024     02/19/2024    K 4.2 02/19/2024     02/19/2024    CREATININE 1.4 02/19/2024    BUN 18 02/19/2024    CO2 25 02/19/2024    TSH 1.593 07/07/2023    INR 1.1 04/07/2022    HGBA1C 5.0 03/11/2019       Screening or Prevention Patient's value Goal Complete/Controlled?   HgA1C Testing and Control   Lab Results   Component Value Date    HGBA1C 5.0 03/11/2019      Annually/Less than 8% No   Lipid profile : 07/07/2023 Annually Yes   LDL control Lab Results   Component Value Date    LDLCALC 117.2 07/07/2023    Annually/Less than 100 mg/dl  No   Nephropathy screening Lab Results   Component Value Date    LABMICR 13.0 10/12/2023     Lab Results   Component Value Date    PROTEINUA 2+ (A) 07/07/2023    Annually Yes   Blood pressure BP Readings from Last 1 Encounters:   02/27/24 (!) 142/72    Less than 140/90 Yes   Dilated retinal exam Most Recent Eye Exam Date: Not Found Annually Yes   Foot exam   Most Recent Foot Exam Date: Not Found Annually Yes       [employer]  Health  Maintenance Topics with due status: Not Due       Topic Last Completion Date    TETANUS VACCINE 02/25/2022    Lipid Panel 07/07/2023     Health Maintenance Due   Topic Date Due    RSV Vaccine (Age 60+ and Pregnant patients) (1 - 1-dose 60+ series) Never done    COVID-19 Vaccine (4 - 2023-24 season) 09/01/2023               -Patient's lab results were reviewed and discussed with patient  -Treatment options and alternatives were discussed with the patient. Patient expressed understanding. Patient was given the opportunity to ask questions and be an active participant in their medical care. Patient had no further questions or concerns at this time.         Future Appointments   Date Time Provider Department Center   3/12/2024 10:20 AM Yanna Lebron MD [facility]   6/12/2024  1:40 PM Angelica Caballero, PAFlorencioC [facility] IN [facility]   8/29/2024 10:00 AM LABORATORY, [facility]   9/5/2024  2:30 PM Jeffry Bermudez MD [facility]  [facility]   9/5/2024  3:00 PM INJECTION 1, [facility] IN[facility]ION [facility]   12/5/2024 10:00 AM Clau Michel NP [facility]          After visit summary printed and given to patient upon discharge.  Patient goals and care plan are included in After visit summary.      The following issues were discussed: The primary encounter diagnosis was Avascular necrosis of right humeral head. Diagnoses of Vitamin D deficiency disease, Gastroesophageal reflux disease without esophagitis, Chronic left-sided low back pain with sciatica, sciatica laterality unspecified, Lumbar spondylosis, Recurrent falls, and Venous insufficiency of both lower extremities were also pertinent to this visit.    Health maintenance needs, recent test results and goals of care discussed with pt and questions answered.           Clau Michel, MOMO, NP-C  [facility] [id number] [place] [address]

## 2024-02-28 NOTE — ASSESSMENT & PLAN NOTE
L spine xray 2024. Fusion bars and pedicle screws extending from T10 to L2. This is bridging a T12 compression fracture, which is chronic. Marked disc space narrowing at the L2-3 and L5-S1 levels, with moderate disc space narrowing in the remainder of the lumbar spine. There are 2 mm of ventral subluxation at L4-5. Multilevel arthritic change involving the facets. There is an IVC filter in place.   Outpatient PT near home

## 2024-02-29 ENCOUNTER — PATIENT MESSAGE (OUTPATIENT)
Dept: PRIMARY CARE CLINIC | Facility: CLINIC | Age: 89
End: 2024-02-29
Payer: MEDICARE

## 2024-03-12 ENCOUNTER — OFFICE VISIT (OUTPATIENT)
Dept: PRIMARY CARE CLINIC | Facility: CLINIC | Age: 89
End: 2024-03-12
Payer: MEDICARE

## 2024-03-12 VITALS
HEART RATE: 75 BPM | SYSTOLIC BLOOD PRESSURE: 140 MMHG | DIASTOLIC BLOOD PRESSURE: 70 MMHG | WEIGHT: 173.06 LBS | BODY MASS INDEX: 29.55 KG/M2 | HEIGHT: 64 IN | TEMPERATURE: 98 F | OXYGEN SATURATION: 96 %

## 2024-03-12 DIAGNOSIS — N18.32 STAGE 3B CHRONIC KIDNEY DISEASE: ICD-10-CM

## 2024-03-12 DIAGNOSIS — R29.6 RECURRENT FALLS: ICD-10-CM

## 2024-03-12 DIAGNOSIS — I10 ESSENTIAL HYPERTENSION: Chronic | ICD-10-CM

## 2024-03-12 DIAGNOSIS — E03.9 ACQUIRED HYPOTHYROIDISM: Primary | ICD-10-CM

## 2024-03-12 PROCEDURE — 1157F ADVNC CARE PLAN IN RCRD: CPT | Mod: HCNC,CPTII,S$GLB, | Performed by: FAMILY MEDICINE

## 2024-03-12 PROCEDURE — 1101F PT FALLS ASSESS-DOCD LE1/YR: CPT | Mod: HCNC,CPTII,S$GLB, | Performed by: FAMILY MEDICINE

## 2024-03-12 PROCEDURE — 3288F FALL RISK ASSESSMENT DOCD: CPT | Mod: HCNC,CPTII,S$GLB, | Performed by: FAMILY MEDICINE

## 2024-03-12 PROCEDURE — 1159F MED LIST DOCD IN RCRD: CPT | Mod: HCNC,CPTII,S$GLB, | Performed by: FAMILY MEDICINE

## 2024-03-12 PROCEDURE — 1160F RVW MEDS BY RX/DR IN RCRD: CPT | Mod: HCNC,CPTII,S$GLB, | Performed by: FAMILY MEDICINE

## 2024-03-12 PROCEDURE — 1126F AMNT PAIN NOTED NONE PRSNT: CPT | Mod: HCNC,CPTII,S$GLB, | Performed by: FAMILY MEDICINE

## 2024-03-12 PROCEDURE — 99999 PR PBB SHADOW E&M-EST. PATIENT-LVL V: CPT | Mod: PBBFAC,HCNC,, | Performed by: FAMILY MEDICINE

## 2024-03-12 PROCEDURE — 99214 OFFICE O/P EST MOD 30 MIN: CPT | Mod: HCNC,S$GLB,, | Performed by: FAMILY MEDICINE

## 2024-03-12 NOTE — PATIENT INSTRUCTIONS
If you are feeling unwell, we'd like to be the first ones to know here at [facility]! Please give us a call. Same day appointments are our top priority to keep you well and out of the emergency rooms and hospitals. Call [phone] for our direct line. After hours advice is always available. Please call [phone] after hours to speak to the on-call team.     RSV vaccine is due and recommended for you at your local pharmacy. Check with [facility] about getting this vaccine.     Call to schedule physical therapy for your balance.     Think of some ideas to get more physical activity like aqua-aerobics, recumbent bicycle to build strength and burn calories.

## 2024-03-12 NOTE — ASSESSMENT & PLAN NOTE
BMP  Lab Results   Component Value Date     02/19/2024    K 4.2 02/19/2024     02/19/2024    CO2 25 02/19/2024    BUN 18 02/19/2024    CREATININE 1.4 02/19/2024    CALCIUM 9.1 02/19/2024    ANIONGAP 12 02/19/2024    EGFRNORACEVR 36 (A) 02/19/2024

## 2024-03-12 NOTE — PROGRESS NOTES
Subjective:       Patient ID: Clau Nunn is a 88 y.o. female.    Chief Complaint: Follow-up    HPI:     88 year old female here for f/u of medical problems. She was last seen for left hip pain following a fall. Xrays without acute injury. Referred at that time to PT but hasn't gone yet because she's so busy. Still with some hip pain and OA pain that limits exercise. Considering return to exercise on stationary bike or at local facility that has water aerobics. Very independent; does her own housework, drives, and keeps her 12 year old great granddaughter on the weekends; they like to shop and bake together.   She has restarted her vitamin D and has repeat level in August for rheumatology.   Denies cp./sob/op. nl bowel and bladder; occ urg incontinence    Updated/ annual due 10/24:  HM: 10/23 fluvax, 1/21 covid vaccine/booster, 11/19 HAV, 5/16 [id number], 5/17 booster [id number], 12/22 [id number], 2/22 TDaP, 11/09 zoster, 7/22 Shingrix #2, 9/22 BMD on prolia, 1/14 Cscope no more needed, 12/22 MMG/ no more, 10/22 Eye Dr. Rubalcava.    Past Medical History:   Diagnosis Date    Allergy     Anxiety     Asthma     Back pain     Chest pain 1/30/2024    Chronic venous insufficiency 11/19/2013    Depression     Diverticulosis 11/19/2013 1/8/8 colonoscopy    Dry eyes     Fracture, sacrum/coccyx     Dr. Sanchez     GERD (gastroesophageal reflux disease)     H/O total hip arthroplasty 12/30/2015    Hypertension     Hypothyroid     Osteoarthritis     Osteoporosis     Postlaminectomy syndrome of lumbar region 1/8/2014    Radius fracture     7/18    Simple chronic bronchitis 5/3/2017    Umbilical hernia 11/19/2013    Urinary incontinence     Vitamin D deficiency disease          7. Venous insufficiency of both lower extremities  Assessment & Plan:  Elevation, compression hose  Low Na intake  Continue chlorthalidone  3 days of lasix       Other orders  -     furosemide (LASIX) 40 MG tablet; Take 1 tablet (40 mg total) by mouth  once daily.  Dispense: 5 tablet; Refill: 0    Objective:     Vitals:    03/12/24 1013   BP: (!) 140/70   Pulse: 75   Temp: 98.2 °F (36.8 °C)     Wt Readings from Last 3 Encounters:   03/12/24 78.5 kg (173 lb 1 oz)   02/27/24 79.2 kg (174 lb 11.4 oz)   02/22/24 79.1 kg (174 lb 6.1 oz)     Temp Readings from Last 3 Encounters:   03/12/24 98.2 °F (36.8 °C) (Oral)   02/27/24 97.9 °F (36.6 °C) (Oral)   02/22/24 98 °F (36.7 °C)     BP Readings from Last 3 Encounters:   03/12/24 (!) 140/70   02/27/24 (!) 142/72   02/22/24 139/79     Pulse Readings from Last 3 Encounters:   03/12/24 75   02/27/24 (!) 58   02/22/24 78        Physical Exam  Vitals and nursing note reviewed.   Constitutional:       General: She is not in acute distress.     Appearance: She is well-developed. She is not ill-appearing.   HENT:      Head: Normocephalic and atraumatic.      Nose: Nose normal.      Mouth/Throat:      Mouth: Mucous membranes are moist.      Pharynx: No oropharyngeal exudate.   Eyes:      General: No scleral icterus.     Pupils: Pupils are equal, round, and reactive to light.   Cardiovascular:      Rate and Rhythm: Regular rhythm. Tachycardia present.   Pulmonary:      Effort: Pulmonary effort is normal.      Breath sounds: Normal breath sounds. No wheezing or rales.   Abdominal:      General: Bowel sounds are normal.      Palpations: Abdomen is soft.   Musculoskeletal:         General: No deformity. Normal range of motion.      Cervical back: Normal range of motion and neck supple.   Skin:     General: Skin is warm and dry.   Neurological:      General: No focal deficit present.      Mental Status: She is alert and oriented to person, place, and time.   Psychiatric:         Mood and Affect: Mood normal.         Behavior: Behavior normal.         Thought Content: Thought content normal.           Albumin   Date Value Ref Range Status   02/19/2024 3.7 3.5 - 5.2 g/dL Final     eGFR   Date Value Ref Range Status   02/19/2024 36 (A) >60  mL/min/1.73 m^2 Final       Assessment:       1. Acquired hypothyroidism    2. Essential hypertension    3. Recurrent falls    4. Stage 3b chronic kidney disease        Plan:           Problem List Items Addressed This Visit          Cardiac/Vascular    Essential hypertension (Chronic)    Current Assessment & Plan     Controlled on chlorthalidone and valsartan; using lasix prn edema            Renal/    Stage 3b chronic kidney disease    Current Assessment & Plan     BMP  Lab Results   Component Value Date     02/19/2024    K 4.2 02/19/2024     02/19/2024    CO2 25 02/19/2024    BUN 18 02/19/2024    CREATININE 1.4 02/19/2024    CALCIUM 9.1 02/19/2024    ANIONGAP 12 02/19/2024    EGFRNORACEVR 36 (A) 02/19/2024               Endocrine    Acquired hypothyroidism - Primary    Current Assessment & Plan     Lab Results   Component Value Date    TSH 1.593 07/07/2023               Other    Recurrent falls    Current Assessment & Plan     Encouraged to go back to PT for gait training          3 month f/u  RSV vaccine is due and recommended for you at your local pharmacy. Check with [facility] about getting this vaccine.     Call to schedule physical therapy for your balance.     Think of some ideas to get more physical activity like aqua-aerobics, recumbent bicycle to build strength and burn calories.

## 2024-04-22 NOTE — PLAN OF CARE
Assessment & Plan     Menopausal syndrome (hot flashes)  - May be perimenopausal based on sx's and age, although given she definitely does not want to get pregnant and has tolerated the minipill, and has no definite absolute contraindication to combo OCPs, will try 20 mcg combo OCP in place of her minipill to see if it resolves sx's and still provides adequate contraception.  - Rx sent to pharm levonorgestrel-ethinyl estradiol (AVIANE) 0.1-20 MG-MCG tablet; Take 1 tablet by mouth daily  - She will take minipill through this Sat, then start Aviane on Sunday. She understands she may develop BTB on it, and can expect menses to return during inert pill week.    Atrophic vaginitis  - Based on her sx's and age, this is likely Dx, although exam is not entirely c/w this, and need to r/o STD's and recurrent yeast, the latter which could be due to DM.  - Plan to start on combo OCPs as noted above to address atrophic vaginitis sx's.  - Rx sent to pharm levonorgestrel-ethinyl estradiol (AVIANE) 0.1-20 MG-MCG tablet; Take 1 tablet by mouth daily  - Wet prep - Clinic Collect  - NEISSERIA GONORRHOEA PCR  - CHLAMYDIA TRACHOMATIS PCR  - If sx's not improved adequately in 4-6 packs, can add her vaginal Premarin cream 2x/wk as well.    Oral contraceptive pill surveillance  - Changing to combo OCP as noted above  - Rx sent to pharm levonorgestrel-ethinyl estradiol (AVIANE) 0.1-20 MG-MCG tablet; Take 1 tablet by mouth daily    Review of the result(s) of each unique test - 8/25/2023 Pap WNL, HPV Neg              No follow-ups on file.    Subjective   Coni Brar is a 50 year old, presenting for the following health issues:  Menopausal Sx (Hot flashes and vaginal dryness)    Pt here for hot flashes and vaginal irritation and dryness. Her PCP has Rx'd Pt w/ Micronor OCPs for contraception as well as Premarin vaginal cream 2x/wk with minimal improvement. Pt has also had a couple yeast vaginitis episodes Rx'd most recently w/ Monistat-1 ovule  Pt discharged home, awake, alert, oriented x's 4, denies any pain, no apparent distress noted. All questions and concerns addressed and answered, pt verbalizes understanding of discharge process, pt meets discharge criteria and is being discharged to car via wheelchair.    "2 days ago. Pt has Type 2 DM. Pt thinks the vaginal itching is better but she has some irritation persisting. She states her hot flashes started about 1 month ago. She is mostly amenorrheic on the minipill. She is s/p Exp Lap w/ LSO years ago for a large hemorrhagic CL cyst. She definitely does not want to get pregnant.                       Objective    /62 (BP Location: Right arm, Patient Position: Chair, Cuff Size: Adult Large)   Ht 1.626 m (5' 4\")   Wt 122.9 kg (271 lb)   LMP  (LMP Unknown)   Breastfeeding No   BMI 46.52 kg/m    Body mass index is 46.52 kg/m .  Physical Exam  Constitutional:       General: She is not in acute distress.     Appearance: Normal appearance. She is obese. She is not ill-appearing.   Neurological:      Mental Status: She is alert.     Pelvic- Exam chaperoned by nurse, External genitalia normal, Bartholin's glands normal, Eaton's glands normal, Urethral meatus normal, Urethra normal, Bladder normal, Vagina with normal rugae, no abnormal lesions, no abnormal discharge, Normal cervix without lesions or mucopus, no cervical motion tenderness, GC/Chlam probe was Done, Wet prep was done.              Signed Electronically by: Rock Kenney MD    "

## 2024-04-23 ENCOUNTER — OFFICE VISIT (OUTPATIENT)
Dept: OPHTHALMOLOGY | Facility: CLINIC | Age: 89
End: 2024-04-23
Payer: MEDICARE

## 2024-04-23 DIAGNOSIS — M35.01 KERATITIS SICCA, BILATERAL: ICD-10-CM

## 2024-04-23 DIAGNOSIS — H52.7 REFRACTIVE ERROR: ICD-10-CM

## 2024-04-23 DIAGNOSIS — Z96.1 PSEUDOPHAKIA OF BOTH EYES: Primary | ICD-10-CM

## 2024-04-23 PROCEDURE — 92014 COMPRE OPH EXAM EST PT 1/>: CPT | Mod: HCNC,S$GLB,, | Performed by: OPHTHALMOLOGY

## 2024-04-23 PROCEDURE — 1160F RVW MEDS BY RX/DR IN RCRD: CPT | Mod: HCNC,CPTII,S$GLB, | Performed by: OPHTHALMOLOGY

## 2024-04-23 PROCEDURE — 99999 PR PBB SHADOW E&M-EST. PATIENT-LVL III: CPT | Mod: PBBFAC,HCNC,, | Performed by: OPHTHALMOLOGY

## 2024-04-23 PROCEDURE — 1126F AMNT PAIN NOTED NONE PRSNT: CPT | Mod: HCNC,CPTII,S$GLB, | Performed by: OPHTHALMOLOGY

## 2024-04-23 PROCEDURE — 92015 DETERMINE REFRACTIVE STATE: CPT | Mod: HCNC,S$GLB,, | Performed by: OPHTHALMOLOGY

## 2024-04-23 PROCEDURE — 1159F MED LIST DOCD IN RCRD: CPT | Mod: HCNC,CPTII,S$GLB, | Performed by: OPHTHALMOLOGY

## 2024-04-23 PROCEDURE — 1157F ADVNC CARE PLAN IN RCRD: CPT | Mod: HCNC,CPTII,S$GLB, | Performed by: OPHTHALMOLOGY

## 2024-04-23 NOTE — PROGRESS NOTES
SUBJECTIVE  Clau Nunn is 89 y.o. female  Corrected distance visual acuity was 20/25 -1 in the right eye and 20/20 -2 in the left eye.   Chief Complaint   Patient presents with    Annual Exam     Vision changes since last eye exam?: strain and focus, have to bring things closer to her eyes    Any eye pain today: No    Other ocular symptoms: No                         HPI     Annual Exam     Additional comments: Vision changes since last eye exam?: strain and   focus, have to bring things closer to her eyes    Any eye pain today: No    Other ocular symptoms: No                          Comments    Likes [facility] 10 am   Dr. Ramesh Duke Radiologist at [facility] (brother + son)     1. Restor IOL OU-Fargusen   2. Dry eyes-CSM   3. Dizzness-No Ocular involvement   4. RE- RX for SV glasses.   5. Blepharitis     Restasis bid prn   AT's PRN OU   Emycin krista as needed             Last edited by Simone Bains on 4/23/2024  1:10 PM.         Assessment /Plan :  1. Pseudophakia of both eyes  -- Condition stable, no therapeutic change required. Monitoring routinely.     2. Keratitis sicca, bilateral  -- Condition stable, no therapeutic change required. Monitoring routinely.     3. Refractive error - Dispensed PAL Rx     RTC in 1 year with or prn any changes            
.

## 2024-04-29 DIAGNOSIS — E03.9 ACQUIRED HYPOTHYROIDISM: ICD-10-CM

## 2024-04-29 RX ORDER — LEVOTHYROXINE SODIUM 100 UG/1
TABLET ORAL
Qty: 90 TABLET | Refills: 3 | Status: SHIPPED | OUTPATIENT
Start: 2024-04-29

## 2024-05-03 ENCOUNTER — PATIENT MESSAGE (OUTPATIENT)
Dept: PRIMARY CARE CLINIC | Facility: CLINIC | Age: 89
End: 2024-05-03
Payer: MEDICARE

## 2024-05-03 NOTE — TELEPHONE ENCOUNTER
Spoke with Ms. Nunn regarding concerns. Discoloration noted to all fingers, she does not use nail polish. She denies any pain, increased fatigue or SOB. Continue to monitor.

## 2024-05-30 NOTE — PROGRESS NOTES
"Subjective:      Patient ID: Clau Nunn is a 89 y.o. female.    Chief Complaint: No chief complaint on file.      HPI  Here for follow up of medical problems.  Down 7# in 5 months.  Working on diet, less sweets.  Has fallen twice, trips when moving very fast.  Went to PT, says didn't help much. No f/c/sw/cough.  No cp/sob/palp.  BMs normal.  Urine incont.  Pains ok with current lyrica.  No albuterol for weeks.   Went to  for arm wounds/skin tearing with her 2 falls.  Daughter is dressing her arm wounds.    Updated/ annual due 10/24:  HM: 10/23 fluvax, 1/21 covid vaccine/booster, 11/19 HAV, 5/16 [id number], 5/17 booster [id number], 12/22 fblxlq25, 2/22 Tdap, 11/09 zoster, 7/22 Shingrix #2, 9/22 BMD on prolia, 1/14 Cscope no more needed, 12/22 MMG/ no more, 10/22 Eye Dr. Rubalcava.     Review of Systems   Constitutional:  Negative for chills, diaphoresis and fever.   Respiratory:  Negative for cough and shortness of breath.    Cardiovascular:  Negative for chest pain, palpitations and leg swelling.   Gastrointestinal:  Negative for blood in stool, constipation, diarrhea, nausea and vomiting.   Genitourinary:  Negative for dysuria, frequency and hematuria.   Psychiatric/Behavioral:  The patient is not nervous/anxious.          Objective:   /80 (BP Location: Right arm, Patient Position: Sitting, BP Method: Medium (Manual))   Pulse 73   Temp 97.8 °F (36.6 °C) (Oral)   Ht 5' 4" (1.626 m)   Wt 74.1 kg (163 lb 5.8 oz)   SpO2 97%   BMI 28.04 kg/m²     Physical Exam  Constitutional:       Appearance: She is well-developed.   Neck:      Thyroid: No thyroid mass.      Vascular: No carotid bruit.   Cardiovascular:      Rate and Rhythm: Normal rate and regular rhythm.      Heart sounds: No murmur heard.     No friction rub. No gallop.   Pulmonary:      Effort: Pulmonary effort is normal.      Breath sounds: Normal breath sounds. No wheezing or rales.   Abdominal:      General: Bowel sounds are normal.      " "Palpations: Abdomen is soft. There is no mass.      Tenderness: There is no abdominal tenderness.   Musculoskeletal:      Cervical back: Neck supple.   Lymphadenopathy:      Cervical: No cervical adenopathy.   Neurological:      Mental Status: She is alert and oriented to person, place, and time.             Assessment:       1. Essential hypertension    2. Acquired hypothyroidism    3. Age-related osteoporosis with current pathological fracture with delayed healing    4. Anxiety    5. Gastroesophageal reflux disease without esophagitis    6. History of DVT in adulthood    7. Chronic anticoagulation    8. Recurrent major depressive disorder, in partial remission    9. Simple chronic bronchitis    10. Stage 3b chronic kidney disease    11. Vitamin D deficiency disease    12. Secondary hyperparathyroidism    13. Other hyperlipidemia    14. Solar purpura    15. Lumbar radiculopathy          Plan:     Essential hypertension- stable, cont rx.    Acquired hypothyroidism- Clinically stable, continue present treatment.  -     TSH; Future; Expected date: 05/31/2024    Age-related osteoporosis with current pathological fracture with delayed healing- on prolia, check vit D.    Anxiety, Recurrent major depressive disorder, in partial remission- doing well, cont SNRI.    Gastroesophageal reflux disease without esophagitis- stable, cont rx.    History of DVT in adulthood, Chronic anticoagulation- continue.    Simple chronic bronchitis- cont albuterol prn.    Stage 3b chronic kidney disease, Secondary hyperparathyroidism- recheck now.    Solar purpera- no rx.    Lumbar radiculopathy- change to [place] due to feels like gained wt on pregab.    Vitamin D deficiency disease- taking "one pill" daily.  -     Vitamin D; Future    Other hyperlipidemia- recheck now.  -     CBC Auto Differential; Future; Expected date: 05/31/2024  -     Comprehensive Metabolic Panel; Future; Expected date: 05/31/2024  -     Lipid Panel; Future; Expected date: " 05/31/2024    [facility]

## 2024-05-31 ENCOUNTER — OFFICE VISIT (OUTPATIENT)
Dept: PRIMARY CARE CLINIC | Facility: CLINIC | Age: 89
End: 2024-05-31
Payer: MEDICARE

## 2024-05-31 VITALS
HEART RATE: 73 BPM | SYSTOLIC BLOOD PRESSURE: 124 MMHG | OXYGEN SATURATION: 97 % | WEIGHT: 163.38 LBS | DIASTOLIC BLOOD PRESSURE: 80 MMHG | HEIGHT: 64 IN | BODY MASS INDEX: 27.89 KG/M2 | TEMPERATURE: 98 F

## 2024-05-31 DIAGNOSIS — F33.41 RECURRENT MAJOR DEPRESSIVE DISORDER, IN PARTIAL REMISSION: ICD-10-CM

## 2024-05-31 DIAGNOSIS — M80.00XG AGE-RELATED OSTEOPOROSIS WITH CURRENT PATHOLOGICAL FRACTURE WITH DELAYED HEALING: ICD-10-CM

## 2024-05-31 DIAGNOSIS — M54.16 LUMBAR RADICULOPATHY: ICD-10-CM

## 2024-05-31 DIAGNOSIS — E55.9 VITAMIN D DEFICIENCY DISEASE: ICD-10-CM

## 2024-05-31 DIAGNOSIS — J41.0 SIMPLE CHRONIC BRONCHITIS: ICD-10-CM

## 2024-05-31 DIAGNOSIS — I10 ESSENTIAL HYPERTENSION: Primary | Chronic | ICD-10-CM

## 2024-05-31 DIAGNOSIS — Z79.01 CHRONIC ANTICOAGULATION: ICD-10-CM

## 2024-05-31 DIAGNOSIS — N25.81 SECONDARY HYPERPARATHYROIDISM: ICD-10-CM

## 2024-05-31 DIAGNOSIS — E78.49 OTHER HYPERLIPIDEMIA: ICD-10-CM

## 2024-05-31 DIAGNOSIS — Z86.718 HISTORY OF DVT IN ADULTHOOD: ICD-10-CM

## 2024-05-31 DIAGNOSIS — N18.32 STAGE 3B CHRONIC KIDNEY DISEASE: ICD-10-CM

## 2024-05-31 DIAGNOSIS — D69.2 SOLAR PURPURA: ICD-10-CM

## 2024-05-31 DIAGNOSIS — F41.9 ANXIETY: ICD-10-CM

## 2024-05-31 DIAGNOSIS — E03.9 ACQUIRED HYPOTHYROIDISM: ICD-10-CM

## 2024-05-31 DIAGNOSIS — K21.9 GASTROESOPHAGEAL REFLUX DISEASE WITHOUT ESOPHAGITIS: ICD-10-CM

## 2024-05-31 LAB
25(OH)D3+25(OH)D2 SERPL-MCNC: 40 NG/ML (ref 30–96)
ALBUMIN SERPL BCP-MCNC: 4.1 G/DL (ref 3.5–5.2)
ALP SERPL-CCNC: 73 U/L (ref 55–135)
ALT SERPL W/O P-5'-P-CCNC: 25 U/L (ref 10–44)
ANION GAP SERPL CALC-SCNC: 14 MMOL/L (ref 8–16)
AST SERPL-CCNC: 29 U/L (ref 10–40)
BASOPHILS # BLD AUTO: 0.06 K/UL (ref 0–0.2)
BASOPHILS NFR BLD: 0.7 % (ref 0–1.9)
BILIRUB SERPL-MCNC: 0.7 MG/DL (ref 0.1–1)
BUN SERPL-MCNC: 19 MG/DL (ref 8–23)
CALCIUM SERPL-MCNC: 9.8 MG/DL (ref 8.7–10.5)
CHLORIDE SERPL-SCNC: 100 MMOL/L (ref 95–110)
CHOLEST SERPL-MCNC: 195 MG/DL (ref 120–199)
CHOLEST/HDLC SERPL: 3.5 {RATIO} (ref 2–5)
CO2 SERPL-SCNC: 24 MMOL/L (ref 23–29)
CREAT SERPL-MCNC: 1.4 MG/DL (ref 0.5–1.4)
DIFFERENTIAL METHOD BLD: ABNORMAL
EOSINOPHIL # BLD AUTO: 0.2 K/UL (ref 0–0.5)
EOSINOPHIL NFR BLD: 2.1 % (ref 0–8)
ERYTHROCYTE [DISTWIDTH] IN BLOOD BY AUTOMATED COUNT: 13.1 % (ref 11.5–14.5)
EST. GFR  (NO RACE VARIABLE): 36.2 ML/MIN/1.73 M^2
GLUCOSE SERPL-MCNC: 100 MG/DL (ref 70–110)
HCT VFR BLD AUTO: 49.6 % (ref 37–48.5)
HDLC SERPL-MCNC: 55 MG/DL (ref 40–75)
HDLC SERPL: 28.2 % (ref 20–50)
HGB BLD-MCNC: 15.8 G/DL (ref 12–16)
IMM GRANULOCYTES # BLD AUTO: 0.05 K/UL (ref 0–0.04)
IMM GRANULOCYTES NFR BLD AUTO: 0.6 % (ref 0–0.5)
LDLC SERPL CALC-MCNC: 100.8 MG/DL (ref 63–159)
LYMPHOCYTES # BLD AUTO: 1.8 K/UL (ref 1–4.8)
LYMPHOCYTES NFR BLD: 22 % (ref 18–48)
MCH RBC QN AUTO: 31.2 PG (ref 27–31)
MCHC RBC AUTO-ENTMCNC: 31.9 G/DL (ref 32–36)
MCV RBC AUTO: 98 FL (ref 82–98)
MONOCYTES # BLD AUTO: 0.6 K/UL (ref 0.3–1)
MONOCYTES NFR BLD: 6.9 % (ref 4–15)
NEUTROPHILS # BLD AUTO: 5.6 K/UL (ref 1.8–7.7)
NEUTROPHILS NFR BLD: 67.7 % (ref 38–73)
NONHDLC SERPL-MCNC: 140 MG/DL
NRBC BLD-RTO: 0 /100 WBC
PLATELET # BLD AUTO: 353 K/UL (ref 150–450)
PMV BLD AUTO: 12.4 FL (ref 9.2–12.9)
POTASSIUM SERPL-SCNC: 4.1 MMOL/L (ref 3.5–5.1)
PROT SERPL-MCNC: 8.4 G/DL (ref 6–8.4)
RBC # BLD AUTO: 5.06 M/UL (ref 4–5.4)
SODIUM SERPL-SCNC: 138 MMOL/L (ref 136–145)
TRIGL SERPL-MCNC: 196 MG/DL (ref 30–150)
TSH SERPL DL<=0.005 MIU/L-ACNC: 5.39 UIU/ML (ref 0.4–4)
WBC # BLD AUTO: 8.24 K/UL (ref 3.9–12.7)

## 2024-05-31 PROCEDURE — 3288F FALL RISK ASSESSMENT DOCD: CPT | Mod: HCNC,CPTII,S$GLB, | Performed by: INTERNAL MEDICINE

## 2024-05-31 PROCEDURE — 85025 COMPLETE CBC W/AUTO DIFF WBC: CPT | Mod: HCNC | Performed by: INTERNAL MEDICINE

## 2024-05-31 PROCEDURE — 1157F ADVNC CARE PLAN IN RCRD: CPT | Mod: HCNC,CPTII,S$GLB, | Performed by: INTERNAL MEDICINE

## 2024-05-31 PROCEDURE — 99214 OFFICE O/P EST MOD 30 MIN: CPT | Mod: HCNC,S$GLB,, | Performed by: INTERNAL MEDICINE

## 2024-05-31 PROCEDURE — 80053 COMPREHEN METABOLIC PANEL: CPT | Mod: HCNC | Performed by: INTERNAL MEDICINE

## 2024-05-31 PROCEDURE — 99999 PR PBB SHADOW E&M-EST. PATIENT-LVL IV: CPT | Mod: PBBFAC,HCNC,, | Performed by: INTERNAL MEDICINE

## 2024-05-31 PROCEDURE — 84443 ASSAY THYROID STIM HORMONE: CPT | Mod: HCNC | Performed by: INTERNAL MEDICINE

## 2024-05-31 PROCEDURE — 84439 ASSAY OF FREE THYROXINE: CPT | Mod: HCNC | Performed by: INTERNAL MEDICINE

## 2024-05-31 PROCEDURE — 80061 LIPID PANEL: CPT | Mod: HCNC | Performed by: INTERNAL MEDICINE

## 2024-05-31 PROCEDURE — 1126F AMNT PAIN NOTED NONE PRSNT: CPT | Mod: HCNC,CPTII,S$GLB, | Performed by: INTERNAL MEDICINE

## 2024-05-31 PROCEDURE — 1100F PTFALLS ASSESS-DOCD GE2>/YR: CPT | Mod: HCNC,CPTII,S$GLB, | Performed by: INTERNAL MEDICINE

## 2024-05-31 PROCEDURE — 82306 VITAMIN D 25 HYDROXY: CPT | Mod: HCNC | Performed by: INTERNAL MEDICINE

## 2024-05-31 PROCEDURE — 1159F MED LIST DOCD IN RCRD: CPT | Mod: HCNC,CPTII,S$GLB, | Performed by: INTERNAL MEDICINE

## 2024-05-31 RX ORDER — GABAPENTIN 300 MG/1
300 CAPSULE ORAL NIGHTLY
Qty: 90 CAPSULE | Refills: 3 | Status: SHIPPED | OUTPATIENT
Start: 2024-05-31 | End: 2024-05-31 | Stop reason: SDUPTHER

## 2024-05-31 RX ORDER — GABAPENTIN 300 MG/1
300 CAPSULE ORAL NIGHTLY
Qty: 90 CAPSULE | Refills: 3 | Status: SHIPPED | OUTPATIENT
Start: 2024-05-31 | End: 2025-05-31

## 2024-06-01 LAB — T4 FREE SERPL-MCNC: 1 NG/DL (ref 0.71–1.51)

## 2024-06-02 ENCOUNTER — PATIENT MESSAGE (OUTPATIENT)
Dept: PRIMARY CARE CLINIC | Facility: CLINIC | Age: 89
End: 2024-06-02
Payer: MEDICARE

## 2024-06-03 ENCOUNTER — TELEPHONE (OUTPATIENT)
Dept: PRIMARY CARE CLINIC | Facility: CLINIC | Age: 89
End: 2024-06-03
Payer: MEDICARE

## 2024-06-03 DIAGNOSIS — S51.809A: ICD-10-CM
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
## 2024-06-03 NOTE — TELEPHONE ENCOUNTER
Verbal order given to [facility]  to admit pt.  Clinic note faxed to [facility].  Will fax order later this afternoon when Dr. Molina signs.

## 2024-06-03 NOTE — TELEPHONE ENCOUNTER
----- Message from Jose Gill sent at 6/3/2024  8:49 AM CDT -----  Contact: tennille Olguin is calling regarding wound care and she is unable to come in and doesn't feel comfortable driving.  She states she has itching.  Please give her a call back at [phone]

## 2024-06-04 NOTE — TELEPHONE ENCOUNTER
[facility] needs updated clinic note with info about wounds     Also needs orders faxed to them

## 2024-06-05 ENCOUNTER — TELEPHONE (OUTPATIENT)
Dept: INTERNAL MEDICINE | Facility: CLINIC | Age: 89
End: 2024-06-05
Payer: MEDICARE

## 2024-06-05 DIAGNOSIS — S41.101A OPEN WOUND OF ARM, BILATERAL: Primary | ICD-10-CM

## 2024-06-05 DIAGNOSIS — S41.102A OPEN WOUND OF ARM, BILATERAL: Primary | ICD-10-CM

## 2024-06-05 NOTE — TELEPHONE ENCOUNTER
PC with pt 2 days ago- had allergic reaction to mupirocin, was dressing arm wounds with it.  Lots of itching, got better after taking zyrtec.    But pt needs wound care assessment with [facility].  Please send referral.  SM

## 2024-06-24 ENCOUNTER — TELEPHONE (OUTPATIENT)
Dept: PRIMARY CARE CLINIC | Facility: CLINIC | Age: 89
End: 2024-06-24
Payer: MEDICARE

## 2024-06-24 NOTE — TELEPHONE ENCOUNTER
----- Message from Griselda Fregoso sent at 6/24/2024 12:32 PM CDT -----  ChristosZafar from [facility] called returning a call in regards to pt,he asked if he could receive a call back at [phone].Please advise.

## 2024-06-24 NOTE — TELEPHONE ENCOUNTER
Spoke with PT- states that they were only approved for 1 visit this week  Pt will be discharged from PT- she will still have Home Health nurse visits   Female

## 2024-07-03 ENCOUNTER — OFFICE VISIT (OUTPATIENT)
Dept: DERMATOLOGY | Facility: CLINIC | Age: 89
End: 2024-07-03
Payer: MEDICARE

## 2024-07-03 ENCOUNTER — TELEPHONE (OUTPATIENT)
Dept: DERMATOLOGY | Facility: CLINIC | Age: 89
End: 2024-07-03

## 2024-07-03 DIAGNOSIS — L60.1 ONYCHOLYSIS: Primary | ICD-10-CM

## 2024-07-03 DIAGNOSIS — L82.1 SEBORRHEIC KERATOSIS: ICD-10-CM

## 2024-07-03 PROCEDURE — 3288F FALL RISK ASSESSMENT DOCD: CPT | Mod: HCNC,CPTII,S$GLB, | Performed by: DERMATOLOGY

## 2024-07-03 PROCEDURE — 1157F ADVNC CARE PLAN IN RCRD: CPT | Mod: HCNC,CPTII,S$GLB, | Performed by: DERMATOLOGY

## 2024-07-03 PROCEDURE — 99999 PR PBB SHADOW E&M-EST. PATIENT-LVL III: CPT | Mod: PBBFAC,HCNC,, | Performed by: DERMATOLOGY

## 2024-07-03 PROCEDURE — 87101 SKIN FUNGI CULTURE: CPT | Mod: HCNC | Performed by: DERMATOLOGY

## 2024-07-03 PROCEDURE — 1159F MED LIST DOCD IN RCRD: CPT | Mod: HCNC,CPTII,S$GLB, | Performed by: DERMATOLOGY

## 2024-07-03 PROCEDURE — 1100F PTFALLS ASSESS-DOCD GE2>/YR: CPT | Mod: HCNC,CPTII,S$GLB, | Performed by: DERMATOLOGY

## 2024-07-03 PROCEDURE — 1126F AMNT PAIN NOTED NONE PRSNT: CPT | Mod: HCNC,CPTII,S$GLB, | Performed by: DERMATOLOGY

## 2024-07-03 PROCEDURE — 99214 OFFICE O/P EST MOD 30 MIN: CPT | Mod: HCNC,S$GLB,, | Performed by: DERMATOLOGY

## 2024-07-03 PROCEDURE — 1160F RVW MEDS BY RX/DR IN RCRD: CPT | Mod: HCNC,CPTII,S$GLB, | Performed by: DERMATOLOGY

## 2024-07-03 RX ORDER — CICLOPIROX 80 MG/ML
SOLUTION TOPICAL NIGHTLY
Qty: 6.6 ML | Refills: 3 | Status: SHIPPED | OUTPATIENT
Start: 2024-07-03

## 2024-07-03 RX ORDER — BETAMETHASONE DIPROPIONATE 0.5 MG/G
OINTMENT TOPICAL
Qty: 45 G | Refills: 3 | Status: SHIPPED | OUTPATIENT
Start: 2024-07-03

## 2024-07-03 NOTE — TELEPHONE ENCOUNTER
Returned call to pt, message sent to dr. ortega----- Message from Li Bailey sent at 7/3/2024  3:40 PM CDT -----  Regarding: Patient Advice  Contact: Clau  .Type:  Needs Medical Advice    Who Called: Clau  Symptoms (please be specific):    How long has patient had these symptoms:    Pharmacy name and phone #:    Would the patient rather a call back or a response via [facility]? call  Best Call Back Number:  [phone]  Additional Information:  Clau is calling to report the name of the medication she is taking: RX: Sulfamethoxazole-TMP DS.  She takes it twice per day.

## 2024-07-03 NOTE — PROGRESS NOTES
Subjective:      Patient ID:  Clau Nunn is a 89 y.o. female who presents for   Chief Complaint   Patient presents with    Nail Problem     History of Present Illness: The patient presents with chief complaint of finger nail problem.  Location: L 5th and 4th nail, and R 4th nail  Duration: 2 months  Signs/Symptoms: nails coming off of the nailbed, turning white at the ends, uncomfortable    Prior treatments:   none      Had recent antibiotic use prior to and during nail issue (unsure which antibiotics were taken)  Took abx for a tooth that had to be pulled- last taken a month ago  Also took abx when she had a recent fall - last took 3 weeks ago    Denies nail polish/manicures  Washes dishes daily - just started wearing gloves  No rash on hands/fingers or elsewhere on body  No change to toenails        Review of Systems   Skin:  Negative for itching and rash.       Objective:   Physical Exam   Constitutional: She appears well-developed and well-nourished. No distress.   Neurological: She is alert and oriented to person, place, and time. She is not disoriented.   Psychiatric: She has a normal mood and affect.   Skin:   Areas Examined (abnormalities noted in diagram):   Nails and Digits Inspection Performed           Diagram Legend     Erythematous scaling macule/papule c/w actinic keratosis       Vascular papule c/w angioma      Pigmented verrucoid papule/plaque c/w seborrheic keratosis      Yellow umbilicated papule c/w sebaceous hyperplasia      Irregularly shaped tan macule c/w lentigo     1-2 mm smooth white papules consistent with Milia      Movable subcutaneous cyst with punctum c/w epidermal inclusion cyst      Subcutaneous movable cyst c/w pilar cyst      Firm pink to brown papule c/w dermatofibroma      Pedunculated fleshy papule(s) c/w skin tag(s)      Evenly pigmented macule c/w junctional nevus     Mildly variegated pigmented, slightly irregular-bordered macule c/w mildly atypical nevus       Flesh colored to evenly pigmented papule c/w intradermal nevus       Pink pearly papule/plaque c/w basal cell carcinoma      Erythematous hyperkeratotic cursted plaque c/w SCC      Surgical scar with no sign of skin cancer recurrence      Open and closed comedones      Inflammatory papules and pustules      Verrucoid papule consistent consistent with wart     Erythematous eczematous patches and plaques     Dystrophic onycholytic nail with subungual debris c/w onychomycosis     Umbilicated papule    Erythematous-base heme-crusted tan verrucoid plaque consistent with inflamed seborrheic keratosis     Erythematous Silvery Scaling Plaque c/w Psoriasis     See annotation      Assessment / Plan:      Pathology Orders:       Normal Orders This Visit    Specimen to Pathology, Dermatology     Questions:    Procedure Type: Dermatology and skin neoplasms    Number of Specimens: 1    ------------------------: -------------------------    Spec 1 Procedure: Other (Specify) Comment - nail clipping    Spec 1 Clinical Impression: onycholysis of several nails; h/o unknown antibiotic use; medication induced photo onycholysis vs onychomycosis vs nail psoriasis vs nail lichen planus vs other    Spec 1 Source: left 4th finger    Release to patient:           Onycholysis- distal  -     Fungal culture , skin, hair, or nails  -     Specimen to Pathology, Dermatology  -     betamethasone dipropionate (DIPROLENE) 0.05 % ointment; Apply to affected fingers around nails at night under occlusion with white cotton gloves for 2-4 weeks  Dispense: 45 g; Refill: 3  -     ciclopirox (PENLAC) 8 % Soln; Apply topically nightly.  Dispense: 6.6 mL; Refill: 3    -discussed potential etiologies of condition and management options  - onycholysis can be caused by trauma, wet work, psoriasis, lichen planus, medications (potentially from her recent antibiotics use), onychomycosis, allergic or irritant contact dermatitis  - recommend trimming all nails short  and avoiding picking/cleaning under the nail plates  - recommend always wearing rubber gloves while washing dishes and avoiding getting nails wet as much as possible; continue avoiding manicures/polish  - consider clobetasol solution drops under nail plate, vinegar soaks, acetone TID under nail plate x 3 months, weekly fluconazole, ILK    Seborrheic keratosis  These are benign inherited growths without a malignant potential. Reassurance given to patient. No treatment is necessary.            Follow up in about 8 weeks (around 8/28/2024).        LOS NUMBER AND COMPLEXITY OF PROBLEMS    COMPLEXITY OF DATA RISK TOTAL TIME (m)   97311  75335 [] 1 self-limited or minor problem [x] Minimal to none [] No treatment recommended or patient to monitor. Reassurance.  15-29  10-19   69757  14354 Low  [] 2 or more self limited or minor problems  [] 1 stable chronic illness  [] 1 acute, uncomplicated illness or injury Limited (2)  [] Prior external notes from each unique source  [] Review result of each unique test  [] Order each unique test  OR [] Independent historian Low  []  OTC medications   []  Discussed/Decision for minor skin surgery (no risk factors) 30-44  20-29   87228  37324 Moderate  [x]  1 or more chronic unstable illness (not at goal or progression or exacerbation) or SE of treatment  []  2 or more stable chronic illnesses  []  1 acute illness with systemic symptoms  []  1 acute complicated injury  []  1 undiagnosed new problem with uncertain prognosis Moderate (1/3 below)  []  3 or more data items        *Now includes independent historian  []  Independent interpretation of a test  []  Discuss management/test with another provider Moderate  [x]  Prescription drug mgmt  []  Discussed/Decision for Minor surgery with risk factors  []  Mgmt limited by social determinates 45-59  30-39   64756  61329 High  []  1 or more chronic illness with severe exacerbation, progression or SE of treatment  []  1 acute or chronic  illness/injury that poses a threat to life or bodily function Extensive (2/3 below)  []  3 or more data items        *Now includes independent historian.  []  Independent interpretation of a test  []  Discuss management/test with another provider High  []  Major surgery with risk discussed  []  Drug therapy requiring intensive monitoring for toxicity  []  Hospitalization  []  Decision for DNR 60-74  40-54

## 2024-07-10 RX ORDER — FLUCONAZOLE 150 MG/1
150 TABLET ORAL
Qty: 4 TABLET | Refills: 1 | Status: SHIPPED | OUTPATIENT
Start: 2024-07-10 | End: 2024-08-09

## 2024-07-15 ENCOUNTER — PATIENT MESSAGE (OUTPATIENT)
Dept: DERMATOLOGY | Facility: CLINIC | Age: 89
End: 2024-07-15
Payer: MEDICARE

## 2024-07-22 DIAGNOSIS — I82.411 DVT OF DEEP FEMORAL VEIN, RIGHT: ICD-10-CM

## 2024-07-22 RX ORDER — APIXABAN 5 MG/1
5 TABLET, FILM COATED ORAL 2 TIMES DAILY
Qty: 180 TABLET | Refills: 3 | Status: SHIPPED | OUTPATIENT
Start: 2024-07-22

## 2024-07-25 ENCOUNTER — TELEPHONE (OUTPATIENT)
Dept: PRIMARY CARE CLINIC | Facility: CLINIC | Age: 89
End: 2024-07-25
Payer: MEDICARE

## 2024-07-25 ENCOUNTER — PATIENT MESSAGE (OUTPATIENT)
Dept: DERMATOLOGY | Facility: CLINIC | Age: 89
End: 2024-07-25
Payer: MEDICARE

## 2024-07-25 ENCOUNTER — TELEPHONE (OUTPATIENT)
Dept: DERMATOLOGY | Facility: CLINIC | Age: 89
End: 2024-07-25
Payer: MEDICARE

## 2024-07-25 ENCOUNTER — PATIENT MESSAGE (OUTPATIENT)
Dept: PRIMARY CARE CLINIC | Facility: CLINIC | Age: 89
End: 2024-07-25
Payer: MEDICARE

## 2024-07-25 NOTE — TELEPHONE ENCOUNTER
----- Message from Jose Gill sent at 7/25/2024  1:27 PM CDT -----  Contact: tennille  Type:  Needs Medical Advice    Who Called: tennille  Pharmacy name and phone #:    [facility] [address] [address]  Phone: [phone] Fax: [phone]  Would the patient rather a call back or a response via [facility]? Call back  Best Call Back Number: [phone]  Additional Information: patient says a rx was called in and she's not sure of the location

## 2024-08-07 ENCOUNTER — OFFICE VISIT (OUTPATIENT)
Dept: PAIN MEDICINE | Facility: CLINIC | Age: 89
End: 2024-08-07
Payer: MEDICARE

## 2024-08-07 VITALS
DIASTOLIC BLOOD PRESSURE: 75 MMHG | SYSTOLIC BLOOD PRESSURE: 128 MMHG | RESPIRATION RATE: 16 BRPM | HEART RATE: 73 BPM | BODY MASS INDEX: 28.46 KG/M2 | HEIGHT: 64 IN | WEIGHT: 166.69 LBS

## 2024-08-07 DIAGNOSIS — M46.1 SACROILIITIS: Primary | ICD-10-CM

## 2024-08-07 DIAGNOSIS — M53.3 SACROILIAC JOINT PAIN: ICD-10-CM

## 2024-08-07 DIAGNOSIS — M54.50 LUMBOSACRAL PAIN: ICD-10-CM

## 2024-08-07 PROCEDURE — 99999 PR PBB SHADOW E&M-EST. PATIENT-LVL III: CPT | Mod: PBBFAC,HCNC,, | Performed by: PHYSICIAN ASSISTANT

## 2024-08-07 RX ORDER — PREGABALIN 75 MG/1
75 CAPSULE ORAL NIGHTLY
COMMUNITY

## 2024-08-07 NOTE — H&P (VIEW-ONLY)
Chronic Pain -- Established Patient (Follow-up visit)    Chief Pain Complaint:  Chief Complaint   Patient presents with    Low-back Pain     Patient has pain in lower back and hip joints, pt states she has bad knees.  Pain level 5/10   Bilateral SIJ pain     Interval History (8/7/2024):  Patient presents today for follow-up visit.  Patient was last seen over 6 months ago. At that visit, the plan was to start Lyrica, but it appears patient has not filled in months in his back taking gabapentin at night. Patient reports pain as 5/10 today.  She has completed physical therapy recently, which has helped with her strength.  Today, she complains of pain in bilateral SI joint.    Interval History (12/13/2023):  Clau Nunn presents today for follow-up visit.  Patient was last seen about 3 months ago.   Patient reports pain as 5/10 today. She had a fall recently where she fell backwards and didn't have the watch on her. She landed on tailbone and left hip.     Interval History (9/8/2023):  Patient presents today for follow-up visit.  Patient was last seen on 8/11/2023. At that visit, the plan was to continue Lyrica. She reports that her pain is worse in the morning, but as she moves around, it improves. Patient reports pain as 4/10 today.    Interval History (8/11/2023):  Clau Nunn presents today for follow-up visit. At that visit, the plan was to start Lyrica, which has helped.  S/p left SIJ RFA on 6/8/23 (over a month ago) and is reporting better pain relief than she did initially. She is also getting around better. Patient reports pain as 3/10 today. Pain is worse the most in the morning when she wakes up, but pain improves as she walks around during the day.     Interval History (6/28/2023): Patient presents today for follow-up visit.  she underwent left SIJ RFA on 6/8/23 (about 3 weeks ago).  The patient reports that she is/was unchanged following the procedure.  she reports limited pain relief so far.  "Patient reports pain as 8/10 today.    Interval History (5/2/2023):  Clau Nunn presents today for follow-up visit.  Patient was last seen on 3/28/2023.  Patient reports pain as 5/10 today.  Pain is much worse and left posterior hip, more so over left SI joint.    Interval History (3/28/2023): Clau Nunn presents today for follow-up visit.  she underwent bilateral SIJ injection on 1/31/23.  The patient reports that she is/was better following the procedure.  she reports about 50% pain relief on left, right side feeling much better.  The changes lasted 4 weeks so far.  The changes have continued through this visit.  Patient reports pain as 5/10 today.  Seeing Podiatry for left foot fracture, which is not healing. Still having left SIJ pain.     Interval History (1/10/2023):  Clau Nunn presents today for follow-up visit.  Patient was last seen about 6 months ago. At that visit, she was feeling better since REJI, but she was having localized SIJ pain - still overall better since injection. She returns today with continued posterior "hip pain". Pain is worse on left. Patient reports pain as 8/10 today.    Interval History (6/29/2022): Clau Nunn presents today for follow-up visit.  she underwent Bilateral L5/S1 + S1 TF REJI on 5/25/22.  The patient reports that she is/was better following the procedure.  she reports 75% pain relief with regards to leg pain.  The changes lasted 4 weeks so far.  The changes have continued through this visit.  Patient reports pain as 5/10 today. Having localized SIJ pain today.     Interval History (5/11/2022): Clau Nunn presents today for follow-up visit.  she underwent left L5/S1 + S1 TF REJI on 7/1/21 with excellent pain relief for about 3 months.  The patient reports that she is/was better following the procedure.  The changes have NOT continued through this visit.   she underwent right suprascapular nerve block on 7/13/21.  The patient reports " that she is/was unchanged following the procedure. She reports limited pain relief for short term. But, she mainly has limited ROM.   Patient reports pain as 5/10 today - due to low back pain and leg pain.    Interval History (6/24/2021): Patient was seen on 2/10/21. At that time she underwent Bilateral L5/S1 TF REJI.  The patient reports that she is/was unchanged following the procedure.  she reports 20% pain relief.  The changes lasted 1 weeks.  The changes have NOT continued through this visit.  Patient reports pain as 5/10 today.  Her main pain complaint today is LLE radiculopathy worse in popliteal space and shin.   S/p left knee injection with Dr. Burns about 2 months ago with some pain relief.    Interval History (2/2/2021): Patient was last seen on 10/16/2020.  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  She is still never seen orthopedics for this issue, and she has not improved from intra-articular shoulder joint injections nor suprascapular nerve block.  Patient reports pain as 8/10 today.  Today, she is complaining of back and bilateral leg pain, previously just on the left side, although the left side still remains the worst.    Interval History (10/19/2020): Patient was seen on 9/16/20. At that time she underwent  Right suprascapular nerve block.  The patient reports that she is/was unchanged following the procedure.  she reports no pain relief.  Patient reports pain as 6/10 today - only when she moves her arm.    Interval History (9/9/2020): Patient was seen on 8/12/20. At that time she underwent left SIJ + left hip joint + right shoulder joint injection.  The patient reports that she is/was better following the procedure.  she reports 75% pain relief with hip pain relief but only 25% relief of shoulder pain.  The changes lasted 4 weeks so far.  The changes have continued through this visit.  Patient reports pain as /10  today.    Interval History (8/5/2020): Patient was seen on 7/8/20. At that time she underwent left L2/3 + L5/S1 TF REJI.  The patient reports that she is/was slightly better following the procedure.  she reports only 50 % pain relief.  The changes lasted 4 weeks so far.  The changes have continued through this visit.  She also reports that her right shoulder has been bothering her.  She has history of right humerus fracture years ago.    Interval History (6/12/2020): She is here today to review MRI. She reports 10/10 pain today. She also has concerns about Prolia as she read that it can cause rashes and joint pain.  She has been having a rash on her right breast for a few weeks.  She will see Dr. Gomez soon to discuss.     Interval History(11/20/18): Patient was seen on 10/24/18. At that time she underwent left SIJ + left GT bursa injection with local.  The patient reports that she is/was better following the procedure.  she reports 100% pain relief.  The changes lasted 4 weeks so far.  The changes have continued through this visit.    History of Present Illness:   Clau Nunn is a 89 y.o. female  who is presenting with a chief complaint of low back pain and hip pain. The patient began experiencing this problem insidiously, and the pain has been gradually worsening over the past 6 month(s). The pain is described as throbbing, cramping, aching and heavy and is located in the bilateral buttocks . Pain is intermittent and lasts hours. The pain radiates to  lateral thigh and groin . The patient rates her pain a 5 out of ten and interferes with activities of daily living a 5 out of ten. Pain is exacerbated by getting up from a seated position, and is improved by rest. Patient reports prior trauma (fall in June 2018 causing right distal radius + right sacrum fracture), prior lumbar surgery in ~2005, right hip replacement, right distal radius fracture requiring ORIF in June 2018.    - pertinent negatives: No  fever, No chills, No weight loss, No bladder dysfunction, No bowel dysfunction, No extremity weakness, No saddle anesthesia  - pertinent positives: none    - medications, other therapies tried (physical therapy, injections):     >> Tylenol    >> Has previously undergone Physical Therapy (aquatic and land) with limited relief    >> Has previously undergone spinal injection/s:   - bilateral SIJ injection on 11-9-17 with ~30% relief for 2 weeks   - left hip injection on 12-28-17 with some relief   - bilateral L3-5 MBB on 5/11/18 with reported 100% pain relief   - left SIJ + left GT bursa injection with local on 10/24/18 with 100% pain relief   - left L2/3 + L5/S1 TF REJI on 7/8/20 with about 50% pain relief   - left SIJ + left hip joint + right shoulder joint injection on 8/12/20 with 75% pain relief with hip pain relief but only 25% relief of shoulder pain - no longer having groin pain after this procedure    - Right suprascapular nerve block on 9/16/20 with limited pain relief    - Bilateral L5/S1 TF REJI on 2/10/21 with 20% pain relief for short term   - left knee injection with Dr. Burns in April 2021 with some pain relief   - left L5/S1 + S1 TF REJI on 7/1/21 with excellent pain relief for about 3 months   - right suprascapular nerve block on 7/13/21 with limited pain relief    - Bilateral L5/S1 + S1 TF REJI on 5/25/22 with 75% pain relief - regarding leg pain, now having localized SIJ   - bilateral SIJ injection on 1/31/23 with >50% pain relief on left, >70% pain relief on right - short-term pain relief  - left SIJ RFA on 6/8/23 with great pain relief       Past Surgical History:   Procedure Laterality Date    ADENOIDECTOMY      BACK SURGERY      x2    breast implants  1973    CATARACT EXTRACTION Bilateral     CLOSED REDUCTION DISTAL RADIUS FRACTURE      Dr. Black, 8/18    cystoscope  1/6/16    DR. Brown    FRACTURE SURGERY  April 3 2015    left wrist    HAND SURGERY      HIP SURGERY Right     total hip-  Dr. Dos Santos    HYSTERECTOMY      36y/o    INJECTION OF ANESTHETIC AGENT AROUND NERVE Right 9/16/2020    Procedure: Right suprascapular nerve block;  Surgeon: Raffi Wells MD;  Location: [facility];  Service: Pain Management;  Laterality: Right;    INJECTION OF ANESTHETIC AGENT AROUND NERVE Right 7/13/2021    Procedure: Block, Nerve Right Suprascapular Nerve Block with RN IV sedation;  Surgeon: Raffi Wells MD;  Location: [facility];  Service: Pain Management;  Laterality: Right;    INJECTION OF ANESTHETIC AGENT INTO SACROILIAC JOINT N/A 8/12/2020    Procedure: Left IA hip Joint + Left SIJ + Right shoulder injection;  Surgeon: Raffi Wells MD;  Location: [facility];  Service: Pain Management;  Laterality: N/A;    INJECTION OF ANESTHETIC AGENT INTO SACROILIAC JOINT Bilateral 1/31/2023    Procedure: Bilateral SIJ Injection w/Local;  Surgeon: Abdirahman Parker MD;  Location: [facility];  Service: Pain Management;  Laterality: Bilateral;    INJECTION OF JOINT N/A 8/12/2020    Procedure: Left IA hip Joint + Left SIJ + Right shoulder injection;  Surgeon: Raffi Wells MD;  Location: [facility];  Service: Pain Management;  Laterality: N/A;    JOINT REPLACEMENT Right     R NNAMDI - Dr. Dos Santos    LEG SURGERY Right     ex-fix tib/fib    RADIOFREQUENCY THERMOCOAGULATION Left 6/8/2023    Procedure: Left SIJ RFA w/ local (give Valium before);  Surgeon: Abdirahman Parker MD;  Location: [facility];  Service: Pain Management;  Laterality: Left;    SELECTIVE INJECTION OF ANESTHETIC AGENT AROUND LUMBAR SPINAL NERVE ROOT BY TRANSFORAMINAL APPROACH Bilateral 2/10/2021    Procedure: Bilateral L5/S1 TF REJI;  Surgeon: Raffi Wells MD;  Location: [facility];  Service: Pain Management;  Laterality: Bilateral;    SELECTIVE INJECTION OF ANESTHETIC AGENT AROUND LUMBAR SPINAL NERVE ROOT BY TRANSFORAMINAL APPROACH Bilateral 5/25/2022    Procedure: Bilateral L5/S1 + S1 TF REJI;  Surgeon: Raffi Wells MD;  Location: [facility];   "Service: Pain Management;  Laterality: Bilateral;    TONSILLECTOMY      TRANSFORAMINAL EPIDURAL INJECTION OF STEROID Left 7/8/2020    Procedure: left L2/3 + L5/S1 TF REJI;  Surgeon: Raffi Wells MD;  Location: [facility];  Service: Pain Management;  Laterality: Left;    TRANSFORAMINAL EPIDURAL INJECTION OF STEROID Left 7/1/2021    Procedure: left L5/S1 + S1 TF REJI;  Surgeon: Raffi Wells MD;  Location: [facility];  Service: Pain Management;  Laterality: Left;        Imaging/ Diagnostic Studies/ Labs (Reviewed on 8/7/2024):     Records from [facility] regarding right shoulder fracture- uploaded into chart under "Media"    Results for orders placed during the hospital encounter of 08/05/20   X-ray Shoulder 2 or More Views Right    Narrative COMPARISON:  12/10/2018  FINDINGS:  There is a chronic fracture deformity of the right humeral head and neck.  Associated osseous productive changes approximate the inferior margins of the articular surface of the humeral head.  No definite acute fractures or dislocations visualized.  There are mild degenerative changes present at the AC joint and glenoid.  Postoperative findings noted in the lower thoracic spine.     Results for orders placed during the hospital encounter of 12/10/18   X-Ray Shoulder Trauma Right    Narrative FINDINGS:  Comminuted fracture involving the surgical neck and right humeral head.  No evidence of dislocation.  Degenerative changes are present at the right AC joint.    Impression Comminuted fracture involving the right humeral head and surgical neck.     Results for orders placed during the hospital encounter of 06/29/20   MRI Lumbar Spine W WO Cont    Narrative COMPARISON:  Prior MRI from June 29, 2020.  FINDINGS:  Again demonstrated are postoperative findings from thoracolumbar spinal fusion and laminectomy at T12.  Chronic compression deformity of T12 with chronic retropulsion that flattens the thecal sac to 14 mm.  This is unchanged.  Mild, grade 1 " degenerative spondylolisthesis at L4-L5.  Vertebral body height is normal.  No abnormal enhancement patterns. The conus medullaris terminates at the level of L2-L3, low lying.  No abnormal signal within the conus. Intervertebral disc levels are as follows:  T11-T12 disc: Fusion at this level.  Mild, chronic retropulsion that flattens the thecal sac to 14 mm.  No significant foraminal stenosis.  T12-L1 disc: Prior laminectomy and fusion.  The thecal sac measures 19 mm.  No significant foraminal stenosis.  L1-L2 disc : Posterior fusion.  No significant spinal stenosis or foraminal stenosis.  L2-L3 disc: Disc space height loss.  Broad-based posterior disc bulge which encroaches slightly into the floors of the exit foramina.  Moderate buckling of ligamentum flavum.  The thecal sac measures 8-9 mm AP.  Mild bilateral foraminal stenosis.  This has progressed since the prior MRI.  L3-L4 disc: Broad-based posterior disc bulge that encroaches slightly into the floors of the exit foramina.  Moderate buckling of ligamentum flavum.  The thecal sac measures 10 mm AP.  Mild bilateral neural foraminal stenosis, right greater than left.  These findings are similar to prior.  L4-L5 disc: Minimal grade 1 spondylolisthesis with severe degenerative facet change and hypertrophy.  Left-sided facet joint effusion.  Is buckling of ligamentum flavum.  The thecal sac measures 11 mm.  No significant foraminal stenosis.  The spondylolisthesis has slightly progressed measuring 4 mm compared to prior 2 mm.  L5-S1 disc: Severe disc space height loss with marginal disc and osteophyte encroach into the exit foramina bilaterally.  Moderate degenerative facet hypertrophy.  The thecal sac measures 14 mm.    Impression 1. 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  2. Progression of mild foraminal stenosis and mild spinal stenosis at L2-L3.  3. Minimal progression of grade 1 spondylolisthesis at L4-L5.  4. Unchanged mild, chronic retropulsion from T12 where  there has been a laminectomy and fusion.       7/30/2018 XR LUMBAR SPINE COMPLETE 5 VIEW  TECHNIQUE:  AP, lateral, spot and bilateral oblique views of the lumbar spine were performed.  COMPARISON:  07/25/2018  FINDINGS:  There is grade 1 spondylolisthesis of L4 on L5.  Pedicle screws and fixation rods are noted for at the T10, T11, L1 and L2 levels.  Chronic compression deformity at the L1 level unchanged.  Prominent bilateral facet arthropathy noted at the L4-5 and L5-S1 levels.  IVC filter noted.     7/30/2018 XR HIPS BILATERAL 2 VIEW INCL AP PELVIS  TECHNIQUE:  AP view of the pelvis and frogleg lateral views of both hips were performed.  COMPARISON:  07/26/2018  FINDINGS:  The bony pelvis is intact. A right total hip arthroplasty, plate and wires are in place.  No hardware failure or loosening suggested.  The appearance is unchanged when compared to the prior exam.  Mild degenerative changes noted at the left hip.  No acute fracture or dislocation of the left hip.  No significant joint space narrowing identified.  No plain film evidence to suggest AVN of either hip.     Results for orders placed during the hospital encounter of 01/13/14   MRI Lumbar Spine W WO Contrast    Narrative Findings: Pre- and postcontrast multiplanar multisequence imaging of the thoracic and lumbar spine was performed using 7 cc of Gadavist intravenous contrast material.  There is moderately severe chronic central compression deformity of the T12 vertebral body which is stabilized by paraspinous rods and pedicle screws which extend from T10 through L2.  Postsurgical changes of laminectomy also noted at T12.  The posterior cortex of the T12 vertebral body is displaced posteriorly and indents the ventral thecal sac.  There is no anterior cord contact or significant central canal stenosis.  Degenerative disk desiccation is noted at multiple levels with moderate disk   narrowing at L5-S1.    Also L5-S1 is a broad-based disk protrusion which  "combined with bilateral facet hypertrophy results in moderately severe narrowing of both neural foramina.  No significant central canal stenosis noted.    From L2-3 through L4-5 there   is mild disk bulging which results in mild bilateral foraminal narrowing.  This is slightly more pronounced at L4-5 with bilateral facet hypertrophy a contributing factor.  No significant central canal stenosis noted.  No thoracic disk extrusion, and foraminal narrowing, canal stenosis is evident.  Thoracolumbar cord is intact.  Paravertebral soft tissues are symmetric and normal in appearance.         Review of Systems:  CONSTITUTIONAL: patient denies any fever, chills, or weight loss  SKIN: patient denies any rash or itching  RESPIRATORY: patient denies having any shortness of breath  GASTROINTESTINAL: patient denies having any diarrhea, constipation, or bowel incontinence  GENITOURINARY: patient denies having any abnormal bladder function    MUSCULOSKELETAL:  - patient complains of the above noted pain/s (see chief pain complaint)    NEUROLOGICAL:   - pain as above  - strength in Lower extremities is intact, BILATERALLY  - sensation in Lower extremities is intact, BILATERALLY  - patient denies any loss of bowel or bladder control      PSYCHIATRIC: patient denies any change in mood    Other:  All other systems reviewed and are negative        Physical Exam:  Vitals:    08/07/24 0945   BP: 128/75   Pulse: 73   Resp: 16   Weight: 75.6 kg (166 lb 10.7 oz)   Height: 5' 4" (1.626 m)   PainSc:   5    Body mass index is 28.61 kg/m².   (reviewed on 8/7/2024)    General: alert and oriented, in no apparent distress.  Gait: antalgic gait   Skin: no rashes, no discoloration, no obvious lesions  HEENT: normocephalic, atraumatic.   Respiratory: without use of accessory muscles of respiration. No audible wheezing.  GI: no obvious distention.     Musculoskeletal - Lumbar Spine:  - Limited ROM secondary to pain reproduction   - Midline scar present " over thoracic spine  - Pain on flexion of lumbar spine: Present, worse than with extension  - Pain on extension of lumbar spine: Present  - Lumbar facet loading: Positive bilaterally  - TTP over the lumbar facet joints: Absent   - TTP over GT bursa: Minimal   - Straight Leg Raise: Positive bilaterally, L>R - improved   - Pain on Internal and external rotation of the hip: Present    SIJ testing:  - TTP over the SI joints: Present bilaterally   - Srikanth's/ Micheal's: Positive    - Sacroiliac Compression Test (lateral pressure): Positive   - SacralThrust Test (posterior pressure): Positive    Right Shoulder:  - Pain on abduction: Present   - ROM: decreased secondary to pain, very limited ROM      - TTP over the AC and GH joint: Present  - Neer's: Positive  - Hawkin's: Positive    Neuro - Lower Extremities:  - BLE Strength: R/L: HF: 5/5, HE: 5/5, KF: 5/5; KE: 5/5; FE: 5/5; FF: 5/5  - Extremity Reflexes: Brisk and symmetric throughout  - Sensory: Sensation to light touch intact bilaterally      Psych:  Mood and affect is appropriate            Assessment:  Clau Nunn is a 89 y.o. year old female who is presenting with       ICD-10-CM ICD-9-CM    1. Sacroiliitis  M46.1 720.2 Case Request-RAD/Other Procedure Area: Bilateral SIJ Injection      2. Sacroiliac joint pain  M53.3 724.6       3. Lumbosacral pain  M54.50 724.2      724.6           Plan:  1. Interventional:   - Schedule bilateral SIJ injection.   Patient is taking Eliquis; she does not have to stop prior to procedure.     - Consider SIJ RFA if warranted - if pain not lasting with SIJ injection.   - Consider repeat Bilateral L5/S1 + S1 TF REJI. Will hold off since radicular pain not an issue at this time.    - S/p left SIJ RFA on 6/8/23 with better pain relief than she was reporting initially.  She is also getting around better.  - S/p bilateral SIJ injection on 1/31/23 with >50% pain relief on left, >70% pain relief on right - short-term pain relief.  - S/p  Bilateral L5/S1 + S1 TF REJI on 5/25/22 with 75% pain relief - regarding leg pain, now having localized SIJ.   - S/p Bilateral L5/S1 TF REJI on 2/10/21 with 20% pain relief x 1 week. Pain now more localized on left.   - S/p Right suprascapular nerve block on 9/16/20 with limited pain relief.    - Anticoagulation use: apixaban (Eliquis) - 2° prevention (h/o DVT) - Heme/Onc.     2. Pharmacologic:   - Hold off on restarting Lyrica (pregabalin) 100mg BID. She has continued to take gabapentin (Neurontin) 300mg QHS.   - Patient encouraged to take Tylenol 500mg x 2 tablets (1000mg) TID more regularly, not to exceed 3000mg per day.     - LA  reviewed and appropriate.      3. Rehabilitative: Continue use of SIJ belt at home with housework. Physical therapy for shoulder did not help in the past, so doesn't want to attend. SIJ exercises given on AVS previously. Patient is very active.    4.  Diagnostic/ Imaging: No new imaging ordered. Previous imaging reviewed.     5. Follow up: 4 weeks post-procedure - in clinic (per pt request)     - This condition does not require this patient to take time off of work, and the primary goal of our Pain Management services is to improve the patient's functional capacity.   - I discussed the risks, benefits, and alternatives to potential treatment options. All questions and concerns were fully addressed today in clinic.         Angelica Caballero PA-C  Interventional Pain Management - [facility]    Disclaimer:  This note was prepared using voice recognition system and is likely to have sound alike errors that may have been overlooked even after proof reading.  Please call me with any questions.

## 2024-08-14 NOTE — PRE-PROCEDURE INSTRUCTIONS
Spoke with patient regarding procedure scheduled on 8.20    Arrival time 0930     Has patient been sick with fever or on antibiotics within the last 7 days? No     Does the patient have any open wounds, sores or rashes? No     Does the patient have any recent fractures? no     Has patient received a vaccination within the last 7 days? No     Received the COVID vaccination? yes     Has the patient stopped all medications as directed? na     Does patient have a pacemaker, defibrillator, or implantable stimulator? No     Does the patient have a ride to and from procedure and someone reliable to remain with patient?  DAUGHTER      Is the patient diabetic? no     Does the patient have sleep apnea? Or use O2 at home? no     Is the patient receiving sedation? Yes      Is the patient instructed to remain NPO beginning at midnight the night before their procedure? yes     Procedure location confirmed with patient? Yes     Covid- Denies signs/symptoms. Instructed to notify PAT/MD if any changes.

## 2024-08-20 ENCOUNTER — HOSPITAL ENCOUNTER (OUTPATIENT)
Facility: HOSPITAL | Age: 89
Discharge: HOME | End: 2024-08-20
Attending: PHYSICAL MEDICINE & REHABILITATION | Admitting: PHYSICAL MEDICINE & REHABILITATION
Payer: MEDICARE

## 2024-08-20 VITALS
BODY MASS INDEX: 30.51 KG/M2 | WEIGHT: 165.81 LBS | RESPIRATION RATE: 16 BRPM | SYSTOLIC BLOOD PRESSURE: 184 MMHG | DIASTOLIC BLOOD PRESSURE: 72 MMHG | HEART RATE: 72 BPM | HEIGHT: 62 IN | TEMPERATURE: 97 F | OXYGEN SATURATION: 97 %

## 2024-08-20 DIAGNOSIS — M46.1 SACROILIITIS: Primary | ICD-10-CM

## 2024-08-20 PROCEDURE — 27096 INJECT SACROILIAC JOINT: CPT | Mod: 50 | Performed by: PHYSICAL MEDICINE & REHABILITATION

## 2024-08-20 PROCEDURE — 63600175 PHARM REV CODE 636 W HCPCS: Performed by: PHYSICAL MEDICINE & REHABILITATION

## 2024-08-20 PROCEDURE — 25500020 PHARM REV CODE 255: Performed by: PHYSICAL MEDICINE & REHABILITATION

## 2024-08-20 PROCEDURE — 27096 INJECT SACROILIAC JOINT: CPT | Mod: 50,,, | Performed by: PHYSICAL MEDICINE & REHABILITATION

## 2024-08-20 RX ORDER — METHYLPREDNISOLONE ACETATE 40 MG/ML
INJECTION, SUSPENSION INTRA-ARTICULAR; INTRALESIONAL; INTRAMUSCULAR; SOFT TISSUE
Status: DISCONTINUED | OUTPATIENT
Start: 2024-08-20 | End: 2024-08-20 | Stop reason: HOSPADM

## 2024-08-20 RX ORDER — ONDANSETRON HYDROCHLORIDE 2 MG/ML
4 INJECTION, SOLUTION INTRAVENOUS ONCE AS NEEDED
Status: DISCONTINUED | OUTPATIENT
Start: 2024-08-20 | End: 2024-08-20 | Stop reason: HOSPADM

## 2024-08-20 RX ORDER — BUPIVACAINE HYDROCHLORIDE 2.5 MG/ML
INJECTION, SOLUTION EPIDURAL; INFILTRATION; INTRACAUDAL
Status: DISCONTINUED | OUTPATIENT
Start: 2024-08-20 | End: 2024-08-20 | Stop reason: HOSPADM

## 2024-08-20 NOTE — DISCHARGE INSTRUCTIONS

## 2024-08-20 NOTE — OP NOTE
Sacroiliac Joint Injection under Fluoroscopy    Date of procedure 08/20/2024    Time-out taken to identify patient and procedure side prior to starting the procedure.                                                                 PROCEDURE:  bilateralsacroiliac joint injection under fluoroscopy.    REASON FOR PROCEDURE: bilateral Sacroiliitis [M46.1]    PHYSICIAN: Abdirahman Parker MD    ASSISTANTS: None    MEDICATIONS INJECTED:  Depo-Medrol 40mg and 4 mL Bupivacaine 0.25%    LOCAL ANESTHETIC USED: Xylocaine 1% 5mL    SEDATION MEDICATIONS: None    ESTIMATED BLOOD LOSS:  None.    COMPLICATIONS:  None.    TECHNIQUE:   Laying in the prone position, the patient was prepped and draped in the usual sterile fashion using ChloraPrep and fenestrated drape.  The area was determined under fluoroscopy.  Local Xylocaine was injected by raising a wheel and going down to the periosteum using a 27-gauge hypodermic needle.  The 3.5 inch 22-gauge spinal needle was introduce into the bilateral sacroiliac joint.  Negative pressure applied to confirm no intravascular placement.  Omnipaque was injected to confirm placement and to confirm that there was no vascular runoff.  The medication was then injected slowly.  The patient tolerated the procedure well.      The patient was monitored for approximately 30 minutes after the procedure.  Patient was given post procedure and discharge instructions to follow at home.  We will see the patient back in two weeks or the patient may call to inform of status. The patient was discharged in a stable condition

## 2024-08-29 ENCOUNTER — OFFICE VISIT (OUTPATIENT)
Dept: PRIMARY CARE CLINIC | Facility: CLINIC | Age: 89
End: 2024-08-29
Payer: MEDICARE

## 2024-08-29 ENCOUNTER — LAB VISIT (OUTPATIENT)
Dept: LAB | Facility: HOSPITAL | Age: 89
End: 2024-08-29
Attending: INTERNAL MEDICINE
Payer: MEDICARE

## 2024-08-29 ENCOUNTER — OFFICE VISIT (OUTPATIENT)
Dept: DERMATOLOGY | Facility: CLINIC | Age: 89
End: 2024-08-29
Payer: MEDICARE

## 2024-08-29 VITALS
WEIGHT: 166.88 LBS | SYSTOLIC BLOOD PRESSURE: 136 MMHG | OXYGEN SATURATION: 97 % | HEART RATE: 75 BPM | BODY MASS INDEX: 29.57 KG/M2 | HEIGHT: 63 IN | DIASTOLIC BLOOD PRESSURE: 78 MMHG | TEMPERATURE: 97 F

## 2024-08-29 VITALS — WEIGHT: 160 LBS | HEIGHT: 63 IN | BODY MASS INDEX: 28.35 KG/M2

## 2024-08-29 DIAGNOSIS — B37.2 CANDIDA ONYCHOMYCOSIS: ICD-10-CM

## 2024-08-29 DIAGNOSIS — M80.00XG AGE-RELATED OSTEOPOROSIS WITH CURRENT PATHOLOGICAL FRACTURE WITH DELAYED HEALING: ICD-10-CM

## 2024-08-29 DIAGNOSIS — F41.9 ANXIETY: Primary | ICD-10-CM

## 2024-08-29 DIAGNOSIS — L60.8 NAIL DISCOLORATION: ICD-10-CM

## 2024-08-29 DIAGNOSIS — S81.802A WOUND OF LEFT LOWER EXTREMITY, INITIAL ENCOUNTER: ICD-10-CM

## 2024-08-29 DIAGNOSIS — Z79.01 CHRONIC ANTICOAGULATION: ICD-10-CM

## 2024-08-29 DIAGNOSIS — L60.1 ONYCHOLYSIS: Primary | ICD-10-CM

## 2024-08-29 DIAGNOSIS — N18.32 STAGE 3B CHRONIC KIDNEY DISEASE: ICD-10-CM

## 2024-08-29 DIAGNOSIS — F33.41 RECURRENT MAJOR DEPRESSIVE DISORDER, IN PARTIAL REMISSION: ICD-10-CM

## 2024-08-29 DIAGNOSIS — I10 ESSENTIAL HYPERTENSION: Chronic | ICD-10-CM

## 2024-08-29 DIAGNOSIS — M15.9 PRIMARY OSTEOARTHRITIS INVOLVING MULTIPLE JOINTS: ICD-10-CM

## 2024-08-29 DIAGNOSIS — M46.1 SACROILIAC INFLAMMATION: ICD-10-CM

## 2024-08-29 PROBLEM — S33.8XXA SPRAIN OF COCCYX: Status: RESOLVED | Noted: 2023-10-30 | Resolved: 2024-08-29

## 2024-08-29 PROBLEM — R23.4 ESCHAR OF LOWER LEG: Status: RESOLVED | Noted: 2023-12-05 | Resolved: 2024-08-29

## 2024-08-29 LAB
25(OH)D3+25(OH)D2 SERPL-MCNC: 35 NG/ML (ref 30–96)
ALBUMIN SERPL BCP-MCNC: 3.7 G/DL (ref 3.5–5.2)
ALP SERPL-CCNC: 60 U/L (ref 55–135)
ALT SERPL W/O P-5'-P-CCNC: 16 U/L (ref 10–44)
ANION GAP SERPL CALC-SCNC: 11 MMOL/L (ref 8–16)
AST SERPL-CCNC: 16 U/L (ref 10–40)
BILIRUB SERPL-MCNC: 0.7 MG/DL (ref 0.1–1)
BUN SERPL-MCNC: 28 MG/DL (ref 8–23)
CALCIUM SERPL-MCNC: 9.4 MG/DL (ref 8.7–10.5)
CCP AB SER IA-ACNC: <0.5 U/ML
CHLORIDE SERPL-SCNC: 104 MMOL/L (ref 95–110)
CO2 SERPL-SCNC: 25 MMOL/L (ref 23–29)
CREAT SERPL-MCNC: 1.2 MG/DL (ref 0.5–1.4)
EST. GFR  (NO RACE VARIABLE): 44 ML/MIN/1.73 M^2
GLUCOSE SERPL-MCNC: 85 MG/DL (ref 70–110)
POTASSIUM SERPL-SCNC: 4 MMOL/L (ref 3.5–5.1)
PROT SERPL-MCNC: 6.8 G/DL (ref 6–8.4)
RHEUMATOID FACT SERPL-ACNC: <13 IU/ML (ref 0–15)
SODIUM SERPL-SCNC: 140 MMOL/L (ref 136–145)
URATE SERPL-MCNC: 6.8 MG/DL (ref 2.4–5.7)

## 2024-08-29 PROCEDURE — 86431 RHEUMATOID FACTOR QUANT: CPT | Performed by: INTERNAL MEDICINE

## 2024-08-29 PROCEDURE — 99214 OFFICE O/P EST MOD 30 MIN: CPT | Mod: S$GLB,,, | Performed by: DERMATOLOGY

## 2024-08-29 PROCEDURE — 99999 PR PBB SHADOW E&M-EST. PATIENT-LVL V: CPT | Mod: PBBFAC,,, | Performed by: DERMATOLOGY

## 2024-08-29 PROCEDURE — 1160F RVW MEDS BY RX/DR IN RCRD: CPT | Mod: CPTII,S$GLB,, | Performed by: DERMATOLOGY

## 2024-08-29 PROCEDURE — 99214 OFFICE O/P EST MOD 30 MIN: CPT | Mod: S$GLB,,, | Performed by: NURSE PRACTITIONER

## 2024-08-29 PROCEDURE — 1157F ADVNC CARE PLAN IN RCRD: CPT | Mod: CPTII,S$GLB,, | Performed by: NURSE PRACTITIONER

## 2024-08-29 PROCEDURE — 1126F AMNT PAIN NOTED NONE PRSNT: CPT | Mod: CPTII,S$GLB,, | Performed by: DERMATOLOGY

## 2024-08-29 PROCEDURE — 99999 PR PBB SHADOW E&M-EST. PATIENT-LVL III: CPT | Mod: PBBFAC,,, | Performed by: NURSE PRACTITIONER

## 2024-08-29 PROCEDURE — 1157F ADVNC CARE PLAN IN RCRD: CPT | Mod: CPTII,S$GLB,, | Performed by: DERMATOLOGY

## 2024-08-29 PROCEDURE — 86200 CCP ANTIBODY: CPT | Performed by: INTERNAL MEDICINE

## 2024-08-29 PROCEDURE — 1101F PT FALLS ASSESS-DOCD LE1/YR: CPT | Mod: CPTII,S$GLB,, | Performed by: DERMATOLOGY

## 2024-08-29 PROCEDURE — 82306 VITAMIN D 25 HYDROXY: CPT | Performed by: INTERNAL MEDICINE

## 2024-08-29 PROCEDURE — G2211 COMPLEX E/M VISIT ADD ON: HCPCS | Mod: S$GLB,,, | Performed by: DERMATOLOGY

## 2024-08-29 PROCEDURE — 3288F FALL RISK ASSESSMENT DOCD: CPT | Mod: CPTII,S$GLB,, | Performed by: DERMATOLOGY

## 2024-08-29 PROCEDURE — 84550 ASSAY OF BLOOD/URIC ACID: CPT | Performed by: INTERNAL MEDICINE

## 2024-08-29 PROCEDURE — 80053 COMPREHEN METABOLIC PANEL: CPT | Performed by: INTERNAL MEDICINE

## 2024-08-29 PROCEDURE — 36415 COLL VENOUS BLD VENIPUNCTURE: CPT | Mod: PO | Performed by: INTERNAL MEDICINE

## 2024-08-29 PROCEDURE — 1159F MED LIST DOCD IN RCRD: CPT | Mod: CPTII,S$GLB,, | Performed by: DERMATOLOGY

## 2024-08-29 PROCEDURE — 1126F AMNT PAIN NOTED NONE PRSNT: CPT | Mod: CPTII,S$GLB,, | Performed by: NURSE PRACTITIONER

## 2024-08-29 RX ORDER — FLUCONAZOLE 150 MG/1
150 TABLET ORAL
Qty: 4 TABLET | Refills: 2 | Status: SHIPPED | OUTPATIENT
Start: 2024-08-29 | End: 2024-09-28

## 2024-08-29 NOTE — PROGRESS NOTES
Clau Nunn  09/01/2024  [id number]    Jeanette Molina MD  Patient Care Team:  Jeanette Molina MD as PCP - General (Internal Medicine)  Kandice Salcedo PA-C as Physician Assistant (Rheumatology)  Raffi Wells MD (Inactive) as Consulting Physician (Pain Medicine)  Jeanette Manriquez MD as Obstetrician (Obstetrics)  Bal Ventura LPN as Care Coordinator        [facility]      Chief Complaint:  Chief Complaint   Patient presents with    Follow-up     Pt had some dizziness after arriving to appointment         Assessment/Plan:  1. Anxiety    2. Recurrent major depressive disorder, in partial remission  Assessment & Plan:  Reports stable mood  Continue plan of care      3. Essential hypertension  Assessment & Plan:  Reports normotensive  Chlorthalidone 25 mg daily and valsartan 160 mg QD  Lasix prn leg edema      4. Chronic anticoagulation  Assessment & Plan:  Denies recent falls or any signs of active bleeding  Hx of DVT - on Eliquis      5. Sacroiliac inflammation    6. Stage 3b chronic kidney disease  Assessment & Plan:  BMP  Lab Results   Component Value Date     08/29/2024    K 4.0 08/29/2024     08/29/2024    CO2 25 08/29/2024    BUN 28 (H) 08/29/2024    CREATININE 1.2 08/29/2024    CALCIUM 9.4 08/29/2024    ANIONGAP 11 08/29/2024    EGFRNORACEVR 44 (A) 08/29/2024    Stable, on ARB for renal protection            Worry Score: 3  History of Present Illness:    Last visit with Dr. Molina 5/31/2024     Recent Fall:  []Yes  [x]No  Activity:  []Vigorous []Moderate [x]Sedentary using walker to assist with ambulation  Appetite:  [x]Good  []Fair  []Poor  Mood: []Stable [x]Anxious []Depressed   Insomnia: [x]Yes  []No        takes Trazodone    Weight stable  She had dizzy spell in clinic on arrival, had only eaten 1 piece of toast this AM  Has had multiple MD appointments today  Went to Derm - Fungal infection to 4th and 5th fingernail of left hand, nail to 4th finger right  hand. Taking fluconazole  Had bilateral hip joint injection on 8/20 with Dr. Wilson,  less pain  Incontinence causing problems especially at night, nocturia 1 to 2 times. Denies dysuria, urgency, frequency  Laceration to left arm now healed  Denies swelling to legs  Receives Prolia every 6 months thru Rheumatology  B/P checks at home /80  Constant dry cough, uses nebulizer, cough drops and cough medicine  Constipation better with Miralax  Cramping in legs - applies OTC cream, which helps      Denies fever, chills, URI symptoms, chest pain, SOB, palpitations, vomiting, diarrhea, no gross neuro deficits, urinary symptoms and leg swelling.      The following were reviewed: Active problem list, medication list, allergies, family history, social history, and Health Maintenance.     History:  Past Medical History:   Diagnosis Date    Allergy     Anxiety     Asthma     Back pain     Chest pain 1/30/2024    Chronic venous insufficiency 11/19/2013    Depression     Diverticulosis 11/19/2013 1/8/8 colonoscopy    Dry eyes     Fracture, sacrum/coccyx     Dr. Sanchez     GERD (gastroesophageal reflux disease)     H/O total hip arthroplasty 12/30/2015    Hypertension     Hypothyroid     Osteoarthritis     Osteoporosis     Postlaminectomy syndrome of lumbar region 1/8/2014    Radius fracture     7/18    Simple chronic bronchitis 5/3/2017    Umbilical hernia 11/19/2013    Urinary incontinence     Vitamin D deficiency disease      Past Surgical History:   Procedure Laterality Date    ADENOIDECTOMY      BACK SURGERY      x2    breast implants  1973    CATARACT EXTRACTION Bilateral     CLOSED REDUCTION DISTAL RADIUS FRACTURE      Dr. Black, 8/18    cystoscope  1/6/16    DR. Brown    FRACTURE SURGERY  April 3 2015    left wrist    HAND SURGERY      HIP SURGERY Right     total hip- Dr. Dos Santos    HYSTERECTOMY      36y/o    INJECTION OF ANESTHETIC AGENT AROUND NERVE Right 9/16/2020    Procedure: Right suprascapular nerve  block;  Surgeon: Raffi Wells MD;  Location: [facility];  Service: Pain Management;  Laterality: Right;    INJECTION OF ANESTHETIC AGENT AROUND NERVE Right 7/13/2021    Procedure: Block, Nerve Right Suprascapular Nerve Block with RN IV sedation;  Surgeon: Raffi Wells MD;  Location: [facility];  Service: Pain Management;  Laterality: Right;    INJECTION OF ANESTHETIC AGENT INTO SACROILIAC JOINT N/A 8/12/2020    Procedure: Left IA hip Joint + Left SIJ + Right shoulder injection;  Surgeon: Raffi Wells MD;  Location: [facility];  Service: Pain Management;  Laterality: N/A;    INJECTION OF ANESTHETIC AGENT INTO SACROILIAC JOINT Bilateral 1/31/2023    Procedure: Bilateral SIJ Injection w/Local;  Surgeon: Abdirahman Parker MD;  Location: [facility];  Service: Pain Management;  Laterality: Bilateral;    INJECTION OF ANESTHETIC AGENT INTO SACROILIAC JOINT Bilateral 8/20/2024    Procedure: Bilateral SIJ Injection;  Surgeon: Abdirahman Parker MD;  Location: [facility];  Service: Pain Management;  Laterality: Bilateral;    INJECTION OF JOINT N/A 8/12/2020    Procedure: Left IA hip Joint + Left SIJ + Right shoulder injection;  Surgeon: Raffi Wells MD;  Location: [facility];  Service: Pain Management;  Laterality: N/A;    JOINT REPLACEMENT Right     R NNAMDI - Dr. Dos Santos    LEG SURGERY Right     ex-fix tib/fib    RADIOFREQUENCY THERMOCOAGULATION Left 6/8/2023    Procedure: Left SIJ RFA w/ local (give Valium before);  Surgeon: Abdirahman Parker MD;  Location: [facility];  Service: Pain Management;  Laterality: Left;    SELECTIVE INJECTION OF ANESTHETIC AGENT AROUND LUMBAR SPINAL NERVE ROOT BY TRANSFORAMINAL APPROACH Bilateral 2/10/2021    Procedure: Bilateral L5/S1 TF REJI;  Surgeon: Raffi Wells MD;  Location: [facility];  Service: Pain Management;  Laterality: Bilateral;    SELECTIVE INJECTION OF ANESTHETIC AGENT AROUND LUMBAR SPINAL NERVE ROOT BY TRANSFORAMINAL APPROACH Bilateral 5/25/2022    Procedure:  Bilateral L5/S1 + S1 TF REJI;  Surgeon: Raffi Wells MD;  Location: [facility];  Service: Pain Management;  Laterality: Bilateral;    TONSILLECTOMY      TRANSFORAMINAL EPIDURAL INJECTION OF STEROID Left 7/8/2020    Procedure: left L2/3 + L5/S1 TF REJI;  Surgeon: Raffi Wells MD;  Location: [facility];  Service: Pain Management;  Laterality: Left;    TRANSFORAMINAL EPIDURAL INJECTION OF STEROID Left 7/1/2021    Procedure: left L5/S1 + S1 TF REJI;  Surgeon: Raffi Wells MD;  Location: [facility];  Service: Pain Management;  Laterality: Left;     Family History   Problem Relation Name Age of Onset    COPD Mother      Cancer Mother          lung CA    Pulmonary fibrosis Sister      Cancer Daughter          colon    Stroke Father      Diabetes Son      Melanoma Neg Hx      Psoriasis Neg Hx      Lupus Neg Hx       Patient Active Problem List   Diagnosis    Primary osteoarthritis involving multiple joints    Acquired hypothyroidism    Lumbar arthropathy    Venous insufficiency of both lower extremities    Anxiety    Vitamin D deficiency disease    Essential hypertension    Hiatal hernia with gastroesophageal reflux    Sacroiliac inflammation    Atherosclerosis of aorta    Chronic lumbar radiculopathy    Stage 3b chronic kidney disease    Simple chronic bronchitis    Pain of left hip joint    Lumbar spondylosis    Pain in both lower extremities    Age-related osteoporosis with current pathological fracture with delayed healing    Radius fracture    History of DVT in adulthood    Lumbar radiculopathy    Secondary hyperparathyroidism    Recurrent major depressive disorder, in partial remission    Shoulder pain    Chronic anticoagulation    Gastroesophageal reflux disease without esophagitis    Impaired functional mobility, balance, gait, and endurance    Left foot pain    Slow transit constipation    Skin tear of left forearm without complication    Solar purpura    Intermittent right-sided chest pain    Chest pain     Avascular necrosis of right humeral head    Recurrent falls     Review of patient's allergies indicates:   Allergen Reactions    Cephalexin Hives, Itching, Swelling, Anxiety, Dermatitis and Rash    Mupirocin Itching       Medications:  Current Outpatient Medications on File Prior to Visit   Medication Sig Dispense Refill    azelastine (ASTELIN) 137 mcg (0.1 %) nasal spray 2 sprays by Nasal route 2 (two) times daily.      betamethasone dipropionate (DIPROLENE) 0.05 % ointment Apply to affected fingers around nails at night under occlusion with white cotton gloves for 2-4 weeks 45 g 3    busPIRone (BUSPAR) 5 MG Tab TAKE 1 TABLET TWICE DAILY 180 tablet 5    chlorthalidone (HYGROTEN) 25 MG Tab Take 1 tablet (25 mg total) by mouth once daily. 30 tablet 0    cholecalciferol, vitamin D3, (D3-2000) 50 mcg (2,000 unit) Cap capsule Take 1 capsule (2,000 Units total) by mouth once daily. 90 capsule 11    ciclopirox (PENLAC) 8 % Soln Apply topically nightly. 6.6 mL 3    clobetasoL (TEMOVATE) 0.05 % external solution Pt to mix in 1 jar of cerave cream and apply to affected areas bid for 2-4 weeks per course 50 mL 3    cyclobenzaprine (FLEXERIL) 5 MG tablet TAKE 1 TABLET (5 MG TOTAL) BY MOUTH NIGHTLY AS NEEDED FOR MUSCLE SPASMS. 90 tablet 1    cycloSPORINE (RESTASIS MULTIDOSE) 0.05 % Drop Place 1 drop into both eyes every 12 (twelve) hours. 5.5 mL 12    DULoxetine (CYMBALTA) 60 MG capsule Take 1 capsule (60 mg total) by mouth once daily. 90 capsule 3    ELIQUIS 5 mg Tab TAKE 1 TABLET TWICE DAILY 180 tablet 3    fluocinonide 0.05% (LIDEX) 0.05 % cream AAA bid to leg for 2-4 weeks per course (Patient taking differently: Apply to affected area(s) of leg topically twice daily for 2-4 weeks per course) 60 g 1    fluticasone propionate (FLONASE) 50 mcg/actuation nasal spray       furosemide (LASIX) 40 MG tablet Take 1 tablet (40 mg total) by mouth once daily. 5 tablet 0    gabapentin (NEURONTIN) 300 MG capsule Take 1 capsule (300 mg  total) by mouth every evening. 90 capsule 3    levothyroxine (SYNTHROID) 100 MCG tablet TAKE 1 TABLET EVERY DAY 90 tablet 3    LIDOcaine (LIDODERM) 5 % Place 1 patch onto the skin once daily. Remove & Discard patch within 12 hours or as directed by MD 30 patch 1    omeprazole (PRILOSEC) 40 MG capsule Take 1 capsule (40 mg total) by mouth every morning. 90 capsule 2    polyethylene glycol (GLYCOLAX) 17 gram/dose powder Take 17 g by mouth once daily. 595 g 0    PROLIA 60 mg/mL Syrg       traZODone (DESYREL) 50 MG tablet TAKE 1 TABLET (50 MG TOTAL) BY MOUTH NIGHTLY AS NEEDED FOR INSOMNIA. 30 tablet 3    valsartan (DIOVAN) 160 MG tablet Take 1 tablet (160 mg total) by mouth once daily. 90 tablet 3    albuterol (PROVENTIL) 2.5 mg /3 mL (0.083 %) nebulizer solution Take 3 mLs (2.5 mg total) by nebulization every 6 (six) hours as needed for Wheezing. Rescue 1 each 6    solifenacin (VESICARE) 5 MG tablet Take 1 tablet (5 mg total) by mouth once daily. 30 tablet 11     Current Facility-Administered Medications on File Prior to Visit   Medication Dose Route Frequency Provider Last Rate Last Admin    denosumab (PROLIA) injection 60 mg  60 mg Subcutaneous Q6 Months Carly Mora PA-C   60 mg at 01/28/19 1548    neomycin-bacitracin-polymyxin ointment   Topical (Top) 1 time in Clinic/HOD Clau Michel NP           Medications have been reviewed and reconciled with patient at visit today.      Exam:  Vitals:    08/29/24 1459   BP: 136/78   Pulse: 75   Temp: 97.2 °F (36.2 °C)     Weight: 75.7 kg (166 lb 14.2 oz)   Body mass index is 29.56 kg/m².      BP Readings from Last 3 Encounters:   08/29/24 136/78   08/20/24 (!) 184/72   08/07/24 128/75     Wt Readings from Last 3 Encounters:   08/29/24 75.7 kg (166 lb 14.2 oz)   08/29/24 72.6 kg (160 lb)   08/20/24 75.2 kg (165 lb 12.6 oz)         Physical Exam  Vitals reviewed.   Constitutional:       General: She is not in acute distress.     Appearance: Normal appearance.    HENT:      Head: Normocephalic and atraumatic.      Nose: Nose normal.      Mouth/Throat:      Mouth: Mucous membranes are moist.   Eyes:      General: No scleral icterus.     Conjunctiva/sclera: Conjunctivae normal.   Cardiovascular:      Rate and Rhythm: Normal rate and regular rhythm.      Heart sounds: No murmur heard.  Pulmonary:      Effort: Pulmonary effort is normal. No respiratory distress.   Abdominal:      Palpations: Abdomen is soft. There is no mass.      Tenderness: There is no abdominal tenderness.   Musculoskeletal:      Cervical back: Normal range of motion and neck supple.      Lumbar back: No swelling, deformity, signs of trauma or bony tenderness. Normal range of motion.        Back:    Lymphadenopathy:      Cervical: No cervical adenopathy.   Skin:     General: Skin is warm and dry.      Comments: intact   Neurological:      Mental Status: She is alert and oriented to person, place, and time. Mental status is at baseline.      Motor: Weakness present.   Psychiatric:         Mood and Affect: Mood normal.         Thought Content: Thought content normal.              Laboratory Reviewed:     Lab Results   Component Value Date    WBC 8.24 05/31/2024    HGB 15.8 05/31/2024    HCT 49.6 (H) 05/31/2024     05/31/2024    CHOL 195 05/31/2024    TRIG 196 (H) 05/31/2024    HDL 55 05/31/2024    ALT 16 08/29/2024    AST 16 08/29/2024     08/29/2024    K 4.0 08/29/2024     08/29/2024    CREATININE 1.2 08/29/2024    BUN 28 (H) 08/29/2024    CO2 25 08/29/2024    TSH 5.392 (H) 05/31/2024    INR 1.1 04/07/2022    HGBA1C 5.0 03/11/2019       Screening or Prevention Patient's value Goal Complete/Controlled?   HgA1C Testing and Control   Lab Results   Component Value Date    HGBA1C 5.0 03/11/2019      Annually/Less than 8% No   Lipid profile : 05/31/2024 Annually Yes   LDL control Lab Results   Component Value Date    LDLCALC 100.8 05/31/2024    Annually/Less than 100 mg/dl  No   Nephropathy  screening Lab Results   Component Value Date    LABMICR 13.0 10/12/2023     Lab Results   Component Value Date    PROTEINUA 2+ (A) 07/07/2023    Annually Yes   Blood pressure BP Readings from Last 1 Encounters:   08/29/24 136/78    Less than 140/90 Yes   Dilated retinal exam Most Recent Eye Exam Date: Not Found Annually Yes   Foot exam   Most Recent Foot Exam Date: Not Found Annually Yes       Health Maintenance  Health Maintenance Topics with due status: Not Due       Topic Last Completion Date    TETANUS VACCINE 02/25/2022    Lipid Panel 05/31/2024     Health Maintenance Due   Topic Date Due    RSV Vaccine (Age 60+ and Pregnant patients) (1 - 1-dose 60+ series) Never done    Influenza Vaccine (1) 09/01/2024    COVID-19 Vaccine (4 - 2023-24 season) 09/01/2024               -Patient's lab results were reviewed and discussed with patient  -Treatment options and alternatives were discussed with the patient. Patient expressed understanding. Patient was given the opportunity to ask questions and be an active participant in their medical care. Patient had no further questions or concerns at this time.         Future Appointments   Date Time Provider Department Center   9/5/2024  3:00 PM INJECTION 1, [facility] INFUSION [facility]   10/22/2024  1:20 PM Angelica Caballero, PA-C [facility] [place]   11/8/2024  3:00 PM Jeanette Molina MD [facility]   11/21/2024 11:00 AM Africa Billings MD [facility] DERM [facility]   12/5/2024 10:00 AM Clau Michel NP [facility]   4/29/2025  2:00 [facility]          After visit summary printed and given to patient upon discharge.  Patient goals and care plan are included in After visit summary.      The following issues were discussed: The primary encounter diagnosis was Anxiety. Diagnoses of Recurrent major depressive disorder, in partial remission, Essential hypertension, Chronic anticoagulation, Sacroiliac  inflammation, and Stage 3b chronic kidney disease were also pertinent to this visit.    Health maintenance needs, recent test results and goals of care discussed with pt and questions answered.           Clau Michel, APRN, NP-C  [facility] [place] [address]

## 2024-08-29 NOTE — PROGRESS NOTES
Subjective:      Patient ID:  Clau Nunn is a 89 y.o. female who presents for     Last seen 7/3/24 for initial eval of onycholysis of several fingernails. Rx'd diprolone ointment and penlac solution. Fungal culture + candida parapsilosis.  Nail clipping showed adherent foci of parakeratosis (no neuts), rare yeasts and sparse bacteria. Rx'd fluconazole weekly starting 7/10/24.      Reports no change in nails so far.  No new nails involved. Tolerating meds well.      Also notes she had an injury to her L lower leg a few         Review of Systems   Skin:  Negative for itching and rash.       Objective:   Physical Exam   Constitutional: She appears well-developed and well-nourished. No distress.   Neurological: She is alert and oriented to person, place, and time. She is not disoriented.   Psychiatric: She has a normal mood and affect.   Skin:   Areas Examined (abnormalities noted in diagram):   LLE Inspection Performed  Nails and Digits Inspection Performed                Diagram Legend     Erythematous scaling macule/papule c/w actinic keratosis       Vascular papule c/w angioma      Pigmented verrucoid papule/plaque c/w seborrheic keratosis      Yellow umbilicated papule c/w sebaceous hyperplasia      Irregularly shaped tan macule c/w lentigo     1-2 mm smooth white papules consistent with Milia      Movable subcutaneous cyst with punctum c/w epidermal inclusion cyst      Subcutaneous movable cyst c/w pilar cyst      Firm pink to brown papule c/w dermatofibroma      Pedunculated fleshy papule(s) c/w skin tag(s)      Evenly pigmented macule c/w junctional nevus     Mildly variegated pigmented, slightly irregular-bordered macule c/w mildly atypical nevus      Flesh colored to evenly pigmented papule c/w intradermal nevus       Pink pearly papule/plaque c/w basal cell carcinoma      Erythematous hyperkeratotic cursted plaque c/w SCC      Surgical scar with no sign of skin cancer recurrence      Open and closed  comedones      Inflammatory papules and pustules      Verrucoid papule consistent consistent with wart     Erythematous eczematous patches and plaques     Dystrophic onycholytic nail with subungual debris c/w onychomycosis     Umbilicated papule    Erythematous-base heme-crusted tan verrucoid plaque consistent with inflamed seborrheic keratosis     Erythematous Silvery Scaling Plaque c/w Psoriasis     See annotation                  CMP  Sodium   Date Value Ref Range Status   08/29/2024 140 136 - 145 mmol/L Final     Potassium   Date Value Ref Range Status   08/29/2024 4.0 3.5 - 5.1 mmol/L Final     Chloride   Date Value Ref Range Status   08/29/2024 104 95 - 110 mmol/L Final     CO2   Date Value Ref Range Status   08/29/2024 25 23 - 29 mmol/L Final     Glucose   Date Value Ref Range Status   08/29/2024 85 70 - 110 mg/dL Final     BUN   Date Value Ref Range Status   08/29/2024 28 (H) 8 - 23 mg/dL Final     Creatinine   Date Value Ref Range Status   08/29/2024 1.2 0.5 - 1.4 mg/dL Final     Calcium   Date Value Ref Range Status   08/29/2024 9.4 8.7 - 10.5 mg/dL Final     Total Protein   Date Value Ref Range Status   08/29/2024 6.8 6.0 - 8.4 g/dL Final     Albumin   Date Value Ref Range Status   08/29/2024 3.7 3.5 - 5.2 g/dL Final     Total Bilirubin   Date Value Ref Range Status   08/29/2024 0.7 0.1 - 1.0 mg/dL Final     Comment:     For infants and newborns, interpretation of results should be based  on gestational age, weight and in agreement with clinical  observations.    Premature Infant recommended reference ranges:  Up to 24 hours.............<8.0 mg/dL  Up to 48 hours............<12.0 mg/dL  3-5 days..................<15.0 mg/dL  6-29 days.................<15.0 mg/dL       Alkaline Phosphatase   Date Value Ref Range Status   08/29/2024 60 55 - 135 U/L Final     AST   Date Value Ref Range Status   08/29/2024 16 10 - 40 U/L Final     ALT   Date Value Ref Range Status   08/29/2024 16 10 - 44 U/L Final     Anion  Gap   Date Value Ref Range Status   08/29/2024 11 8 - 16 mmol/L Final     eGFR   Date Value Ref Range Status   08/29/2024 44 (A) >60 mL/min/1.73 m^2 Final     Lab Results   Component Value Date    WBC 8.24 05/31/2024    HGB 15.8 05/31/2024    HCT 49.6 (H) 05/31/2024    MCV 98 05/31/2024     05/31/2024          Final Pathologic Diagnosis   Date Value Ref Range Status   07/03/2024   Final    1. Nail plate, left 4th finger, nail clippings:   - NAIL PLATE WITH ADHERENT FOCI OF PARAKERATOSIS UNASSOCIATED WITH NEUTROPHILS (SEE COMMENT).    COMMENT: Extremely rare yeasts and sparse bacteria are noted admixed with foci of parakeratosis at the periphery of the nail plate which would have been adjacent to the nail bed epithelium.  The presence of sparse organisms likely represents secondary   colonization as opposed to a primary infectious process.  Nail psoriasis cannot be excluded in light of the presence of adherent parakeratotic foci at the periphery of the nail plate with absence of hypergranulosis, though the presence of neutrophils in   these parakeratotic foci would be common in this context.  Photo-onycholysis cannot be excluded based on this histology.  Clinical correlation is essential.        Comment:     Interp By Pakr Cheney M.D., Signed on 07/09/2024 at 13:49           Component 1 mo ago   Fungus Cult, skin, hair or nails  Abnormal   CANDIDA PARAPSILOSIS  Rare       Assessment / Plan:        Onycholysis  Candida onychomycosis  Nail discoloration  - consider Damien's nails - reviewed recent labs, CMP and CBC wnl; rheum is checking RF;   - consider nail psoriasis - clipping not diagnostic  - continue clob ointment BID x 2-4 weeks on, 2 weeks off intermittently  - continue penlac solution topically  - start acetone drops into the nail space TID  - keep nails trimmed short, avoid picking/manipulation under nail plate  - consider clob solution instead of ointment; consider ILK     -     fluconazole  (DIFLUCAN) 150 MG Tab; Take 1 tablet (150 mg total) by mouth every 7 days.  Dispense: 4 tablet; Refill: 2; discussed r/b/ae including low risk of hepatotoxicity    Wound of left lower extremity, initial encounter  - slowly healing; continue local wound care; continue to monitor           Follow up in about 3 months (around 11/29/2024).          LOS NUMBER AND COMPLEXITY OF PROBLEMS    COMPLEXITY OF DATA RISK TOTAL TIME (m)   91715  11274 [] 1 self-limited or minor problem [x] Minimal to none [] No treatment recommended or patient to monitor. Reassurance.  15-29  10-19   21063  20484 Low  [] 2 or more self limited or minor problems  [] 1 stable chronic illness  [] 1 acute, uncomplicated illness or injury Limited (2)  [] Prior external notes from each unique source  [] Review result of each unique test  [] Order each unique test  OR [] Independent historian Low  []  OTC medications   []  Discussed/Decision for minor skin surgery (no risk factors) 30-44  20-29   71428  76409 Moderate  [x]  1 or more chronic unstable illness (not at goal or progression or exacerbation) or SE of treatment  []  2 or more stable chronic illnesses  []  1 acute illness with systemic symptoms  []  1 acute complicated injury  []  1 undiagnosed new problem with uncertain prognosis Moderate (1/3 below)  []  3 or more data items        *Now includes independent historian  []  Independent interpretation of a test  []  Discuss management/test with another provider Moderate  [x]  Prescription drug mgmt  []  Discussed/Decision for Minor surgery with risk factors  []  Mgmt limited by social determinates 45-59  30-39   41816  18516 High  []  1 or more chronic illness with severe exacerbation, progression or SE of treatment  []  1 acute or chronic illness/injury that poses a threat to life or bodily function Extensive (2/3 below)  []  3 or more data items        *Now includes independent historian.  []  Independent interpretation of a test  []  Discuss  management/test with another provider High  []  Major surgery with risk discussed  []  Drug therapy requiring intensive monitoring for toxicity  []  Hospitalization  []  Decision for DNR 60-74  40-54

## 2024-09-01 NOTE — ASSESSMENT & PLAN NOTE
BMP  Lab Results   Component Value Date     08/29/2024    K 4.0 08/29/2024     08/29/2024    CO2 25 08/29/2024    BUN 28 (H) 08/29/2024    CREATININE 1.2 08/29/2024    CALCIUM 9.4 08/29/2024    ANIONGAP 11 08/29/2024    EGFRNORACEVR 44 (A) 08/29/2024    Stable, on ARB for renal protection

## 2024-09-03 ENCOUNTER — TELEPHONE (OUTPATIENT)
Dept: INFUSION THERAPY | Facility: HOSPITAL | Age: 89
End: 2024-09-03
Payer: MEDICARE

## 2024-09-03 NOTE — TELEPHONE ENCOUNTER
returned call rg rescheduling injection time    ----- Message from Shasta Fregoso sent at 9/3/2024  8:13 AM CDT -----  Contact: Clau Nogueira is calling in regards to getting her appt on 09/05 rescheduled to an earlier time on that date.please call  back at .[phone]       Thanks  CINDY

## 2024-09-05 ENCOUNTER — INFUSION (OUTPATIENT)
Dept: INFUSION THERAPY | Facility: HOSPITAL | Age: 89
End: 2024-09-05
Attending: INTERNAL MEDICINE
Payer: MEDICARE

## 2024-09-05 VITALS
SYSTOLIC BLOOD PRESSURE: 157 MMHG | TEMPERATURE: 97 F | OXYGEN SATURATION: 97 % | RESPIRATION RATE: 20 BRPM | HEART RATE: 74 BPM | DIASTOLIC BLOOD PRESSURE: 75 MMHG

## 2024-09-05 DIAGNOSIS — M80.00XG AGE-RELATED OSTEOPOROSIS WITH CURRENT PATHOLOGICAL FRACTURE WITH DELAYED HEALING: Primary | ICD-10-CM

## 2024-09-05 PROCEDURE — 63600175 PHARM REV CODE 636 W HCPCS: Mod: JZ,JG | Performed by: INTERNAL MEDICINE

## 2024-09-05 PROCEDURE — 96372 THER/PROPH/DIAG INJ SC/IM: CPT

## 2024-09-05 RX ADMIN — DENOSUMAB 60 MG: 60 INJECTION SUBCUTANEOUS at 09:09

## 2024-09-05 NOTE — DISCHARGE INSTRUCTIONS
[facility]  [address] [facility]  [address]  [phone] phone     [phone] fax  Hours of Operation: Monday- Friday 8:00am- 5:00pm  After hours phone  [phone]  Hematology / Oncology Physicians on call      DONNIE Sanchez Dr., NP Phaon Dunbar, NP Khelsea Conley, FNP    Please call with any concerns regarding your appointment today.

## 2024-09-24 DIAGNOSIS — K21.9 GASTROESOPHAGEAL REFLUX DISEASE WITHOUT ESOPHAGITIS: ICD-10-CM

## 2024-09-24 RX ORDER — OMEPRAZOLE 40 MG/1
40 CAPSULE, DELAYED RELEASE ORAL EVERY MORNING
Qty: 90 CAPSULE | Refills: 1 | Status: SHIPPED | OUTPATIENT
Start: 2024-09-24

## 2024-09-27 ENCOUNTER — CLINICAL SUPPORT (OUTPATIENT)
Dept: PRIMARY CARE CLINIC | Facility: CLINIC | Age: 89
End: 2024-09-27
Payer: MEDICARE

## 2024-09-27 DIAGNOSIS — Z23 NEED FOR VACCINATION: Primary | ICD-10-CM

## 2024-09-27 PROCEDURE — 99999 PR PBB SHADOW E&M-EST. PATIENT-LVL III: CPT | Mod: PBBFAC,,,

## 2024-09-27 PROCEDURE — 90653 IIV ADJUVANT VACCINE IM: CPT | Mod: S$GLB,,, | Performed by: INTERNAL MEDICINE

## 2024-09-27 PROCEDURE — G0008 ADMIN INFLUENZA VIRUS VAC: HCPCS | Mod: S$GLB,,, | Performed by: INTERNAL MEDICINE

## 2024-10-15 ENCOUNTER — OFFICE VISIT (OUTPATIENT)
Dept: PRIMARY CARE CLINIC | Facility: CLINIC | Age: 89
End: 2024-10-15
Payer: MEDICARE

## 2024-10-15 VITALS
BODY MASS INDEX: 30.36 KG/M2 | WEIGHT: 171.44 LBS | OXYGEN SATURATION: 95 % | DIASTOLIC BLOOD PRESSURE: 72 MMHG | TEMPERATURE: 98 F | SYSTOLIC BLOOD PRESSURE: 158 MMHG | HEART RATE: 81 BPM

## 2024-10-15 DIAGNOSIS — R06.09 DOE (DYSPNEA ON EXERTION): ICD-10-CM

## 2024-10-15 DIAGNOSIS — I10 ESSENTIAL HYPERTENSION: Chronic | ICD-10-CM

## 2024-10-15 DIAGNOSIS — I87.2 VENOUS INSUFFICIENCY OF BOTH LOWER EXTREMITIES: ICD-10-CM

## 2024-10-15 DIAGNOSIS — N18.32 STAGE 3B CHRONIC KIDNEY DISEASE: ICD-10-CM

## 2024-10-15 DIAGNOSIS — R60.0 PERIPHERAL EDEMA: Primary | ICD-10-CM

## 2024-10-15 DIAGNOSIS — R05.3 CHRONIC COUGH: ICD-10-CM

## 2024-10-15 PROCEDURE — 1125F AMNT PAIN NOTED PAIN PRSNT: CPT | Mod: CPTII,S$GLB,, | Performed by: NURSE PRACTITIONER

## 2024-10-15 PROCEDURE — 1101F PT FALLS ASSESS-DOCD LE1/YR: CPT | Mod: CPTII,S$GLB,, | Performed by: NURSE PRACTITIONER

## 2024-10-15 PROCEDURE — 99999 PR PBB SHADOW E&M-EST. PATIENT-LVL V: CPT | Mod: PBBFAC,,, | Performed by: NURSE PRACTITIONER

## 2024-10-15 PROCEDURE — 3288F FALL RISK ASSESSMENT DOCD: CPT | Mod: CPTII,S$GLB,, | Performed by: NURSE PRACTITIONER

## 2024-10-15 PROCEDURE — 1159F MED LIST DOCD IN RCRD: CPT | Mod: CPTII,S$GLB,, | Performed by: NURSE PRACTITIONER

## 2024-10-15 PROCEDURE — 99215 OFFICE O/P EST HI 40 MIN: CPT | Mod: S$GLB,,, | Performed by: NURSE PRACTITIONER

## 2024-10-15 PROCEDURE — 1157F ADVNC CARE PLAN IN RCRD: CPT | Mod: CPTII,S$GLB,, | Performed by: NURSE PRACTITIONER

## 2024-10-15 RX ORDER — FUROSEMIDE 40 MG/1
40 TABLET ORAL DAILY
Qty: 15 TABLET | Refills: 0 | Status: SHIPPED | OUTPATIENT
Start: 2024-10-15 | End: 2025-10-15

## 2024-10-15 NOTE — PATIENT INSTRUCTIONS
Raise the head of bed with bed riser    Acid reflux medicine in the morning    OTC Pepcid take nightly to help with acid reflux      Check for Chlorthalidone with your blood pressure medicine    Please call Ayesha with your medicines

## 2024-10-15 NOTE — ASSESSMENT & PLAN NOTE
Weight gain, leg swelling, abdominal bloating  May need ECHO to rule out CHF  Furosemide 40 mg daily x 7 day  Advised potassium intake

## 2024-10-15 NOTE — ASSESSMENT & PLAN NOTE
No smoking hx  GERD vs dysphagia vs lung ds  Elevate HOB with leg risers  Add Pepcid nightly in conjunction with daily omeprazole  Possible need for CXR  Possible need for ENT referral  Pulmonary evaluation

## 2024-10-15 NOTE — ASSESSMENT & PLAN NOTE
Elevation, compression hose  Low Na intake  Continue chlorthalidone - will confirm she is taking  Right leg > left leg - takes Eliquis daily

## 2024-10-15 NOTE — PROGRESS NOTES
Clau Nunn  10/15/2024  [id number]    Jeanette Molina MD  Patient Care Team:  Jeanette Molina MD as PCP - General (Internal Medicine)  Kandice Salcedo PA-C as Physician Assistant (Rheumatology)  Raffi Wells MD (Inactive) as Consulting Physician (Pain Medicine)  Jeanette Manriquez MD as Obstetrician (Obstetrics)  Bal Ventura LPN as Care Coordinator        [facility]      Chief Complaint:  Chief Complaint   Patient presents with    Leg Swelling    Weight Gain         Assessment/Plan:  1. Peripheral edema  -     furosemide (LASIX) 40 MG tablet; Take 1 tablet (40 mg total) by mouth once daily.  Dispense: 15 tablet; Refill: 0    2. Essential hypertension  Assessment & Plan:  States BP has been elevated at home  Taking valsartan  RN to confirm pt taking chlorthalidone      3. Stage 3b chronic kidney disease  Assessment & Plan:   Latest Reference Range & Units 02/19/24 11:10 05/31/24 15:23 08/29/24 09:57   BUN 8 - 23 mg/dL 18 19 28 (H)   Creatinine 0.5 - 1.4 mg/dL 1.4 1.4 1.2   eGFR >60 mL/min/1.73 m^2 36 ! 36.2 ! 44 !   Avoid nephrotoxic agents  Continue valsartan for renal protection        4. Venous insufficiency of both lower extremities  Assessment & Plan:  Elevation, compression hose  Low Na intake  Continue chlorthalidone - will confirm she is taking  Right leg > left leg - takes Eliquis daily        5. Chronic cough  Assessment & Plan:  No smoking hx  GERD vs dysphagia vs lung ds  Elevate HOB with leg risers  Add Pepcid nightly in conjunction with daily omeprazole  Possible need for CXR  Possible need for ENT referral  Pulmonary evaluation      6. PEREZ (dyspnea on exertion)  Assessment & Plan:  Weight gain, leg swelling, abdominal bloating  May need ECHO to rule out CHF  Furosemide 40 mg daily x 7 day  Advised potassium intake            Worry Score: 3  History of Present Illness:    Last visit with Dr. Molina 5/31/2024     Ms. Nunn returns with c/o leg swelling and weight  "gain. Also, describes chronic cough.   Chronic Dry cough - "long time" Dr. Molina prescribed cough med and nebulizer  Notices cough at night when lying down  Rare during the day  Denies tickle in throat  Hx of Acid reflux - gets burning in the throat intermittently - takes omeprazole 40 mg daily  No hx of smoking    Secondly, reports weight gain since last visit  Swelling to BLE right > left - onset 1 month ago  Previous fracture to RLE  Gained #5 pounds since visit  Slight worsened PEREZ  Sleeps on 1 pillow  Denies PND  Recent BP higher at home  Abdominal bloating      Denies fever, chills, URI symptoms, chest pain, SOB, palpitations, vomiting, diarrhea, no gross neuro deficits & urinary symptoms      The following were reviewed: Active problem list, medication list, allergies, family history, social history, and Health Maintenance.     History:  Past Medical History:   Diagnosis Date    Allergy     Anxiety     Asthma     Back pain     Chest pain 1/30/2024    Chronic venous insufficiency 11/19/2013    Depression     Diverticulosis 11/19/2013 1/8/8 colonoscopy    Dry eyes     Fracture, sacrum/coccyx     Dr. Sanchez     GERD (gastroesophageal reflux disease)     H/O total hip arthroplasty 12/30/2015    Hypertension     Hypothyroid     Osteoarthritis     Osteoporosis     Postlaminectomy syndrome of lumbar region 1/8/2014    Radius fracture     7/18    Simple chronic bronchitis 5/3/2017    Umbilical hernia 11/19/2013    Urinary incontinence     Vitamin D deficiency disease      Past Surgical History:   Procedure Laterality Date    ADENOIDECTOMY      BACK SURGERY      x2    breast implants  1973    CATARACT EXTRACTION Bilateral     CLOSED REDUCTION DISTAL RADIUS FRACTURE      Dr. Black, 8/18    cystoscope  1/6/16    DR. Brown    FRACTURE SURGERY  April 3 2015    left wrist    HAND SURGERY      HIP SURGERY Right     total hip- Dr. Dos Santos    HYSTERECTOMY      34y/o    INJECTION OF ANESTHETIC AGENT AROUND NERVE " Right 9/16/2020    Procedure: Right suprascapular nerve block;  Surgeon: Raffi Wells MD;  Location: [facility];  Service: Pain Management;  Laterality: Right;    INJECTION OF ANESTHETIC AGENT AROUND NERVE Right 7/13/2021    Procedure: Block, Nerve Right Suprascapular Nerve Block with RN IV sedation;  Surgeon: Raffi Wells MD;  Location: [facility];  Service: Pain Management;  Laterality: Right;    INJECTION OF ANESTHETIC AGENT INTO SACROILIAC JOINT N/A 8/12/2020    Procedure: Left IA hip Joint + Left SIJ + Right shoulder injection;  Surgeon: Raffi Wells MD;  Location: [facility];  Service: Pain Management;  Laterality: N/A;    INJECTION OF ANESTHETIC AGENT INTO SACROILIAC JOINT Bilateral 1/31/2023    Procedure: Bilateral SIJ Injection w/Local;  Surgeon: Abdirahman Parker MD;  Location: [facility];  Service: Pain Management;  Laterality: Bilateral;    INJECTION OF ANESTHETIC AGENT INTO SACROILIAC JOINT Bilateral 8/20/2024    Procedure: Bilateral SIJ Injection;  Surgeon: Abdirahman Parker MD;  Location: [facility];  Service: Pain Management;  Laterality: Bilateral;    INJECTION OF JOINT N/A 8/12/2020    Procedure: Left IA hip Joint + Left SIJ + Right shoulder injection;  Surgeon: Raffi Wells MD;  Location: [facility];  Service: Pain Management;  Laterality: N/A;    JOINT REPLACEMENT Right     R NNAMDI - Dr. Dos Santos    LEG SURGERY Right     ex-fix tib/fib    RADIOFREQUENCY THERMOCOAGULATION Left 6/8/2023    Procedure: Left SIJ RFA w/ local (give Valium before);  Surgeon: Abdiramhan Parker MD;  Location: [facility];  Service: Pain Management;  Laterality: Left;    SELECTIVE INJECTION OF ANESTHETIC AGENT AROUND LUMBAR SPINAL NERVE ROOT BY TRANSFORAMINAL APPROACH Bilateral 2/10/2021    Procedure: Bilateral L5/S1 TF REJI;  Surgeon: Raffi Wells MD;  Location: [facility];  Service: Pain Management;  Laterality: Bilateral;    SELECTIVE INJECTION OF ANESTHETIC AGENT AROUND LUMBAR SPINAL NERVE ROOT BY  TRANSFORAMINAL APPROACH Bilateral 5/25/2022    Procedure: Bilateral L5/S1 + S1 TF REJI;  Surgeon: Raffi Wells MD;  Location: [facility];  Service: Pain Management;  Laterality: Bilateral;    TONSILLECTOMY      TRANSFORAMINAL EPIDURAL INJECTION OF STEROID Left 7/8/2020    Procedure: left L2/3 + L5/S1 TF REJI;  Surgeon: Raffi Wells MD;  Location: [facility];  Service: Pain Management;  Laterality: Left;    TRANSFORAMINAL EPIDURAL INJECTION OF STEROID Left 7/1/2021    Procedure: left L5/S1 + S1 TF REJI;  Surgeon: Raffi Wells MD;  Location: [facility];  Service: Pain Management;  Laterality: Left;     Family History   Problem Relation Name Age of Onset    COPD Mother      Cancer Mother          lung CA    Pulmonary fibrosis Sister      Cancer Daughter          colon    Stroke Father      Diabetes Son      Melanoma Neg Hx      Psoriasis Neg Hx      Lupus Neg Hx       Patient Active Problem List   Diagnosis    Primary osteoarthritis involving multiple joints    Acquired hypothyroidism    Lumbar arthropathy    Venous insufficiency of both lower extremities    Anxiety    Vitamin D deficiency disease    Essential hypertension    Hiatal hernia with gastroesophageal reflux    Sacroiliac inflammation    Atherosclerosis of aorta    Chronic lumbar radiculopathy    Stage 3b chronic kidney disease    Simple chronic bronchitis    Pain of left hip joint    Lumbar spondylosis    Pain in both lower extremities    Age-related osteoporosis with current pathological fracture with delayed healing    Radius fracture    History of DVT in adulthood    Lumbar radiculopathy    Secondary hyperparathyroidism    Recurrent major depressive disorder, in partial remission    Shoulder pain    Chronic anticoagulation    Gastroesophageal reflux disease without esophagitis    Impaired functional mobility, balance, gait, and endurance    Left foot pain    Slow transit constipation    Skin tear of left forearm without complication    Solar purpura     Intermittent right-sided chest pain    Chest pain    Avascular necrosis of right humeral head    Recurrent falls    Chronic cough    PEREZ (dyspnea on exertion)     Review of patient's allergies indicates:   Allergen Reactions    Cephalexin Hives, Itching, Swelling, Anxiety, Dermatitis and Rash    Mupirocin Itching       Medications:  Current Outpatient Medications on File Prior to Visit   Medication Sig Dispense Refill    albuterol (PROVENTIL) 2.5 mg /3 mL (0.083 %) nebulizer solution Take 3 mLs (2.5 mg total) by nebulization every 6 (six) hours as needed for Wheezing. Rescue 1 each 6    betamethasone dipropionate (DIPROLENE) 0.05 % ointment Apply to affected fingers around nails at night under occlusion with white cotton gloves for 2-4 weeks 45 g 3    busPIRone (BUSPAR) 5 MG Tab TAKE 1 TABLET TWICE DAILY 180 tablet 5    cholecalciferol, vitamin D3, (D3-2000) 50 mcg (2,000 unit) Cap capsule Take 1 capsule (2,000 Units total) by mouth once daily. 90 capsule 11    ciclopirox (PENLAC) 8 % Soln Apply topically nightly. 6.6 mL 3    clobetasoL (TEMOVATE) 0.05 % external solution Pt to mix in 1 jar of cerave cream and apply to affected areas bid for 2-4 weeks per course 50 mL 3    cycloSPORINE (RESTASIS MULTIDOSE) 0.05 % Drop Place 1 drop into both eyes every 12 (twelve) hours. 5.5 mL 12    DULoxetine (CYMBALTA) 60 MG capsule Take 1 capsule (60 mg total) by mouth once daily. 90 capsule 3    ELIQUIS 5 mg Tab TAKE 1 TABLET TWICE DAILY 180 tablet 3    fluticasone propionate (FLONASE) 50 mcg/actuation nasal spray       gabapentin (NEURONTIN) 300 MG capsule Take 1 capsule (300 mg total) by mouth every evening. 90 capsule 3    levothyroxine (SYNTHROID) 100 MCG tablet TAKE 1 TABLET EVERY DAY 90 tablet 3    LIDOcaine (LIDODERM) 5 % Place 1 patch onto the skin once daily. Remove & Discard patch within 12 hours or as directed by MD 30 patch 1    omeprazole (PRILOSEC) 40 MG capsule Take 1 capsule (40 mg total) by mouth every  morning. 90 capsule 1    polyethylene glycol (GLYCOLAX) 17 gram/dose powder Take 17 g by mouth once daily. 595 g 0    PROLIA 60 mg/mL Syrg       solifenacin (VESICARE) 5 MG tablet Take 1 tablet (5 mg total) by mouth once daily. 30 tablet 11    traZODone (DESYREL) 50 MG tablet TAKE 1 TABLET (50 MG TOTAL) BY MOUTH NIGHTLY AS NEEDED FOR INSOMNIA. 30 tablet 3    valsartan (DIOVAN) 160 MG tablet Take 1 tablet (160 mg total) by mouth once daily. 90 tablet 3    azelastine (ASTELIN) 137 mcg (0.1 %) nasal spray 2 sprays by Nasal route 2 (two) times daily. (Patient not taking: Reported on 10/15/2024)      chlorthalidone (HYGROTEN) 25 MG Tab Take 1 tablet (25 mg total) by mouth once daily. (Patient not taking: Reported on 10/15/2024) 30 tablet 0    cyclobenzaprine (FLEXERIL) 5 MG tablet TAKE 1 TABLET (5 MG TOTAL) BY MOUTH NIGHTLY AS NEEDED FOR MUSCLE SPASMS. (Patient not taking: Reported on 10/15/2024) 90 tablet 1    fluocinonide 0.05% (LIDEX) 0.05 % cream AAA bid to leg for 2-4 weeks per course (Patient not taking: Reported on 10/15/2024) 60 g 1    [DISCONTINUED] furosemide (LASIX) 40 MG tablet Take 1 tablet (40 mg total) by mouth once daily. 5 tablet 0     Current Facility-Administered Medications on File Prior to Visit   Medication Dose Route Frequency Provider Last Rate Last Admin    denosumab (PROLIA) injection 60 mg  60 mg Subcutaneous Q6 Months Carly Mora, DONNIE   60 mg at 01/28/19 1548    neomycin-bacitracin-polymyxin ointment   Topical (Top) 1 time in Clinic/HOD Clau Michel NP           Medications have been reviewed and reconciled with patient at visit today.      Exam:  Vitals:    10/15/24 0817   BP: (!) 158/72   Pulse: 81   Temp: 97.7 °F (36.5 °C)     Weight: 77.7 kg (171 lb 6.5 oz)   Body mass index is 30.36 kg/m².      BP Readings from Last 3 Encounters:   10/15/24 (!) 158/72   09/05/24 (!) 157/75   08/29/24 136/78     Wt Readings from Last 3 Encounters:   10/15/24 77.7 kg (171 lb 6.5 oz)   08/29/24  75.7 kg (166 lb 14.2 oz)   08/29/24 72.6 kg (160 lb)         Physical Exam  Vitals reviewed.   Constitutional:       General: She is not in acute distress.     Appearance: Normal appearance.   HENT:      Head: Normocephalic and atraumatic.   Eyes:      Conjunctiva/sclera: Conjunctivae normal.   Cardiovascular:      Rate and Rhythm: Normal rate. Rhythm irregular.      Heart sounds: No murmur heard.  Pulmonary:      Effort: Pulmonary effort is normal. No respiratory distress.      Breath sounds: Examination of the right-lower field reveals rales. Rales present.   Abdominal:      General: There is distension.      Palpations: Abdomen is soft.      Tenderness: There is no abdominal tenderness.   Musculoskeletal:      Cervical back: Normal range of motion and neck supple.      Right lower leg: Edema (+3) present.      Left lower leg: Left lower leg edema: +1.   Skin:     General: Skin is warm and dry.   Neurological:      Mental Status: She is alert and oriented to person, place, and time. Mental status is at baseline.   Psychiatric:         Mood and Affect: Mood normal.         Thought Content: Thought content normal.              Laboratory Reviewed:     Lab Results   Component Value Date    WBC 8.24 05/31/2024    HGB 15.8 05/31/2024    HCT 49.6 (H) 05/31/2024     05/31/2024    CHOL 195 05/31/2024    TRIG 196 (H) 05/31/2024    HDL 55 05/31/2024    ALT 16 08/29/2024    AST 16 08/29/2024     08/29/2024    K 4.0 08/29/2024     08/29/2024    CREATININE 1.2 08/29/2024    BUN 28 (H) 08/29/2024    CO2 25 08/29/2024    TSH 5.392 (H) 05/31/2024    INR 1.1 04/07/2022    HGBA1C 5.0 03/11/2019       Screening or Prevention Patient's value Goal Complete/Controlled?   HgA1C Testing and Control   Lab Results   Component Value Date    HGBA1C 5.0 03/11/2019      Annually/Less than 8% No   Lipid profile : 05/31/2024 Annually Yes   LDL control Lab Results   Component Value Date    LDLCALC 100.8 05/31/2024     Annually/Less than 100 mg/dl  No   Nephropathy screening Lab Results   Component Value Date    LABMICR 13.0 10/12/2023     Lab Results   Component Value Date    PROTEINUA 2+ (A) 07/07/2023    Annually No   Blood pressure BP Readings from Last 1 Encounters:   10/15/24 (!) 158/72    Less than 140/90 No   Dilated retinal exam Most Recent Eye Exam Date: Not Found Annually Yes   Foot exam   Most Recent Foot Exam Date: Not Found Annually Yes       Health Maintenance  Health Maintenance Topics with due status: Not Due       Topic Last Completion Date    TETANUS VACCINE 02/25/2022    Lipid Panel 05/31/2024     Health Maintenance Due   Topic Date Due    RSV Vaccine (Age 60+ and Pregnant patients) (1 - 1-dose 75+ series) Never done    COVID-19 Vaccine (4 - 2024-25 season) 09/01/2024               -Patient's lab results were reviewed and discussed with patient  -Treatment options and alternatives were discussed with the patient. Patient expressed understanding. Patient was given the opportunity to ask questions and be an active participant in their medical care. Patient had no further questions or concerns at this time.         Future Appointments   Date Time Provider Department Center   10/22/2024  1:20 PM Angelica Caballero, PA-C [facility] INT [facility] [place]   11/8/2024  3:00 PM Jeanette Molina MD [facility]   11/18/2024 11:00 AM Africa Billings MD [facility] DERM [facility]   12/5/2024 10:00 AM Clau Michel NP [facility]   3/6/2025 10:00 AM LABORATORY, CEN[facility]AL [facility]   3/13/2025  9:30 AM Jeffry Bermudez MD ON [facility]   3/13/2025 10:15 AM INJECTION 1, [facility] INFUSION [facility] IN[facility]   4/29/2025  2:00 PM Kd Rubalcava MD ON O[facility]AL [facility]          After visit summary printed and given to patient upon discharge.  Patient goals and care plan are included in After visit summary.      The following issues were discussed: The primary encounter diagnosis was Peripheral  edema. Diagnoses of Essential hypertension, Stage 3b chronic kidney disease, Venous insufficiency of both lower extremities, Chronic cough, and PEREZ (dyspnea on exertion) were also pertinent to this visit.    Health maintenance needs, recent test results and goals of care discussed with pt and questions answered.           MOMO Gutierrez, NP-C  [facility] [address] [place] [address]

## 2024-10-15 NOTE — ASSESSMENT & PLAN NOTE
Latest Reference Range & Units 02/19/24 11:10 05/31/24 15:23 08/29/24 09:57   BUN 8 - 23 mg/dL 18 19 28 (H)   Creatinine 0.5 - 1.4 mg/dL 1.4 1.4 1.2   eGFR >60 mL/min/1.73 m^2 36 ! 36.2 ! 44 !   Avoid nephrotoxic agents  Continue valsartan for renal protection

## 2024-10-22 ENCOUNTER — OFFICE VISIT (OUTPATIENT)
Dept: PAIN MEDICINE | Facility: CLINIC | Age: 89
End: 2024-10-22
Payer: MEDICARE

## 2024-10-22 VITALS
WEIGHT: 167.75 LBS | HEART RATE: 62 BPM | BODY MASS INDEX: 29.72 KG/M2 | DIASTOLIC BLOOD PRESSURE: 65 MMHG | SYSTOLIC BLOOD PRESSURE: 129 MMHG | HEIGHT: 63 IN | RESPIRATION RATE: 17 BRPM

## 2024-10-22 DIAGNOSIS — Z01.818 PREOP TESTING: ICD-10-CM

## 2024-10-22 DIAGNOSIS — M53.3 SACROILIAC JOINT PAIN: ICD-10-CM

## 2024-10-22 DIAGNOSIS — M25.562 BILATERAL CHRONIC KNEE PAIN: ICD-10-CM

## 2024-10-22 DIAGNOSIS — G89.29 BILATERAL CHRONIC KNEE PAIN: ICD-10-CM

## 2024-10-22 DIAGNOSIS — M54.16 BILATERAL LUMBAR RADICULOPATHY: ICD-10-CM

## 2024-10-22 DIAGNOSIS — M46.1 SACROILIITIS: ICD-10-CM

## 2024-10-22 DIAGNOSIS — M54.51 VERTEBROGENIC LOW BACK PAIN: Primary | ICD-10-CM

## 2024-10-22 DIAGNOSIS — M25.561 BILATERAL CHRONIC KNEE PAIN: ICD-10-CM

## 2024-10-22 DIAGNOSIS — M54.9 DORSALGIA, UNSPECIFIED: ICD-10-CM

## 2024-10-22 PROCEDURE — 1160F RVW MEDS BY RX/DR IN RCRD: CPT | Mod: CPTII,S$GLB,, | Performed by: PHYSICIAN ASSISTANT

## 2024-10-22 PROCEDURE — 1125F AMNT PAIN NOTED PAIN PRSNT: CPT | Mod: CPTII,S$GLB,, | Performed by: PHYSICIAN ASSISTANT

## 2024-10-22 PROCEDURE — 1157F ADVNC CARE PLAN IN RCRD: CPT | Mod: CPTII,S$GLB,, | Performed by: PHYSICIAN ASSISTANT

## 2024-10-22 PROCEDURE — 99214 OFFICE O/P EST MOD 30 MIN: CPT | Mod: S$GLB,,, | Performed by: PHYSICIAN ASSISTANT

## 2024-10-22 PROCEDURE — 1159F MED LIST DOCD IN RCRD: CPT | Mod: CPTII,S$GLB,, | Performed by: PHYSICIAN ASSISTANT

## 2024-10-22 PROCEDURE — 3288F FALL RISK ASSESSMENT DOCD: CPT | Mod: CPTII,S$GLB,, | Performed by: PHYSICIAN ASSISTANT

## 2024-10-22 PROCEDURE — 99999 PR PBB SHADOW E&M-EST. PATIENT-LVL V: CPT | Mod: PBBFAC,,, | Performed by: PHYSICIAN ASSISTANT

## 2024-10-22 PROCEDURE — 1101F PT FALLS ASSESS-DOCD LE1/YR: CPT | Mod: CPTII,S$GLB,, | Performed by: PHYSICIAN ASSISTANT

## 2024-10-22 NOTE — PROGRESS NOTES
Chronic Pain -- Established Patient (Follow-up visit)    Chief Pain Complaint:  Chief Complaint   Patient presents with    Follow-up     Injection follow up     Bilateral SIJ pain     Interval History (10/22/2024): Clau Nunn presents today for follow-up visit.  she underwent bilateral SIJ injection on 8/20/24 (2 months ago).  The patient reports that she is/was unchanged following the procedure.  she reports limited pain relief.   Patient reports pain as 5/10 today.    Interval History (8/7/2024):  Patient presents today for follow-up visit.  Patient was last seen over 6 months ago. At that visit, the plan was to start Lyrica, but it appears patient has not filled in months in his back taking gabapentin at night. Patient reports pain as 5/10 today.  She has completed physical therapy recently, which has helped with her strength.  Today, she complains of pain in bilateral SI joint.    Interval History (12/13/2023):  Clau Nunn presents today for follow-up visit.  Patient was last seen about 3 months ago.   Patient reports pain as 5/10 today. She had a fall recently where she fell backwards and didn't have the watch on her. She landed on tailbone and left hip.     Interval History (9/8/2023):  Patient presents today for follow-up visit.  Patient was last seen on 8/11/2023. At that visit, the plan was to continue Lyrica. She reports that her pain is worse in the morning, but as she moves around, it improves. Patient reports pain as 4/10 today.    Interval History (8/11/2023):  Clau Nunn presents today for follow-up visit. At that visit, the plan was to start Lyrica, which has helped.  S/p left SIJ RFA on 6/8/23 (over a month ago) and is reporting better pain relief than she did initially. She is also getting around better. Patient reports pain as 3/10 today. Pain is worse the most in the morning when she wakes up, but pain improves as she walks around during the day.     Interval History  "(6/28/2023): Patient presents today for follow-up visit.  she underwent left SIJ RFA on 6/8/23 (about 3 weeks ago).  The patient reports that she is/was unchanged following the procedure.  she reports limited pain relief so far. Patient reports pain as 8/10 today.    Interval History (5/2/2023):  Clau Nunn presents today for follow-up visit.  Patient was last seen on 3/28/2023.  Patient reports pain as 5/10 today.  Pain is much worse and left posterior hip, more so over left SI joint.    Interval History (3/28/2023): Clau Nunn presents today for follow-up visit.  she underwent bilateral SIJ injection on 1/31/23.  The patient reports that she is/was better following the procedure.  she reports about 50% pain relief on left, right side feeling much better.  The changes lasted 4 weeks so far.  The changes have continued through this visit.  Patient reports pain as 5/10 today.  Seeing Podiatry for left foot fracture, which is not healing. Still having left SIJ pain.     Interval History (1/10/2023):  Clau Nunn presents today for follow-up visit.  Patient was last seen about 6 months ago. At that visit, she was feeling better since REJI, but she was having localized SIJ pain - still overall better since injection. She returns today with continued posterior "hip pain". Pain is worse on left. Patient reports pain as 8/10 today.    Interval History (6/29/2022): Clau Nunn presents today for follow-up visit.  she underwent Bilateral L5/S1 + S1 TF REJI on 5/25/22.  The patient reports that she is/was better following the procedure.  she reports 75% pain relief with regards to leg pain.  The changes lasted 4 weeks so far.  The changes have continued through this visit.  Patient reports pain as 5/10 today. Having localized SIJ pain today.     Interval History (5/11/2022): Clau Nunn presents today for follow-up visit.  she underwent left L5/S1 + S1 TF REJI on 7/1/21 with excellent pain " relief for about 3 months.  The patient reports that she is/was better following the procedure.  The changes have NOT continued through this visit.   she underwent right suprascapular nerve block on 7/13/21.  The patient reports that she is/was unchanged following the procedure. She reports limited pain relief for short term. But, she mainly has limited ROM.   Patient reports pain as 5/10 today - due to low back pain and leg pain.    Interval History (6/24/2021): Patient was seen on 2/10/21. At that time she underwent Bilateral L5/S1 TF REJI.  The patient reports that she is/was unchanged following the procedure.  she reports 20% pain relief.  The changes lasted 1 weeks.  The changes have NOT continued through this visit.  Patient reports pain as 5/10 today.  Her main pain complaint today is LLE radiculopathy worse in popliteal space and shin.   S/p left knee injection with Dr. Burns about 2 months ago with some pain relief.    Interval History (2/2/2021): Patient was last seen on 10/16/2020.  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  She is still never seen orthopedics for this issue, and she has not improved from intra-articular shoulder joint injections nor suprascapular nerve block.  Patient reports pain as 8/10 today.  Today, she is complaining of back and bilateral leg pain, previously just on the left side, although the left side still remains the worst.    Interval History (10/19/2020): Patient was seen on 9/16/20. At that time she underwent  Right suprascapular nerve block.  The patient reports that she is/was unchanged following the procedure.  she reports no pain relief.  Patient reports pain as 6/10 today - only when she moves her arm.    Interval History (9/9/2020): Patient was seen on 8/12/20. At that time she underwent left SIJ + left hip joint + right shoulder joint injection.  The patient reports that she is/was better  following the procedure.  she reports 75% pain relief with hip pain relief but only 25% relief of shoulder pain.  The changes lasted 4 weeks so far.  The changes have continued through this visit.  Patient reports pain as /10 today.    Interval History (8/5/2020): Patient was seen on 7/8/20. At that time she underwent left L2/3 + L5/S1 TF REJI.  The patient reports that she is/was slightly better following the procedure.  she reports only 50 % pain relief.  The changes lasted 4 weeks so far.  The changes have continued through this visit.  She also reports that her right shoulder has been bothering her.  She has history of right humerus fracture years ago.    Interval History (6/12/2020): She is here today to review MRI. She reports 10/10 pain today. She also has concerns about Prolia as she read that it can cause rashes and joint pain.  She has been having a rash on her right breast for a few weeks.  She will see Dr. Gomez soon to discuss.     Interval History(11/20/18): Patient was seen on 10/24/18. At that time she underwent left SIJ + left GT bursa injection with local.  The patient reports that she is/was better following the procedure.  she reports 100% pain relief.  The changes lasted 4 weeks so far.  The changes have continued through this visit.    History of Present Illness:   Clau Nunn is a 89 y.o. female  who is presenting with a chief complaint of low back pain and hip pain. The patient began experiencing this problem insidiously, and the pain has been gradually worsening over the past 6 month(s). The pain is described as throbbing, cramping, aching and heavy and is located in the bilateral buttocks . Pain is intermittent and lasts hours. The pain radiates to  lateral thigh and groin . The patient rates her pain a 5 out of ten and interferes with activities of daily living a 5 out of ten. Pain is exacerbated by getting up from a seated position, and is improved by rest. Patient reports prior  trauma (fall in June 2018 causing right distal radius + right sacrum fracture), prior lumbar surgery in ~2005, right hip replacement, right distal radius fracture requiring ORIF in June 2018.    - pertinent negatives: No fever, No chills, No weight loss, No bladder dysfunction, No bowel dysfunction, No extremity weakness, No saddle anesthesia  - pertinent positives: none    - medications, other therapies tried (physical therapy, injections):     >> Tylenol    >> Has previously undergone Physical Therapy (aquatic and land) with limited relief    >> Has previously undergone spinal injection/s:   - bilateral SIJ injection on 11-9-17 with ~30% relief for 2 weeks   - left hip injection on 12-28-17 with some relief   - bilateral L3-5 MBB on 5/11/18 with reported 100% pain relief   - left SIJ + left GT bursa injection with local on 10/24/18 with 100% pain relief   - left L2/3 + L5/S1 TF REJI on 7/8/20 with about 50% pain relief   - left SIJ + left hip joint + right shoulder joint injection on 8/12/20 with 75% pain relief with hip pain relief but only 25% relief of shoulder pain - no longer having groin pain after this procedure    - Right suprascapular nerve block on 9/16/20 with limited pain relief    - Bilateral L5/S1 TF REJI on 2/10/21 with 20% pain relief for short term   - left knee injection with Dr. Burns in April 2021 with some pain relief   - left L5/S1 + S1 TF REJI on 7/1/21 with excellent pain relief for about 3 months   - right suprascapular nerve block on 7/13/21 with limited pain relief    - Bilateral L5/S1 + S1 TF REJI on 5/25/22 with 75% pain relief - regarding leg pain, now having localized SIJ   - bilateral SIJ injection on 1/31/23 with >50% pain relief on left, >70% pain relief on right - short-term pain relief  - left SIJ RFA on 6/8/23 with great pain relief   - bilateral SIJ injection on 8/20/24 with limited pain relief      Past Surgical History:   Procedure Laterality Date    ADENOIDECTOMY      BACK  SURGERY      x2    breast implants  1973    CATARACT EXTRACTION Bilateral     CLOSED REDUCTION DISTAL RADIUS FRACTURE      Dr. Black, 8/18    cystoscope  1/6/16    DR. Brown    FRACTURE SURGERY  April 3 2015    left wrist    HAND SURGERY      HIP SURGERY Right     total hip- Dr. Dos Santos    HYSTERECTOMY      34y/o    INJECTION OF ANESTHETIC AGENT AROUND NERVE Right 9/16/2020    Procedure: Right suprascapular nerve block;  Surgeon: Raffi Wells MD;  Location: [facility];  Service: Pain Management;  Laterality: Right;    INJECTION OF ANESTHETIC AGENT AROUND NERVE Right 7/13/2021    Procedure: Block, Nerve Right Suprascapular Nerve Block with RN IV sedation;  Surgeon: Raffi Wells MD;  Location: [facility];  Service: Pain Management;  Laterality: Right;    INJECTION OF ANESTHETIC AGENT INTO SACROILIAC JOINT N/A 8/12/2020    Procedure: Left IA hip Joint + Left SIJ + Right shoulder injection;  Surgeon: Raffi Wells MD;  Location: [facility];  Service: Pain Management;  Laterality: N/A;    INJECTION OF ANESTHETIC AGENT INTO SACROILIAC JOINT Bilateral 1/31/2023    Procedure: Bilateral SIJ Injection w/Local;  Surgeon: Abdirahman Parker MD;  Location: [facility];  Service: Pain Management;  Laterality: Bilateral;    INJECTION OF ANESTHETIC AGENT INTO SACROILIAC JOINT Bilateral 8/20/2024    Procedure: Bilateral SIJ Injection;  Surgeon: Abdirahman Parker MD;  Location: [facility];  Service: Pain Management;  Laterality: Bilateral;    INJECTION OF JOINT N/A 8/12/2020    Procedure: Left IA hip Joint + Left SIJ + Right shoulder injection;  Surgeon: Raffi Wells MD;  Location: [facility];  Service: Pain Management;  Laterality: N/A;    JOINT REPLACEMENT Right     R NNAMDI - Dr. Dos Santos    LEG SURGERY Right     ex-fix tib/fib    RADIOFREQUENCY THERMOCOAGULATION Left 6/8/2023    Procedure: Left SIJ RFA w/ local (give Valium before);  Surgeon: Abdirahman Parker MD;  Location: [facility];  Service: Pain Management;   "Laterality: Left;    SELECTIVE INJECTION OF ANESTHETIC AGENT AROUND LUMBAR SPINAL NERVE ROOT BY TRANSFORAMINAL APPROACH Bilateral 2/10/2021    Procedure: Bilateral L5/S1 TF REJI;  Surgeon: Raffi Wells MD;  Location: [facility];  Service: Pain Management;  Laterality: Bilateral;    SELECTIVE INJECTION OF ANESTHETIC AGENT AROUND LUMBAR SPINAL NERVE ROOT BY TRANSFORAMINAL APPROACH Bilateral 5/25/2022    Procedure: Bilateral L5/S1 + S1 TF REJI;  Surgeon: Raffi Wells MD;  Location: [facility];  Service: Pain Management;  Laterality: Bilateral;    TONSILLECTOMY      TRANSFORAMINAL EPIDURAL INJECTION OF STEROID Left 7/8/2020    Procedure: left L2/3 + L5/S1 TF REJI;  Surgeon: Raffi Welsl MD;  Location: [facility];  Service: Pain Management;  Laterality: Left;    TRANSFORAMINAL EPIDURAL INJECTION OF STEROID Left 7/1/2021    Procedure: left L5/S1 + S1 TF REJI;  Surgeon: Raffi Wells MD;  Location: [facility];  Service: Pain Management;  Laterality: Left;        Imaging/ Diagnostic Studies/ Labs (Reviewed on 10/22/2024):    Records from [facility] regarding right shoulder fracture- uploaded into chart under "Media"    Results for orders placed during the hospital encounter of 08/05/20   X-ray Shoulder 2 or More Views Right    Narrative COMPARISON:  12/10/2018  FINDINGS:  There is a chronic fracture deformity of the right humeral head and neck.  Associated osseous productive changes approximate the inferior margins of the articular surface of the humeral head.  No definite acute fractures or dislocations visualized.  There are mild degenerative changes present at the AC joint and glenoid.  Postoperative findings noted in the lower thoracic spine.     Results for orders placed during the hospital encounter of 12/10/18   X-Ray Shoulder Trauma Right    Narrative FINDINGS:  Comminuted fracture involving the surgical neck and right humeral head.  No evidence of dislocation.  Degenerative changes are present at the right AC " joint.    Impression Comminuted fracture involving the right humeral head and surgical neck.     Results for orders placed during the hospital encounter of 06/29/20   MRI Lumbar Spine W WO Cont    Narrative COMPARISON:  Prior MRI from June 29, 2020.  FINDINGS:  Again demonstrated are postoperative findings from thoracolumbar spinal fusion and laminectomy at T12.  Chronic compression deformity of T12 with chronic retropulsion that flattens the thecal sac to 14 mm.  This is unchanged.  Mild, grade 1 degenerative spondylolisthesis at L4-L5.  Vertebral body height is normal.  No abnormal enhancement patterns. The conus medullaris terminates at the level of L2-L3, low lying.  No abnormal signal within the conus. Intervertebral disc levels are as follows:  T11-T12 disc: Fusion at this level.  Mild, chronic retropulsion that flattens the thecal sac to 14 mm.  No significant foraminal stenosis.  T12-L1 disc: Prior laminectomy and fusion.  The thecal sac measures 19 mm.  No significant foraminal stenosis.  L1-L2 disc : Posterior fusion.  No significant spinal stenosis or foraminal stenosis.  L2-L3 disc: Disc space height loss.  Broad-based posterior disc bulge which encroaches slightly into the floors of the exit foramina.  Moderate buckling of ligamentum flavum.  The thecal sac measures 8-9 mm AP.  Mild bilateral foraminal stenosis.  This has progressed since the prior MRI.  L3-L4 disc: Broad-based posterior disc bulge that encroaches slightly into the floors of the exit foramina.  Moderate buckling of ligamentum flavum.  The thecal sac measures 10 mm AP.  Mild bilateral neural foraminal stenosis, right greater than left.  These findings are similar to prior.  L4-L5 disc: Minimal grade 1 spondylolisthesis with severe degenerative facet change and hypertrophy.  Left-sided facet joint effusion.  Is buckling of ligamentum flavum.  The thecal sac measures 11 mm.  No significant foraminal stenosis.  The spondylolisthesis has  slightly progressed measuring 4 mm compared to prior 2 mm.  L5-S1 disc: Severe disc space height loss with marginal disc and osteophyte encroach into the exit foramina bilaterally.  Moderate degenerative facet hypertrophy.  The thecal sac measures 14 mm.    Impression 1. 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  2. Progression of mild foraminal stenosis and mild spinal stenosis at L2-L3.  3. Minimal progression of grade 1 spondylolisthesis at L4-L5.  4. Unchanged mild, chronic retropulsion from T12 where there has been a laminectomy and fusion.       7/30/2018 XR LUMBAR SPINE COMPLETE 5 VIEW  TECHNIQUE:  AP, lateral, spot and bilateral oblique views of the lumbar spine were performed.  COMPARISON:  07/25/2018  FINDINGS:  There is grade 1 spondylolisthesis of L4 on L5.  Pedicle screws and fixation rods are noted for at the T10, T11, L1 and L2 levels.  Chronic compression deformity at the L1 level unchanged.  Prominent bilateral facet arthropathy noted at the L4-5 and L5-S1 levels.  IVC filter noted.     7/30/2018 XR HIPS BILATERAL 2 VIEW INCL AP PELVIS  TECHNIQUE:  AP view of the pelvis and frogleg lateral views of both hips were performed.  COMPARISON:  07/26/2018  FINDINGS:  The bony pelvis is intact. A right total hip arthroplasty, plate and wires are in place.  No hardware failure or loosening suggested.  The appearance is unchanged when compared to the prior exam.  Mild degenerative changes noted at the left hip.  No acute fracture or dislocation of the left hip.  No significant joint space narrowing identified.  No plain film evidence to suggest AVN of either hip.     Results for orders placed during the hospital encounter of 01/13/14   MRI Lumbar Spine W WO Contrast    Narrative Findings: Pre- and postcontrast multiplanar multisequence imaging of the thoracic and lumbar spine was performed using 7 cc of Gadavist intravenous contrast material.  There is moderately severe chronic central compression deformity of  the T12 vertebral body which is stabilized by paraspinous rods and pedicle screws which extend from T10 through L2.  Postsurgical changes of laminectomy also noted at T12.  The posterior cortex of the T12 vertebral body is displaced posteriorly and indents the ventral thecal sac.  There is no anterior cord contact or significant central canal stenosis.  Degenerative disk desiccation is noted at multiple levels with moderate disk   narrowing at L5-S1.    Also L5-S1 is a broad-based disk protrusion which combined with bilateral facet hypertrophy results in moderately severe narrowing of both neural foramina.  No significant central canal stenosis noted.    From L2-3 through L4-5 there   is mild disk bulging which results in mild bilateral foraminal narrowing.  This is slightly more pronounced at L4-5 with bilateral facet hypertrophy a contributing factor.  No significant central canal stenosis noted.  No thoracic disk extrusion, and foraminal narrowing, canal stenosis is evident.  Thoracolumbar cord is intact.  Paravertebral soft tissues are symmetric and normal in appearance.         Review of Systems:  CONSTITUTIONAL: patient denies any fever, chills, or weight loss  SKIN: patient denies any rash or itching  RESPIRATORY: patient denies having any shortness of breath  GASTROINTESTINAL: patient denies having any diarrhea, constipation, or bowel incontinence  GENITOURINARY: patient denies having any abnormal bladder function    MUSCULOSKELETAL:  - patient complains of the above noted pain/s (see chief pain complaint)    NEUROLOGICAL:   - pain as above  - strength in Lower extremities is intact, BILATERALLY  - sensation in Lower extremities is intact, BILATERALLY  - patient denies any loss of bowel or bladder control      PSYCHIATRIC: patient denies any change in mood    Other:  All other systems reviewed and are negative        Physical Exam:  Vitals:    10/22/24 1301   BP: 129/65   Pulse: 62   Resp: 17   Weight: 76.1  "kg (167 lb 12.3 oz)   Height: 5' 3" (1.6 m)   PainSc:   5   PainLoc: Back      Body mass index is 29.72 kg/m².   (reviewed on 10/22/2024)    General: alert and oriented, in no apparent distress.  Gait: antalgic gait   Skin: no rashes, no discoloration, no obvious lesions  HEENT: normocephalic, atraumatic.   Respiratory: without use of accessory muscles of respiration.     Musculoskeletal - Lumbar Spine:  - Limited ROM secondary to pain reproduction   - Midline scar present over thoracic spine  - Pain on flexion of lumbar spine: Present, worse than with extension  - Pain on extension of lumbar spine: Present  - Lumbar facet loading: Positive bilaterally  - TTP over the lumbar facet joints: Absent   - TTP over GT bursa: Minimal   - Straight Leg Raise: Positive bilaterally, L>R - improved   - Pain on Internal and external rotation of the hip: Present    SIJ testing:  - TTP over the SI joints: Present bilaterally   - Srikanth's/ Micheal's: Positive    - Sacroiliac Compression Test (lateral pressure): Positive   - SacralThrust Test (posterior pressure): Positive    Right Shoulder:  - Pain on abduction: Present   - ROM: decreased secondary to pain, very limited ROM      - TTP over the AC and GH joint: Present  - Neer's: Positive  - Hawkin's: Positive    Neuro - Lower Extremities:  - BLE Strength: R/L: HF: 5/5, HE: 5/5, KF: 5/5; KE: 5/5; FE: 5/5; FF: 5/5  - Extremity Reflexes: Brisk and symmetric throughout  - Sensory: Sensation to light touch intact bilaterally      Psych:  Mood and affect is appropriate            Assessment:  Clau Nunn is a 89 y.o. year old female who is presenting with       ICD-10-CM ICD-9-CM    1. Vertebrogenic low back pain  M54.51 724.2 MRI Lumbar Spine W WO Cont      2. Sacroiliitis  M46.1 720.2       3. Sacroiliac joint pain  M53.3 724.6       4. Dorsalgia, unspecified  M54.9 724.5 MRI Lumbar Spine W WO Cont      5. Bilateral lumbar radiculopathy  M54.16 724.4 MRI Lumbar Spine W WO Cont    "   6. Preop testing  Z01.818 V72.84 Creatinine, serum      7. Bilateral chronic knee pain  M25.561 719.46 Ambulatory referral/consult to Orthopedics    M25.562 338.29     G89.29              Plan:  1. Interventional:   - S/p bilateral SIJ injection on 8/20/24 with limited pain relief.    - Patient potentially demonstrates vertebrogenic back pain with endplate inflammation. Consider Intracept procedure after MRI review.     - S/p left SIJ RFA on 6/8/23 with better pain relief than she was reporting initially.  She is also getting around better.  - S/p bilateral SIJ injection on 1/31/23 with >50% pain relief on left, >70% pain relief on right - short-term pain relief.  - S/p Bilateral L5/S1 + S1 TF REJI on 5/25/22 with 75% pain relief - regarding leg pain, now having localized SIJ.   - S/p Bilateral L5/S1 TF REJI on 2/10/21 with 20% pain relief x 1 week. Pain now more localized on left.   - S/p Right suprascapular nerve block on 9/16/20 with limited pain relief.    - Anticoagulation use: apixaban (Eliquis) - 2° prevention (h/o DVT) - Heme/Onc.     2. Pharmacologic:   - Hold off on restarting Lyrica (pregabalin) 100mg BID. She has continued to take gabapentin (Neurontin) 300mg QHS.   - Patient encouraged to take Tylenol 500mg x 2 tablets (1000mg) TID more regularly, not to exceed 3000mg per day.     - LA  reviewed and appropriate.      3. Rehabilitative: Continue use of SIJ belt at home with housework. Physical therapy for shoulder did not help in the past, so doesn't want to attend. SIJ exercises given on AVS previously. Patient is very active.    4. Diagnostic/ Imaging: No new imaging ordered. Previous imaging reviewed: Lumbar MRI (06/29/20).  Will update lumbar MRI to evaluate for Modic changes.     5. Consult/ Referral: Refer to Dr. Kaur (Orthopedics) for evaluation and treatment of bilateral knee pain.     6. Follow up: MRI review - virtual visit / audio     Future Appointments   Date Time Provider Department  Center   11/8/2024  3:00 PM Jeanette Molina MD [facility]   11/15/2024 12:00 PM [facility]   11/18/2024 11:00 AM Africa Billings MD [facility]   11/19/2024  7:00 AM Angelica Caballero PA-C [facility] INT [facility]   12/5/2024 10:00 AM Clau Michel, ASHLEY [facility]   3/6/2025 10:00 AM LABORATORY, [facility]   3/13/2025  9:30 AM Jeffry Bermudez MD [facility]   3/13/2025 10:15 AM INJECTION 1, [facility] INF[facility]ION [facility]   4/29/2025  2:00 PM Kd Rubalcava MD ON OPHTHAL [facility]        - This condition does not require this patient to take time off of work, and the primary goal of our Pain Management services is to improve the patient's functional capacity.   - I discussed the risks, benefits, and alternatives to potential treatment options. All questions and concerns were fully addressed today in clinic.         Angelica Caballero PA-C  Interventional Pain Management - [facility]    Disclaimer:  This note was prepared using voice recognition system and is likely to have sound alike errors that may have been overlooked even after proof reading.  Please call me with any questions.

## 2024-11-01 NOTE — PROGRESS NOTES
"Subjective:      Patient ID: Clau Nunn is a 89 y.o. female.    Chief Complaint: Follow-up (3 month f/u )      HPI  Here for f/u medical problems and preventive exam.  Walks about 10min for exercise, and walks holding on to basket in grocery store.  No f/c/sw.  Occas coughing.  No cp/palp.  PEREZ, unchanged.  BMs good on miralax.  Urine incont.      HM: 9/24 fluvax, 1/21 covid vaccine/booster, 11/19 HAV, 5/16 [id number], 5/17 booster [id number], 12/22 [id number], 2/22 Tdap, 11/09 zoster, 7/22 Shingrix #2, 9/22 BMD on prolia, 1/14 Cscope no more needed, 12/22 MMG/ no more, 10/22 Eye Dr. Rubalcava.     Review of Systems   Constitutional:  Negative for appetite change, chills, diaphoresis and fever.   HENT:  Negative for congestion, ear pain, rhinorrhea, sinus pressure and sore throat.    Respiratory:  Negative for cough, chest tightness and shortness of breath.    Cardiovascular:  Negative for chest pain and palpitations.   Gastrointestinal:  Negative for blood in stool, constipation, diarrhea, nausea and vomiting.   Genitourinary:  Negative for dysuria, frequency, hematuria, menstrual problem, urgency and vaginal discharge.   Musculoskeletal:  Negative for arthralgias.   Skin:  Negative for rash.   Neurological:  Negative for dizziness and headaches.   Psychiatric/Behavioral:  Negative for sleep disturbance. The patient is not nervous/anxious.          Objective:   BP (!) 140/60 (BP Location: Right arm, Patient Position: Sitting)   Pulse 74   Temp 96.7 °F (35.9 °C) (Skin)   Ht 5' 3" (1.6 m)   Wt 77.6 kg (171 lb 1.2 oz)   SpO2 95%   BMI 30.30 kg/m²     Physical Exam  Constitutional:       Appearance: She is well-developed.   HENT:      Right Ear: External ear normal. Tympanic membrane is not injected.      Left Ear: External ear normal. Tympanic membrane is not injected.   Eyes:      Conjunctiva/sclera: Conjunctivae normal.   Neck:      Thyroid: No thyromegaly.   Cardiovascular:      Rate and Rhythm: Normal rate " and regular rhythm.      Heart sounds: No murmur heard.     No friction rub. No gallop.   Pulmonary:      Effort: Pulmonary effort is normal.      Breath sounds: Normal breath sounds. No wheezing or rales.   Abdominal:      General: Bowel sounds are normal.      Palpations: Abdomen is soft. There is no mass.      Tenderness: There is no abdominal tenderness.   Musculoskeletal:      Cervical back: Normal range of motion and neck supple.   Lymphadenopathy:      Cervical: No cervical adenopathy.   Skin:     General: Skin is warm.      Findings: No rash.   Neurological:      Mental Status: She is alert and oriented to person, place, and time.        Latest Reference Range & Units 05/31/24 15:23 08/29/24 09:57   WBC 3.90 - 12.70 K/uL 8.24    RBC 4.00 - 5.40 M/uL 5.06    Hemoglobin 12.0 - 16.0 g/dL 15.8    Hematocrit 37.0 - 48.5 % 49.6 (H)    MCV 82 - 98 fL 98    MCH 27.0 - 31.0 pg 31.2 (H)    MCHC 32.0 - 36.0 g/dL 31.9 (L)    RDW 11.5 - 14.5 % 13.1    Platelet Count 150 - 450 K/uL 353    MPV 9.2 - 12.9 fL 12.4    Gran % 38.0 - 73.0 % 67.7    Lymph % 18.0 - 48.0 % 22.0    Mono % 4.0 - 15.0 % 6.9    Eos % 0.0 - 8.0 % 2.1    Basophil % 0.0 - 1.9 % 0.7    Immature Granulocytes 0.0 - 0.5 % 0.6 (H)    Gran # (ANC) 1.8 - 7.7 K/uL 5.6    Lymph # 1.0 - 4.8 K/uL 1.8    Mono # 0.3 - 1.0 K/uL 0.6    Eos # 0.0 - 0.5 K/uL 0.2    Baso # 0.00 - 0.20 K/uL 0.06    Immature Grans (Abs) 0.00 - 0.04 K/uL 0.05 (H)    nRBC 0 /100 WBC 0    Differential Method  Automated    Sodium 136 - 145 mmol/L 138 140   Potassium 3.5 - 5.1 mmol/L 4.1 4.0   Chloride 95 - 110 mmol/L 100 104   CO2 23 - 29 mmol/L 24 25   Anion Gap 8 - 16 mmol/L 14 11   BUN 8 - 23 mg/dL 19 28 (H)   Creatinine 0.5 - 1.4 mg/dL 1.4 1.2   eGFR >60 mL/min/1.73 m^2 36.2 ! 44 !   Glucose 70 - 110 mg/dL 100 85   Calcium 8.7 - 10.5 mg/dL 9.8 9.4   ALP 55 - 135 U/L 73 60   PROTEIN TOTAL 6.0 - 8.4 g/dL 8.4 6.8   Albumin 3.5 - 5.2 g/dL 4.1 3.7   Uric Acid 2.4 - 5.7 mg/dL  6.8 (H)   BILIRUBIN  TOTAL 0.1 - 1.0 mg/dL 0.7 0.7   AST 10 - 40 U/L 29 16   ALT 10 - 44 U/L 25 16   Cholesterol Total 120 - 199 mg/dL 195    HDL 40 - 75 mg/dL 55    HDL/Cholesterol Ratio 20.0 - 50.0 % 28.2    Non-HDL Cholesterol mg/dL 140    Total Cholesterol/HDL Ratio 2.0 - 5.0  3.5    Triglycerides 30 - 150 mg/dL 196 (H)    LDL Cholesterol 63.0 - 159.0 mg/dL 100.8    Vitamin D 30 - 96 ng/mL 40 35   TSH 0.400 - 4.000 uIU/mL 5.392 (H)    Free T4 0.71 - 1.51 ng/dL 1.00          Assessment:       1. Essential hypertension    2. Simple chronic bronchitis    3. Anxiety    4. Age-related osteoporosis with current pathological fracture with delayed healing    5. Gastroesophageal reflux disease without esophagitis    6. Recurrent major depressive disorder, in partial remission    7. Stage 3b chronic kidney disease    8. Slow transit constipation    9. Solar purpura    10. Acquired hypothyroidism    11. Atherosclerosis of aorta    12. Chronic anticoagulation    13. History of DVT in adulthood    14. Chronic cough    15. Primary insomnia          Plan:     1. Essential hypertension- make sure taking chlorthal  Overview:  Valsart 160mg daily,  Chlorthlaidone 25mg  daily.      2. Simple chronic bronchitis, Chronic cough- doing well, cont rx.  Overview:  Albuterol nebulizer prn.      3. Anxiety  Overview:  Buspar 5mg prn.      4. Age-related osteoporosis with current pathological fracture with delayed healing  Overview:  On Prolia.      5. Gastroesophageal reflux disease without esophagitis  Overview:  Omeprazole 40mg daily.      6. Recurrent major depressive disorder, in partial remission- stop cymbalta, start lexapro.  Overview:  Wants to return to lexapro.    Orders:  -     EScitalopram oxalate (LEXAPRO) 10 MG tablet; Take 1 tablet (10 mg total) by mouth once daily.  Dispense: 90 tablet; Refill: 3    7. Stage 3b chronic kidney disease    8. Slow transit constipation  Overview:  Miralax 17g daily.      9. Solar purpura- no rx needed.    10.  Acquired hypothyroidism- Clinically stable, continue present treatment.    11. Atherosclerosis of aorta  Overview:  CT abd 11/11/13    13. History of DVT in adulthood, Chronic anticoagulation  Overview:  Eliquis 5mg BID.    7/19 right popliteal    15. Primary insomnia  -     traZODone (DESYREL) 50 MG tablet; Take 1-2 tablets ( mg total) by mouth nightly as needed for Insomnia.  Dispense: 180 tablet; Refill: 3     RTC 3mo.

## 2024-11-05 ENCOUNTER — TELEPHONE (OUTPATIENT)
Dept: SPORTS MEDICINE | Facility: CLINIC | Age: 89
End: 2024-11-05
Payer: MEDICARE

## 2024-11-05 NOTE — TELEPHONE ENCOUNTER
Called and scheduled pt for rebecca knee pain, would like whichever injections for pain that Dr. Varela thinks.     **Previously had CSI with diff providers/ previously saw CB for shoulder pain

## 2024-11-06 ENCOUNTER — OFFICE VISIT (OUTPATIENT)
Dept: SPORTS MEDICINE | Facility: CLINIC | Age: 89
End: 2024-11-06
Payer: MEDICARE

## 2024-11-06 ENCOUNTER — HOSPITAL ENCOUNTER (OUTPATIENT)
Dept: RADIOLOGY | Facility: HOSPITAL | Age: 89
Discharge: HOME | End: 2024-11-06
Attending: STUDENT IN AN ORGANIZED HEALTH CARE EDUCATION/TRAINING PROGRAM
Payer: MEDICARE

## 2024-11-06 DIAGNOSIS — M25.562 PAIN IN BOTH KNEES, UNSPECIFIED CHRONICITY: Primary | ICD-10-CM

## 2024-11-06 DIAGNOSIS — M25.562 PAIN IN BOTH KNEES, UNSPECIFIED CHRONICITY: ICD-10-CM

## 2024-11-06 DIAGNOSIS — M17.12 PRIMARY OSTEOARTHRITIS OF LEFT KNEE: Primary | ICD-10-CM

## 2024-11-06 DIAGNOSIS — M25.561 PAIN IN BOTH KNEES, UNSPECIFIED CHRONICITY: ICD-10-CM

## 2024-11-06 DIAGNOSIS — M17.31 POST-TRAUMATIC OSTEOARTHRITIS OF RIGHT KNEE: ICD-10-CM

## 2024-11-06 DIAGNOSIS — M25.561 PAIN IN BOTH KNEES, UNSPECIFIED CHRONICITY: Primary | ICD-10-CM

## 2024-11-06 PROCEDURE — 1157F ADVNC CARE PLAN IN RCRD: CPT | Mod: CPTII,S$GLB,, | Performed by: STUDENT IN AN ORGANIZED HEALTH CARE EDUCATION/TRAINING PROGRAM

## 2024-11-06 PROCEDURE — 99213 OFFICE O/P EST LOW 20 MIN: CPT | Mod: 25,S$GLB,, | Performed by: STUDENT IN AN ORGANIZED HEALTH CARE EDUCATION/TRAINING PROGRAM

## 2024-11-06 PROCEDURE — 73564 X-RAY EXAM KNEE 4 OR MORE: CPT | Mod: 26,50,, | Performed by: STUDENT IN AN ORGANIZED HEALTH CARE EDUCATION/TRAINING PROGRAM

## 2024-11-06 PROCEDURE — 3288F FALL RISK ASSESSMENT DOCD: CPT | Mod: CPTII,S$GLB,, | Performed by: STUDENT IN AN ORGANIZED HEALTH CARE EDUCATION/TRAINING PROGRAM

## 2024-11-06 PROCEDURE — 20610 DRAIN/INJ JOINT/BURSA W/O US: CPT | Mod: 50,S$GLB,, | Performed by: STUDENT IN AN ORGANIZED HEALTH CARE EDUCATION/TRAINING PROGRAM

## 2024-11-06 PROCEDURE — 1160F RVW MEDS BY RX/DR IN RCRD: CPT | Mod: CPTII,S$GLB,, | Performed by: STUDENT IN AN ORGANIZED HEALTH CARE EDUCATION/TRAINING PROGRAM

## 2024-11-06 PROCEDURE — 1101F PT FALLS ASSESS-DOCD LE1/YR: CPT | Mod: CPTII,S$GLB,, | Performed by: STUDENT IN AN ORGANIZED HEALTH CARE EDUCATION/TRAINING PROGRAM

## 2024-11-06 PROCEDURE — 99999 PR PBB SHADOW E&M-EST. PATIENT-LVL III: CPT | Mod: PBBFAC,,, | Performed by: STUDENT IN AN ORGANIZED HEALTH CARE EDUCATION/TRAINING PROGRAM

## 2024-11-06 PROCEDURE — 1159F MED LIST DOCD IN RCRD: CPT | Mod: CPTII,S$GLB,, | Performed by: STUDENT IN AN ORGANIZED HEALTH CARE EDUCATION/TRAINING PROGRAM

## 2024-11-06 PROCEDURE — 73564 X-RAY EXAM KNEE 4 OR MORE: CPT | Mod: TC,50

## 2024-11-06 RX ORDER — AMOXICILLIN 500 MG/1
CAPSULE ORAL
COMMUNITY
Start: 2024-10-23

## 2024-11-06 RX ADMIN — TRIAMCINOLONE ACETONIDE 40 MG: 40 INJECTION, SUSPENSION INTRA-ARTICULAR; INTRAMUSCULAR at 10:11

## 2024-11-06 NOTE — PROGRESS NOTES
Orthopaedics Sports Medicine     Knee Initial Visit         11/7/2024    Referring MD: No ref. provider found    Chief Complaint   Patient presents with    Right Knee - Pain    Left Knee - Pain       History of Present Illness:   [facility] Nunn is a 89 y.o. year old female patient presents with pain and dysfunction involving the left and right knee.     Onset of the symptoms was many years ago. History of right proximal tibia fracture treated non-operatively.     Inciting event: No specific injury or trauma, gradual onset and worsening of symptoms.     Current symptoms include left and right knee pain that she describes as intermittent aching pain that she rates a 5/10 today. Has been treated for arthritis in the past. States symptoms improve with injections in the past but usually return after several months.     Pain is aggravated by weight bearing.      Evaluation to date: XR.     Treatment to date: Rest, activity modification, she has received multiple CSI and viscosupplementation injections with rheumatology in the past with most recent being in February 2024. She reports good relief with these injections in the past.         Past Medical History:   Past Medical History:   Diagnosis Date    Allergy     Anxiety     Asthma     Back pain     Chest pain 1/30/2024    Chronic venous insufficiency 11/19/2013    Depression     Diverticulosis 11/19/2013 1/8/8 colonoscopy    Dry eyes     Fracture, sacrum/coccyx     Dr. Sanchez     GERD (gastroesophageal reflux disease)     H/O total hip arthroplasty 12/30/2015    Hypertension     Hypothyroid     Osteoarthritis     Osteoporosis     Postlaminectomy syndrome of lumbar region 1/8/2014    Radius fracture     7/18    Simple chronic bronchitis 5/3/2017    Umbilical hernia 11/19/2013    Urinary incontinence     Vitamin D deficiency disease        Past Surgical History:   Past Surgical History:   Procedure Laterality Date    ADENOIDECTOMY      BACK SURGERY      x2    breast  implants  1973    CATARACT EXTRACTION Bilateral     CLOSED REDUCTION DISTAL RADIUS FRACTURE      Dr. Black, 8/18    cystoscope  1/6/16    DR. Brown    FRACTURE SURGERY  April 3 2015    left wrist    HAND SURGERY      HIP SURGERY Right     total hip- Dr. Dos Santos    HYSTERECTOMY      36y/o    INJECTION OF ANESTHETIC AGENT AROUND NERVE Right 9/16/2020    Procedure: Right suprascapular nerve block;  Surgeon: Raffi Wells MD;  Location: [facility];  Service: Pain Management;  Laterality: Right;    INJECTION OF ANESTHETIC AGENT AROUND NERVE Right 7/13/2021    Procedure: Block, Nerve Right Suprascapular Nerve Block with RN IV sedation;  Surgeon: Raffi Wells MD;  Location: [facility];  Service: Pain Management;  Laterality: Right;    INJECTION OF ANESTHETIC AGENT INTO SACROILIAC JOINT N/A 8/12/2020    Procedure: Left IA hip Joint + Left SIJ + Right shoulder injection;  Surgeon: Raffi Wells MD;  Location: [facility];  Service: Pain Management;  Laterality: N/A;    INJECTION OF ANESTHETIC AGENT INTO SACROILIAC JOINT Bilateral 1/31/2023    Procedure: Bilateral SIJ Injection w/Local;  Surgeon: Abdirahman Parker MD;  Location: [facility];  Service: Pain Management;  Laterality: Bilateral;    INJECTION OF ANESTHETIC AGENT INTO SACROILIAC JOINT Bilateral 8/20/2024    Procedure: Bilateral SIJ Injection;  Surgeon: Abdirahman Parker MD;  Location: [facility];  Service: Pain Management;  Laterality: Bilateral;    INJECTION OF JOINT N/A 8/12/2020    Procedure: Left IA hip Joint + Left SIJ + Right shoulder injection;  Surgeon: Raffi Wells MD;  Location: [facility];  Service: Pain Management;  Laterality: N/A;    JOINT REPLACEMENT Right     R NNAMDI - Dr. Dos Santos    LEG SURGERY Right     ex-fix tib/fib    RADIOFREQUENCY THERMOCOAGULATION Left 6/8/2023    Procedure: Left SIJ RFA w/ local (give Valium before);  Surgeon: Abdirahman Parker MD;  Location: [facility];  Service: Pain Management;  Laterality: Left;     SELECTIVE INJECTION OF ANESTHETIC AGENT AROUND LUMBAR SPINAL NERVE ROOT BY TRANSFORAMINAL APPROACH Bilateral 2/10/2021    Procedure: Bilateral L5/S1 TF REIJ;  Surgeon: Raffi Wells MD;  Location: [facility];  Service: Pain Management;  Laterality: Bilateral;    SELECTIVE INJECTION OF ANESTHETIC AGENT AROUND LUMBAR SPINAL NERVE ROOT BY TRANSFORAMINAL APPROACH Bilateral 5/25/2022    Procedure: Bilateral L5/S1 + S1 TF REJI;  Surgeon: Raffi Wells MD;  Location: [facility];  Service: Pain Management;  Laterality: Bilateral;    TONSILLECTOMY      TRANSFORAMINAL EPIDURAL INJECTION OF STEROID Left 7/8/2020    Procedure: left L2/3 + L5/S1 TF REJI;  Surgeon: Raffi Wells MD;  Location: [facility];  Service: Pain Management;  Laterality: Left;    TRANSFORAMINAL EPIDURAL INJECTION OF STEROID Left 7/1/2021    Procedure: left L5/S1 + S1 TF REJI;  Surgeon: Raffi Wells MD;  Location: [facility];  Service: Pain Management;  Laterality: Left;       Medications:  Patient's Medications   New Prescriptions    No medications on file   Previous Medications    ALBUTEROL (PROVENTIL) 2.5 MG /3 ML (0.083 %) NEBULIZER SOLUTION    Take 3 mLs (2.5 mg total) by nebulization every 6 (six) hours as needed for Wheezing. Rescue    AMOXICILLIN (AMOXIL) 500 MG CAPSULE    Take by mouth.    AZELASTINE (ASTELIN) 137 MCG (0.1 %) NASAL SPRAY    2 sprays by Nasal route 2 (two) times daily.    BETAMETHASONE DIPROPIONATE (DIPROLENE) 0.05 % OINTMENT    Apply to affected fingers around nails at night under occlusion with white cotton gloves for 2-4 weeks    BUSPIRONE (BUSPAR) 5 MG TAB    TAKE 1 TABLET TWICE DAILY    CHLORTHALIDONE (HYGROTEN) 25 MG TAB    Take 1 tablet (25 mg total) by mouth once daily.    CHOLECALCIFEROL, VITAMIN D3, (D3-2000) 50 MCG (2,000 UNIT) CAP CAPSULE    Take 1 capsule (2,000 Units total) by mouth once daily.    CICLOPIROX (PENLAC) 8 % SOLN    Apply topically nightly.    CLOBETASOL (TEMOVATE) 0.05 % EXTERNAL SOLUTION    Pt to  mix in 1 jar of cerave cream and apply to affected areas bid for 2-4 weeks per course    CYCLOBENZAPRINE (FLEXERIL) 5 MG TABLET    TAKE 1 TABLET (5 MG TOTAL) BY MOUTH NIGHTLY AS NEEDED FOR MUSCLE SPASMS.    CYCLOSPORINE (RESTASIS MULTIDOSE) 0.05 % DROP    Place 1 drop into both eyes every 12 (twelve) hours.    DULOXETINE (CYMBALTA) 60 MG CAPSULE    Take 1 capsule (60 mg total) by mouth once daily.    ELIQUIS 5 MG TAB    TAKE 1 TABLET TWICE DAILY    FLUOCINONIDE 0.05% (LIDEX) 0.05 % CREAM    AAA bid to leg for 2-4 weeks per course    FLUTICASONE PROPIONATE (FLONASE) 50 MCG/ACTUATION NASAL SPRAY        FUROSEMIDE (LASIX) 40 MG TABLET    Take 1 tablet (40 mg total) by mouth once daily.    GABAPENTIN (NEURONTIN) 300 MG CAPSULE    Take 1 capsule (300 mg total) by mouth every evening.    LEVOTHYROXINE (SYNTHROID) 100 MCG TABLET    TAKE 1 TABLET EVERY DAY    LIDOCAINE (LIDODERM) 5 %    Place 1 patch onto the skin once daily. Remove & Discard patch within 12 hours or as directed by MD    OMEPRAZOLE (PRILOSEC) 40 MG CAPSULE    Take 1 capsule (40 mg total) by mouth every morning.    POLYETHYLENE GLYCOL (GLYCOLAX) 17 GRAM/DOSE POWDER    Take 17 g by mouth once daily.    PROLIA 60 MG/ML SYRG        SOLIFENACIN (VESICARE) 5 MG TABLET    Take 1 tablet (5 mg total) by mouth once daily.    TRAZODONE (DESYREL) 50 MG TABLET    TAKE 1 TABLET (50 MG TOTAL) BY MOUTH NIGHTLY AS NEEDED FOR INSOMNIA.    VALSARTAN (DIOVAN) 160 MG TABLET    Take 1 tablet (160 mg total) by mouth once daily.   Modified Medications    No medications on file   Discontinued Medications    No medications on file        Allergies:   Review of patient's allergies indicates:   Allergen Reactions    Cephalexin Hives, Itching, Swelling, Anxiety, Dermatitis and Rash    Mupirocin Itching       Social History:   [place]: [place], [place]  alcohol use: She reports current alcohol use.  tobacco use: She reports that she has never smoked. She has never used smokeless  "tobacco.    Review of systems:  history of recent illness, fevers, shakes, or chills: no  history of cardiac problems or chest pain: Yes  history of of pulmonary problems or asthma: no  history of diabetes: no  history of prior DVT or clotting problems: no  history of sleep apnea: no    Physical Examination:  Estimated body mass index is 29.72 kg/m² as calculated from the following:    Height as of 10/22/24: 5' 3" (1.6 m).    Weight as of 10/22/24: 76.1 kg (167 lb 12.3 oz).    Standing exam  stance: normal alignment, no significant leg-length discrepancy  gait: antalgic      Knee      RIGHT  LEFT  Skin:     Intact   Intact  ROM:     0-100  0-120  Effusion:    Neg   Neg  Medial joint line tenderness:  +   +  Lateral joint line tenderness:  +  +  Kevin:     +   Neg  Patella crepitus:   Neg   +  Patella tenderness:   Neg   Neg  Patella grind:      Neg   Neg  Valgus stress:    Neg   Neg  Varus stress:    Neg   Neg  Posterior drawer:   Neg   Neg  N-V               intact  intact  Hip:    nml    nml   Lower extremity edema: Negative negative    Neurovascular exam  - motor function grossly intact bilaterally to hip flexion, knee extension and flexion, ankle dorsiflexion and plantarflexion  - sensation intact to light touch bilaterally to femoral, tibial, tibial and peroneal distributions  - symmetrical pedal pulses    Imaging:   XR Results:  X-ray Knee Ortho Bilateral with Flexion  Narrative: EXAMINATION:  XR KNEE ORTHO BILAT WITH FLEXION    CLINICAL HISTORY:  Pain in right knee    TECHNIQUE:  XR KNEE ORTHO BILAT WITH FLEXION    COMPARISON:  08/02/2022.    FINDINGS:  Right: Suspected posttraumatic deformity of the proximal tibia/fibula.  End-stage degenerative changes of the right knee with bone-on-bone apposition.    Left: Bicompartmental joint space narrowing and sclerosis similar to prior.  Chondrocalcinosis.    Osseous demineralization.  Atherosclerotic disease.  Impression: As above.    Electronically signed " by: Cortes Lebron  Date:    11/06/2024  Time:    10:33           MRI Results:  No results found for this or any previous visit.      CT Results:  No results found for this or any previous visit.      Physician Read: I agree with the above impression.    Impression:  89 y.o. female with bilateral knee osteoarthritis - Kellgren Edgar Grade 3    Plan:  Discussed diagnosis and treatment options with the patient today. She has bilateral knee osteoarthritis, radiographically worse on the right.  We discussed nonoperative treatment of osteoarthritis.  This consists of supportive care in the form of activity modification, bracing, NSAIDs, and intermittent injections.  Injection types include corticosteroid or viscosupplementation injections. She reports good relief with both injection types from rheumatology in the past.   I recommend proceeding with bilateral knee intra-articular corticosteroid injection. We will also submit for authorization for viscosupplementation injections for both knees. The patient is in agreement with this plan.   Procedures performed today and patient tolerated the procedure well with no immediate complications.   MEDICAL NECESSITY FOR VISCOSUPPLEMETNATION: After thorough evaluation of the patient, I have determined that visco-supplementation is medically necessary. The patient has painful degenerative changes of the knee with failure of conservative treatments including lifestyle modifications and rehabilitation exercises.  Oral analgesis/NSAIDs have not adequately controlled symptoms and there is radiographic evidence of Kellgren Edgar grade 2 or greater osteoarthritic changes, or in lack of radiographic evidence, there is arthroscopic or other evidence of chondrosis.   Follow-up with Keyonna Robledo in 3 weeks for viscosupplementation injections. If her symptoms are doing well at that time we can postpone until when her symptoms have returned.         Kd Varela MD    I,  Lance Kaba, acted as a scribe for Kd Varela MD for the duration of this office visit.

## 2024-11-07 RX ORDER — TRIAMCINOLONE ACETONIDE 40 MG/ML
40 INJECTION, SUSPENSION INTRA-ARTICULAR; INTRAMUSCULAR
Status: DISCONTINUED | OUTPATIENT
Start: 2024-11-06 | End: 2024-11-07 | Stop reason: HOSPADM

## 2024-11-07 NOTE — PROCEDURES
Large Joint Aspiration/Injection: bilateral knee    Date/Time: 11/6/2024 10:15 AM    Performed by: Kd Varela MD  Authorized by: Kd Varela MD    Consent Done?:  Yes (Verbal)  Indications:  Pain and arthritis  Site marked: the procedure site was marked    Timeout: prior to procedure the correct patient, procedure, and site was verified    Prep: patient was prepped and draped in usual sterile fashion      Local anesthesia used?: Yes    Anesthesia:  Local infiltration  Local anesthetic:  Lidocaine 1% without epinephrine    Details:  Needle Size:  22 G  Ultrasonic Guidance for needle placement?: No    Approach:  Anterolateral  Location:  Knee  Laterality:  Bilateral  Site:  Bilateral knee  Medications (Right):  40 mg triamcinolone acetonide 40 mg/mL  Medications (Left):  40 mg triamcinolone acetonide 40 mg/mL  Patient tolerance:  Patient tolerated the procedure well with no immediate complications

## 2024-11-08 ENCOUNTER — OFFICE VISIT (OUTPATIENT)
Dept: PRIMARY CARE CLINIC | Facility: CLINIC | Age: 89
End: 2024-11-08
Payer: MEDICARE

## 2024-11-08 VITALS
OXYGEN SATURATION: 95 % | DIASTOLIC BLOOD PRESSURE: 60 MMHG | SYSTOLIC BLOOD PRESSURE: 140 MMHG | HEART RATE: 74 BPM | HEIGHT: 63 IN | BODY MASS INDEX: 30.31 KG/M2 | TEMPERATURE: 97 F | WEIGHT: 171.06 LBS

## 2024-11-08 DIAGNOSIS — D69.2 SOLAR PURPURA: ICD-10-CM

## 2024-11-08 DIAGNOSIS — E03.9 ACQUIRED HYPOTHYROIDISM: ICD-10-CM

## 2024-11-08 DIAGNOSIS — I70.0 ATHEROSCLEROSIS OF AORTA: ICD-10-CM

## 2024-11-08 DIAGNOSIS — Z79.01 CHRONIC ANTICOAGULATION: ICD-10-CM

## 2024-11-08 DIAGNOSIS — M80.00XG AGE-RELATED OSTEOPOROSIS WITH CURRENT PATHOLOGICAL FRACTURE WITH DELAYED HEALING: ICD-10-CM

## 2024-11-08 DIAGNOSIS — K21.9 GASTROESOPHAGEAL REFLUX DISEASE WITHOUT ESOPHAGITIS: ICD-10-CM

## 2024-11-08 DIAGNOSIS — F41.9 ANXIETY: ICD-10-CM

## 2024-11-08 DIAGNOSIS — K59.01 SLOW TRANSIT CONSTIPATION: ICD-10-CM

## 2024-11-08 DIAGNOSIS — F51.01 PRIMARY INSOMNIA: ICD-10-CM

## 2024-11-08 DIAGNOSIS — Z86.718 HISTORY OF DVT IN ADULTHOOD: ICD-10-CM

## 2024-11-08 DIAGNOSIS — N18.32 STAGE 3B CHRONIC KIDNEY DISEASE: ICD-10-CM

## 2024-11-08 DIAGNOSIS — F33.41 RECURRENT MAJOR DEPRESSIVE DISORDER, IN PARTIAL REMISSION: ICD-10-CM

## 2024-11-08 DIAGNOSIS — R05.3 CHRONIC COUGH: ICD-10-CM

## 2024-11-08 DIAGNOSIS — I10 ESSENTIAL HYPERTENSION: Primary | Chronic | ICD-10-CM

## 2024-11-08 DIAGNOSIS — J41.0 SIMPLE CHRONIC BRONCHITIS: ICD-10-CM

## 2024-11-08 PROCEDURE — 99999 PR PBB SHADOW E&M-EST. PATIENT-LVL IV: CPT | Mod: PBBFAC,,, | Performed by: INTERNAL MEDICINE

## 2024-11-08 RX ORDER — ESCITALOPRAM OXALATE 10 MG/1
10 TABLET ORAL DAILY
Qty: 90 TABLET | Refills: 3 | Status: SHIPPED | OUTPATIENT
Start: 2024-11-08 | End: 2025-11-08

## 2024-11-08 RX ORDER — TRAZODONE HYDROCHLORIDE 50 MG/1
50-100 TABLET ORAL NIGHTLY PRN
Qty: 180 TABLET | Refills: 3 | Status: SHIPPED | OUTPATIENT
Start: 2024-11-08

## 2024-11-11 ENCOUNTER — TELEPHONE (OUTPATIENT)
Dept: PAIN MEDICINE | Facility: CLINIC | Age: 89
End: 2024-11-11
Payer: MEDICARE

## 2024-11-11 ENCOUNTER — TELEPHONE (OUTPATIENT)
Dept: PRIMARY CARE CLINIC | Facility: CLINIC | Age: 89
End: 2024-11-11
Payer: MEDICARE

## 2024-11-11 DIAGNOSIS — I10 ESSENTIAL HYPERTENSION: Chronic | ICD-10-CM

## 2024-11-11 RX ORDER — CHLORTHALIDONE 25 MG/1
25 TABLET ORAL DAILY
Qty: 90 TABLET | Refills: 2 | Status: SHIPPED | OUTPATIENT
Start: 2024-11-11 | End: 2025-08-08

## 2024-11-11 NOTE — TELEPHONE ENCOUNTER
Reached out to patient to reschedule appointment because the provider will be out of clinic.  Apt has been made . All questions answered.     Marcos Mark  Medical

## 2024-11-11 NOTE — TELEPHONE ENCOUNTER
----- Message from Jeanette Molina MD sent at 11/11/2024  8:14 AM CST -----  Please tell pt to restart chlorthalidone, and monitor BPs and bring to [facility] in 1-2 weeks.  SM  ----- Message -----  From: Andreina Maldonado, RN  Sent: 11/8/2024   4:51 PM CST  To: Jeanette Molina MD    Please see pt message below  ----- Message -----  From: Griselda Fregoso  Sent: 11/8/2024   4:38 PM CST  To: Jesse [facility] Staff    Pt called, stating that during her visit today (1/8), the provider wanted to know if she was taking a chlorthalidone prescription, and pt wanted to inform PCP that she is not currently taking that medication.For any further information please contact pt at [phone].

## 2024-11-11 NOTE — TELEPHONE ENCOUNTER
----- Message from Jeanette Molina MD sent at 11/11/2024  8:14 AM CST -----  Please tell pt to restart chlorthalidone, and monitor BPs and bring to [facility] in 1-2 weeks.  SM  ----- Message -----  From: Andreina Maldonado, RN  Sent: 11/8/2024   4:51 PM CST  To: Jeanette Molina MD    Please see pt message below  ----- Message -----  From: Griselda Fregoso  Sent: 11/8/2024   4:38 PM CST  To: Jesse [facility] Staff    Pt called, stating that during her visit today (1/8), the provider wanted to know if she was taking a chlorthalidone prescription, and pt wanted to inform PCP that she is not currently taking that medication.For any further information please contact pt at [phone].

## 2024-11-11 NOTE — TELEPHONE ENCOUNTER
PC with pt- advised to restart chlorthalidone - pt needs refills, also told to monitor bp at home and record and f/u in 2 weeks. Pt verbalized understanding.

## 2024-11-15 ENCOUNTER — HOSPITAL ENCOUNTER (OUTPATIENT)
Dept: RADIOLOGY | Facility: HOSPITAL | Age: 89
Discharge: HOME | End: 2024-11-15
Attending: PHYSICIAN ASSISTANT
Payer: MEDICARE

## 2024-11-15 DIAGNOSIS — M54.9 DORSALGIA, UNSPECIFIED: ICD-10-CM

## 2024-11-15 DIAGNOSIS — M54.51 VERTEBROGENIC LOW BACK PAIN: ICD-10-CM

## 2024-11-15 DIAGNOSIS — M54.16 BILATERAL LUMBAR RADICULOPATHY: ICD-10-CM

## 2024-11-15 PROCEDURE — 72148 MRI LUMBAR SPINE W/O DYE: CPT | Mod: TC

## 2024-11-15 PROCEDURE — 72148 MRI LUMBAR SPINE W/O DYE: CPT | Mod: 26,,, | Performed by: RADIOLOGY

## 2024-11-18 ENCOUNTER — OFFICE VISIT (OUTPATIENT)
Dept: DERMATOLOGY | Facility: CLINIC | Age: 89
End: 2024-11-18
Payer: MEDICARE

## 2024-11-18 DIAGNOSIS — D48.5 NEOPLASM OF UNCERTAIN BEHAVIOR OF SKIN: ICD-10-CM

## 2024-11-18 DIAGNOSIS — B37.2 CANDIDA ONYCHOMYCOSIS: ICD-10-CM

## 2024-11-18 DIAGNOSIS — L57.0 AK (ACTINIC KERATOSIS): Primary | ICD-10-CM

## 2024-11-18 DIAGNOSIS — L60.1 ONYCHOLYSIS: ICD-10-CM

## 2024-11-18 PROCEDURE — 3288F FALL RISK ASSESSMENT DOCD: CPT | Mod: CPTII,S$GLB,, | Performed by: DERMATOLOGY

## 2024-11-18 PROCEDURE — 99214 OFFICE O/P EST MOD 30 MIN: CPT | Mod: 25,S$GLB,, | Performed by: DERMATOLOGY

## 2024-11-18 PROCEDURE — 17000 DESTRUCT PREMALG LESION: CPT | Mod: S$GLB,,, | Performed by: DERMATOLOGY

## 2024-11-18 PROCEDURE — 1126F AMNT PAIN NOTED NONE PRSNT: CPT | Mod: CPTII,S$GLB,, | Performed by: DERMATOLOGY

## 2024-11-18 PROCEDURE — 1160F RVW MEDS BY RX/DR IN RCRD: CPT | Mod: CPTII,S$GLB,, | Performed by: DERMATOLOGY

## 2024-11-18 PROCEDURE — 1101F PT FALLS ASSESS-DOCD LE1/YR: CPT | Mod: CPTII,S$GLB,, | Performed by: DERMATOLOGY

## 2024-11-18 PROCEDURE — 99999 PR PBB SHADOW E&M-EST. PATIENT-LVL IV: CPT | Mod: PBBFAC,,, | Performed by: DERMATOLOGY

## 2024-11-18 PROCEDURE — 1157F ADVNC CARE PLAN IN RCRD: CPT | Mod: CPTII,S$GLB,, | Performed by: DERMATOLOGY

## 2024-11-18 PROCEDURE — 1159F MED LIST DOCD IN RCRD: CPT | Mod: CPTII,S$GLB,, | Performed by: DERMATOLOGY

## 2024-11-18 RX ORDER — FLUCONAZOLE 150 MG/1
150 TABLET ORAL
Qty: 12 TABLET | Refills: 0 | Status: SHIPPED | OUTPATIENT
Start: 2024-11-18 | End: 2025-02-16

## 2024-11-18 NOTE — PROGRESS NOTES
Subjective:      Patient ID:  Clau Nunn is a 89 y.o. female who presents for   Chief Complaint   Patient presents with    Follow-up     12 week f/u concerning nails . Tx clobetasol ointment and penlac/ effective      Last seen 8/29/24 for onycholysis.  Fungal culture + candida parapsilosis.  Nail clipping showed adherent foci of parakeratosis (no neuts), rare yeasts and sparse bacteria. Continued below treatment and also recommended acetone drops into the nail space TID and keep nails trimmed short, avoid picking/manipulation under nail plate.  She reports very good improvement.    Current tx:  Fluconazole weekly starting 7/10/24  diprolene ointment and penlac solution  Acetone drops into nail space TID      Also c/o rough spot on nose x many years. Has been frozen once a long time ago. No pain/bleeding.    Also c/o bump on L lower leg x 1-2 months that she scratched and it bled. No pain. Is on eliquis  The other spot on her leg that was there last time healed up      Denies personal h/o skin cancer  + fam h/o skin cancer          Review of Systems    Objective:   Physical Exam   Constitutional: She appears well-developed and well-nourished. No distress.   Neurological: She is alert and oriented to person, place, and time. She is not disoriented.   Psychiatric: She has a normal mood and affect.   Skin:   Areas Examined (abnormalities noted in diagram):   Head / Face Inspection Performed  LLE Inspection Performed  Nails and Digits Inspection Performed                     Diagram Legend     Erythematous scaling macule/papule c/w actinic keratosis       Vascular papule c/w angioma      Pigmented verrucoid papule/plaque c/w seborrheic keratosis      Yellow umbilicated papule c/w sebaceous hyperplasia      Irregularly shaped tan macule c/w lentigo     1-2 mm smooth white papules consistent with Milia      Movable subcutaneous cyst with punctum c/w epidermal inclusion cyst      Subcutaneous movable cyst c/w  pilar cyst      Firm pink to brown papule c/w dermatofibroma      Pedunculated fleshy papule(s) c/w skin tag(s)      Evenly pigmented macule c/w junctional nevus     Mildly variegated pigmented, slightly irregular-bordered macule c/w mildly atypical nevus      Flesh colored to evenly pigmented papule c/w intradermal nevus       Pink pearly papule/plaque c/w basal cell carcinoma      Erythematous hyperkeratotic cursted plaque c/w SCC      Surgical scar with no sign of skin cancer recurrence      Open and closed comedones      Inflammatory papules and pustules      Verrucoid papule consistent consistent with wart     Erythematous eczematous patches and plaques     Dystrophic onycholytic nail with subungual debris c/w onychomycosis     Umbilicated papule    Erythematous-base heme-crusted tan verrucoid plaque consistent with inflamed seborrheic keratosis     Erythematous Silvery Scaling Plaque c/w Psoriasis     See annotation                Assessment / Plan:        AK (actinic keratosis)  Offered LN2 vs efudex  She would like trial of repeat LN2  If no improvement, will plan for efudex course    Cryosurgery Procedure Note    Verbal consent from the patient is obtained and the patient is aware of the precancerous quality and need for treatment of these lesions. Liquid nitrogen cryosurgery is applied to the 1 actinic keratoses, as detailed in the physical exam, to produce a freeze injury. The patient is aware that blisters may form and is instructed on wound care with gentle cleansing and use of vaseline ointment to keep moist until healed. The patient is supplied a handout on cryosurgery and is instructed to call if lesions do not completely resolve.    Onycholysis  Candida onychomycosis  -     fluconazole (DIFLUCAN) 150 MG Tab; Take 1 tablet (150 mg total) by mouth every 7 days.  Dispense: 12 tablet; Refill: 0    - improving on current therapy, will continue weekly fluconazole to complete a 6 month course  - continue  clob ointment BID x 2-4 weeks on, 2 weeks off intermittently  - continue penlac solution topically  - start acetone drops into the nail space TID  - keep nails trimmed shorter, avoid picking/manipulation under nail plate  - consider clob solution instead of ointment; consider ILK     Side effect profile reviewed for topical steroids including atrophy, telangiectasia and striae development with prolonged usage.  Patient acknowledged understanding.      Neoplasm of uncertain behavior of skin  - ISK vs HAK vs SCC vs BCC  - discussed  and offered biopsy vs LN2 vs monitoring without picking at it - she prefers monitoring  - low threshold to biopsy at f/u ; she will let us know if it worsens in the interim and come in sooner if so           Follow up in about 3 months (around 2/18/2025).        LOS NUMBER AND COMPLEXITY OF PROBLEMS    COMPLEXITY OF DATA RISK TOTAL TIME (m)   68544  [id number] [] 1 self-limited or minor problem [x] Minimal to none [] No treatment recommended or patient to monitor. Reassurance.  15-29  10-19   38020  74135 Low  [] 2 or more self limited or minor problems  [] 1 stable chronic illness  [] 1 acute, uncomplicated illness or injury Limited (2)  [] Prior external notes from each unique source  [] Review result of each unique test  [] Order each unique test  OR [] Independent historian Low  []  OTC medications   []  Discussed/Decision for minor skin surgery (no risk factors) 30-44  20-29   35310  24611 Moderate  [x]  1 or more chronic unstable illness (not at goal or progression or exacerbation) or SE of treatment  []  2 or more stable chronic illnesses  []  1 acute illness with systemic symptoms  []  1 acute complicated injury  []  1 undiagnosed new problem with uncertain prognosis Moderate (1/3 below)  []  3 or more data items        *Now includes independent historian  []  Independent interpretation of a test  []  Discuss management/test with another provider Moderate  [x]  Prescription drug  mgmt  []  Discussed/Decision for Minor surgery with risk factors  []  Mgmt limited by social determinates 45-59  30-39   39768  34050 High  []  1 or more chronic illness with severe exacerbation, progression or SE of treatment  []  1 acute or chronic illness/injury that poses a threat to life or bodily function Extensive (2/3 below)  []  3 or more data items        *Now includes independent historian.  []  Independent interpretation of a test  []  Discuss management/test with another provider High  []  Major surgery with risk discussed  []  Drug therapy requiring intensive monitoring for toxicity  []  Hospitalization  []  Decision for DNR 60-74  40-54

## 2024-11-21 ENCOUNTER — OFFICE VISIT (OUTPATIENT)
Dept: PAIN MEDICINE | Facility: CLINIC | Age: 89
End: 2024-11-21
Payer: MEDICARE

## 2024-11-21 VITALS — RESPIRATION RATE: 17 BRPM

## 2024-11-21 DIAGNOSIS — M54.51 VERTEBROGENIC LOW BACK PAIN: Primary | ICD-10-CM

## 2024-11-21 DIAGNOSIS — M51.360 DISCOGENIC LUMBAR PAIN: ICD-10-CM

## 2024-11-21 DIAGNOSIS — M53.3 SACROILIAC JOINT PAIN: ICD-10-CM

## 2024-11-21 DIAGNOSIS — M54.10 RADICULAR PAIN: ICD-10-CM

## 2024-11-21 NOTE — PROGRESS NOTES
Established Patient - TeleHealth Visit    The patient location is: [place]  The chief complaint leading to consultation is: chronic pain     Visit type: audiovisual    Face to Face time with patient: 10-15 minutes  20 minutes of total time spent on the encounter, which includes face to face time and non-face to face time preparing to see the patient (eg, review of tests), Obtaining and/or reviewing separately obtained history, Documenting clinical information in the electronic or other health record, Independently interpreting results (not separately reported) and communicating results to the patient/family/caregiver, or Care coordination (not separately reported).     Each patient to whom he or she provides medical services by telemedicine is:  (1) informed of the relationship between the physician and patient and the respective role of any other health care provider with respect to management of the patient; and (2) notified that he or she may decline to receive medical services by telemedicine and may withdraw from such care at any time.      Chronic Pain -- Established Patient (Follow-up visit)    Chief Pain Complaint:  Chief Complaint   Patient presents with    Follow-up       Interval History (10/22/2024): Clau Nunn presents today for follow-up visit.  she underwent bilateral SIJ injection on 8/20/24 (2 months ago).  The patient reports that she is/was unchanged following the procedure.  she reports limited pain relief.   Patient reports pain as 5/10 today.    Interval History (8/7/2024):  Patient presents today for follow-up visit.  Patient was last seen over 6 months ago. At that visit, the plan was to start Lyrica, but it appears patient has not filled in months in his back taking gabapentin at night. Patient reports pain as 5/10 today.  She has completed physical therapy recently, which has helped with her strength.  Today, she complains of pain in bilateral SI joint.    Interval History (12/13/2023):   Clau Nunn presents today for follow-up visit.  Patient was last seen about 3 months ago.   Patient reports pain as 5/10 today. She had a fall recently where she fell backwards and didn't have the watch on her. She landed on tailbone and left hip.     Interval History (9/8/2023):  Patient presents today for follow-up visit.  Patient was last seen on 8/11/2023. At that visit, the plan was to continue Lyrica. She reports that her pain is worse in the morning, but as she moves around, it improves. Patient reports pain as 4/10 today.    Interval History (8/11/2023):  Clau Nunn presents today for follow-up visit. At that visit, the plan was to start Lyrica, which has helped.  S/p left SIJ RFA on 6/8/23 (over a month ago) and is reporting better pain relief than she did initially. She is also getting around better. Patient reports pain as 3/10 today. Pain is worse the most in the morning when she wakes up, but pain improves as she walks around during the day.     Interval History (6/28/2023): Patient presents today for follow-up visit.  she underwent left SIJ RFA on 6/8/23 (about 3 weeks ago).  The patient reports that she is/was unchanged following the procedure.  she reports limited pain relief so far. Patient reports pain as 8/10 today.    Interval History (5/2/2023):  Clau Nunn presents today for follow-up visit.  Patient was last seen on 3/28/2023.  Patient reports pain as 5/10 today.  Pain is much worse and left posterior hip, more so over left SI joint.    Interval History (3/28/2023): Clau Nunn presents today for follow-up visit.  she underwent bilateral SIJ injection on 1/31/23.  The patient reports that she is/was better following the procedure.  she reports about 50% pain relief on left, right side feeling much better.  The changes lasted 4 weeks so far.  The changes have continued through this visit.  Patient reports pain as 5/10 today.  Seeing Podiatry for left foot  "fracture, which is not healing. Still having left SIJ pain.     Interval History (1/10/2023):  Clau Nunn presents today for follow-up visit.  Patient was last seen about 6 months ago. At that visit, she was feeling better since REJI, but she was having localized SIJ pain - still overall better since injection. She returns today with continued posterior "hip pain". Pain is worse on left. Patient reports pain as 8/10 today.    Interval History (6/29/2022): Clau Nunn presents today for follow-up visit.  she underwent Bilateral L5/S1 + S1 TF REJI on 5/25/22.  The patient reports that she is/was better following the procedure.  she reports 75% pain relief with regards to leg pain.  The changes lasted 4 weeks so far.  The changes have continued through this visit.  Patient reports pain as 5/10 today. Having localized SIJ pain today.     Interval History (5/11/2022): Clau Nunn presents today for follow-up visit.  she underwent left L5/S1 + S1 TF REJI on 7/1/21 with excellent pain relief for about 3 months.  The patient reports that she is/was better following the procedure.  The changes have NOT continued through this visit.   she underwent right suprascapular nerve block on 7/13/21.  The patient reports that she is/was unchanged following the procedure. She reports limited pain relief for short term. But, she mainly has limited ROM.   Patient reports pain as 5/10 today - due to low back pain and leg pain.    Interval History (6/24/2021): Patient was seen on 2/10/21. At that time she underwent Bilateral L5/S1 TF REJI.  The patient reports that she is/was unchanged following the procedure.  she reports 20% pain relief.  The changes lasted 1 weeks.  The changes have NOT continued through this visit.  Patient reports pain as 5/10 today.  Her main pain complaint today is LLE radiculopathy worse in popliteal space and shin.   S/p left knee injection with Dr. Burns about 2 months ago with some pain " relief.    Interval History (2/2/2021): Patient was last seen on 10/16/2020.  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  She is still never seen orthopedics for this issue, and she has not improved from intra-articular shoulder joint injections nor suprascapular nerve block.  Patient reports pain as 8/10 today.  Today, she is complaining of back and bilateral leg pain, previously just on the left side, although the left side still remains the worst.    Interval History (10/19/2020): Patient was seen on 9/16/20. At that time she underwent  Right suprascapular nerve block.  The patient reports that she is/was unchanged following the procedure.  she reports no pain relief.  Patient reports pain as 6/10 today - only when she moves her arm.    Interval History (9/9/2020): Patient was seen on 8/12/20. At that time she underwent left SIJ + left hip joint + right shoulder joint injection.  The patient reports that she is/was better following the procedure.  she reports 75% pain relief with hip pain relief but only 25% relief of shoulder pain.  The changes lasted 4 weeks so far.  The changes have continued through this visit.  Patient reports pain as /10 today.    Interval History (8/5/2020): Patient was seen on 7/8/20. At that time she underwent left L2/3 + L5/S1 TF REJI.  The patient reports that she is/was slightly better following the procedure.  she reports only 50 % pain relief.  The changes lasted 4 weeks so far.  The changes have continued through this visit.  She also reports that her right shoulder has been bothering her.  She has history of right humerus fracture years ago.    Interval History (6/12/2020): She is here today to review MRI. She reports 10/10 pain today. She also has concerns about Prolia as she read that it can cause rashes and joint pain.  She has been having a rash on her right breast for a few weeks.  She will see   Patricia soon to discuss.     Interval History(11/20/18): Patient was seen on 10/24/18. At that time she underwent left SIJ + left GT bursa injection with local.  The patient reports that she is/was better following the procedure.  she reports 100% pain relief.  The changes lasted 4 weeks so far.  The changes have continued through this visit.    History of Present Illness:   Clau Nunn is a 89 y.o. female  who is presenting with a chief complaint of low back pain and hip pain. The patient began experiencing this problem insidiously, and the pain has been gradually worsening over the past 6 month(s). The pain is described as throbbing, cramping, aching and heavy and is located in the bilateral buttocks . Pain is intermittent and lasts hours. The pain radiates to  lateral thigh and groin . The patient rates her pain a 5 out of ten and interferes with activities of daily living a 5 out of ten. Pain is exacerbated by getting up from a seated position, and is improved by rest. Patient reports prior trauma (fall in June 2018 causing right distal radius + right sacrum fracture), prior lumbar surgery in ~2005, right hip replacement, right distal radius fracture requiring ORIF in June 2018.    - pertinent negatives: No fever, No chills, No weight loss, No bladder dysfunction, No bowel dysfunction, No extremity weakness, No saddle anesthesia  - pertinent positives: none    - medications, other therapies tried (physical therapy, injections):     >> Tylenol    >> Has previously undergone Physical Therapy (aquatic and land) with limited relief    >> Has previously undergone spinal injection/s:   - bilateral SIJ injection on 11-9-17 with ~30% relief for 2 weeks   - left hip injection on 12-28-17 with some relief   - bilateral L3-5 MBB on 5/11/18 with reported 100% pain relief   - left SIJ + left GT bursa injection with local on 10/24/18 with 100% pain relief   - left L2/3 + L5/S1 TF REJI on 7/8/20 with about 50% pain  relief   - left SIJ + left hip joint + right shoulder joint injection on 8/12/20 with 75% pain relief with hip pain relief but only 25% relief of shoulder pain - no longer having groin pain after this procedure    - Right suprascapular nerve block on 9/16/20 with limited pain relief    - Bilateral L5/S1 TF REJI on 2/10/21 with 20% pain relief for short term   - left knee injection with Dr. Burns in April 2021 with some pain relief   - left L5/S1 + S1 TF REJI on 7/1/21 with excellent pain relief for about 3 months   - right suprascapular nerve block on 7/13/21 with limited pain relief    - Bilateral L5/S1 + S1 TF REJI on 5/25/22 with 75% pain relief - regarding leg pain, now having localized SIJ   - bilateral SIJ injection on 1/31/23 with >50% pain relief on left, >70% pain relief on right - short-term pain relief  - left SIJ RFA on 6/8/23 with great pain relief   - bilateral SIJ injection on 8/20/24 with limited pain relief      Past Surgical History:   Procedure Laterality Date    ADENOIDECTOMY      BACK SURGERY      x2    breast implants  1973    CATARACT EXTRACTION Bilateral     CLOSED REDUCTION DISTAL RADIUS FRACTURE      Dr. Black, 8/18    cystoscope  1/6/16    DR. Brown    FRACTURE SURGERY  April 3 2015    left wrist    HAND SURGERY      HIP SURGERY Right     total hip- Dr. Dos Santos    HYSTERECTOMY      36y/o    INJECTION OF ANESTHETIC AGENT AROUND NERVE Right 9/16/2020    Procedure: Right suprascapular nerve block;  Surgeon: Raffi Wells MD;  Location: [facility];  Service: Pain Management;  Laterality: Right;    INJECTION OF ANESTHETIC AGENT AROUND NERVE Right 7/13/2021    Procedure: Block, Nerve Right Suprascapular Nerve Block with RN IV sedation;  Surgeon: Raffi Wells MD;  Location: [facility];  Service: Pain Management;  Laterality: Right;    INJECTION OF ANESTHETIC AGENT INTO SACROILIAC JOINT N/A 8/12/2020    Procedure: Left IA hip Joint + Left SIJ + Right shoulder injection;  Surgeon: Raffi  MD Priscilla;  Location: [facility];  Service: Pain Management;  Laterality: N/A;    INJECTION OF ANESTHETIC AGENT INTO SACROILIAC JOINT Bilateral 1/31/2023    Procedure: Bilateral SIJ Injection w/Local;  Surgeon: Abdirahman Parker MD;  Location: [facility];  Service: Pain Management;  Laterality: Bilateral;    INJECTION OF ANESTHETIC AGENT INTO SACROILIAC JOINT Bilateral 8/20/2024    Procedure: Bilateral SIJ Injection;  Surgeon: Abdirahman Parker MD;  Location: [facility];  Service: Pain Management;  Laterality: Bilateral;    INJECTION OF JOINT N/A 8/12/2020    Procedure: Left IA hip Joint + Left SIJ + Right shoulder injection;  Surgeon: Raffi Wells MD;  Location: [facility];  Service: Pain Management;  Laterality: N/A;    JOINT REPLACEMENT Right     R NNAMDI - Dr. Dos Santos    LEG SURGERY Right     ex-fix tib/fib    RADIOFREQUENCY THERMOCOAGULATION Left 6/8/2023    Procedure: Left SIJ RFA w/ local (give Valium before);  Surgeon: Abdirahman Parker MD;  Location: [facility];  Service: Pain Management;  Laterality: Left;    SELECTIVE INJECTION OF ANESTHETIC AGENT AROUND LUMBAR SPINAL NERVE ROOT BY TRANSFORAMINAL APPROACH Bilateral 2/10/2021    Procedure: Bilateral L5/S1 TF REJI;  Surgeon: Raffi Wells MD;  Location: [facility];  Service: Pain Management;  Laterality: Bilateral;    SELECTIVE INJECTION OF ANESTHETIC AGENT AROUND LUMBAR SPINAL NERVE ROOT BY TRANSFORAMINAL APPROACH Bilateral 5/25/2022    Procedure: Bilateral L5/S1 + S1 TF REJI;  Surgeon: Raffi Wells MD;  Location: [facility];  Service: Pain Management;  Laterality: Bilateral;    TONSILLECTOMY      TRANSFORAMINAL EPIDURAL INJECTION OF STEROID Left 7/8/2020    Procedure: left L2/3 + L5/S1 TF REJI;  Surgeon: Raffi Wells MD;  Location: [facility];  Service: Pain Management;  Laterality: Left;    TRANSFORAMINAL EPIDURAL INJECTION OF STEROID Left 7/1/2021    Procedure: left L5/S1 + S1 TF REJI;  Surgeon: Raffi Wells MD;  Location: [facility];   "Service: Pain Management;  Laterality: Left;        Imaging/ Diagnostic Studies/ Labs (Reviewed on 11/21/2024):    11/15/2024 MRI Lumbar Spine Without Contrast  COMPARISON: 06/29/2020 MRI lumbar spine; 02/27/2024 radiograph    FINDINGS:  Stable T12 compression deformity with thoracolumbar spine surgical fusion.  Remaining lumbar vertebral body heights maintained.  Grade 1 anterolisthesis L4 on L5.  Multilevel disc desiccation noted with prominent height loss at L2-3 and L5-S1.  Mild Modic type 1 endplate changes at L2-3.    Visualized intra-abdominal/pelvic structures unremarkable.  Conus unchanged.    L1-L2: No spinal canal stenosis or neural foraminal narrowing.    L2-L3: Circumferential disc bulging which in conjunction with facet/ligamentum flavum hypertrophy causes similar spinal canal stenosis.  Similar bilateral inferior neural foraminal narrowing noted.    L3-L4: Mild posterior diffuse disc bulge present without spinal canal stenosis.  Similar minimal to mild bilateral neural foraminal narrowing.  Facet arthropathy.    L4-L5: Spondylolisthesis with disc uncovering.  Prominent facet arthropathy.  Similar mild spinal canal stenosis without high-grade neural foraminal narrowing.    L5-S1: Similar posterior disc osteophyte complex minimally effacing the anterior thecal sac without spinal canal stenosis.  Facet arthropathy.  Similar moderate bilateral neural foraminal narrowing.    Impression  Progression of L2-3 degenerative findings including increased degenerative disc height loss with mild Modic type 1 endplate changes.  Otherwise similar degenerative/chronic findings.            Records from [facility] regarding right shoulder fracture- uploaded into chart under "Media"    Results for orders placed during the hospital encounter of 08/05/20   X-ray Shoulder 2 or More Views Right    Narrative COMPARISON:  12/10/2018  FINDINGS:  There is a chronic fracture deformity of the right humeral head and neck.  Associated " osseous productive changes approximate the inferior margins of the articular surface of the humeral head.  No definite acute fractures or dislocations visualized.  There are mild degenerative changes present at the AC joint and glenoid.  Postoperative findings noted in the lower thoracic spine.     Results for orders placed during the hospital encounter of 12/10/18   X-Ray Shoulder Trauma Right    Narrative FINDINGS:  Comminuted fracture involving the surgical neck and right humeral head.  No evidence of dislocation.  Degenerative changes are present at the right AC joint.    Impression Comminuted fracture involving the right humeral head and surgical neck.     Results for orders placed during the hospital encounter of 06/29/20   MRI Lumbar Spine W WO Cont    Narrative COMPARISON:  Prior MRI from June 29, 2020.  FINDINGS:  Again demonstrated are postoperative findings from thoracolumbar spinal fusion and laminectomy at T12.  Chronic compression deformity of T12 with chronic retropulsion that flattens the thecal sac to 14 mm.  This is unchanged.  Mild, grade 1 degenerative spondylolisthesis at L4-L5.  Vertebral body height is normal.  No abnormal enhancement patterns. The conus medullaris terminates at the level of L2-L3, low lying.  No abnormal signal within the conus. Intervertebral disc levels are as follows:  T11-T12 disc: Fusion at this level.  Mild, chronic retropulsion that flattens the thecal sac to 14 mm.  No significant foraminal stenosis.  T12-L1 disc: Prior laminectomy and fusion.  The thecal sac measures 19 mm.  No significant foraminal stenosis.  L1-L2 disc : Posterior fusion.  No significant spinal stenosis or foraminal stenosis.  L2-L3 disc: Disc space height loss.  Broad-based posterior disc bulge which encroaches slightly into the floors of the exit foramina.  Moderate buckling of ligamentum flavum.  The thecal sac measures 8-9 mm AP.  Mild bilateral foraminal stenosis.  This has progressed since  the prior MRI.  L3-L4 disc: Broad-based posterior disc bulge that encroaches slightly into the floors of the exit foramina.  Moderate buckling of ligamentum flavum.  The thecal sac measures 10 mm AP.  Mild bilateral neural foraminal stenosis, right greater than left.  These findings are similar to prior.  L4-L5 disc: Minimal grade 1 spondylolisthesis with severe degenerative facet change and hypertrophy.  Left-sided facet joint effusion.  Is buckling of ligamentum flavum.  The thecal sac measures 11 mm.  No significant foraminal stenosis.  The spondylolisthesis has slightly progressed measuring 4 mm compared to prior 2 mm.  L5-S1 disc: Severe disc space height loss with marginal disc and osteophyte encroach into the exit foramina bilaterally.  Moderate degenerative facet hypertrophy.  The thecal sac measures 14 mm.    Impression 1. 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  2. Progression of mild foraminal stenosis and mild spinal stenosis at L2-L3.  3. Minimal progression of grade 1 spondylolisthesis at L4-L5.  4. Unchanged mild, chronic retropulsion from T12 where there has been a laminectomy and fusion.       7/30/2018 XR LUMBAR SPINE COMPLETE 5 VIEW  TECHNIQUE:  AP, lateral, spot and bilateral oblique views of the lumbar spine were performed.  COMPARISON:  07/25/2018  FINDINGS:  There is grade 1 spondylolisthesis of L4 on L5.  Pedicle screws and fixation rods are noted for at the T10, T11, L1 and L2 levels.  Chronic compression deformity at the L1 level unchanged.  Prominent bilateral facet arthropathy noted at the L4-5 and L5-S1 levels.  IVC filter noted.     7/30/2018 XR HIPS BILATERAL 2 VIEW INCL AP PELVIS  TECHNIQUE:  AP view of the pelvis and frogleg lateral views of both hips were performed.  COMPARISON:  07/26/2018  FINDINGS:  The bony pelvis is intact. A right total hip arthroplasty, plate and wires are in place.  No hardware failure or loosening suggested.  The appearance is unchanged when compared to the  prior exam.  Mild degenerative changes noted at the left hip.  No acute fracture or dislocation of the left hip.  No significant joint space narrowing identified.  No plain film evidence to suggest AVN of either hip.     Results for orders placed during the hospital encounter of 01/13/14   MRI Lumbar Spine W WO Contrast    Narrative Findings: Pre- and postcontrast multiplanar multisequence imaging of the thoracic and lumbar spine was performed using 7 cc of Gadavist intravenous contrast material.  There is moderately severe chronic central compression deformity of the T12 vertebral body which is stabilized by paraspinous rods and pedicle screws which extend from T10 through L2.  Postsurgical changes of laminectomy also noted at T12.  The posterior cortex of the T12 vertebral body is displaced posteriorly and indents the ventral thecal sac.  There is no anterior cord contact or significant central canal stenosis.  Degenerative disk desiccation is noted at multiple levels with moderate disk   narrowing at L5-S1.    Also L5-S1 is a broad-based disk protrusion which combined with bilateral facet hypertrophy results in moderately severe narrowing of both neural foramina.  No significant central canal stenosis noted.    From L2-3 through L4-5 there   is mild disk bulging which results in mild bilateral foraminal narrowing.  This is slightly more pronounced at L4-5 with bilateral facet hypertrophy a contributing factor.  No significant central canal stenosis noted.  No thoracic disk extrusion, and foraminal narrowing, canal stenosis is evident.  Thoracolumbar cord is intact.  Paravertebral soft tissues are symmetric and normal in appearance.         Review of Systems:  CONSTITUTIONAL: patient denies any fever, chills, or weight loss  SKIN: patient denies any rash or itching  RESPIRATORY: patient denies having any shortness of breath  GASTROINTESTINAL: patient denies having any diarrhea, constipation, or bowel  incontinence  GENITOURINARY: patient denies having any abnormal bladder function    MUSCULOSKELETAL:  - patient complains of the above noted pain/s (see chief pain complaint)    NEUROLOGICAL:   - pain as above  - strength in Lower extremities is intact, BILATERALLY  - sensation in Lower extremities is intact, BILATERALLY  - patient denies any loss of bowel or bladder control      PSYCHIATRIC: patient denies any change in mood    Other:  All other systems reviewed and are negative        Telemedicine Physical Exam:   Vitals:    11/21/24 1407   Resp: 17     There is no height or weight on file to calculate BMI.   (reviewed on 11/21/2024)     (Physical exam performed virtually with patient guided on specific actions and diagnostic maneuvers)  GENERAL: Well appearing, in no acute distress, alert and oriented x3.  Cooperative.  PSYCH:  Mood and affect appropriate.  SKIN: Skin color & texture with no obvious abnormalities.    HEAD/FACE:  Normocephalic, atraumatic.    PULM:  No difficulty breathing. No nasal flaring. No obvious wheezing.  EXTREMITIES: No obvious deformities. Moving all extremities well, appears to have symmetric strength throughout.  MUSCULOSKELETAL: No obvious atrophy abnormalities are noted.   NEURO: No obvious neurologic deficit.   GAIT: sitting.     Physical Exam: last in clinic visit:  General: alert and oriented, in no apparent distress.  Gait: antalgic gait   Skin: no rashes, no discoloration, no obvious lesions  HEENT: normocephalic, atraumatic.   Respiratory: without use of accessory muscles of respiration.     Musculoskeletal - Lumbar Spine:  - Limited ROM secondary to pain reproduction   - Midline scar present over thoracic spine  - Pain on flexion of lumbar spine: Present, worse than with extension  - Pain on extension of lumbar spine: Present  - Lumbar facet loading: Positive bilaterally  - TTP over the lumbar facet joints: Absent   - TTP over GT bursa: Minimal   - Straight Leg Raise: Positive  bilaterally, L>R - improved   - Pain on Internal and external rotation of the hip: Present    SIJ testing:  - TTP over the SI joints: Present bilaterally   - Srikanth's/ Micheal's: Positive    - Sacroiliac Compression Test (lateral pressure): Positive   - SacralThrust Test (posterior pressure): Positive    Right Shoulder:  - Pain on abduction: Present   - ROM: decreased secondary to pain, very limited ROM      - TTP over the AC and GH joint: Present  - Neer's: Positive  - Hawkin's: Positive    Neuro - Lower Extremities:  - BLE Strength: R/L: HF: 5/5, HE: 5/5, KF: 5/5; KE: 5/5; FE: 5/5; FF: 5/5  - Extremity Reflexes: Brisk and symmetric throughout  - Sensory: Sensation to light touch intact bilaterally      Psych:  Mood and affect is appropriate            Assessment:  Clau Nunn is a 89 y.o. year old female who is presenting with       ICD-10-CM ICD-9-CM    1. Vertebrogenic low back pain  M54.51 724.2 Case Request-RAD/Other Procedure Area: Intracept basivertebral nerve ablation at L2 and L3      2. Radicular pain  M54.10 729.2 Case Request-RAD/Other Procedure Area: Lumbar L4/5 IL REJI      3. Discogenic lumbar pain  M51.360 724.2 Case Request-RAD/Other Procedure Area: Lumbar L4/5 IL REJI      4. Sacroiliac joint pain  M53.3 724.6               Plan:  1. Interventional:   - Schedule L4/5 IL REJI - in the meantime. Patient is taking apixaban (Eliquis); she will have to stop 3 days prior to procedure.  Will get clearance from Heme/Onc.      -Patient demonstrates vertebrogenic back pain with endplate inflammation and Modic changes at L2 and L3 vertebral bodies, as seen on MRI.  Patient is a candidate for the Intracept basivertebral nerve ablation at L2 and L3.  Due to the patient's need for some pain relief in to enable continuation of activities of daily living, we will proceed with palliative epidural steroid injection while we consider the  Intracept procedure. Consent signed and uploaded to the  portal.    Additionally, there is no underlying untreated mental health condition including depression, drug and alcohol abuse, or anxiety contributing to this patient's lower back pain.     - S/p bilateral SIJ injection on 8/20/24 with limited pain relief.  - S/p left SIJ RFA on 6/8/23 with better pain relief than she was reporting initially.  She is also getting around better.  - S/p bilateral SIJ injection on 1/31/23 with >50% pain relief on left, >70% pain relief on right - short-term pain relief.  - S/p Bilateral L5/S1 + S1 TF REJI on 5/25/22 with 75% pain relief - regarding leg pain, now having localized SIJ.   - S/p Bilateral L5/S1 TF REJI on 2/10/21 with 20% pain relief x 1 week. Pain now more localized on left.   - S/p Right suprascapular nerve block on 9/16/20 with limited pain relief.    - Anticoagulation use: apixaban (Eliquis) - 2° prevention (h/o DVT) - Heme/Onc.     2. Pharmacologic:   - Hold off on restarting Lyrica (pregabalin) 100mg BID.  She has continued to take gabapentin (Neurontin) 300mg QHS.   - Patient encouraged to take Tylenol 500mg x 2 tablets (1000mg) TID more regularly, not to exceed 3000mg per day.     - LA  reviewed and appropriate.      3. Rehabilitative: Continue use of SIJ belt at home with housework. Physical therapy for shoulder did not help in the past, so doesn't want to attend. SIJ exercises given on AVS previously. Patient is very active.    4. Diagnostic/ Imaging: Lumbar MRI reviewed with patient.    5. Consult/ Referral:   - Continue follow-up with Dr. Varela (Orthopedics) for treatment of bilateral knee pain.  S/p bilateral knee steroid injection on 11/06/2024; planning for viscosupplementation.      6. Follow up: 4 weeks post-procedure     Future Appointments   Date Time Provider Department Center   12/4/2024 10:00 AM Keyonna Robledo PA-C [facility]   12/5/2024 10:00 AM Clau Michel NP [facility]    2/6/2025  3:00 PM Jeanette Molina,  MD [facility]   3/6/2025 10:00 AM LABORATORY, [facility]ENTR[facility]   3/13/2025  9:00 AM Africa Billings MD [facility] DERM [facility]   3/13/2025  9:30 AM Jeffry Bermudez MD ON [facility]  Medical C   3/13/2025 10:15 AM INJECTION 1, [facility] INFUSION [facility] INFSN [facility]   4/29/2025  2:00 PM Kd Rubalcava MD [facility]        - This condition does not require this patient to take time off of work, and the primary goal of our Pain Management services is to improve the patient's functional capacity.   - I discussed the risks, benefits, and alternatives to potential treatment options. All questions and concerns were fully addressed today in clinic.         Angelica Caballero PA-C  Interventional Pain Management - [facility]    Disclaimer:  This note was prepared using voice recognition system and is likely to have sound alike errors that may have been overlooked even after proof reading.  Please call me with any questions.

## 2024-11-28 ENCOUNTER — HOSPITAL ENCOUNTER (EMERGENCY)
Facility: HOSPITAL | Age: 89
Discharge: HOME | End: 2024-11-28
Attending: EMERGENCY MEDICINE
Payer: MEDICARE

## 2024-11-28 VITALS
DIASTOLIC BLOOD PRESSURE: 79 MMHG | WEIGHT: 160 LBS | BODY MASS INDEX: 28.35 KG/M2 | TEMPERATURE: 98 F | RESPIRATION RATE: 20 BRPM | HEIGHT: 63 IN | SYSTOLIC BLOOD PRESSURE: 178 MMHG | HEART RATE: 88 BPM | OXYGEN SATURATION: 100 %

## 2024-11-28 DIAGNOSIS — S09.90XA INJURY OF HEAD, INITIAL ENCOUNTER: Primary | ICD-10-CM

## 2024-11-28 DIAGNOSIS — S01.81XA LACERATION OF FOREHEAD, INITIAL ENCOUNTER: ICD-10-CM
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
## 2024-11-28 PROCEDURE — 25000003 PHARM REV CODE 250: Performed by: EMERGENCY MEDICINE

## 2024-11-28 PROCEDURE — 12015 RPR F/E/E/N/L/M 7.6-12.5 CM: CPT

## 2024-11-28 PROCEDURE — 63600175 PHARM REV CODE 636 W HCPCS: Performed by: EMERGENCY MEDICINE

## 2024-11-28 PROCEDURE — 99284 EMERGENCY DEPT VISIT MOD MDM: CPT | Mod: 25

## 2024-11-28 RX ORDER — VALSARTAN 160 MG/1
160 TABLET ORAL
Status: COMPLETED | OUTPATIENT
Start: 2024-11-28 | End: 2024-11-28

## 2024-11-28 RX ORDER — LIDOCAINE HYDROCHLORIDE AND EPINEPHRINE 10; 10 UG/ML; MG/ML
20 INJECTION, SOLUTION INFILTRATION; PERINEURAL ONCE
Status: COMPLETED | OUTPATIENT
Start: 2024-11-28 | End: 2024-11-28

## 2024-11-28 RX ADMIN — VALSARTAN 160 MG: 160 TABLET, FILM COATED ORAL at 05:11

## 2024-11-28 RX ADMIN — LIDOCAINE HYDROCHLORIDE,EPINEPHRINE BITARTRATE 20 ML: 10; .01 INJECTION, SOLUTION INFILTRATION; PERINEURAL at 04:11

## 2024-11-28 NOTE — DISCHARGE INSTRUCTIONS
1.)  Start using your walker at all times.    2.)  Have sutures evaluated in 7-10 days for removal.

## 2024-11-28 NOTE — ED PROVIDER NOTES
SCRIBE #1 NOTE: I, Nolan Magana, am scribing for, and in the presence of, Kd Gonzalez DO. I have scribed the entire note.       History     Chief Complaint   Patient presents with    Fall     Slipped and fell, hit head on . Denies LOC. Laceration to forehead, c/o pain to left knee. Patient is on eliquis      Review of patient's allergies indicates:   Allergen Reactions    Cephalexin Hives, Itching, Swelling, Anxiety, Dermatitis and Rash    Mupirocin Itching         History of Present Illness     HPI    11/28/2024, 2:52 PM  History obtained from the patient      History of Present Illness: Clau Nunn is a 89 y.o. female patient with a PMHx of HTN, hypothyroid, GERD, depression, anxiety, asthma, osteoarthritis, diverticulosis, osteoporosis, and radius fracture who presents to the Emergency Department for evaluation of trip and fall injuries which onset suddenly PTA. Pt states she fell and hit her head on the . Pt states she is on Eliquis. Pt is unsure of her last tetanus shot. Symptoms are constant and moderate in severity. No mitigating or exacerbating factors reported. Associated sxs include lacerations to forehead. Patient denies any LOC at the time of the fall. No prior Tx reported. No further complaints or concerns at this time.       Arrival mode: Personal Transportation    PCP: Jeanette Molina MD        Past Medical History:  Past Medical History:   Diagnosis Date    Allergy     Anxiety     Asthma     Back pain     Chest pain 1/30/2024    Chronic venous insufficiency 11/19/2013    Depression     Diverticulosis 11/19/2013 1/8/8 colonoscopy    Dry eyes     Fracture, sacrum/coccyx     Dr. Sanchez     GERD (gastroesophageal reflux disease)     H/O total hip arthroplasty 12/30/2015    Hypertension     Hypothyroid     Osteoarthritis     Osteoporosis     Postlaminectomy syndrome of lumbar region 1/8/2014    Radius fracture     7/18    Simple chronic bronchitis 5/3/2017     Umbilical hernia 11/19/2013    Urinary incontinence     Vitamin D deficiency disease        Past Surgical History:  Past Surgical History:   Procedure Laterality Date    ADENOIDECTOMY      BACK SURGERY      x2    breast implants  1973    CATARACT EXTRACTION Bilateral     CLOSED REDUCTION DISTAL RADIUS FRACTURE      Dr. Black, 8/18    cystoscope  1/6/16    DR. Brown    FRACTURE SURGERY  April 3 2015    left wrist    HAND SURGERY      HIP SURGERY Right     total hip- Dr. Dos Santos    HYSTERECTOMY      36y/o    INJECTION OF ANESTHETIC AGENT AROUND NERVE Right 9/16/2020    Procedure: Right suprascapular nerve block;  Surgeon: Raffi Wells MD;  Location: [facility];  Service: Pain Management;  Laterality: Right;    INJECTION OF ANESTHETIC AGENT AROUND NERVE Right 7/13/2021    Procedure: Block, Nerve Right Suprascapular Nerve Block with RN IV sedation;  Surgeon: Raffi Wells MD;  Location: [facility];  Service: Pain Management;  Laterality: Right;    INJECTION OF ANESTHETIC AGENT INTO SACROILIAC JOINT N/A 8/12/2020    Procedure: Left IA hip Joint + Left SIJ + Right shoulder injection;  Surgeon: Raffi eWlls MD;  Location: [facility];  Service: Pain Management;  Laterality: N/A;    INJECTION OF ANESTHETIC AGENT INTO SACROILIAC JOINT Bilateral 1/31/2023    Procedure: Bilateral SIJ Injection w/Local;  Surgeon: Abdirahman Parker MD;  Location: [facility];  Service: Pain Management;  Laterality: Bilateral;    INJECTION OF ANESTHETIC AGENT INTO SACROILIAC JOINT Bilateral 8/20/2024    Procedure: Bilateral SIJ Injection;  Surgeon: Abdirahman Parker MD;  Location: [facility];  Service: Pain Management;  Laterality: Bilateral;    INJECTION OF JOINT N/A 8/12/2020    Procedure: Left IA hip Joint + Left SIJ + Right shoulder injection;  Surgeon: Raffi Wells MD;  Location: [facility];  Service: Pain Management;  Laterality: N/A;    JOINT REPLACEMENT Right     R NNAMDI - Dr. Dos Santos    LEG SURGERY Right     ex-fix tib/fib     RADIOFREQUENCY THERMOCOAGULATION Left 6/8/2023    Procedure: Left SIJ RFA w/ local (give Valium before);  Surgeon: Abdirahman Parker MD;  Location: [facility];  Service: Pain Management;  Laterality: Left;    SELECTIVE INJECTION OF ANESTHETIC AGENT AROUND LUMBAR SPINAL NERVE ROOT BY TRANSFORAMINAL APPROACH Bilateral 2/10/2021    Procedure: Bilateral L5/S1 TF REJI;  Surgeon: Raffi Wells MD;  Location: [facility];  Service: Pain Management;  Laterality: Bilateral;    SELECTIVE INJECTION OF ANESTHETIC AGENT AROUND LUMBAR SPINAL NERVE ROOT BY TRANSFORAMINAL APPROACH Bilateral 5/25/2022    Procedure: Bilateral L5/S1 + S1 TF REJI;  Surgeon: Raffi Wells MD;  Location: [facility];  Service: Pain Management;  Laterality: Bilateral;    TONSILLECTOMY      TRANSFORAMINAL EPIDURAL INJECTION OF STEROID Left 7/8/2020    Procedure: left L2/3 + L5/S1 TF REJI;  Surgeon: Raffi Wells MD;  Location: [facility];  Service: Pain Management;  Laterality: Left;    TRANSFORAMINAL EPIDURAL INJECTION OF STEROID Left 7/1/2021    Procedure: left L5/S1 + S1 TF REJI;  Surgeon: Raffi Wells MD;  Location: [facility];  Service: Pain Management;  Laterality: Left;         Family History:  Family History   Problem Relation Name Age of Onset    COPD Mother      Cancer Mother          lung CA    Pulmonary fibrosis Sister      Cancer Daughter          colon    Stroke Father      Diabetes Son      Melanoma Neg Hx      Psoriasis Neg Hx      Lupus Neg Hx         Social History:  Social History     Tobacco Use    Smoking status: Never    Smokeless tobacco: Never   Substance and Sexual Activity    Alcohol use: Yes     Alcohol/week: 0.0 standard drinks of alcohol     Comment: rarely    Drug use: Never    Sexual activity: Never     Partners: Male     Birth control/protection: Post-menopausal        Review of Systems     Review of Systems   Skin:  Positive for wound (laceration to forehead).      Physical Exam     Initial Vitals [11/28/24 1402]    BP Pulse Resp Temp SpO2   (!) 179/77 74 20 97.9 °F (36.6 °C) 97 %      MAP       --          Physical Exam  Vitals reviewed.   Constitutional:       General: She is not in acute distress.     Appearance: Normal appearance.   HENT:      Head:      Comments: 10 cm C-shaped laceration noted to the forehead with mild oozing  Eyes:      Extraocular Movements: Extraocular movements intact.      Pupils: Pupils are equal, round, and reactive to light.   Neck:      Comments: There is no midline tenderness to palpation, step-offs, crepitus noted to the cervical spine  Cardiovascular:      Rate and Rhythm: Normal rate and regular rhythm.      Heart sounds: No murmur heard.  Pulmonary:      Effort: Pulmonary effort is normal.      Breath sounds: No wheezing.   Abdominal:      General: Abdomen is flat.      Palpations: Abdomen is soft.      Tenderness: There is no abdominal tenderness.   Musculoskeletal:         General: No tenderness. Normal range of motion.      Comments: There is no midline tenderness to palpation, step-offs, crepitus noted to the thoracic or lumbar spine   Skin:     Comments: 10 cm C-shaped laceration noted to the forehead   Neurological:      General: No focal deficit present.      Mental Status: She is alert and oriented to person, place, and time.      Sensory: No sensory deficit.      Motor: No weakness.          ED Course   Lac Repair    Date/Time: 11/28/2024 4:10 PM    Performed by: Kd Gonzalez DO  Authorized by: Kd Gonzalez DO    Consent:     Consent obtained:  Verbal    Consent given by:  Patient    Risks, benefits, and alternatives were discussed: yes      Risks discussed:  Infection, pain, retained foreign body, need for additional repair, poor cosmetic result, tendon damage, vascular damage, poor wound healing and nerve damage    Alternatives discussed:  No treatment  Universal protocol:     Procedure explained and questions answered to patient or proxy's satisfaction: yes   "    Relevant documents present and verified: yes      Test results available: yes      Imaging studies available: yes      Required blood products, implants, devices, and special equipment available: yes      Site/side marked: yes      Immediately prior to procedure, a time out was called: yes      Patient identity confirmed:  Verbally with patient  Anesthesia:     Anesthesia method:  Local infiltration    Local anesthetic:  Lidocaine 1% WITH epi  Laceration details:     Location:  Face    Face location:  Forehead    Length (cm):  10  Pre-procedure details:     Preparation:  Patient was prepped and draped in usual sterile fashion  Exploration:     Contaminated: no    Treatment:     Area cleansed with:  Povidone-iodine and soap and water    Amount of cleaning:  Standard  Skin repair:     Repair method:  Sutures    Suture size:  3-0    Suture material:  Nylon    Suture technique:  Simple interrupted    Number of sutures:  14  Approximation:     Approximation:  Close  Repair type:     Repair type:  Intermediate    ED Vital Signs:  Vitals:    11/28/24 1402 11/28/24 1415 11/28/24 1425 11/28/24 1605   BP: (!) 179/77 129/75 130/72 (!) 179/84   Pulse: 74 70 64 64   Resp: 20 20 20 20   Temp: 97.9 °F (36.6 °C)      TempSrc: Oral      SpO2: 97% 97% 98% 97%   Weight: 72.6 kg (160 lb)      Height: 5' 3" (1.6 m)       11/28/24 1635 11/28/24 1730 11/28/24 1752   BP: (!) 167/73 (!) 183/81 (!) 178/79   Pulse: 67 88    Resp: 20     Temp:      TempSrc:      SpO2: 100% 100%    Weight:      Height:          Abnormal Lab Results:  Labs Reviewed - No data to display     Imaging Results:  Imaging Results              CT Head Without Contrast (Final result)  Result time 11/28/24 14:45:13      Final result by Jcarlos Chicas MD (11/28/24 14:45:13)                   Impression:      No CT evidence of acute intracranial abnormality.    Small right frontal scalp contusion and laceration.    All CT scans at this facility use dose modulation, " iterative reconstruction, and/or weight base dosing when appropriate to reduce radiation dose to as low as reasonably achievable.      Electronically signed by: Jcarlos Chicas  Date:    11/28/2024  Time:    14:45               Narrative:    EXAMINATION:  CT HEAD WITHOUT CONTRAST    CLINICAL HISTORY:  Head trauma, moderate-severe;    TECHNIQUE:  Contiguous axial images were obtained from the skull base through the vertex without intravenous contrast.    COMPARISON:  Head CT 08/02/2022    FINDINGS:  No acute intracranial hemorrhage.  Unchanged basal ganglia mineralization.  No mass effect or midline shift. Normal parenchymal attenuation. The ventricles and sulci are normal in size and configuration. The visualized paranasal sinuses and mastoid air cells are clear.  Small right frontal scalp contusion and laceration with subcutaneous gas.  No acute osseous abnormality.                                       CT Cervical Spine Without Contrast (Final result)  Result time 11/28/24 14:55:08      Final result by Jcarlos Chicas MD (11/28/24 14:55:08)                   Impression:      No CT evidence of acute cervical spine fracture or subluxation.    Mild multilevel degenerative changes of the cervical spine as discussed in the body of the report.    All CT scans at this facility use dose modulation, iterative reconstruction, and/or weight based dosing when appropriate to reduce radiation dose to as low as reasonable achievable.      Electronically signed by: Jcarlos Chicas  Date:    11/28/2024  Time:    14:55               Narrative:    EXAMINATION:  CT CERVICAL SPINE WITHOUT CONTRAST    CLINICAL HISTORY:  Neck trauma (Age >= 65y);    TECHNIQUE:  Low dose axial images, sagittal and coronal reformations were performed though the cervical spine.  Contrast was not administered.    COMPARISON:  None.    FINDINGS:  Skull Base and Craniocervical Junction (partially imaged): Within normal limits.    Spinal Alignment: Grade 1  C6-C7, C7-T1 and T2-T3 anterolisthesis.    Vertebrae: Cervical vertebral body heights are maintained.  No evidence of an acute displaced fracture.    Discs: Mild multilevel intervertebral disc height loss.    Degenerative findings:Multilevel small posterior disc osteophyte complexes without definite ventral cord contact.  Prominent C6-C7 ligamentum flavum hypertrophy and grade 1 anterolisthesis contributes to minor spinal canal stenosis.  No additional significant cervical spinal canal stenosis.  Multilevel facet arthropathy and uncovertebral joint spurring contribute to mild-to-moderate neural foraminal stenosis throughout the cervical spine.  Neural foraminal stenosis appears most pronounced on the left at C4-C5 with moderate stenosis.    Paraspinal muscles & soft tissues: Heavy bilateral carotid bifurcation atherosclerotic calcifications.                                       CT Maxillofacial Without Contrast (Final result)  Result time 11/28/24 14:49:24      Final result by Jcarlos Chicas MD (11/28/24 14:49:24)                   Impression:      No acute maxillofacial fracture.    Small right frontal scalp hematoma and laceration.    Tiny right face soft tissue contusion.    All CT scans at this facility use dose modulation, iterative reconstruction, and/or weight based dosing when appropriate to reduce radiation dose to as low as reasonable achievable.      Electronically signed by: Jcarlos Chicas  Date:    11/28/2024  Time:    14:49               Narrative:    EXAMINATION:  CT MAXILLOFACIAL WITHOUT CONTRAST    CLINICAL HISTORY:  Facial trauma, blunt;    TECHNIQUE:  Low dose axial images, sagittal and coronal reformations were obtained through the face.  Contrast was not administered.    COMPARISON:  None.    FINDINGS:  Facial bones: No evidence of acute displaced fracture.  Significant degenerative changes of the bilateral temporomandibular joints with small bilateral, well corticated presumed  osteophytes.    Subcutaneous soft tissues: Right frontal scalp soft tissue contusion and laceration.  Tiny soft tissue fat stranding of the right face.  There are bilateral subcutaneous calcifications.  There are carotid bifurcation atherosclerotic calcifications bilaterally.    Orbits: Orbital walls are intact.  Globes are normal in position and shape.  Postsurgical changes of both lenses.  No abnormal intraorbital fat stranding.    Paranasal sinuses: Predominantly clear.    Aerodigestive tract: Visualized portions appear grossly within normal limits.    Lymph nodes: No pathologic cervical lymphadenopathy.    Salivary glands: Within normal limits.    Vascular structures: Heavy bilateral carotid bifurcation atherosclerotic calcifications.    Skull base: Within normal limits.    Cervical spine (imaged portions): Normal.    Other findings: N/A                                              The Emergency Provider reviewed the vital signs and test results, which are outlined above.     ED Discussion     5:00 PM: Reassessed pt at this time. Discussed with pt all pertinent ED information and results. Discussed pt dx and plan of tx. Gave pt all f/u and return to the ED instructions. All questions and concerns were addressed at this time. Pt expresses understanding of information and instructions, and is comfortable with plan to discharge. Pt is stable for discharge.    I discussed with patient and/or family/caretaker that evaluation in the ED does not suggest any emergent or life threatening medical conditions requiring immediate intervention beyond what was provided in the ED, and I believe patient is safe for discharge.  Regardless, an unremarkable evaluation in the ED does not preclude the development or presence of a serious of life threatening condition. As such, patient was instructed to return immediately for any worsening or change in current symptoms.      ED Course as of 11/29/24 1142   Thu Nov 28, 2024   2669 CT  Head Without Contrast  No CT evidence of acute intracranial abnormality.     Small right frontal scalp contusion and laceration.   [CD]   1519 CT Maxillofacial Without Contrast  No acute maxillofacial fracture.     Small right frontal scalp hematoma and laceration.     Tiny right face soft tissue contusion.   [CD]   1519 CT Cervical Spine Without Contrast  No CT evidence of acute cervical spine fracture or subluxation.     Mild multilevel degenerative changes of the cervical spine as discussed in the body of the report.      [CD]      ED Course User Index  [CD] Kd Gonzalez,      Medical Decision Making  Instructed to have sutures evaluated for removal in 7 days.  Head injury precautions discussed.  Instructed to return immediately for any new or worsening symptoms and she verbalized understanding.    Amount and/or Complexity of Data Reviewed  Radiology: ordered. Decision-making details documented in ED Course.    Risk  Prescription drug management.  Risk Details: Differential diagnosis includes but is not limited to:  Fracture, dislocation, sprain, strain, contusion, intracerebral hemorrhage, vascular injury                ED Medication(s):  Medications   LIDOcaine-EPINEPHrine 1%-1:100,000 injection 20 mL (20 mLs Intradermal Given by Provider 11/28/24 1600)   valsartan tablet 160 mg (160 mg Oral Given 11/28/24 1072)       Discharge Medication List as of 11/28/2024  4:59 PM           Follow-up Information       Schedule an appointment as soon as possible for a visit  with Jeanette Molina MD.    Specialty: Internal Medicine  Contact information:  [address]  [facility] [id number] [phone]                                 Scribe Attestation:   Scribe #1: I performed the above scribed service and the documentation accurately describes the services I performed. I attest to the accuracy of the note.     Attending:   Physician Attestation Statement for Scribe #1: Carlos MILLS Christopher B.,  , personally performed the services described in this documentation, as scribed by Nolan Magana, in my presence, and it is both accurate and complete.           Clinical Impression       ICD-10-CM ICD-9-CM   1. Injury of head, initial encounter  S09.90XA 959.01   2. Laceration of forehead, initial encounter  S01.81XA 873.42   3. Fall, initial encounter  W19.XXXA E888.9       Disposition:   Disposition: Discharged  Condition: Stable       Kd Gonzalez,   11/29/24 1144

## 2024-11-29 ENCOUNTER — TELEPHONE (OUTPATIENT)
Dept: SPORTS MEDICINE | Facility: CLINIC | Age: 89
End: 2024-11-29
Payer: MEDICARE

## 2024-11-29 NOTE — TELEPHONE ENCOUNTER
Appt cancelled. ASAEL    ----- Message from Jose sent at 11/29/2024 11:53 AM CST -----  Contact: tennille  Type:  Cancel Appointment Request      Name of Caller:tennille  When is the first available appointment?unknown  Symptoms:cancel appointment for 12/4  Would the patient rather a call back or a response via [facility]? call  Best Call Back Number:[phone]  Additional Information:

## 2024-12-05 ENCOUNTER — OFFICE VISIT (OUTPATIENT)
Dept: PRIMARY CARE CLINIC | Facility: CLINIC | Age: 89
End: 2024-12-05
Payer: MEDICARE

## 2024-12-05 VITALS
WEIGHT: 167.88 LBS | SYSTOLIC BLOOD PRESSURE: 140 MMHG | DIASTOLIC BLOOD PRESSURE: 72 MMHG | HEIGHT: 63 IN | BODY MASS INDEX: 29.75 KG/M2 | OXYGEN SATURATION: 97 % | HEART RATE: 72 BPM | TEMPERATURE: 98 F

## 2024-12-05 DIAGNOSIS — Z86.718 HISTORY OF DVT IN ADULTHOOD: ICD-10-CM

## 2024-12-05 DIAGNOSIS — Z79.01 CHRONIC ANTICOAGULATION: ICD-10-CM

## 2024-12-05 DIAGNOSIS — R29.6 RECURRENT FALLS: ICD-10-CM

## 2024-12-05 DIAGNOSIS — N39.0 RECURRENT UTI: Primary | ICD-10-CM

## 2024-12-05 DIAGNOSIS — S01.81XD LACERATION OF FOREHEAD, SUBSEQUENT ENCOUNTER: ICD-10-CM

## 2024-12-05 PROBLEM — S01.81XA LACERATION OF FOREHEAD: Status: ACTIVE | Noted: 2024-12-05

## 2024-12-05 PROCEDURE — 3288F FALL RISK ASSESSMENT DOCD: CPT | Mod: CPTII,S$GLB,, | Performed by: NURSE PRACTITIONER

## 2024-12-05 PROCEDURE — 99215 OFFICE O/P EST HI 40 MIN: CPT | Mod: S$GLB,,, | Performed by: NURSE PRACTITIONER

## 2024-12-05 PROCEDURE — 99999 PR PBB SHADOW E&M-EST. PATIENT-LVL V: CPT | Mod: PBBFAC,,, | Performed by: NURSE PRACTITIONER

## 2024-12-05 PROCEDURE — 1157F ADVNC CARE PLAN IN RCRD: CPT | Mod: CPTII,S$GLB,, | Performed by: NURSE PRACTITIONER

## 2024-12-05 PROCEDURE — 1100F PTFALLS ASSESS-DOCD GE2>/YR: CPT | Mod: CPTII,S$GLB,, | Performed by: NURSE PRACTITIONER

## 2024-12-05 PROCEDURE — 1126F AMNT PAIN NOTED NONE PRSNT: CPT | Mod: CPTII,S$GLB,, | Performed by: NURSE PRACTITIONER

## 2024-12-05 PROCEDURE — 1159F MED LIST DOCD IN RCRD: CPT | Mod: CPTII,S$GLB,, | Performed by: NURSE PRACTITIONER

## 2024-12-05 RX ORDER — CIPROFLOXACIN 500 MG/1
500 TABLET ORAL 2 TIMES DAILY
Qty: 14 TABLET | Refills: 0 | Status: SHIPPED | OUTPATIENT
Start: 2024-12-05 | End: 2024-12-12

## 2024-12-05 NOTE — PROGRESS NOTES
Clau Nunn  12/05/2024  [id number]    Jeanette Molina MD  Patient Care Team:  Jeanette Molina MD as PCP - General (Internal Medicine)  Kandice Salcedo PA-C as Physician Assistant (Rheumatology)  Raffi Wells MD (Inactive) as Consulting Physician (Pain Medicine)  Jeanette Manriquez MD as Obstetrician (Obstetrics)  Ayesha Gillespie, EMILIANA as           [facility] Note    Family and/or Caretaker present at visit?  No.  Diagnostic tests reviewed/disposition: No diagnosic tests pending after this hospitalization.  Disease/illness education:  Fall precautions  Home health/community services discussion/referrals: Patient does not have home health established from hospital visit.  They do not need home health.  If needed, we will set up home health for the patient.   Establishment or re-establishment of referral orders for community resources: No other necessary community resources.   Discussion with other health care providers: No discussion with other health care providers necessary.     Returns for F/U of recent ED visit on 11/28/24 for mechanical fall  with head injury. She fell hitting her head on a . Had resultant forehead lacs. No LOC  Denies presyncopal symptoms  CT Head - small right frontal scalp contusion  CT C spine - no acute findings  CT Maxillofacial - no acute findings    She had a 10 cm forehead lac repair in the ED. Denies pain associated with lac  Her energy level has improved since her fall  Received a letter from [employer] who will not continue to pay for Eliquis  Has teja filter right groin placed with last surgery  Within last week had symptoms of UTI - burning and frequency - she took few pills of Cipro and Azo and symptoms resolved.  Asking for Cipro Rx to keep at home for recurrent of symptoms.  Blood pressures from home:  146/88, 158/70, 135/85, 120/70, 133/80, 112/60, 130/70  Continues with harsh dry cough  Normal BM    Denies fever, chills, URI  symptoms, chest pain, SOB, palpitations, vomiting, diarrhea, no gross neuro deficits, urinary symptoms                 The following were reviewed: Active problem list, medication list, allergies, family history, social history, and Health Maintenance.     History:  Past Medical History:   Diagnosis Date    Allergy     Anxiety     Asthma     Back pain     Chest pain 1/30/2024    Chronic venous insufficiency 11/19/2013    Depression     Diverticulosis 11/19/2013 1/8/8 colonoscopy    Dry eyes     Fracture, sacrum/coccyx     Dr. Sanchez     GERD (gastroesophageal reflux disease)     H/O total hip arthroplasty 12/30/2015    Hypertension     Hypothyroid     Osteoarthritis     Osteoporosis     Postlaminectomy syndrome of lumbar region 1/8/2014    Radius fracture     7/18    Simple chronic bronchitis 5/3/2017    Umbilical hernia 11/19/2013    Urinary incontinence     Vitamin D deficiency disease      Past Surgical History:   Procedure Laterality Date    ADENOIDECTOMY      BACK SURGERY      x2    breast implants  1973    CATARACT EXTRACTION Bilateral     CLOSED REDUCTION DISTAL RADIUS FRACTURE      Dr. Black, 8/18    cystoscope  1/6/16    DR. Brown    FRACTURE SURGERY  April 3 2015    left wrist    HAND SURGERY      HIP SURGERY Right     total hip- Dr. Dos Santos    HYSTERECTOMY      34y/o    INJECTION OF ANESTHETIC AGENT AROUND NERVE Right 9/16/2020    Procedure: Right suprascapular nerve block;  Surgeon: Raffi Wells MD;  Location: [facility];  Service: Pain Management;  Laterality: Right;    INJECTION OF ANESTHETIC AGENT AROUND NERVE Right 7/13/2021    Procedure: Block, Nerve Right Suprascapular Nerve Block with RN IV sedation;  Surgeon: Raffi Wells MD;  Location: [facility];  Service: Pain Management;  Laterality: Right;    INJECTION OF ANESTHETIC AGENT INTO SACROILIAC JOINT N/A 8/12/2020    Procedure: Left IA hip Joint + Left SIJ + Right shoulder injection;  Surgeon: Raffi Wells MD;  Location: [facility];   Service: Pain Management;  Laterality: N/A;    INJECTION OF ANESTHETIC AGENT INTO SACROILIAC JOINT Bilateral 1/31/2023    Procedure: Bilateral SIJ Injection w/Local;  Surgeon: Abdirahman Parker MD;  Location: [facility];  Service: Pain Management;  Laterality: Bilateral;    INJECTION OF ANESTHETIC AGENT INTO SACROILIAC JOINT Bilateral 8/20/2024    Procedure: Bilateral SIJ Injection;  Surgeon: Abdirahman Parker MD;  Location: [facility];  Service: Pain Management;  Laterality: Bilateral;    INJECTION OF JOINT N/A 8/12/2020    Procedure: Left IA hip Joint + Left SIJ + Right shoulder injection;  Surgeon: Raffi Wells MD;  Location: [facility];  Service: Pain Management;  Laterality: N/A;    JOINT REPLACEMENT Right     R NNAMDI - Dr. Dos Santos    LEG SURGERY Right     ex-fix tib/fib    RADIOFREQUENCY THERMOCOAGULATION Left 6/8/2023    Procedure: Left SIJ RFA w/ local (give Valium before);  Surgeon: Abdirahman Parker MD;  Location: [facility];  Service: Pain Management;  Laterality: Left;    SELECTIVE INJECTION OF ANESTHETIC AGENT AROUND LUMBAR SPINAL NERVE ROOT BY TRANSFORAMINAL APPROACH Bilateral 2/10/2021    Procedure: Bilateral L5/S1 TF REJI;  Surgeon: Raffi Wells MD;  Location: [facility];  Service: Pain Management;  Laterality: Bilateral;    SELECTIVE INJECTION OF ANESTHETIC AGENT AROUND LUMBAR SPINAL NERVE ROOT BY TRANSFORAMINAL APPROACH Bilateral 5/25/2022    Procedure: Bilateral L5/S1 + S1 TF REJI;  Surgeon: Raffi Wells MD;  Location: [facility];  Service: Pain Management;  Laterality: Bilateral;    TONSILLECTOMY      TRANSFORAMINAL EPIDURAL INJECTION OF STEROID Left 7/8/2020    Procedure: left L2/3 + L5/S1 TF REJI;  Surgeon: Raffi Wells MD;  Location: [facility];  Service: Pain Management;  Laterality: Left;    TRANSFORAMINAL EPIDURAL INJECTION OF STEROID Left 7/1/2021    Procedure: left L5/S1 + S1 TF REJI;  Surgeon: Raffi Wells MD;  Location: [facility];  Service: Pain Management;   Laterality: Left;     Family History   Problem Relation Name Age of Onset    COPD Mother      Cancer Mother          lung CA    Pulmonary fibrosis Sister      Cancer Daughter          colon    Stroke Father      Diabetes Son      Melanoma Neg Hx      Psoriasis Neg Hx      Lupus Neg Hx       Patient Active Problem List   Diagnosis    Primary osteoarthritis involving multiple joints    Acquired hypothyroidism    Lumbar arthropathy    Venous insufficiency of both lower extremities    Anxiety    Vitamin D deficiency disease    Essential hypertension    Hiatal hernia with gastroesophageal reflux    Sacroiliac inflammation    Atherosclerosis of aorta    Chronic lumbar radiculopathy    Stage 3b chronic kidney disease    Simple chronic bronchitis    Pain of left hip joint    Lumbar spondylosis    Pain in both lower extremities    Age-related osteoporosis with current pathological fracture with delayed healing    Radius fracture    History of DVT in adulthood    Lumbar radiculopathy    Secondary hyperparathyroidism    Recurrent major depressive disorder, in partial remission    Shoulder pain    Chronic anticoagulation    Gastroesophageal reflux disease without esophagitis    Impaired functional mobility, balance, gait, and endurance    Left foot pain    Slow transit constipation    Skin tear of left forearm without complication    Solar purpura    Intermittent right-sided chest pain    Chest pain    Avascular necrosis of right humeral head    Recurrent falls    Chronic cough    PEREZ (dyspnea on exertion)    Laceration of forehead     Review of patient's allergies indicates:   Allergen Reactions    Cephalexin Hives, Itching, Swelling, Anxiety, Dermatitis and Rash    Mupirocin Itching       Medications:  Current Outpatient Medications on File Prior to Visit   Medication Sig Dispense Refill    azelastine (ASTELIN) 137 mcg (0.1 %) nasal spray 2 sprays by Nasal route 2 (two) times daily.      betamethasone dipropionate (DIPROLENE)  0.05 % ointment Apply to affected fingers around nails at night under occlusion with white cotton gloves for 2-4 weeks 45 g 3    busPIRone (BUSPAR) 5 MG Tab TAKE 1 TABLET TWICE DAILY 180 tablet 5    chlorthalidone (HYGROTEN) 25 MG Tab Take 1 tablet (25 mg total) by mouth once daily. 90 tablet 2    cholecalciferol, vitamin D3, (D3-2000) 50 mcg (2,000 unit) Cap capsule Take 1 capsule (2,000 Units total) by mouth once daily. 90 capsule 11    ciclopirox (PENLAC) 8 % Soln Apply topically nightly. 6.6 mL 3    clobetasoL (TEMOVATE) 0.05 % external solution Pt to mix in 1 jar of cerave cream and apply to affected areas bid for 2-4 weeks per course 50 mL 3    cyclobenzaprine (FLEXERIL) 5 MG tablet TAKE 1 TABLET (5 MG TOTAL) BY MOUTH NIGHTLY AS NEEDED FOR MUSCLE SPASMS. 90 tablet 1    cycloSPORINE (RESTASIS MULTIDOSE) 0.05 % Drop Place 1 drop into both eyes every 12 (twelve) hours. 5.5 mL 12    ELIQUIS 5 mg Tab TAKE 1 TABLET TWICE DAILY 180 tablet 3    EScitalopram oxalate (LEXAPRO) 10 MG tablet Take 1 tablet (10 mg total) by mouth once daily. 90 tablet 3    fluconazole (DIFLUCAN) 150 MG Tab Take 1 tablet (150 mg total) by mouth every 7 days. 12 tablet 0    fluocinonide 0.05% (LIDEX) 0.05 % cream AAA bid to leg for 2-4 weeks per course 60 g 1    fluticasone propionate (FLONASE) 50 mcg/actuation nasal spray       furosemide (LASIX) 40 MG tablet Take 1 tablet (40 mg total) by mouth once daily. 15 tablet 0    gabapentin (NEURONTIN) 300 MG capsule Take 1 capsule (300 mg total) by mouth every evening. 90 capsule 3    levothyroxine (SYNTHROID) 100 MCG tablet TAKE 1 TABLET EVERY DAY 90 tablet 3    LIDOcaine (LIDODERM) 5 % Place 1 patch onto the skin once daily. Remove & Discard patch within 12 hours or as directed by MD 30 patch 1    omeprazole (PRILOSEC) 40 MG capsule Take 1 capsule (40 mg total) by mouth every morning. 90 capsule 1    polyethylene glycol (GLYCOLAX) 17 gram/dose powder Take 17 g by mouth once daily. 595 g 0     PROLIA 60 mg/mL Syrg       traZODone (DESYREL) 50 MG tablet Take 1-2 tablets ( mg total) by mouth nightly as needed for Insomnia. 180 tablet 3    valsartan (DIOVAN) 160 MG tablet Take 1 tablet (160 mg total) by mouth once daily. 90 tablet 3    [DISCONTINUED] amoxicillin (AMOXIL) 500 MG capsule Take by mouth.      albuterol (PROVENTIL) 2.5 mg /3 mL (0.083 %) nebulizer solution Take 3 mLs (2.5 mg total) by nebulization every 6 (six) hours as needed for Wheezing. Rescue 1 each 6    solifenacin (VESICARE) 5 MG tablet Take 1 tablet (5 mg total) by mouth once daily. 30 tablet 11     Current Facility-Administered Medications on File Prior to Visit   Medication Dose Route Frequency Provider Last Rate Last Admin    denosumab (PROLIA) injection 60 mg  60 mg Subcutaneous Q6 Months Carly Mora, PA-C   60 mg at 01/28/19 1548    neomycin-bacitracin-polymyxin ointment   Topical (Top) 1 time in Clinic/HOD Clau Michel NP           Medications have been reviewed and reconciled with patient at visit today.      Exam:  Vitals:    12/05/24 0950   BP: (!) 140/72   Pulse: 72   Temp: 97.5 °F (36.4 °C)     Weight: 76.1 kg (167 lb 14.1 oz)   Body mass index is 29.74 kg/m².      BP Readings from Last 3 Encounters:   12/05/24 (!) 140/72   11/28/24 (!) 178/79   11/08/24 (!) 140/60     Wt Readings from Last 3 Encounters:   12/05/24 0950 76.1 kg (167 lb 14.1 oz)   11/28/24 1402 72.6 kg (160 lb)   11/08/24 1440 77.6 kg (171 lb 1.2 oz)          Physical Exam  Vitals reviewed.   Constitutional:       Appearance: She is ill-appearing.   HENT:      Head: Normocephalic.      Nose: Nose normal.   Eyes:      Conjunctiva/sclera: Conjunctivae normal.   Cardiovascular:      Rate and Rhythm: Normal rate. Rhythm irregular.   Pulmonary:      Effort: Pulmonary effort is normal.      Breath sounds: Normal breath sounds.   Musculoskeletal:         General: Normal range of motion.      Cervical back: Neck supple.   Skin:     Findings: Bruising  present.      Comments: Facial bruising  Forehead lac with sutures   Neurological:      Mental Status: She is alert and oriented to person, place, and time. Mental status is at baseline.   Psychiatric:         Mood and Affect: Mood normal.         Thought Content: Thought content normal.          Laboratory Reviewed:     Lab Results   Component Value Date    WBC 8.24 05/31/2024    HGB 15.8 05/31/2024    HCT 49.6 (H) 05/31/2024     05/31/2024    CHOL 195 05/31/2024    TRIG 196 (H) 05/31/2024    HDL 55 05/31/2024    ALT 16 08/29/2024    AST 16 08/29/2024     08/29/2024    K 4.0 08/29/2024     08/29/2024    CREATININE 1.2 08/29/2024    BUN 28 (H) 08/29/2024    CO2 25 08/29/2024    TSH 5.392 (H) 05/31/2024    INR 1.1 04/07/2022    HGBA1C 5.0 03/11/2019       Screening or Prevention Patient's value Goal Complete/Controlled?   HgA1C Testing and Control   Lab Results   Component Value Date    HGBA1C 5.0 03/11/2019      Annually/Less than 8% No   Lipid profile : 05/31/2024 Annually Yes   LDL control Lab Results   Component Value Date    LDLCALC 100.8 05/31/2024    Annually/Less than 100 mg/dl  No   Nephropathy screening Lab Results   Component Value Date    LABMICR 13.0 10/12/2023     Lab Results   Component Value Date    PROTEINUA 2+ (A) 07/07/2023    Annually No   Blood pressure BP Readings from Last 1 Encounters:   12/05/24 (!) 140/72    Less than 140/90 No   Dilated retinal exam Most Recent Eye Exam Date: Not Found Annually Yes   Foot exam   Most Recent Foot Exam Date: Not Found Annually Yes       Health Maintenance  Health Maintenance Topics with due status: Not Due       Topic Last Completion Date    TETANUS VACCINE 02/25/2022    Lipid Panel 05/31/2024     Health Maintenance Due   Topic Date Due    RSV Vaccine (Age 60+ and Pregnant patients) (1 - 1-dose 75+ series) Never done    COVID-19 Vaccine (4 - 2024-25 season) 09/01/2024       Assessment and Plan:  1. Recurrent UTI  -     ciprofloxacin HCl  (CIPRO) 500 MG tablet; Take 1 tablet (500 mg total) by mouth 2 (two) times daily. for 7 days  Dispense: 14 tablet; Refill: 0    2. Chronic anticoagulation  Assessment & Plan:  Pharmacy E consult to assist with alternate anticoagulation    Orders:  -     E-Consult to Pharmacy    3. History of DVT in adulthood  Overview:  Eliquis 5mg BID.    7/19 right popliteal    Orders:  -     E-Consult to Pharmacy    4. Recurrent falls  Assessment & Plan:  Tripped on watering hose  Fall precautions      5. Laceration of forehead, subsequent encounter  Assessment & Plan:  Suture removal of laceration per RN  Steri strips applied  Wound without signs of infection                   -Patient's lab results were reviewed and discussed with patient  -Treatment options and alternatives were discussed with the patient. Patient expressed understanding. Patient was given the opportunity to ask questions and be an active participant in their medical care. Patient had no further questions or concerns at this time.         Future Appointments   Date Time Provider Department Center   12/18/2024 10:00 AM Clau Michel NP [facility]   2/6/2025  3:00 PM Jeanette Molina MD [facility]   3/6/2025 10:00 AM LABORATORY, CENTRAL [facility]   3/13/2025  9:00 AM Africa Billings MD [facility] DERM [facility]   3/13/2025  9:30 AM Jeffry Bermudez MD [facility] RHEU  Medical C   3/13/2025 10:15 AM INJECTION 1, [facility] INFUSION [facility] INFSN [facility]   4/29/2025  2:00 PM Kd Rubalcava MD [facility] Medical C          After visit summary printed and given to patient upon discharge.  Patient goals and care plan are included in After visit summary.    Total medical decision making time was 45 min.    The following issues were discussed: The primary encounter diagnosis was Recurrent UTI. Diagnoses of Chronic anticoagulation, History of DVT in adulthood, Recurrent falls, and Laceration of forehead, subsequent encounter were also  pertinent to this visit.    Health maintenance needs, recent test results and goals of care discussed with pt and questions answered.           MOMO Gutierrez, NP-C  [facility] [phone] [place] [id number]

## 2024-12-06 ENCOUNTER — E-CONSULT (OUTPATIENT)
Dept: PHARMACY | Facility: CLINIC | Age: 89
End: 2024-12-06
Payer: MEDICARE

## 2024-12-06 DIAGNOSIS — Z86.718 HISTORY OF DVT IN ADULTHOOD: Primary | ICD-10-CM

## 2024-12-06 NOTE — CONSULTS
[place] - Pharmacy/[facility]  Response for E-Consult     Patient Name: Clau Nunn  MRN: [id number]  Primary Care Provider: Jeanette Molina MD   Requesting Provider: Clau Michel NP  Consults    Recommendation: Eliquis - Pt is currently in the donut hole.  Per [employer], she will have to pay $139.76 for Eliquis for this month. If patient keeps the same plan, she will be back at $47 monthly starting 2025.      Contingency that warrants a repeat eConsult or referral: no    Total time of Consultation: 30 minute    I did not speak to the requesting provider verbally about this.     *This eConsult is based on the clinical data available to me and is furnished without benefit of a physical examination. The eConsult will need to be interpreted in light of any clinical issues or changes in patient status not available to me at the time of filing this eConsults. Significant changes in patient condition or level of acuity should result in immediate formal consultation and reevaluation. Please alert me if you have further questions.    Thank you for this eConsult referral.     Carly Guillen, PharmD  [place] - Pharmacy/[facility]

## 2024-12-10 ENCOUNTER — TELEPHONE (OUTPATIENT)
Dept: PAIN MEDICINE | Facility: CLINIC | Age: 89
End: 2024-12-10
Payer: MEDICARE

## 2024-12-10 NOTE — TELEPHONE ENCOUNTER
Cardiac Clearance     ----- Message from Jeanette Molina MD sent at 12/5/2024  4:19 PM CST -----  Yes, ok to hold eliquis for 3 days.  SM  ----- Message -----  From: Lashay Mantilla MA  Sent: 12/5/2024   2:48 PM CST  To: MD Dr. Jesse Baumann    Clau Nunn was seen in office with complaints of severe low back pain. Dr. Parker would like to perform lumbar epidural, and Clau Nunn would like to proceed. Dr. Parker is requesting for clearance to hold apixaban (Eliquis) 3 days prior to procedure. Patient Clau Nunn can resume medication following the procedure.     Thank You,  Leticia COTA  [facility] Surgery Scheduler

## 2024-12-16 ENCOUNTER — PATIENT MESSAGE (OUTPATIENT)
Dept: PRIMARY CARE CLINIC | Facility: CLINIC | Age: 89
End: 2024-12-16
Payer: MEDICARE

## 2024-12-16 NOTE — TELEPHONE ENCOUNTER
Advised Ms. Nunn  results of E consult.  Currently in [facility] for Eliquis.  Per [employer], with next prescription, Eliquis should be $47 per month. She states she has enough supply at this time.    Clau Michel, NP

## 2024-12-18 ENCOUNTER — OFFICE VISIT (OUTPATIENT)
Dept: PRIMARY CARE CLINIC | Facility: CLINIC | Age: 89
End: 2024-12-18
Payer: MEDICARE

## 2024-12-18 VITALS
BODY MASS INDEX: 29.71 KG/M2 | DIASTOLIC BLOOD PRESSURE: 78 MMHG | HEART RATE: 81 BPM | SYSTOLIC BLOOD PRESSURE: 142 MMHG | WEIGHT: 167.69 LBS | HEIGHT: 63 IN | OXYGEN SATURATION: 95 % | TEMPERATURE: 97 F

## 2024-12-18 DIAGNOSIS — F33.41 RECURRENT MAJOR DEPRESSIVE DISORDER, IN PARTIAL REMISSION: ICD-10-CM

## 2024-12-18 DIAGNOSIS — I70.0 ATHEROSCLEROSIS OF AORTA: ICD-10-CM

## 2024-12-18 DIAGNOSIS — M87.021 AVASCULAR NECROSIS OF RIGHT HUMERAL HEAD: ICD-10-CM

## 2024-12-18 DIAGNOSIS — Z86.718 HISTORY OF DVT IN ADULTHOOD: ICD-10-CM

## 2024-12-18 DIAGNOSIS — Z79.01 CHRONIC ANTICOAGULATION: ICD-10-CM

## 2024-12-18 DIAGNOSIS — J41.0 SIMPLE CHRONIC BRONCHITIS: ICD-10-CM

## 2024-12-18 DIAGNOSIS — M80.00XG AGE-RELATED OSTEOPOROSIS WITH CURRENT PATHOLOGICAL FRACTURE WITH DELAYED HEALING: ICD-10-CM

## 2024-12-18 DIAGNOSIS — E03.9 ACQUIRED HYPOTHYROIDISM: ICD-10-CM

## 2024-12-18 DIAGNOSIS — R29.6 RECURRENT FALLS: ICD-10-CM

## 2024-12-18 DIAGNOSIS — K21.9 GASTROESOPHAGEAL REFLUX DISEASE WITHOUT ESOPHAGITIS: ICD-10-CM

## 2024-12-18 DIAGNOSIS — N25.81 SECONDARY HYPERPARATHYROIDISM: ICD-10-CM

## 2024-12-18 DIAGNOSIS — R26.9 ABNORMALITY OF GAIT AND MOBILITY: ICD-10-CM

## 2024-12-18 DIAGNOSIS — F41.9 ANXIETY: ICD-10-CM

## 2024-12-18 DIAGNOSIS — I10 ESSENTIAL HYPERTENSION: Chronic | ICD-10-CM

## 2024-12-18 DIAGNOSIS — M54.16 LUMBAR RADICULOPATHY: ICD-10-CM

## 2024-12-18 DIAGNOSIS — Z74.09 OTHER REDUCED MOBILITY: ICD-10-CM

## 2024-12-18 DIAGNOSIS — M46.1 SACROILIAC INFLAMMATION: ICD-10-CM

## 2024-12-18 DIAGNOSIS — Z00.00 ENCOUNTER FOR PREVENTIVE HEALTH EXAMINATION: Primary | ICD-10-CM

## 2024-12-18 DIAGNOSIS — E55.9 VITAMIN D DEFICIENCY DISEASE: ICD-10-CM

## 2024-12-18 DIAGNOSIS — D69.2 SOLAR PURPURA: ICD-10-CM

## 2024-12-18 DIAGNOSIS — I87.2 VENOUS INSUFFICIENCY OF BOTH LOWER EXTREMITIES: ICD-10-CM

## 2024-12-18 DIAGNOSIS — N18.32 STAGE 3B CHRONIC KIDNEY DISEASE: ICD-10-CM

## 2024-12-18 PROBLEM — R07.89 INTERMITTENT RIGHT-SIDED CHEST PAIN: Status: RESOLVED | Noted: 2024-01-09 | Resolved: 2024-12-18

## 2024-12-18 PROCEDURE — 99999 PR PBB SHADOW E&M-EST. PATIENT-LVL V: CPT | Mod: PBBFAC,,, | Performed by: NURSE PRACTITIONER

## 2024-12-18 RX ORDER — GABAPENTIN 300 MG/1
300 CAPSULE ORAL NIGHTLY
Qty: 90 CAPSULE | Refills: 3 | Status: SHIPPED | OUTPATIENT
Start: 2024-12-18 | End: 2025-12-18

## 2024-12-18 NOTE — PATIENT INSTRUCTIONS
Local pharmacy - RSV vaccine  Counseling and Referral of Other Preventative  (Italic type indicates deductible and co-insurance are waived)    Patient Name: Clau Nunn  Today's Date: 12/18/2024    Health Maintenance       Date Due Completion Date    RSV Vaccine (Age 60+ and Pregnant patients) (1 - 1-dose 75+ series) Never done ---    COVID-19 Vaccine (4 - 2024-25 season) 09/01/2024 10/12/2021    Lipid Panel 05/31/2029 5/31/2024    TETANUS VACCINE 02/25/2032 2/25/2022        No orders of the defined types were placed in this encounter.    The following information is provided to all patients.  This information is to help you find resources for any of the problems found today that may be affecting your health:                  Living healthy guide: www.[facility].gov      Understanding Diabetes: www.diabetes.org      Eating healthy: www.cdc.gov/healthyweight      [facility] home safety checklist: www.cdc.gov/steadi/patient.html      Agency on Aging: www.goea.[place].gov      Alcoholics anonymous (AA): www.aa.org      Physical Activity: www.eligio.nih.gov/ot1zobn      Tobacco use: www.quitwithusla.org

## 2024-12-18 NOTE — PROGRESS NOTES
"  Clau Nunn presented for a follow-up Medicare AWV today. The following components were reviewed and updated:    Medical history  Family History  Social history  Allergies and Current Medications  Health Risk Assessment  Health Maintenance  Care Team    **See Completed Assessments for Annual Wellness visit with in the encounter summary    The following assessments were completed:  Depression Screening  Cognitive function Screening  Timed Get Up Test  Whisper Test  Nutrition Screening    Vitals:    12/18/24 1006   BP: (!) 142/78   BP Location: Right arm   Patient Position: Sitting   Pulse: 81   Temp: 97.1 °F (36.2 °C)   TempSrc: Skin   SpO2: 95%   Weight: 76.1 kg (167 lb 10.6 oz)   Height: 5' 3" (1.6 m)     Body mass index is 29.7 kg/m².            Review of Systems   Constitutional:  Negative for chills, fatigue and fever.   HENT:  Positive for postnasal drip. Negative for congestion, rhinorrhea and sore throat.    Eyes:         Eyes burning   Respiratory:  Positive for cough and shortness of breath. Negative for wheezing.    Cardiovascular:  Positive for leg swelling. Negative for chest pain and palpitations.   Gastrointestinal:  Positive for constipation. Negative for abdominal pain, diarrhea, nausea and vomiting.   Genitourinary:  Negative for dysuria, frequency and hematuria.        Incontinence   Musculoskeletal:  Positive for arthralgias, back pain and gait problem.   Skin:  Positive for color change.   Neurological:  Positive for numbness. Negative for dizziness, light-headedness and headaches.   Psychiatric/Behavioral:  Positive for sleep disturbance.            Physical Exam  Vitals reviewed.   Constitutional:       General: She is not in acute distress.     Appearance: Normal appearance.   HENT:      Head: Normocephalic and atraumatic.      Nose: Nose normal.      Mouth/Throat:      Mouth: Mucous membranes are moist.   Eyes:      General: No scleral icterus.     Conjunctiva/sclera: Conjunctivae " normal.   Cardiovascular:      Rate and Rhythm: Normal rate and regular rhythm.      Heart sounds: No murmur heard.  Pulmonary:      Effort: Pulmonary effort is normal. No respiratory distress.      Breath sounds: Normal breath sounds.   Abdominal:      Palpations: Abdomen is soft. There is no mass.      Tenderness: There is no abdominal tenderness.   Musculoskeletal:      Cervical back: Normal range of motion and neck supple.      Right lower leg: Edema present.      Left lower leg: Edema present.   Lymphadenopathy:      Cervical: No cervical adenopathy.   Skin:     General: Skin is warm and dry.      Comments: Healed scar to forehead  Areas of ecchymosis to skin   Neurological:      Mental Status: She is alert and oriented to person, place, and time. Mental status is at baseline.   Psychiatric:         Mood and Affect: Mood normal.         Thought Content: Thought content normal.            Health Maintenance         Date Due Completion Date    RSV Vaccine (Age 60+ and Pregnant patients) (1 - 1-dose 75+ series) Never done ---    COVID-19 Vaccine (4 - 2024-25 season) 09/01/2024 10/12/2021    Lipid Panel 05/31/2029 5/31/2024    TETANUS VACCINE 02/25/2032 2/25/2022              PROBLEM LIST ITEMS ADDRESSED THIS VISIT:    1. Encounter for preventive health examination  Assessment & Plan:  2024 - pending RSV vaccine    Review for Opioid Screening: Pt does not have Rx for Opioids      Review for Substance Use Disorders: Patient does not use illicit substances as reported        2. Recurrent major depressive disorder, in partial remission  Overview:  Wants to return to lexapro.    Assessment & Plan:  Mood stable  Continue lexapro      3. Anxiety  Overview:  Buspar 5mg prn.    Assessment & Plan:  Pt's mood stable  Continue buspirone and lexapro      4. Simple chronic bronchitis  Overview:  Albuterol nebulizer prn.    Assessment & Plan:  Reports daily dry cough  Albuterol prn  Follow with Pulmonology      5. Venous  insufficiency of both lower extremities  Assessment & Plan:  Elevation, compression hose  Low Na intake  Continue chlorthalidone   Right leg > left leg   Furosemide daily         6. Essential hypertension  Overview:  Valsart 160mg daily,  Chlorthlaidone 25mg  daily.    Assessment & Plan:    BP Readings from Last 3 Encounters:   12/18/24 (!) 142/78   12/05/24 (!) 140/72   11/28/24 (!) 178/79     Pulse Readings from Last 3 Encounters:   12/18/24 81   12/05/24 72   11/28/24 88   Continue valsartan 160 mg and chlorthalidone 25 mg         7. Atherosclerosis of aorta  Overview:  CT abd 11/11/13    Assessment & Plan:  Manage serum lipids and blood pressure  Lab Results   Component Value Date    LDLCALC 100.8 05/31/2024    Follow with PCP      8. Stage 3b chronic kidney disease  Assessment & Plan:  Assess and monitor  Lab Results   Component Value Date    CREATININE 1.2 08/29/2024    CREATININE 1.4 05/31/2024    CREATININE 1.4 02/19/2024      Latest Reference Range & Units 02/19/24 11:10 05/31/24 15:23 08/29/24 09:57   eGFR >60 mL/min/1.73 m^2 36 ! 36.2 ! 44 !   Continue Valsartan for renal protection  Manage blood pressure  Denies any prerenal GI losses, postrenal complaints  F/U with PCP or Nephrology       9. Solar purpura  Assessment & Plan:  Avoid injury to extremities  Easily bruises due to anticoagulation      10. History of DVT in adulthood  Overview:  Eliquis 5mg BID.    7/19 right popliteal    Assessment & Plan:  Continue Eliquis  Fall precautions discussed  Monitor for S/S of thrombosis      11. Chronic anticoagulation  Overview:  2022 - Interval development of near completely occlusive thrombus within the distal right femoral vein that extends into the popliteal vein.     Assessment & Plan:  Denies signs of active bleeding  Continue Eliquis for hx of DVT      12. Vitamin D deficiency disease  Assessment & Plan:   Latest Reference Range & Units 05/31/24 15:23 08/29/24 09:57   Vitamin D 30 - 96 ng/mL 40 35    Continue D3 daily supplement  Brief sun exposure      13. Secondary hyperparathyroidism  Assessment & Plan:  Lab Results   Component Value Date    .2 (H) 10/12/2023    CALCIUM 9.4 08/29/2024    PHOS 3.1 02/06/2020    Follow with PCP      14. Acquired hypothyroidism  Overview:  levothyroxine    Assessment & Plan:  Lab Results   Component Value Date    TSH 5.392 (H) 05/31/2024    Levothyroxine 100 mcg daily      15. Gastroesophageal reflux disease without esophagitis  Overview:  Omeprazole 40mg daily.    Assessment & Plan:  Symptoms controlled  Continue daily omeprazole  TUMS prn  GERD precautions      16. Sacroiliac inflammation  Assessment & Plan:  Is followed by pain mgt  Scheduled for steroid injection to lumbar region        17. Avascular necrosis of right humeral head  Overview:  S/P right hip replacement    Assessment & Plan:  S/P right hip replacement      18. Age-related osteoporosis with current pathological fracture with delayed healing  Overview:  01/02/2020   FINDINGS:  The L3-L4 vertebral bone mineral density is equal to 1.578 g/cm squared with a T score of 3.2.  There has been a positive 4.0% statistically significant change relative to the prior study.     The left femoral neck bone mineral density is equal to 1.001 g/cm squared with a T score of -0.3.  There has been  a positive 14.3% statistically significant change relative to the prior study.   Impression:   No evidence of significant bone density loss    On Prolia.      19. Recurrent falls  Assessment & Plan:  Mechanical falls  Encouraged use of rollator  Fall precautions      20. Lumbar radiculopathy  Assessment & Plan:  Followed by pain mgt    Orders:  -     gabapentin (NEURONTIN) 300 MG capsule; Take 1 capsule (300 mg total) by mouth every evening.  Dispense: 90 capsule; Refill: 3    21. Abnormality of gait and mobility  Assessment & Plan:  Recurrent falls  Encouraged consistent use of rollator      22. Other reduced  mobility          Provided Clau with a 5-10 year written screening schedule and personal prevention plan. Recommendations were developed using the USPSTF age appropriate recommendations. Education, counseling, and referrals were provided as needed.  After Visit Summary printed and given to patient which includes a list of additional screenings\tests needed.    Future Appointments   Date Time Provider Department Center   2/6/2025  3:00 PM Jeanette Molina MD [facility]   3/6/2025 10:00 AM LABORATORY, CENTRAL [facility]   3/13/2025  9:00 AM Africa Billings MD BR DERM [facility]   3/13/2025  9:30 AM Jeffry Bermudez MD ON[facility]   3/13/2025 10:15 AM INJECTION 1, [facility] INFUSION [facility] INFSN [facility]   4/29/2025  2:00 PM Kd Rubalcava MD ON OPHTHAL  Medical C          Clau JERRYMOMO Fontana, NP-C  [facility] [address] [place] [address]    I offered to discuss advanced care planning, including how to pick a person who would make decisions for you if you were unable to make them for yourself, called a health care power of , and what kind of decisions you might make such as use of life sustaining treatments such as ventilators and tube feeding when faced with a life limiting illness recorded on a living will that they will need to know. (How you want to be cared for as you near the end of your natural life)     X  Patient has advanced directives on file, which we reviewed, and they do not wish to make changes.

## 2024-12-19 PROBLEM — Z74.09 OTHER REDUCED MOBILITY: Status: ACTIVE | Noted: 2024-12-19

## 2024-12-19 PROBLEM — Z00.00 ENCOUNTER FOR PREVENTIVE HEALTH EXAMINATION: Status: ACTIVE | Noted: 2024-12-19

## 2024-12-19 PROBLEM — R26.9 ABNORMALITY OF GAIT AND MOBILITY: Status: ACTIVE | Noted: 2024-12-19

## 2024-12-19 NOTE — ASSESSMENT & PLAN NOTE
Latest Reference Range & Units 05/31/24 15:23 08/29/24 09:57   Vitamin D 30 - 96 ng/mL 40 35   Continue D3 daily supplement  Brief sun exposure

## 2024-12-19 NOTE — ASSESSMENT & PLAN NOTE
2024 - pending RSV vaccine    Review for Opioid Screening: Pt does not have Rx for Opioids      Review for Substance Use Disorders: Patient does not use illicit substances as reported

## 2024-12-19 NOTE — ASSESSMENT & PLAN NOTE
Elevation, compression hose  Low Na intake  Continue chlorthalidone   Right leg > left leg   Furosemide daily

## 2024-12-19 NOTE — ASSESSMENT & PLAN NOTE
Manage serum lipids and blood pressure  Lab Results   Component Value Date    LDLCALC 100.8 05/31/2024    Follow with PCP

## 2024-12-19 NOTE — ASSESSMENT & PLAN NOTE
Assess and monitor  Lab Results   Component Value Date    CREATININE 1.2 08/29/2024    CREATININE 1.4 05/31/2024    CREATININE 1.4 02/19/2024      Latest Reference Range & Units 02/19/24 11:10 05/31/24 15:23 08/29/24 09:57   eGFR >60 mL/min/1.73 m^2 36 ! 36.2 ! 44 !   Continue Valsartan for renal protection  Manage blood pressure  Denies any prerenal GI losses, postrenal complaints  F/U with PCP or Nephrology

## 2024-12-19 NOTE — ASSESSMENT & PLAN NOTE
Avoid injury to extremities  Easily bruises due to anticoagulation

## 2024-12-19 NOTE — ASSESSMENT & PLAN NOTE
BP Readings from Last 3 Encounters:   12/18/24 (!) 142/78   12/05/24 (!) 140/72   11/28/24 (!) 178/79     Pulse Readings from Last 3 Encounters:   12/18/24 81   12/05/24 72   11/28/24 88   Continue valsartan 160 mg and chlorthalidone 25 mg

## 2024-12-19 NOTE — ASSESSMENT & PLAN NOTE
Lab Results   Component Value Date    .2 (H) 10/12/2023    CALCIUM 9.4 08/29/2024    PHOS 3.1 02/06/2020    Follow with PCP

## 2024-12-25 DIAGNOSIS — M54.40 CHRONIC LEFT-SIDED LOW BACK PAIN WITH SCIATICA, SCIATICA LATERALITY UNSPECIFIED: ICD-10-CM

## 2024-12-25 DIAGNOSIS — G89.29 CHRONIC LEFT-SIDED LOW BACK PAIN WITH SCIATICA, SCIATICA LATERALITY UNSPECIFIED: ICD-10-CM

## 2024-12-26 RX ORDER — LIDOCAINE 50 MG/G
PATCH TOPICAL
Qty: 30 PATCH | Refills: 1 | Status: SHIPPED | OUTPATIENT
Start: 2024-12-26

## 2025-01-07 ENCOUNTER — TELEPHONE (OUTPATIENT)
Dept: PAIN MEDICINE | Facility: CLINIC | Age: 89
End: 2025-01-07
Payer: MEDICARE

## 2025-01-07 NOTE — TELEPHONE ENCOUNTER
----- Message from Med Assistant Tate sent at 1/6/2025  3:43 PM CST -----  Contact: [phone] Patient    ----- Message -----  From: Nini Ponce  Sent: 1/6/2025   3:28 PM CST  To: Ada SCHMITT Staff    Pt is calling in regards to Charlie denying the request for her to have her injections on 01/21/2025. The appointment for the injections is with Dr Parker. Please call and advise. Thank you

## 2025-01-24 NOTE — PROGRESS NOTES
"Subjective:      Patient ID: Clau Nunn is a 89 y.o. female.    Chief Complaint: Follow-up (3 month f/u ), incontanance, and Shortness of Breath (When walking )      HPI  Here for follow up of medical problems.  Lots of urge and stress incontinence.  No f/c/sw.  Denies depression on lexapro.  No f/c/sw/cough.  BMs normal, with diet changes.  No cp/palp.  Occas cough and SOB, uses nebulizer prn, about twice a week.      Advance Care Planning     Date: 02/06/2025    ACP Reviewed/No Changes  Voluntary advance care planning discussion had today with patient. Previously completed HCPOA in electronic medical record is current, no changes made.      A total of 5 min was spent on advance care planning, goals of care discussion, emotional support, formulating and communicating prognosis and exploring burden/benefit of various approaches of treatment. This discussion occurred on a fully voluntary basis with the verbal consent of the patient and/or family.                  Updated/ annual due 11/25:  HM: 9/24 fluvax, 1/21 covid vaccine/booster, 11/19 HAV, 5/16 [id number], 5/17 booster [id number], 12/22 [id number], 2/22 Tdap, 11/09 zoster, 7/22 Shingrix #2, 9/22 BMD on prolia, 1/14 Cscope no more needed, 12/22 MMG/ no more, 10/22 Eye Dr. Rubalcava.      Review of Systems   Constitutional:  Negative for chills, diaphoresis and fever.   Respiratory:  Negative for cough and shortness of breath.    Cardiovascular:  Negative for chest pain, palpitations and leg swelling.   Gastrointestinal:  Negative for blood in stool, constipation, diarrhea, nausea and vomiting.   Genitourinary:  Negative for dysuria, frequency and hematuria.   Psychiatric/Behavioral:  The patient is not nervous/anxious.          Objective:   /68 (BP Location: Right arm, Patient Position: Sitting)   Pulse 76   Temp 97.4 °F (36.3 °C) (Skin)   Ht 5' 3" (1.6 m)   Wt 76.2 kg (168 lb 1.6 oz)   SpO2 (!) 94%   BMI 29.78 kg/m²     Physical " Exam  Constitutional:       Appearance: She is well-developed.   Neck:      Thyroid: No thyroid mass.      Vascular: No carotid bruit.   Cardiovascular:      Rate and Rhythm: Normal rate and regular rhythm.      Heart sounds: No murmur heard.     No friction rub. No gallop.   Pulmonary:      Effort: Pulmonary effort is normal.      Breath sounds: Normal breath sounds. No wheezing or rales.   Abdominal:      General: Bowel sounds are normal.      Palpations: Abdomen is soft. There is no mass.      Tenderness: There is no abdominal tenderness.   Musculoskeletal:      Cervical back: Neck supple.   Lymphadenopathy:      Cervical: No cervical adenopathy.   Neurological:      Mental Status: She is alert and oriented to person, place, and time.           Assessment:       1. Essential hypertension    2. Acquired hypothyroidism    3. Age-related osteoporosis with current pathological fracture with delayed healing    4. History of DVT in adulthood    5. Chronic anticoagulation    6. Chronic lumbar radiculopathy    7. Recurrent major depressive disorder, in partial remission    8. Simple chronic bronchitis    9. Stage 3b chronic kidney disease    10. Venous insufficiency of both lower extremities    11. Urge incontinence          Plan:     1. Essential hypertension- stable, cont rxs.  Overview:  Valsart 160mg daily,  Chlorthlaidone 25mg  daily.    2. Acquired hypothyroidism- Clinically stable, continue present treatment.  Overview:  Synthroid 100mcg daily.    Orders:  -     TSH; Future; Expected date: 02/06/2025    3. Age-related osteoporosis with current pathological fracture with delayed healing- on prolia.  Overview:  01/02/2020   FINDINGS:  The L3-L4 vertebral bone mineral density is equal to 1.578 g/cm squared with a T score of 3.2.  There has been a positive 4.0% statistically significant change relative to the prior study.     The left femoral neck bone mineral density is equal to 1.001 g/cm squared with a T score of  -0.3.  There has been  a positive 14.3% statistically significant change relative to the prior study.   Impression:   No evidence of significant bone density loss    On Prolia.    4. History of DVT in adulthood, Chronic anticoagulation  Overview:  Eliquis 5mg BID.    7/19 right popliteal    6. Chronic lumbar radiculopathy, ins denies REJI.  Increase tylenol TID.    7. Recurrent major depressive disorder, in partial remission- cont rx.  Overview:  Lexapro 10mg daily.    8. Simple chronic bronchitis- doing well, cont albuterol prn.  Overview:  Albuterol nebulizer prn.    9. Stage 3b chronic kidney disease- recheck now.  -     Basic Metabolic Panel; Future; Expected date: 02/06/2025    10. Venous insufficiency of both lower extremities- compression stocking prn.    11. Urge incontinence, r/o UTI, restart vesicare.  Urol if not better.  -     solifenacin (VESICARE) 5 MG tablet; Take 1 tablet (5 mg total) by mouth once daily.  Dispense: 90 tablet; Refill: 3  -     Urinalysis, Reflex to Urine Culture Urine, Clean Catch; Future; Expected date: 02/06/2025     RTC  3mo.

## 2025-02-06 ENCOUNTER — OFFICE VISIT (OUTPATIENT)
Dept: PRIMARY CARE CLINIC | Facility: CLINIC | Age: 89
End: 2025-02-06
Payer: MEDICARE

## 2025-02-06 VITALS
WEIGHT: 168.13 LBS | HEIGHT: 63 IN | HEART RATE: 76 BPM | TEMPERATURE: 97 F | OXYGEN SATURATION: 94 % | BODY MASS INDEX: 29.79 KG/M2 | DIASTOLIC BLOOD PRESSURE: 68 MMHG | SYSTOLIC BLOOD PRESSURE: 122 MMHG

## 2025-02-06 DIAGNOSIS — M80.00XG AGE-RELATED OSTEOPOROSIS WITH CURRENT PATHOLOGICAL FRACTURE WITH DELAYED HEALING: ICD-10-CM

## 2025-02-06 DIAGNOSIS — F33.41 RECURRENT MAJOR DEPRESSIVE DISORDER, IN PARTIAL REMISSION: ICD-10-CM

## 2025-02-06 DIAGNOSIS — J41.0 SIMPLE CHRONIC BRONCHITIS: ICD-10-CM

## 2025-02-06 DIAGNOSIS — M54.16 CHRONIC LUMBAR RADICULOPATHY: ICD-10-CM

## 2025-02-06 DIAGNOSIS — N18.32 STAGE 3B CHRONIC KIDNEY DISEASE: ICD-10-CM

## 2025-02-06 DIAGNOSIS — I10 ESSENTIAL HYPERTENSION: Primary | Chronic | ICD-10-CM

## 2025-02-06 DIAGNOSIS — Z86.718 HISTORY OF DVT IN ADULTHOOD: ICD-10-CM

## 2025-02-06 DIAGNOSIS — E03.9 ACQUIRED HYPOTHYROIDISM: ICD-10-CM

## 2025-02-06 DIAGNOSIS — Z79.01 CHRONIC ANTICOAGULATION: ICD-10-CM

## 2025-02-06 DIAGNOSIS — I87.2 VENOUS INSUFFICIENCY OF BOTH LOWER EXTREMITIES: ICD-10-CM

## 2025-02-06 DIAGNOSIS — N39.41 URGE INCONTINENCE: ICD-10-CM

## 2025-02-06 LAB
ANION GAP SERPL CALC-SCNC: 12 MMOL/L (ref 8–16)
BILIRUB UR QL STRIP: NEGATIVE
BUN SERPL-MCNC: 15 MG/DL (ref 8–23)
CALCIUM SERPL-MCNC: 9.8 MG/DL (ref 8.7–10.5)
CHLORIDE SERPL-SCNC: 104 MMOL/L (ref 95–110)
CLARITY UR REFRACT.AUTO: CLEAR
CO2 SERPL-SCNC: 24 MMOL/L (ref 23–29)
COLOR UR AUTO: YELLOW
CREAT SERPL-MCNC: 1.2 MG/DL (ref 0.5–1.4)
EST. GFR  (NO RACE VARIABLE): 43.5 ML/MIN/1.73 M^2
GLUCOSE SERPL-MCNC: 93 MG/DL (ref 70–110)
GLUCOSE UR QL STRIP: NEGATIVE
HGB UR QL STRIP: NEGATIVE
KETONES UR QL STRIP: NEGATIVE
LEUKOCYTE ESTERASE UR QL STRIP: NEGATIVE
NITRITE UR QL STRIP: NEGATIVE
PH UR STRIP: 7 [PH] (ref 5–8)
POTASSIUM SERPL-SCNC: 4.6 MMOL/L (ref 3.5–5.1)
PROT UR QL STRIP: NEGATIVE
SODIUM SERPL-SCNC: 140 MMOL/L (ref 136–145)
SP GR UR STRIP: 1.02 (ref 1–1.03)
TSH SERPL DL<=0.005 MIU/L-ACNC: 2.27 UIU/ML (ref 0.4–4)
URN SPEC COLLECT METH UR: NORMAL

## 2025-02-06 PROCEDURE — 80048 BASIC METABOLIC PNL TOTAL CA: CPT | Mod: HCNC | Performed by: INTERNAL MEDICINE

## 2025-02-06 PROCEDURE — 3288F FALL RISK ASSESSMENT DOCD: CPT | Mod: HCNC,CPTII,S$GLB, | Performed by: INTERNAL MEDICINE

## 2025-02-06 PROCEDURE — 84443 ASSAY THYROID STIM HORMONE: CPT | Mod: HCNC | Performed by: INTERNAL MEDICINE

## 2025-02-06 PROCEDURE — 1123F ACP DISCUSS/DSCN MKR DOCD: CPT | Mod: HCNC,CPTII,S$GLB, | Performed by: INTERNAL MEDICINE

## 2025-02-06 PROCEDURE — 99214 OFFICE O/P EST MOD 30 MIN: CPT | Mod: HCNC,S$GLB,, | Performed by: INTERNAL MEDICINE

## 2025-02-06 PROCEDURE — 99999 PR PBB SHADOW E&M-EST. PATIENT-LVL III: CPT | Mod: PBBFAC,HCNC,, | Performed by: INTERNAL MEDICINE

## 2025-02-06 PROCEDURE — 1101F PT FALLS ASSESS-DOCD LE1/YR: CPT | Mod: HCNC,CPTII,S$GLB, | Performed by: INTERNAL MEDICINE

## 2025-02-06 PROCEDURE — 1126F AMNT PAIN NOTED NONE PRSNT: CPT | Mod: HCNC,CPTII,S$GLB, | Performed by: INTERNAL MEDICINE

## 2025-02-06 PROCEDURE — 81003 URINALYSIS AUTO W/O SCOPE: CPT | Mod: HCNC | Performed by: INTERNAL MEDICINE

## 2025-02-06 RX ORDER — SOLIFENACIN SUCCINATE 5 MG/1
5 TABLET, FILM COATED ORAL DAILY
Qty: 90 TABLET | Refills: 3 | Status: SHIPPED | OUTPATIENT
Start: 2025-02-06 | End: 2026-02-06

## 2025-02-07 ENCOUNTER — PATIENT MESSAGE (OUTPATIENT)
Dept: PRIMARY CARE CLINIC | Facility: CLINIC | Age: 89
End: 2025-02-07
Payer: MEDICARE

## 2025-02-17 DIAGNOSIS — K21.9 GASTROESOPHAGEAL REFLUX DISEASE WITHOUT ESOPHAGITIS: ICD-10-CM

## 2025-02-17 RX ORDER — OMEPRAZOLE 40 MG/1
40 CAPSULE, DELAYED RELEASE ORAL EVERY MORNING
Qty: 90 CAPSULE | Refills: 3 | Status: SHIPPED | OUTPATIENT
Start: 2025-02-17

## 2025-03-06 ENCOUNTER — LAB VISIT (OUTPATIENT)
Dept: LAB | Facility: HOSPITAL | Age: 89
End: 2025-03-06
Attending: INTERNAL MEDICINE
Payer: MEDICARE

## 2025-03-06 DIAGNOSIS — M80.00XG AGE-RELATED OSTEOPOROSIS WITH CURRENT PATHOLOGICAL FRACTURE WITH DELAYED HEALING: ICD-10-CM

## 2025-03-06 LAB
ALBUMIN SERPL BCP-MCNC: 3.7 G/DL (ref 3.5–5.2)
ALP SERPL-CCNC: 48 U/L (ref 40–150)
ALT SERPL W/O P-5'-P-CCNC: 12 U/L (ref 10–44)
ANION GAP SERPL CALC-SCNC: 12 MMOL/L (ref 8–16)
AST SERPL-CCNC: 20 U/L (ref 10–40)
BILIRUB SERPL-MCNC: 0.6 MG/DL (ref 0.1–1)
BUN SERPL-MCNC: 17 MG/DL (ref 8–23)
CALCIUM SERPL-MCNC: 10 MG/DL (ref 8.7–10.5)
CHLORIDE SERPL-SCNC: 100 MMOL/L (ref 95–110)
CO2 SERPL-SCNC: 26 MMOL/L (ref 23–29)
CREAT SERPL-MCNC: 1.2 MG/DL (ref 0.5–1.4)
EST. GFR  (NO RACE VARIABLE): 44 ML/MIN/1.73 M^2
GLUCOSE SERPL-MCNC: 91 MG/DL (ref 70–110)
POTASSIUM SERPL-SCNC: 3.8 MMOL/L (ref 3.5–5.1)
PROT SERPL-MCNC: 7 G/DL (ref 6–8.4)
SODIUM SERPL-SCNC: 138 MMOL/L (ref 136–145)

## 2025-03-06 PROCEDURE — 80053 COMPREHEN METABOLIC PANEL: CPT | Mod: HCNC | Performed by: PHYSICIAN ASSISTANT

## 2025-03-06 PROCEDURE — 36415 COLL VENOUS BLD VENIPUNCTURE: CPT | Mod: HCNC,PO | Performed by: PHYSICIAN ASSISTANT

## 2025-03-13 ENCOUNTER — OFFICE VISIT (OUTPATIENT)
Dept: RHEUMATOLOGY | Facility: CLINIC | Age: 89
End: 2025-03-13
Payer: MEDICARE

## 2025-03-13 ENCOUNTER — OFFICE VISIT (OUTPATIENT)
Dept: DERMATOLOGY | Facility: CLINIC | Age: 89
End: 2025-03-13
Payer: MEDICARE

## 2025-03-13 DIAGNOSIS — Z91.89 AT HIGH RISK FOR HYPOCALCEMIA: ICD-10-CM

## 2025-03-13 DIAGNOSIS — L57.0 AK (ACTINIC KERATOSIS): ICD-10-CM

## 2025-03-13 DIAGNOSIS — L60.1 ONYCHOLYSIS: ICD-10-CM

## 2025-03-13 DIAGNOSIS — R29.6 RECURRENT FALLS: ICD-10-CM

## 2025-03-13 DIAGNOSIS — M81.0 AGE-RELATED OSTEOPOROSIS WITHOUT CURRENT PATHOLOGICAL FRACTURE: Primary | ICD-10-CM

## 2025-03-13 DIAGNOSIS — Z51.81 MEDICATION MONITORING ENCOUNTER: ICD-10-CM

## 2025-03-13 DIAGNOSIS — E55.9 VITAMIN D INSUFFICIENCY: ICD-10-CM

## 2025-03-13 DIAGNOSIS — N18.32 STAGE 3B CHRONIC KIDNEY DISEASE: ICD-10-CM

## 2025-03-13 DIAGNOSIS — D48.5 NEOPLASM OF UNCERTAIN BEHAVIOR OF SKIN: Primary | ICD-10-CM

## 2025-03-13 PROCEDURE — 99999 PR PBB SHADOW E&M-EST. PATIENT-LVL V: CPT | Mod: PBBFAC,HCNC,, | Performed by: DERMATOLOGY

## 2025-03-13 RX ORDER — FLUCONAZOLE 150 MG/1
150 TABLET ORAL WEEKLY
Qty: 12 TABLET | Refills: 1 | Status: SHIPPED | OUTPATIENT
Start: 2025-03-13 | End: 2025-08-28

## 2025-03-13 RX ORDER — CLOBETASOL PROPIONATE 0.5 MG/ML
SOLUTION TOPICAL
Qty: 50 ML | Refills: 1 | Status: SHIPPED | OUTPATIENT
Start: 2025-03-13

## 2025-03-13 NOTE — PATIENT INSTRUCTIONS
For legs: Recommend over the counter Amlactin (ammonium lactate) cream twice daily             Shave Biopsy Wound Care    Your doctor has performed a shave biopsy today.  A band aid and vaseline ointment has been placed over the site.  This should remain in place for 24 hours.  It is recommended that you keep the area dry for the first 24 hours.  After 24 hours, you may remove the band aid and wash the area with warm soap and water and apply Vaseline jelly.  Many patients prefer to use Neosporin or Bacitracin ointment.  This is acceptable; however, know that you can develop an allergy to this medication even if you have used it safely for years.  It is important to keep the area moist.  Letting it dry out and get air slows healing time, and will worsen the scar.  Band aid is optional after first 24 hours.      If you notice increasing redness, tenderness, pain, or yellow drainage at the biopsy site, please notify your doctor.  These are signs of an infection.    If your biopsy site is bleeding, apply firm pressure for 15 minutes straight.  Repeat for another 15 minutes, if it is still bleeding.   If the surgical site continues to bleed, then please contact your doctor.      [address] [place] [address]/ [phone] FAX/ [email or web address]             CRYOSURGERY      Your doctor has used a method called cryosurgery to treat your skin condition. Cryosurgery refers to the use of very cold substances to treat a variety of skin conditions such as warts, pre-skin cancers, molluscum contagiosum, sun spots, and several benign growths. The substance we use in cryosurgery is liquid nitrogen and is so cold (-195 degrees Celsius) that it burns when administered.     Following treatment in the office, the skin may immediately burn and become red. You may find the area around the lesion is affected as well. It is sometimes necessary to treat not only the lesion, but a small area of the surrounding  normal skin to achieve a good response.     A blister, and even a blood filled blister, may form after treatment.   This is a normal response. If the blister is painful, it is acceptable to sterilize a needle with rubbing alcohol and gently pop the blister. It is important that you gently wash the area with soap and warm water as the blister fluid may contain wart virus if a wart was treated. Do not remove the roof of the blister.     The area treated can take anywhere from 1-3 weeks to heal. Healing time depends on the kind of skin lesion treated, the location, and how aggressively the lesion was treated. It is recommended that the areas treated are covered with Vaseline or bacitracin ointment and a band-aid. If a band-aid is not practical, just ointment applied several times per day will do. Keeping these areas moist will speed the healing time.    Treatment with liquid nitrogen can leave a scar. In dark skin, it may be a light or dark scar, in light skin it may be a white or pink scar. These will generally fade with time, but may never go away completely.     If you have any concerns after your treatment, please feel free to call the office.       [facility] [place] [address]/ [phone] FAX/ [email or web address]

## 2025-03-13 NOTE — PROGRESS NOTES
"   RHEUMATOLOGY OUTPATIENT CLINIC NOTE    3/13/2025    Attending Rheumatologist: Jeffry Bermudez  Primary Care Provider/Physician Requesting Consultation: Jeanette Molina MD   Chief Complaint/Reason For Consultation:  No chief complaint on file.      Subjective:     Clau Nunn is a 89 y.o. White female with OP    Interval mechanical fall w/o resulting fracture.  No acute complaints. Not scheduled for invasive dental w/u.    Addendum  "good morning, pt states she has upcoming dental work with her bridge in 2 weeks; she sais they are going to cut out the middle of the bridge and currently on antibiotics for a possible abscess, please advise if we can proceed with prolia injection"    thank you.  May reschedule Prolia after invasive dental workup completed and deemed adequately healed by dentistry.  Recommend daily Ca/vit D supp /400mg BID.    Review of Systems   Genitourinary:  Negative for dysuria, hematuria and urgency.       Chronic comorbid conditions affecting medical decision making today:  Past Medical History:   Diagnosis Date    Allergy     Anxiety     Asthma     Back pain     Chest pain 1/30/2024    Chronic venous insufficiency 11/19/2013    Depression     Diverticulosis 11/19/2013 1/8/8 colonoscopy    Dry eyes     Fracture, sacrum/coccyx     Dr. Sanchez     GERD (gastroesophageal reflux disease)     H/O total hip arthroplasty 12/30/2015    Hypertension     Hypothyroid     Osteoarthritis     Osteoporosis     Postlaminectomy syndrome of lumbar region 1/8/2014    Radius fracture     7/18    Simple chronic bronchitis 5/3/2017    Umbilical hernia 11/19/2013    Urinary incontinence     Vitamin D deficiency disease      Past Surgical History:   Procedure Laterality Date    ADENOIDECTOMY      BACK SURGERY      x2    breast implants  1973    CATARACT EXTRACTION Bilateral     CLOSED REDUCTION DISTAL RADIUS FRACTURE      Dr. Black, 8/18    cystoscope  1/6/16    DR. Brown    FRACTURE SURGERY  " April 3 2015    left wrist    HAND SURGERY      HIP SURGERY Right     total hip- Dr. Dos Santos    HYSTERECTOMY      34y/o    INJECTION OF ANESTHETIC AGENT AROUND NERVE Right 9/16/2020    Procedure: Right suprascapular nerve block;  Surgeon: Raffi Wells MD;  Location: [facility];  Service: Pain Management;  Laterality: Right;    INJECTION OF ANESTHETIC AGENT AROUND NERVE Right 7/13/2021    Procedure: Block, Nerve Right Suprascapular Nerve Block with RN IV sedation;  Surgeon: Raffi Wells MD;  Location: [facility];  Service: Pain Management;  Laterality: Right;    INJECTION OF ANESTHETIC AGENT INTO SACROILIAC JOINT N/A 8/12/2020    Procedure: Left IA hip Joint + Left SIJ + Right shoulder injection;  Surgeon: Raffi Wells MD;  Location: [facility];  Service: Pain Management;  Laterality: N/A;    INJECTION OF ANESTHETIC AGENT INTO SACROILIAC JOINT Bilateral 1/31/2023    Procedure: Bilateral SIJ Injection w/Local;  Surgeon: Abdirahman Parker MD;  Location: [facility];  Service: Pain Management;  Laterality: Bilateral;    INJECTION OF ANESTHETIC AGENT INTO SACROILIAC JOINT Bilateral 8/20/2024    Procedure: Bilateral SIJ Injection;  Surgeon: Abdirahman Parker MD;  Location: [facility];  Service: Pain Management;  Laterality: Bilateral;    INJECTION OF JOINT N/A 8/12/2020    Procedure: Left IA hip Joint + Left SIJ + Right shoulder injection;  Surgeon: Raffi Wells MD;  Location: [facility];  Service: Pain Management;  Laterality: N/A;    JOINT REPLACEMENT Right     R NNAMDI - Dr. Dos Santos    LEG SURGERY Right     ex-fix tib/fib    RADIOFREQUENCY THERMOCOAGULATION Left 6/8/2023    Procedure: Left SIJ RFA w/ local (give Valium before);  Surgeon: Abdirahman Parker MD;  Location: [facility];  Service: Pain Management;  Laterality: Left;    SELECTIVE INJECTION OF ANESTHETIC AGENT AROUND LUMBAR SPINAL NERVE ROOT BY TRANSFORAMINAL APPROACH Bilateral 2/10/2021    Procedure: Bilateral L5/S1 TF REJI;  Surgeon: Raffi Wells  MD;  Location: [facility];  Service: Pain Management;  Laterality: Bilateral;    SELECTIVE INJECTION OF ANESTHETIC AGENT AROUND LUMBAR SPINAL NERVE ROOT BY TRANSFORAMINAL APPROACH Bilateral 5/25/2022    Procedure: Bilateral L5/S1 + S1 TF REJI;  Surgeon: Raffi Wells MD;  Location: [facility];  Service: Pain Management;  Laterality: Bilateral;    TONSILLECTOMY      TRANSFORAMINAL EPIDURAL INJECTION OF STEROID Left 7/8/2020    Procedure: left L2/3 + L5/S1 TF REJI;  Surgeon: Raffi Wells MD;  Location: [facility];  Service: Pain Management;  Laterality: Left;    TRANSFORAMINAL EPIDURAL INJECTION OF STEROID Left 7/1/2021    Procedure: left L5/S1 + S1 TF REJI;  Surgeon: Raffi Wells MD;  Location: [facility];  Service: Pain Management;  Laterality: Left;     Family History   Problem Relation Name Age of Onset    COPD Mother      Cancer Mother          lung CA    Pulmonary fibrosis Sister      Cancer Daughter          colon    Stroke Father      Diabetes Son      Melanoma Neg Hx      Psoriasis Neg Hx      Lupus Neg Hx       Tobacco Use History[1]  Current Medications[2]     Objective:     There were no vitals filed for this visit.  Physical Exam   Eyes: Conjunctivae are normal.   Pulmonary/Chest: Effort normal. No respiratory distress.   Musculoskeletal:         General: No swelling.   Skin: No rash noted.       Reviewed available old and all outside pertinent medical records available.    All lab results personally reviewed and interpreted by me.       ASSESSMENT / PLAN     1. Age-related osteoporosis without current pathological fracture  Prolia q6m      2. Medication monitoring encounter  Comprehensive Metabolic Panel      3. Vitamin D insufficiency  Vitamin D      4. Stage 3b chronic kidney disease        5. At high risk for hypocalcemia  Ca/vit D supp /400mg BID      6. Recurrent falls                Visit today included increased complexity associated with the care of the episodic problem Age-related  osteoporosis without current pathological fracture addressed and managing the longitudinal care of the patient due to the serious and/or complex managed problem(s) At high risk for hypocalcemia , Medication monitoring encounter.    Jeffry Bermudez M.D.           [1]   Social History  Tobacco Use   Smoking Status Never   Smokeless Tobacco Never   [2]   Current Outpatient Medications:     albuterol (PROVENTIL) 2.5 mg /3 mL (0.083 %) nebulizer solution, Take 3 mLs (2.5 mg total) by nebulization every 6 (six) hours as needed for Wheezing. Rescue, Disp: 1 each, Rfl: 6    azelastine (ASTELIN) 137 mcg (0.1 %) nasal spray, 2 sprays by Nasal route 2 (two) times daily., Disp: , Rfl:     betamethasone dipropionate (DIPROLENE) 0.05 % ointment, Apply to affected fingers around nails at night under occlusion with white cotton gloves for 2-4 weeks, Disp: 45 g, Rfl: 3    busPIRone (BUSPAR) 5 MG Tab, TAKE 1 TABLET TWICE DAILY, Disp: 180 tablet, Rfl: 5    chlorthalidone (HYGROTEN) 25 MG Tab, Take 1 tablet (25 mg total) by mouth once daily., Disp: 90 tablet, Rfl: 2    cholecalciferol, vitamin D3, (D3-2000) 50 mcg (2,000 unit) Cap capsule, Take 1 capsule (2,000 Units total) by mouth once daily., Disp: 90 capsule, Rfl: 11    ciclopirox (PENLAC) 8 % Soln, Apply topically nightly., Disp: 6.6 mL, Rfl: 3    clobetasoL (TEMOVATE) 0.05 % external solution, Pt to mix in 1 jar of cerave cream and apply to affected areas bid for 2-4 weeks per course, Disp: 50 mL, Rfl: 3    clobetasoL (TEMOVATE) 0.05 % external solution, Apply 2 drops under nail plates for 4 weeks on, take 2 weeks off, then repeat indefinitely, Disp: 50 mL, Rfl: 1    cyclobenzaprine (FLEXERIL) 5 MG tablet, TAKE 1 TABLET (5 MG TOTAL) BY MOUTH NIGHTLY AS NEEDED FOR MUSCLE SPASMS., Disp: 90 tablet, Rfl: 1    cycloSPORINE (RESTASIS MULTIDOSE) 0.05 % Drop, Place 1 drop into both eyes every 12 (twelve) hours., Disp: 5.5 mL, Rfl: 12    ELIQUIS 5 mg Tab, TAKE 1 TABLET TWICE DAILY, Disp:  180 tablet, Rfl: 3    EScitalopram oxalate (LEXAPRO) 10 MG tablet, Take 1 tablet (10 mg total) by mouth once daily., Disp: 90 tablet, Rfl: 3    fluconazole (DIFLUCAN) 150 MG Tab, Take 1 tablet (150 mg total) by mouth once a week. For 6 months, Disp: 12 tablet, Rfl: 1    fluocinonide 0.05% (LIDEX) 0.05 % cream, AAA bid to leg for 2-4 weeks per course, Disp: 60 g, Rfl: 1    fluticasone propionate (FLONASE) 50 mcg/actuation nasal spray, , Disp: , Rfl:     furosemide (LASIX) 40 MG tablet, Take 1 tablet (40 mg total) by mouth once daily., Disp: 15 tablet, Rfl: 0    gabapentin (NEURONTIN) 300 MG capsule, Take 1 capsule (300 mg total) by mouth every evening., Disp: 90 capsule, Rfl: 3    levothyroxine (SYNTHROID) 100 MCG tablet, TAKE 1 TABLET EVERY DAY, Disp: 90 tablet, Rfl: 3    LIDOcaine (LIDODERM) 5 %, APPLY ONE PATCH TO AFFECTED AREA. 12 HOURS ON AND 12 HOURS OFF, Disp: 30 patch, Rfl: 1    omeprazole (PRILOSEC) 40 MG capsule, TAKE 1 CAPSULE (40 MG TOTAL) BY MOUTH EVERY MORNING., Disp: 90 capsule, Rfl: 3    polyethylene glycol (GLYCOLAX) 17 gram/dose powder, Take 17 g by mouth once daily., Disp: 595 g, Rfl: 0    PROLIA 60 mg/mL Syrg, , Disp: , Rfl:     solifenacin (VESICARE) 5 MG tablet, Take 1 tablet (5 mg total) by mouth once daily., Disp: 90 tablet, Rfl: 3    traZODone (DESYREL) 50 MG tablet, Take 1-2 tablets ( mg total) by mouth nightly as needed for Insomnia., Disp: 180 tablet, Rfl: 3    valsartan (DIOVAN) 160 MG tablet, Take 1 tablet (160 mg total) by mouth once daily., Disp: 90 tablet, Rfl: 3    Current Facility-Administered Medications:     denosumab (PROLIA) injection 60 mg, 60 mg, Subcutaneous, Q6 Months, Carly Mora PA-C, 60 mg at 01/28/19 1548    neomycin-bacitracin-polymyxin ointment, , Topical (Top), 1 time in Clinic/HOD, Clau Michel, NP

## 2025-03-13 NOTE — PROGRESS NOTES
Subjective:      Patient ID:  Clau Nunn is a 89 y.o. female who presents for   Chief Complaint   Patient presents with    Actinic Keratosis     Follow up on area of the nose, denied any issues.     Last seen 11/18/24 for onycholysis. Fungal culture + candida parapsilosis.  Nail clipping showed adherent foci of parakeratosis (no neuts), rare yeasts and sparse bacteria.   Reports she has been off fluconazole for about a month and notes L 5th and 4th nail plates starting to lift up again. Nails not trimmed short.      Notes nose seems to have healed.    Notes bump on L lower leg won't heal.    Tx:  Fluconazole weekly starting 7/10/24  Clob ointment BID  Penlac solution  Acetone drops into nail space TID      Denies personal h/o skin cancer  + fam h/o skin cancer      Review of Systems   Cardiovascular:  Positive for pedal edema.   Skin:  Positive for dry skin.       Objective:   Physical Exam   Constitutional: She appears well-developed and well-nourished. No distress.   Neurological: She is alert and oriented to person, place, and time. She is not disoriented.   Psychiatric: She has a normal mood and affect.   Skin:   Areas Examined (abnormalities noted in diagram):   Head / Face Inspection Performed  RLE Inspected  LLE Inspection Performed  Nails and Digits Inspection Performed                     Diagram Legend     Erythematous scaling macule/papule c/w actinic keratosis       Vascular papule c/w angioma      Pigmented verrucoid papule/plaque c/w seborrheic keratosis      Yellow umbilicated papule c/w sebaceous hyperplasia      Irregularly shaped tan macule c/w lentigo     1-2 mm smooth white papules consistent with Milia      Movable subcutaneous cyst with punctum c/w epidermal inclusion cyst      Subcutaneous movable cyst c/w pilar cyst      Firm pink to brown papule c/w dermatofibroma      Pedunculated fleshy papule(s) c/w skin tag(s)      Evenly pigmented macule c/w junctional nevus     Mildly  variegated pigmented, slightly irregular-bordered macule c/w mildly atypical nevus      Flesh colored to evenly pigmented papule c/w intradermal nevus       Pink pearly papule/plaque c/w basal cell carcinoma      Erythematous hyperkeratotic cursted plaque c/w SCC      Surgical scar with no sign of skin cancer recurrence      Open and closed comedones      Inflammatory papules and pustules      Verrucoid papule consistent consistent with wart     Erythematous eczematous patches and plaques     Dystrophic onycholytic nail with subungual debris c/w onychomycosis     Umbilicated papule    Erythematous-base heme-crusted tan verrucoid plaque consistent with inflamed seborrheic keratosis     Erythematous Silvery Scaling Plaque c/w Psoriasis     See annotation                Assessment / Plan:      Pathology Orders:       Normal Orders This Visit    Specimen to Pathology, Dermatology     Questions:    Procedure Type: Dermatology and skin neoplasms    Number of Specimens: 1    ------------------------: -------------------------    Spec 1 Procedure: Biopsy    Spec 1 Clinical Impression: r/o SCC    Spec 1 Source: L medial ankle    Release to patient:           Neoplasm of uncertain behavior of skin  -     Specimen to Pathology, Dermatology    Shave biopsy procedure note:    Shave biopsy performed after verbal consent including risk of infection, scar, recurrence, need for additional treatment of site. Area prepped with alcohol, anesthetized with approximately 1.0cc of 1% lidocaine with epinephrine. Lesional tissue shaved with razor blade. Hemostasis achieved with application of aluminum chloride followed by hyfrecation. No complications. Dressing applied. Wound care explained.      Discussed MMS if malignant- Elvin    Onycholysis  Keep nails trimmed short  Change clob ointment to solution  Restart acetone drops under nail plate  Restart fluconazole  -     fluconazole (DIFLUCAN) 150 MG Tab; Take 1 tablet (150 mg total) by  mouth once a week. For 6 months  Dispense: 12 tablet; Refill: 1  -     clobetasoL (TEMOVATE) 0.05 % external solution; Apply 2 drops under nail plates for 4 weeks on, take 2 weeks off, then repeat indefinitely  Dispense: 50 mL; Refill: 1    AK (actinic keratosis)  Cryosurgery Procedure Note    Verbal consent from the patient is obtained and the patient is aware of the precancerous quality and need for treatment of these lesions. Liquid nitrogen cryosurgery is applied to the 1 actinic keratoses, as detailed in the physical exam, to produce a freeze injury. The patient is aware that blisters may form and is instructed on wound care with gentle cleansing and use of vaseline ointment to keep moist until healed. The patient is supplied a handout on cryosurgery and is instructed to call if lesions do not completely resolve.    BLE edema, xerosis, venous stasis changes  Compression stockings recommended  Keep legs elevated as much as possible  Rec otc amlactin cream BID         Follow up in about 3 months        LOS NUMBER AND COMPLEXITY OF PROBLEMS    COMPLEXITY OF DATA RISK TOTAL TIME (m)   50757  22844 [] 1 self-limited or minor problem [x] Minimal to none [] No treatment recommended or patient to monitor. Reassurance.  15-29  10-19   [id number]  [id number] Low  [] 2 or more self limited or minor problems  [] 1 stable chronic illness  [] 1 acute, uncomplicated illness or injury Limited (2)  [] Prior external notes from each unique source  [] Review result of each unique test  [] Order each unique test  OR [] Independent historian Low  []  OTC medications   []  Discussed/Decision for minor skin surgery (no risk factors) 30-44  20-29   01348  [id number] Moderate  [x]  1 or more chronic unstable illness (not at goal or progression or exacerbation) or SE of treatment  []  2 or more stable chronic illnesses  []  1 acute illness with systemic symptoms  []  1 acute complicated injury  []  1 undiagnosed new problem with uncertain prognosis  Moderate (1/3 below)  []  3 or more data items        *Now includes independent historian  []  Independent interpretation of a test  []  Discuss management/test with another provider Moderate  [x]  Prescription drug mgmt  []  Discussed/Decision for Minor surgery with risk factors  []  Mgmt limited by social determinates 45-59  30-39   28841  81460 High  []  1 or more chronic illness with severe exacerbation, progression or SE of treatment  []  1 acute or chronic illness/injury that poses a threat to life or bodily function Extensive (2/3 below)  []  3 or more data items        *Now includes independent historian.  []  Independent interpretation of a test  []  Discuss management/test with another provider High  []  Major surgery with risk discussed  []  Drug therapy requiring intensive monitoring for toxicity  []  Hospitalization  []  Decision for DNR 60-74  40-54

## 2025-03-18 ENCOUNTER — RESULTS FOLLOW-UP (OUTPATIENT)
Dept: DERMATOLOGY | Facility: CLINIC | Age: 89
End: 2025-03-18

## 2025-03-18 RX ORDER — AMMONIUM LACTATE 12 G/100G
LOTION TOPICAL 2 TIMES DAILY
Qty: 225 G | Refills: 5 | Status: SHIPPED | OUTPATIENT
Start: 2025-03-18

## 2025-03-18 NOTE — PROGRESS NOTES
Fax referral to Dr. 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  Make sure she as 12 week f/u appt for skin check.    I called and discussed biopsy result, answered all questions.  We discussed applying amlactin BID (sent rx and also discussed that it is available OTC) and importance of wearing compression stockings and keeping legs elevated.       Final Pathologic Diagnosis   Date Value Ref Range Status   03/13/2025   Final    1. Skin, left medial ankle, shave biopsy:   - SQUAMOUS CELL CARCINOMA IN SITU, AT LEAST.  - THE ATYPICAL SQUAMOUS EPITHELIUM EXTENDS TO THE BASE OF THE BIOPSY, AND AN UNDERLYING INVASIVE SQUAMOUS CELL CARCINOMA CANNOT BE EXCLUDED.      This lesion is skin cancer. You will be contacted regarding treatment.       Comment:     Interp By Park Cheney M.D., Signed on 03/18/2025 at 02:13

## 2025-03-23 ENCOUNTER — HOSPITAL ENCOUNTER (EMERGENCY)
Facility: HOSPITAL | Age: 89
Discharge: HOME | End: 2025-03-23
Attending: EMERGENCY MEDICINE
Payer: MEDICARE

## 2025-03-23 VITALS
WEIGHT: 160 LBS | HEIGHT: 63 IN | RESPIRATION RATE: 20 BRPM | TEMPERATURE: 98 F | BODY MASS INDEX: 28.35 KG/M2 | OXYGEN SATURATION: 95 % | SYSTOLIC BLOOD PRESSURE: 150 MMHG | HEART RATE: 65 BPM | DIASTOLIC BLOOD PRESSURE: 74 MMHG

## 2025-03-23 DIAGNOSIS — S22.31XA CLOSED FRACTURE OF ONE RIB OF RIGHT SIDE, INITIAL ENCOUNTER: ICD-10-CM

## 2025-03-23 DIAGNOSIS — S41.111A SKIN TEAR OF RIGHT UPPER ARM WITHOUT COMPLICATION, INITIAL ENCOUNTER: Primary | ICD-10-CM

## 2025-03-23 DIAGNOSIS — R29.6 RECURRENT FALLS: ICD-10-CM

## 2025-03-23 DIAGNOSIS — R07.81 RIB PAIN ON RIGHT SIDE: ICD-10-CM

## 2025-03-23 LAB
ABSOLUTE EOSINOPHIL (OHS): 0.06 K/UL
ABSOLUTE MONOCYTE (OHS): 0.67 K/UL (ref 0.3–1)
ABSOLUTE NEUTROPHIL COUNT (OHS): 6.61 K/UL (ref 1.8–7.7)
ALBUMIN SERPL BCP-MCNC: 3.7 G/DL (ref 3.5–5.2)
ALP SERPL-CCNC: 49 UNIT/L (ref 40–150)
ALT SERPL W/O P-5'-P-CCNC: 14 UNIT/L (ref 10–44)
ANION GAP (OHS): 11 MMOL/L (ref 8–16)
AST SERPL-CCNC: 22 UNIT/L (ref 11–45)
BASOPHILS # BLD AUTO: 0.04 K/UL
BASOPHILS NFR BLD AUTO: 0.5 %
BILIRUB SERPL-MCNC: 0.5 MG/DL (ref 0.1–1)
BUN SERPL-MCNC: 17 MG/DL (ref 8–23)
CALCIUM SERPL-MCNC: 9.6 MG/DL (ref 8.7–10.5)
CHLORIDE SERPL-SCNC: 102 MMOL/L (ref 95–110)
CO2 SERPL-SCNC: 25 MMOL/L (ref 23–29)
CREAT SERPL-MCNC: 1.2 MG/DL (ref 0.5–1.4)
ERYTHROCYTE [DISTWIDTH] IN BLOOD BY AUTOMATED COUNT: 12.2 % (ref 11.5–14.5)
GFR SERPLBLD CREATININE-BSD FMLA CKD-EPI: 44 ML/MIN/1.73/M2
GLUCOSE SERPL-MCNC: 123 MG/DL (ref 70–110)
HCT VFR BLD AUTO: 46.5 % (ref 37–48.5)
HCV AB SERPL QL IA: NEGATIVE
HGB BLD-MCNC: 15.4 GM/DL (ref 12–16)
HIV 1+2 AB+HIV1 P24 AG SERPL QL IA: NEGATIVE
IMM GRANULOCYTES # BLD AUTO: 0.04 K/UL (ref 0–0.04)
IMM GRANULOCYTES NFR BLD AUTO: 0.5 % (ref 0–0.5)
LYMPHOCYTES # BLD AUTO: 1.45 K/UL (ref 1–4.8)
MCH RBC QN AUTO: 31.2 PG (ref 27–50)
MCHC RBC AUTO-ENTMCNC: 33.1 G/DL (ref 32–36)
MCV RBC AUTO: 94 FL (ref 82–98)
NUCLEATED RBC (/100WBC) (OHS): 0 /100 WBC
PLATELET # BLD AUTO: 281 K/UL (ref 150–450)
PMV BLD AUTO: 11 FL (ref 9.2–12.9)
POTASSIUM SERPL-SCNC: 3 MMOL/L (ref 3.5–5.1)
PROT SERPL-MCNC: 7 GM/DL (ref 6–8.4)
RBC # BLD AUTO: 4.94 M/UL (ref 4–5.4)
RELATIVE EOSINOPHIL (OHS): 0.7 %
RELATIVE LYMPHOCYTE (OHS): 16.3 % (ref 18–48)
RELATIVE MONOCYTE (OHS): 7.6 % (ref 4–15)
RELATIVE NEUTROPHIL (OHS): 74.4 % (ref 38–73)
SODIUM SERPL-SCNC: 138 MMOL/L (ref 136–145)
WBC # BLD AUTO: 8.87 K/UL (ref 3.9–12.7)

## 2025-03-23 PROCEDURE — 96374 THER/PROPH/DIAG INJ IV PUSH: CPT | Mod: HCNC

## 2025-03-23 PROCEDURE — 93005 ELECTROCARDIOGRAM TRACING: CPT | Mod: HCNC

## 2025-03-23 PROCEDURE — 94799 UNLISTED PULMONARY SVC/PX: CPT | Mod: HCNC

## 2025-03-23 PROCEDURE — 80053 COMPREHEN METABOLIC PANEL: CPT | Mod: HCNC | Performed by: EMERGENCY MEDICINE

## 2025-03-23 PROCEDURE — 96375 TX/PRO/DX INJ NEW DRUG ADDON: CPT | Mod: HCNC

## 2025-03-23 PROCEDURE — 93010 ELECTROCARDIOGRAM REPORT: CPT | Mod: HCNC,,, | Performed by: INTERNAL MEDICINE

## 2025-03-23 PROCEDURE — 25000003 PHARM REV CODE 250: Mod: HCNC | Performed by: EMERGENCY MEDICINE

## 2025-03-23 PROCEDURE — 63600175 PHARM REV CODE 636 W HCPCS: Mod: HCNC | Performed by: EMERGENCY MEDICINE

## 2025-03-23 PROCEDURE — 99285 EMERGENCY DEPT VISIT HI MDM: CPT | Mod: 25,HCNC

## 2025-03-23 PROCEDURE — 85025 COMPLETE CBC W/AUTO DIFF WBC: CPT | Mod: HCNC | Performed by: EMERGENCY MEDICINE

## 2025-03-23 PROCEDURE — 99900035 HC TECH TIME PER 15 MIN (STAT): Mod: HCNC

## 2025-03-23 PROCEDURE — 86803 HEPATITIS C AB TEST: CPT | Mod: HCNC | Performed by: EMERGENCY MEDICINE

## 2025-03-23 PROCEDURE — 87389 HIV-1 AG W/HIV-1&-2 AB AG IA: CPT | Mod: HCNC | Performed by: EMERGENCY MEDICINE

## 2025-03-23 RX ORDER — POTASSIUM CHLORIDE 20 MEQ/1
40 TABLET, EXTENDED RELEASE ORAL
Status: COMPLETED | OUTPATIENT
Start: 2025-03-23 | End: 2025-03-23

## 2025-03-23 RX ORDER — ONDANSETRON HYDROCHLORIDE 2 MG/ML
4 INJECTION, SOLUTION INTRAVENOUS
Status: COMPLETED | OUTPATIENT
Start: 2025-03-23 | End: 2025-03-23

## 2025-03-23 RX ORDER — CLINDAMYCIN HYDROCHLORIDE 150 MG/1
300 CAPSULE ORAL
Status: COMPLETED | OUTPATIENT
Start: 2025-03-23 | End: 2025-03-23

## 2025-03-23 RX ORDER — ONDANSETRON 4 MG/1
4 TABLET, ORALLY DISINTEGRATING ORAL EVERY 8 HOURS PRN
Qty: 15 TABLET | Refills: 0 | Status: SHIPPED | OUTPATIENT
Start: 2025-03-23

## 2025-03-23 RX ORDER — HYDROCODONE BITARTRATE AND ACETAMINOPHEN 5; 325 MG/1; MG/1
1 TABLET ORAL EVERY 8 HOURS PRN
Qty: 15 TABLET | Refills: 0 | Status: SHIPPED | OUTPATIENT
Start: 2025-03-23

## 2025-03-23 RX ORDER — CLINDAMYCIN HYDROCHLORIDE 300 MG/1
300 CAPSULE ORAL EVERY 8 HOURS
Qty: 15 CAPSULE | Refills: 0 | Status: SHIPPED | OUTPATIENT
Start: 2025-03-23 | End: 2025-03-28

## 2025-03-23 RX ORDER — HYDROCODONE BITARTRATE AND ACETAMINOPHEN 5; 325 MG/1; MG/1
1 TABLET ORAL
Refills: 0 | Status: COMPLETED | OUTPATIENT
Start: 2025-03-23 | End: 2025-03-23

## 2025-03-23 RX ORDER — MORPHINE SULFATE 4 MG/ML
2 INJECTION, SOLUTION INTRAMUSCULAR; INTRAVENOUS
Status: COMPLETED | OUTPATIENT
Start: 2025-03-23 | End: 2025-03-23

## 2025-03-23 RX ADMIN — POTASSIUM CHLORIDE 40 MEQ: 1500 TABLET, EXTENDED RELEASE ORAL at 10:03

## 2025-03-23 RX ADMIN — ONDANSETRON 4 MG: 2 INJECTION INTRAMUSCULAR; INTRAVENOUS at 08:03

## 2025-03-23 RX ADMIN — MORPHINE SULFATE 2 MG: 4 INJECTION INTRAVENOUS at 08:03

## 2025-03-23 RX ADMIN — HYDROCODONE BITARTRATE AND ACETAMINOPHEN 1 TABLET: 5; 325 TABLET ORAL at 10:03

## 2025-03-23 RX ADMIN — BACITRACIN ZINC, NEOMYCIN, POLYMYXIN B: 400; 3.5; 5 OINTMENT TOPICAL at 08:03

## 2025-03-23 RX ADMIN — CLINDAMYCIN HYDROCHLORIDE 300 MG: 150 CAPSULE ORAL at 10:03

## 2025-03-24 ENCOUNTER — TELEPHONE (OUTPATIENT)
Dept: PRIMARY CARE CLINIC | Facility: CLINIC | Age: 89
End: 2025-03-24
Payer: MEDICARE

## 2025-03-24 ENCOUNTER — PATIENT OUTREACH (OUTPATIENT)
Facility: OTHER | Age: 89
End: 2025-03-24
Payer: MEDICARE

## 2025-03-24 DIAGNOSIS — R26.9 ABNORMALITY OF GAIT AND MOBILITY: Primary | ICD-10-CM

## 2025-03-24 DIAGNOSIS — T14.8XXA OPEN WOUND OF SKIN: ICD-10-CM

## 2025-03-24 LAB
OHS QRS DURATION: 142 MS
OHS QTC CALCULATION: 524 MS

## 2025-03-24 NOTE — ED PROVIDER NOTES
Encounter Date: 3/23/2025       History     Chief Complaint   Patient presents with    Fall     Pt brought in by EMS after a fall. Pt reports she got up and walked a short distance before becoming dizzy causing her to fall. Pt denies hitting head and denies LOC. Pt has a skin tear to her right elbow and right hand. Pt complains of right rib pain under her right arm that is worse on deep inspiration and movement. +blood thinners     Patient is an 89-year-old female who presents to the emergency department with a chief complaint of right rib pain.  Patient reports that she has a history of frequent falls.  Today she said she started feeling lightheaded while she was walking and then fell.  Denied any loss of consciousness.  Denied any chest pain, shortness of breath, palpitations, vomiting, diarrhea, blood in his stool.  She landed on her right upper extremity and her right rib.  Pain is made worse with movement, palpation, and deep inspiration.  She has skin tears to her right upper extremity, but denies any significant pain.  She reports that her tetanus status is up-to-date.  Denies hitting her head, neck pain, back pain, pelvic/hip pain, abdominal pain.      Review of patient's allergies indicates:   Allergen Reactions    Cephalexin Hives, Itching, Swelling, Anxiety, Dermatitis and Rash    Mupirocin Itching     Past Medical History:   Diagnosis Date    Allergy     Anxiety     Asthma     Back pain     Chest pain 1/30/2024    Chronic venous insufficiency 11/19/2013    Depression     Diverticulosis 11/19/2013 1/8/8 colonoscopy    Dry eyes     Fracture, sacrum/coccyx     Dr. Sanchez     GERD (gastroesophageal reflux disease)     H/O total hip arthroplasty 12/30/2015    Hypertension     Hypothyroid     Osteoarthritis     Osteoporosis     Postlaminectomy syndrome of lumbar region 1/8/2014    Radius fracture     7/18    Simple chronic bronchitis 5/3/2017    Umbilical hernia 11/19/2013    Urinary incontinence      Vitamin D deficiency disease      Past Surgical History:   Procedure Laterality Date    ADENOIDECTOMY      BACK SURGERY      x2    breast implants  1973    CATARACT EXTRACTION Bilateral     CLOSED REDUCTION DISTAL RADIUS FRACTURE      Dr. Black, 8/18    cystoscope  1/6/16    DR. Brown    FRACTURE SURGERY  April 3 2015    left wrist    HAND SURGERY      HIP SURGERY Right     total hip- Dr. Dos Santos    HYSTERECTOMY      36y/o    INJECTION OF ANESTHETIC AGENT AROUND NERVE Right 9/16/2020    Procedure: Right suprascapular nerve block;  Surgeon: Raffi Wells MD;  Location: [facility];  Service: Pain Management;  Laterality: Right;    INJECTION OF ANESTHETIC AGENT AROUND NERVE Right 7/13/2021    Procedure: Block, Nerve Right Suprascapular Nerve Block with RN IV sedation;  Surgeon: Raffi Wells MD;  Location: [facility];  Service: Pain Management;  Laterality: Right;    INJECTION OF ANESTHETIC AGENT INTO SACROILIAC JOINT N/A 8/12/2020    Procedure: Left IA hip Joint + Left SIJ + Right shoulder injection;  Surgeon: Raffi Wells MD;  Location: [facility];  Service: Pain Management;  Laterality: N/A;    INJECTION OF ANESTHETIC AGENT INTO SACROILIAC JOINT Bilateral 1/31/2023    Procedure: Bilateral SIJ Injection w/Local;  Surgeon: Abdirahman Parker MD;  Location: [facility];  Service: Pain Management;  Laterality: Bilateral;    INJECTION OF ANESTHETIC AGENT INTO SACROILIAC JOINT Bilateral 8/20/2024    Procedure: Bilateral SIJ Injection;  Surgeon: Abdirahman Parker MD;  Location: [facility];  Service: Pain Management;  Laterality: Bilateral;    INJECTION OF JOINT N/A 8/12/2020    Procedure: Left IA hip Joint + Left SIJ + Right shoulder injection;  Surgeon: Raffi Wells MD;  Location: [facility];  Service: Pain Management;  Laterality: N/A;    JOINT REPLACEMENT Right     R NNAMDI - Dr. Dos Santos    LEG SURGERY Right     ex-fix tib/fib    RADIOFREQUENCY THERMOCOAGULATION Left 6/8/2023    Procedure: Left SIJ RFA w/  local (give Valium before);  Surgeon: Abdirahman Parker MD;  Location: [facility];  Service: Pain Management;  Laterality: Left;    SELECTIVE INJECTION OF ANESTHETIC AGENT AROUND LUMBAR SPINAL NERVE ROOT BY TRANSFORAMINAL APPROACH Bilateral 2/10/2021    Procedure: Bilateral L5/S1 TF REJI;  Surgeon: Raffi Wells MD;  Location: [facility];  Service: Pain Management;  Laterality: Bilateral;    SELECTIVE INJECTION OF ANESTHETIC AGENT AROUND LUMBAR SPINAL NERVE ROOT BY TRANSFORAMINAL APPROACH Bilateral 5/25/2022    Procedure: Bilateral L5/S1 + S1 TF REJI;  Surgeon: Raffi Wells MD;  Location: [facility];  Service: Pain Management;  Laterality: Bilateral;    TONSILLECTOMY      TRANSFORAMINAL EPIDURAL INJECTION OF STEROID Left 7/8/2020    Procedure: left L2/3 + L5/S1 TF REJI;  Surgeon: Raffi Wells MD;  Location: [facility];  Service: Pain Management;  Laterality: Left;    TRANSFORAMINAL EPIDURAL INJECTION OF STEROID Left 7/1/2021    Procedure: left L5/S1 + S1 TF REJI;  Surgeon: Raffi Wells MD;  Location: [facility];  Service: Pain Management;  Laterality: Left;     Family History   Problem Relation Name Age of Onset    COPD Mother      Cancer Mother          lung CA    Pulmonary fibrosis Sister      Cancer Daughter          colon    Stroke Father      Diabetes Son      Melanoma Neg Hx      Psoriasis Neg Hx      Lupus Neg Hx       Social History[1]  Review of Systems  See HPI. Otherwise Review of Systems negative.      Physical Exam     Initial Vitals [03/23/25 1901]   BP Pulse Resp Temp SpO2   (!) 150/79 79 15 98 °F (36.7 °C) 96 %      MAP       --         Physical Exam    Nursing note and vitals reviewed.  Constitutional: She appears well-developed and well-nourished. No distress.   HENT:   Head: Normocephalic and atraumatic. Mouth/Throat: Oropharynx is clear and moist.   Eyes: Conjunctivae and EOM are normal. Pupils are equal, round, and reactive to light.   Neck: Neck supple. No tracheal deviation present.    Cardiovascular:  Normal rate, regular rhythm, normal heart sounds and intact distal pulses.           Pulmonary/Chest: Breath sounds normal. No respiratory distress.   Abdominal: Abdomen is soft. She exhibits no distension. There is no abdominal tenderness. There is no rebound and no guarding.   Musculoskeletal:         General: Tenderness (right rib ttp) present. No edema. Normal range of motion.      Cervical back: Neck supple.     Neurological: She is alert and oriented to person, place, and time. GCS score is 15. GCS eye subscore is 4. GCS verbal subscore is 5. GCS motor subscore is 6.   No focal deficits   Skin:   Large skin tear to the proximal right upper extremity.  2 cm laceration to the right dorsal hand.  No tendons exposed.  Bleeding controlled   Psychiatric: She has a normal mood and affect. Her behavior is normal.         ED Course   Procedures  Labs Reviewed   COMPREHENSIVE METABOLIC PANEL - Abnormal       Result Value    Sodium 138      Potassium 3.0 (*)     Chloride 102      CO2 25      Glucose 123 (*)     BUN 17      Creatinine 1.2      Calcium 9.6      Protein Total 7.0      Albumin 3.7      Bilirubin Total 0.5      ALP 49      AST 22      ALT 14      Anion Gap 11      eGFR 44 (*)    CBC WITH DIFFERENTIAL - Abnormal    WBC 8.87      RBC 4.94      HGB 15.4      HCT 46.5      MCV 94      MCH 31.2      MCHC 33.1      RDW 12.2      Platelet Count 281      MPV 11.0      Nucleated RBC 0      Neut % 74.4 (*)     Lymph % 16.3 (*)     Mono % 7.6      Eos % 0.7      Basophil % 0.5      Imm Grans % 0.5      Neut # 6.61      Lymph # 1.45      Mono # 0.67      Eos # 0.06      Baso # 0.04      Imm Grans # 0.04     HEPATITIS C ANTIBODY - Normal    Hep C Ab Interp Negative     HIV 1 / 2 ANTIBODY - Normal    HIV 1/2 Ag/Ab Negative     CBC W/ AUTO DIFFERENTIAL    Narrative:     The following orders were created for panel order CBC auto differential.  Procedure                               Abnormality          Status                     ---------                               -----------         ------                     CBC with Differential[[id number]]       Abnormal            Final result                 Please view results for these tests on the individual orders.   HEP C VIRUS HOLD SPECIMEN     EKG Readings: (Independently Interpreted)   EKG reviewed interpreted by ED provider as normal sinus rhythm with premature atrial complexes, rate 84, right bundle-branch block pattern, T-wave inversions, no ST depression or ST elevation         Imaging Results              X-Ray Ribs 2 View Right (Final result)  Result time 03/23/25 19:51:04      Final result by Tawanna Moore MD (03/23/25 19:51:04)                   Impression:       As above    Finalized on: 3/23/2025 7:51 PM By:  Tawanna Moore MD  [facility]# [id number]      2025-03-23 19:53:12.815     [facility]               Narrative:    EXAM: XR RIBS 2 VIEW RIGHT    CLINICAL INDICATION:  Third 90 and pleural diarrhea pleuritic diarrhea pleural pain    PRIORS:   None    FINDINGS:  Three-view exam  Lower right rib fractures with healing callus.  Chronic appearing proximal humeral fracture.  No overt acute or aggressive bony finding.                                         Medications   neomycin-bacitracin-polymyxin ointment ( Topical (Top) Given 3/23/25 2015)   morphine injection 2 mg (2 mg Intravenous Given 3/23/25 2011)   ondansetron injection 4 mg (4 mg Intravenous Given 3/23/25 2011)   potassium chloride SA CR tablet 40 mEq (40 mEq Oral Given 3/23/25 2257)   clindamycin capsule 300 mg (300 mg Oral Given 3/23/25 2257)   HYDROcodone-acetaminophen 5-325 mg per tablet 1 tablet (1 tablet Oral Given 3/23/25 2257)     Medical Decision Making  89-year-old female with a history of recurrent falls had another fall today.  Rib fracture.  Pain treated with pain medication.  Instructed on how to use incentive spirometer at home.  She has a small laceration to the dorsum of the hand that  was treated with Steri-Strips.  She also has a large skin tear to the proximal right upper extremity.  Not amenable to any ED closure.  Wound cleaned.  Neosporin applied.  Nonadhesive bandage applied.  Given the size of it, will provide with few days' worth of prophylactic antibiotics.  Discussed home care and close follow-up.    Amount and/or Complexity of Data Reviewed  External Data Reviewed: notes.     Details: Past medical history, medications, allergies reviewed  Labs: ordered. Decision-making details documented in ED Course.  Radiology: ordered and independent interpretation performed. Decision-making details documented in ED Course.  ECG/medicine tests: ordered and independent interpretation performed. Decision-making details documented in ED Course.    Risk  OTC drugs.  Prescription drug management.  Parenteral controlled substances.                                      Clinical Impression:  Final diagnoses:  [R29.6] Recurrent falls  [R07.81] Rib pain on right side  [S41.111A] Skin tear of right upper arm without complication, initial encounter (Primary)  [S22.31XA] Closed fracture of one rib of right side, initial encounter          ED Disposition Condition    Discharge Stable          ED Prescriptions       Medication Sig Dispense Start Date End Date Auth. Provider    HYDROcodone-acetaminophen (NORCO) 5-325 mg per tablet Take 1 tablet by mouth every 8 (eight) hours as needed for Pain. 15 tablet 3/23/2025 -- Estevan Morley MD    ondansetron (ZOFRAN-ODT) 4 MG TbDL Take 1 tablet (4 mg total) by mouth every 8 (eight) hours as needed (nausea). 15 tablet 3/23/2025 -- Estevan Morley MD    clindamycin (CLEOCIN) 300 MG capsule Take 1 capsule (300 mg total) by mouth every 8 (eight) hours. for 5 days 15 capsule 3/23/2025 3/28/2025 Estevan Morley MD          Follow-up Information       Follow up With Specialties Details Why Contact Info    Jeanette Molina MD Internal Medicine Schedule an appointment as soon as  possible for a visit   [address]  [facility] [address]  [phone]      [facility] - Emergency Dept. Emergency Medicine  As needed, If symptoms worsen [id number]  [facility] [address] [phone]                 [1]   Social History  Tobacco Use    Smoking status: Never    Smokeless tobacco: Never   Substance Use Topics    Alcohol use: Yes     Alcohol/week: 0.0 standard drinks of alcohol     Comment: rarely    Drug use: Never        Estevan Morley MD  03/24/25 0104

## 2025-03-24 NOTE — TELEPHONE ENCOUNTER
PC with pt- pt went to ED on yesterday after a fall.  Dx with rib fx and skin tear to right arm    Pt asking for home health referral- [facility]- for wound care     Pt states that she cannot come into the office at this time

## 2025-03-25 ENCOUNTER — OFFICE VISIT (OUTPATIENT)
Dept: PRIMARY CARE CLINIC | Facility: CLINIC | Age: 89
End: 2025-03-25
Payer: MEDICARE

## 2025-03-25 DIAGNOSIS — T14.8XXA OPEN WOUND OF SKIN: ICD-10-CM

## 2025-03-25 DIAGNOSIS — R29.6 RECURRENT FALLS: Primary | ICD-10-CM

## 2025-03-25 DIAGNOSIS — S22.41XA CLOSED FRACTURE OF MULTIPLE RIBS OF RIGHT SIDE, INITIAL ENCOUNTER: ICD-10-CM

## 2025-03-25 NOTE — PROGRESS NOTES
"  Primary Care Telemedicine Appointment      The patient location is:  Patient Home   The chief complaint leading to consultation is: fall and wound to right arm  Total time spent on medical decision making was 20 min.    Visit type: Virtual visit with synchronous audio only and video 10 minutes  Each patient to whom he or she provides medical services by telemedicine is:  (1) informed of the relationship between the physician and patient and the respective role of any other health care provider with respect to management of the patient; and (2) notified that he or she may decline to receive medical services by telemedicine and may withdraw from such care at any time.      Subjective:      Patient ID: Clau Nunn is a 89 y.o. female     Chief Complaint: Follow-up    Prior to this visit, patient's last encounter with PCP was 2/6/2025.    Met with pt virtually after she experienced a fall 2 days ago.  She stood from sitting position and felt "hazy."  She walked to the kitchen and fell on her right side. She noted severe pain to the right chest and experienced and open wound to the right arm. In the ED, she was diagnosed with lower right rib fractures. She was discharged with incentive spirometer and prescription of Norco 5.  She was prescribed Clindamycin and right arm skin tear.  Today, she was seen virtually. She reports difficulty with standing and ambulation due to pain.  She is using her walker.  Her son is staying with her providing her assistance. The Norco 5 does decrease the rib cage pain a small amount.   She describes a skin tear to the right arm with a dressing and is currently taking a prescription of Clindamycin to help prevent skin infection. Currently, she denies cough, fever and SOB.   Past Surgical History:   Procedure Laterality Date    ADENOIDECTOMY      BACK SURGERY      x2    breast implants  1973    CATARACT EXTRACTION Bilateral     CLOSED REDUCTION DISTAL RADIUS FRACTURE       " [place], 8/18    cystoscope  1/6/16    DR. Brown    FRACTURE SURGERY  April 3 2015    left wrist    HAND SURGERY      HIP SURGERY Right     total hip- Dr. Dos Santos    HYSTERECTOMY      36y/o    INJECTION OF ANESTHETIC AGENT AROUND NERVE Right 9/16/2020    Procedure: Right suprascapular nerve block;  Surgeon: Raffi Wells MD;  Location: [facility];  Service: Pain Management;  Laterality: Right;    INJECTION OF ANESTHETIC AGENT AROUND NERVE Right 7/13/2021    Procedure: Block, Nerve Right Suprascapular Nerve Block with RN IV sedation;  Surgeon: Raffi Wells MD;  Location: [facility];  Service: Pain Management;  Laterality: Right;    INJECTION OF ANESTHETIC AGENT INTO SACROILIAC JOINT N/A 8/12/2020    Procedure: Left IA hip Joint + Left SIJ + Right shoulder injection;  Surgeon: Raffi Wells MD;  [facility]: [facility];  Service: Pain Management;  Laterality: N/A;    INJECTION OF ANESTHETIC AGENT INTO SACROILIAC JOINT Bilateral 1/31/2023    Procedure: Bilateral SIJ Injection w/Local;  Surgeon: Abdirahman Parker MD;  Location: [facility];  Service: Pain Management;  Laterality: Bilateral;    INJECTION OF ANESTHETIC AGENT INTO SACROILIAC JOINT Bilateral 8/20/2024    Procedure: Bilateral SIJ Injection;  Surgeon: Abdirahman Parker MD;  Location: [facility];  Service: Pain Management;  Laterality: Bilateral;    INJECTION OF JOINT N/A 8/12/2020    Procedure: Left IA hip Joint + Left SIJ + Right shoulder injection;  Surgeon: Raffi Wells MD;  Location: [facility];  Service: Pain Management;  Laterality: N/A;    JOINT REPLACEMENT Right     R NNAMDI - Dr. Dos Santos    LEG SURGERY Right     ex-fix tib/fib    RADIOFREQUENCY THERMOCOAGULATION Left 6/8/2023    Procedure: Left SIJ RFA w/ local (give Valium before);  Surgeon: Abdirahman Parker MD;  Location: [facility];  Service: Pain Management;  Laterality: Left;    SELECTIVE INJECTION OF ANESTHETIC AGENT AROUND LUMBAR SPINAL NERVE ROOT BY TRANSFORAMINAL APPROACH  Bilateral 2/10/2021    Procedure: Bilateral L5/S1 TF REJI;  Surgeon: Raffi Wells MD;  Location: [facility];  Service: Pain Management;  Laterality: Bilateral;    SELECTIVE INJECTION OF ANESTHETIC AGENT AROUND LUMBAR SPINAL NERVE ROOT BY TRANSFORAMINAL APPROACH Bilateral 5/25/2022    Procedure: Bilateral L5/S1 + S1 TF REJI;  Surgeon: Raffi Wells MD;  Location: [facility];  Service: Pain Management;  Laterality: Bilateral;    TONSILLECTOMY      TRANSFORAMINAL EPIDURAL INJECTION OF STEROID Left 7/8/2020    Procedure: left L2/3 + L5/S1 TF REJI;  Surgeon: Raffi Wells MD;  Location: [facility];  Service: Pain Management;  Laterality: Left;    TRANSFORAMINAL EPIDURAL INJECTION OF STEROID Left 7/1/2021    Procedure: left L5/S1 + S1 TF REJI;  Surgeon: Raffi Wells MD;  Location: [facility];  Service: Pain Management;  Laterality: Left;       Past Medical History:   Diagnosis Date    Allergy     Anxiety     Asthma     Back pain     Chest pain 1/30/2024    Chronic venous insufficiency 11/19/2013    Depression     Diverticulosis 11/19/2013 1/8/8 colonoscopy    Dry eyes     Fracture, sacrum/coccyx     Dr. Sanchez     GERD (gastroesophageal reflux disease)     H/O total hip arthroplasty 12/30/2015    Hypertension     Hypothyroid     Osteoarthritis     Osteoporosis     Postlaminectomy syndrome of lumbar region 1/8/2014    Radius fracture     7/18    Simple chronic bronchitis 5/3/2017    Umbilical hernia 11/19/2013    Urinary incontinence     Vitamin D deficiency disease            Objective:   There were no vitals filed for this visit.      There is no height or weight on file to calculate BMI.  BP Readings from Last 3 Encounters:   03/23/25 (!) 150/74   03/13/25 (!) 151/76   02/06/25 122/68      Wt Readings from Last 3 Encounters:   03/23/25 1901 72.6 kg (160 lb)   02/06/25 1506 76.2 kg (168 lb 1.6 oz)   12/18/24 1006 76.1 kg (167 lb 10.6 oz)        Physical Exam  Constitutional:       General: She is not in acute  distress.     Appearance: She is not ill-appearing.   HENT:      Head: Normocephalic.   Eyes:      Conjunctiva/sclera: Conjunctivae normal.   Pulmonary:      Effort: Pulmonary effort is normal. No respiratory distress.   Neurological:      Mental Status: She is alert and oriented to person, place, and time.         Lab Results   Component Value Date    WBC 8.87 03/23/2025    HGB 15.4 03/23/2025    HCT 46.5 03/23/2025     03/23/2025    CHOL 195 05/31/2024    TRIG 196 (H) 05/31/2024    HDL 55 05/31/2024    ALT 14 03/23/2025    AST 22 03/23/2025     03/23/2025    K 3.0 (L) 03/23/2025     03/23/2025    CREATININE 1.2 03/23/2025    BUN 17 03/23/2025    CO2 25 03/23/2025    TSH 2.266 02/06/2025    INR 1.1 04/07/2022    HGBA1C 5.0 03/11/2019         Assessment:       Plan:       Health Maintenance         Date Due Completion Date    COVID-19 Vaccine (4 - 2024-25 season) 09/01/2024 10/12/2021    Lipid Panel 05/31/2029 5/31/2024    TETANUS VACCINE 02/25/2032 2/25/2022            1. Recurrent falls  Assessment & Plan:  Encouraged use of rollator  Fall precautions    Orders:  -     Ambulatory referral/consult to Home Health; Future; Expected date: 03/26/2025    2. Open wound of skin  Assessment & Plan:  Complete Clindamycin  Monitor for increased pain, swelling, redness and drainage  Home health with wound care    Orders:  -     Ambulatory referral/consult to Home Health; Future; Expected date: 03/26/2025    3. Closed fracture of multiple ribs of right side, initial encounter  Assessment & Plan:  Incentive spirometer every 3 to 4 hours  Splint chest for cough and deep breathe  Prn pain med           Follow up in about 2 weeks (around 4/8/2025) for F/U appointment - schedule regular appt.              Clau Michel, APRN, NP-C  [facility] [address], [facility]  [place] [address]  [phone] ph  [phone] fax

## 2025-03-25 NOTE — ASSESSMENT & PLAN NOTE
Complete Clindamycin  Monitor for increased pain, swelling, redness and drainage  Home health with wound care

## 2025-04-08 ENCOUNTER — OFFICE VISIT (OUTPATIENT)
Dept: PRIMARY CARE CLINIC | Facility: CLINIC | Age: 89
End: 2025-04-08
Payer: MEDICARE

## 2025-04-08 DIAGNOSIS — R26.9 ABNORMALITY OF GAIT AND MOBILITY: ICD-10-CM

## 2025-04-08 DIAGNOSIS — R29.6 RECURRENT FALLS: Primary | ICD-10-CM

## 2025-04-08 DIAGNOSIS — S22.41XA CLOSED FRACTURE OF MULTIPLE RIBS OF RIGHT SIDE, INITIAL ENCOUNTER: ICD-10-CM

## 2025-04-08 DIAGNOSIS — S51.812A SKIN TEAR OF LEFT FOREARM WITHOUT COMPLICATION, INITIAL ENCOUNTER: ICD-10-CM

## 2025-04-08 DIAGNOSIS — S22.31XA CLOSED FRACTURE OF ONE RIB OF RIGHT SIDE, INITIAL ENCOUNTER: ICD-10-CM

## 2025-04-08 RX ORDER — HYDROCODONE BITARTRATE AND ACETAMINOPHEN 5; 325 MG/1; MG/1
1 TABLET ORAL EVERY 8 HOURS PRN
Qty: 15 TABLET | Refills: 0 | Status: SHIPPED | OUTPATIENT
Start: 2025-04-08

## 2025-04-08 NOTE — PROGRESS NOTES
Primary Care Telemedicine Appointment      The patient location is:  Patient Home   The chief complaint leading to consultation is: follow up, fall, rib fractures  Total time spent on medical decision making was 10 min.    Visit type: Virtual visit with synchronous audio only and video - 11 min.   Each patient to whom he or she provides medical services by telemedicine is:  (1) informed of the relationship between the physician and patient and the respective role of any other health care provider with respect to management of the patient; and (2) notified that he or she may decline to receive medical services by telemedicine and may withdraw from such care at any time.      Subjective:      Patient ID: Clau Nunn is a 89 y.o. female     Chief Complaint: Follow-up    Prior to this visit, patient's last encounter with myself was 3/25/2025. Pt took a fall on 3/23 with resulting right upper arm skin tear and left sided rib fractures.     Today, she returns for follow up.   3rd week after fall. Still with generalized back pain and right rib cage pain- stabbing pain, mostly with movement  Denies SOB  Rare cough  Ran out of hydrocodone and requesting additional  Is taking Tylenol but does not help  Is having bowel movements.   Right upper arm - moist xeroform dressing changes - wound improvement verbalized,  no redness, no drainage  Can stand more easily, using walker, working with PT (about 3 weeks total)    Denies fever, chills, URI symptoms, chest pain, SOB, palpitations, vomiting, diarrhea, no gross neuro deficits, urinary symptoms and leg swelling     Past Surgical History:   Procedure Laterality Date    ADENOIDECTOMY      BACK SURGERY      x2    breast implants  1973    CATARACT EXTRACTION Bilateral     CLOSED REDUCTION DISTAL RADIUS FRACTURE      Dr. Black, 8/18    cystoscope  1/6/16    DR. Brown    FRACTURE SURGERY  April 3 2015    left wrist    HAND SURGERY      HIP SURGERY Right     total hip-   [facility]    HYSTERECTOMY      34y/o    INJECTION OF ANESTHETIC AGENT AROUND NERVE Right 9/16/2020    Procedure: Right suprascapular nerve block;  Surgeon: Raffi Wells MD;  Location: [facility];  Service: Pain Management;  Laterality: Right;    INJECTION OF ANESTHETIC AGENT AROUND NERVE Right 7/13/2021    Procedure: Block, Nerve Right Suprascapular Nerve Block with RN IV sedation;  Surgeon: Raffi Wells MD;  Location: [facility];  Service: Pain Management;  Laterality: Right;    INJECTION OF ANESTHETIC AGENT INTO SACROILIAC JOINT N/A 8/12/2020    Procedure: Left IA hip Joint + Left SIJ + Right shoulder injection;  Surgeon: Raffi Wells MD;  Location: [facility];  Service: Pain Management;  Laterality: N/A;    INJECTION OF ANESTHETIC AGENT INTO SACROILIAC JOINT Bilateral 1/31/2023    Procedure: Bilateral SIJ Injection w/Local;  Surgeon: Abdirahman Parker MD;  Location: [facility];  Service: Pain Management;  Laterality: Bilateral;    INJECTION OF ANESTHETIC AGENT INTO SACROILIAC JOINT Bilateral 8/20/2024    Procedure: Bilateral SIJ Injection;  Surgeon: Abdirahman Parker MD;  Location: [facility];  Service: Pain Management;  Laterality: Bilateral;    INJECTION OF JOINT N/A 8/12/2020    Procedure: Left IA hip Joint + Left SIJ + Right shoulder injection;  Surgeon: Raffi Wells MD;  Location: [facility];  Service: Pain Management;  Laterality: N/A;    JOINT REPLACEMENT Right     R NNAMDI - Dr. Dos Santos    LEG SURGERY Right     ex-fix tib/fib    RADIOFREQUENCY THERMOCOAGULATION Left 6/8/2023    Procedure: Left SIJ RFA w/ local (give Valium before);  Surgeon: Abdirahman Parker MD;  Location: [facility];  Service: Pain Management;  Laterality: Left;    SELECTIVE INJECTION OF ANESTHETIC AGENT AROUND LUMBAR SPINAL NERVE ROOT BY TRANSFORAMINAL APPROACH Bilateral 2/10/2021    Procedure: Bilateral L5/S1 TF REJI;  Surgeon: Raffi Wells MD;  Location: [facility];  Service: Pain Management;  Laterality: Bilateral;     SELECTIVE INJECTION OF ANESTHETIC AGENT AROUND LUMBAR SPINAL NERVE ROOT BY TRANSFORAMINAL APPROACH Bilateral 5/25/2022    Procedure: Bilateral L5/S1 + S1 TF REJI;  Surgeon: Raffi Wells MD;  Location: [facility];  Service: Pain Management;  Laterality: Bilateral;    TONSILLECTOMY      TRANSFORAMINAL EPIDURAL INJECTION OF STEROID Left 7/8/2020    Procedure: left L2/3 + L5/S1 TF REJI;  Surgeon: Raffi Wells MD;  Location: [facility];  Service: Pain Management;  Laterality: Left;    TRANSFORAMINAL EPIDURAL INJECTION OF STEROID Left 7/1/2021    Procedure: left L5/S1 + S1 TF REJI;  Surgeon: Raffi Wells MD;  Location: [facility];  Service: Pain Management;  Laterality: Left;       Past Medical History:   Diagnosis Date    Allergy     Anxiety     Asthma     Back pain     Chest pain 1/30/2024    Chronic venous insufficiency 11/19/2013    Depression     Diverticulosis 11/19/2013 1/8/8 colonoscopy    Dry eyes     Fracture, sacrum/coccyx     Dr. Sanchez     GERD (gastroesophageal reflux disease)     H/O total hip arthroplasty 12/30/2015    Hypertension     Hypothyroid     Osteoarthritis     Osteoporosis     Postlaminectomy syndrome of lumbar region 1/8/2014    Radius fracture     7/18    Simple chronic bronchitis 5/3/2017    Umbilical hernia 11/19/2013    Urinary incontinence     Vitamin D deficiency disease            Objective:   There were no vitals filed for this visit.      There is no height or weight on file to calculate BMI.  BP Readings from Last 3 Encounters:   03/23/25 (!) 150/74   03/13/25 (!) 151/76   02/06/25 122/68      Wt Readings from Last 3 Encounters:   03/23/25 1901 72.6 kg (160 lb)   02/06/25 1506 76.2 kg (168 lb 1.6 oz)   12/18/24 1006 76.1 kg (167 lb 10.6 oz)        Physical Exam  Constitutional:       General: She is not in acute distress.     Appearance: She is not ill-appearing.   HENT:      Head: Normocephalic.   Eyes:      Conjunctiva/sclera: Conjunctivae normal.   Pulmonary:      Effort:  Pulmonary effort is normal. No respiratory distress.      Breath sounds: No stridor. No wheezing.   Musculoskeletal:         General: No signs of injury.   Skin:     Coloration: Skin is not jaundiced or pale.   Neurological:      Mental Status: She is alert and oriented to person, place, and time.         Lab Results   Component Value Date    WBC 8.87 03/23/2025    HGB 15.4 03/23/2025    HCT 46.5 03/23/2025     03/23/2025    CHOL 195 05/31/2024    TRIG 196 (H) 05/31/2024    HDL 55 05/31/2024    ALT 14 03/23/2025    AST 22 03/23/2025     03/23/2025    K 3.0 (L) 03/23/2025     03/23/2025    CREATININE 1.2 03/23/2025    BUN 17 03/23/2025    CO2 25 03/23/2025    TSH 2.266 02/06/2025    INR 1.1 04/07/2022    HGBA1C 5.0 03/11/2019         Assessment:       Plan:       Health Maintenance         Date Due Completion Date    COVID-19 Vaccine (4 - 2024-25 season) 09/01/2024 10/12/2021    Lipid Panel 05/31/2029 5/31/2024    TETANUS VACCINE 02/25/2032 2/25/2022            1. Recurrent falls  Assessment & Plan:  Daily use of rollator  Complete course of physical therapy      2. Closed fracture of one rib of right side, initial encounter  -     HYDROcodone-acetaminophen (NORCO) 5-325 mg per tablet; Take 1 tablet by mouth every 8 (eight) hours as needed for Pain.  Dispense: 15 tablet; Refill: 0    3. Abnormality of gait and mobility    4. Skin tear of left forearm without complication, initial encounter    5. Closed fracture of multiple ribs of right side, initial encounter  Assessment & Plan:  Incentive spirometer every 3 to 4 hours  Splint chest for cough and deep breathe  Prn pain med  Refill [place]           No follow-ups on file.              Clau Michel, APRN, NP-C  [facility] [address] [place] [address]  [phone] ph  [phone] fax

## 2025-04-08 NOTE — ASSESSMENT & PLAN NOTE
Incentive spirometer every 3 to 4 hours  Splint chest for cough and deep breathe  Prn pain med  Refill [place]

## 2025-04-11 ENCOUNTER — TELEPHONE (OUTPATIENT)
Dept: DERMATOLOGY | Facility: CLINIC | Age: 89
End: 2025-04-11
Payer: MEDICARE

## 2025-04-11 NOTE — TELEPHONE ENCOUNTER
Returned call to pt. Message sent to .    ----- Message from Africa Billinsg MD sent at 4/11/2025  4:04 PM CDT -----  Regarding: RE: pt advice  Does she want to see a different Mohs surgeon, or discuss other options for treatment?  ----- Message -----  From: Kassi Robles MA  Sent: 4/11/2025  10:00 AM CDT  To: Africa Billings MD  Subject: FW: pt advice                                      ----- Message -----  From: Irene Kim  Sent: 4/11/2025   9:30 AM CDT  To: Manuelito Leger Staff  Subject: pt advice                                        PATIENT CALLPt called regarding plan of care. She's decided not to see Dr Man Oconnor for MOHS, just wanted to update this provider. Please call back at [phone] if there are any further questions or concerns

## 2025-04-23 ENCOUNTER — LAB VISIT (OUTPATIENT)
Dept: LAB | Facility: HOSPITAL | Age: 89
End: 2025-04-23
Attending: INTERNAL MEDICINE
Payer: MEDICARE

## 2025-04-23 DIAGNOSIS — Z51.81 MEDICATION MONITORING ENCOUNTER: ICD-10-CM

## 2025-04-23 LAB
ALBUMIN SERPL BCP-MCNC: 3.7 G/DL (ref 3.5–5.2)
ALP SERPL-CCNC: 67 UNIT/L (ref 40–150)
ALT SERPL W/O P-5'-P-CCNC: 9 UNIT/L (ref 10–44)
ANION GAP (OHS): 10 MMOL/L (ref 8–16)
AST SERPL-CCNC: 17 UNIT/L (ref 11–45)
BILIRUB SERPL-MCNC: 0.5 MG/DL (ref 0.1–1)
BUN SERPL-MCNC: 17 MG/DL (ref 8–23)
CALCIUM SERPL-MCNC: 9.9 MG/DL (ref 8.7–10.5)
CHLORIDE SERPL-SCNC: 99 MMOL/L (ref 95–110)
CO2 SERPL-SCNC: 30 MMOL/L (ref 23–29)
CREAT SERPL-MCNC: 1.1 MG/DL (ref 0.5–1.4)
GFR SERPLBLD CREATININE-BSD FMLA CKD-EPI: 48 ML/MIN/1.73/M2
GLUCOSE SERPL-MCNC: 79 MG/DL (ref 70–110)
POTASSIUM SERPL-SCNC: 3.8 MMOL/L (ref 3.5–5.1)
PROT SERPL-MCNC: 7.7 GM/DL (ref 6–8.4)
SODIUM SERPL-SCNC: 139 MMOL/L (ref 136–145)

## 2025-04-23 PROCEDURE — 36415 COLL VENOUS BLD VENIPUNCTURE: CPT | Mod: HCNC,PO

## 2025-04-23 PROCEDURE — 84450 TRANSFERASE (AST) (SGOT): CPT | Mod: HCNC

## 2025-04-23 NOTE — TELEPHONE ENCOUNTER
----- Message from Donna Sim sent at 5/20/2022 11:06 AM CDT -----  Type:  Patient Returning Call    Who Called:patient  Who Left Message for Patient:Adenike  Does the patient know what this is regarding?:na  Would the patient rather a call back or a response via [facility]? Call back  Best Call Back [phone]  Additional Information: na      General Daily Progress Note    Admission Date: 4/22/2025  Hospital Day 2  Post-Op Day Not Applicable  Subjective:   Still having epigastric pain.  He has had this pain on previous occasions, and evaluations have not revealed the source.  He has continued to pass watery blood and clots without any actual stool.      Objective:   Patient Vitals for the past 24 hrs:   BP Temp Temp src Pulse Resp SpO2 Height Weight   04/23/25 0731 129/69 97.6 °F (36.4 °C) Oral 59 14 100 % -- --   04/23/25 0600 -- -- -- 53 -- -- -- --   04/23/25 0445 117/65 97.7 °F (36.5 °C) Oral 56 -- 98 % -- --   04/23/25 0424 -- -- -- 59 16 96 % -- --   04/23/25 0423 120/69 97.6 °F (36.4 °C) Oral 59 14 99 % -- --   04/23/25 0400 -- -- -- 58 -- -- -- --   04/23/25 0315 -- -- -- 60 11 -- -- --   04/23/25 0314 (!) 131/91 97.7 °F (36.5 °C) Oral 60 16 (!) 86 % -- --   04/23/25 0137 103/76 -- -- 64 13 98 % -- --   04/23/25 0133 105/83 98 °F (36.7 °C) Oral 64 12 100 % -- --   04/23/25 0130 -- -- -- -- -- -- 1.829 m (6') 100.9 kg (222 lb 8 oz)   04/23/25 0000 120/75 97.8 °F (36.6 °C) Oral 64 15 95 % -- --   04/22/25 2045 129/78 98.5 °F (36.9 °C) Oral 60 14 94 % -- --   04/22/25 2030 124/76 -- -- 62 17 -- -- --   04/22/25 1757 129/87 98.4 °F (36.9 °C) Oral 64 16 100 % 1.829 m (6') 98.9 kg (218 lb 0.6 oz)     No intake/output data recorded.  04/21 1901 - 04/23 0700  In: 310   Out: -       Physical Examination:   General Appearance - Somewhat uncomfortable.  Abdomen -  Soft.  Non-distended.  Moderately tender in the epigastrium.  No rebound tenderness or involuntary guarding.            Data Review   Recent Results (from the past 24 hours)   CBC with Auto Differential    Collection Time: 04/22/25  6:00 PM   Result Value Ref Range    WBC 7.5 4.1 - 11.1 K/uL    RBC 3.04 (L) 4.10 - 5.70 M/uL    Hemoglobin 8.8 (L) 12.1 - 17.0 g/dL    Hematocrit 28.6 (L) 36.6 - 50.3 %    MCV 94.1 80.0 - 99.0 FL    MCH 28.9 26.0 - 34.0 PG    MCHC 30.8 30.0 - 36.5 g/dL    RDW 15.7 (H)

## 2025-04-24 ENCOUNTER — INFUSION (OUTPATIENT)
Dept: INFUSION THERAPY | Facility: HOSPITAL | Age: 89
End: 2025-04-24
Attending: INTERNAL MEDICINE
Payer: MEDICARE

## 2025-04-24 VITALS — DIASTOLIC BLOOD PRESSURE: 75 MMHG | TEMPERATURE: 98 F | SYSTOLIC BLOOD PRESSURE: 177 MMHG | HEART RATE: 71 BPM

## 2025-04-24 NOTE — NURSING
Patient not cleared from oral surgeon from previous dental work. Per Juliann Richardson PharmD advise patient was rescheduled for Prolia injection after her appointment April 30 for clearance. Patient rescheduled for May 5.

## 2025-04-29 ENCOUNTER — OFFICE VISIT (OUTPATIENT)
Dept: OPHTHALMOLOGY | Facility: CLINIC | Age: 89
End: 2025-04-29
Payer: MEDICARE

## 2025-04-29 DIAGNOSIS — H52.7 REFRACTIVE ERROR: ICD-10-CM

## 2025-04-29 DIAGNOSIS — Z96.1 PSEUDOPHAKIA OF BOTH EYES: Primary | ICD-10-CM

## 2025-04-29 DIAGNOSIS — H04.129 DRY EYE: ICD-10-CM

## 2025-04-29 PROCEDURE — 1157F ADVNC CARE PLAN IN RCRD: CPT | Mod: CPTII,HCNC,S$GLB, | Performed by: OPHTHALMOLOGY

## 2025-04-29 PROCEDURE — 1160F RVW MEDS BY RX/DR IN RCRD: CPT | Mod: CPTII,HCNC,S$GLB, | Performed by: OPHTHALMOLOGY

## 2025-04-29 PROCEDURE — 92014 COMPRE OPH EXAM EST PT 1/>: CPT | Mod: HCNC,S$GLB,, | Performed by: OPHTHALMOLOGY

## 2025-04-29 PROCEDURE — 92015 DETERMINE REFRACTIVE STATE: CPT | Mod: HCNC,S$GLB,, | Performed by: OPHTHALMOLOGY

## 2025-04-29 PROCEDURE — 1159F MED LIST DOCD IN RCRD: CPT | Mod: CPTII,HCNC,S$GLB, | Performed by: OPHTHALMOLOGY

## 2025-04-29 PROCEDURE — 99999 PR PBB SHADOW E&M-EST. PATIENT-LVL III: CPT | Mod: PBBFAC,HCNC,, | Performed by: OPHTHALMOLOGY

## 2025-05-07 ENCOUNTER — OFFICE VISIT (OUTPATIENT)
Dept: PRIMARY CARE CLINIC | Facility: CLINIC | Age: 89
End: 2025-05-07
Payer: MEDICARE

## 2025-05-07 ENCOUNTER — RESEARCH ENCOUNTER (OUTPATIENT)
Dept: RESEARCH | Facility: HOSPITAL | Age: 89
End: 2025-05-07
Payer: MEDICARE

## 2025-05-07 ENCOUNTER — TELEPHONE (OUTPATIENT)
Dept: RHEUMATOLOGY | Facility: CLINIC | Age: 89
End: 2025-05-07
Payer: MEDICARE

## 2025-05-07 VITALS
HEIGHT: 63 IN | WEIGHT: 166.31 LBS | DIASTOLIC BLOOD PRESSURE: 62 MMHG | BODY MASS INDEX: 29.47 KG/M2 | HEART RATE: 65 BPM | OXYGEN SATURATION: 95 % | TEMPERATURE: 97 F | SYSTOLIC BLOOD PRESSURE: 102 MMHG

## 2025-05-07 DIAGNOSIS — R35.0 URINARY FREQUENCY: Primary | ICD-10-CM

## 2025-05-07 DIAGNOSIS — E55.9 VITAMIN D INSUFFICIENCY: ICD-10-CM

## 2025-05-07 DIAGNOSIS — M81.0 AGE-RELATED OSTEOPOROSIS WITHOUT CURRENT PATHOLOGICAL FRACTURE: Primary | ICD-10-CM

## 2025-05-07 DIAGNOSIS — T14.8XXA OPEN WOUND OF SKIN: ICD-10-CM

## 2025-05-07 DIAGNOSIS — I10 ESSENTIAL HYPERTENSION: Chronic | ICD-10-CM

## 2025-05-07 DIAGNOSIS — R29.6 RECURRENT FALLS: ICD-10-CM

## 2025-05-07 DIAGNOSIS — Z74.09 IMPAIRED FUNCTIONAL MOBILITY, BALANCE, GAIT, AND ENDURANCE: ICD-10-CM

## 2025-05-07 DIAGNOSIS — D04.72 SQUAMOUS CELL CARCINOMA IN SITU (SCCIS) OF SKIN OF LEFT LOWER LEG: ICD-10-CM

## 2025-05-07 DIAGNOSIS — J41.0 SIMPLE CHRONIC BRONCHITIS: ICD-10-CM

## 2025-05-07 DIAGNOSIS — M87.021 AVASCULAR NECROSIS OF RIGHT HUMERAL HEAD: ICD-10-CM

## 2025-05-07 PROBLEM — S51.812A SKIN TEAR OF LEFT FOREARM WITHOUT COMPLICATION: Status: RESOLVED | Noted: 2023-10-26 | Resolved: 2025-05-07

## 2025-05-07 LAB
BACTERIA #/AREA URNS AUTO: ABNORMAL /HPF
BILIRUB UR QL STRIP.AUTO: NEGATIVE
CLARITY UR: CLEAR
COLOR UR AUTO: YELLOW
GLUCOSE UR QL STRIP: NEGATIVE
HGB UR QL STRIP: NEGATIVE
KETONES UR QL STRIP: NEGATIVE
LEUKOCYTE ESTERASE UR QL STRIP: ABNORMAL
MICROSCOPIC COMMENT: ABNORMAL
NITRITE UR QL STRIP: NEGATIVE
PH UR STRIP: 7 [PH]
PROT UR QL STRIP: NEGATIVE
RBC #/AREA URNS AUTO: <1 /HPF (ref 0–4)
SP GR UR STRIP: 1.01
SQUAMOUS #/AREA URNS AUTO: 2 /HPF
UROBILINOGEN UR STRIP-ACNC: NEGATIVE EU/DL
WBC #/AREA URNS AUTO: 20 /HPF (ref 0–5)

## 2025-05-07 PROCEDURE — 81001 URINALYSIS AUTO W/SCOPE: CPT | Mod: HCNC | Performed by: NURSE PRACTITIONER

## 2025-05-07 PROCEDURE — 87086 URINE CULTURE/COLONY COUNT: CPT | Mod: HCNC | Performed by: NURSE PRACTITIONER

## 2025-05-07 PROCEDURE — 99999 PR PBB SHADOW E&M-EST. PATIENT-LVL V: CPT | Mod: PBBFAC,HCNC,, | Performed by: NURSE PRACTITIONER

## 2025-05-07 NOTE — TELEPHONE ENCOUNTER
----- Message from Pharmacist Juliann sent at 4/24/2025 10:01 AM CDT -----  FYI- possible fragility fracture of rib 3/25/25. Presented for Prolia (dose was due 3/5/25) 04/24/2025 but has not yet been cleared by oral surgeon. Rescheduled to 5/1 post-appt w/ oral surgeon. Prolia since 2017.

## 2025-05-07 NOTE — PROGRESS NOTES
Clau Nunn  05/07/2025  [id number]    Jeanette Molina MD  Patient Care Team:  Jeanette Molina MD as PCP - General (Internal Medicine)  Kandice Salcedo PA-C as Physician Assistant (Rheumatology)  Raffi Wells MD (Inactive) as Consulting Physician (Pain Medicine)  Jeanette Manriquez MD as Obstetrician (Obstetrics)  Ayesha Gillespie RN as     Future Appointments   Date Time Provider Department Center   5/19/2025 10:00 AM Clau Michel NP [facility] 65+ [place]   6/23/2025  9:45 AM Africa Billings MD [facility] D[facility]   8/7/2025 11:00 AM Jeanette Molina MD [facility] 65+ [place]   9/11/2025 10:00 AM LABORATORY, [facility]   9/18/2025  3:30 PM Jeffry Bermudez MD [facility]   9/18/2025  4:30 PM INJECTION 1, [facility] INFUSION [facility] IN[facility]N [facility]       [facility] Primary Care Note      Chief Complaint:  Chief Complaint   Patient presents with    Follow-up     3month f/u          Assessment/Plan:  1. Urinary frequency  -     Urinalysis, Reflex to Urine Culture Urine, Clean Catch    2. Impaired functional mobility, balance, gait, and endurance    3. Open wound of skin  Assessment & Plan:  Refer to [facility] for wound care    Orders:  -     Ambulatory referral/consult to Home Health; Future; Expected date: 05/08/2025    4. Recurrent falls  Assessment & Plan:  Using her cane daily   Denies presyncopal symptoms  Completed course of PT      5. Avascular necrosis of right humeral head  Overview:  S/P right hip replacement      6. 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 Essential hypertension  Overview:  Hypertension Medications              chlorthalidone (HYGROTEN) 25 MG Tab Take 1 tablet (25 mg total) by mouth once daily.    furosemide (LASIX) 40 MG tablet Take 1 tablet (40 mg total) by mouth once daily.    valsartan (DIOVAN) 160 MG tablet Take 1 tablet (160 mg total) by mouth once  daily.             Assessment & Plan:  Blood pressure controlled  Continue current Rx      8. Simple chronic bronchitis  Overview:  Albuterol nebulizer prn.    Assessment & Plan:  Has chronic cough  Uses nebulizer prn            Worry Score: 3  History of Present Illness:    Last visit with Dr. Molina 2/6/2025 Here for follow up of medical problems.  Lots of urge and stress incontinence.  No f/c/sw.  Denies depression on lexapro.  No f/c/sw/cough.  BMs normal, with diet changes.  No cp/palp.  Occas cough and SOB, uses nebulizer prn, about twice a week.  Urge incontinence - restart vesicare, lumbar radiculopathy - increased Tylenol to tid  4/8/25 - Fall, rib fracture and skin tear left forearm    Updated/ annual due 11/25:  HM: 9/24 fluvax, 1/21 covid vaccine/booster, 11/19 HAV, 5/16 [id number], 5/17 booster [id number], 12/22 [id number], 2/22 Tdap, 11/09 zoster, 7/22 Shingrix #2, 9/22 BMD on prolia, 1/14 Cscope no more needed, 12/22 MMG/ no more, 10/22 Eye Dr. Rubalcava.      Ms. Nunn returns for follow up.     Recent Fall:  []Yes  [x]No   using cane constantly  Activity:  []Vigorous []Moderate [x]Sedentary  Appetite:  [x]Good  []Fair  []Poor  Mood: [x]Stable []Anxious []Depressed   Insomnia: []Yes  []No   trazodone does not help much  PT stopped on 4/23/25    Urine:   Nocturia - x 5 for 2 weeks  She reports worsening urinary frequency with at least 5 nightly bathroom visits. She notes urinary odor and reports VESIcare is not providing relief for bladder symptoms. She denies dysuria. She has a PureWick device at home but is not currently using it.    Fall:  Her rib injury from a recent fall is nearly healed. She has a persistent skin tear that continues to bleed and remains raw. Her last home health nursing visit for bandage change was approximately two weeks ago.   Insurance company stopped home health wound care.     She reports physical therapy was beneficial for sciatic nerve pain. She continues to experience  persistent back ache.    Insomnia:   She reports poor sleep quality. Trazodone provides minimal effectiveness. She has melatonin available but is not using it. She lives with her son but performs most daily activities independently, including driving. She experiences fatigue after physical exertion such as bringing in groceries, requiring rest periods.     Skin:  Skin tear to right upper arm - try to resume HH.  Saw derm regarding lifting fingernails.  Taking weekly fluconazole (onycholysis)  Shave biopsy taken of lesion to left lower leg - hyperpigmented xerotic scaling plaques.   SQUAMOUS CELL CARCINOMA IN SITU, AT LEAST.  - THE ATYPICAL SQUAMOUS EPITHELIUM EXTENDS TO THE BASE OF THE BIOPSY, AND AN UNDERLYING INVASIVE SQUAMOUS CELL CARCINOMA CANNOT BE EXCLUDED.    Ms. Nunn says she was referred to Dr. Oconnor for MMS of SCCIS - Pt states she is not interested in having a Moh's procedure on her leg given her age         The following were reviewed: Active problem list, medication list, allergies, family history, social history, and Health Maintenance.     History:  Past Medical History:   Diagnosis Date    Allergy     Anxiety     Asthma     Back pain     Chest pain 1/30/2024    Chronic venous insufficiency 11/19/2013    Depression     Diverticulosis 11/19/2013 1/8/8 colonoscopy    Dry eyes     Fracture, sacrum/coccyx     Dr. Sanchez     GERD (gastroesophageal reflux disease)     H/O total hip arthroplasty 12/30/2015    Hypertension     Hypothyroid     Osteoarthritis     Osteoporosis     Postlaminectomy syndrome of lumbar region 1/8/2014    Radius fracture     7/18    Simple chronic bronchitis 5/3/2017    Umbilical hernia 11/19/2013    Urinary incontinence     Vitamin D deficiency disease      Past Surgical History:   Procedure Laterality Date    ADENOIDECTOMY      BACK SURGERY      x2    breast implants  1973    CATARACT EXTRACTION Bilateral     CLOSED REDUCTION DISTAL RADIUS FRACTURE      Dr. Black, 8/18     cystoscope  1/6/16    DR. Brown    FRACTURE SURGERY  April 3 2015    left wrist    HAND SURGERY      HIP SURGERY Right     total hip- Dr. Dos Santos    HYSTERECTOMY      34y/o    INJECTION OF ANESTHETIC AGENT AROUND NERVE Right 9/16/2020    Procedure: Right suprascapular nerve block;  Surgeon: Raffi Wells MD;  [facility]: [facility];  Service: Pain Management;  Laterality: Right;    INJECTION OF ANESTHETIC AGENT AROUND NERVE Right 7/13/2021    Procedure: Block, Nerve Right Suprascapular Nerve Block with RN IV sedation;  Surgeon: Raffi Wells MD;  Location: [facility];  Service: Pain Management;  Laterality: Right;    INJECTION OF ANESTHETIC AGENT INTO SACROILIAC JOINT N/A 8/12/2020    Procedure: Left IA hip Joint + Left SIJ + Right shoulder injection;  Surgeon: Raffi Wells MD;  Location: [facility];  Service: Pain Management;  Laterality: N/A;    INJECTION OF ANESTHETIC AGENT INTO SACROILIAC JOINT Bilateral 1/31/2023    Procedure: Bilateral SIJ Injection w/Local;  Surgeon: Abdirahman Parker MD;  Location: [facility];  Service: Pain Management;  Laterality: Bilateral;    INJECTION OF ANESTHETIC AGENT INTO SACROILIAC JOINT Bilateral 8/20/2024    Procedure: Bilateral SIJ Injection;  Surgeon: Abdirahman Parker MD;  Location: [facility];  Service: Pain Management;  Laterality: Bilateral;    INJECTION OF JOINT N/A 8/12/2020    Procedure: Left IA hip Joint + Left SIJ + Right shoulder injection;  Surgeon: Raffi Wells MD;  [facility]: [facility];  Service: Pain Management;  Laterality: N/A;    JOINT REPLACEMENT Right     R NNAMDI - Dr. Dos Santos    LEG SURGERY Right     ex-fix tib/fib    RADIOFREQUENCY THERMOCOAGULATION Left 6/8/2023    Procedure: Left SIJ RFA w/ local (give Valium before);  Surgeon: Abdirahman Parker MD;  Location: [facility];  Service: Pain Management;  Laterality: Left;    SELECTIVE INJECTION OF ANESTHETIC AGENT AROUND LUMBAR SPINAL NERVE ROOT BY TRANSFORAMINAL APPROACH Bilateral 2/10/2021     Procedure: Bilateral L5/S1 TF REJI;  Surgeon: Raffi Wells MD;  Location: [facility];  Service: Pain Management;  Laterality: Bilateral;    SELECTIVE INJECTION OF ANESTHETIC AGENT AROUND LUMBAR SPINAL NERVE ROOT BY TRANSFORAMINAL APPROACH Bilateral 5/25/2022    Procedure: Bilateral L5/S1 + S1 TF REJI;  Surgeon: Raffi Wells MD;  Location: [facility];  Service: Pain Management;  Laterality: Bilateral;    TONSILLECTOMY      TRANSFORAMINAL EPIDURAL INJECTION OF STEROID Left 7/8/2020    Procedure: left L2/3 + L5/S1 TF REJI;  Surgeon: Raffi Wells MD;  Location: [facility];  Service: Pain Management;  Laterality: Left;    TRANSFORAMINAL EPIDURAL INJECTION OF STEROID Left 7/1/2021    Procedure: left L5/S1 + S1 TF REJI;  Surgeon: Raffi Wells MD;  Location: [facility];  Service: Pain Management;  Laterality: Left;     Family History   Problem Relation Name Age of Onset    COPD Mother      Cancer Mother          lung CA    Pulmonary fibrosis Sister      Cancer Daughter          colon    Stroke Father      Diabetes Son      Melanoma Neg Hx      Psoriasis Neg Hx      Lupus Neg Hx       Problem List[1]  Review of patient's allergies indicates:   Allergen Reactions    Cephalexin Hives, Itching, Swelling, Anxiety, Dermatitis and Rash    Mupirocin Itching       PHQ-9       ALFONZO-7       Medications:  Medications Ordered Prior to Encounter[2]    Medications have been reviewed and reconciled with patient at visit today.      Exam:  Vitals:    05/07/25 0952   BP: 102/62   Pulse: 65   Temp: 97.3 °F (36.3 °C)     Weight: 75.4 kg (166 lb 5.4 oz)   Body mass index is 29.47 kg/m².      BP Readings from Last 3 Encounters:   05/07/25 102/62   04/24/25 (!) 177/75   03/23/25 (!) 150/74     Wt Readings from Last 3 Encounters:   05/07/25 75.4 kg (166 lb 5.4 oz)   03/23/25 72.6 kg (160 lb)   02/06/25 76.2 kg (168 lb 1.6 oz)         Physical Exam  Vitals reviewed.   Constitutional:       General: She is not in acute distress.      Appearance: Normal appearance.   HENT:      Head: Normocephalic and atraumatic.      Nose: Nose normal.      Mouth/Throat:      Mouth: Mucous membranes are moist.   Eyes:      General: No scleral icterus.     Conjunctiva/sclera: Conjunctivae normal.   Cardiovascular:      Rate and Rhythm: Normal rate and regular rhythm.      Heart sounds: No murmur heard.  Pulmonary:      Effort: Pulmonary effort is normal. No respiratory distress.      Breath sounds: Normal breath sounds.   Abdominal:      Palpations: Abdomen is soft. There is no mass.      Tenderness: There is no abdominal tenderness.   Musculoskeletal:      Cervical back: Normal range of motion and neck supple.      Right lower leg: No edema.      Left lower leg: No edema.   Lymphadenopathy:      Cervical: No cervical adenopathy.   Skin:     General: Skin is warm and dry.   Neurological:      Mental Status: She is alert and oriented to person, place, and time. Mental status is at baseline.   Psychiatric:         Mood and Affect: Mood normal.         Thought Content: Thought content normal.        Right upper arm    Left lower leg  Laboratory Reviewed:     Lab Results   Component Value Date    WBC 8.87 03/23/2025    HGB 15.4 03/23/2025    HCT 46.5 03/23/2025     03/23/2025    CHOL 195 05/31/2024    TRIG 196 (H) 05/31/2024    HDL 55 05/31/2024    ALT 9 (L) 04/23/2025    AST 17 04/23/2025     04/23/2025    K 3.8 04/23/2025    CL 99 04/23/2025    CREATININE 1.1 04/23/2025    BUN 17 04/23/2025    CO2 30 (H) 04/23/2025    TSH 2.266 02/06/2025    INR 1.1 04/07/2022    HGBA1C 5.0 03/11/2019       Screening or Prevention Patient's value Goal Complete/Controlled?   HgA1C Testing and Control   Lab Results   Component Value Date    HGBA1C 5.0 03/11/2019      Annually/Less than 8% No   Lipid profile : 05/31/2024 Annually Yes   LDL control Lab Results   Component Value Date    LDLCALC 100.8 05/31/2024    Annually/Less than 100 mg/dl  No   Nephropathy screening Lab  Results   Component Value Date    LABMICR 13.0 10/12/2023     Lab Results   Component Value Date    PROTEINUA Negative 02/06/2025    Annually Yes   Blood pressure BP Readings from Last 1 Encounters:   05/07/25 102/62    Less than 140/90 No   Dilated retinal exam Most Recent Eye Exam Date: Not Found Annually Yes   Foot exam   Most Recent Foot Exam Date: Not Found Annually Yes       Health Maintenance  Health Maintenance Topics with due status: Not Due       Topic Last Completion Date    TETANUS VACCINE 02/25/2022    Lipid Panel 05/31/2024     Health Maintenance Due   Topic Date Due    COVID-19 Vaccine (4 - 2024-25 season) 09/01/2024               -Patient's lab results were reviewed and discussed with patient  -Treatment options and alternatives were discussed with the patient. Patient expressed understanding. Patient was given the opportunity to ask questions and be an active participant in their medical care. Patient had no further questions or concerns at this time.               After visit summary printed and given to patient upon discharge.  Patient goals and care plan are included in After visit summary.      The following issues were discussed: The primary encounter diagnosis was Urinary frequency. 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    Health maintenance needs, recent test results and goals of care discussed with pt and questions answered.           Clau Michel, MOMO, NP-C  [facility] [address] [place] [address]          [1]   Patient Active Problem List  Diagnosis    Primary osteoarthritis involving multiple joints    Acquired hypothyroidism    Lumbar arthropathy    Venous insufficiency of both lower  extremities    Anxiety    Essential hypertension    Hiatal hernia with gastroesophageal reflux    Atherosclerosis of aorta    Chronic lumbar radiculopathy    Stage 3b chronic kidney disease    Simple chronic bronchitis    Pain of left hip joint    Lumbar spondylosis    Pain in both lower extremities    Age-related osteoporosis with current pathological fracture with delayed healing    Radius fracture    History of DVT in adulthood    Lumbar radiculopathy    Secondary hyperparathyroidism    Recurrent major depressive disorder, in partial remission    Shoulder pain    Chronic anticoagulation    Gastroesophageal reflux disease without esophagitis    Impaired functional mobility, balance, gait, and endurance    Left foot pain    Slow transit constipation    Solar purpura    Chest pain    Avascular necrosis of right humeral head    Recurrent falls    Chronic cough    PEREZ (dyspnea on exertion)    Laceration of forehead    Encounter for preventive health examination    Other reduced mobility    Abnormality of gait and mobility    Open wound of skin    Closed fracture of multiple ribs of right side    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 To legs 225 g 5    azelastine (ASTELIN) 137 mcg (0.1 %) nasal spray 2 sprays by Nasal route 2 (two) times daily.      betamethasone dipropionate (DIPROLENE) 0.05 % ointment Apply to affected fingers around nails at night under occlusion with white cotton gloves for 2-4 weeks 45 g 3    busPIRone (BUSPAR) 5 MG Tab TAKE 1 TABLET TWICE DAILY 180 tablet 5    chlorthalidone (HYGROTEN) 25 MG Tab Take 1 tablet (25 mg total) by mouth once daily. 90 tablet 2    cholecalciferol, vitamin D3, (D3-2000) 50 mcg (2,000 unit) Cap capsule Take 1 capsule (2,000 Units total) by mouth once daily. 90 capsule 11     ciclopirox (PENLAC) 8 % Soln Apply topically nightly. 6.6 mL 3    clobetasoL (TEMOVATE) 0.05 % external solution Pt to mix in 1 jar of cerave cream and apply to affected areas bid for 2-4 weeks per course 50 mL 3    clobetasoL (TEMOVATE) 0.05 % external solution Apply 2 drops under nail plates for 4 weeks on, take 2 weeks off, then repeat indefinitely 50 mL 1    cyclobenzaprine (FLEXERIL) 5 MG tablet TAKE 1 TABLET (5 MG TOTAL) BY MOUTH NIGHTLY AS NEEDED FOR MUSCLE SPASMS. 90 tablet 1    cycloSPORINE (RESTASIS MULTIDOSE) 0.05 % Drop Place 1 drop into both eyes every 12 (twelve) hours. 5.5 mL 12    ELIQUIS 5 mg Tab TAKE 1 TABLET TWICE DAILY 180 tablet 3    EScitalopram oxalate (LEXAPRO) 10 MG tablet Take 1 tablet (10 mg total) by mouth once daily. 90 tablet 3    fluconazole (DIFLUCAN) 150 MG Tab Take 1 tablet (150 mg total) by mouth once a week. For 6 months 12 tablet 1    fluocinonide 0.05% (LIDEX) 0.05 % cream AAA bid to leg for 2-4 weeks per course 60 g 1    fluticasone propionate (FLONASE) 50 mcg/actuation nasal spray       furosemide (LASIX) 40 MG tablet Take 1 tablet (40 mg total) by mouth once daily. 15 tablet 0    gabapentin (NEURONTIN) 300 MG capsule Take 1 capsule (300 mg total) by mouth every evening. 90 capsule 3    HYDROcodone-acetaminophen (NORCO) 5-325 mg per tablet Take 1 tablet by mouth every 8 (eight) hours as needed for Pain. 15 tablet 0    levothyroxine (SYNTHROID) 100 MCG tablet TAKE 1 TABLET EVERY DAY 90 tablet 3    LIDOcaine (LIDODERM) 5 % APPLY ONE PATCH TO AFFECTED AREA. 12 HOURS ON AND 12 HOURS OFF 30 patch 1    omeprazole (PRILOSEC) 40 MG capsule TAKE 1 CAPSULE (40 MG TOTAL) BY MOUTH EVERY MORNING. 90 capsule 3    ondansetron (ZOFRAN-ODT) 4 MG TbDL Take 1 tablet (4 mg total) by mouth every 8 (eight) hours as needed (nausea). 15 tablet 0    polyethylene glycol (GLYCOLAX) 17 gram/dose powder Take 17 g by mouth once daily. 595 g 0    PROLIA 60 mg/mL Syrg       solifenacin (VESICARE) 5 MG  tablet Take 1 tablet (5 mg total) by mouth once daily. 90 tablet 3    traZODone (DESYREL) 50 MG tablet Take 1-2 tablets ( mg total) by mouth nightly as needed for Insomnia. 180 tablet 3    valsartan (DIOVAN) 160 MG tablet Take 1 tablet (160 mg total) by mouth once daily. 90 tablet 3    albuterol (PROVENTIL) 2.5 mg /3 mL (0.083 %) nebulizer solution Take 3 mLs (2.5 mg total) by nebulization every 6 (six) hours as needed for Wheezing. Rescue 1 each 6     Current Facility-Administered Medications on File Prior to Visit   Medication Dose Route Frequency Provider Last Rate Last Admin    denosumab (PROLIA) injection 60 mg  60 mg Subcutaneous Q6 Months Carly Mora PA-C   60 mg at 01/28/19 1548    denosumab (PROLIA) injection 60 mg  60 mg Subcutaneous 1 time in Clinic/HOD Jeffry Bermudez MD        neomycin-bacitracin-polymyxin ointment   Topical (Top) 1 time in Clinic/HOD Clau Michel, NP

## 2025-05-07 NOTE — TELEPHONE ENCOUNTER
Collect updated PTH. If WNL (<120) do 1 year of anabolic therapy.   Staff message sent requesting lab scheduled.

## 2025-05-07 NOTE — PROGRESS NOTES
Study Title: TOYA: Real-world Evidence to Advance Multi-Cancer Early Detection Health Equity (REACH/Galleri-Medicare study)    Protocol IRB #: [id number]     Sponsor: [employer]    : Sunil Bach MD    Patient eligibility was checked prior to enrollment in the study. Patient met the following inclusion and exclusion criteria:     INCLUSION CRITERIA  Participant age is 50 years or older with Medicare coverage at the time of signing the Informed Consent form  Participant is eligible to receive the Galleri test, based on a determination by the study investigator or designee  Participant is capable of giving signed Informed Consent that is legally effective (consent provided by LAR is not permitted)  Participant is able to comprehend and respond to questions in participant questionnaires.    EXCLUSION CRITERIA  Participant having had a previous Galleri test not associated with this study  Participant is undergoing or referred for diagnostic evaluation due to clinical suspicion for cancer  Participant has a personal history of invasive or hematologic malignancy, diagnosed within the last 3 years prior to expected enrollment date or diagnosed greater than 3 years prior to expected enrollment date and never treated  Participant has had definitive treatment for invasive or hematologic malignancy within the 3 years prior to expected enrollment date  Participant is not able to comply with protocol procedures  Participant is not a current patient at a participating center  Participant is currently enrolled or was previously enrolled in another [facility]-sponsored study  Participant is current or previous employee/contractor of [email or web address]  Participant is currently pregnant (by participant's self-report of pregnancy status)    DOCUMENTATION OF INFORMED CONSENT    Prior to the Informed Consent (IC) being signed, or any study protocol required data collection, testing, procedure, or intervention being performed, the  following was done and/or discussed:  Patient was given a copy of the IC for review   Purpose of the study and qualifications to participate   Study design, Follow up schedule, and tests or procedures done at each visit  Confidentiality and HIPAA Authorization for Release of Medical Records for the research trial/ subject's rights/research related injury  Risk, Benefits, Alternative Treatments, Compensation and Costs  Participation in the research trial is voluntary and patient may withdraw at anytime  Contact information for study related questions    Patient verbalizes understanding of the above: Yes  Contact information for CRC and PI given to patient: Yes  Patient able to adequately summarize: the purpose of the study, the risks associated with the study, and all procedures, testing, and follow-ups associated with the study: Yes    Patient signed the informed consent form for the research study with an IRB approval date of JUL;Y 02,2024. Each page of the consent form was reviewed with patient and all questions answered satisfactorily. Patient received a copy of the consent form. The original consent was scanned into electronic medical records.    INSURANCE VERIFICATION    Thoroughly discussed with patient that the study team does not expect them to be billed for this test. However, by participating in this trial, we cannot guarantee that they will not receive a bill, as cost ultimately depends on the details of their Medicare coverage. Patient voiced understanding.      BLOOD DRAW    Following IC being signed and prior to blood draw, patient completed all baseline/pretest questionnaires. The following specimens were collected from the pt at the time of this encounter via peripheral blood draw.    Blood draw location: Left Arm  Needle used: 21 gauge butterfly needle  Blood draw amount: 20ml  Blood draw time: 10:55 AM

## 2025-05-08 ENCOUNTER — PATIENT MESSAGE (OUTPATIENT)
Dept: PRIMARY CARE CLINIC | Facility: CLINIC | Age: 89
End: 2025-05-08
Payer: MEDICARE

## 2025-05-08 ENCOUNTER — TELEPHONE (OUTPATIENT)
Dept: PRIMARY CARE CLINIC | Facility: CLINIC | Age: 89
End: 2025-05-08
Payer: MEDICARE

## 2025-05-08 DIAGNOSIS — M80.00XG AGE-RELATED OSTEOPOROSIS WITH CURRENT PATHOLOGICAL FRACTURE WITH DELAYED HEALING: ICD-10-CM

## 2025-05-08 DIAGNOSIS — E55.9 VITAMIN D INSUFFICIENCY: Primary | ICD-10-CM

## 2025-05-08 DIAGNOSIS — N30.00 ACUTE CYSTITIS WITHOUT HEMATURIA: Primary | ICD-10-CM

## 2025-05-08 RX ORDER — CIPROFLOXACIN 250 MG/1
250 TABLET, FILM COATED ORAL 2 TIMES DAILY
Qty: 14 TABLET | Refills: 0 | Status: SHIPPED | OUTPATIENT
Start: 2025-05-08 | End: 2025-05-19

## 2025-05-08 NOTE — TELEPHONE ENCOUNTER
Ayesha,    Please let Ms. Nunn know signs of infection in her urine.  [facility].    Clau Wan, ASHLEY

## 2025-05-08 NOTE — TELEPHONE ENCOUNTER
"Dr. Bermudez wants a follow up visit in clinic to discuss options/assess whether fracture was fragility fracture thus "true failure" of prolia. 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   "

## 2025-05-08 NOTE — TELEPHONE ENCOUNTER
Called patient and notified of the results and recommendations. Patient verbalized understanding, Ayesha Gillespie

## 2025-05-09 LAB — BACTERIA UR CULT: ABNORMAL

## 2025-05-12 ENCOUNTER — PATIENT MESSAGE (OUTPATIENT)
Dept: PRIMARY CARE CLINIC | Facility: CLINIC | Age: 89
End: 2025-05-12
Payer: MEDICARE

## 2025-05-12 ENCOUNTER — LAB VISIT (OUTPATIENT)
Dept: LAB | Facility: HOSPITAL | Age: 89
End: 2025-05-12
Attending: INTERNAL MEDICINE
Payer: MEDICARE

## 2025-05-12 DIAGNOSIS — E55.9 VITAMIN D INSUFFICIENCY: ICD-10-CM

## 2025-05-12 DIAGNOSIS — M81.0 AGE-RELATED OSTEOPOROSIS WITHOUT CURRENT PATHOLOGICAL FRACTURE: ICD-10-CM

## 2025-05-12 DIAGNOSIS — M80.00XG AGE-RELATED OSTEOPOROSIS WITH CURRENT PATHOLOGICAL FRACTURE WITH DELAYED HEALING: ICD-10-CM

## 2025-05-12 DIAGNOSIS — N30.00 ACUTE CYSTITIS WITHOUT HEMATURIA: Primary | ICD-10-CM

## 2025-05-12 LAB
25(OH)D3+25(OH)D2 SERPL-MCNC: 44 NG/ML (ref 30–96)
PTH-INTACT SERPL-MCNC: 76.2 PG/ML (ref 9–77)

## 2025-05-12 PROCEDURE — 36415 COLL VENOUS BLD VENIPUNCTURE: CPT | Mod: HCNC,PO

## 2025-05-12 PROCEDURE — 82306 VITAMIN D 25 HYDROXY: CPT | Mod: HCNC

## 2025-05-12 PROCEDURE — 83970 ASSAY OF PARATHORMONE: CPT | Mod: HCNC

## 2025-05-12 RX ORDER — NITROFURANTOIN 25; 75 MG/1; MG/1
100 CAPSULE ORAL 2 TIMES DAILY
Qty: 20 CAPSULE | Refills: 0 | Status: SHIPPED | OUTPATIENT
Start: 2025-05-12 | End: 2025-05-22

## 2025-05-12 NOTE — TELEPHONE ENCOUNTER
Urine Culture 10,000 - 49,999 cfu/ml Enterococcus faecalis Abnormal         Resulting Agency: [facility]     Susceptibility     Enterococcus faecalis     JONE     Ampicillin <=2 µg/ml Sensitive     Nitrofurantoin <=32 µg/ml Sensitive     Vancomycin 1 µg/ml Sensitive       Ayesha, please call Ms. Nunn,       Urine culture indicates Enterococcus.   Stop Cipro and start new Rx of nitrofurantoin.   Drink extra clear fluids when taking the medication  Will recheck a urine specimen 5/26/25 at the [facility] location.

## 2025-05-19 ENCOUNTER — OFFICE VISIT (OUTPATIENT)
Dept: PRIMARY CARE CLINIC | Facility: CLINIC | Age: 89
End: 2025-05-19
Payer: MEDICARE

## 2025-05-19 VITALS
OXYGEN SATURATION: 95 % | SYSTOLIC BLOOD PRESSURE: 130 MMHG | HEART RATE: 81 BPM | WEIGHT: 162.06 LBS | BODY MASS INDEX: 28.71 KG/M2 | TEMPERATURE: 97 F | HEIGHT: 63 IN | DIASTOLIC BLOOD PRESSURE: 76 MMHG

## 2025-05-19 DIAGNOSIS — T14.8XXA OPEN WOUND OF SKIN: ICD-10-CM

## 2025-05-19 DIAGNOSIS — R32 URINARY INCONTINENCE, UNSPECIFIED TYPE: ICD-10-CM

## 2025-05-19 DIAGNOSIS — M80.00XG AGE-RELATED OSTEOPOROSIS WITH CURRENT PATHOLOGICAL FRACTURE WITH DELAYED HEALING: ICD-10-CM

## 2025-05-19 DIAGNOSIS — F33.41 RECURRENT MAJOR DEPRESSIVE DISORDER, IN PARTIAL REMISSION: Primary | ICD-10-CM

## 2025-05-19 DIAGNOSIS — R05.3 CHRONIC COUGH: ICD-10-CM

## 2025-05-19 DIAGNOSIS — D04.72 SQUAMOUS CELL CARCINOMA IN SITU (SCCIS) OF SKIN OF LEFT LOWER LEG: ICD-10-CM

## 2025-05-19 DIAGNOSIS — N18.32 STAGE 3B CHRONIC KIDNEY DISEASE: ICD-10-CM

## 2025-05-19 DIAGNOSIS — I10 ESSENTIAL HYPERTENSION: Chronic | ICD-10-CM

## 2025-05-19 PROBLEM — S01.81XA LACERATION OF FOREHEAD: Status: RESOLVED | Noted: 2024-12-05 | Resolved: 2025-05-19

## 2025-05-19 PROCEDURE — 99999 PR PBB SHADOW E&M-EST. PATIENT-LVL V: CPT | Mod: PBBFAC,HCNC,, | Performed by: NURSE PRACTITIONER

## 2025-05-19 NOTE — ASSESSMENT & PLAN NOTE
Has prescription for albuterol inhaler which she uses daily  2015 PFT - normal  Does not want to repeat PFT or have CXR  No smoking history

## 2025-05-19 NOTE — PROGRESS NOTES
Clau Nunn  05/19/2025  [id number]    Jeanette Molina MD  Patient Care Team:  Jeanette Molina MD as PCP - General (Internal Medicine)  Kandice Salcedo PA-C as Physician Assistant (Rheumatology)  Raffi Wells MD (Inactive) as Consulting Physician (Pain Medicine)  Jeanette Manriquez MD as Obstetrician (Obstetrics)  Ayesha Gillespie RN as     Future Appointments   Date Time Provider Department Center   5/26/2025 10:00 AM LABORATORY, [facility] [place]   5/27/2025  9:40 AM Carly Saenz NP ON UROLOGY [facility]   6/23/2025  9:45 AM Africa Billings MD [facility] [place] DERM [facility] [place]   8/7/2025 11:00 AM Jeanette Molina MD [facility]   9/11/2025 10:00 AM LABORATORY, [facility] [place]   9/18/2025  3:30 PM Jeffry Bermudez MD ON RHEU [facility]   9/18/2025  4:30 PM INJECTION 1, [facility] [place]       [facility] Primary Care Note      Chief Complaint:  Chief Complaint   Patient presents with    Follow-up         Assessment/Plan:  1. Recurrent major depressive disorder, in partial remission  Overview:  Lexapro 10mg daily.    Assessment & Plan:  Mood is stable      2. Chronic cough  Assessment & Plan:  Has prescription for albuterol inhaler which she uses daily  2015 PFT - normal  Does not want to repeat PFT or have CXR  No smoking history      3. Essential hypertension  Overview:  Hypertension Medications              chlorthalidone (HYGROTEN) 25 MG Tab Take 1 tablet (25 mg total) by mouth once daily.    furosemide (LASIX) 40 MG tablet Take 1 tablet (40 mg total) by mouth once daily.    valsartan (DIOVAN) 160 MG tablet Take 1 tablet (160 mg total) by mouth once daily.             Assessment & Plan:  Controlled  Continue current Rx      4. Stage 3b chronic kidney disease    5. 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 Urinary incontinence, unspecified type  Assessment &  "Plan:  Possible bladder botox with Kymberly Kim, Urology      7. Open wound of skin  Assessment & Plan:  Continue wound care with [facility]      8. Age-related osteoporosis with current pathological fracture with delayed healing  Overview:  01/02/2020   FINDINGS:  The L3-L4 vertebral bone mineral density is equal to 1.578 g/cm squared with a T score of 3.2.  There has been a positive 4.0% statistically significant change relative to the prior study.     The left femoral neck bone mineral density is equal to 1.001 g/cm squared with a T score of -0.3.  There has been  a positive 14.3% statistically significant change relative to the prior study.   Impression:   No evidence of significant bone density loss    On Prolia.            Worry Score: 2  History of Present Illness:    Last visit with Dr. Molina 2/6/2025   LOV with myself - 3/25, 4/25, 5/25    She returns for follow up.   Getting better since ABX changed to nitrofurantoin.   [facility]ia OLIVA is caring for the arm wound - "still has a raw spot" to posterior right upper arm.   "She applies some type of vaseline ointment to the wound" - dressing changed today - she will return on Thursday    Reports with standing - she urinates, uses 3 pads. Prescribed vesicare  Weak, fatigue, not feeling well while taking Cipro - then med changed to macrobid and feeling better - had decreased appetite    She does not not want to have further evaluation/ work up on right medial leg squamous cell CA due to her age.   To follow up with Dr. Billings soon    Dental work finalized now has plans to take Prolia    Wants to see Dr. Kim Urology for possible bladder botox    Chronic cough, uses Albuterol inhaler - daily basis, frequent cough when lying in bed, + PEREZ        Denies fever, chills, URI symptoms, chest pain, SOB, palpitations, vomiting, diarrhea, no gross neuro deficits, urinary symptoms and leg swelling.      The following were reviewed: Active problem list, medication list, " allergies, family history, social history, and Health Maintenance.     History:  Past Medical History:   Diagnosis Date    Allergy     Anxiety     Asthma     Back pain     Chest pain 1/30/2024    Chronic venous insufficiency 11/19/2013    Depression     Diverticulosis 11/19/2013 1/8/8 colonoscopy    Dry eyes     Fracture, sacrum/coccyx     Dr. Sanchez     GERD (gastroesophageal reflux disease)     H/O total hip arthroplasty 12/30/2015    Hypertension     Hypothyroid     Osteoarthritis     Osteoporosis     Postlaminectomy syndrome of lumbar region 1/8/2014    Radius fracture     7/18    Simple chronic bronchitis 5/3/2017    Umbilical hernia 11/19/2013    Urinary incontinence     Vitamin D deficiency disease      Past Surgical History:   Procedure Laterality Date    ADENOIDECTOMY      BACK SURGERY      x2    breast implants  1973    CATARACT EXTRACTION Bilateral     CLOSED REDUCTION DISTAL RADIUS FRACTURE      Dr. Black, 8/18    cystoscope  1/6/16    DR. Brown    FRACTURE SURGERY  April 3 2015    left wrist    HAND SURGERY      HIP SURGERY Right     total hip- Dr. Dos Santos    HYSTERECTOMY      34y/o    INJECTION OF ANESTHETIC AGENT AROUND NERVE Right 9/16/2020    Procedure: Right suprascapular nerve block;  Surgeon: Raffi Wells MD;  Location: [facility];  Service: Pain Management;  Laterality: Right;    INJECTION OF ANESTHETIC AGENT AROUND NERVE Right 7/13/2021    Procedure: Block, Nerve Right Suprascapular Nerve Block with RN IV sedation;  Surgeon: Raffi Wells MD;  Location: [facility];  Service: Pain Management;  Laterality: Right;    INJECTION OF ANESTHETIC AGENT INTO SACROILIAC JOINT N/A 8/12/2020    Procedure: Left IA hip Joint + Left SIJ + Right shoulder injection;  Surgeon: Raffi Wells MD;  Location: [facility];  Service: Pain Management;  Laterality: N/A;    INJECTION OF ANESTHETIC AGENT INTO SACROILIAC JOINT Bilateral 1/31/2023    Procedure: Bilateral SIJ Injection w/Local;  Surgeon: Abdirahman  LAINE Parker MD;  Location: [facility];  Service: Pain Management;  Laterality: Bilateral;    INJECTION OF ANESTHETIC AGENT INTO SACROILIAC JOINT Bilateral 8/20/2024    Procedure: Bilateral SIJ Injection;  Surgeon: Abdirahman Parker MD;  Location: [facility];  Service: Pain Management;  Laterality: Bilateral;    INJECTION OF JOINT N/A 8/12/2020    Procedure: Left IA hip Joint + Left SIJ + Right shoulder injection;  Surgeon: Raffi Wells MD;  Location: [facility];  Service: Pain Management;  Laterality: N/A;    JOINT REPLACEMENT Right     R NNAMDI - Dr. Dos Santos    LEG SURGERY Right     ex-fix tib/fib    RADIOFREQUENCY THERMOCOAGULATION Left 6/8/2023    Procedure: Left SIJ RFA w/ local (give Valium before);  Surgeon: Abdirahman Parker MD;  Location: [facility];  Service: Pain Management;  Laterality: Left;    SELECTIVE INJECTION OF ANESTHETIC AGENT AROUND LUMBAR SPINAL NERVE ROOT BY TRANSFORAMINAL APPROACH Bilateral 2/10/2021    Procedure: Bilateral L5/S1 TF REJI;  Surgeon: Raffi Wells MD;  [facility]: [facility];  Service: Pain Management;  Laterality: Bilateral;    SELECTIVE INJECTION OF ANESTHETIC AGENT AROUND LUMBAR SPINAL NERVE ROOT BY TRANSFORAMINAL APPROACH Bilateral 5/25/2022    Procedure: Bilateral L5/S1 + S1 TF REJI;  Surgeon: Raffi Wells MD;  [facility]: [facility];  Service: Pain Management;  Laterality: Bilateral;    TONSILLECTOMY      TRANSFORAMINAL EPIDURAL INJECTION OF STEROID Left 7/8/2020    Procedure: left L2/3 + L5/S1 TF REJI;  Surgeon: Raffi Wells MD;  [facility]: [facility];  Service: Pain Management;  Laterality: Left;    TRANSFORAMINAL EPIDURAL INJECTION OF STEROID Left 7/1/2021    Procedure: left L5/S1 + S1 TF REJI;  Surgeon: Raffi Wells MD;  [facility]: [facility];  Service: Pain Management;  Laterality: Left;     Family History   Problem Relation Name Age of Onset    COPD Mother      Cancer Mother          lung CA    Pulmonary fibrosis Sister      Cancer Daughter          colon     Stroke Father      Diabetes Son      Melanoma Neg Hx      Psoriasis Neg Hx      Lupus Neg Hx       Problem List[1]  Review of patient's allergies indicates:   Allergen Reactions    Cephalexin Hives, Itching, Swelling, Anxiety, Dermatitis and Rash    Mupirocin Itching       PHQ-9       ALFONZO-7       Medications:  Medications Ordered Prior to Encounter[2]    Medications have been reviewed and reconciled with patient at visit today.      Exam:  Vitals:    05/19/25 1034   BP: 130/76   Pulse:    Temp:      Weight: 73.5 kg (162 lb 0.6 oz)   Body mass index is 28.7 kg/m².      BP Readings from Last 3 Encounters:   05/19/25 130/76   05/07/25 102/62   04/24/25 (!) 177/75     Wt Readings from Last 3 Encounters:   05/19/25 73.5 kg (162 lb 0.6 oz)   05/07/25 75.4 kg (166 lb 5.4 oz)   03/23/25 72.6 kg (160 lb)         Physical Exam  Vitals reviewed.   Constitutional:       General: She is not in acute distress.     Appearance: Normal appearance.   HENT:      Head: Normocephalic and atraumatic.      Nose: Nose normal.      Mouth/Throat:      Mouth: Mucous membranes are moist.   Eyes:      General: No scleral icterus.     Conjunctiva/sclera: Conjunctivae normal.   Cardiovascular:      Rate and Rhythm: Normal rate and regular rhythm.      Heart sounds: No murmur heard.  Pulmonary:      Effort: Pulmonary effort is normal. No respiratory distress.      Breath sounds: Normal breath sounds.   Abdominal:      Palpations: Abdomen is soft. There is no mass.      Tenderness: There is no abdominal tenderness.   Musculoskeletal:      Cervical back: Normal range of motion and neck supple.      Right lower leg: No edema.      Left lower leg: No edema.   Lymphadenopathy:      Cervical: No cervical adenopathy.   Skin:     General: Skin is warm and dry.   Neurological:      Mental Status: She is alert and oriented to person, place, and time. Mental status is at baseline.   Psychiatric:         Mood and Affect: Mood normal.         Thought Content:  Thought content normal.              Laboratory Reviewed:     Lab Results   Component Value Date    WBC 8.87 03/23/2025    HGB 15.4 03/23/2025    HCT 46.5 03/23/2025     03/23/2025    CHOL 195 05/31/2024    TRIG 196 (H) 05/31/2024    HDL 55 05/31/2024    ALT 9 (L) 04/23/2025    AST 17 04/23/2025     04/23/2025    K 3.8 04/23/2025    CL 99 04/23/2025    CREATININE 1.1 04/23/2025    BUN 17 04/23/2025    CO2 30 (H) 04/23/2025    TSH 2.266 02/06/2025    INR 1.1 04/07/2022    HGBA1C 5.0 03/11/2019       Screening or Prevention Patient's value Goal Complete/Controlled?   HgA1C Testing and Control   Lab Results   Component Value Date    HGBA1C 5.0 03/11/2019      Annually/Less than 8% No   Lipid profile : 05/31/2024 Annually Yes   LDL control Lab Results   Component Value Date    LDLCALC 100.8 05/31/2024    Annually/Less than 100 mg/dl  No   Nephropathy screening Lab Results   Component Value Date    LABMICR 13.0 10/12/2023     Lab Results   Component Value Date    PROTEINUA Negative 05/07/2025    Annually Yes   Blood pressure BP Readings from Last 1 Encounters:   05/19/25 130/76    Less than 140/90 Yes   Dilated retinal exam Most Recent Eye Exam Date: Not Found Annually Yes   Foot exam   Most Recent Foot Exam Date: Not Found Annually Yes       Health Maintenance  Health Maintenance Topics with due status: Not Due       Topic Last Completion Date    TETANUS VACCINE 02/25/2022    Lipid Panel 05/31/2024     Health Maintenance Due   Topic Date Due    COVID-19 Vaccine (4 - 2024-25 season) 09/01/2024               -Patient's lab results were reviewed and discussed with patient  -Treatment options and alternatives were discussed with the patient. Patient expressed understanding. Patient was given the opportunity to ask questions and be an active participant in their medical care. Patient had no further questions or concerns at this time.               After visit summary printed and given to patient upon  discharge.  Patient goals and care plan are included in After visit summary.      The following issues were discussed: The primary encounter diagnosis was Recurrent major depressive disorder, in partial remission. 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    Health maintenance needs, recent test results and goals of care discussed with pt and questions answered.           MOMO Gutierrez, NP-C  [facility] [address], [place] [address]          [1]   Patient Active Problem List  Diagnosis    Primary osteoarthritis involving multiple joints    Acquired hypothyroidism    Lumbar arthropathy    Venous insufficiency of both lower extremities    Anxiety    Essential hypertension    Hiatal hernia with gastroesophageal reflux    Atherosclerosis of aorta    Chronic lumbar radiculopathy    Stage 3b chronic kidney disease    Simple chronic bronchitis    Pain of left hip joint    Lumbar spondylosis    Pain in both lower extremities    Age-related osteoporosis with current pathological fracture with delayed healing    Radius fracture    History of DVT in adulthood    Lumbar radiculopathy    Secondary hyperparathyroidism    Recurrent major depressive disorder, in partial remission    Shoulder pain    Chronic anticoagulation    Gastroesophageal reflux disease without esophagitis    Impaired functional mobility, balance, gait, and endurance    Left foot pain    Slow transit constipation    Solar purpura    Chest pain    Avascular necrosis of right humeral head    Recurrent falls    Chronic cough    PEREZ (dyspnea on exertion)    Encounter for preventive health examination    Other reduced mobility    Abnormality of  gait and mobility    Open wound of skin    Closed fracture of multiple ribs of right side    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 To legs 225 g 5    azelastine (ASTELIN) 137 mcg (0.1 %) nasal spray 2 sprays by Nasal route 2 (two) times daily.      betamethasone dipropionate (DIPROLENE) 0.05 % ointment Apply to affected fingers around nails at night under occlusion with white cotton gloves for 2-4 weeks 45 g 3    busPIRone (BUSPAR) 5 MG Tab TAKE 1 TABLET TWICE DAILY 180 tablet 5    chlorthalidone (HYGROTEN) 25 MG Tab Take 1 tablet (25 mg total) by mouth once daily. 90 tablet 2    cholecalciferol, vitamin D3, (D3-2000) 50 mcg (2,000 unit) Cap capsule Take 1 capsule (2,000 Units total) by mouth once daily. 90 capsule 11    ciclopirox (PENLAC) 8 % Soln Apply topically nightly. 6.6 mL 3    clobetasoL (TEMOVATE) 0.05 % external solution Pt to mix in 1 jar of cerave cream and apply to affected areas bid for 2-4 weeks per course 50 mL 3    clobetasoL (TEMOVATE) 0.05 % external solution Apply 2 drops under nail plates for 4 weeks on, take 2 weeks off, then repeat indefinitely 50 mL 1    cyclobenzaprine (FLEXERIL) 5 MG tablet TAKE 1 TABLET (5 MG TOTAL) BY MOUTH NIGHTLY AS NEEDED FOR MUSCLE SPASMS. 90 tablet 1    cycloSPORINE (RESTASIS MULTIDOSE) 0.05 % Drop Place 1 drop into both eyes every 12 (twelve) hours. 5.5 mL 12    ELIQUIS 5 mg Tab TAKE 1 TABLET TWICE DAILY 180 tablet 3    EScitalopram oxalate (LEXAPRO) 10 MG tablet Take 1 tablet (10 mg total) by mouth once daily. 90 tablet 3    fluconazole (DIFLUCAN) 150 MG Tab Take 1 tablet (150 mg total) by mouth once a week. For 6 months 12 tablet 1    fluocinonide 0.05% (LIDEX) 0.05 % cream AAA bid to leg for 2-4 weeks per course 60 g 1    fluticasone  propionate (FLONASE) 50 mcg/actuation nasal spray       furosemide (LASIX) 40 MG tablet Take 1 tablet (40 mg total) by mouth once daily. 15 tablet 0    gabapentin (NEURONTIN) 300 MG capsule Take 1 capsule (300 mg total) by mouth every evening. 90 capsule 3    HYDROcodone-acetaminophen (NORCO) 5-325 mg per tablet Take 1 tablet by mouth every 8 (eight) hours as needed for Pain. 15 tablet 0    levothyroxine (SYNTHROID) 100 MCG tablet TAKE 1 TABLET EVERY DAY 90 tablet 3    LIDOcaine (LIDODERM) 5 % APPLY ONE PATCH TO AFFECTED AREA. 12 HOURS ON AND 12 HOURS OFF 30 patch 1    nitrofurantoin, macrocrystal-monohydrate, (MACROBID) 100 MG capsule Take 1 capsule (100 mg total) by mouth 2 (two) times daily. for 10 days 20 capsule 0    omeprazole (PRILOSEC) 40 MG capsule TAKE 1 CAPSULE (40 MG TOTAL) BY MOUTH EVERY MORNING. 90 capsule 3    ondansetron (ZOFRAN-ODT) 4 MG TbDL Take 1 tablet (4 mg total) by mouth every 8 (eight) hours as needed (nausea). 15 tablet 0    polyethylene glycol (GLYCOLAX) 17 gram/dose powder Take 17 g by mouth once daily. 595 g 0    PROLIA 60 mg/mL Syrg       solifenacin (VESICARE) 5 MG tablet Take 1 tablet (5 mg total) by mouth once daily. 90 tablet 3    traZODone (DESYREL) 50 MG tablet Take 1-2 tablets ( mg total) by mouth nightly as needed for Insomnia. 180 tablet 3    valsartan (DIOVAN) 160 MG tablet Take 1 tablet (160 mg total) by mouth once daily. 90 tablet 3    albuterol (PROVENTIL) 2.5 mg /3 mL (0.083 %) nebulizer solution Take 3 mLs (2.5 mg total) by nebulization every 6 (six) hours as needed for Wheezing. Rescue 1 each 6    [DISCONTINUED] ciprofloxacin HCl (CIPRO) 250 MG tablet Take 1 tablet (250 mg total) by mouth 2 (two) times daily. for 7 days 14 tablet 0     Current Facility-Administered Medications on File Prior to Visit   Medication Dose Route Frequency Provider Last Rate Last Admin    denosumab (PROLIA) injection 60 mg  60 mg Subcutaneous Q6 Months Carly Mora, DONNIE   60 mg at  01/28/19 1548    denosumab (PROLIA) injection 60 mg  60 mg Subcutaneous 1 time in Clinic/HOD Jeffry Bermudez MD        neomycin-bacitracin-polymyxin ointment   Topical (Top) 1 time in Clinic/HOD Clau Michel, NP

## 2025-05-26 ENCOUNTER — LAB VISIT (OUTPATIENT)
Dept: LAB | Facility: HOSPITAL | Age: 89
End: 2025-05-26
Attending: NURSE PRACTITIONER
Payer: MEDICARE

## 2025-05-26 ENCOUNTER — EXTERNAL HOME HEALTH (OUTPATIENT)
Dept: HOME HEALTH SERVICES | Facility: HOSPITAL | Age: 89
End: 2025-05-26
Payer: MEDICARE

## 2025-05-26 DIAGNOSIS — N30.00 ACUTE CYSTITIS WITHOUT HEMATURIA: ICD-10-CM

## 2025-05-26 LAB
BILIRUB UR QL STRIP.AUTO: NEGATIVE
CLARITY UR: CLEAR
COLOR UR AUTO: YELLOW
GLUCOSE UR QL STRIP: NEGATIVE
HGB UR QL STRIP: NEGATIVE
KETONES UR QL STRIP: NEGATIVE
LEUKOCYTE ESTERASE UR QL STRIP: NEGATIVE
NITRITE UR QL STRIP: NEGATIVE
PH UR STRIP: 6 [PH]
PROT UR QL STRIP: NEGATIVE
SP GR UR STRIP: 1.02
UROBILINOGEN UR STRIP-ACNC: ABNORMAL EU/DL

## 2025-05-26 PROCEDURE — 81003 URINALYSIS AUTO W/O SCOPE: CPT | Mod: HCNC

## 2025-05-27 ENCOUNTER — OFFICE VISIT (OUTPATIENT)
Dept: UROLOGY | Facility: CLINIC | Age: 89
End: 2025-05-27
Payer: MEDICARE

## 2025-05-27 VITALS
DIASTOLIC BLOOD PRESSURE: 77 MMHG | HEART RATE: 67 BPM | BODY MASS INDEX: 28.7 KG/M2 | SYSTOLIC BLOOD PRESSURE: 141 MMHG | HEIGHT: 63 IN

## 2025-05-27 DIAGNOSIS — N32.81 OAB (OVERACTIVE BLADDER): ICD-10-CM

## 2025-05-27 DIAGNOSIS — N39.3 STRESS INCONTINENCE OF URINE: Primary | ICD-10-CM

## 2025-05-27 PROBLEM — M79.604 PAIN IN BOTH LOWER EXTREMITIES: Status: RESOLVED | Noted: 2018-05-28 | Resolved: 2025-05-27

## 2025-05-27 PROBLEM — M25.552 PAIN OF LEFT HIP JOINT: Status: RESOLVED | Noted: 2017-12-28 | Resolved: 2025-05-27

## 2025-05-27 PROBLEM — M79.605 PAIN IN BOTH LOWER EXTREMITIES: Status: RESOLVED | Noted: 2018-05-28 | Resolved: 2025-05-27

## 2025-05-27 PROBLEM — R07.9 CHEST PAIN: Status: RESOLVED | Noted: 2024-01-30 | Resolved: 2025-05-27

## 2025-05-27 PROBLEM — M79.672 LEFT FOOT PAIN: Status: RESOLVED | Noted: 2023-10-26 | Resolved: 2025-05-27

## 2025-05-27 PROBLEM — R05.3 CHRONIC COUGH: Status: RESOLVED | Noted: 2024-10-15 | Resolved: 2025-05-27

## 2025-05-27 PROBLEM — Z00.00 ENCOUNTER FOR PREVENTIVE HEALTH EXAMINATION: Status: RESOLVED | Noted: 2024-12-19 | Resolved: 2025-05-27

## 2025-05-27 PROBLEM — M25.519 SHOULDER PAIN: Status: RESOLVED | Noted: 2020-09-16 | Resolved: 2025-05-27

## 2025-05-27 PROBLEM — J41.0 SIMPLE CHRONIC BRONCHITIS: Status: RESOLVED | Noted: 2017-05-03 | Resolved: 2025-05-27

## 2025-05-27 PROBLEM — T14.8XXA OPEN WOUND OF SKIN: Status: RESOLVED | Noted: 2025-03-25 | Resolved: 2025-05-27

## 2025-05-27 PROCEDURE — 1101F PT FALLS ASSESS-DOCD LE1/YR: CPT | Mod: CPTII,HCNC,S$GLB, | Performed by: NURSE PRACTITIONER

## 2025-05-27 PROCEDURE — 1126F AMNT PAIN NOTED NONE PRSNT: CPT | Mod: CPTII,HCNC,S$GLB, | Performed by: NURSE PRACTITIONER

## 2025-05-27 PROCEDURE — 99999 PR PBB SHADOW E&M-EST. PATIENT-LVL V: CPT | Mod: PBBFAC,HCNC,, | Performed by: NURSE PRACTITIONER

## 2025-05-27 PROCEDURE — 3288F FALL RISK ASSESSMENT DOCD: CPT | Mod: CPTII,HCNC,S$GLB, | Performed by: NURSE PRACTITIONER

## 2025-05-27 PROCEDURE — 99214 OFFICE O/P EST MOD 30 MIN: CPT | Mod: HCNC,S$GLB,, | Performed by: NURSE PRACTITIONER

## 2025-05-27 PROCEDURE — 1159F MED LIST DOCD IN RCRD: CPT | Mod: CPTII,HCNC,S$GLB, | Performed by: NURSE PRACTITIONER

## 2025-05-27 PROCEDURE — 1157F ADVNC CARE PLAN IN RCRD: CPT | Mod: CPTII,HCNC,S$GLB, | Performed by: NURSE PRACTITIONER

## 2025-05-27 RX ORDER — SOLIFENACIN SUCCINATE 10 MG/1
10 TABLET, FILM COATED ORAL DAILY
Qty: 90 TABLET | Refills: 3 | Status: SHIPPED | OUTPATIENT
Start: 2025-05-27 | End: 2026-05-27

## 2025-05-27 RX ORDER — MIRABEGRON 50 MG/1
50 TABLET, FILM COATED, EXTENDED RELEASE ORAL DAILY
Qty: 90 TABLET | Refills: 3 | Status: SHIPPED | OUTPATIENT
Start: 2025-05-27 | End: 2026-05-27

## 2025-05-27 NOTE — PROGRESS NOTES
Chief Complaint:   Urge incontinence    HPI:   Patient is a 89-year-old female that is presenting with an increase in urge incontinence.  Has been seen by Dr. Kim for urge incontinence in his currently on Myrbetriq 50 mg and VESIcare 5 mg.  States that nocturia is 2-3 times nightly and daytime frequency and urge incontinence has increased over the last 6 months.  Was recently treated with a urinary tract infection, Macrobid, has a complete resolve.  Urine in clinic is negative and PVR is 14 mL.  States that she drinks mostly water throughout the day.  No gross hematuria.  Reports that she is wearing several pads at once that she changes several times throughout the day.  Reports where she stands up she has incontinence.  This is keeping her from social engagements.  4/24/23  Julio MORIRS  Clau Nunn is a 88 y.o. female here for evaluation of Urinary Tract Infection (Patient states she still has a UTI and her urethra hurts when she is mostly going to the bathroom but overall it just hurts.)  -on myrbetriq 50mg + vesicare 5mg. Not really using vaginal estrogen cream as much. She reports urethral pain for the last 2 weeks. Nocturia Q2 hours. Before current UTI started, she did notice improvement in UUI. No gross hematuria or fever. She does have suprapubic pain.   3/13/23- Using myrbetriq and it helps, but not enough. nocturia x 2 at least. She bought a pure wick, but she turns a lot during the night. She has UUI when she stands up from a seated position. She also has DIOMEDES with cough/laugh. 4 nighttime pads per day. Sometimes she has insensate incontinence. Still using vaginal estrogen cream. She gets vaginal itching from the moisture. No recent UTIs.   12/20/21- She reports insensate incontinence going on for a few years. Changes pads 2-3 times a day. She does have some degree of UUI and also has incontinence upon valsalva. States that she gets UTIs every 6-8 weeks. She is normally seen at the walk-in  clinic near her home. She states that it is not an [facility] facility. Culture has been done once and her antibiotics were changed once. Not sure of the organism. She reports that UTI symptoms generally include dysuria and chills and fatigue. She is currently not symptomatic. No gross h  02/14/2023  Patient is a an 87-year-old female that has been followed by Dr. Kim for recurrent urinary tract infections and overactive bladder.  Patient states that she recently completed a course of antibiotics secondary to E coli cystitis.  Is currently asymptomatic.  Urine in clinic is negative and PVR is 59 mL.  Patient states that she is currently on oxybutynin, however, has urge incontinence that is keeping her from going to social events.  Patient states that she wears a diaper and a pad and is changing pads several times during the day.  Nocturia is 4 times nightly.  Denies gross hematuria.  States that she drinks water throughout the day, very little caffeine.  12/20/2021 Dr. Kim  Clau Nunn is a 86 y.o. female here for evaluation of Other (Weak bladder. Pt uses pads everytime. Pt gets UTI every 6 weeks )  12/20/21- She reports insensate incontinence going on for a few years. Changes pads 2-3 times a day. She does have some degree of UUI and also has incontinence upon valsalva. States that she gets UTIs every 6-8 weeks. She is normally seen at the walk-in clinic near her home. She states that it is not an [facility] facility. Culture has been done once and her antibiotics were changed once. Not sure of the organism. She reports that UTI symptoms generally include dysuria and chills and fatigue. She is currently not symptomatic. No gross hematuria. Symptoms generally do clear with antibiotics. UTIs have been ongoing for years. She is using vaginal estrogen, prescribed by another urologist. Uses it 1-2 times a week.   Allergies:  Cephalexin and Mupirocin    Medications:  has a current medication list which  includes the following prescription(s): albuterol, ammonium lactate, azelastine, betamethasone dipropionate, buspirone, chlorthalidone, cholecalciferol (vitamin d3), ciclopirox, clobetasol, clobetasol, cyclobenzaprine, restasis multidose, eliquis, escitalopram oxalate, fluconazole, fluocinonide 0.05%, fluticasone propionate, furosemide, gabapentin, hydrocodone-acetaminophen, levothyroxine, lidocaine, omeprazole, ondansetron, polyethylene glycol, prolia, solifenacin, trazodone, and valsartan, and the following Facility-Administered Medications: denosumab, denosumab, and neomycin-bacitracin-polymyxin.    Review of Systems:  General: No fever, chills, fatigability, or weight loss.  Skin: No rashes, itching, or changes in color or texture of skin.  Chest: Denies PEREZ, cyanosis, wheezing, cough, and sputum production.  Abdomen: Appetite fine. No weight loss. Denies diarrhea, abdominal pain, hematemesis, or blood in stool.  Musculoskeletal: No joint stiffness or swelling. Denies back pain.  : As above.  All other review of systems negative.    PMH:   has a past medical history of Allergy, Anxiety, Asthma, Back pain, Chest pain (1/30/2024), Chronic venous insufficiency (11/19/2013), Depression, Diverticulosis (11/19/2013), Dry eyes, Fracture, sacrum/coccyx, GERD (gastroesophageal reflux disease), H/O total hip arthroplasty (12/30/2015), Hypertension, Hypothyroid, Osteoarthritis, Osteoporosis, Postlaminectomy syndrome of lumbar region (1/8/2014), Radius fracture, Simple chronic bronchitis (5/3/2017), Umbilical hernia (11/19/2013), Urinary incontinence, and Vitamin D deficiency disease.    PSH:   has a past surgical history that includes Tonsillectomy; Adenoidectomy; Back surgery; Leg Surgery (Right); Hip surgery (Right); breast implants (1973); Hysterectomy; Fracture surgery (April 3 2015); Hand surgery; cystoscope (1/6/16); Cataract extraction (Bilateral); Joint replacement (Right); Closed reduction distal radius  fracture; Transforaminal epidural injection of steroid (Left, 7/8/2020); Injection of joint (N/A, 8/12/2020); Injection of anesthetic agent into sacroiliac joint (N/A, 8/12/2020); Injection of anesthetic agent around nerve (Right, 9/16/2020); Selective injection of anesthetic agent around lumbar spinal nerve root by transforaminal approach (Bilateral, 2/10/2021); Transforaminal epidural injection of steroid (Left, 7/1/2021); Injection of anesthetic agent around nerve (Right, 7/13/2021); Selective injection of anesthetic agent around lumbar spinal nerve root by transforaminal approach (Bilateral, 5/25/2022); Injection of anesthetic agent into sacroiliac joint (Bilateral, 1/31/2023); Radiofrequency thermocoagulation (Left, 6/8/2023); and Injection of anesthetic agent into sacroiliac joint (Bilateral, 8/20/2024).    FamHx: family history includes COPD in her mother; Cancer in her daughter and mother; Diabetes in her son; Pulmonary fibrosis in her sister; Stroke in her father.    SocHx:  reports that she has never smoked. She has never used smokeless tobacco. She reports current alcohol use. She reports that she does not use drugs.      Physical Exam:  General: A&Ox3, no apparent distress, no deformities  Neck: No masses, normal thyroid  Lungs: normal inspiration, no use of accessory muscles  Heart: normal pulse, no arrhythmias  Abdomen: Soft, NT, ND, no masses, no hernias, no hepatosplenomegaly  Lymphatic: Neck and groin nodes negative  Skin: The skin is warm and dry. No jaundice.  Ext: No c/c/e.    Labs/Studies:   See HPI    Impression/Plan:   Urge incontinence  Patient to remain on Myrbetriq and VESIcare was increased to 10 mg daily.  Patient is requesting possible procedures, was scheduled with MD to discuss InterStim or Botox.

## 2025-05-29 ENCOUNTER — PATIENT MESSAGE (OUTPATIENT)
Dept: PRIMARY CARE CLINIC | Facility: CLINIC | Age: 89
End: 2025-05-29
Payer: MEDICARE

## 2025-05-29 ENCOUNTER — TELEPHONE (OUTPATIENT)
Dept: PRIMARY CARE CLINIC | Facility: CLINIC | Age: 89
End: 2025-05-29
Payer: MEDICARE

## 2025-05-29 NOTE — TELEPHONE ENCOUNTER
Please let Ms. Nunn know her repeat urine did not show infection after completion of her antibiotics.    ASHLEY Olguin

## 2025-06-03 ENCOUNTER — TELEPHONE (OUTPATIENT)
Dept: RHEUMATOLOGY | Facility: CLINIC | Age: 89
End: 2025-06-03
Payer: MEDICARE

## 2025-06-10 ENCOUNTER — TELEPHONE (OUTPATIENT)
Dept: UROLOGY | Facility: CLINIC | Age: 89
End: 2025-06-10
Payer: MEDICARE

## 2025-06-10 NOTE — TELEPHONE ENCOUNTER
I called and spoke with the pharmacist about patient taking the Vesicare and Myrbetriq together.  I informed her NP Carly Saenz prescribed it like that for her and it is okay to take together, no further questions.

## 2025-06-12 ENCOUNTER — TELEPHONE (OUTPATIENT)
Dept: SPORTS MEDICINE | Facility: CLINIC | Age: 89
End: 2025-06-12
Payer: MEDICARE

## 2025-06-12 ENCOUNTER — OFFICE VISIT (OUTPATIENT)
Dept: RHEUMATOLOGY | Facility: CLINIC | Age: 89
End: 2025-06-12
Payer: MEDICARE

## 2025-06-12 ENCOUNTER — DOCUMENT SCAN (OUTPATIENT)
Dept: HOME HEALTH SERVICES | Facility: HOSPITAL | Age: 89
End: 2025-06-12
Payer: MEDICARE

## 2025-06-12 ENCOUNTER — LAB VISIT (OUTPATIENT)
Dept: LAB | Facility: HOSPITAL | Age: 89
End: 2025-06-12
Attending: INTERNAL MEDICINE
Payer: MEDICARE

## 2025-06-12 ENCOUNTER — TELEPHONE (OUTPATIENT)
Dept: RHEUMATOLOGY | Facility: CLINIC | Age: 89
End: 2025-06-12
Payer: MEDICARE

## 2025-06-12 VITALS
HEART RATE: 47 BPM | HEIGHT: 63 IN | SYSTOLIC BLOOD PRESSURE: 159 MMHG | DIASTOLIC BLOOD PRESSURE: 80 MMHG | BODY MASS INDEX: 29.41 KG/M2 | WEIGHT: 166 LBS

## 2025-06-12 DIAGNOSIS — E55.9 VITAMIN D INSUFFICIENCY: ICD-10-CM

## 2025-06-12 DIAGNOSIS — Z51.81 MEDICATION MONITORING ENCOUNTER: ICD-10-CM

## 2025-06-12 DIAGNOSIS — M81.0 AGE-RELATED OSTEOPOROSIS WITHOUT CURRENT PATHOLOGICAL FRACTURE: Primary | ICD-10-CM

## 2025-06-12 DIAGNOSIS — N18.32 STAGE 3B CHRONIC KIDNEY DISEASE: ICD-10-CM

## 2025-06-12 DIAGNOSIS — Z91.89 AT HIGH RISK FOR HYPOCALCEMIA: ICD-10-CM

## 2025-06-12 DIAGNOSIS — Z87.310 HISTORY OF HEALED FRAGILITY FRACTURE: ICD-10-CM

## 2025-06-12 LAB
ALBUMIN SERPL BCP-MCNC: 3.6 G/DL (ref 3.5–5.2)
ALP SERPL-CCNC: 62 UNIT/L (ref 40–150)
ALT SERPL W/O P-5'-P-CCNC: 8 UNIT/L (ref 10–44)
ANION GAP (OHS): 13 MMOL/L (ref 8–16)
AST SERPL-CCNC: 19 UNIT/L (ref 11–45)
BILIRUB SERPL-MCNC: 0.9 MG/DL (ref 0.1–1)
BUN SERPL-MCNC: 18 MG/DL (ref 8–23)
CALCIUM SERPL-MCNC: 9.7 MG/DL (ref 8.7–10.5)
CHLORIDE SERPL-SCNC: 98 MMOL/L (ref 95–110)
CO2 SERPL-SCNC: 28 MMOL/L (ref 23–29)
CREAT SERPL-MCNC: 1.3 MG/DL (ref 0.5–1.4)
GFR SERPLBLD CREATININE-BSD FMLA CKD-EPI: 39 ML/MIN/1.73/M2
GLUCOSE SERPL-MCNC: 104 MG/DL (ref 70–110)
POTASSIUM SERPL-SCNC: 3.6 MMOL/L (ref 3.5–5.1)
PROT SERPL-MCNC: 7.6 GM/DL (ref 6–8.4)
SODIUM SERPL-SCNC: 139 MMOL/L (ref 136–145)

## 2025-06-12 PROCEDURE — 36415 COLL VENOUS BLD VENIPUNCTURE: CPT | Mod: HCNC

## 2025-06-12 PROCEDURE — 99999 PR PBB SHADOW E&M-EST. PATIENT-LVL IV: CPT | Mod: PBBFAC,HCNC,, | Performed by: INTERNAL MEDICINE

## 2025-06-12 PROCEDURE — 84450 TRANSFERASE (AST) (SGOT): CPT | Mod: HCNC

## 2025-06-12 NOTE — PROGRESS NOTES
RHEUMATOLOGY OUTPATIENT CLINIC NOTE    6/12/2025    Attending Rheumatologist: Jeffry Bermudez  Primary Care Provider/Physician Requesting Consultation: Jeanette Molina MD   Chief Complaint/Reason For Consultation:  Osteoporosis      Subjective:     Clau Nunn is a 89 y.o. White female with OP    Due for Prolia, last received 9/2024.  Interval completion of invasive dental w/u.  Denies upcoming dental surgeries.  Frequent falls but no fractures since last visit.  No acute complaints at present.      Review of Systems   Constitutional:  Negative for fever.   Eyes:  Negative for pain.   Genitourinary:  Negative for dysuria, hematuria and urgency.   Musculoskeletal:  Positive for back pain and falls.   Skin:  Negative for rash.   Neurological:  Negative for focal weakness.       Chronic comorbid conditions affecting medical decision making today:  Past Medical History:   Diagnosis Date    Allergy     Anxiety     Asthma     Back pain     Chest pain 1/30/2024    Chronic venous insufficiency 11/19/2013    Depression     Diverticulosis 11/19/2013 1/8/8 colonoscopy    Dry eyes     Fracture, sacrum/coccyx     Dr. Sanchez     GERD (gastroesophageal reflux disease)     H/O total hip arthroplasty 12/30/2015    Hypertension     Hypothyroid     Osteoarthritis     Osteoporosis     Postlaminectomy syndrome of lumbar region 1/8/2014    Radius fracture     7/18    Simple chronic bronchitis 5/3/2017    Umbilical hernia 11/19/2013    Urinary incontinence     Vitamin D deficiency disease      Past Surgical History:   Procedure Laterality Date    ADENOIDECTOMY      BACK SURGERY      x2    breast implants  1973    CATARACT EXTRACTION Bilateral     CLOSED REDUCTION DISTAL RADIUS FRACTURE      Dr. Black, 8/18    cystoscope  1/6/16    DR. Brown    FRACTURE SURGERY  April 3 2015    left wrist    HAND SURGERY      HIP SURGERY Right     total hip- Dr. Dos Santos    HYSTERECTOMY      36y/o    INJECTION OF ANESTHETIC AGENT AROUND  NERVE Right 9/16/2020    Procedure: Right suprascapular nerve block;  Surgeon: Raffi Wells MD;  Location: [facility];  Service: Pain Management;  Laterality: Right;    INJECTION OF ANESTHETIC AGENT AROUND NERVE Right 7/13/2021    Procedure: Block, Nerve Right Suprascapular Nerve Block with RN IV sedation;  Surgeon: Raffi Wells MD;  Location: [facility];  Service: Pain Management;  Laterality: Right;    INJECTION OF ANESTHETIC AGENT INTO SACROILIAC JOINT N/A 8/12/2020    Procedure: Left IA hip Joint + Left SIJ + Right shoulder injection;  Surgeon: Raffi Wells MD;  Location: [facility];  Service: Pain Management;  Laterality: N/A;    INJECTION OF ANESTHETIC AGENT INTO SACROILIAC JOINT Bilateral 1/31/2023    Procedure: Bilateral SIJ Injection w/Local;  Surgeon: Abdirahman Parker MD;  Location: [facility];  Service: Pain Management;  Laterality: Bilateral;    INJECTION OF ANESTHETIC AGENT INTO SACROILIAC JOINT Bilateral 8/20/2024    Procedure: Bilateral SIJ Injection;  Surgeon: Abdirahman Parker MD;  Location: [facility];  Service: Pain Management;  Laterality: Bilateral;    INJECTION OF JOINT N/A 8/12/2020    Procedure: Left IA hip Joint + Left SIJ + Right shoulder injection;  Surgeon: Raffi Wells MD;  Location: [facility];  Service: Pain Management;  Laterality: N/A;    JOINT REPLACEMENT Right     R NNAMDI - Dr. Dos Santos    LEG SURGERY Right     ex-fix tib/fib    RADIOFREQUENCY THERMOCOAGULATION Left 6/8/2023    Procedure: Left SIJ RFA w/ local (give Valium before);  Surgeon: Abdirahman Parker MD;  Location: [facility];  Service: Pain Management;  Laterality: Left;    SELECTIVE INJECTION OF ANESTHETIC AGENT AROUND LUMBAR SPINAL NERVE ROOT BY TRANSFORAMINAL APPROACH Bilateral 2/10/2021    Procedure: Bilateral L5/S1 TF REJI;  Surgeon: Raffi Wells MD;  Location: [facility];  Service: Pain Management;  Laterality: Bilateral;    SELECTIVE INJECTION OF ANESTHETIC AGENT AROUND LUMBAR SPINAL NERVE ROOT  BY TRANSFORAMINAL APPROACH Bilateral 5/25/2022    Procedure: Bilateral L5/S1 + S1 TF REJI;  Surgeon: Raffi Wells MD;  Location: [facility];  Service: Pain Management;  Laterality: Bilateral;    TONSILLECTOMY      TRANSFORAMINAL EPIDURAL INJECTION OF STEROID Left 7/8/2020    Procedure: left L2/3 + L5/S1 TF REJI;  Surgeon: Raffi Wells MD;  Location: [facility];  Service: Pain Management;  Laterality: Left;    TRANSFORAMINAL EPIDURAL INJECTION OF STEROID Left 7/1/2021    Procedure: left L5/S1 + S1 TF REJI;  Surgeon: Raffi Wells MD;  Location: [facility];  Service: Pain Management;  Laterality: Left;     Family History   Problem Relation Name Age of Onset    COPD Mother      Cancer Mother          lung CA    Pulmonary fibrosis Sister      Cancer Daughter          colon    Stroke Father      Diabetes Son      Melanoma Neg Hx      Psoriasis Neg Hx      Lupus Neg Hx       Tobacco Use History[1]  Current Medications[2]     Objective:     Vitals:    06/12/25 1451   BP: (!) 159/80   Pulse: (!) 47     Physical Exam   Eyes: Conjunctivae are normal.   Pulmonary/Chest: Effort normal. No respiratory distress.   Musculoskeletal:         General: Deformity present. No swelling or tenderness. Normal range of motion.   Skin: No rash noted.       Reviewed available old and all outside pertinent medical records available.    All lab results personally reviewed and interpreted by me.       ASSESSMENT / PLAN     1. Age-related osteoporosis without current pathological fracture  Prolia q6m.        2. Medication monitoring encounter  Comprehensive Metabolic Panel      3. Stage 3b chronic kidney disease  Risk for hypocalcemia      4. At high risk for hypocalcemia  Ca/vit D supp /400mg BID      5. Vitamin D insufficiency  Last measured on target      6. History of healed fragility fracture  Risk of recurrence w/o Prolia              Visit today included increased complexity associated with the care of the episodic problem  Age-related osteoporosis without current pathological fracture addressed and managing the longitudinal care of the patient due to the serious and/or complex managed problem(s) At high risk for hypocalcemia , Medication monitoring encounter.    Jeffry Bermudez M.D.           [1]   Social History  Tobacco Use   Smoking Status Never   Smokeless Tobacco Never   [2]   Current Outpatient Medications:     ammonium lactate (AMLACTIN) 12 % lotion, Apply topically 2 (two) times daily. To legs, Disp: 225 g, Rfl: 5    azelastine (ASTELIN) 137 mcg (0.1 %) nasal spray, 2 sprays by Nasal route 2 (two) times daily., Disp: , Rfl:     betamethasone dipropionate (DIPROLENE) 0.05 % ointment, Apply to affected fingers around nails at night under occlusion with white cotton gloves for 2-4 weeks, Disp: 45 g, Rfl: 3    busPIRone (BUSPAR) 5 MG Tab, TAKE 1 TABLET TWICE DAILY, Disp: 180 tablet, Rfl: 5    chlorthalidone (HYGROTEN) 25 MG Tab, Take 1 tablet (25 mg total) by mouth once daily., Disp: 90 tablet, Rfl: 2    cholecalciferol, vitamin D3, (D3-2000) 50 mcg (2,000 unit) Cap capsule, Take 1 capsule (2,000 Units total) by mouth once daily., Disp: 90 capsule, Rfl: 11    ciclopirox (PENLAC) 8 % Soln, Apply topically nightly., Disp: 6.6 mL, Rfl: 3    clobetasoL (TEMOVATE) 0.05 % external solution, Pt to mix in 1 jar of cerave cream and apply to affected areas bid for 2-4 weeks per course, Disp: 50 mL, Rfl: 3    clobetasoL (TEMOVATE) 0.05 % external solution, Apply 2 drops under nail plates for 4 weeks on, take 2 weeks off, then repeat indefinitely, Disp: 50 mL, Rfl: 1    cyclobenzaprine (FLEXERIL) 5 MG tablet, TAKE 1 TABLET (5 MG TOTAL) BY MOUTH NIGHTLY AS NEEDED FOR MUSCLE SPASMS., Disp: 90 tablet, Rfl: 1    cycloSPORINE (RESTASIS MULTIDOSE) 0.05 % Drop, Place 1 drop into both eyes every 12 (twelve) hours., Disp: 5.5 mL, Rfl: 12    ELIQUIS 5 mg Tab, TAKE 1 TABLET TWICE DAILY, Disp: 180 tablet, Rfl: 3    EScitalopram oxalate (LEXAPRO) 10 MG  tablet, Take 1 tablet (10 mg total) by mouth once daily., Disp: 90 tablet, Rfl: 3    fluconazole (DIFLUCAN) 150 MG Tab, Take 1 tablet (150 mg total) by mouth once a week. For 6 months, Disp: 12 tablet, Rfl: 1    fluocinonide 0.05% (LIDEX) 0.05 % cream, AAA bid to leg for 2-4 weeks per course, Disp: 60 g, Rfl: 1    fluticasone propionate (FLONASE) 50 mcg/actuation nasal spray, , Disp: , Rfl:     furosemide (LASIX) 40 MG tablet, Take 1 tablet (40 mg total) by mouth once daily., Disp: 15 tablet, Rfl: 0    gabapentin (NEURONTIN) 300 MG capsule, Take 1 capsule (300 mg total) by mouth every evening., Disp: 90 capsule, Rfl: 3    HYDROcodone-acetaminophen (NORCO) 5-325 mg per tablet, Take 1 tablet by mouth every 8 (eight) hours as needed for Pain., Disp: 15 tablet, Rfl: 0    levothyroxine (SYNTHROID) 100 MCG tablet, TAKE 1 TABLET EVERY DAY, Disp: 90 tablet, Rfl: 3    LIDOcaine (LIDODERM) 5 %, APPLY ONE PATCH TO AFFECTED AREA. 12 HOURS ON AND 12 HOURS OFF, Disp: 30 patch, Rfl: 1    mirabegron (MYRBETRIQ) 50 mg Tb24, Take 1 tablet (50 mg total) by mouth once daily., Disp: 90 tablet, Rfl: 3    omeprazole (PRILOSEC) 40 MG capsule, TAKE 1 CAPSULE (40 MG TOTAL) BY MOUTH EVERY MORNING., Disp: 90 capsule, Rfl: 3    ondansetron (ZOFRAN-ODT) 4 MG TbDL, Take 1 tablet (4 mg total) by mouth every 8 (eight) hours as needed (nausea)., Disp: 15 tablet, Rfl: 0    polyethylene glycol (GLYCOLAX) 17 gram/dose powder, Take 17 g by mouth once daily., Disp: 595 g, Rfl: 0    PROLIA 60 mg/mL Syrg, , Disp: , Rfl:     solifenacin (VESICARE) 10 MG tablet, Take 1 tablet (10 mg total) by mouth once daily., Disp: 90 tablet, Rfl: 3    traZODone (DESYREL) 50 MG tablet, Take 1-2 tablets ( mg total) by mouth nightly as needed for Insomnia., Disp: 180 tablet, Rfl: 3    valsartan (DIOVAN) 160 MG tablet, Take 1 tablet (160 mg total) by mouth once daily., Disp: 90 tablet, Rfl: 3    albuterol (PROVENTIL) 2.5 mg /3 mL (0.083 %) nebulizer solution, Take 3 mLs  (2.5 mg total) by nebulization every 6 (six) hours as needed for Wheezing. Rescue, Disp: 1 each, Rfl: 6    Current Facility-Administered Medications:     denosumab (PROLIA) injection 60 mg, 60 mg, Subcutaneous, Q6 Months, Carly Mora, DONNIE, 60 mg at 01/28/19 1548    neomycin-bacitracin-polymyxin ointment, , Topical (Top), 1 time in Clinic/HOD, Calu Michel NP    Facility-Administered Medications Ordered in Other Visits:     denosumab (PROLIA) injection 60 mg, 60 mg, Subcutaneous, 1 time in Clinic/HOD, Jeffry Bermudez MD

## 2025-06-12 NOTE — TELEPHONE ENCOUNTER
LVM for return call regarding scheduling per pt request.       Copied from CRM #[id number]. Topic: Appointments - Appointment Access  >> Jun 12, 2025  8:16 AM Cierra wrote:  Patient is requesting a call back regarding scheduling. Please call back at [phone]

## 2025-06-12 NOTE — TELEPHONE ENCOUNTER
Spoke with pt regarding scheduling appt. Stated Rahel would need to see her before ordering Gel injection. Scheduled appointment per pt request.     Copied from CRM #[id number]. Topic: General Inquiry - Return Call  >> Jun 12, 2025  9:18 AM Armida wrote:  Type:  Patient Returning Call    Who Called:Clau   Who Left Message for Patient:Susan   Does the patient know what this is regarding?:yes  Would the patient rather a call back or a response via [facility]? Call back   Best Call Back Number:[phone]   Additional Information:

## 2025-06-12 NOTE — TELEPHONE ENCOUNTER
Dadam for return call      Copied from CRM #[id number]. Topic: General Inquiry - Return Call  >> Jun 12, 2025  8:45 AM Leonie wrote:  Type:  Patient Returning Call    Who Called:Clau   Who Left Message for Patient:nurse   Does the patient know what this is regarding?:returning missed call  Would the patient rather a call back or a response via [facility]? call  Best Call Back Number:[phone]     Additional Information:

## 2025-06-17 ENCOUNTER — OFFICE VISIT (OUTPATIENT)
Dept: ORTHOPEDICS | Facility: CLINIC | Age: 89
End: 2025-06-17
Payer: MEDICARE

## 2025-06-17 VITALS — WEIGHT: 166 LBS | BODY MASS INDEX: 29.41 KG/M2 | HEIGHT: 63 IN

## 2025-06-17 DIAGNOSIS — M17.0 BILATERAL PRIMARY OSTEOARTHRITIS OF KNEE: Primary | ICD-10-CM

## 2025-06-17 PROCEDURE — 97110 THERAPEUTIC EXERCISES: CPT | Mod: HCNC,GP,S$GLB,96 | Performed by: NURSE PRACTITIONER

## 2025-06-17 PROCEDURE — 99214 OFFICE O/P EST MOD 30 MIN: CPT | Mod: HCNC,25,S$GLB, | Performed by: NURSE PRACTITIONER

## 2025-06-17 PROCEDURE — 1101F PT FALLS ASSESS-DOCD LE1/YR: CPT | Mod: CPTII,HCNC,S$GLB, | Performed by: NURSE PRACTITIONER

## 2025-06-17 PROCEDURE — 20610 DRAIN/INJ JOINT/BURSA W/O US: CPT | Mod: 50,HCNC,S$GLB, | Performed by: NURSE PRACTITIONER

## 2025-06-17 PROCEDURE — 1157F ADVNC CARE PLAN IN RCRD: CPT | Mod: CPTII,HCNC,S$GLB, | Performed by: NURSE PRACTITIONER

## 2025-06-17 PROCEDURE — 3288F FALL RISK ASSESSMENT DOCD: CPT | Mod: CPTII,HCNC,S$GLB, | Performed by: NURSE PRACTITIONER

## 2025-06-17 PROCEDURE — 1159F MED LIST DOCD IN RCRD: CPT | Mod: CPTII,HCNC,S$GLB, | Performed by: NURSE PRACTITIONER

## 2025-06-17 PROCEDURE — 1125F AMNT PAIN NOTED PAIN PRSNT: CPT | Mod: CPTII,HCNC,S$GLB, | Performed by: NURSE PRACTITIONER

## 2025-06-17 PROCEDURE — 99999 PR PBB SHADOW E&M-EST. PATIENT-LVL IV: CPT | Mod: PBBFAC,HCNC,, | Performed by: NURSE PRACTITIONER

## 2025-06-17 RX ORDER — METHYLPREDNISOLONE ACETATE 40 MG/ML
40 INJECTION, SUSPENSION INTRA-ARTICULAR; INTRALESIONAL; INTRAMUSCULAR; SOFT TISSUE
Status: DISCONTINUED | OUTPATIENT
Start: 2025-06-17 | End: 2025-06-17 | Stop reason: HOSPADM

## 2025-06-17 RX ORDER — BUPIVACAINE HYDROCHLORIDE 5 MG/ML
4 INJECTION, SOLUTION EPIDURAL; INTRACAUDAL; PERINEURAL
Status: DISCONTINUED | OUTPATIENT
Start: 2025-06-17 | End: 2025-06-17 | Stop reason: HOSPADM

## 2025-06-17 RX ADMIN — METHYLPREDNISOLONE ACETATE 40 MG: 40 INJECTION, SUSPENSION INTRA-ARTICULAR; INTRALESIONAL; INTRAMUSCULAR; SOFT TISSUE at 11:06

## 2025-06-17 RX ADMIN — BUPIVACAINE HYDROCHLORIDE 4 ML: 5 INJECTION, SOLUTION EPIDURAL; INTRACAUDAL; PERINEURAL at 11:06

## 2025-06-17 NOTE — PATIENT INSTRUCTIONS
Medications:    No NSAIDs patient is on Eliquis     Injections:   Injection of Depo-Medrol 40 mg bilateral knees      Education:    I personally spent 8 minutes developing, teaching, performing  and explaining a home exercise/stretching regimen for the treatment of  hamstring stretching and maintaining flexibility of bilateral knees. Patient demonstrated understanding. A written summary was provided, all questions were answered and counseling provided to encourage patient to do these daily. CPT 61813-VF       Return to clinic:    As needed for bilateral knee cortisone injections

## 2025-06-17 NOTE — PROCEDURES
Large Joint Aspiration/Injection: bilateral knee    Date/Time: 6/17/2025 11:30 AM    Performed by: Rahel Hi NP  Authorized by: Rahel Hi NP    Consent Done?:  Yes (Verbal)  Indications:  Pain and arthritis  Site marked: the procedure site was marked    Timeout: prior to procedure the correct patient, procedure, and site was verified    Prep: patient was prepped and draped in usual sterile fashion      Local anesthesia used?: Yes    Local anesthetic:  Topical anesthetic  Anesthetic total (ml):  3      Details:  Needle Size:  25 G  Ultrasonic Guidance for needle placement?: No    Approach:  Anterolateral  Location:  Knee  Laterality:  Bilateral  Site:  Bilateral knee  Medications (Right):  4 mL BUPivacaine (PF) 0.5% (5 mg/mL) 0.5 % (5 mg/mL); 40 mg methylPREDNISolone acetate 40 mg/mL  Medications (Left):  4 mL BUPivacaine (PF) 0.5% (5 mg/mL) 0.5 % (5 mg/mL); 40 mg methylPREDNISolone acetate 40 mg/mL  Patient tolerance:  Patient tolerated the procedure well with no immediate complications

## 2025-06-17 NOTE — PROGRESS NOTES
Rahel Hi NP  [facility]          Chief Complaint:   Chief Complaint   Patient presents with    Right Knee - Pain    Left Knee - Pain        History of Present Illness: Clau Nunn is a 89 y.o. female   with a long history of bilateral knee degenerative arthritis with a previous right knee fracture that was treated non operatively.  Her last x-rays of bilateral knees were 11/06/2024 she has received cortisone injections in the past her right knee has always hurt her worse than the left.  She would like a cortisone injection in both knees today if possible    ROS:   Neuro: Awake, alert and oriented  Pulm: no resp distress  Muscloskeletal:  Bilateral knee pain right greater than left    Past Medical History:   Past Medical History:   Diagnosis Date    Allergy     Anxiety     Asthma     Back pain     Chest pain 1/30/2024    Chronic venous insufficiency 11/19/2013    Depression     Diverticulosis 11/19/2013 1/8/8 colonoscopy    Dry eyes     Fracture, sacrum/coccyx     Dr. Sanchez     GERD (gastroesophageal reflux disease)     H/O total hip arthroplasty 12/30/2015    Hypertension     Hypothyroid     Osteoarthritis     Osteoporosis     Postlaminectomy syndrome of lumbar region 1/8/2014    Radius fracture     7/18    Simple chronic bronchitis 5/3/2017    Umbilical hernia 11/19/2013    Urinary incontinence     Vitamin D deficiency disease       Past Surgical History:   Procedure Laterality Date    ADENOIDECTOMY      BACK SURGERY      x2    breast implants  1973    CATARACT EXTRACTION Bilateral     CLOSED REDUCTION DISTAL RADIUS FRACTURE      Dr. Black, 8/18    cystoscope  1/6/16    DR. Brown    FRACTURE SURGERY  April 3 2015    left wrist    HAND SURGERY      HIP SURGERY Right     total hip- Dr. Dos Santos    HYSTERECTOMY      34y/o    INJECTION OF ANESTHETIC AGENT AROUND NERVE Right 9/16/2020    Procedure: Right suprascapular nerve block;  Surgeon: Raffi Wells MD;   Location: [facility];  Service: Pain Management;  Laterality: Right;    INJECTION OF ANESTHETIC AGENT AROUND NERVE Right 7/13/2021    Procedure: Block, Nerve Right Suprascapular Nerve Block with RN IV sedation;  Surgeon: Raffi Wells MD;  Location: [facility];  Service: Pain Management;  Laterality: Right;    INJECTION OF ANESTHETIC AGENT INTO SACROILIAC JOINT N/A 8/12/2020    Procedure: Left IA hip Joint + Left SIJ + Right shoulder injection;  Surgeon: Raffi Wells MD;  Location: [facility];  Service: Pain Management;  Laterality: N/A;    INJECTION OF ANESTHETIC AGENT INTO SACROILIAC JOINT Bilateral 1/31/2023    Procedure: Bilateral SIJ Injection w/Local;  Surgeon: Abdirahman Parker MD;  Location: [facility];  Service: Pain Management;  Laterality: Bilateral;    INJECTION OF ANESTHETIC AGENT INTO SACROILIAC JOINT Bilateral 8/20/2024    Procedure: Bilateral SIJ Injection;  Surgeon: Abdirahman Parker MD;  Location: [facility];  Service: Pain Management;  Laterality: Bilateral;    INJECTION OF JOINT N/A 8/12/2020    Procedure: Left IA hip Joint + Left SIJ + Right shoulder injection;  Surgeon: Raffi Wells MD;  Location: [facility];  Service: Pain Management;  Laterality: N/A;    JOINT REPLACEMENT Right     R NNAMDI - Dr. Dos Santos    LEG SURGERY Right     ex-fix tib/fib    RADIOFREQUENCY THERMOCOAGULATION Left 6/8/2023    Procedure: Left SIJ RFA w/ local (give Valium before);  Surgeon: Abdirahman Parker MD;  Location: [facility];  Service: Pain Management;  Laterality: Left;    SELECTIVE INJECTION OF ANESTHETIC AGENT AROUND LUMBAR SPINAL NERVE ROOT BY TRANSFORAMINAL APPROACH Bilateral 2/10/2021    Procedure: Bilateral L5/S1 TF REJI;  Surgeon: Raffi Wells MD;  Location: [facility];  Service: Pain Management;  Laterality: Bilateral;    SELECTIVE INJECTION OF ANESTHETIC AGENT AROUND LUMBAR SPINAL NERVE ROOT BY TRANSFORAMINAL APPROACH Bilateral 5/25/2022    Procedure: Bilateral L5/S1 + S1 TF REJI;   Surgeon: Raffi Wells MD;  Location: [facility];  Service: Pain Management;  Laterality: Bilateral;    TONSILLECTOMY      TRANSFORAMINAL EPIDURAL INJECTION OF STEROID Left 7/8/2020    Procedure: left L2/3 + L5/S1 TF REJI;  Surgeon: Raffi Wells MD;  Location: [facility];  Service: Pain Management;  Laterality: Left;    TRANSFORAMINAL EPIDURAL INJECTION OF STEROID Left 7/1/2021    Procedure: left L5/S1 + S1 TF REJI;  Surgeon: Raffi Wells MD;  Location: [facility];  Service: Pain Management;  Laterality: Left;      Family History   Problem Relation Name Age of Onset    COPD Mother      Cancer Mother          lung CA    Pulmonary fibrosis Sister      Cancer Daughter          colon    Stroke Father      Diabetes Son      Melanoma Neg Hx      Psoriasis Neg Hx      Lupus Neg Hx        Social History[1]   Medication List with Changes/Refills   Current Medications    ALBUTEROL (PROVENTIL) 2.5 MG /3 ML (0.083 %) NEBULIZER SOLUTION    Take 3 mLs (2.5 mg total) by nebulization every 6 (six) hours as needed for Wheezing. Rescue    AMMONIUM LACTATE (AMLACTIN) 12 % LOTION    Apply topically 2 (two) times daily. To legs    AZELASTINE (ASTELIN) 137 MCG (0.1 %) NASAL SPRAY    2 sprays by Nasal route 2 (two) times daily.    BETAMETHASONE DIPROPIONATE (DIPROLENE) 0.05 % OINTMENT    Apply to affected fingers around nails at night under occlusion with white cotton gloves for 2-4 weeks    BUSPIRONE (BUSPAR) 5 MG TAB    TAKE 1 TABLET TWICE DAILY    CHLORTHALIDONE (HYGROTEN) 25 MG TAB    Take 1 tablet (25 mg total) by mouth once daily.    CHOLECALCIFEROL, VITAMIN D3, (D3-2000) 50 MCG (2,000 UNIT) CAP CAPSULE    Take 1 capsule (2,000 Units total) by mouth once daily.    CICLOPIROX (PENLAC) 8 % SOLN    Apply topically nightly.    CLOBETASOL (TEMOVATE) 0.05 % EXTERNAL SOLUTION    Pt to mix in 1 jar of cerave cream and apply to affected areas bid for 2-4 weeks per course    CLOBETASOL (TEMOVATE) 0.05 % EXTERNAL SOLUTION    Apply 2  drops under nail plates for 4 weeks on, take 2 weeks off, then repeat indefinitely    CYCLOBENZAPRINE (FLEXERIL) 5 MG TABLET    TAKE 1 TABLET (5 MG TOTAL) BY MOUTH NIGHTLY AS NEEDED FOR MUSCLE SPASMS.    CYCLOSPORINE (RESTASIS MULTIDOSE) 0.05 % DROP    Place 1 drop into both eyes every 12 (twelve) hours.    ELIQUIS 5 MG TAB    TAKE 1 TABLET TWICE DAILY    ESCITALOPRAM OXALATE (LEXAPRO) 10 MG TABLET    Take 1 tablet (10 mg total) by mouth once daily.    FLUCONAZOLE (DIFLUCAN) 150 MG TAB    Take 1 tablet (150 mg total) by mouth once a week. For 6 months    FLUOCINONIDE 0.05% (LIDEX) 0.05 % CREAM    AAA bid to leg for 2-4 weeks per course    FLUTICASONE PROPIONATE (FLONASE) 50 MCG/ACTUATION NASAL SPRAY        FUROSEMIDE (LASIX) 40 MG TABLET    Take 1 tablet (40 mg total) by mouth once daily.    GABAPENTIN (NEURONTIN) 300 MG CAPSULE    Take 1 capsule (300 mg total) by mouth every evening.    HYDROCODONE-ACETAMINOPHEN (NORCO) 5-325 MG PER TABLET    Take 1 tablet by mouth every 8 (eight) hours as needed for Pain.    LEVOTHYROXINE (SYNTHROID) 100 MCG TABLET    TAKE 1 TABLET EVERY DAY    LIDOCAINE (LIDODERM) 5 %    APPLY ONE PATCH TO AFFECTED AREA. 12 HOURS ON AND 12 HOURS OFF    MIRABEGRON (MYRBETRIQ) 50 MG TB24    Take 1 tablet (50 mg total) by mouth once daily.    OMEPRAZOLE (PRILOSEC) 40 MG CAPSULE    TAKE 1 CAPSULE (40 MG TOTAL) BY MOUTH EVERY MORNING.    ONDANSETRON (ZOFRAN-ODT) 4 MG TBDL    Take 1 tablet (4 mg total) by mouth every 8 (eight) hours as needed (nausea).    POLYETHYLENE GLYCOL (GLYCOLAX) 17 GRAM/DOSE POWDER    Take 17 g by mouth once daily.    PROLIA 60 MG/ML SYRG        SOLIFENACIN (VESICARE) 10 MG TABLET    Take 1 tablet (10 mg total) by mouth once daily.    TRAZODONE (DESYREL) 50 MG TABLET    Take 1-2 tablets ( mg total) by mouth nightly as needed for Insomnia.    VALSARTAN (DIOVAN) 160 MG TABLET    Take 1 tablet (160 mg total) by mouth once daily.      Review of patient's allergies indicates:  "  Allergen Reactions    Cephalexin Hives, Itching, Swelling, Anxiety, Dermatitis and Rash    Mupirocin Itching          Physical Exam:   BMI: Body mass index is 29.41 kg/m². 89 y.o. female  5' 3" (1.6 m) 75.3 kg (166 lb) On inspection of the bilateral knee.  There is mild diffuse soft tissue swelling.  No significant  Infrapatellar fat pad swelling.  Non antalgic gait.  The patient can independently arise from a sitting to a standing position.  Range of motion reveals the patient can perform repetitive flexion and extension knee excursions in a seated position.  Extension -2 degrees right knee 0° left knee Flexion 120° crepitance is palpable during range of motion.  No obvious varus/valgus alignment upon standing.  Resisted straight leg raise is 5/5 strength.  There is no acute tenderness to palpation along the medial or lateral joint margin of both right and left knee. Strength Sensation circulation are intact.       Imaging: Relevant imaging results reviewed and interpreted and discussed with the patient and/or family today.   11/06/2024 x-rays right and left knee   FINDINGS:  Right: Suspected posttraumatic deformity of the proximal tibia/fibula.  End-stage degenerative changes of the right knee with bone-on-bone apposition.     Left: Bicompartmental joint space narrowing and sclerosis similar to prior.  Chondrocalcinosis.     Osseous demineralization.  Atherosclerotic disease.    Assessment/Plan:  1. Bilateral primary osteoarthritis of knee       Patient Instructions   Medications:    No NSAIDs patient is on Eliquis     Injections:   Injection of Depo-Medrol 40 mg bilateral knees      Education:    I personally spent 8 minutes developing, teaching, performing  and explaining a home exercise/stretching regimen for the treatment of  hamstring stretching and maintaining flexibility of bilateral knees. Patient demonstrated understanding. A written summary was provided, all questions were answered and counseling " provided to encourage patient to do these daily. CPT 19356-UL       Return to clinic:    As needed for bilateral knee cortisone injections      I discussed worrisome and red flag signs and symptoms with the patient. The patient expressed understanding and agreed to alert me immediately or to go to the emergency room if they experience any of these.   Treatment plan was developed with input from the patient/family, and they expressed understanding and agreement with the plan. All questions were answered today.             Rahel Hi NP-C  Orthopedic Nurse Pracitioner  [place]       [1]   Social History  Socioeconomic History    Marital status:    Occupational History    Occupation: retired     Comment: [employer]   Tobacco Use    Smoking status: Never    Smokeless tobacco: Never   Substance and Sexual Activity    Alcohol use: Yes     Alcohol/week: 0.0 standard drinks of alcohol     Comment: rarely    Drug use: Never    Sexual activity: Never     Partners: Male     Birth control/protection: Post-menopausal   Social History Narrative    Patient is retired from [employer]     Social Drivers of Health     Financial Resource Strain: Low Risk  (3/25/2025)    Overall Financial Resource Strain (CARDIA)     Difficulty of Paying Living Expenses: Not hard at all   Food Insecurity: No Food Insecurity (3/25/2025)    Hunger Vital Sign     Worried About Running Out of Food in the Last Year: Never true     Ran Out of Food in the Last Year: Never true   Transportation Needs: No Transportation Needs (3/25/2025)    PRAPARE - Transportation     Lack of Transportation (Medical): No     Lack of Transportation (Non-Medical): No   Physical Activity: Insufficiently Active (3/25/2025)    Exercise Vital Sign     Days of Exercise per Week: 1 day     Minutes of Exercise per Session: 20 min   Stress: No Stress Concern Present (3/25/2025)    [employer] - Occupational Stress  Questionnaire     Feeling of Stress : Only a little   Recent Concern: Stress - Stress Concern Present (2/5/2025)    [employer] - Occupational Stress Questionnaire     Feeling of Stress : To some extent   Housing Stability: Low Risk  (3/25/2025)    Housing Stability Vital Sign     Unable to Pay for Housing in the Last Year: No     Homeless in the Last Year: No

## 2025-06-19 ENCOUNTER — OFFICE VISIT (OUTPATIENT)
Dept: PAIN MEDICINE | Facility: CLINIC | Age: 89
End: 2025-06-19
Payer: MEDICARE

## 2025-06-19 ENCOUNTER — PATIENT MESSAGE (OUTPATIENT)
Dept: PAIN MEDICINE | Facility: CLINIC | Age: 89
End: 2025-06-19

## 2025-06-19 VITALS — BODY MASS INDEX: 29.41 KG/M2 | HEIGHT: 63 IN

## 2025-06-19 DIAGNOSIS — M54.51 VERTEBROGENIC LOW BACK PAIN: Primary | ICD-10-CM

## 2025-06-19 DIAGNOSIS — M54.10 RADICULAR PAIN: ICD-10-CM

## 2025-06-19 DIAGNOSIS — M54.17 RADICULOPATHY, LUMBOSACRAL REGION: ICD-10-CM

## 2025-06-19 DIAGNOSIS — M51.360 DISCOGENIC LUMBAR PAIN: ICD-10-CM

## 2025-06-19 NOTE — PROGRESS NOTES
Established Patient - TeleHealth Visit    The patient location is: [place]  The chief complaint leading to consultation is: chronic pain     Visit type: Audiovisual telemedicine visit     Total time spent on the encounter includes Face-to-face time and non-face to face time preparing to see the patient (eg, review of tests), Obtaining and/or reviewing separately obtained history, Documenting clinical information in the electronic or other health record, Independently interpreting results (not separately reported) and communicating results to the patient/family/caregiver, and/or Care coordination (not separately reported).     Each patient to whom he or she provides medical services by telemedicine is:  (1) informed of the relationship between the physician and patient and the respective role of any other health care provider with respect to management of the patient; and (2) notified that he or she may decline to receive medical services by telemedicine and may withdraw from such care at any time.        Chronic Pain -- Established Patient (Follow-up visit)  Chief complaint:  Follow-up and Back Pain         Interval History (6/19/2025):  Patient presents with back pain, describing it as aching across the lower back and above each buttock. An epidural had been scheduled while waiting for approval of an intercept procedure, although it was not completed after last visit. She currently manages pain with Tylenol and gabapentin. The condition appears to be ongoing, as evidenced by the discussion of previous treatment attempts and plans for future interventions. Pain is 8/10 at times.    Interval History (10/22/2024): Clau Nunn presents today for follow-up visit.  she underwent bilateral SIJ injection on 8/20/24 (2 months ago).  The patient reports that she is/was unchanged following the procedure.  she reports limited pain relief.   Patient reports pain as 5/10 today.    Interval History (8/7/2024):  Patient presents  today for follow-up visit.  Patient was last seen over 6 months ago. At that visit, the plan was to start Lyrica, but it appears patient has not filled in months in his back taking gabapentin at night. Patient reports pain as 5/10 today.  She has completed physical therapy recently, which has helped with her strength.  Today, she complains of pain in bilateral SI joint.    Interval History (12/13/2023):  Clau Nunn presents today for follow-up visit.  Patient was last seen about 3 months ago.   Patient reports pain as 5/10 today. She had a fall recently where she fell backwards and didn't have the watch on her. She landed on tailbone and left hip.     Interval History (9/8/2023):  Patient presents today for follow-up visit.  Patient was last seen on 8/11/2023. At that visit, the plan was to continue Lyrica. She reports that her pain is worse in the morning, but as she moves around, it improves. Patient reports pain as 4/10 today.    Interval History (8/11/2023):  Clau Nunn presents today for follow-up visit. At that visit, the plan was to start Lyrica, which has helped.  S/p left SIJ RFA on 6/8/23 (over a month ago) and is reporting better pain relief than she did initially. She is also getting around better. Patient reports pain as 3/10 today. Pain is worse the most in the morning when she wakes up, but pain improves as she walks around during the day.     Interval History (6/28/2023): Patient presents today for follow-up visit.  she underwent left SIJ RFA on 6/8/23 (about 3 weeks ago).  The patient reports that she is/was unchanged following the procedure.  she reports limited pain relief so far. Patient reports pain as 8/10 today.    Interval History (5/2/2023):  Clau Nunn presents today for follow-up visit.  Patient was last seen on 3/28/2023.  Patient reports pain as 5/10 today.  Pain is much worse and left posterior hip, more so over left SI joint.    Interval History (3/28/2023):  "Clau Nunn presents today for follow-up visit.  she underwent bilateral SIJ injection on 1/31/23.  The patient reports that she is/was better following the procedure.  she reports about 50% pain relief on left, right side feeling much better.  The changes lasted 4 weeks so far.  The changes have continued through this visit.  Patient reports pain as 5/10 today.  Seeing Podiatry for left foot fracture, which is not healing. Still having left SIJ pain.     Interval History (1/10/2023):  Clau Nunn presents today for follow-up visit.  Patient was last seen about 6 months ago. At that visit, she was feeling better since REJI, but she was having localized SIJ pain - still overall better since injection. She returns today with continued posterior "hip pain". Pain is worse on left. Patient reports pain as 8/10 today.    Interval History (6/29/2022): Clau Nunn presents today for follow-up visit.  she underwent Bilateral L5/S1 + S1 TF REJI on 5/25/22.  The patient reports that she is/was better following the procedure.  she reports 75% pain relief with regards to leg pain.  The changes lasted 4 weeks so far.  The changes have continued through this visit.  Patient reports pain as 5/10 today. Having localized SIJ pain today.     Interval History (5/11/2022): Clau Nunn presents today for follow-up visit.  she underwent left L5/S1 + S1 TF REJI on 7/1/21 with excellent pain relief for about 3 months.  The patient reports that she is/was better following the procedure.  The changes have NOT continued through this visit.   she underwent right suprascapular nerve block on 7/13/21.  The patient reports that she is/was unchanged following the procedure. She reports limited pain relief for short term. But, she mainly has limited ROM.   Patient reports pain as 5/10 today - due to low back pain and leg pain.    Interval History (6/24/2021): Patient was seen on 2/10/21. At that time she underwent Bilateral " L5/S1 TF REJI.  The patient reports that she is/was unchanged following the procedure.  she reports 20% pain relief.  The changes lasted 1 weeks.  The changes have NOT continued through this visit.  Patient reports pain as 5/10 today.  Her main pain complaint today is LLE radiculopathy worse in popliteal space and shin.   S/p left knee injection with Dr. Burns about 2 months ago with some pain relief.    Interval History (2/2/2021): Patient was last seen on 10/16/2020.  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  She is still never seen orthopedics for this issue, and she has not improved from intra-articular shoulder joint injections nor suprascapular nerve block.  Patient reports pain as 8/10 today.  Today, she is complaining of back and bilateral leg pain, previously just on the left side, although the left side still remains the worst.    Interval History (10/19/2020): Patient was seen on 9/16/20. At that time she underwent  Right suprascapular nerve block.  The patient reports that she is/was unchanged following the procedure.  she reports no pain relief.  Patient reports pain as 6/10 today - only when she moves her arm.    Interval History (9/9/2020): Patient was seen on 8/12/20. At that time she underwent left SIJ + left hip joint + right shoulder joint injection.  The patient reports that she is/was better following the procedure.  she reports 75% pain relief with hip pain relief but only 25% relief of shoulder pain.  The changes lasted 4 weeks so far.  The changes have continued through this visit.  Patient reports pain as /10 today.    Interval History (8/5/2020): Patient was seen on 7/8/20. At that time she underwent left L2/3 + L5/S1 TF REJI.  The patient reports that she is/was slightly better following the procedure.  she reports only 50 % pain relief.  The changes lasted 4 weeks so far.  The changes have continued through  this visit.  She also reports that her right shoulder has been bothering her.  She has history of right humerus fracture years ago.    Interval History (6/12/2020): She is here today to review MRI. She reports 10/10 pain today. She also has concerns about Prolia as she read that it can cause rashes and joint pain.  She has been having a rash on her right breast for a few weeks.  She will see Dr. Gomez soon to discuss.     Interval History(11/20/18): Patient was seen on 10/24/18. At that time she underwent left SIJ + left GT bursa injection with local.  The patient reports that she is/was better following the procedure.  she reports 100% pain relief.  The changes lasted 4 weeks so far.  The changes have continued through this visit.    History of Present Illness:   Clau Nunn is a 89 y.o. female  who is presenting with a chief complaint of low back pain and hip pain. The patient began experiencing this problem insidiously, and the pain has been gradually worsening over the past 6 month(s). The pain is described as throbbing, cramping, aching and heavy and is located in the bilateral buttocks. Pain is intermittent and lasts hours. The pain radiates to lateral thigh and groin. The patient rates her pain a 5 out of ten and interferes with activities of daily living a 5 out of ten. Pain is exacerbated by getting up from a seated position, and is improved by rest. Patient reports prior trauma (fall in June 2018 causing right distal radius + right sacrum fracture), prior lumbar surgery in ~2005, right hip replacement, right distal radius fracture requiring ORIF in June 2018.    - pertinent negatives: No fever, No chills, No weight loss, No bladder dysfunction, No bowel dysfunction, No extremity weakness, No saddle anesthesia  - pertinent positives: none    - medications, other therapies tried (physical therapy, injections):     >> Tylenol    >> Has previously undergone Physical Therapy (aquatic and land) with  limited relief    >> Has previously undergone spinal injection/s:   - bilateral SIJ injection on 11-9-17 with ~30% relief for 2 weeks   - left hip injection on 12-28-17 with some relief   - bilateral L3-5 MBB on 5/11/18 with reported 100% pain relief   - left SIJ + left GT bursa injection with local on 10/24/18 with 100% pain relief   - left L2/3 + L5/S1 TF REJI on 7/8/20 with about 50% pain relief   - left SIJ + left hip joint + right shoulder joint injection on 8/12/20 with 75% pain relief with hip pain relief but only 25% relief of shoulder pain - no longer having groin pain after this procedure    - Right suprascapular nerve block on 9/16/20 with limited pain relief    - Bilateral L5/S1 TF REJI on 2/10/21 with 20% pain relief for short term   - left knee injection with Dr. Burns in April 2021 with some pain relief   - left L5/S1 + S1 TF REJI on 7/1/21 with excellent pain relief for about 3 months   - right suprascapular nerve block on 7/13/21 with limited pain relief    - Bilateral L5/S1 + S1 TF REJI on 5/25/22 with 75% pain relief - regarding leg pain, now having localized SIJ   - bilateral SIJ injection on 1/31/23 with >50% pain relief on left, >70% pain relief on right - short-term pain relief  - left SIJ RFA on 6/8/23 with great pain relief   - bilateral SIJ injection on 8/20/24 with limited pain relief      Past Surgical History:   Procedure Laterality Date    ADENOIDECTOMY      BACK SURGERY      x2    breast implants  1973    CATARACT EXTRACTION Bilateral     CLOSED REDUCTION DISTAL RADIUS FRACTURE      Dr. Black, 8/18    cystoscope  1/6/16    DR. Brown    FRACTURE SURGERY  April 3 2015    left wrist    HAND SURGERY      HIP SURGERY Right     total hip- Dr. Dos Santos    HYSTERECTOMY      34y/o    INJECTION OF ANESTHETIC AGENT AROUND NERVE Right 9/16/2020    Procedure: Right suprascapular nerve block;  Surgeon: Raffi Wells MD;  Location: [facility];  Service: Pain Management;  Laterality: Right;     INJECTION OF ANESTHETIC AGENT AROUND NERVE Right 7/13/2021    Procedure: Block, Nerve Right Suprascapular Nerve Block with RN IV sedation;  Surgeon: Raffi Wells MD;  Location: [facility];  Service: Pain Management;  Laterality: Right;    INJECTION OF ANESTHETIC AGENT INTO SACROILIAC JOINT N/A 8/12/2020    Procedure: Left IA hip Joint + Left SIJ + Right shoulder injection;  Surgeon: Raffi Wells MD;  Location: [facility];  Service: Pain Management;  Laterality: N/A;    INJECTION OF ANESTHETIC AGENT INTO SACROILIAC JOINT Bilateral 1/31/2023    Procedure: Bilateral SIJ Injection w/Local;  Surgeon: Abdirahman Parker MD;  Location: [facility];  Service: Pain Management;  Laterality: Bilateral;    INJECTION OF ANESTHETIC AGENT INTO SACROILIAC JOINT Bilateral 8/20/2024    Procedure: Bilateral SIJ Injection;  Surgeon: Abdirahman Parker MD;  Location: [facility];  Service: Pain Management;  Laterality: Bilateral;    INJECTION OF JOINT N/A 8/12/2020    Procedure: Left IA hip Joint + Left SIJ + Right shoulder injection;  Surgeon: Raffi Wells MD;  Location: [facility];  Service: Pain Management;  Laterality: N/A;    JOINT REPLACEMENT Right     R NNAMDI - Dr. Dos Santos    LEG SURGERY Right     ex-fix tib/fib    RADIOFREQUENCY THERMOCOAGULATION Left 6/8/2023    Procedure: Left SIJ RFA w/ local (give Valium before);  Surgeon: Abdirahman Parker MD;  Location: [facility];  Service: Pain Management;  Laterality: Left;    SELECTIVE INJECTION OF ANESTHETIC AGENT AROUND LUMBAR SPINAL NERVE ROOT BY TRANSFORAMINAL APPROACH Bilateral 2/10/2021    Procedure: Bilateral L5/S1 TF REJI;  Surgeon: Raffi Wells MD;  Location: [facility];  Service: Pain Management;  Laterality: Bilateral;    SELECTIVE INJECTION OF ANESTHETIC AGENT AROUND LUMBAR SPINAL NERVE ROOT BY TRANSFORAMINAL APPROACH Bilateral 5/25/2022    Procedure: Bilateral L5/S1 + S1 TF REJI;  Surgeon: Raffi Wells MD;  Location: [facility];  Service: Pain Management;   Laterality: Bilateral;    TONSILLECTOMY      TRANSFORAMINAL EPIDURAL INJECTION OF STEROID Left 7/8/2020    Procedure: left L2/3 + L5/S1 TF REJI;  Surgeon: Raffi Wells MD;  Location: [facility];  Service: Pain Management;  Laterality: Left;    TRANSFORAMINAL EPIDURAL INJECTION OF STEROID Left 7/1/2021    Procedure: left L5/S1 + S1 TF REJI;  Surgeon: Raffi Wells MD;  Location: [facility];  Service: Pain Management;  Laterality: Left;        Imaging/ Diagnostic Studies/ Labs (Reviewed on 6/19/2025):    11/15/2024 MRI Lumbar Spine Without Contrast  COMPARISON: 06/29/2020 MRI lumbar spine; 02/27/2024 radiograph    FINDINGS:  Stable T12 compression deformity with thoracolumbar spine surgical fusion.  Remaining lumbar vertebral body heights maintained.  Grade 1 anterolisthesis L4 on L5.  Multilevel disc desiccation noted with prominent height loss at L2-3 and L5-S1.  Mild Modic type 1 endplate changes at L2-3.    Visualized intra-abdominal/pelvic structures unremarkable.  Conus unchanged.    L1-L2: No spinal canal stenosis or neural foraminal narrowing.    L2-L3: Circumferential disc bulging which in conjunction with facet/ligamentum flavum hypertrophy causes similar spinal canal stenosis.  Similar bilateral inferior neural foraminal narrowing noted.    L3-L4: Mild posterior diffuse disc bulge present without spinal canal stenosis.  Similar minimal to mild bilateral neural foraminal narrowing.  Facet arthropathy.    L4-L5: Spondylolisthesis with disc uncovering.  Prominent facet arthropathy.  Similar mild spinal canal stenosis without high-grade neural foraminal narrowing.    L5-S1: Similar posterior disc osteophyte complex minimally effacing the anterior thecal sac without spinal canal stenosis.  Facet arthropathy.  Similar moderate bilateral neural foraminal narrowing.    Impression  Progression of L2-3 degenerative findings including increased degenerative disc height loss with mild Modic type 1 endplate changes.   "Otherwise similar degenerative/chronic findings.            Records from [facility] regarding right shoulder fracture- uploaded into chart under "Media"    Results for orders placed during the hospital encounter of 08/05/20   X-ray Shoulder 2 or More Views Right    Narrative COMPARISON:  12/10/2018  FINDINGS:  There is a chronic fracture deformity of the right humeral head and neck.  Associated osseous productive changes approximate the inferior margins of the articular surface of the humeral head.  No definite acute fractures or dislocations visualized.  There are mild degenerative changes present at the AC joint and glenoid.  Postoperative findings noted in the lower thoracic spine.     Results for orders placed during the hospital encounter of 12/10/18   X-Ray Shoulder Trauma Right    Narrative FINDINGS:  Comminuted fracture involving the surgical neck and right humeral head.  No evidence of dislocation.  Degenerative changes are present at the right AC joint.    Impression Comminuted fracture involving the right humeral head and surgical neck.     Results for orders placed during the hospital encounter of 06/29/20   MRI Lumbar Spine W WO Cont    Narrative COMPARISON:  Prior MRI from June 29, 2020.  FINDINGS:  Again demonstrated are postoperative findings from thoracolumbar spinal fusion and laminectomy at T12.  Chronic compression deformity of T12 with chronic retropulsion that flattens the thecal sac to 14 mm.  This is unchanged.  Mild, grade 1 degenerative spondylolisthesis at L4-L5.  Vertebral body height is normal.  No abnormal enhancement patterns. The conus medullaris terminates at the level of L2-L3, low lying.  No abnormal signal within the conus. Intervertebral disc levels are as follows:  T11-T12 disc: Fusion at this level.  Mild, chronic retropulsion that flattens the thecal sac to 14 mm.  No significant foraminal stenosis.  T12-L1 disc: Prior laminectomy and fusion.  The thecal sac measures 19 mm.  No " significant foraminal stenosis.  L1-L2 disc : Posterior fusion.  No significant spinal stenosis or foraminal stenosis.  L2-L3 disc: Disc space height loss.  Broad-based posterior disc bulge which encroaches slightly into the floors of the exit foramina.  Moderate buckling of ligamentum flavum.  The thecal sac measures 8-9 mm AP.  Mild bilateral foraminal stenosis.  This has progressed since the prior MRI.  L3-L4 disc: Broad-based posterior disc bulge that encroaches slightly into the floors of the exit foramina.  Moderate buckling of ligamentum flavum.  The thecal sac measures 10 mm AP.  Mild bilateral neural foraminal stenosis, right greater than left.  These findings are similar to prior.  L4-L5 disc: Minimal grade 1 spondylolisthesis with severe degenerative facet change and hypertrophy.  Left-sided facet joint effusion.  Is buckling of ligamentum flavum.  The thecal sac measures 11 mm.  No significant foraminal stenosis.  The spondylolisthesis has slightly progressed measuring 4 mm compared to prior 2 mm.  L5-S1 disc: Severe disc space height loss with marginal disc and osteophyte encroach into the exit foramina bilaterally.  Moderate degenerative facet hypertrophy.  The thecal sac measures 14 mm.    Impression 1. 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  2. Progression of mild foraminal stenosis and mild spinal stenosis at L2-L3.  3. Minimal progression of grade 1 spondylolisthesis at L4-L5.  4. Unchanged mild, chronic retropulsion from T12 where there has been a laminectomy and fusion.       7/30/2018 XR LUMBAR SPINE COMPLETE 5 VIEW  TECHNIQUE:  AP, lateral, spot and bilateral oblique views of the lumbar spine were performed.  COMPARISON:  07/25/2018  FINDINGS:  There is grade 1 spondylolisthesis of L4 on L5.  Pedicle screws and fixation rods are noted for at the T10, T11, L1 and L2 levels.  Chronic compression deformity at the L1 level unchanged.  Prominent bilateral facet arthropathy noted at the L4-5 and L5-S1  levels.  IVC filter noted.     7/30/2018 XR HIPS BILATERAL 2 VIEW INCL AP PELVIS  TECHNIQUE:  AP view of the pelvis and frogleg lateral views of both hips were performed.  COMPARISON:  07/26/2018  FINDINGS:  The bony pelvis is intact. A right total hip arthroplasty, plate and wires are in place.  No hardware failure or loosening suggested.  The appearance is unchanged when compared to the prior exam.  Mild degenerative changes noted at the left hip.  No acute fracture or dislocation of the left hip.  No significant joint space narrowing identified.  No plain film evidence to suggest AVN of either hip.     Results for orders placed during the hospital encounter of 01/13/14   MRI Lumbar Spine W WO Contrast    Narrative Findings: Pre- and postcontrast multiplanar multisequence imaging of the thoracic and lumbar spine was performed using 7 cc of Gadavist intravenous contrast material.  There is moderately severe chronic central compression deformity of the T12 vertebral body which is stabilized by paraspinous rods and pedicle screws which extend from T10 through L2.  Postsurgical changes of laminectomy also noted at T12.  The posterior cortex of the T12 vertebral body is displaced posteriorly and indents the ventral thecal sac.  There is no anterior cord contact or significant central canal stenosis.  Degenerative disk desiccation is noted at multiple levels with moderate disk   narrowing at L5-S1.    Also L5-S1 is a broad-based disk protrusion which combined with bilateral facet hypertrophy results in moderately severe narrowing of both neural foramina.  No significant central canal stenosis noted.    From L2-3 through L4-5 there   is mild disk bulging which results in mild bilateral foraminal narrowing.  This is slightly more pronounced at L4-5 with bilateral facet hypertrophy a contributing factor.  No significant central canal stenosis noted.  No thoracic disk extrusion, and foraminal narrowing, canal stenosis is  "evident.  Thoracolumbar cord is intact.  Paravertebral soft tissues are symmetric and normal in appearance.         Review of Systems:  CONSTITUTIONAL: patient denies any fever, chills, or weight loss  SKIN: patient denies any rash or itching  RESPIRATORY: patient denies having any shortness of breath  GASTROINTESTINAL: patient denies having any diarrhea, constipation, or bowel incontinence  GENITOURINARY: patient denies having any abnormal bladder function    MUSCULOSKELETAL:  - patient complains of the above noted pain/s (see chief pain complaint)    NEUROLOGICAL:   - pain as above  - strength in Lower extremities is intact, BILATERALLY  - sensation in Lower extremities is intact, BILATERALLY  - patient denies any loss of bowel or bladder control      PSYCHIATRIC: patient denies any change in mood    Other:  All other systems reviewed and are negative        Telemedicine Physical Exam:   Vitals:    06/19/25 1350   Height: 5' 3" (1.6 m)   Body mass index is 29.41 kg/m².   (reviewed on 6/19/2025)     (Physical exam performed virtually with patient guided on specific actions and diagnostic maneuvers)  GENERAL: Well appearing, in no acute distress, alert and oriented x3.  Cooperative.  PSYCH:  Mood and affect appropriate.  SKIN: Skin color & texture with no obvious abnormalities.    HEAD/FACE:  Normocephalic, atraumatic.    PULM:  No difficulty breathing. No nasal flaring. No obvious wheezing.  EXTREMITIES: No obvious deformities. Moving all extremities well, appears to have symmetric strength throughout.  MUSCULOSKELETAL: No obvious atrophy abnormalities are noted.   GAIT: sitting.     Physical Exam: last in clinic visit:  General: alert and oriented, in no apparent distress.  Gait: antalgic gait   Skin: no rashes, no discoloration, no obvious lesions  HEENT: normocephalic, atraumatic.   Respiratory: without use of accessory muscles of respiration.     Musculoskeletal - Lumbar Spine:  - Limited ROM secondary to pain " reproduction   - Midline scar present over thoracic spine  - Pain on flexion of lumbar spine: Present, worse than with extension  - Pain on extension of lumbar spine: Present  - Lumbar facet loading: Positive bilaterally  - TTP over the lumbar facet joints: Absent   - TTP over GT bursa: Minimal   - Straight Leg Raise: Positive bilaterally, L>R - improved   - Pain on Internal and external rotation of the hip: Present    SIJ testing:  - TTP over the SI joints: Present bilaterally   - Srikanth's/ Micheal's: Positive    - Sacroiliac Compression Test (lateral pressure): Positive   - SacralThrust Test (posterior pressure): Positive    Right Shoulder:  - Pain on abduction: Present   - ROM: decreased secondary to pain, very limited ROM      - TTP over the AC and GH joint: Present  - Neer's: Positive  - Hawkin's: Positive    Neuro - Lower Extremities:  - BLE Strength: R/L: HF: 5/5, HE: 5/5, KF: 5/5; KE: 5/5; FE: 5/5; FF: 5/5  - Extremity Reflexes: Brisk and symmetric throughout  - Sensory: Sensation to light touch intact bilaterally      Psych:  Mood and affect is appropriate            Assessment:  Clau Nunn is a 89 y.o. year old female who is presenting with       ICD-10-CM ICD-9-CM    1. Vertebrogenic low back pain  M54.51 724.2 Ambulatory referral/consult to Psychiatry      2. Radicular pain  M54.10 729.2 Case Request-RAD/Other Procedure Area: L4/5 IL REJI      3. Discogenic lumbar pain  M51.360 724.2       4. Radiculopathy, lumbosacral region  M54.17 724.4 Case Request-RAD/Other Procedure Area: L4/5 IL REJI           Plan:  1. Interventional:   - Schedule L4/5 IL REJI - in the meantime. Patient is taking apixaban (Eliquis); she will have to stop 3 days prior to procedure.  Will get clearance from Heme/Onc.   Discussed epidural injection as temporary pain relief while waiting for intercept procedure approval.    - Patient demonstrates vertebrogenic back pain with endplate inflammation and Modic changes at L2 and L3  vertebral bodies, as seen on MRI.  Patient is a candidate and will be scheduled for Intracept basivertebral nerve ablation at L2 and L3.  Due to the patient's need for some pain relief in to enable continuation of activities of daily living, we will proceed with palliative epidural steroid injection while we consider the  Intracept procedure. Consent will be signed and uploaded to the portal today.  Additionally, there is no underlying untreated mental health condition including depression, drug and alcohol abuse, or anxiety contributing to this patient's lower back pain.  Patient is taking apixaban (Eliquis); she will have to stop 3 days prior to procedure.  Will get clearance from Heme/Onc.     - Will pursue Intracept procedure again through new approval process with [employer].    - S/p bilateral SIJ injection on 8/20/24 with limited pain relief.  - S/p left SIJ RFA on 6/8/23 with better pain relief than she was reporting initially.  She is also getting around better.  - S/p bilateral SIJ injection on 1/31/23 with >50% pain relief on left, >70% pain relief on right - short-term pain relief.  - S/p Bilateral L5/S1 + S1 TF REJI on 5/25/22 with 75% pain relief - regarding leg pain, now having localized SIJ.   - S/p Bilateral L5/S1 TF REJI on 2/10/21 with 20% pain relief x 1 week. Pain now more localized on left.   - S/p Right suprascapular nerve block on 9/16/20 with limited pain relief.    - Anticoagulation use: apixaban (Eliquis) - 2° prevention (h/o DVT) - Heme/Onc.     2. Pharmacologic:   - Continue gabapentin (Neurontin) 300mg QHS.   - Patient encouraged to take OTC Tylenol 500mg x 2 tablets (1000mg) TID more regularly, not to exceed 3000mg per day.     - LA  reviewed and appropriate.      3. Rehabilitative: Continue use of SIJ belt at home with housework. Physical therapy for shoulder did not help in the past, so doesn't want to attend. SIJ exercises given on AVS previously. Patient is very active.    4.  Diagnostic/ Imaging: No new imaging ordered. Previous imaging reviewed: Lumbar MRI.     5. Consult/ Referral:   - Ordered psychology clearance required by [facility] for Intracept procedure approval.  - Continue follow-up with Orthopedics for treatment of bilateral knee pain.      6. Follow up:   - Follow up with Leticia for Intracept scheduling and process initiation through portal (with required consent)  - 4 weeks post-lumbar REJI -- virtual visit      Future Appointments   Date Time Provider Department Center   6/23/2025  9:45 AM Africa Billings MD [facility] DERM [facility]   6/23/2025 10:45 AM INJECTION 1, [facility] INFUSION [facility] INFS[facility]   7/28/2025 10:00 AM Kymberly Kim MD [facility] UROLOGY [facility]   8/7/2025 11:00 AM Jeanette Molina MD BS 65PL[facility] 65+ [place]   12/29/2025 11:30 AM LABORATORY, [facility]   1/8/2026  2:30 PM Jeffry Bermudez MD [facility]   1/8/2026  3:15 PM INJECTION 1, [facility] INFUSION [facility]      - Extensive direct patient care was provided during this telemedicine visit. Over 50% of the time was spent counseling/educating the patient. Prolonged duration of time was spent on patient care including prep time reviewing the chart before the visit, time spent with patient during the visit, and the time spent after the visit reviewing studies, documenting note, obtaining information from collaborative providers, etc.    - This condition does not require this patient to take time off of work, and the primary goal of our Pain Management services is to improve the patient's functional capacity.   - I discussed the risks, benefits, and alternatives to potential treatment options. All questions and concerns were fully addressed today in clinic.         Angelica Caballero PA-C  Interventional Pain Management - [facility]    Disclaimer:  This note was prepared using voice recognition system and is likely to have sound alike errors that may have been overlooked even  after proof reading.  Please call me with any questions.     This note was generated with the assistance of ambient listening technology to support clinical documentation. The content has been reviewed and edited for accuracy by the author, though minor syntax or spelling errors may remain. Please contact the author of this note for any clarification.

## 2025-06-20 ENCOUNTER — TELEPHONE (OUTPATIENT)
Dept: PAIN MEDICINE | Facility: CLINIC | Age: 89
End: 2025-06-20
Payer: MEDICARE

## 2025-06-23 ENCOUNTER — INFUSION (OUTPATIENT)
Dept: INFUSION THERAPY | Facility: HOSPITAL | Age: 89
End: 2025-06-23
Attending: INTERNAL MEDICINE
Payer: MEDICARE

## 2025-06-23 ENCOUNTER — OFFICE VISIT (OUTPATIENT)
Dept: DERMATOLOGY | Facility: CLINIC | Age: 89
End: 2025-06-23
Payer: MEDICARE

## 2025-06-23 ENCOUNTER — TELEPHONE (OUTPATIENT)
Dept: PAIN MEDICINE | Facility: CLINIC | Age: 89
End: 2025-06-23
Payer: MEDICARE

## 2025-06-23 VITALS
SYSTOLIC BLOOD PRESSURE: 194 MMHG | OXYGEN SATURATION: 95 % | TEMPERATURE: 97 F | DIASTOLIC BLOOD PRESSURE: 85 MMHG | RESPIRATION RATE: 18 BRPM

## 2025-06-23 DIAGNOSIS — D04.72 SQUAMOUS CELL CARCINOMA IN SITU (SCCIS) OF SKIN OF LEFT LOWER LEG: Primary | ICD-10-CM

## 2025-06-23 DIAGNOSIS — D48.5 NEOPLASM OF UNCERTAIN BEHAVIOR OF SKIN: ICD-10-CM

## 2025-06-23 DIAGNOSIS — L60.1 ONYCHOLYSIS: ICD-10-CM

## 2025-06-23 DIAGNOSIS — L82.0 INFLAMED SEBORRHEIC KERATOSIS OF LEFT CHEEK: ICD-10-CM

## 2025-06-23 DIAGNOSIS — M80.00XG AGE-RELATED OSTEOPOROSIS WITH CURRENT PATHOLOGICAL FRACTURE WITH DELAYED HEALING: Primary | ICD-10-CM

## 2025-06-23 PROCEDURE — 99213 OFFICE O/P EST LOW 20 MIN: CPT | Mod: 25,HCNC,S$GLB, | Performed by: DERMATOLOGY

## 2025-06-23 PROCEDURE — 1159F MED LIST DOCD IN RCRD: CPT | Mod: CPTII,HCNC,S$GLB, | Performed by: DERMATOLOGY

## 2025-06-23 PROCEDURE — 96372 THER/PROPH/DIAG INJ SC/IM: CPT | Mod: HCNC

## 2025-06-23 PROCEDURE — 1157F ADVNC CARE PLAN IN RCRD: CPT | Mod: CPTII,HCNC,S$GLB, | Performed by: DERMATOLOGY

## 2025-06-23 PROCEDURE — 63600175 PHARM REV CODE 636 W HCPCS: Mod: JZ,TB,HCNC | Performed by: INTERNAL MEDICINE

## 2025-06-23 PROCEDURE — 17110 DESTRUCTION B9 LES UP TO 14: CPT | Mod: HCNC,S$GLB,, | Performed by: DERMATOLOGY

## 2025-06-23 PROCEDURE — 1160F RVW MEDS BY RX/DR IN RCRD: CPT | Mod: CPTII,HCNC,S$GLB, | Performed by: DERMATOLOGY

## 2025-06-23 PROCEDURE — 3288F FALL RISK ASSESSMENT DOCD: CPT | Mod: CPTII,HCNC,S$GLB, | Performed by: DERMATOLOGY

## 2025-06-23 PROCEDURE — 1101F PT FALLS ASSESS-DOCD LE1/YR: CPT | Mod: CPTII,HCNC,S$GLB, | Performed by: DERMATOLOGY

## 2025-06-23 PROCEDURE — 99999 PR PBB SHADOW E&M-EST. PATIENT-LVL IV: CPT | Mod: PBBFAC,HCNC,, | Performed by: DERMATOLOGY

## 2025-06-23 PROCEDURE — 1125F AMNT PAIN NOTED PAIN PRSNT: CPT | Mod: CPTII,HCNC,S$GLB, | Performed by: DERMATOLOGY

## 2025-06-23 RX ORDER — FLUOROURACIL 50 MG/G
CREAM TOPICAL
Qty: 40 G | Refills: 1 | Status: SHIPPED | OUTPATIENT
Start: 2025-06-23

## 2025-06-23 RX ADMIN — DENOSUMAB 60 MG: 60 INJECTION SUBCUTANEOUS at 10:06

## 2025-06-23 NOTE — PROGRESS NOTES
Subjective:      Patient ID:  Clau Nunn is a 89 y.o. female who presents for   Chief Complaint   Patient presents with    Spot     C/o spot on ankle      Last seen 3/13/25 for biopsy of  L medial leg SCCIS, restarted weekly fluconazole for onycholysis, changed clob oint to soln for nails.  Reports nails have improved, onycholysis has resolved.    She has decided she does not want surgery for the skin cancer on her leg.  She says she understands that the spot may continue to grow, form a nonhealing wound, or spread throughout the body and cause death.      C/o bump on R lower leg and L cheek x years, asymptomatic.      Review of Systems   Skin:  Positive for activity-related sunscreen use. Negative for daily sunscreen use.       Objective:   Physical Exam   Constitutional: She appears well-developed and well-nourished. No distress.   Neurological: She is alert and oriented to person, place, and time. She is not disoriented.   Psychiatric: She has a normal mood and affect.   Skin:   Areas Examined (abnormalities noted in diagram):   Head / Face Inspection Performed  RLE Inspected  LLE Inspection Performed  Nails and Digits Inspection Performed                     Diagram Legend     Erythematous scaling macule/papule c/w actinic keratosis       Vascular papule c/w angioma      Pigmented verrucoid papule/plaque c/w seborrheic keratosis      Yellow umbilicated papule c/w sebaceous hyperplasia      Irregularly shaped tan macule c/w lentigo     1-2 mm smooth white papules consistent with Milia      Movable subcutaneous cyst with punctum c/w epidermal inclusion cyst      Subcutaneous movable cyst c/w pilar cyst      Firm pink to brown papule c/w dermatofibroma      Pedunculated fleshy papule(s) c/w skin tag(s)      Evenly pigmented macule c/w junctional nevus     Mildly variegated pigmented, slightly irregular-bordered macule c/w mildly atypical nevus      Flesh colored to evenly pigmented papule c/w intradermal  nevus       Pink pearly papule/plaque c/w basal cell carcinoma      Erythematous hyperkeratotic cursted plaque c/w SCC      Surgical scar with no sign of skin cancer recurrence      Open and closed comedones      Inflammatory papules and pustules      Verrucoid papule consistent consistent with wart     Erythematous eczematous patches and plaques     Dystrophic onycholytic nail with subungual debris c/w onychomycosis     Umbilicated papule    Erythematous-base heme-crusted tan verrucoid plaque consistent with inflamed seborrheic keratosis     Erythematous Silvery Scaling Plaque c/w Psoriasis     See annotation       Final Pathologic Diagnosis   Date Value Ref Range Status   03/13/2025   Final    1. Skin, left medial ankle, shave biopsy:   - SQUAMOUS CELL CARCINOMA IN SITU, AT LEAST.  - THE ATYPICAL SQUAMOUS EPITHELIUM EXTENDS TO THE BASE OF THE BIOPSY, AND AN UNDERLYING INVASIVE SQUAMOUS CELL CARCINOMA CANNOT BE EXCLUDED.      This lesion is skin cancer. You will be contacted regarding treatment.       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 We discussed that first line treatment is surgical excision and that I recommend a Mohs surgeon given the size and location of it.  We discussed the risk of declining/refusing surgery and the potential for continued growth of the lesion, nonhealing wound, bleeding, pain, metastasis, and death, and she acknowledged and understood this.  We discussed other treatment options including ED&C and topical chemotherapy cream such as Efudex.    She reports she still does not want surgical treatment of this.  She would like to try efudex. We discussed that if there is  indeed an invasive component, the efudex will not treat this.  She understands.    -    Will begin treatment with efudex applied to the L medial lower leg BID for 6 weeks.  Side effects discussed including blistering, irritation and redness.  If patient develops blisters, patient instructed to hold efudex and use cortisone cream BID x 2-3 days until blister resolves.  Once blister resolves, pt should resume treatment with efudex.  Patient acknowledged understanding.  AVS given with written instructions for efudex use.         Neoplasm of uncertain behavior of skin- R medial lower leg  - SCC vs HAK vs ISK  - discussed and offered biopsy and then surgical excision as first line management (similar to above), which she declines.  She prefers a trial of LN2.    Cryosurgery procedure note:    Verbal consent from the patient is obtained. Liquid nitrogen cryosurgery is applied to ! lesions to produce a freeze injury. The patient is aware that blisters may form and is instructed on wound care with gentle cleansing and use of vaseline ointment to keep moist until healed. The patient is supplied a handout on cryosurgery and is instructed to call if lesions do not completely resolve.    Will reconsider biopsy if refractory.    Inflamed seborrheic keratosis of left cheek  I feel this one is more likely an ISK, but that HAK and SCC are still on the differential. Discussed biopsy and management if + for SCC.  She prefers a trial of LN2 first.    Cryosurgery procedure note:    Verbal consent from the patient is obtained. Liquid nitrogen cryosurgery is applied to 1 lesions to produce a freeze injury. The patient is aware that blisters may form and is instructed on wound care with gentle cleansing and use of vaseline ointment to keep moist until healed. The patient is supplied a handout on cryosurgery and is instructed to call if lesions do not completely resolve.    Onycholysis  - improved. Tolerating well.  Continue weekly  fluconazole to complete a total 6 month course.           Follow up in about 10 weeks (around 9/1/2025).        LOS NUMBER AND COMPLEXITY OF PROBLEMS    COMPLEXITY OF DATA RISK TOTAL TIME (m)   29333  10066 [] 1 self-limited or minor problem [x] Minimal to none [] No treatment recommended or patient to monitor. Reassurance.  15-29  10-19   19280  63573 Low  [] 2 or more self limited or minor problems  [x] 1 stable chronic illness  [] 1 acute, uncomplicated illness or injury Limited (2)  [] Prior external notes from each unique source  [] Review result of each unique test  [] Order each unique test  OR [] Independent historian Low  []  OTC medications   []  Discussed/Decision for minor skin surgery (no risk factors) 30-44  20-29   07849  62081 Moderate  []  1 or more chronic unstable illness (not at goal or progression or exacerbation) or SE of treatment  []  2 or more stable chronic illnesses  []  1 acute illness with systemic symptoms  []  1 acute complicated injury  []  1 undiagnosed new problem with uncertain prognosis Moderate (1/3 below)  []  3 or more data items        *Now includes independent historian  []  Independent interpretation of a test  []  Discuss management/test with another provider Moderate  [x]  Prescription drug mgmt  []  Discussed/Decision for Minor surgery with risk factors  []  Mgmt limited by social determinates 45-59  30-39   70776  46136 High  []  1 or more chronic illness with severe exacerbation, progression or SE of treatment  []  1 acute or chronic illness/injury that poses a threat to life or bodily function Extensive (2/3 below)  []  3 or more data items        *Now includes independent historian.  []  Independent interpretation of a test  []  Discuss management/test with another provider High  []  Major surgery with risk discussed  []  Drug therapy requiring intensive monitoring for toxicity  []  Hospitalization  []  Decision for DNR 60-74  40-54

## 2025-06-23 NOTE — TELEPHONE ENCOUNTER
----- Message from Ara Hernández NP sent at 6/23/2025  2:05 PM CDT -----  Regarding: RE: Medical Clearance Needed  Yes, that would be OK.    Thank you,   Che Hernández - FNP -C  Benign Hematology  ----- Message -----  From: Daisy Roger MA  Sent: 6/20/2025   4:39 PM CDT  To: Jeanette Molina MD; Ara Hernández NP  Subject: Medical Clearance Needed                         Dr. Molina or ASHLEY Hernández:    Clau Nunn was seen in office with complaints of severe low back pain. Dr. Parker would like to perform Intracept basivertebral nerve ablation at L2 and L3, and Clau Nunn would like to proceed. Dr. Parker is requesting for clearance to hold apixaban (Eliquis) 3 days prior to procedure. Patient Clau Nunn can resume medication following the procedure. Can the patient proceed with this procedure/injection?    Daisy OBRIEN ([facility]) Interventional Pain Management Surgery Scheduler

## 2025-06-23 NOTE — TELEPHONE ENCOUNTER
I received the patient clearance. Please see message below     Daisy OBRIEN ([facility])

## 2025-06-23 NOTE — PATIENT INSTRUCTIONS
CRYOSURGERY      Your doctor has used a method called cryosurgery to treat your skin condition. Cryosurgery refers to the use of very cold substances to treat a variety of skin conditions such as warts, pre-skin cancers, molluscum contagiosum, sun spots, and several benign growths. The substance we use in cryosurgery is liquid nitrogen and is so cold (-195 degrees Celsius) that it burns when administered.     Following treatment in the office, the skin may immediately burn and become red. You may find the area around the lesion is affected as well. It is sometimes necessary to treat not only the lesion, but a small area of the surrounding normal skin to achieve a good response.     A blister, and even a blood filled blister, may form after treatment.   This is a normal response. If the blister is painful, it is acceptable to sterilize a needle with rubbing alcohol and gently pop the blister. It is important that you gently wash the area with soap and warm water as the blister fluid may contain wart virus if a wart was treated. Do not remove the roof of the blister.     The area treated can take anywhere from 1-3 weeks to heal. Healing time depends on the kind of skin lesion treated, the location, and how aggressively the lesion was treated. It is recommended that the areas treated are covered with Vaseline or bacitracin ointment and a band-aid. If a band-aid is not practical, just ointment applied several times per day will do. Keeping these areas moist will speed the healing time.    Treatment with liquid nitrogen can leave a scar. In dark skin, it may be a light or dark scar, in light skin it may be a white or pink scar. These will generally fade with time, but may never go away completely.     If you have any concerns after your treatment, please feel free to call the office.       [address] [facility] [address]/ [phone] FAX/ [email or web address]                 How to use Efudex (5%  fluorouracil cream)    Apply a thin layer twice a day to the affected area on the L lower leg for 6 weeks.  Efudex may increase your sensitivity to sunlight, so you should wear a daily sunscreen with SPF of 30 or greater while using efudex.    If severe blistering and pain, then hold efudex for 2-3 days and use OTC cortisone cream twice a day.  Once blisters and pain resolve, restart using efudex for a full 6 week course of treatment.  Full healing may take an additional 4-6 weeks after stopping efudex.    Please call the [facility] office with any questions or concerns.    [facility] [address]  [address]  [facility] [place] [address]  Dept: [phone]  Dept Fax: [phone]      Patient Education     Fluorouracil Topical cream     What is this medicine?   FLUOROURACIL, 5-FU (flure oh YOOR a gt) is a chemotherapy agent. It is used on the skin to treat skin cancer and skin conditions that could become cancer.   This medicine may be used for other purposes; ask your health care provider or pharmacist if you have questions.     What should I tell my health care provider before I take this medicine?   They need to know if you have any of these conditions:   DPD enzyme deficiency   recent or current radiation therapy   swelling or open sores at the treatment site   an unusual or allergic reaction to fluorouracil, other chemotherapy, other medicines, foods, dyes, or preservatives   pregnant or trying to get pregnant   breast-feeding    How should I use this medicine?   This medicine is only for use on the skin. Follow the directions on the prescription label. Wash hands before and after use. Wash affected area and gently pat dry. To apply this medicine use a cotton-tipped applicator, or use gloves if applying with fingertips. If applied with unprotected fingertips, it is very important to wash your hands well after you apply this  medicine. Avoid applying to the eyes, nose, or mouth. Apply enough medicine to cover the affected area. You can cover the area with a light gauze dressing, but do not use tight or air-tight dressings. Finish the full course prescribed by your doctor or health care professional, even if you think your condition is better. Do not stop taking except on the advice of your doctor or health care professional.   Talk to your pediatrician regarding the use of this medicine in children. Special care may be needed.   Overdosage: If you think you have taken too much of this medicine contact a poison control center or emergency room at once.   NOTE: This medicine is only for you. Do not share this medicine with others.     What if I miss a dose?   If you miss a dose, apply it as soon as you can. If it is almost time for your next dose, only use that dose. Do not apply extra doses.     What may interact with this medicine?   Interactions are not expected. Do not use any other skin products without telling your doctor or health care professional.   This list may not describe all possible interactions. Give your health care provider a list of all the medicines, herbs, non-prescription drugs, or dietary supplements you use. Also tell them if you smoke, drink alcohol, or use illegal drugs. Some items may interact with your medicine.     What should I watch for while using this medicine?   Visit your doctor or health care professional for checks on your progress. You will need to use this medicine for 2 to 6 weeks. This may be longer depending on the condition being treated. You may not see full healing for another 1 to 2 months after you stop using the medicine.   Treated areas of skin can look unsightly during and for several weeks after treatment with this medicine.   This medicine can make you more sensitive to the sun. Keep out of the sun. If you cannot avoid being in the sun, wear protective clothing and use sunscreen. Do not  use sun lamps or tanning beds/booths.     What side effects may I notice from receiving this medicine?   Side effects that you should report to your doctor or health care professional as soon as possible:   severe redness and swelling of normal skin  Side effects that usually do not require medical attention (report to your doctor or health care professional if they continue or are bothersome):   dark colored skin   eye irritation including burning, itching, sensitivity, stinging, or watering   increased sensitivity of the skin to sun and ultraviolet light   pain and burning of the affected area   scaling or swelling of the affected area   skin rash, itching of the affected area   tenderness  This list may not describe all possible side effects. Call your doctor for medical advice about side effects. You may report side effects to FDA at 1-188-FDA-6365.     Where should I keep my medicine?   Keep out of the reach of children.   Store at room temperature between 15 and 30 degrees C (59 and 86 degrees F). Do not freeze. Keep container tightly closed. Throw away any unused medicine after the expiration date.   NOTE:This sheet is a summary. It may not cover all possible information. If you have questions about this medicine, talk to your doctor, pharmacist, or health care provider. Copyright© 2014 [employer]

## 2025-06-24 ENCOUNTER — TELEPHONE (OUTPATIENT)
Dept: PAIN MEDICINE | Facility: CLINIC | Age: 89
End: 2025-06-24
Payer: MEDICARE

## 2025-06-24 NOTE — TELEPHONE ENCOUNTER
----- Message from Jeanette Molina MD sent at 6/24/2025  9:55 AM CDT -----  Regarding: RE: Medical Clearance Needed  Yes, pt can hold eliquis for 3 days prior.    ----- Message -----  From: Daisy Roger MA  Sent: 6/20/2025   4:39 PM CDT  To: Jeanette Molina MD; Ara Hernández NP  Subject: Medical Clearance Needed                         Dr. Molina or ASHLEY Hernández:    Clau Nunn was seen in office with complaints of severe low back pain. Dr. Parker would like to perform Intracept basivertebral nerve ablation at L2 and L3, and Clau Nunn would like to proceed. Dr. Parker is requesting for clearance to hold apixaban (Eliquis) 3 days prior to procedure. Patient Clau Nunn can resume medication following the procedure. Can the patient proceed with this procedure/injection?    Daisy OBRIEN ([facility]) Interventional Pain Management Surgery Scheduler

## 2025-07-03 ENCOUNTER — TELEPHONE (OUTPATIENT)
Dept: PAIN MEDICINE | Facility: CLINIC | Age: 89
End: 2025-07-03
Payer: MEDICARE

## 2025-07-03 NOTE — TELEPHONE ENCOUNTER
Copied from CRM #[id number]. Topic: Appointments - Appointment Access  >> Jul 3, 2025 12:01 PM Gretta wrote:  Type:  Patient Returning Call    Who Called:pt   Who Left Message for Patient:pt  Does the patient know what this is regarding?:pt had a miss call and was calling back  Would the patient rather a call back or a response via [facility]?  Call   Best Call Back Number:[phone]  Additional Information:  call back   Patient moved to [facility] 1 for intubation as per Doctor Yamil. Placed on continuous cardiac monitoring, medicated and intubated as ordered, ivf infusing to left ac 18G. Secondary IV heplock obtained and right ac 18G. Both are patent and intact. OG tube inserted with return to gastric contents. Placed on mechanical ventilator. Indwelling mari catheter inserted yielding cloudy yellow urine.

## 2025-07-03 NOTE — TELEPHONE ENCOUNTER
I called the patient back letting her know that I will have Ms. Jackson to reach out to her to schedule her intracept procedure. Patient does understand. I sent a message to Mo and I also told Mo that I have the clearance in the patient chart already.    Daisy OBRIEN ([facility])

## 2025-07-24 ENCOUNTER — EXTERNAL HOME HEALTH (OUTPATIENT)
Dept: HOME HEALTH SERVICES | Facility: HOSPITAL | Age: 89
End: 2025-07-24
Payer: MEDICARE

## 2025-07-28 ENCOUNTER — OFFICE VISIT (OUTPATIENT)
Dept: UROLOGY | Facility: CLINIC | Age: 89
End: 2025-07-28
Payer: MEDICARE

## 2025-07-28 VITALS
DIASTOLIC BLOOD PRESSURE: 72 MMHG | WEIGHT: 166.44 LBS | TEMPERATURE: 98 F | SYSTOLIC BLOOD PRESSURE: 157 MMHG | RESPIRATION RATE: 17 BRPM | HEART RATE: 65 BPM | HEIGHT: 63 IN | BODY MASS INDEX: 29.49 KG/M2

## 2025-07-28 DIAGNOSIS — N39.41 URGE INCONTINENCE OF URINE: ICD-10-CM

## 2025-07-28 DIAGNOSIS — N39.41 URGE INCONTINENCE: Primary | ICD-10-CM

## 2025-07-28 DIAGNOSIS — Z01.818 PRE-OP TESTING: ICD-10-CM

## 2025-07-28 DIAGNOSIS — N39.3 STRESS INCONTINENCE OF URINE: ICD-10-CM

## 2025-07-28 DIAGNOSIS — N32.81 OAB (OVERACTIVE BLADDER): ICD-10-CM

## 2025-07-28 LAB
BILIRUBIN, UA POC OHS: NEGATIVE
BLOOD, UA POC OHS: NEGATIVE
CLARITY, UA POC OHS: CLEAR
COLOR, UA POC OHS: YELLOW
GLUCOSE, UA POC OHS: NEGATIVE
KETONES, UA POC OHS: NEGATIVE
LEUKOCYTES, UA POC OHS: NEGATIVE
NITRITE, UA POC OHS: NEGATIVE
PH, UA POC OHS: 7
POC RESIDUAL URINE VOLUME: 48 ML (ref 0–100)
PROTEIN, UA POC OHS: NEGATIVE
SPECIFIC GRAVITY, UA POC OHS: 1.02
UROBILINOGEN, UA POC OHS: 0.2

## 2025-07-28 PROCEDURE — 3288F FALL RISK ASSESSMENT DOCD: CPT | Mod: CPTII,S$GLB,, | Performed by: UROLOGY

## 2025-07-28 PROCEDURE — 1157F ADVNC CARE PLAN IN RCRD: CPT | Mod: CPTII,S$GLB,, | Performed by: UROLOGY

## 2025-07-28 PROCEDURE — 99214 OFFICE O/P EST MOD 30 MIN: CPT | Mod: S$GLB,,, | Performed by: UROLOGY

## 2025-07-28 PROCEDURE — 99999 PR PBB SHADOW E&M-EST. PATIENT-LVL V: CPT | Mod: PBBFAC,HCNC,, | Performed by: UROLOGY

## 2025-07-28 PROCEDURE — 1159F MED LIST DOCD IN RCRD: CPT | Mod: CPTII,S$GLB,, | Performed by: UROLOGY

## 2025-07-28 PROCEDURE — 1101F PT FALLS ASSESS-DOCD LE1/YR: CPT | Mod: CPTII,S$GLB,, | Performed by: UROLOGY

## 2025-07-28 PROCEDURE — 81003 URINALYSIS AUTO W/O SCOPE: CPT | Mod: QW,S$GLB,, | Performed by: UROLOGY

## 2025-07-28 PROCEDURE — 51798 US URINE CAPACITY MEASURE: CPT | Mod: S$GLB,,, | Performed by: UROLOGY

## 2025-07-28 PROCEDURE — 1126F AMNT PAIN NOTED NONE PRSNT: CPT | Mod: CPTII,S$GLB,, | Performed by: UROLOGY

## 2025-07-28 RX ORDER — CIPROFLOXACIN 2 MG/ML
400 INJECTION, SOLUTION INTRAVENOUS
OUTPATIENT
Start: 2025-07-28

## 2025-07-28 RX ORDER — LIDOCAINE HYDROCHLORIDE 20 MG/ML
JELLY TOPICAL ONCE
OUTPATIENT
Start: 2025-07-28 | End: 2025-07-28

## 2025-07-28 NOTE — PROGRESS NOTES
Chief Complaint   Patient presents with    Follow-up     Discuss possible procedure         History of Present Illness:   Clau Nunn is a 89 y.o. female here for evaluation of Follow-up (Discuss possible procedure)    7/28/25-Currently on myrbetriq 50mg + vesicare 10mg. She reports that when she stands up the urine pours out. She also has DIOMEDES with cough/sneeze. UUI>DIOMEDES. She wears a big pad with a regular sized pad on top, which she changes Q2 hours. Nocturia x 2.   4/24/23-on myrbetriq 50mg + vesicare 5mg. Not really using vaginal estrogen cream as much. She reports urethral pain for the last 2 weeks. Nocturia Q2 hours. Before current UTI started, she did notice improvement in UUI. No gross hematuria or fever. She does have suprapubic pain.   3/13/23- Using myrbetriq and it helps, but not enough. nocturia x 2 at least. She bought a pure wick, but she turns a lot during the night. She has UUI when she stands up from a seated position. She also has DIOMEDES with cough/laugh. 4 nighttime pads per day. Sometimes she has insensate incontinence. Still using vaginal estrogen cream. She gets vaginal itching from the moisture. No recent UTIs.   12/20/21- She reports insensate incontinence going on for a few years. Changes pads 2-3 times a day. She does have some degree of UUI and also has incontinence upon valsalva. States that she gets UTIs every 6-8 weeks. She is normally seen at the walk-in clinic near her home. She states that it is not an [facility] facility. Culture has been done once and her antibiotics were changed once. Not sure of the organism. She reports that UTI symptoms generally include dysuria and chills and fatigue. She is currently not symptomatic. No gross hematuria. Symptoms generally do clear with antibiotics. UTIs have been ongoing for years. She is using vaginal estrogen, prescribed by another urologist. Uses it 1-2 times a week.         Review of Systems   Constitutional:  Negative for chills and  fever.   Respiratory:  Negative for shortness of breath.    Cardiovascular:  Negative for chest pain.   Gastrointestinal:  Negative for abdominal pain, constipation and diarrhea.   Neurological:  Negative for numbness.   All other systems reviewed and are negative.        Past Medical History:   Diagnosis Date    Allergy     Anxiety     Asthma     Back pain     Chest pain 1/30/2024    Chronic venous insufficiency 11/19/2013    Depression     Diverticulosis 11/19/2013 1/8/8 colonoscopy    Dry eyes     Fracture, sacrum/coccyx     Dr. Sanchez     GERD (gastroesophageal reflux disease)     H/O total hip arthroplasty 12/30/2015    Hypertension     Hypothyroid     Osteoarthritis     Osteoporosis     Postlaminectomy syndrome of lumbar region 1/8/2014    Radius fracture     7/18    Simple chronic bronchitis 5/3/2017    Umbilical hernia 11/19/2013    Urinary incontinence     Vitamin D deficiency disease        Past Surgical History:   Procedure Laterality Date    ADENOIDECTOMY      BACK SURGERY      x2    breast implants  1973    CATARACT EXTRACTION Bilateral     CLOSED REDUCTION DISTAL RADIUS FRACTURE      Dr. Black, 8/18    cystoscope  1/6/16    DR. Brown    FRACTURE SURGERY  April 3 2015    left wrist    HAND SURGERY      HIP SURGERY Right     total hip- Dr. Dos Santos    HYSTERECTOMY      34y/o    INJECTION OF ANESTHETIC AGENT AROUND NERVE Right 9/16/2020    Procedure: Right suprascapular nerve block;  Surgeon: Raffi Wells MD;  Location: [facility];  Service: Pain Management;  Laterality: Right;    INJECTION OF ANESTHETIC AGENT AROUND NERVE Right 7/13/2021    Procedure: Block, Nerve Right Suprascapular Nerve Block with RN IV sedation;  Surgeon: Raffi Wells MD;  Location: [facility];  Service: Pain Management;  Laterality: Right;    INJECTION OF ANESTHETIC AGENT INTO SACROILIAC JOINT N/A 8/12/2020    Procedure: Left IA hip Joint + Left SIJ + Right shoulder injection;  Surgeon: Raffi Wells MD;  Location: [facility];  Service: Pain Management;  Laterality: N/A;    INJECTION OF ANESTHETIC AGENT INTO SACROILIAC JOINT Bilateral 1/31/2023    Procedure: Bilateral SIJ Injection w/Local;  Surgeon: Abdirahman Parker MD;  Location: [facility];  Service: Pain Management;  Laterality: Bilateral;    INJECTION OF ANESTHETIC AGENT INTO SACROILIAC JOINT Bilateral 8/20/2024    Procedure: Bilateral SIJ Injection;  Surgeon: Abdirahman Parker MD;  Location: [facility];  Service: Pain Management;  Laterality: Bilateral;    INJECTION OF JOINT N/A 8/12/2020    Procedure: Left IA hip Joint + Left SIJ + Right shoulder injection;  Surgeon: Raffi Wells MD;  [facility];  Service: Pain Management;  Laterality: N/A;    JOINT REPLACEMENT Right     R NNAMDI - Dr. Dos Santos    LEG SURGERY Right     ex-fix tib/fib    RADIOFREQUENCY THERMOCOAGULATION Left 6/8/2023    Procedure: Left SIJ RFA w/ local (give Valium before);  Surgeon: Abdirahman Parker MD;  Location: [facility];  Service: Pain Management;  Laterality: Left;    SELECTIVE INJECTION OF ANESTHETIC AGENT AROUND LUMBAR SPINAL NERVE ROOT BY TRANSFORAMINAL APPROACH Bilateral 2/10/2021    Procedure: Bilateral L5/S1 TF REJI;  Surgeon: Raffi Wells MD;  Location: [facility];  Service: Pain Management;  Laterality: Bilateral;    SELECTIVE INJECTION OF ANESTHETIC AGENT AROUND LUMBAR SPINAL NERVE ROOT BY TRANSFORAMINAL APPROACH Bilateral 5/25/2022    Procedure: Bilateral L5/S1 + S1 TF REJI;  Surgeon: Raffi Wells MD;  Location: [facility];  Service: Pain Management;  Laterality: Bilateral;    TONSILLECTOMY      TRANSFORAMINAL EPIDURAL INJECTION OF STEROID Left 7/8/2020    Procedure: left L2/3 + L5/S1 TF REJI;  Surgeon: Raffi Wells MD;  [facility];  Service: Pain Management;  Laterality: Left;    TRANSFORAMINAL EPIDURAL INJECTION OF STEROID Left 7/1/2021    Procedure: left L5/S1 + S1 TF REJI;  Surgeon: Raffi Wells MD;  [facility];  Service: Pain Management;   Laterality: Left;       Family History   Problem Relation Name Age of Onset    COPD Mother      Cancer Mother          lung CA    Pulmonary fibrosis Sister      Cancer Daughter          colon    Stroke Father      Diabetes Son      Melanoma Neg Hx      Psoriasis Neg Hx      Lupus Neg Hx         Social History     Tobacco Use    Smoking status: Never    Smokeless tobacco: Never   Substance Use Topics    Alcohol use: Yes     Alcohol/week: 0.0 standard drinks of alcohol     Comment: rarely    Drug use: Never       Current Outpatient Medications   Medication Sig Dispense Refill    ammonium lactate (AMLACTIN) 12 % lotion Apply topically 2 (two) times daily. To legs 225 g 5    azelastine (ASTELIN) 137 mcg (0.1 %) nasal spray 2 sprays by Nasal route 2 (two) times daily.      betamethasone dipropionate (DIPROLENE) 0.05 % ointment Apply to affected fingers around nails at night under occlusion with white cotton gloves for 2-4 weeks 45 g 3    busPIRone (BUSPAR) 5 MG Tab TAKE 1 TABLET TWICE DAILY 180 tablet 5    chlorthalidone (HYGROTEN) 25 MG Tab Take 1 tablet (25 mg total) by mouth once daily. 90 tablet 2    cholecalciferol, vitamin D3, (D3-2000) 50 mcg (2,000 unit) Cap capsule Take 1 capsule (2,000 Units total) by mouth once daily. 90 capsule 11    ciclopirox (PENLAC) 8 % Soln Apply topically nightly. 6.6 mL 3    clobetasoL (TEMOVATE) 0.05 % external solution Pt to mix in 1 jar of cerave cream and apply to affected areas bid for 2-4 weeks per course 50 mL 3    clobetasoL (TEMOVATE) 0.05 % external solution Apply 2 drops under nail plates for 4 weeks on, take 2 weeks off, then repeat indefinitely 50 mL 1    cyclobenzaprine (FLEXERIL) 5 MG tablet TAKE 1 TABLET (5 MG TOTAL) BY MOUTH NIGHTLY AS NEEDED FOR MUSCLE SPASMS. 90 tablet 1    cycloSPORINE (RESTASIS MULTIDOSE) 0.05 % Drop Place 1 drop into both eyes every 12 (twelve) hours. 5.5 mL 12    ELIQUIS 5 mg Tab TAKE 1 TABLET TWICE DAILY 180 tablet 3    EScitalopram oxalate  (LEXAPRO) 10 MG tablet Take 1 tablet (10 mg total) by mouth once daily. 90 tablet 3    fluconazole (DIFLUCAN) 150 MG Tab Take 1 tablet (150 mg total) by mouth once a week. For 6 months 12 tablet 1    fluocinonide 0.05% (LIDEX) 0.05 % cream AAA bid to leg for 2-4 weeks per course 60 g 1    fluorouraciL (EFUDEX) 5 % cream AAA bid x 6 weeks 40 g 1    fluticasone propionate (FLONASE) 50 mcg/actuation nasal spray       furosemide (LASIX) 40 MG tablet Take 1 tablet (40 mg total) by mouth once daily. 15 tablet 0    gabapentin (NEURONTIN) 300 MG capsule Take 1 capsule (300 mg total) by mouth every evening. 90 capsule 3    HYDROcodone-acetaminophen (NORCO) 5-325 mg per tablet Take 1 tablet by mouth every 8 (eight) hours as needed for Pain. 15 tablet 0    levothyroxine (SYNTHROID) 100 MCG tablet TAKE 1 TABLET EVERY DAY 90 tablet 3    LIDOcaine (LIDODERM) 5 % APPLY ONE PATCH TO AFFECTED AREA. 12 HOURS ON AND 12 HOURS OFF 30 patch 1    mirabegron (MYRBETRIQ) 50 mg Tb24 Take 1 tablet (50 mg total) by mouth once daily. 90 tablet 3    omeprazole (PRILOSEC) 40 MG capsule TAKE 1 CAPSULE (40 MG TOTAL) BY MOUTH EVERY MORNING. 90 capsule 3    ondansetron (ZOFRAN-ODT) 4 MG TbDL Take 1 tablet (4 mg total) by mouth every 8 (eight) hours as needed (nausea). 15 tablet 0    polyethylene glycol (GLYCOLAX) 17 gram/dose powder Take 17 g by mouth once daily. 595 g 0    PROLIA 60 mg/mL Syrg       solifenacin (VESICARE) 10 MG tablet Take 1 tablet (10 mg total) by mouth once daily. 90 tablet 3    traZODone (DESYREL) 50 MG tablet Take 1-2 tablets ( mg total) by mouth nightly as needed for Insomnia. 180 tablet 3    valsartan (DIOVAN) 160 MG tablet Take 1 tablet (160 mg total) by mouth once daily. 90 tablet 3    albuterol (PROVENTIL) 2.5 mg /3 mL (0.083 %) nebulizer solution Take 3 mLs (2.5 mg total) by nebulization every 6 (six) hours as needed for Wheezing. Rescue 1 each 6     Current Facility-Administered Medications   Medication Dose Route  Frequency Provider Last Rate Last Admin    denosumab (PROLIA) injection 60 mg  60 mg Subcutaneous Q6 Months Carly Mora, DONNIE   60 mg at 01/28/19 1548    neomycin-bacitracin-polymyxin ointment   Topical (Top) 1 time in Clinic/HOD Clau Michel NP         Facility-Administered Medications Ordered in Other Visits   Medication Dose Route Frequency Provider Last Rate Last Admin    denosumab (PROLIA) injection 60 mg  60 mg Subcutaneous 1 time in Clinic/HOD Jeffry Bermudez MD           Review of patient's allergies indicates:   Allergen Reactions    Cephalexin Hives, Itching, Swelling, Anxiety, Dermatitis and Rash    Mupirocin Itching       Physical Exam  Vitals:    07/28/25 0946   BP: (!) 157/72   Pulse: 65   Resp: 17   Temp: 98.3 °F (36.8 °C)         General: Well-developed, well-nourished, in no acute distress  HEENT: Normocephalic, atraumatic, extraocular movements intact  Neck: Supple, no supraclavicular or cervical lymphadenopathy, trachea midline  Respirations: even and unlabored  Extremities: moves all equally, no clubbing, cyanosis or edema  Skin: Warm and dry. No lesions  Psych: normal affect  Neuro: Alert and oriented x 3. Cranial nerves II-XII intact    PVR: 48mL    UA: negative for blood, LE, nit      Assessment:  1. Urge incontinence  Place in Outpatient    Case Request Operating Room: CYSTOSCOPY,WITH BOTULINUM TOXIN INJECTION    Diet NPO    Place sequential compression device    Diet NPO      2. Stress incontinence of urine  POCT Bladder Scan    POCT Urinalysis(Instrument)    Urine Culture High Risk ($$)      3. OAB (overactive bladder)  POCT Bladder Scan    POCT Urinalysis(Instrument)    Urine Culture High Risk ($$)      4. Urge incontinence of urine        5. Pre-op testing  X-Ray Chest PA And Lateral    CBC Auto Differential    Comprehensive Metabolic Panel    Ambulatory referral/consult to Pre-Admit                Plan:   Urge incontinence  -     Place in Outpatient; Standing  -     Case  Request Operating Room: CYSTOSCOPY,WITH BOTULINUM TOXIN INJECTION  -     Diet NPO; Standing  -     Place sequential compression device; Standing    Stress incontinence of urine  -     POCT Bladder Scan  -     POCT Urinalysis(Instrument)  -     Urine Culture High Risk ($$); Future; Expected date: 08/04/2025    OAB (overactive bladder)  -     POCT Bladder Scan  -     POCT Urinalysis(Instrument)  -     Urine Culture High Risk ($$); Future; Expected date: 08/04/2025    Urge incontinence of urine    Pre-op testing  -     X-Ray Chest PA And Lateral; Future; Expected date: 07/28/2025  -     CBC Auto Differential; Future; Expected date: 07/28/2025  -     Comprehensive Metabolic Panel; Future; Expected date: 07/28/2025  -     Ambulatory referral/consult to Pre-Admit; Future; Expected date: 08/04/2025    Other orders  -     IP VTE LOW RISK PATIENT; Standing  -     ciprofloxacin (CIPRO)400mg/200ml D5W IVPB 400 mg  -     LIDOcaine HCl 2% urojet    We discussed further management options for her OAB symptoms, including botox and sacral neuromodulation. Risks/benefits of each were discussed including the risk of retention with botox. She would like to schedule botox.   8/18/25 Botox scheduled.   She understands that her DIOMEDES symptoms will not be improved with botox treatment.

## 2025-08-01 NOTE — PROGRESS NOTES
"Subjective:      Patient ID: Clau Nunn is a 89 y.o. female.    Chief Complaint: Follow-up (3 month f/u ), Back Pain, and Leg Swelling      HPI  Here for follow up of medical problems.  Insurance denied back injections, hasn't heard back from pain doctor about next step.  Depression doing well on rx.    Dry cough, not using flonase regularly.  BMs good on miralax.  Noct leg cramps sometimes.  66yo son lives with her, no job, not helpful.    Updated/ annual due 11/25:  HM: 9/24 fluvax, 1/21 covid vaccine/booster, 11/19 HAV, 5/16 [id number], 5/17 booster [id number], 12/22 [id number], 2/22 Tdap, 11/09 zoster, 7/22 Shingrix #2, 9/22 BMD on prolia, 1/14 Cscope no more needed, 12/22 MMG/ no more, 10/22 Eye Dr. Rubalcava.      Review of Systems   Constitutional:  Negative for chills, diaphoresis and fever.   Respiratory:  Negative for cough and shortness of breath.    Cardiovascular:  Negative for chest pain, palpitations and leg swelling.   Gastrointestinal:  Negative for blood in stool, constipation, diarrhea, nausea and vomiting.   Genitourinary:  Negative for dysuria, frequency and hematuria.   Psychiatric/Behavioral:  The patient is not nervous/anxious.          Objective:   /80 (BP Location: Right arm, Patient Position: Sitting)   Pulse 73   Temp 97.8 °F (36.6 °C) (Skin)   Ht 5' 3" (1.6 m)   Wt 74.6 kg (164 lb 9.2 oz)   SpO2 96%   BMI 29.15 kg/m²     Physical Exam  Constitutional:       Appearance: She is well-developed.   Neck:      Thyroid: No thyroid mass.      Vascular: No carotid bruit.   Cardiovascular:      Rate and Rhythm: Normal rate and regular rhythm.      Heart sounds: No murmur heard.     No friction rub. No gallop.   Pulmonary:      Effort: Pulmonary effort is normal.      Breath sounds: Normal breath sounds. No wheezing or rales.   Abdominal:      General: Bowel sounds are normal.      Palpations: Abdomen is soft. There is no mass.      Tenderness: There is no abdominal tenderness. "   Musculoskeletal:      Cervical back: Neck supple.   Lymphadenopathy:      Cervical: No cervical adenopathy.   Neurological:      Mental Status: She is alert and oriented to person, place, and time.             Assessment:       1. Acquired hypothyroidism    2. Age-related osteoporosis with current pathological fracture with delayed healing    3. Atherosclerosis of aorta    4. Essential hypertension    5. Gastroesophageal reflux disease without esophagitis    6. Recurrent major depressive disorder, in partial remission    7. Solar purpura    8. Slow transit constipation    9. Chronic lumbar radiculopathy    10. Stage 3b chronic kidney disease    11. Continuous leakage of urine    12. Venous insufficiency of both lower extremities    13. Lumbar radiculopathy    14. History of DVT in adulthood    15. Recurrent major depressive disorder, in full remission    16. Anxiety          Plan:     1. Acquired hypothyroidism  Overview:  Synthroid 100mcg daily.    Assessment & Plan:  Clinically stable, continue present treatment.    Orders:  -     Lipid Panel; Future; Expected date: 08/07/2025  -     TSH; Future; Expected date: 08/07/2025    2. Age-related osteoporosis with current pathological fracture with delayed healing  Overview:  01/02/2020   FINDINGS:  The L3-L4 vertebral bone mineral density is equal to 1.578 g/cm squared with a T score of 3.2.  There has been a positive 4.0% statistically significant change relative to the prior study.     The left femoral neck bone mineral density is equal to 1.001 g/cm squared with a T score of -0.3.  There has been  a positive 14.3% statistically significant change relative to the prior study.   Impression:   No evidence of significant bone density loss    On Prolia.    Assessment & Plan:  Stable, cont rx.    3. Atherosclerosis of aorta  Overview:  CT abd 11/11/13    On eliquis.    Assessment & Plan:  Stable, continue present medication.      4. Essential  hypertension  Overview:  Hypertension Medications              chlorthalidone (HYGROTEN) 25 MG Tab Take 1 tablet (25 mg total) by mouth once daily.    furosemide (LASIX) 40 MG tablet Take 1 tablet (40 mg total) by mouth once daily.    valsartan (DIOVAN) 160 MG tablet Take 1 tablet (160 mg total) by mouth once daily.           Assessment & Plan:  Stable, cont rx.    5. Gastroesophageal reflux disease without esophagitis  Overview:  Omeprazole 40mg daily.    Assessment & Plan:  Stable on rx, cont.      6. Recurrent major depressive disorder, in partial remission  Overview:  Change to duloxetine 30mg daily, for depression and pain.    Assessment & Plan:  Start cymbalta, RTC 2mo.    7. Solar purpura  Assessment & Plan:  Protect skin, no rx needed.    8. Slow transit constipation  Overview:  Miralax 17g daily.    Assessment & Plan:  Doing well, cont rx.    9. Chronic lumbar radiculopathy- f/u with Pain doctor.  Start SNRI.    10. Stage 3b chronic kidney disease- cont hydration.    11. Continuous leakage of urine  Assessment & Plan:  To get botox injections.    12. Venous insufficiency of both lower extremities  Assessment & Plan:  Elevate, compression stockings all the time.    13. Lumbar radiculopathy  Overview:  Pain doctor.    Assessment & Plan:  F/u with Pain doctor.    Orders:  -     DULoxetine (CYMBALTA) 30 MG capsule; Take 1 capsule (30 mg total) by mouth once daily.  Dispense: 90 capsule; Refill: 3    14. History of DVT in adulthood  Overview:  Eliquis 5mg BID.    7/19 right popliteal    Assessment & Plan:  Stable, cont rx.    16. Anxiety  Overview:  Buspar 5mg prn.    Assessment & Plan:  Change to SNRI, cont buspar, helping prn.    RTC  2mo.  RSV at pharmacy.  Start counseling.

## 2025-08-05 ENCOUNTER — HOSPITAL ENCOUNTER (OUTPATIENT)
Dept: RADIOLOGY | Facility: HOSPITAL | Age: 89
Discharge: HOME | End: 2025-08-05
Attending: UROLOGY
Payer: MEDICARE

## 2025-08-05 DIAGNOSIS — Z01.818 PRE-OP TESTING: ICD-10-CM

## 2025-08-05 PROCEDURE — 71046 X-RAY EXAM CHEST 2 VIEWS: CPT | Mod: 26,,, | Performed by: RADIOLOGY

## 2025-08-05 PROCEDURE — 71046 X-RAY EXAM CHEST 2 VIEWS: CPT | Mod: TC,PO

## 2025-08-07 ENCOUNTER — RESULTS FOLLOW-UP (OUTPATIENT)
Dept: UROLOGY | Facility: HOSPITAL | Age: 89
End: 2025-08-07
Payer: MEDICARE

## 2025-08-07 ENCOUNTER — OFFICE VISIT (OUTPATIENT)
Dept: PRIMARY CARE CLINIC | Facility: CLINIC | Age: 89
End: 2025-08-07
Payer: MEDICARE

## 2025-08-07 VITALS
HEART RATE: 73 BPM | TEMPERATURE: 98 F | OXYGEN SATURATION: 96 % | SYSTOLIC BLOOD PRESSURE: 130 MMHG | DIASTOLIC BLOOD PRESSURE: 80 MMHG | BODY MASS INDEX: 29.16 KG/M2 | HEIGHT: 63 IN | WEIGHT: 164.56 LBS

## 2025-08-07 DIAGNOSIS — N18.32 STAGE 3B CHRONIC KIDNEY DISEASE: ICD-10-CM

## 2025-08-07 DIAGNOSIS — I70.0 ATHEROSCLEROSIS OF AORTA: ICD-10-CM

## 2025-08-07 DIAGNOSIS — Z86.718 HISTORY OF DVT IN ADULTHOOD: ICD-10-CM

## 2025-08-07 DIAGNOSIS — N39.45 CONTINUOUS LEAKAGE OF URINE: ICD-10-CM

## 2025-08-07 DIAGNOSIS — M54.16 LUMBAR RADICULOPATHY: ICD-10-CM

## 2025-08-07 DIAGNOSIS — M80.00XG AGE-RELATED OSTEOPOROSIS WITH CURRENT PATHOLOGICAL FRACTURE WITH DELAYED HEALING: ICD-10-CM

## 2025-08-07 DIAGNOSIS — I87.2 VENOUS INSUFFICIENCY OF BOTH LOWER EXTREMITIES: ICD-10-CM

## 2025-08-07 DIAGNOSIS — F33.41 RECURRENT MAJOR DEPRESSIVE DISORDER, IN PARTIAL REMISSION: ICD-10-CM

## 2025-08-07 DIAGNOSIS — I10 ESSENTIAL HYPERTENSION: Chronic | ICD-10-CM

## 2025-08-07 DIAGNOSIS — K59.01 SLOW TRANSIT CONSTIPATION: ICD-10-CM

## 2025-08-07 DIAGNOSIS — M54.16 CHRONIC LUMBAR RADICULOPATHY: ICD-10-CM

## 2025-08-07 DIAGNOSIS — E03.9 ACQUIRED HYPOTHYROIDISM: Primary | ICD-10-CM

## 2025-08-07 DIAGNOSIS — F33.42 RECURRENT MAJOR DEPRESSIVE DISORDER, IN FULL REMISSION: ICD-10-CM

## 2025-08-07 DIAGNOSIS — F41.9 ANXIETY: ICD-10-CM

## 2025-08-07 DIAGNOSIS — K21.9 GASTROESOPHAGEAL REFLUX DISEASE WITHOUT ESOPHAGITIS: ICD-10-CM

## 2025-08-07 DIAGNOSIS — D69.2 SOLAR PURPURA: ICD-10-CM

## 2025-08-07 PROCEDURE — 99999 PR PBB SHADOW E&M-EST. PATIENT-LVL V: CPT | Mod: PBBFAC,HCNC,, | Performed by: INTERNAL MEDICINE

## 2025-08-07 RX ORDER — DULOXETIN HYDROCHLORIDE 30 MG/1
30 CAPSULE, DELAYED RELEASE ORAL DAILY
Qty: 90 CAPSULE | Refills: 3 | Status: SHIPPED | OUTPATIENT
Start: 2025-08-07

## 2025-08-08 ENCOUNTER — TELEPHONE (OUTPATIENT)
Dept: PAIN MEDICINE | Facility: CLINIC | Age: 89
End: 2025-08-08
Payer: MEDICARE

## 2025-08-08 ENCOUNTER — PATIENT MESSAGE (OUTPATIENT)
Dept: PAIN MEDICINE | Facility: CLINIC | Age: 89
End: 2025-08-08
Payer: MEDICARE

## 2025-08-08 NOTE — TELEPHONE ENCOUNTER
Copied from CRM #[id number]. Topic: General Inquiry - Return Call  >> Aug 8, 2025 11:20 AM Stephy wrote:  Type:  Patient Returning Call    Who Called: pt  Who Left Message for Patient: MARKOS  Does the patient know what this is regarding?:  Would the patient rather a call back or a response via [facility]? phone  Best Call Back Number:[phone]   Additional Information:

## 2025-08-08 NOTE — TELEPHONE ENCOUNTER
Returned call to patient, her phone must set up for scam calls and doesn't allow phone to go through.  Will try to return call to patient.

## 2025-08-08 NOTE — TELEPHONE ENCOUNTER
Returned call to patient in regards to denial from insurance, no answer left message to return call at earliest convenience.  And sent portal mesage

## 2025-08-08 NOTE — TELEPHONE ENCOUNTER
Copied from CRM #[id number]. Topic: General Inquiry - Patient Advice  >> Aug 8, 2025 10:27 AM Allyn wrote:  Type:  Needs Medical Advice    Who Called: Clau Nunn   Would the patient rather a call back or a response via [facility]? Call back  Best Call Back Number: [phone]  Additional Information: Insurance denied injections and Clau would like to know what will be the next alternative.

## 2025-08-09 ENCOUNTER — E-CONSULT (OUTPATIENT)
Dept: CARDIOLOGY | Facility: CLINIC | Age: 89
End: 2025-08-09
Payer: MEDICARE

## 2025-08-09 DIAGNOSIS — Z01.810 PREOP CARDIOVASCULAR EXAM: Primary | ICD-10-CM

## 2025-08-10 NOTE — CONSULTS
[facility] [address]  Response for E-Consult     Patient Name: Clau Nunn  MRN: [id number]  Primary Care Provider: Jeanette Molina MD   Requesting Provider: Marie Cummings*  E-Consult to General Cardiology  Consult performed by: Adrian Jaffe MD  Consult ordered by: Marie Cummings, DONNIE          E consult for preop clearance of cystoscopy  The chart reviewed.  PMH htn aortic atherosclerosis PVD h/o DVT  03/25 EKG NSR RBBB  02/24 NUke stress test no ischemia, EF nml  Cr 1.2    Plan  Elevated periop risk of CV events for non-high risk procedure.  Ok to proceed the scheduled procedure without further cardiac study.  OK to hold Eliquis 2 days before the procedure and resume postop once hemodynamically stable      Total time of Consultation: 10 minute    I did not speak to the requesting provider verbally about this.     *This eConsult is based on the clinical data available to me and is furnished without benefit of a physical examination. The eConsult will need to be interpreted in light of any clinical issues or changes in patient status not available to me at the time of filing this eConsults. Significant changes in patient condition or level of acuity should result in immediate formal consultation and reevaluation. Please alert me if you have further questions.    Thank you for this eConsult referral.     Adrian Jaffe MD  [facility] [address]

## 2025-08-11 ENCOUNTER — PATIENT MESSAGE (OUTPATIENT)
Dept: PRIMARY CARE CLINIC | Facility: CLINIC | Age: 89
End: 2025-08-11
Payer: MEDICARE

## 2025-08-11 PROBLEM — Z01.810 PREOP CARDIOVASCULAR EXAM: Status: RESOLVED | Noted: 2025-08-09 | Resolved: 2025-08-11

## 2025-08-13 ENCOUNTER — ANESTHESIA EVENT (OUTPATIENT)
Dept: SURGERY | Facility: HOSPITAL | Age: 89
End: 2025-08-13
Payer: MEDICARE

## 2025-08-13 ENCOUNTER — TELEPHONE (OUTPATIENT)
Dept: PRIMARY CARE CLINIC | Facility: CLINIC | Age: 89
End: 2025-08-13
Payer: MEDICARE

## 2025-08-13 ENCOUNTER — OFFICE VISIT (OUTPATIENT)
Dept: INTERNAL MEDICINE | Facility: CLINIC | Age: 89
End: 2025-08-13
Payer: MEDICARE

## 2025-08-13 ENCOUNTER — PATIENT MESSAGE (OUTPATIENT)
Dept: PREADMISSION TESTING | Facility: HOSPITAL | Age: 89
End: 2025-08-13
Payer: MEDICARE

## 2025-08-13 VITALS
RESPIRATION RATE: 18 BRPM | TEMPERATURE: 98 F | SYSTOLIC BLOOD PRESSURE: 145 MMHG | HEART RATE: 83 BPM | OXYGEN SATURATION: 95 % | DIASTOLIC BLOOD PRESSURE: 64 MMHG

## 2025-08-13 DIAGNOSIS — R32 URINARY INCONTINENCE, UNSPECIFIED TYPE: ICD-10-CM

## 2025-08-13 DIAGNOSIS — F41.9 ANXIETY: ICD-10-CM

## 2025-08-13 DIAGNOSIS — K21.9 GASTROESOPHAGEAL REFLUX DISEASE WITHOUT ESOPHAGITIS: ICD-10-CM

## 2025-08-13 DIAGNOSIS — Z86.718 HISTORY OF DVT IN ADULTHOOD: ICD-10-CM

## 2025-08-13 DIAGNOSIS — I10 ESSENTIAL HYPERTENSION: Chronic | ICD-10-CM

## 2025-08-13 DIAGNOSIS — Z01.818 PREOP EXAMINATION: Primary | ICD-10-CM

## 2025-08-13 DIAGNOSIS — E03.9 ACQUIRED HYPOTHYROIDISM: ICD-10-CM

## 2025-08-13 DIAGNOSIS — N18.32 STAGE 3B CHRONIC KIDNEY DISEASE: ICD-10-CM

## 2025-08-13 DIAGNOSIS — Z01.818 PRE-OP TESTING: ICD-10-CM

## 2025-08-13 PROCEDURE — 99999 PR PBB SHADOW E&M-EST. PATIENT-LVL V: CPT | Mod: PBBFAC,HCNC,,

## 2025-08-14 ENCOUNTER — PATIENT MESSAGE (OUTPATIENT)
Dept: UROLOGY | Facility: CLINIC | Age: 89
End: 2025-08-14
Payer: MEDICARE

## 2025-08-15 ENCOUNTER — PATIENT MESSAGE (OUTPATIENT)
Dept: RESPIRATORY THERAPY | Facility: HOSPITAL | Age: 89
End: 2025-08-15
Payer: MEDICARE

## 2025-08-18 ENCOUNTER — ANESTHESIA (OUTPATIENT)
Dept: SURGERY | Facility: HOSPITAL | Age: 89
End: 2025-08-18
Payer: MEDICARE

## 2025-08-18 ENCOUNTER — HOSPITAL ENCOUNTER (OUTPATIENT)
Facility: HOSPITAL | Age: 89
Discharge: HOME | End: 2025-08-18
Attending: UROLOGY | Admitting: UROLOGY
Payer: MEDICARE

## 2025-08-18 VITALS
OXYGEN SATURATION: 97 % | HEART RATE: 60 BPM | DIASTOLIC BLOOD PRESSURE: 96 MMHG | HEIGHT: 63 IN | RESPIRATION RATE: 16 BRPM | WEIGHT: 166.13 LBS | BODY MASS INDEX: 29.44 KG/M2 | SYSTOLIC BLOOD PRESSURE: 161 MMHG | TEMPERATURE: 98 F

## 2025-08-18 DIAGNOSIS — N39.41 URGE INCONTINENCE: ICD-10-CM

## 2025-08-18 DIAGNOSIS — N39.41 URGE INCONTINENCE OF URINE: Primary | ICD-10-CM

## 2025-08-18 PROCEDURE — 63600175 PHARM REV CODE 636 W HCPCS: Mod: HCNC | Performed by: ANESTHESIOLOGY

## 2025-08-18 PROCEDURE — 71000015 HC POSTOP RECOV 1ST HR: Mod: HCNC | Performed by: UROLOGY

## 2025-08-18 PROCEDURE — 36000706: Mod: HCNC | Performed by: UROLOGY

## 2025-08-18 PROCEDURE — 37000009 HC ANESTHESIA EA ADD 15 MINS: Mod: HCNC | Performed by: UROLOGY

## 2025-08-18 PROCEDURE — 63600175 PHARM REV CODE 636 W HCPCS: Mod: HCNC | Performed by: NURSE ANESTHETIST, CERTIFIED REGISTERED

## 2025-08-18 PROCEDURE — 63600175 PHARM REV CODE 636 W HCPCS: Mod: JZ,TB,HCNC | Performed by: UROLOGY

## 2025-08-18 PROCEDURE — 71000033 HC RECOVERY, INTIAL HOUR: Mod: HCNC | Performed by: UROLOGY

## 2025-08-18 PROCEDURE — 52287 CYSTOSCOPY CHEMODENERVATION: CPT | Mod: ,,, | Performed by: UROLOGY

## 2025-08-18 PROCEDURE — 63600175 PHARM REV CODE 636 W HCPCS: Mod: HCNC | Performed by: UROLOGY

## 2025-08-18 PROCEDURE — 36000707: Mod: HCNC | Performed by: UROLOGY

## 2025-08-18 PROCEDURE — 37000008 HC ANESTHESIA 1ST 15 MINUTES: Mod: HCNC | Performed by: UROLOGY

## 2025-08-18 RX ORDER — ALBUTEROL SULFATE 0.83 MG/ML
2.5 SOLUTION RESPIRATORY (INHALATION) EVERY 4 HOURS PRN
Status: DISCONTINUED | OUTPATIENT
Start: 2025-08-18 | End: 2025-08-18 | Stop reason: HOSPADM

## 2025-08-18 RX ORDER — ONDANSETRON HYDROCHLORIDE 2 MG/ML
4 INJECTION, SOLUTION INTRAVENOUS ONCE AS NEEDED
Status: DISCONTINUED | OUTPATIENT
Start: 2025-08-18 | End: 2025-08-18 | Stop reason: HOSPADM

## 2025-08-18 RX ORDER — FENTANYL CITRATE 50 UG/ML
25 INJECTION, SOLUTION INTRAMUSCULAR; INTRAVENOUS EVERY 5 MIN PRN
Status: DISCONTINUED | OUTPATIENT
Start: 2025-08-18 | End: 2025-08-18 | Stop reason: HOSPADM

## 2025-08-18 RX ORDER — PROPOFOL 10 MG/ML
VIAL (ML) INTRAVENOUS
Status: DISCONTINUED | OUTPATIENT
Start: 2025-08-18 | End: 2025-08-18

## 2025-08-18 RX ORDER — PROPOFOL 10 MG/ML
VIAL (ML) INTRAVENOUS CONTINUOUS PRN
Status: DISCONTINUED | OUTPATIENT
Start: 2025-08-18 | End: 2025-08-18

## 2025-08-18 RX ORDER — HYDROCODONE BITARTRATE AND ACETAMINOPHEN 5; 325 MG/1; MG/1
1 TABLET ORAL
Status: DISCONTINUED | OUTPATIENT
Start: 2025-08-18 | End: 2025-08-18 | Stop reason: HOSPADM

## 2025-08-18 RX ORDER — CIPROFLOXACIN 2 MG/ML
INJECTION, SOLUTION INTRAVENOUS
Status: COMPLETED
Start: 2025-08-18 | End: 2025-08-18

## 2025-08-18 RX ORDER — GLUCAGON 1 MG
1 KIT INJECTION
Status: DISCONTINUED | OUTPATIENT
Start: 2025-08-18 | End: 2025-08-18 | Stop reason: HOSPADM

## 2025-08-18 RX ORDER — DIPHENHYDRAMINE HYDROCHLORIDE 50 MG/ML
25 INJECTION, SOLUTION INTRAMUSCULAR; INTRAVENOUS EVERY 6 HOURS PRN
Status: DISCONTINUED | OUTPATIENT
Start: 2025-08-18 | End: 2025-08-18 | Stop reason: HOSPADM

## 2025-08-18 RX ORDER — FENTANYL CITRATE 50 UG/ML
INJECTION, SOLUTION INTRAMUSCULAR; INTRAVENOUS
Status: DISCONTINUED | OUTPATIENT
Start: 2025-08-18 | End: 2025-08-18

## 2025-08-18 RX ORDER — LIDOCAINE HYDROCHLORIDE 20 MG/ML
JELLY TOPICAL
Status: DISCONTINUED
Start: 2025-08-18 | End: 2025-08-18 | Stop reason: HOSPADM

## 2025-08-18 RX ORDER — LIDOCAINE HYDROCHLORIDE 20 MG/ML
INJECTION, SOLUTION EPIDURAL; INFILTRATION; INTRACAUDAL; PERINEURAL
Status: DISCONTINUED | OUTPATIENT
Start: 2025-08-18 | End: 2025-08-18

## 2025-08-18 RX ORDER — CIPROFLOXACIN 2 MG/ML
400 INJECTION, SOLUTION INTRAVENOUS
Status: COMPLETED | OUTPATIENT
Start: 2025-08-18 | End: 2025-08-18

## 2025-08-18 RX ADMIN — FENTANYL CITRATE 25 MCG: 50 INJECTION, SOLUTION INTRAMUSCULAR; INTRAVENOUS at 12:08

## 2025-08-18 RX ADMIN — PROPOFOL 30 MG: 10 INJECTION, EMULSION INTRAVENOUS at 12:08

## 2025-08-18 RX ADMIN — CIPROFLOXACIN 400 MG: 2 INJECTION, SOLUTION INTRAVENOUS at 12:08

## 2025-08-18 RX ADMIN — SODIUM CHLORIDE, POTASSIUM CHLORIDE, SODIUM LACTATE AND CALCIUM CHLORIDE: 600; 310; 30; 20 INJECTION, SOLUTION INTRAVENOUS at 12:08

## 2025-08-18 RX ADMIN — PROPOFOL 50 MCG/KG/MIN: 10 INJECTION, EMULSION INTRAVENOUS at 12:08

## 2025-08-18 RX ADMIN — DIPHENHYDRAMINE HYDROCHLORIDE 25 MG: 50 INJECTION INTRAMUSCULAR; INTRAVENOUS at 01:08

## 2025-08-18 RX ADMIN — LIDOCAINE HYDROCHLORIDE 70 MG: 20 INJECTION, SOLUTION EPIDURAL; INFILTRATION; INTRACAUDAL; PERINEURAL at 12:08

## 2025-08-19 ENCOUNTER — TELEPHONE (OUTPATIENT)
Dept: UROLOGY | Facility: CLINIC | Age: 89
End: 2025-08-19
Payer: MEDICARE

## 2025-08-19 ENCOUNTER — PATIENT MESSAGE (OUTPATIENT)
Dept: UROLOGY | Facility: CLINIC | Age: 89
End: 2025-08-19
Payer: MEDICARE

## 2025-08-25 ENCOUNTER — TELEPHONE (OUTPATIENT)
Dept: DERMATOLOGY | Facility: CLINIC | Age: 89
End: 2025-08-25
Payer: MEDICARE

## (undated) DEVICE — TUBING SUCTION STRAIGHT .25X20

## (undated) DEVICE — CONTAINER SPECIMEN OR STER 4OZ

## (undated) DEVICE — NDL INJETAK ADJ TIP 70CM

## (undated) DEVICE — DRAPE T CYSTOSCOPY STERILE

## (undated) DEVICE — LEGGING CLEAR POLY 2/PACK

## (undated) DEVICE — Device

## (undated) DEVICE — SEE L#152161

## (undated) DEVICE — GOWN POLY REINF BRTH SLV XL

## (undated) DEVICE — KIT TURNOVER

## (undated) DEVICE — SOL WATER STRL IRR 1000ML

## (undated) DEVICE — TRAY SKIN SCRUB WET PREMIUM

## (undated) DEVICE — BOWL STERILE LARGE 32OZ

## (undated) DEVICE — GLOVE SURG ULTRA TOUCH 6

## (undated) DEVICE — UROVIEW 2600/2800

## (undated) DEVICE — MARKER SKIN RULER STERILE

## (undated) DEVICE — SYR ONLY LUER LOCK 20CC

## (undated) DEVICE — SET IRR URLGY 2LINE UNIV SPIKE

## (undated) DEVICE — NDL BLNT STD 1.5IN 18G

## (undated) DEVICE — SYR 10CC LUER LOCK

## (undated) DEVICE — SPONGE COTTON TRAY 4X4IN